# Patient Record
Sex: MALE | Race: BLACK OR AFRICAN AMERICAN | NOT HISPANIC OR LATINO | Employment: OTHER | URBAN - METROPOLITAN AREA
[De-identification: names, ages, dates, MRNs, and addresses within clinical notes are randomized per-mention and may not be internally consistent; named-entity substitution may affect disease eponyms.]

---

## 2017-01-13 ENCOUNTER — INITIAL CONSULT (OUTPATIENT)
Dept: WOUND CARE | Facility: CLINIC | Age: 67
End: 2017-01-13
Payer: MEDICARE

## 2017-01-13 VITALS — DIASTOLIC BLOOD PRESSURE: 73 MMHG | TEMPERATURE: 98 F | SYSTOLIC BLOOD PRESSURE: 130 MMHG | HEART RATE: 60 BPM

## 2017-01-13 DIAGNOSIS — L89.620 DECUBITUS ULCER OF LEFT HEEL, UNSTAGEABLE: ICD-10-CM

## 2017-01-13 DIAGNOSIS — R60.0 BILATERAL LEG EDEMA: ICD-10-CM

## 2017-01-13 DIAGNOSIS — L97.509 ULCER OF OTHER PART OF FOOT: ICD-10-CM

## 2017-01-13 DIAGNOSIS — B35.3 TINEA PEDIS OF BOTH FEET: ICD-10-CM

## 2017-01-13 DIAGNOSIS — I70.25 ATHEROSCLEROSIS OF NATIVE ARTERIES OF THE EXTREMITIES WITH ULCERATION: ICD-10-CM

## 2017-01-13 PROBLEM — L89.600 DECUBITUS ULCER OF HEEL, UNSTAGEABLE: Status: ACTIVE | Noted: 2017-01-13

## 2017-01-13 PROCEDURE — 99203 OFFICE O/P NEW LOW 30 MIN: CPT | Mod: S$PBB,,, | Performed by: NURSE PRACTITIONER

## 2017-01-13 PROCEDURE — 99999 PR PBB SHADOW E&M-EST. PATIENT-LVL V: CPT | Mod: PBBFAC,,, | Performed by: NURSE PRACTITIONER

## 2017-01-13 PROCEDURE — 99215 OFFICE O/P EST HI 40 MIN: CPT | Mod: PBBFAC | Performed by: NURSE PRACTITIONER

## 2017-01-13 RX ORDER — ECONAZOLE NITRATE 10 MG/G
CREAM TOPICAL DAILY
Qty: 85 G | Refills: 0 | Status: ON HOLD | OUTPATIENT
Start: 2017-01-13 | End: 2017-05-01

## 2017-01-13 NOTE — MR AVS SNAPSHOT
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       These Medications        Disp Refills Start End    econazole nitrate 1 % cream 85 g 0 2017    Apply topically once daily. Apply to feet daily as directed. - Topical (Top)    Pharmacy: [facility] - [address] #: [phone]         [facility] On Call     [facility] On Call Nurse Care Line -  Assistance  Registered nurses in the [facility] On Call Center provide clinical advisement, health education, appointment booking, and other advisory services.  Call for this free service at [phone].             Medications           Message regarding Medications     Verify the changes and/or additions to your medication regime listed below are the same as discussed with your clinician today.  If any of these changes or additions are incorrect, please notify your healthcare provider.         START taking these NEW medications        Refills    econazole nitrate 1 % cream 0    Sig: Apply topically once daily. Apply to feet daily as directed.    Class: Normal    Route: Topical (Top)           Verify that the below list of medications is an accurate representation of the medications you are currently taking.  If none reported, the list may be blank. If incorrect, please contact your healthcare provider. Carry this list with you in case of emergency.           Current Medications     allopurinol (ZYLOPRIM) 300 MG tablet Take 300 mg by mouth.  Tablet Oral Every day    amiodarone (PACERONE) 200 MG Tab Take 200 mg by mouth once daily.     apixaban (ELIQUIS) 5 mg Tab Take 5 mg by mouth 2 (two) times daily.    aspirin 81 MG Chew Take 81 mg by mouth. 1 Tablet, Chewable Oral Every day    atorvastatin (LIPITOR) 40 MG tablet Take 40 mg by mouth. 1 Tablet Oral Every day    benzonatate (TESSALON) 100 MG capsule Take 100 mg by mouth 3 (three) times daily as needed for Cough.    carvedilol (COREG) 6.25 MG tablet Take 1 tablet (6.25 mg total) by mouth 2 (two) times daily.    econazole nitrate 1 % cream Apply topically once daily. Apply to feet daily as directed.    ferrous sulfate 325 (65 FE) MG EC tablet Take 1 tablet (325 mg total) by mouth 2 (two) times daily with meals.    fluticasone (FLONASE) 50 mcg/actuation nasal spray 2 sprays by Each Nare route once daily.    furosemide (LASIX) 40 MG tablet Take 1 tablet (40 mg total) by mouth once daily.    guaifenesin (MUCINEX) 600 mg 12 hr tablet Take 1 tablet (600 mg total) by mouth 2 (two) times daily.    insulin detemir (LEVEMIR) 100 unit/mL injection Inject 8 Units into the skin 2 (two) times daily.    insulin lispro (HUMALOG) 100 unit/mL injection Inject 5 Units into the skin 3 (three) times daily before meals.    lisinopril (PRINIVIL,ZESTRIL) 5 MG tablet Take 1 tablet (5 mg total) by mouth once daily. HOLD unless hypertensive    lisinopril (PRINIVIL,ZESTRIL) 5 MG  tablet Take 1 tablet (5 mg total) by mouth once daily.    metOLazone (ZAROXOLYN) 2.5 MG tablet Take 1 tablet (2.5 mg total) by mouth daily as needed (HOLD unless fluid overloaded).    ONE TOUCH ULTRA TEST Strp Test blood sugar four times daily    spironolactone (ALDACTONE) 25 MG tablet Take 1 tablet (25 mg total) by mouth once daily. HOLD unless fluid overloaded           Clinical Reference Information           Vital Signs - Last Recorded  Most recent update: 1/13/2017  3:59 PM by Denita Hendrix NP    Wt BMI             (P) 125.9 kg (277 lb 9.6 oz) (P) 43.57 kg/m2         Allergies as of 1/13/2017     No Known Allergies      Immunizations Administered on Date of Encounter - 1/13/2017     None      Orders Placed During Today's Visit     Future Labs/Procedures Expected by Expires    VAS Ankle Brachial Indices Resting  As directed 1/13/2018      Instructions    Spectazole cream to feet daily.  Cleanse left foot wounds with wound .  Betadine daily to left foot ulcers, cover with gauze and secure with roll gauze.

## 2017-01-13 NOTE — PATIENT INSTRUCTIONS
Spectazole cream to feet daily.  Cleanse left foot wounds with wound .  Betadine daily to left foot ulcers, cover with gauze and secure with roll gauze.

## 2017-01-13 NOTE — PROGRESS NOTES
Subjective:       Patient ID: Hemant Kennedy Jr. is a 66 y.o. male.    Chief Complaint: Wound Check    HPI   This is a 66 year old male referred for evaluation and management of wounds to both feet.  He has been using gentian violet on the wounds and they are not healing. He has home health services through [facility].  He is afebrile.  He denies increased redness, swelling or purulent drainage.  His pain level is 8/10.  His wound healing is complicated by type II diabetes.    Review of Systems   Constitutional: Negative for chills, diaphoresis and fever.   HENT: Positive for rhinorrhea. Negative for hearing loss, postnasal drip, sinus pressure, sneezing, sore throat, tinnitus and trouble swallowing.    Eyes: Negative for visual disturbance.   Respiratory: Positive for apnea (CPAP). Negative for cough, shortness of breath and wheezing.    Cardiovascular: Positive for leg swelling. Negative for chest pain and palpitations.   Gastrointestinal: Negative for constipation, diarrhea, nausea and vomiting.   Genitourinary: Positive for difficulty urinating (Incontinence). Negative for dysuria, frequency and hematuria.   Musculoskeletal: Negative for arthralgias, back pain and joint swelling.   Skin: Positive for wound.   Neurological: Negative for dizziness, weakness, light-headedness and headaches.   Hematological: Bruises/bleeds easily.   Psychiatric/Behavioral: Positive for confusion, decreased concentration, dysphoric mood and sleep disturbance. The patient is not nervous/anxious.        Objective:      Physical Exam   Constitutional: He is oriented to person, place, and time. He appears well-developed and well-nourished. No distress.   HENT:   Head: Normocephalic and atraumatic.   Mouth/Throat: Oropharynx is clear and moist.   Eyes: Conjunctivae and EOM are normal. Pupils are equal, round, and reactive to light. Right eye exhibits no discharge. Left eye exhibits no discharge. No scleral icterus.   Neck:  Normal range of motion. Neck supple. No JVD present. No tracheal deviation present. No thyromegaly present.   Cardiovascular: Normal rate, regular rhythm and normal heart sounds.  Exam reveals no gallop and no friction rub.    No murmur heard.  Pedal pulses non-palpable   Pulmonary/Chest: Effort normal and breath sounds normal. No respiratory distress. He has no wheezes. He has no rales.   Abdominal: Soft. Bowel sounds are normal. He exhibits no distension. There is no tenderness.   Musculoskeletal: Normal range of motion. He exhibits edema (3-4+lower leg). He exhibits no tenderness.        Feet:    Neurological: He is alert and oriented to person, place, and time.   Skin: Skin is warm and dry. No rash noted. He is not diaphoretic. No erythema.   Psychiatric: He has a normal mood and affect. His behavior is normal. Judgment and thought content normal.   Nursing note and vitals reviewed.      Assessment:       1. Uncontrolled type 2 diabetes mellitus with complication, with long-term current use of insulin    2. Ulcer of other part of foot    3. Decubitus ulcer of left heel, unstageable    4. Atherosclerosis of native arteries of the extremities with ulceration    5. Tinea pedis of both feet    6. Bilateral leg edema        Plan:           ALFONSO/PVRs.   Spectazole cream to feet daily.  Cleanse left foot wounds with wound .  Betadine daily to left foot ulcers, cover with gauze and secure with roll gauze.    Return to clinic in 2 weeks.   [facility] notified [facility] orders via EP[facility] fax.    Left lateral foot    Left heel

## 2017-01-18 ENCOUNTER — TELEPHONE (OUTPATIENT)
Dept: WOUND CARE | Facility: CLINIC | Age: 67
End: 2017-01-18

## 2017-01-18 ENCOUNTER — OFFICE VISIT (OUTPATIENT)
Dept: INTERNAL MEDICINE | Facility: CLINIC | Age: 67
End: 2017-01-18
Payer: MEDICARE

## 2017-01-18 VITALS
SYSTOLIC BLOOD PRESSURE: 84 MMHG | HEART RATE: 63 BPM | WEIGHT: 274.06 LBS | DIASTOLIC BLOOD PRESSURE: 42 MMHG | BODY MASS INDEX: 43.01 KG/M2

## 2017-01-18 DIAGNOSIS — I95.9 HYPOTENSION, UNSPECIFIED HYPOTENSION TYPE: ICD-10-CM

## 2017-01-18 DIAGNOSIS — N18.3 CKD (CHRONIC KIDNEY DISEASE), STAGE 3 (MODERATE): ICD-10-CM

## 2017-01-18 DIAGNOSIS — E87.70 HYPERVOLEMIA, UNSPECIFIED HYPERVOLEMIA TYPE: ICD-10-CM

## 2017-01-18 DIAGNOSIS — I50.40 COMBINED SYSTOLIC AND DIASTOLIC CONGESTIVE HEART FAILURE, UNSPECIFIED CONGESTIVE HEART FAILURE CHRONICITY: ICD-10-CM

## 2017-01-18 DIAGNOSIS — G47.33 OSA ON CPAP: ICD-10-CM

## 2017-01-18 DIAGNOSIS — E11.22 TYPE 2 DIABETES MELLITUS WITH STAGE 3 CHRONIC KIDNEY DISEASE, WITHOUT LONG-TERM CURRENT USE OF INSULIN: Primary | ICD-10-CM

## 2017-01-18 DIAGNOSIS — I48.91 ATRIAL FIBRILLATION, UNSPECIFIED TYPE: ICD-10-CM

## 2017-01-18 DIAGNOSIS — N18.30 TYPE 2 DIABETES MELLITUS WITH STAGE 3 CHRONIC KIDNEY DISEASE, WITHOUT LONG-TERM CURRENT USE OF INSULIN: Primary | ICD-10-CM

## 2017-01-18 PROCEDURE — 99999 PR PBB SHADOW E&M-EST. PATIENT-LVL III: CPT | Mod: PBBFAC,GC,, | Performed by: STUDENT IN AN ORGANIZED HEALTH CARE EDUCATION/TRAINING PROGRAM

## 2017-01-18 PROCEDURE — 99214 OFFICE O/P EST MOD 30 MIN: CPT | Mod: S$PBB,GC,, | Performed by: STUDENT IN AN ORGANIZED HEALTH CARE EDUCATION/TRAINING PROGRAM

## 2017-01-18 PROCEDURE — 99213 OFFICE O/P EST LOW 20 MIN: CPT | Mod: PBBFAC | Performed by: STUDENT IN AN ORGANIZED HEALTH CARE EDUCATION/TRAINING PROGRAM

## 2017-01-18 RX ORDER — CARVEDILOL 6.25 MG/1
12.5 TABLET ORAL 2 TIMES DAILY
Qty: 60 TABLET | Refills: 11
Start: 2017-01-18 | End: 2017-02-24 | Stop reason: DRUGHIGH

## 2017-01-18 RX ORDER — FUROSEMIDE 40 MG/1
40 TABLET ORAL DAILY
Qty: 180 TABLET | Refills: 3 | Status: ON HOLD
Start: 2017-01-18 | End: 2017-05-03 | Stop reason: HOSPADM

## 2017-01-18 RX ORDER — GABAPENTIN 300 MG/1
300 CAPSULE ORAL 3 TIMES DAILY
Qty: 90 CAPSULE | Refills: 11 | Status: ON HOLD | OUTPATIENT
Start: 2017-01-18 | End: 2020-12-16 | Stop reason: HOSPADM

## 2017-01-18 RX ORDER — LISINOPRIL 5 MG/1
5 TABLET ORAL DAILY
Qty: 90 TABLET | Refills: 3 | Status: SHIPPED | OUTPATIENT
Start: 2017-01-18 | End: 2017-02-24 | Stop reason: SDUPTHER

## 2017-01-18 NOTE — TELEPHONE ENCOUNTER
----- Message from Chandra Fontanez sent at 1/18/2017  2:09 PM CST -----  Contact: Morelia with [facility]  Caller said pt was seen on 1/13/17 and mentioned anti fungal cream. Pt didn't have a script and they want to know was it sent to a pharmacy or what they need to do. Please call Morelia at [phone]

## 2017-01-18 NOTE — PROGRESS NOTES
Subjective:       Patient ID: Hemant Kennedy Jr. is a 66 y.o. male.    Chief Complaint: Annual Exam    HPI Comments: Mr. Kenneyd is a 66 yro man with PMH DM2, foot ulcer, sacral ulcer, CKD 3, afib, systolic and diastolic HF, HTN, ERIC, CVA, gout, and anemia who presents to clinic to establish care. He was recently hospitalized at the end of November for sepsis due to PNA that was complicated by an HUMERA. He states since his hospitalization, he has been having pain in his left foot due to two ulcers, one on his left heal and the other on his left lateral forefoot. He states he has taken tylenol without relief of the pain. Home Health nursing visits his home several times a week to care for the wounds and change the dressings. Additionally, he has seen the Wound Care nurse within the past week. He states his diabetes is well controlled with 8 levemir twice a day and 5 Humalog prandial. He measures his blood sugars daily and they exhibit good control.   He reports that his BP usually runs ~120/70s. Today his BP is 84/42 automatic and 100/50 manual. He denies syncope, falls, light headedness, or fatigue. He takes lasix 40 and metolazone 2.5 PRN for LE edema. He reports a 20 lb weight gain over the past month. He was sent home with a scale from his past hospital admission, but he states he is unable to use it because he cannot stand.  He endorses bilateral LE edema that he states has been stable over the past weeks. He states he has good urine output and is feeling annoyed by how frequently he has to urinate due to his lasix. He has not seen a cardiologist since his hospital admission. He denies SOB, abdominal distention, CP, or palpitations. He has been wheelchair bound since about 2005 due to arthritis in his knees.       Review of Systems   Constitutional: Positive for unexpected weight change. Negative for activity change, chills, fatigue and fever.   HENT: Negative for congestion and rhinorrhea.    Eyes: Negative for  pain and itching.   Respiratory: Negative for cough and shortness of breath.    Cardiovascular: Positive for leg swelling. Negative for chest pain and palpitations.   Gastrointestinal: Negative for abdominal distention, abdominal pain, constipation and diarrhea.   Endocrine: Negative for cold intolerance and heat intolerance.   Genitourinary: Negative for dysuria, frequency and hematuria.   Musculoskeletal: Negative for arthralgias and myalgias.   Skin: Negative for pallor and rash.   Allergic/Immunologic: Negative for environmental allergies and food allergies.   Neurological: Negative for numbness and headaches.   Hematological: Negative for adenopathy. Does not bruise/bleed easily.   Psychiatric/Behavioral: Negative for agitation and behavioral problems.       Past Medical History   Diagnosis Date    *Atrial fibrillation     Atrial fibrillation     Cardiomyopathy     CHF (congestive heart failure)     Diabetes mellitus     DVT (deep venous thrombosis)     Hypertension     Personal history of stroke with residual effects - Right Occipital 7/25/2012    Sleep apnea     Stroke      Family History   Problem Relation Age of Onset    Heart attack Mother      No past surgical history on file.     Medication List with Changes/Refills   New Medications    GABAPENTIN (NEURONTIN) 300 MG CAPSULE    Take 1 capsule (300 mg total) by mouth 3 (three) times daily.   Current Medications    ALLOPURINOL (ZYLOPRIM) 300 MG TABLET    Take 300 mg by mouth.  Tablet Oral Every day    AMIODARONE (PACERONE) 200 MG TAB    Take 200 mg by mouth once daily.     APIXABAN (ELIQUIS) 5 MG TAB    Take 5 mg by mouth 2 (two) times daily.    ASPIRIN 81 MG CHEW    Take 81 mg by mouth. 1 Tablet, Chewable Oral Every day    ATORVASTATIN (LIPITOR) 40 MG TABLET    Take 40 mg by mouth. 1 Tablet Oral Every day    BENZONATATE (TESSALON) 100 MG CAPSULE    Take 100 mg by mouth 3 (three) times daily as needed for Cough.    ECONAZOLE NITRATE 1 % CREAM     Apply topically once daily. Apply to feet daily as directed.    FERROUS SULFATE 325 (65 FE) MG EC TABLET    Take 1 tablet (325 mg total) by mouth 2 (two) times daily with meals.    FLUTICASONE (FLONASE) 50 MCG/ACTUATION NASAL SPRAY    2 sprays by Each Nare route once daily.    GUAIFENESIN (MUCINEX) 600 MG 12 HR TABLET    Take 1 tablet (600 mg total) by mouth 2 (two) times daily.    INSULIN DETEMIR (LEVEMIR) 100 UNIT/ML INJECTION    Inject 8 Units into the skin 2 (two) times daily.    INSULIN LISPRO (HUMALOG) 100 UNIT/ML INJECTION    Inject 5 Units into the skin 3 (three) times daily before meals.    METOLAZONE (ZAROXOLYN) 2.5 MG TABLET    Take 1 tablet (2.5 mg total) by mouth daily as needed (HOLD unless fluid overloaded).    ONE TOUCH ULTRA TEST STRP    Test blood sugar four times daily    SPIRONOLACTONE (ALDACTONE) 25 MG TABLET    Take 1 tablet (25 mg total) by mouth once daily. HOLD unless fluid overloaded   Changed and/or Refilled Medications    Modified Medication Previous Medication    CARVEDILOL (COREG) 6.25 MG TABLET carvedilol (COREG) 6.25 MG tablet       Take 2 tablets (12.5 mg total) by mouth 2 (two) times daily.    Take 1 tablet (6.25 mg total) by mouth 2 (two) times daily.    FUROSEMIDE (LASIX) 40 MG TABLET furosemide (LASIX) 40 MG tablet       Take 1 tablet (40 mg total) by mouth once daily.    Take 1 tablet (40 mg total) by mouth once daily.    LISINOPRIL (PRINIVIL,ZESTRIL) 5 MG TABLET lisinopril (PRINIVIL,ZESTRIL) 5 MG tablet       Take 1 tablet (5 mg total) by mouth once daily.    Take 1 tablet (5 mg total) by mouth once daily.               Objective:       Visit Vitals    BP (!) 84/42 (BP Location: Left arm, Patient Position: Sitting, BP Method: Automatic)    Pulse 63    Wt 124.3 kg (274 lb 0.5 oz)    BMI 43.01 kg/m2       Physical Exam   Constitutional: He is oriented to person, place, and time. He appears well-developed and well-nourished. No distress.   HENT:   Head: Normocephalic and  atraumatic.   Nose: Nose normal.   Mouth/Throat: Oropharynx is clear and moist. No oropharyngeal exudate.   Eyes: Conjunctivae and EOM are normal. Pupils are equal, round, and reactive to light. No scleral icterus.   Neck: Neck supple. No JVD present. No thyromegaly present.   Cardiovascular: Normal rate, regular rhythm, normal heart sounds and intact distal pulses.    No murmur heard.  Pulmonary/Chest: Effort normal and breath sounds normal. No respiratory distress. He has no wheezes. He has no rales.   Abdominal: Soft. Bowel sounds are normal. He exhibits no distension. There is no tenderness.   Musculoskeletal: Normal range of motion. He exhibits edema. He exhibits no deformity.   Bilateral pitting edema to knees. Left foot wrapped with dressings for ulcer on left heal and left lateral forefoot.    Lymphadenopathy:     He has no cervical adenopathy.   Neurological: He is alert and oriented to person, place, and time.   Skin: Skin is warm and dry. No rash noted. No erythema.       Assessment:       1. Type 2 diabetes mellitus with stage 3 chronic kidney disease, without long-term current use of insulin    2. Ulcer    3. Combined systolic and diastolic congestive heart failure, unspecified congestive heart failure chronicity    4. CKD (chronic kidney disease), stage 3 (moderate)    5. Hypotension, unspecified hypotension type    6. ERIC on CPAP    7. Hypervolemia, unspecified hypervolemia type    8. Atrial fibrillation, unspecified type        Plan:         Type 2 diabetes mellitus with stage 3 chronic kidney disease, without long-term current use of insulin  -     Lipid panel; Future; Expected date: 1/18/17  -     Ambulatory Referral to Endocrinology  -     gabapentin (NEURONTIN) 300 MG capsule; Take 1 capsule (300 mg total) by mouth 3 (three) times daily.  Dispense: 90 capsule; Refill: 11  -     Ambulatory Referral to Optometry  -    Continue levemir 8 BID and Humalog 5 prandial     Ulcer        -   Home health  nursing visits his home several times a week to care for wounds and changes wound dressings        -   Follows with Wound Care, last seen by wound care nurse this week        -   Pain not adequately addressed with tylenol, start gabapentin 300 TID    Combined systolic and diastolic congestive heart failure, unspecified congestive heart failure chronicity  -     Ambulatory consult to Cardiology  -     Refilled medications below:  -     carvedilol (COREG) 6.25 MG tablet; Take 2 tablets (12.5 mg total) by mouth 2 (two) times daily.  Dispense: 60 tablet; Refill: 11  -     furosemide (LASIX) 40 MG tablet; Take 1 tablet (40 mg total) by mouth once daily.  Dispense: 180 tablet; Refill: 3        -     lisinopril (PRINIVIL,ZESTRIL) 5 MG tablet; Take 1 tablet (5 mg total) by mouth once daily.  Dispense: 90 tablet; Refill: 3    CKD (chronic kidney disease), stage 3 (moderate)  -     COMPREHENSIVE METABOLIC PANEL; Future; Expected date: 1/18/17  -     Follows with Dr. Barker in nephrology, last seen within the past mo and was instructed to restart all anti-HTN medications after hospital discharge on 11/30    Hypotension, unspecified hypotension type       -     Currently taking coreg 12.5 BID, lasix 40, lisinopril 5 mg, spirnolactone 25 mg, metolazone 2.5 PRN for LE edema       -     Consulted cardiology for further recommendations, last seen by Dr. Joseph in cardiology clinic    ERIC on CPAP        -     Continue nightly CPAP and home oxygen    Hypervolemia, unspecified hypervolemia type        -     Reports significant weight gain over past month- about 20 lbs        -     Currently taking lasix 40 mg and metolazone 2.5 PRN for LE edema        -     States he follows a low salt diet, but is not fluid restricted         -     Unable to measure his weight on scale he was given because he cannot stand. Requesting scale with arm rails for support        -     Consulted cardiology for further recommendations    NUNO Sanchez       -     Continue apixiban 5 mg and amdiodarone 200 mg    CAD        -    Continue ASA 81, atorvastatin 40    Gout       -     Continue Allopurinol 300 mg    Patient seen with Dr. RANDALL Cooper MD  IM PGY-1

## 2017-01-18 NOTE — MR AVS SNAPSHOT
[facility] - Internal Medicine  [address]  [place] [address]  Phone: [phone]  Fax: [phone]                  Hemant Kennedy Jr.   2017 1:15 PM   Office Visit    Description:  Male : 1950   Provider:  Vickie Cooper MD   Department:  [facility] - Internal Medicine           Reason for Visit     Annual Exam           Diagnoses this Visit        Comments    Type 2 diabetes mellitus with stage 3 chronic kidney disease, without long-term current use of insulin    -  Primary     Ulcer         Health care maintenance         Combined systolic and diastolic congestive heart failure, unspecified congestive heart failure chronicity         CKD (chronic kidney disease), stage 3 (moderate)         Hypotension, unspecified hypotension type                To Do List           Future Appointments        Provider Department Dept Phone    2017 11:00 AM VASCULAR, LAB [facility] - Vascular Laboratory [phone]    2017 12:40 PM Denita Hendrix NP [facility] - Wound Care [phone]    2017 10:30 AM Karne Mcdermott OD [facility] - Optometry [phone]    2017 4:00 PM Viri Chambers MD [facility] - Cardiology [phone]    3/29/2017 1:00 PM Lakisha Plummer MD [facility] - Nephrology [phone]      Goals (5 Years of Data)     None      Follow-Up and Disposition     Return in about 3 months (around 2017), or if symptoms worsen or fail to improve.       These Medications        Disp Refills Start End    gabapentin (NEURONTIN) 300 MG capsule 90 capsule 11 2017    Take 1 capsule (300 mg total) by mouth 3 (three) times daily. - Oral    Pharmacy: [facility] [id number] [address] [facility] AT [facility] Ph #: [phone]       carvedilol (COREG) 6.25 MG tablet 60 tablet 11 2017    Take 2 tablets (12.5 mg total) by mouth 2 (two) times daily. - Oral    Pharmacy: [facility] [id number] - [facility]  [address] AT [facility] Ph #: [phone]       lisinopril (PRINIVIL,ZESTRIL) 5 MG tablet 90 tablet 3 1/18/2017 1/18/2018    Take 1 tablet (5 mg total) by mouth once daily. - Oral    Pharmacy: [facility] [address] [place] [address] AT [facility] Ph #: [phone]       furosemide (LASIX) 40 MG tablet 180 tablet 3 1/18/2017     Take 1 tablet (40 mg total) by mouth once daily. - Oral    Pharmacy: [facility] [address] AT [facility] Ph #: [phone]         [facility]     [facility] On Call Nurse Care Line - 24/7 Assistance  Registered nurses in the [facility] Center provide clinical advisement, health education, appointment booking, and other advisory services.  Call for this free service at [phone].             Medications           Message regarding Medications     Verify the changes and/or additions to your medication regime listed below are the same as discussed with your clinician today.  If any of these changes or additions are incorrect, please notify your healthcare provider.        START taking these NEW medications        Refills    gabapentin (NEURONTIN) 300 MG capsule 11    Sig: Take 1 capsule (300 mg total) by mouth 3 (three) times daily.    Class: Normal    Route: Oral      CHANGE how you are taking these medications     Start Taking Instead of    carvedilol (COREG) 6.25 MG tablet carvedilol (COREG) 6.25 MG tablet    Dosage:  Take 2 tablets (12.5 mg total) by mouth 2 (two) times daily. Dosage:  Take 1 tablet (6.25 mg total) by mouth 2 (two) times daily.    Reason for Change:  Reorder            Verify that the below list of medications is an accurate representation of the medications you are currently taking.  If none reported, the list may be blank. If incorrect, please contact your healthcare provider. Carry this list with you in case of emergency.            Current Medications     allopurinol (ZYLOPRIM) 300 MG tablet Take 300 mg by mouth.  Tablet Oral Every day    amiodarone (PACERONE) 200 MG Tab Take 200 mg by mouth once daily.     apixaban (ELIQUIS) 5 mg Tab Take 5 mg by mouth 2 (two) times daily.    aspirin 81 MG Chew Take 81 mg by mouth. 1 Tablet, Chewable Oral Every day    atorvastatin (LIPITOR) 40 MG tablet Take 40 mg by mouth. 1 Tablet Oral Every day    benzonatate (TESSALON) 100 MG capsule Take 100 mg by mouth 3 (three) times daily as needed for Cough.    carvedilol (COREG) 6.25 MG tablet Take 2 tablets (12.5 mg total) by mouth 2 (two) times daily.    econazole nitrate 1 % cream Apply topically once daily. Apply to feet daily as directed.    ferrous sulfate 325 (65 FE) MG EC tablet Take 1 tablet (325 mg total) by mouth 2 (two) times daily with meals.    fluticasone (FLONASE) 50 mcg/actuation nasal spray 2 sprays by Each Nare route once daily.    furosemide (LASIX) 40 MG tablet Take 1 tablet (40 mg total) by mouth once daily.    insulin detemir (LEVEMIR) 100 unit/mL injection Inject 8 Units into the skin 2 (two) times daily.    insulin lispro (HUMALOG) 100 unit/mL injection Inject 5 Units into the skin 3 (three) times daily before meals.    lisinopril (PRINIVIL,ZESTRIL) 5 MG tablet Take 1 tablet (5 mg total) by mouth once daily.    metOLazone (ZAROXOLYN) 2.5 MG tablet Take 1 tablet (2.5 mg total) by mouth daily as needed (HOLD unless fluid overloaded).    ONE TOUCH ULTRA TEST Strp Test blood sugar four times daily    spironolactone (ALDACTONE) 25 MG tablet Take 1 tablet (25 mg total) by mouth once daily. HOLD unless fluid overloaded    gabapentin (NEURONTIN) 300 MG capsule Take 1 capsule (300 mg total) by mouth 3 (three) times daily.    guaifenesin (MUCINEX) 600 mg 12 hr tablet Take 1 tablet (600 mg total) by mouth 2 (two) times daily.           Clinical Reference Information           Vital Signs - Last Recorded  Most recent update: 1/18/2017  1:28 PM by  Steph Mcclendon MA    BP Pulse Wt BMI       (!) 84/42 (BP Location: Left arm, Patient Position: Sitting, BP Method: Automatic) 63 124.3 kg (274 lb 0.5 oz) 43.01 kg/m2       Blood Pressure          Most Recent Value    BP  (!)  84/42      Allergies as of 1/18/2017     No Known Allergies      Immunizations Administered on Date of Encounter - 1/18/2017     None      Orders Placed During Today's Visit      Normal Orders This Visit    Ambulatory consult to Cardiology     Ambulatory Referral to Endocrinology     Ambulatory Referral to Optometry     Future Labs/Procedures Expected by Expires    COMPREHENSIVE METABOLIC PANEL  1/18/2017 3/19/2018    Lipid panel  1/18/2017 3/19/2018

## 2017-01-19 DIAGNOSIS — I50.20 SYSTOLIC CONGESTIVE HEART FAILURE, UNSPECIFIED CONGESTIVE HEART FAILURE CHRONICITY: Primary | ICD-10-CM

## 2017-01-19 NOTE — PROGRESS NOTES
Patient examined, record reviewed, agree with findings and recommendations as documented above. Complicated medical history with multiple active conditions. Today, weight is significantly heavier than on discharge from hospital and also significantly heavier than last cardiology visit. Lower extremity edema bilaterally. Difficulty weighing at home as he does not have an appropriate scale with handles for balance while in standing position. Refilled medications. Message to cardiology regarding increase in weight likely due to HF. Obtain bloodwork today.

## 2017-01-24 ENCOUNTER — LAB VISIT (OUTPATIENT)
Dept: LAB | Facility: HOSPITAL | Age: 67
End: 2017-01-24
Attending: HOSPITALIST
Payer: MEDICARE

## 2017-01-24 ENCOUNTER — OFFICE VISIT (OUTPATIENT)
Dept: CARDIOLOGY | Facility: CLINIC | Age: 67
End: 2017-01-24
Payer: MEDICARE

## 2017-01-24 VITALS
BODY MASS INDEX: 45.88 KG/M2 | HEIGHT: 65 IN | HEART RATE: 62 BPM | WEIGHT: 275.38 LBS | SYSTOLIC BLOOD PRESSURE: 103 MMHG | DIASTOLIC BLOOD PRESSURE: 51 MMHG

## 2017-01-24 DIAGNOSIS — I42.8 CARDIOMYOPATHY, NONISCHEMIC: ICD-10-CM

## 2017-01-24 DIAGNOSIS — E78.5 HYPERLIPIDEMIA, UNSPECIFIED HYPERLIPIDEMIA TYPE: ICD-10-CM

## 2017-01-24 DIAGNOSIS — R60.0 BILATERAL LEG EDEMA: ICD-10-CM

## 2017-01-24 DIAGNOSIS — N18.30 CKD (CHRONIC KIDNEY DISEASE) STAGE 3, GFR 30-59 ML/MIN: ICD-10-CM

## 2017-01-24 DIAGNOSIS — Z09 HOSPITAL DISCHARGE FOLLOW-UP: Primary | ICD-10-CM

## 2017-01-24 DIAGNOSIS — I10 ESSENTIAL HYPERTENSION: ICD-10-CM

## 2017-01-24 DIAGNOSIS — I50.20 SYSTOLIC CONGESTIVE HEART FAILURE, UNSPECIFIED CONGESTIVE HEART FAILURE CHRONICITY: ICD-10-CM

## 2017-01-24 DIAGNOSIS — I63.9 CEREBROVASCULAR ACCIDENT (CVA), UNSPECIFIED MECHANISM: ICD-10-CM

## 2017-01-24 DIAGNOSIS — I48.91 ATRIAL FIBRILLATION, UNSPECIFIED TYPE: ICD-10-CM

## 2017-01-24 DIAGNOSIS — I50.22 CHRONIC SYSTOLIC HEART FAILURE: ICD-10-CM

## 2017-01-24 DIAGNOSIS — E66.01 MORBID OBESITY DUE TO EXCESS CALORIES: ICD-10-CM

## 2017-01-24 PROBLEM — I70.25 ATHEROSCLEROSIS OF NATIVE ARTERIES OF THE EXTREMITIES WITH ULCERATION: Status: RESOLVED | Noted: 2017-01-13 | Resolved: 2017-01-24

## 2017-01-24 LAB
ANION GAP SERPL CALC-SCNC: 7 MMOL/L
BNP SERPL-MCNC: 127 PG/ML
BUN SERPL-MCNC: 40 MG/DL
CALCIUM SERPL-MCNC: 8.6 MG/DL
CHLORIDE SERPL-SCNC: 108 MMOL/L
CO2 SERPL-SCNC: 27 MMOL/L
CREAT SERPL-MCNC: 1.9 MG/DL
EST. GFR  (AFRICAN AMERICAN): 41 ML/MIN/1.73 M^2
EST. GFR  (NON AFRICAN AMERICAN): 35.4 ML/MIN/1.73 M^2
GLUCOSE SERPL-MCNC: 105 MG/DL
POTASSIUM SERPL-SCNC: 4.6 MMOL/L
SODIUM SERPL-SCNC: 142 MMOL/L

## 2017-01-24 PROCEDURE — 99213 OFFICE O/P EST LOW 20 MIN: CPT | Mod: PBBFAC | Performed by: INTERNAL MEDICINE

## 2017-01-24 PROCEDURE — 93010 ELECTROCARDIOGRAM REPORT: CPT | Mod: S$PBB,,, | Performed by: INTERNAL MEDICINE

## 2017-01-24 PROCEDURE — 99214 OFFICE O/P EST MOD 30 MIN: CPT | Mod: S$PBB,GC,, | Performed by: INTERNAL MEDICINE

## 2017-01-24 PROCEDURE — 93005 ELECTROCARDIOGRAM TRACING: CPT | Mod: PBBFAC | Performed by: INTERNAL MEDICINE

## 2017-01-24 PROCEDURE — 99999 PR PBB SHADOW E&M-EST. PATIENT-LVL III: CPT | Mod: PBBFAC,GC,, | Performed by: INTERNAL MEDICINE

## 2017-01-24 NOTE — PROGRESS NOTES
I have personally taken the history and examined this patient and agree with the fellow's and resident's note as stated above. If creatinine trends 2.0 or above, would stop spironolactone.

## 2017-01-24 NOTE — MR AVS SNAPSHOT
[facility] - Cardiology  [address] [facility] [address]  Phone: [phone]                  Hematn Kennedy Jr.   2017 11:00 AM   Office Visit    Description:  Male : 1950   Provider:  Luther Brandt MD   Department:  [facility] - Cardiology           Reason for Visit     Chronic systolic congestive heart failure, NYHA class 2           Diagnoses this Visit        Comments    Morbid obesity due to excess calories    -  Primary     Bilateral leg edema         CKD (chronic kidney disease) stage 3, GFR 30-59 ml/min         Cardiomyopathy, nonischemic         Atrial fibrillation, unspecified type         Essential hypertension                To Do List           Future Appointments        Provider Department Dept Phone    2017 11:00 AM VASCULAR, LAB [facility] - Vascular Laboratory [phone]    2017 12:40 PM Denita Hendrix NP [facility] - Wound Care [phone]    2017 10:30 AM Karen Mcdermott OD [facility] - Optometry [phone]    2017 4:00 PM Viri Chambers MD [facility] - Cardiology [phone]    3/29/2017 1:00 PM Lakisha Plummer MD [facility] - Nephrology [phone]      Goals (5 Years of Data)     None      Follow-Up and Disposition     Return in about 1 month (around 2017).      [facility]     [facility] On Call Nurse [facility] -  Assistance  Registered nurses in the [facility] On Call Center provide clinical advisement, health education, appointment booking, and other advisory services.  Call for this free service at [phone].             Medications           Message regarding Medications     Verify the changes and/or additions to your medication regime listed below are the same as discussed with your clinician today.  If any of these changes or additions are incorrect, please notify your healthcare provider.        STOP taking these medications     benzonatate (TESSALON) 100 MG capsule Take 100 mg by mouth 3 (three) times daily as needed for  Cough.    guaifenesin (MUCINEX) 600 mg 12 hr tablet Take 1 tablet (600 mg total) by mouth 2 (two) times daily.           Verify that the below list of medications is an accurate representation of the medications you are currently taking.  If none reported, the list may be blank. If incorrect, please contact your healthcare provider. Carry this list with you in case of emergency.           Current Medications     allopurinol (ZYLOPRIM) 300 MG tablet Take 300 mg by mouth.  Tablet Oral Every day    amiodarone (PACERONE) 200 MG Tab Take 200 mg by mouth once daily.     apixaban (ELIQUIS) 5 mg Tab Take 5 mg by mouth 2 (two) times daily.    aspirin 81 MG Chew Take 81 mg by mouth. 1 Tablet, Chewable Oral Every day    atorvastatin (LIPITOR) 40 MG tablet Take 40 mg by mouth. 1 Tablet Oral Every day    econazole nitrate 1 % cream Apply topically once daily. Apply to feet daily as directed.    ferrous sulfate 325 (65 FE) MG EC tablet Take 1 tablet (325 mg total) by mouth 2 (two) times daily with meals.    fluticasone (FLONASE) 50 mcg/actuation nasal spray 2 sprays by Each Nare route once daily.    furosemide (LASIX) 40 MG tablet Take 1 tablet (40 mg total) by mouth once daily.    gabapentin (NEURONTIN) 300 MG capsule Take 1 capsule (300 mg total) by mouth 3 (three) times daily.    HYDROPHILIC CREAM (TRIAD WOUND DRESSING TOP) Apply topically daily as needed.    insulin detemir (LEVEMIR) 100 unit/mL injection Inject 8 Units into the skin 2 (two) times daily.    insulin lispro (HUMALOG) 100 unit/mL injection Inject 5 Units into the skin 3 (three) times daily before meals.    lisinopril (PRINIVIL,ZESTRIL) 5 MG tablet Take 1 tablet (5 mg total) by mouth once daily.    metOLazone (ZAROXOLYN) 2.5 MG tablet Take 1 tablet (2.5 mg total) by mouth daily as needed (HOLD unless fluid overloaded).    ONE TOUCH ULTRA TEST Strp Test blood sugar four times daily    spironolactone (ALDACTONE) 25 MG tablet Take 1 tablet (25 mg total) by mouth  "once daily. HOLD unless fluid overloaded    carvedilol (COREG) 6.25 MG tablet Take 2 tablets (12.5 mg total) by mouth 2 (two) times daily.           Clinical Reference Information           Vital Signs - Last Recorded  Most recent update: 1/24/2017 10:54 AM by Ella Zelaya MA    BP Pulse Ht Wt BMI    (!) 103/51 (BP Location: Left arm, Patient Position: Sitting, BP Method: Automatic) 62 5' 5" (1.651 m) 124.9 kg (275 lb 5.7 oz) 45.82 kg/m2      Blood Pressure          Most Recent Value    Right Arm BP - Sitting  95/53    Left Arm BP - Sitting  103/51    BP  (!)  103/51      Allergies as of 1/24/2017     No Known Allergies      Immunizations Administered on Date of Encounter - 1/24/2017     None      Orders Placed During Today's Visit      Normal Orders This Visit    IN OFFICE EKG 12-LEAD (to [place])     Future Labs/Procedures Expected by Expires    Basic metabolic panel  1/31/2017 3/25/2018      "

## 2017-01-24 NOTE — PROGRESS NOTES
Cardiology Clinic Note  1/24/17    Subjective:       Patient ID: Hemant Kennedy Jr. is a 66 y.o. male being seen for an established visit.    Chief Complaint: Chronic systolic congestive heart failure, NYHA class 2    HPI  67 y/o M with HFrEF (EF 20-25% in Jun 2016), NICM, HTN, HLD, atrial fibrillation, T2DM, h/o CVA in 2010, ERIC who presents to the clinic for hospital follow up.     He was admitted to hospital Nov 9-Nov 30 '16 for fall from bed that resulted in a head laceration. On presentation to the ER, patient was noted to have hypotension, elevated leukocytosis and bilateral infiltrates on CXR indicative of pneumonia. He was thus treated for septic shock 2/2 pneumonia. Prior to discharge he was asked to discontinue metolazone and spirinolactone; his lisinopril dose was decreased from 40mg daily to 5mg daily due to persistent hypotension in the hospital. He was discharged to rehab for 2 weeks and since has been week. Although his BP is low in clinic, his records by home health nurse note SBP ranging from 115-140s at home    In clinic, he notes no chest, palpitations, SOB. He denies any orthopnea (sleeps on 1 pillow at night) or PND. He usually wears CPAP nightly for ERIC.    Of note, patient he wheel-chair bound due to severe bilateral osteoarthritis of knees that prevent weight-bearing for any length of time. He has a h/o CVA in 2010 that resulted in residual left sided weakness in upper and lower extremities.        Past Medical History   Diagnosis Date    *Atrial fibrillation     Atrial fibrillation     Cardiomyopathy     CHF (congestive heart failure)     Diabetes mellitus     DVT (deep venous thrombosis)     Hypertension     Personal history of stroke with residual effects - Right Occipital 7/25/2012    Sleep apnea     Stroke        No past surgical history on file.    Family History   Problem Relation Age of Onset    Heart attack Mother        Social History     Social History    Marital  status:      Spouse name: N/A    Number of children: N/A    Years of education: N/A     Social History Main Topics    Smoking status: Never Smoker    Smokeless tobacco: Never Used    Alcohol use No    Drug use: No    Sexual activity: Not on file     Other Topics Concern    Not on file     Social History Narrative     Review of Systems   Constitutional: Negative for appetite change, chills and unexpected weight change.   Eyes: Negative for visual disturbance.   Respiratory: Negative for chest tightness, shortness of breath and wheezing.    Cardiovascular: Positive for leg swelling. Negative for chest pain and palpitations.   Gastrointestinal: Negative for abdominal pain, diarrhea and nausea.   Endocrine: Positive for polyuria.   Musculoskeletal: Positive for arthralgias.   Neurological: Negative for dizziness, weakness and light-headedness.       Patient's Medications   New Prescriptions    No medications on file   Previous Medications    ALLOPURINOL (ZYLOPRIM) 300 MG TABLET    Take 300 mg by mouth.  Tablet Oral Every day    AMIODARONE (PACERONE) 200 MG TAB    Take 200 mg by mouth once daily.     APIXABAN (ELIQUIS) 5 MG TAB    Take 5 mg by mouth 2 (two) times daily.    ASPIRIN 81 MG CHEW    Take 81 mg by mouth. 1 Tablet, Chewable Oral Every day    ATORVASTATIN (LIPITOR) 40 MG TABLET    Take 40 mg by mouth. 1 Tablet Oral Every day    CARVEDILOL (COREG) 6.25 MG TABLET    Take 2 tablets (12.5 mg total) by mouth 2 (two) times daily.    ECONAZOLE NITRATE 1 % CREAM    Apply topically once daily. Apply to feet daily as directed.    FERROUS SULFATE 325 (65 FE) MG EC TABLET    Take 1 tablet (325 mg total) by mouth 2 (two) times daily with meals.    FLUTICASONE (FLONASE) 50 MCG/ACTUATION NASAL SPRAY    2 sprays by Each Nare route once daily.    FUROSEMIDE (LASIX) 40 MG TABLET    Take 1 tablet (40 mg total) by mouth once daily.    GABAPENTIN (NEURONTIN) 300 MG CAPSULE    Take 1 capsule (300 mg total) by mouth 3  (three) times daily.    HYDROPHILIC CREAM (TRIAD WOUND DRESSING TOP)    Apply topically daily as needed.    INSULIN DETEMIR (LEVEMIR) 100 UNIT/ML INJECTION    Inject 8 Units into the skin 2 (two) times daily.    INSULIN LISPRO (HUMALOG) 100 UNIT/ML INJECTION    Inject 5 Units into the skin 3 (three) times daily before meals.    LISINOPRIL (PRINIVIL,ZESTRIL) 5 MG TABLET    Take 1 tablet (5 mg total) by mouth once daily.    METOLAZONE (ZAROXOLYN) 2.5 MG TABLET    Take 1 tablet (2.5 mg total) by mouth daily as needed (HOLD unless fluid overloaded).    ONE TOUCH ULTRA TEST STRP    Test blood sugar four times daily    SPIRONOLACTONE (ALDACTONE) 25 MG TABLET    Take 1 tablet (25 mg total) by mouth once daily. HOLD unless fluid overloaded   Modified Medications    No medications on file   Discontinued Medications    BENZONATATE (TESSALON) 100 MG CAPSULE    Take 100 mg by mouth 3 (three) times daily as needed for Cough.    GUAIFENESIN (MUCINEX) 600 MG 12 HR TABLET    Take 1 tablet (600 mg total) by mouth 2 (two) times daily.       Patient Active Problem List    Diagnosis Date Noted    Chronic systolic heart failure 01/24/2017    Type II diabetes mellitus with complication, uncontrolled 01/13/2017    Ulcer of other part of foot 01/13/2017    Decubitus ulcer of heel, unstageable 01/13/2017    Tinea pedis 01/13/2017    Bilateral leg edema 01/13/2017    HUMERA (acute kidney injury)     ATN (acute tubular necrosis) 11/10/2016     - pt has CKD Stage III, with baseline Cr of 1.4  - Cr of 8.9, repeat Cr of 7.9  - likely pre-renal and 2/2 to hypotensive injury as pt is in septic shock  - will f/u urine studies      Left-sided weakness secondary to cva 2010 07/15/2016    Wheelchair bound 07/15/2016    On home oxygen therapy 07/15/2016    Morbid obesity due to excess calories 12/02/2015    CVA 2010 L sided weakness 11/29/2015    Acute systolic heart failure 11/28/2015    CKD (chronic kidney disease) stage 3, GFR 30-59  "ml/min 11/27/2015    Atrial fibrillation, controlled 07/21/2013    Anemia 07/21/2013    Anasarca 07/20/2013    CAP (community acquired pneumonia) 07/07/2013    Debility 07/07/2013    Diabetes mellitus, type 2 07/07/2013    Morbid obesity 07/07/2013    Sleep apnea 07/07/2013    Hypertension 12/18/2012    Atrial fibrillation 12/18/2012    Cardiomyopathy, nonischemic 12/18/2012    Chronic systolic congestive heart failure, NYHA class 2 12/18/2012    Long term (current) use of anticoagulants 07/25/2012         Objective:      Visit Vitals    BP (!) 103/51 (BP Location: Left arm, Patient Position: Sitting, BP Method: Automatic)    Pulse 62    Ht 5' 5" (1.651 m)    Wt 124.9 kg (275 lb 5.7 oz)    BMI 45.82 kg/m2     Estimated body mass index is 45.82 kg/(m^2) as calculated from the following:    Height as of this encounter: 5' 5" (1.651 m).    Weight as of this encounter: 124.9 kg (275 lb 5.7 oz).    Physical Exam   Constitutional: He is oriented to person, place, and time. He appears well-developed and well-nourished.   HENT:   Head: Normocephalic and atraumatic.   Eyes: Pupils are equal, round, and reactive to light.   Neck: No JVD present.   Cardiovascular: Normal heart sounds and intact distal pulses.    Irregular rate and rhythm   Pulmonary/Chest: Effort normal and breath sounds normal. He has no wheezes. He has no rales.   Abdominal: Soft. Bowel sounds are normal.   Musculoskeletal: He exhibits edema (1+ pitting edema bilaterally).   Neurological: He is alert and oriented to person, place, and time.   Skin: Skin is warm and dry.   Vitals reviewed.      2D ECHO (7/14/16)  Results for orders placed or performed during the hospital encounter of 07/14/16   2D echo with color flow doppler   Result Value Ref Range    EF 25 (A) 55 - 65    Diastolic Dysfunction Yes (A)     Est. PA Systolic Pressure 53.44 (A)     Tricuspid Valve Regurgitation TRIVIAL TO MILD        Assessment:         1. Hospital discharge " follow-up     2. Chronic systolic heart failure     3. Atrial fibrillation, unspecified type  IN OFFICE EKG 12-LEAD (to [place])   4. CKD (chronic kidney disease) stage 3, GFR 30-59 ml/min  Basic metabolic panel   5. Cardiomyopathy, nonischemic     6. Essential hypertension     7. Bilateral leg edema     8. Cerebrovascular accident (CVA), unspecified mechanism     9. Morbid obesity due to excess calories     10. Hyperlipidemia, unspecified hyperlipidemia type           Plan:       Mr. Kennedy is a 65 y/o M with HFrEF (EF 20-25% in Jun 2016), NICM, HTN, HLD, atrial fibrillation, T2DM, h/o CVA in 2010, ERIC who presents to the clinic for hospital follow up.     Hospital follow up  - has completed 2 weeks of inpatient rehab  - no further falls or new symptoms since discharge    HFrEF  - although patient has LE edema, he does not have JVD or pulmonary crackles indicative of fluid, thus appearing euvolemic  - he is asymptomatic - denies SOBOE  - will continue lasix 40mg daily, coreg 12.5mg BID, spirinolactone 25mg, and lisinopril 5mg  - will continue holding off on metalazone at this time as patient is reporting polyuria  - patient unable to weigh himself at home, therefore unable to adjust metolazone and lasix on a prn basis    Atrial fibrillation, unspecified type  - IN OFFICE EKG 12-LEAD (to [place])_ Sinus bradycardia today  - for rate control: continue carvedilol 6.25mg BID  - for rhythm control: continue amiodarone 200mg  - for anticoagulation: CHADsVASC 6; will continue with elliquis 5mg daily    CKD (chronic kidney disease) stage 3, GFR 30-59 ml/min  - Cr 1.9 today appears to higher than previous baseline however not warranting change of medications yet  - Basic metabolic panel ordered in 1-2 weeks  - if Cr continue to trend upwards, will consider discontinuing ACEi vs. lasix to prevent further kidney injury    Cardiomyopathy, nonischemic  - no acute issues  - continue RF modification of HTN, T2DM, HLD, morbid  obesity    Essential hypertension  - BP in clinic 103/51; unable to perform orthostatics as he is unable to stand  - per home records, SBP ranging 110-140s  - will continue current regimen: coreg, lisnopril, lasix    Bilateral leg edema  - likely secondary to HFrEF. See above for plan  - encouraged leg elevation when home    CVA 2010 with residual L sided weakness  - continue RF mod for increased stroke risk - continue aspirin, atorvastaitn, elliquis for anticoagulation    Morbid obesity due to excess calories  - patient physically unable to exercise due to wheelchair-bound status  - encouraged low-salt, low-chol diet; avoid juice and soft-drinks    Hyperlipidemia  - will continue atorvastatin 40mg daily    BMP in 1-2 weeks to evaluate stable vs. worsening creatinine. RTC in 1 month with Dr. Chambers.    Patient seen and plan of care discussed with Dr. Vazquez and Dr. Damian Olivares MD  Internal Medicine, PGY-1  [phone]    Fellow Addendum    I saw and evaluated the patient with Dr. Olivares and reviewed and edited the note above. I agree with the plan.    Luther Brandt MD  Cardiology Fellow, PGY-VI  Pager: [phone]  1/24/2017, 4:55 PM

## 2017-02-01 ENCOUNTER — OFFICE VISIT (OUTPATIENT)
Dept: WOUND CARE | Facility: CLINIC | Age: 67
End: 2017-02-01
Payer: MEDICARE

## 2017-02-01 ENCOUNTER — HOSPITAL ENCOUNTER (OUTPATIENT)
Dept: VASCULAR SURGERY | Facility: CLINIC | Age: 67
Discharge: HOME | End: 2017-02-01
Attending: NURSE PRACTITIONER
Payer: MEDICARE

## 2017-02-01 VITALS
BODY MASS INDEX: 44.82 KG/M2 | HEIGHT: 66 IN | HEART RATE: 55 BPM | WEIGHT: 278.88 LBS | DIASTOLIC BLOOD PRESSURE: 67 MMHG | TEMPERATURE: 98 F | SYSTOLIC BLOOD PRESSURE: 112 MMHG

## 2017-02-01 DIAGNOSIS — I73.9 PERIPHERAL VASCULAR DISEASE, UNSPECIFIED: ICD-10-CM

## 2017-02-01 DIAGNOSIS — I70.25 ATHEROSCLEROSIS OF NATIVE ARTERIES OF THE EXTREMITIES WITH ULCERATION: ICD-10-CM

## 2017-02-01 DIAGNOSIS — B35.3 TINEA PEDIS OF BOTH FEET: ICD-10-CM

## 2017-02-01 DIAGNOSIS — L89.620 DECUBITUS ULCER OF LEFT HEEL, UNSTAGEABLE: ICD-10-CM

## 2017-02-01 DIAGNOSIS — R60.0 BILATERAL LEG EDEMA: ICD-10-CM

## 2017-02-01 DIAGNOSIS — L97.509 ULCER OF OTHER PART OF FOOT: ICD-10-CM

## 2017-02-01 PROCEDURE — 11042 DBRDMT SUBQ TIS 1ST 20SQCM/<: CPT | Mod: PBBFAC,LT | Performed by: NURSE PRACTITIONER

## 2017-02-01 PROCEDURE — 99999 PR PBB SHADOW E&M-EST. PATIENT-LVL V: CPT | Mod: PBBFAC,,, | Performed by: NURSE PRACTITIONER

## 2017-02-01 PROCEDURE — 99499 UNLISTED E&M SERVICE: CPT | Mod: S$PBB,,, | Performed by: NURSE PRACTITIONER

## 2017-02-01 PROCEDURE — 93923 UPR/LXTR ART STDY 3+ LVLS: CPT | Mod: 26,S$PBB,, | Performed by: SURGERY

## 2017-02-01 PROCEDURE — 99215 OFFICE O/P EST HI 40 MIN: CPT | Mod: PBBFAC | Performed by: NURSE PRACTITIONER

## 2017-02-01 PROCEDURE — 11042 DBRDMT SUBQ TIS 1ST 20SQCM/<: CPT | Mod: S$PBB,,, | Performed by: NURSE PRACTITIONER

## 2017-02-01 NOTE — PROGRESS NOTES
Subjective:       Patient ID: Hemant Kennedy Jr. is a 66 y.o. male.    Chief Complaint: Wound Check    Wound Check        This patient is seen today for reevaluation of wounds to both feet.  He has been using betadine on the wounds and they are not healing. He has [facility] health services [facility].  He is afebrile.  He denies increased redness, swelling or purulent drainage.  His pain level is 8/10.  His wound healing is complicated by type II diabetes.    Review of Systems   Constitutional: Negative for chills, diaphoresis and fever.   HENT: Positive for rhinorrhea. Negative for hearing loss, postnasal drip, sinus pressure, sneezing, sore throat, tinnitus and trouble swallowing.    Eyes: Negative for visual disturbance.   Respiratory: Positive for apnea (CPAP). Negative for cough, shortness of breath and wheezing.    Cardiovascular: Positive for leg swelling. Negative for chest pain and palpitations.   Gastrointestinal: Negative for constipation, diarrhea, nausea and vomiting.   Genitourinary: Positive for difficulty urinating (Incontinence). Negative for dysuria, frequency and hematuria.   Musculoskeletal: Negative for arthralgias, back pain and joint swelling.   Skin: Positive for wound.   Neurological: Negative for dizziness, weakness, light-headedness and headaches.   Hematological: Bruises/bleeds easily.   Psychiatric/Behavioral: Positive for confusion, decreased concentration, dysphoric mood and sleep disturbance. The patient is not nervous/anxious.        Objective:      Physical Exam   Constitutional: He is oriented to person, place, and time. He appears well-developed and well-nourished. No distress.   HENT:   Head: Normocephalic and atraumatic.   Neck: Normal range of motion.   Cardiovascular:   Pedal pulses non-palpable   Pulmonary/Chest: Effort normal. No respiratory distress.   Musculoskeletal: Normal range of motion. He exhibits edema (3-4+lower leg). He exhibits no tenderness.         Feet:    Neurological: He is alert and oriented to person, place, and time.   Skin: Skin is warm and dry. No rash noted. He is not diaphoretic. No erythema.   Psychiatric: He has a normal mood and affect. His behavior is normal. Judgment and thought content normal.   Nursing note and vitals reviewed.      ALower Extremities Segmental Pressure [mmHg]:                    Right             Left  _______________________________________________________________  Brachial          115               100  Calf              255               255  Posterior Tibial  255               255  Dorsalis Pedis    255               255  ALFONSO (Post. Tib.)  2.22              2.22  ALFONSO (Dors. 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 PVR waveforms indicate mild peripheral arterial obstructive disease.     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 PVR waveforms indicate mild peripheral arterial obstructive disease. vascular lab study performed today revealed:    ..  Hemoglobin A1C   Date Value Ref Range Status   11/10/2016 7.6 (H) 4.5 - 6.2 % Final     Comment:     According to ADA guidelines, hemoglobin A1C <7.0% represents  optimal control in non-pregnant diabetic patients.  Different  metrics may apply to specific populations.   Standards of Medical Care in Diabetes - 2016.  For the purpose of screening for the presence of diabetes:  <5.7%     Consistent with the absence of diabetes  5.7-6.4%  Consistent with increasing risk for diabetes   (prediabetes)  >or=6.5%  Consistent with diabetes  Currently no consensus exists for use of hemoglobin A1C  for diagnosis of diabetes for children.     07/16/2016 6.3 (H) 4.5 - 6.2 % Final     Comment:     According to ADA guidelines, hemoglobin A1C <7.0% represents  optimal control in non-pregnant diabetic patients.  Different  metrics may apply to  "specific populations.   Standards of Medical Care in Diabetes - 2016.  For the purpose of screening for the presence of diabetes:  <5.7%     Consistent with the absence of diabetes  5.7-6.4%  Consistent with increasing risk for diabetes   (prediabetes)  >or=6.5%  Consistent with diabetes  Currently no consensus exists for use of hemoglobin A1C  for diagnosis of diabetes for children.     11/27/2015 6.9 (H) 4.5 - 6.2 % Final     Assessment:       1. Uncontrolled type 2 diabetes mellitus with complication, with long-term current use of insulin    2. Ulcer of other part of foot    3. Decubitus ulcer of left heel, unstageable    4. Tinea pedis of both feet    5. Atherosclerosis of native arteries of the extremities with ulceration    6. Bilateral leg edema        Plan:           Spectazole cream to feet daily.  Cleanse left foot wounds with wound .  Santyl daily to left foot ulcers, cover with gauze and secure with roll gauze.    Compression with two 4" ace wraps left lower leg.  Change dressing daily.  Return to clinic in 3 weeks.   [facility] notified of orders via EPIC fax.    Left lateral foot      Left heel            "

## 2017-02-01 NOTE — PATIENT INSTRUCTIONS
"You may shower using a mild soap such as Dove.  Irrigate the wounds with lukewarm water for 5 minutes and dry thoroughly.  Apply santyl to the wounds, cover with cotton gauze and secure with roll gauze.  Use two 4" ace wraps for compression.  Change dressing daily.  Report any signs of infection.  "

## 2017-02-01 NOTE — MR AVS SNAPSHOT
[facility]  [address]  [facility] [address]  Phone: [phone]                  Hemant Kennedy JrShanti   2017 12:40 PM   Office Visit    Description:  Male : 1950   Provider:  Denita Hendrix NP   Department:  [facility]           Reason for Visit     Wound Check           Diagnoses this Visit        Comments    Uncontrolled type 2 diabetes mellitus with complication, with long-term current use of insulin    -  Primary     Ulcer of other part of foot         Decubitus ulcer of left heel, unstageable         Tinea pedis of both feet         Atherosclerosis of native arteries of the extremities with ulceration         Bilateral leg edema                To Do List           Future Appointments        Provider Department Dept Phone    2017 10:30 AM [facility] - Optometry [phone]    2017 4:00 PM MD [facility] - Cardiology [phone]    3/29/2017 1:00 PM MD [facility] - Nephrology [phone]    2017 10:00 AM MD [facility] - Endo/Diab/Metab [phone]      Goals (5 Years of Data)     None      Follow-Up and Disposition     Return in about 3 weeks (around 2017) for Wound evaluation.       These Medications        Disp Refills Start End    collagenase (SANTYL) ointment 90 g 0 2017     Apply topically once daily. Apply to wound daily as directed. - Topical (Top)    Pharmacy: [facility] [address] [facility] Ph #: [phone]         [facility] On Call     [facility] On Call Nurse Care Line -  Assistance  Registered nurses in the [facility] On Call Center provide clinical advisement, health education, appointment booking, and other advisory services.  Call for this free service at [phone].             Medications           Message regarding Medications     Verify the changes and/or additions to your medication  regime listed below are the same as discussed with your clinician today.  If any of these changes or additions are incorrect, please notify your healthcare provider.        START taking these NEW medications        Refills    collagenase (SANTYL) ointment 0    Sig: Apply topically once daily. Apply to wound daily as directed.    Class: Normal    Route: Topical (Top)           Verify that the below list of medications is an accurate representation of the medications you are currently taking.  If none reported, the list may be blank. If incorrect, please contact your healthcare provider. Carry this list with you in case of emergency.           Current Medications     allopurinol (ZYLOPRIM) 300 MG tablet Take 300 mg by mouth.  Tablet Oral Every day    amiodarone (PACERONE) 200 MG Tab Take 200 mg by mouth once daily.     apixaban (ELIQUIS) 5 mg Tab Take 5 mg by mouth 2 (two) times daily.    aspirin 81 MG Chew Take 81 mg by mouth. 1 Tablet, Chewable Oral Every day    atorvastatin (LIPITOR) 40 MG tablet Take 40 mg by mouth. 1 Tablet Oral Every day    carvedilol (COREG) 6.25 MG tablet Take 2 tablets (12.5 mg total) by mouth 2 (two) times daily.    collagenase (SANTYL) ointment Apply topically once daily. Apply to wound daily as directed.    econazole nitrate 1 % cream Apply topically once daily. Apply to feet daily as directed.    ferrous sulfate 325 (65 FE) MG EC tablet Take 1 tablet (325 mg total) by mouth 2 (two) times daily with meals.    fluticasone (FLONASE) 50 mcg/actuation nasal spray 2 sprays by Each Nare route once daily.    furosemide (LASIX) 40 MG tablet Take 1 tablet (40 mg total) by mouth once daily.    gabapentin (NEURONTIN) 300 MG capsule Take 1 capsule (300 mg total) by mouth 3 (three) times daily.    HYDROPHILIC CREAM (TRIAD WOUND DRESSING TOP) Apply topically daily as needed.    insulin detemir (LEVEMIR) 100 unit/mL injection Inject 8 Units into the skin 2 (two) times daily.    insulin lispro (HUMALOG)  "100 unit/mL injection Inject 5 Units into the skin 3 (three) times daily before meals.    lisinopril (PRINIVIL,ZESTRIL) 5 MG tablet Take 1 tablet (5 mg total) by mouth once daily.    metOLazone (ZAROXOLYN) 2.5 MG tablet Take 1 tablet (2.5 mg total) by mouth daily as needed (HOLD unless fluid overloaded).    ONE TOUCH ULTRA TEST Strp Test blood sugar four times daily    spironolactone (ALDACTONE) 25 MG tablet Take 1 tablet (25 mg total) by mouth once daily. HOLD unless fluid overloaded           Clinical Reference Information           Vital Signs - Last Recorded  Most recent update: 2/1/2017 12:49 PM by Luz Marina Acevedo LPN    BP Pulse Temp Ht Wt BMI    112/67 (!) 55 98 °F (36.7 °C) (Oral) 5' 6" (1.676 m) 126.5 kg (278 lb 14.4 oz) 45.02 kg/m2      Blood Pressure          Most Recent Value    BP  112/67      Allergies as of 2/1/2017     No Known Allergies      Immunizations Administered on Date of Encounter - 2/1/2017     None      Instructions    You may shower using a mild soap such as Dove.  Irrigate the wounds with lukewarm water for 5 minutes and dry thoroughly.  Apply santyl to the wounds, cover with cotton gauze and secure with roll gauze.  Use two 4" ace wraps for compression.  Change dressing daily.  Report any signs of infection.       "

## 2017-02-10 ENCOUNTER — TELEPHONE (OUTPATIENT)
Dept: WOUND CARE | Facility: CLINIC | Age: 67
End: 2017-02-10

## 2017-02-10 NOTE — TELEPHONE ENCOUNTER
----- Message from Chandra Fontanez sent at 2/10/2017 11:29 AM CST -----  Contact: Vinh with [facility]  Caller said pt is a daily wound care with santyl ointment and he doesn't have anymore slough. Caller would like to reduce pt to 3 times a week. Please call her regarding this at [phone]

## 2017-02-20 ENCOUNTER — TELEPHONE (OUTPATIENT)
Dept: INTERNAL MEDICINE | Facility: CLINIC | Age: 67
End: 2017-02-20

## 2017-02-20 NOTE — TELEPHONE ENCOUNTER
Spoke with Domonique, states that  orders were faxed over and they were signed by Dr. Cooper, but they can not take the residence signature. Advised Domonique to fax order to [facility] and I would give to Dr. Villa.

## 2017-02-20 NOTE — TELEPHONE ENCOUNTER
----- Message from Mary Schwarz sent at 2/20/2017  1:30 PM CST -----  Contact: [phone]  States that they have sent over orders for home health on pt. Stating she wants to talk to someone in regards to orders.Please advise.

## 2017-02-23 ENCOUNTER — INITIAL CONSULT (OUTPATIENT)
Dept: OPTOMETRY | Facility: CLINIC | Age: 67
End: 2017-02-23
Payer: MEDICARE

## 2017-02-23 DIAGNOSIS — H52.4 MYOPIA WITH PRESBYOPIA OF BOTH EYES: ICD-10-CM

## 2017-02-23 DIAGNOSIS — E11.3293 MILD NONPROLIFERATIVE DIABETIC RETINOPATHY OF BOTH EYES WITHOUT MACULAR EDEMA ASSOCIATED WITH TYPE 2 DIABETES MELLITUS: ICD-10-CM

## 2017-02-23 DIAGNOSIS — H25.13 NUCLEAR SCLEROSIS, BILATERAL: Primary | ICD-10-CM

## 2017-02-23 DIAGNOSIS — H52.13 MYOPIA WITH PRESBYOPIA OF BOTH EYES: ICD-10-CM

## 2017-02-23 PROCEDURE — 99213 OFFICE O/P EST LOW 20 MIN: CPT | Mod: PBBFAC | Performed by: OPTOMETRIST

## 2017-02-23 PROCEDURE — 92004 COMPRE OPH EXAM NEW PT 1/>: CPT | Mod: S$PBB,,, | Performed by: OPTOMETRIST

## 2017-02-23 PROCEDURE — 99999 PR PBB SHADOW E&M-EST. PATIENT-LVL III: CPT | Mod: PBBFAC,,, | Performed by: OPTOMETRIST

## 2017-02-23 RX ORDER — CARVEDILOL 12.5 MG/1
12.5 TABLET ORAL 2 TIMES DAILY WITH MEALS
Status: ON HOLD | COMMUNITY
Start: 2017-02-02 | End: 2017-07-28 | Stop reason: HOSPADM

## 2017-02-23 NOTE — LETTER
February 23, 2017      Vickie Cooper MD  [address]  [facility] [address]           [facility]  [address]  [facility] [address]  Phone: [phone]  Fax: [phone]          Patient: Hemant Kennedy Jr.   MR Number: [id number]   YOB: 1950   Date of Visit: 2/23/2017       Dear Dr. Vickie Cooper:    Thank you for referring Hemant Kennedy to me for evaluation. Attached you will find relevant portions of my assessment and plan of care.    If you have questions, please do not hesitate to call me. I look forward to following Hemant Kennedy along with you.    Sincerely,    Karen Mcdermott, OD    Enclosure  CC:  No Recipients    If you would like to receive this communication electronically, please contact [email or web address] or [phone] to request more information on [facility] Link access.    For providers and/or their staff who would like to refer a patient to [facility], please contact us through our one-stop-shop provider referral line, [facility], at [phone].    If you feel you have received this communication in error or would no longer like to receive these types of communications, please e-mail [email or web address]

## 2017-02-23 NOTE — PROGRESS NOTES
HPI     Last eye exam was approximately 2 years ago (Ricky).  Patient states decrease in distance vision over the past year. Overdue for   health check.  Patient denies diplopia, headaches, flashes/floaters, and pain.    Hemoglobin A1C       Date                     Value               Ref Range             Status                11/10/2016               7.6 (H)             4.5 - 6.2 %           Final                     Last edited by Nayely Soto on 2/23/2017 10:56 AM.     ROS     Negative for: Constitutional, Gastrointestinal, Neurological, Skin,   Genitourinary, Musculoskeletal, HENT, Endocrine, Cardiovascular, Eyes,   Respiratory, Psychiatric, Allergic/Imm, Heme/Lymph    Last edited by Karen Mcdermott, OD on 2/23/2017 11:40 AM. (History)        Assessment /Plan     For exam results, see Encounter Report.    Nuclear sclerosis, bilateral    Mild nonproliferative diabetic retinopathy of both eyes without macular edema associated with type 2 diabetes mellitus    Myopia with presbyopia of both eyes            1. Educated on cataracts and affects on vision.  Patient unhappy with vision.  Consult Dr. Zuniga for cataract surgery.  2.  Educated pt on diabetic changes. Continue working on good blood sugar control.  Monitor 6 months.  3.   Continue w/ current rx.  Educated pt can not improve vision by changing rx.

## 2017-02-24 ENCOUNTER — OFFICE VISIT (OUTPATIENT)
Dept: CARDIOLOGY | Facility: CLINIC | Age: 67
End: 2017-02-24
Payer: MEDICARE

## 2017-02-24 ENCOUNTER — OFFICE VISIT (OUTPATIENT)
Dept: WOUND CARE | Facility: CLINIC | Age: 67
End: 2017-02-24
Payer: MEDICARE

## 2017-02-24 VITALS
DIASTOLIC BLOOD PRESSURE: 53 MMHG | TEMPERATURE: 98 F | SYSTOLIC BLOOD PRESSURE: 110 MMHG | HEART RATE: 54 BPM | HEIGHT: 67 IN

## 2017-02-24 VITALS
SYSTOLIC BLOOD PRESSURE: 117 MMHG | WEIGHT: 276.69 LBS | DIASTOLIC BLOOD PRESSURE: 57 MMHG | BODY MASS INDEX: 43.33 KG/M2 | HEART RATE: 53 BPM

## 2017-02-24 DIAGNOSIS — R60.0 BILATERAL LEG EDEMA: ICD-10-CM

## 2017-02-24 DIAGNOSIS — L97.509 ULCER OF OTHER PART OF FOOT: ICD-10-CM

## 2017-02-24 DIAGNOSIS — B35.3 TINEA PEDIS OF BOTH FEET: ICD-10-CM

## 2017-02-24 DIAGNOSIS — I10 ESSENTIAL HYPERTENSION: Primary | ICD-10-CM

## 2017-02-24 DIAGNOSIS — I48.0 PAROXYSMAL ATRIAL FIBRILLATION: ICD-10-CM

## 2017-02-24 DIAGNOSIS — I50.22 CHRONIC SYSTOLIC CONGESTIVE HEART FAILURE, NYHA CLASS 2: ICD-10-CM

## 2017-02-24 DIAGNOSIS — I70.25 ATHEROSCLEROSIS OF NATIVE ARTERIES OF THE EXTREMITIES WITH ULCERATION: ICD-10-CM

## 2017-02-24 DIAGNOSIS — I42.8 CARDIOMYOPATHY, NONISCHEMIC: ICD-10-CM

## 2017-02-24 DIAGNOSIS — L89.620 DECUBITUS ULCER OF LEFT HEEL, UNSTAGEABLE: ICD-10-CM

## 2017-02-24 PROCEDURE — 99213 OFFICE O/P EST LOW 20 MIN: CPT | Mod: PBBFAC,27 | Performed by: HOSPITALIST

## 2017-02-24 PROCEDURE — 99999 PR PBB SHADOW E&M-EST. PATIENT-LVL III: CPT | Mod: PBBFAC,GC,, | Performed by: HOSPITALIST

## 2017-02-24 PROCEDURE — 99213 OFFICE O/P EST LOW 20 MIN: CPT | Mod: S$PBB,GC,, | Performed by: HOSPITALIST

## 2017-02-24 PROCEDURE — 99212 OFFICE O/P EST SF 10 MIN: CPT | Mod: S$PBB,,, | Performed by: NURSE PRACTITIONER

## 2017-02-24 PROCEDURE — 99999 PR PBB SHADOW E&M-EST. PATIENT-LVL V: CPT | Mod: PBBFAC,,, | Performed by: NURSE PRACTITIONER

## 2017-02-24 RX ORDER — LISINOPRIL 5 MG/1
7.5 TABLET ORAL DAILY
Qty: 90 TABLET | Refills: 3 | Status: SHIPPED | OUTPATIENT
Start: 2017-02-24 | End: 2017-03-29 | Stop reason: SDUPTHER

## 2017-02-24 NOTE — PROGRESS NOTES
Subjective:    Patient ID:  Hemant Kennedy Jr. is a 66 y.o. male who presents for follow-up of Chronic systolic congestive heart failure, NYHA class 2      HPI  67 y/o M with HFrEF (EF 20-25% in Jun 2016), NICM declined ICD, HTN, HLD, atrial fibrillation, T2DM, h/o CVA in 2010, ERIC  presents to the clinic for Follow up. Patient has chronic systolic heart failure exacerbation EF 20-25%. Patient was recently admitted with PNA and septic shock in 11/2016 and his heart failure medications was cut because of low BP. He was seen last month for post discharge follow up and was doing well. He is here today for follow up. Denied new complaint. Has chronic LE edema that is stable, denied orthopnea or PND.     ROS    Constitutional: negative for chills, fevers and night sweats  Ears, nose, mouth, throat, and face: negative for nasal congestion, sore throat and tinnitus  Respiratory: negative for cough, dyspnea on exertion, pleurisy/chest pain, sputum and wheezing  Cardiovascular: See HPI  Gastrointestinal: negative  Genitourinary:negative for dysuria, frequency, hematuria, hesitancy and nocturia  Hematologic/lymphatic: negative for bleeding and easy bruising  Musculoskeletal:negative for muscle weakness and myalgias  Neurological: negative for dizziness, headaches, paresthesia and weakness  Behavioral/Psych: negative for anxiety and bad mood    Objective:    Physical Exam    General: Patient in no acute distress or discomfort  HEENT: No JVD, moist mucous membranes  Cardiac: S1S2 RRR no GMR  Chest: CTABL, no wheezing or rales  Abd:Soft NTND  Ext: chronic 2+ Pretibial Edema No swelling  Neuro: A and O X 3, non focal    Assessment:       1. Essential hypertension    2. Paroxysmal atrial fibrillation    3. Cardiomyopathy, nonischemic    4. Chronic systolic congestive heart failure, NYHA class 2      HTN: Controlled  P Afib: currently in sinus  NICM: Chronic, compensated    Plan:       Plan:  1. Chronic systolic HF- NICM  -  patient appears euvolemic today, has chronic LE edema. will continue with lasix 40 daily  C/W Coreg to 12.5 mgs TWICE DAILY  Will increase Lisinopril gradually to 7.5 mgs daily and continue wiht Spironolactone 25 mgs  coucelled to avoid salt and wt himself daily  Patient declined ICD placement.  2. Afib, paroxsysmal now in sinus: Chadsvasc 6, on Amio and eliquis  3. Type II DM: a1c 6.3/134  4. ERIC on Cpap: cpap at night at 8  5. CVA 2010 L sided weakness/ wheelchair bound: C/W ASA, Atorvastain and Eliquis for anticoagulation    RTC in 4-6 weeks with BMP    Viri Chambers MD  Cardiology Fellow

## 2017-02-24 NOTE — MR AVS SNAPSHOT
[facility]  [address]  [facility] [address]  Phone: [phone]                  Hemant Kennedy JrShanti   2017 2:40 PM   Office Visit    Description:  Male : 1950   Provider:  Denita Hendrix NP   Department:  [facility]           Reason for Visit     Wound Check           Diagnoses this Visit        Comments    Uncontrolled type 2 diabetes mellitus with complication, with long-term current use of insulin    -  Primary     Ulcer of other part of foot         Decubitus ulcer of left heel, unstageable         Atherosclerosis of native arteries of the extremities with ulceration         Tinea pedis of both feet         Bilateral leg edema                To Do List           Future Appointments        Provider Department Dept Phone    2017 4:00 PM MD [facility] - Cardiology [phone]    3/17/2017 10:00 AM MD [facility] - Ophthalmology [phone]    3/29/2017 1:00 PM MD [facility] - Nephrology [phone]    2017 10:00 AM MD [facility] - Endo/Diab/Metab [phone]      Goals (5 Years of Data)     None      Follow-Up and Disposition     Return in about 3 weeks (around 3/17/2017) for Wound evaluation.      [facility] On Call     [facility] On Call Nurse Care Line -  Assistance  Registered nurses in the [facility] On Call Center provide clinical advisement, health education, appointment booking, and other advisory services.  Call for this free service at [phone].             Medications           Message regarding Medications     Verify the changes and/or additions to your medication regime listed below are the same as discussed with your clinician today.  If any of these changes or additions are incorrect, please notify your healthcare provider.             Verify that the below list of medications is an accurate representation of the medications you are currently taking.  If none reported, the  list may be blank. If incorrect, please contact your healthcare provider. Carry this list with you in case of emergency.           Current Medications     allopurinol (ZYLOPRIM) 300 MG tablet Take 300 mg by mouth.  Tablet Oral Every day    amiodarone (PACERONE) 200 MG Tab Take 200 mg by mouth once daily.     apixaban (ELIQUIS) 5 mg Tab Take 5 mg by mouth 2 (two) times daily.    aspirin 81 MG Chew Take 81 mg by mouth. 1 Tablet, Chewable Oral Every day    atorvastatin (LIPITOR) 40 MG tablet Take 40 mg by mouth. 1 Tablet Oral Every day    carvedilol (COREG) 12.5 MG tablet     carvedilol (COREG) 6.25 MG tablet Take 2 tablets (12.5 mg total) by mouth 2 (two) times daily.    collagenase (SANTYL) ointment Apply topically once daily. Apply to wound daily as directed.    econazole nitrate 1 % cream Apply topically once daily. Apply to feet daily as directed.    ferrous sulfate 325 (65 FE) MG EC tablet Take 1 tablet (325 mg total) by mouth 2 (two) times daily with meals.    fluticasone (FLONASE) 50 mcg/actuation nasal spray 2 sprays by Each Nare route once daily.    furosemide (LASIX) 40 MG tablet Take 1 tablet (40 mg total) by mouth once daily.    gabapentin (NEURONTIN) 300 MG capsule Take 1 capsule (300 mg total) by mouth 3 (three) times daily.    HYDROPHILIC CREAM (TRIAD WOUND DRESSING TOP) Apply topically daily as needed.    insulin detemir (LEVEMIR) 100 unit/mL injection Inject 8 Units into the skin 2 (two) times daily.    insulin lispro (HUMALOG) 100 unit/mL injection Inject 5 Units into the skin 3 (three) times daily before meals.    lisinopril (PRINIVIL,ZESTRIL) 5 MG tablet Take 1 tablet (5 mg total) by mouth once daily.    metOLazone (ZAROXOLYN) 2.5 MG tablet Take 1 tablet (2.5 mg total) by mouth daily as needed (HOLD unless fluid overloaded).    ONE TOUCH ULTRA TEST Strp Test blood sugar four times daily    spironolactone (ALDACTONE) 25 MG tablet Take 1 tablet (25 mg total) by mouth once daily. HOLD unless fluid  "overloaded           Clinical Reference Information           Your Vitals Were     BP                   110/53           Blood Pressure          Most Recent Value    BP  (!)  110/53      Allergies as of 2/24/2017     No Known Allergies      Immunizations Administered on Date of Encounter - 2/24/2017     None      Instructions    Spectazole cream to feet daily.  Cleanse left foot wounds with wound .  Santyl daily to left foot ulcers, cover with gauze and secure with roll gauze.    Compression with two 4" ace wraps left lower leg.  Change dressing daily.         Language Assistance Services     ATTENTION: Language assistance services are available, free of charge. Please call [phone].      ATENCIÓN: Si habla harvey, tiene a bodlen disposición servicios gratuitos de asistencia lingüística. Llame al [phone].     MANISH Ý: N?u b?n nói Ti?ng Vi?t, có các d?ch v? h? tr? ngôn ng? mi?n phí dành cho b?n. G?i s? [phone].         [facility] complies with applicable Federal civil rights laws and does not discriminate on the basis of race, color, national origin, age, disability, or sex.        "

## 2017-02-24 NOTE — PROGRESS NOTES
I have reviewed the fellow's history and physical and discussed the case with the fellow. I have personally spoken with and examined the patient in the clinic. I agree with the findings, assessment, and plan.

## 2017-02-24 NOTE — MR AVS SNAPSHOT
[facility] - Cardiology  [address] [facility] [address]  Phone: [phone]                  Hemant Kennedy Jr.   2017 4:00 PM   Office Visit    Description:  Male : 1950   Provider:  Viri Chambers MD   Department:  [facility] - Cardiology           Reason for Visit     Chronic systolic congestive heart failure, NYHA class 2           Diagnoses this Visit        Comments    Essential hypertension    -  Primary     Paroxysmal atrial fibrillation         Cardiomyopathy, nonischemic         Chronic systolic congestive heart failure, NYHA class 2                To Do List           Future Appointments        Provider Department Dept Phone    3/17/2017 10:00 AM Gilberto Zuniga MD [facility] - Ophthalmology [phone]    3/23/2017 12:40 PM Denita Hendrix NP [facility] - Wound Care [phone]    3/29/2017 1:00 PM Lakisha Plummer MD [facility] - Nephrology [phone]    2017 10:00 AM Catarina Domingo MD [facility] - Endo/Diab/Metab [phone]      Goals (5 Years of Data)     None      Follow-Up and Disposition     Return in about 5 weeks (around 3/31/2017).       These Medications        Disp Refills Start End    lisinopril (PRINIVIL,ZESTRIL) 5 MG tablet 90 tablet 3 2017    Take 1.5 tablets (7.5 mg total) by mouth once daily. - Oral    Pharmacy: [facility] - [address] #: [phone]         [facility] On Call     [facility] On Call Nurse Care Line -  Assistance  Registered nurses in the [facility] provide clinical advisement, health education, appointment booking, and other advisory services.  Call for this free service at [phone].             Medications           Message regarding Medications     Verify the changes and/or additions to your medication regime listed below are the same as discussed with your clinician today.  If any of these changes or additions are incorrect, please notify your  healthcare provider.        CHANGE how you are taking these medications     Start Taking Instead of    lisinopril (PRINIVIL,ZESTRIL) 5 MG tablet lisinopril (PRINIVIL,ZESTRIL) 5 MG tablet    Dosage:  Take 1.5 tablets (7.5 mg total) by mouth once daily. Dosage:  Take 1 tablet (5 mg total) by mouth once daily.    Reason for Change:  Reorder            Verify that the below list of medications is an accurate representation of the medications you are currently taking.  If none reported, the list may be blank. If incorrect, please contact your healthcare provider. Carry this list with you in case of emergency.           Current Medications     allopurinol (ZYLOPRIM) 300 MG tablet Take 300 mg by mouth.  Tablet Oral Every day    amiodarone (PACERONE) 200 MG Tab Take 200 mg by mouth once daily.     apixaban (ELIQUIS) 5 mg Tab Take 5 mg by mouth 2 (two) times daily.    aspirin 81 MG Chew Take 81 mg by mouth. 1 Tablet, Chewable Oral Every day    atorvastatin (LIPITOR) 40 MG tablet Take 40 mg by mouth. 1 Tablet Oral Every day    carvedilol (COREG) 12.5 MG tablet Take 12.5 mg by mouth 2 (two) times daily with meals.     collagenase (SANTYL) ointment Apply topically once daily. Apply to wound daily as directed.    ferrous sulfate 325 (65 FE) MG EC tablet Take 1 tablet (325 mg total) by mouth 2 (two) times daily with meals.    fluticasone (FLONASE) 50 mcg/actuation nasal spray 2 sprays by Each Nare route once daily.    furosemide (LASIX) 40 MG tablet Take 1 tablet (40 mg total) by mouth once daily.    gabapentin (NEURONTIN) 300 MG capsule Take 1 capsule (300 mg total) by mouth 3 (three) times daily.    HYDROPHILIC CREAM (TRIAD WOUND DRESSING TOP) Apply topically daily as needed.    insulin detemir (LEVEMIR) 100 unit/mL injection Inject 8 Units into the skin 2 (two) times daily.    insulin lispro (HUMALOG) 100 unit/mL injection Inject 5 Units into the skin 3 (three) times daily before meals.    lisinopril (PRINIVIL,ZESTRIL) 5 MG  tablet Take 1.5 tablets (7.5 mg total) by mouth once daily.    metOLazone (ZAROXOLYN) 2.5 MG tablet Take 1 tablet (2.5 mg total) by mouth daily as needed (HOLD unless fluid overloaded).    ONE TOUCH ULTRA TEST Strp Test blood sugar four times daily    spironolactone (ALDACTONE) 25 MG tablet Take 1 tablet (25 mg total) by mouth once daily. HOLD unless fluid overloaded    econazole nitrate 1 % cream Apply topically once daily. Apply to feet daily as directed.           Clinical Reference Information           Your Vitals Were     BP                   117/57 (BP Location: Left arm, Patient Position: Sitting, BP Method: Automatic)           Blood Pressure          Most Recent Value    Right Arm BP - Sitting  119/60    Left Arm BP - Sitting  117/57    BP  (!)  117/57      Allergies as of 2/24/2017     No Known Allergies      Immunizations Administered on Date of Encounter - 2/24/2017     None      Language Assistance Services     ATTENTION: Language assistance services are available, free of charge. Please call [phone].      ATENCIÓN: Si mandola harvey, tiene a bolden disposición servicios gratuitos de asistencia lingüística. Llame al [phone].     MANISH Ý: N?u b?n nói Ti?ng Vi?t, có các d?ch v? h? tr? ngôn ng? mi?n phí dành cho b?n. G?i s? [phone].         [facility] complies with applicable Federal civil rights laws and does not discriminate on the basis of race, color, national origin, age, disability, or sex.

## 2017-02-24 NOTE — PATIENT INSTRUCTIONS
"Spectazole cream to feet daily.  Cleanse left foot wounds with wound .  Santyl daily to left foot ulcers, cover with gauze and secure with roll gauze.    Compression with two 4" ace wraps left lower leg.  Change dressing daily.    "

## 2017-03-01 ENCOUNTER — TELEPHONE (OUTPATIENT)
Dept: WOUND CARE | Facility: CLINIC | Age: 67
End: 2017-03-01

## 2017-03-01 ENCOUNTER — HOSPITAL ENCOUNTER (EMERGENCY)
Facility: HOSPITAL | Age: 67
Discharge: HOME | End: 2017-03-02
Attending: EMERGENCY MEDICINE
Payer: MEDICARE

## 2017-03-01 DIAGNOSIS — R52 PAIN: ICD-10-CM

## 2017-03-01 DIAGNOSIS — M54.9 UPPER BACK PAIN ON RIGHT SIDE: Primary | ICD-10-CM

## 2017-03-01 PROCEDURE — 99283 EMERGENCY DEPT VISIT LOW MDM: CPT | Mod: ,,, | Performed by: EMERGENCY MEDICINE

## 2017-03-01 PROCEDURE — 99283 EMERGENCY DEPT VISIT LOW MDM: CPT

## 2017-03-01 NOTE — ED AVS SNAPSHOT
[facility]  [address] [facility] [address]               Hemant Kennedy Jr.   3/1/2017 11:37 PM   ED    Description:  Male : 1950   Department:  [facility]           Your Care was Coordinated By:     Provider Role From To    Derek Miller MD Attending Provider 17 0027 --      Reason for Visit     Back Pain           Diagnoses this Visit        Comments    Upper back pain on right side    -  Primary     Pain           ED Disposition     ED Disposition Condition Comment    Discharge             To Do List           Follow-up Information     Follow up with Hemant Markham MD. Schedule an appointment as soon as possible for a visit in 3 days.    Specialty:  Internal Medicine    Why:  As needed    Contact information:    [address] [facility] [address] [phone]          [facility]    Specialty:  Emergency Medicine    Why:  If symptoms worsen    Contact information:    [address] [facility] [address] [phone]       These Medications        Disp Refills Start End    acetaminophen (TYLENOL) 500 MG tablet 15 tablet 0 3/2/2017     Take 2 tablets (1,000 mg total) by mouth 3 (three) times daily as needed for Pain. - Oral    Pharmacy: [facility] - [address] Ph #: [phone]       cyclobenzaprine (FLEXERIL) 5 MG tablet 10 tablet 0 3/2/2017 3/12/2017    Take 1 tablet (5 mg total) by mouth 2 (two) times daily as needed for Muscle spasms. - Oral    Pharmacy: [facility] - [address] Ph #: [phone]         [facility]     [facility] Care [facility] -  Assistance  Registered nurses in the [facility] provide clinical advisement, health education, appointment booking, and other advisory services.  Call for this free service at  [phone].             Medications           Message regarding Medications     Verify the changes and/or additions to your medication regime listed below are the same as discussed with your clinician today.  If any of these changes or additions are incorrect, please notify your healthcare provider.        START taking these NEW medications        Refills    acetaminophen (TYLENOL) 500 MG tablet 0    Sig: Take 2 tablets (1,000 mg total) by mouth 3 (three) times daily as needed for Pain.    Class: Print    Route: Oral    cyclobenzaprine (FLEXERIL) 5 MG tablet 0    Sig: Take 1 tablet (5 mg total) by mouth 2 (two) times daily as needed for Muscle spasms.    Class: Print    Route: Oral      These medications were administered today        Dose Freq    acetaminophen tablet 650 mg 650 mg ED 1 Time    Sig: Take 2 tablets (650 mg total) by mouth ED 1 Time.    Class: Normal    Route: Oral           Verify that the below list of medications is an accurate representation of the medications you are currently taking.  If none reported, the list may be blank. If incorrect, please contact your healthcare provider. Carry this list with you in case of emergency.           Current Medications     allopurinol (ZYLOPRIM) 300 MG tablet Take 300 mg by mouth.  Tablet Oral Every day    amiodarone (PACERONE) 200 MG Tab Take 200 mg by mouth once daily.     apixaban (ELIQUIS) 5 mg Tab Take 5 mg by mouth 2 (two) times daily.    aspirin 81 MG Chew Take 81 mg by mouth. 1 Tablet, Chewable Oral Every day    atorvastatin (LIPITOR) 40 MG tablet Take 40 mg by mouth. 1 Tablet Oral Every day    carvedilol (COREG) 12.5 MG tablet Take 12.5 mg by mouth 2 (two) times daily with meals.     ferrous sulfate 325 (65 FE) MG EC tablet Take 1 tablet (325 mg total) by mouth 2 (two) times daily with meals.    fluticasone (FLONASE) 50 mcg/actuation nasal spray 2 sprays by Each Nare route once daily.    furosemide (LASIX) 40 MG tablet Take 1 tablet (40 mg total)  by mouth once daily.    gabapentin (NEURONTIN) 300 MG capsule Take 1 capsule (300 mg total) by mouth 3 (three) times daily.    HYDROPHILIC CREAM (TRIAD WOUND DRESSING TOP) Apply topically daily as needed.    insulin detemir (LEVEMIR) 100 unit/mL injection Inject 8 Units into the skin 2 (two) times daily.    insulin lispro (HUMALOG) 100 unit/mL injection Inject 5 Units into the skin 3 (three) times daily before meals.    lisinopril (PRINIVIL,ZESTRIL) 5 MG tablet Take 1.5 tablets (7.5 mg total) by mouth once daily.    metOLazone (ZAROXOLYN) 2.5 MG tablet Take 1 tablet (2.5 mg total) by mouth daily as needed (HOLD unless fluid overloaded).    ONE TOUCH ULTRA TEST Strp Test blood sugar four times daily    spironolactone (ALDACTONE) 25 MG tablet Take 1 tablet (25 mg total) by mouth once daily. HOLD unless fluid overloaded    acetaminophen (TYLENOL) 500 MG tablet Take 2 tablets (1,000 mg total) by mouth 3 (three) times daily as needed for Pain.    collagenase (SANTYL) ointment Apply topically once daily. Apply to wound daily as directed.    cyclobenzaprine (FLEXERIL) 5 MG tablet Take 1 tablet (5 mg total) by mouth 2 (two) times daily as needed for Muscle spasms.    econazole nitrate 1 % cream Apply topically once daily. Apply to feet daily as directed.           Clinical Reference Information           Your Vitals Were     BP                   150/70           Allergies as of 3/2/2017     No Known Allergies      Immunizations Administered on Date of Encounter - 3/2/2017     None      ED Micro, Lab, POCT     None      ED Imaging Orders     Start Ordered       Status Ordering Provider    03/02/17 0042 03/02/17 0041  X-Ray Chest PA And Lateral  1 time imaging      Final result       Discharge References/Attachments     BACK PAIN (ACUTE OR CHRONIC) (ENGLISH)      Your Scheduled Appointments     Mar 17, 2017 10:00 AM CDT   Cataract [facility] with MD [facility]    [address]  [facility] [address]   [phone]            Mar 23, 2017 12:40 PM CDT   Established Patient Visit with LUCILLE Crump - Wound Care ([facility] )    [address]  [place] [address]   [phone]            Mar 29, 2017  1:00 PM CDT   Established Patient Visit with MD [facility] - Nephrology ([facility] )    [address]  [place] [address]   [phone]            Mar 29, 2017  3:00 PM CDT   Established Patient Visit with MD [facility] - Cardiology ([facility] )    [address]  [place] [address]   [phone]            May 16, 2017 10:00 AM CDT   CONSULT with MD [facility] - Endo/Diab/Metab ([facility] )    [address]  [place] [id number] [phone]               [facility] complies with applicable Federal civil rights laws and does not discriminate on the basis of race, color, national origin, age, disability, or sex.        Language Assistance Services     ATTENTION: Language assistance services are available, free of charge. Please call [phone].      ATENCIÓN: Si habla español, tiene a bolden disposición servicios gratuitos de asistencia lingüística. Llame al [phone].     CHÚ Ý: N?u b?n nói Ti?ng Vi?t, có các d?ch v? h? tr? ngôn ng? mi?n phí dành cho b?n. G?i s? [phone].

## 2017-03-01 NOTE — TELEPHONE ENCOUNTER
----- Message from Dimple Smalls sent at 3/1/2017 12:02 PM CST -----  Contact: Instagram [facility]  Caller would like to speak with someone in ref to pt to get clarification on some things. Please call [phone].Thank you.

## 2017-03-02 VITALS
RESPIRATION RATE: 18 BRPM | BODY MASS INDEX: 43.16 KG/M2 | WEIGHT: 275 LBS | SYSTOLIC BLOOD PRESSURE: 117 MMHG | OXYGEN SATURATION: 100 % | DIASTOLIC BLOOD PRESSURE: 61 MMHG | TEMPERATURE: 98 F | HEART RATE: 53 BPM | HEIGHT: 67 IN

## 2017-03-02 PROCEDURE — 25000003 PHARM REV CODE 250: Performed by: EMERGENCY MEDICINE

## 2017-03-02 RX ORDER — ACETAMINOPHEN 325 MG/1
650 TABLET ORAL
Status: COMPLETED | OUTPATIENT
Start: 2017-03-02 | End: 2017-03-02

## 2017-03-02 RX ORDER — CYCLOBENZAPRINE HCL 5 MG
5 TABLET ORAL 2 TIMES DAILY PRN
Qty: 10 TABLET | Refills: 0 | Status: SHIPPED | OUTPATIENT
Start: 2017-03-02 | End: 2017-03-12

## 2017-03-02 RX ORDER — ACETAMINOPHEN 500 MG
1000 TABLET ORAL 3 TIMES DAILY PRN
Qty: 15 TABLET | Refills: 0 | Status: SHIPPED | OUTPATIENT
Start: 2017-03-02

## 2017-03-02 RX ADMIN — ACETAMINOPHEN 650 MG: 325 TABLET ORAL at 01:03

## 2017-03-02 NOTE — ED TRIAGE NOTES
"Hemant Kennedy Jr., a 66 y.o. male presents to the ED with c/o back pain. Pt states that he was seen in hospital weeks ago and diagnosed with a pinched nerve. Pt states that it is painful  to lean to either side. Pt states that he goes to rehab for his feet for bed sores from his hospitalization from November to December. Pt denies SOB, chest pain NVDF. Pt does not ambulate due to "bone on bone knee pain since Arianne".     3  Chief Complaint   Patient presents with    Back Pain     right lower back pain for 2 weeks, denies trauma.       Review of patient's allergies indicates:  No Known Allergies  Past Medical History:   Diagnosis Date    *Atrial fibrillation     Atrial fibrillation     Cardiomyopathy     Cataract     CHF (congestive heart failure)     Diabetes mellitus     DVT (deep venous thrombosis)     Hypertension     Personal history of stroke with residual effects - Right Occipital 7/25/2012    Sleep apnea     Stroke        LOC: Patient name and date of birth verified.  The patient is awake, alert and aware of environment with an appropriate affect, the patient is oriented x 3 and speaking appropriately.  Pt in NAD.    APPEARANCE: Patient resting comfortably and in no acute distress, patient is clean and well groomed, patient's clothing is properly fastened.  SKIN: The skin is warm and dry, color consistent with ethnicity, patient has normal skin turgor and moist mucus membranes, skin intact, no breakdown or brusing noted.  MUSCULOSKELETAL: Patient moving all extremities well, no obvious swelling or deformities noted.  Pt reports right sided back pain , with exertion. Pt present in wheelchair due to weakness and pain to bilateral extremities unrelated to current complaint.   RESPIRATORY: Airway is open and patent, respirations are spontaneous, patient has a normal effort and rate, no accessory muscle use noted.  CARDIAC: Patient has a normal rate and rhythm, no periphreal edema noted, capillary " "refill < 3 seconds.  ABDOMEN: Soft and non tender to palpation, no distention noted. Bowel sounds present in all four quadrants.  NEUROLOGIC: Eyes open spontaneously, behavior appropriate to situation, follows commands, facial expression symmetrical, bilateral hand grasp equal and even, purposeful motor response noted, normal sensation in all extremities when touched with a finger.  Pt reports "pinched nerve" in lower back.     "

## 2017-03-02 NOTE — ED PROVIDER NOTES
Encounter Date: 3/1/2017    SCRIBE #1 NOTE: I, Elijah De La Rosa, am scribing for, and in the presence of,  Dr. Miller. I have scribed the following portions of the note - Other sections scribed: HPI, ROS.       History     Chief Complaint   Patient presents with    Back Pain     right lower back pain for 2 weeks, denies trauma.       Review of patient's allergies indicates:  No Known Allergies  HPI Comments: Time seen by provider: 12:34 AM     This is a 66 y.o. male with pertinent PMHx of HTN, stroke, DM, atrial fibrillation, cardiomyopathy, CHF, and DVT, who presents to the ED with a chief complaint of gradually worsening moderate right sided back pain that radiates to the front with exertion x 2 weeks. Pt states that he was told ~ 1 week ago by his cardiologist that he probably had a pulled muscle due to being chronically wheelchair bound. Pt states that he came to the ED tonight because he couldn't take the pain anymore. He states that he was not prescribed any pain medications for this pain by the cardiologist and has not taken any OTC pain medications either other than once a day aspirin. Pt denies fever, injury, new weakness or numbness, urinary symptoms, rash, cough, and cold symptoms. There are no other associated symptoms or mitigating/aggravating factors reported at this time.  The pain is specifically provoked by turning in his wheelchair or attempting to use his arm to push himself up out of the chair.      The history is provided by the patient.     Past Medical History:   Diagnosis Date    *Atrial fibrillation     Atrial fibrillation     Cardiomyopathy     Cataract     CHF (congestive heart failure)     Diabetes mellitus     DVT (deep venous thrombosis)     Hypertension     Personal history of stroke with residual effects - Right Occipital 7/25/2012    Sleep apnea     Stroke      History reviewed. No pertinent surgical history.  Family History   Problem Relation Age of Onset    Heart attack  Mother     Cataracts Mother     Glaucoma Sister     Glaucoma Sister      Social History   Substance Use Topics    Smoking status: Never Smoker    Smokeless tobacco: Never Used    Alcohol use No     Review of Systems   Constitutional: Negative for chills and fever.   HENT: Negative for congestion.    Eyes: Negative for pain.   Respiratory: Negative for cough and shortness of breath.    Cardiovascular: Negative for chest pain.   Gastrointestinal: Negative for abdominal pain, nausea and vomiting.   Genitourinary: Negative for difficulty urinating and dysuria.   Musculoskeletal: Positive for back pain (right side, radiates to front with exertion). Negative for neck pain.        Positive chronic bilateral leg pain and foot pain   Skin: Negative for rash.   Neurological: Negative for weakness and headaches.       Physical Exam   Initial Vitals   BP Pulse Resp Temp SpO2   03/01/17 2049 03/01/17 2049 03/01/17 2049 03/01/17 2049 03/01/17 2049   115/63 59 18 98.2 °F (36.8 °C) 99 %     Physical Exam    Constitutional: He appears well-developed and well-nourished. He is not diaphoretic. No distress.   HENT:   Head: Normocephalic and atraumatic.   Right Ear: External ear normal.   Left Ear: External ear normal.   Mouth/Throat: Oropharynx is clear and moist.   Eyes: Conjunctivae are normal. Pupils are equal, round, and reactive to light. Right eye exhibits no discharge. Left eye exhibits no discharge. No scleral icterus.   Neck: Normal range of motion. Neck supple.   Cardiovascular: Normal rate and regular rhythm. Exam reveals no gallop and no friction rub.    Pulmonary/Chest: Breath sounds normal. No respiratory distress. He has no wheezes. He has no rhonchi. He has no rales. He exhibits tenderness.   Musculoskeletal: Normal range of motion. He exhibits tenderness. He exhibits no edema.   Tender R upper back at inferior edge of scapula around towards mid axillary line, reproduces sxs, no rash, no ecchymosis, no  crepitus  No spinal tenderness CTL spine   Neurological: He is alert and oriented to person, place, and time.   Skin: Skin is warm and dry. No rash noted. No erythema.   Psychiatric: He has a normal mood and affect.         ED Course   Procedures  Labs Reviewed - No data to display       X-Rays:   Independently Interpreted Readings:   Other Readings:  Cxr:  No acute infiltrate, consolidation or effusion.  No ptx.  No fx.        Medical Decision Making:   History:   Old Medical Records: I decided to obtain old medical records.  Initial Assessment:   Unilateral upper back pain provoked by specific movements - is c/w msk back pain.  Very low suspicion for pna, ptx, pe, chf, acs.  Pain is reproducible on exam.  Will obtain screening xr.    xr w/o acute process.  Home with trial of nsaids, f/u pmd.  Advised on sxs to prompt return to ED.    Clinical Tests:   Radiological Study: Reviewed and Ordered            Scribe Attestation:   Scribe #1: I performed the above scribed service and the documentation accurately describes the services I performed. I attest to the accuracy of the note.    Attending Attestation:           Physician Attestation for Scribe:  Physician Attestation Statement for Scribe #1: I, Dr. Miller, reviewed documentation, as scribed by Elijah De La Rosa in my presence, and it is both accurate and complete.                 ED Course     Clinical Impression:   The primary encounter diagnosis was Upper back pain on right side. A diagnosis of Pain was also pertinent to this visit.          Derek Miller MD  03/06/17 8849

## 2017-03-17 ENCOUNTER — OFFICE VISIT (OUTPATIENT)
Dept: OPHTHALMOLOGY | Facility: CLINIC | Age: 67
End: 2017-03-17
Payer: MEDICARE

## 2017-03-17 DIAGNOSIS — H25.13 NUCLEAR SCLEROSIS, BILATERAL: Primary | ICD-10-CM

## 2017-03-17 PROCEDURE — 99999 PR PBB SHADOW E&M-EST. PATIENT-LVL II: CPT | Mod: PBBFAC,,, | Performed by: OPHTHALMOLOGY

## 2017-03-17 PROCEDURE — 92014 COMPRE OPH EXAM EST PT 1/>: CPT | Mod: S$PBB,,, | Performed by: OPHTHALMOLOGY

## 2017-03-17 PROCEDURE — 99212 OFFICE O/P EST SF 10 MIN: CPT | Mod: PBBFAC | Performed by: OPHTHALMOLOGY

## 2017-03-17 PROCEDURE — 92136 OPHTHALMIC BIOMETRY: CPT | Mod: 50,PBBFAC | Performed by: OPHTHALMOLOGY

## 2017-03-17 RX ORDER — PHENYLEPHRINE HYDROCHLORIDE 25 MG/ML
1 SOLUTION/ DROPS OPHTHALMIC
Status: CANCELLED | OUTPATIENT
Start: 2017-03-17

## 2017-03-17 RX ORDER — TROPICAMIDE 10 MG/ML
1 SOLUTION/ DROPS OPHTHALMIC
Status: CANCELLED | OUTPATIENT
Start: 2017-03-17

## 2017-03-17 RX ORDER — LIDOCAINE HYDROCHLORIDE 10 MG/ML
1 INJECTION, SOLUTION EPIDURAL; INFILTRATION; INTRACAUDAL; PERINEURAL ONCE
Status: CANCELLED | OUTPATIENT
Start: 2017-03-17 | End: 2017-03-17

## 2017-03-17 RX ORDER — TETRACAINE HYDROCHLORIDE 5 MG/ML
1 SOLUTION OPHTHALMIC
Status: CANCELLED | OUTPATIENT
Start: 2017-03-17

## 2017-03-17 RX ORDER — MOXIFLOXACIN 5 MG/ML
1 SOLUTION/ DROPS OPHTHALMIC
Status: CANCELLED | OUTPATIENT
Start: 2017-03-17

## 2017-03-17 NOTE — PROGRESS NOTES
HPI     Cataract    Additional comments: Left Eye > Right Eye.            Comments   65 y/o diabetic male referred by Dr Mcdermott for Cataract Evaluation.   Pt states vision OS has like a film over it x 1 yr.  Finds OD is not   affected.  Has been wearing glasses all his life.  Wears them for all things except   very small print.     LBSL: 102 this am   Hemoglobin A1C       Date                     Value               Ref Range             Status                11/10/2016               7.6 (H)             4.5 - 6.2 %           Final                   07/16/2016               6.3 (H)             4.5 - 6.2 %           Final                        11/27/2015               6.9 (H)             4.5 - 6.2 %           Final                       Last edited by Grecia Thorpe on 3/17/2017 10:51 AM. (History)            Assessment /Plan     For exam results, see Encounter Report.    Nuclear sclerosis, bilateral      Visually Significant Cataract: Patient reports decreased vision consistent with the clinical amount of lenticular opacity, which reaches the level of visual significance and affects activities of daily living. Risks, benefits, and alternatives to cataract surgery were discussed and the consent reviewed. IOL options were discussed, including ATIOLs and the associated side effects and additional patient cost associated with them.   IOL Selections:   Right eye  IOL: pcboo 19.0 near target     Left eye  IOL: pcboo 20.0 near target    Pt wishes to have left eye done first.  Wants to keep myopia

## 2017-03-21 RX ORDER — ALLOPURINOL 300 MG/1
300 TABLET ORAL DAILY
Qty: 90 TABLET | Refills: 3 | OUTPATIENT
Start: 2017-03-21

## 2017-03-23 ENCOUNTER — OFFICE VISIT (OUTPATIENT)
Dept: WOUND CARE | Facility: CLINIC | Age: 67
End: 2017-03-23
Payer: MEDICARE

## 2017-03-23 VITALS
DIASTOLIC BLOOD PRESSURE: 37 MMHG | BODY MASS INDEX: 39.37 KG/M2 | WEIGHT: 275 LBS | HEIGHT: 70 IN | HEART RATE: 56 BPM | SYSTOLIC BLOOD PRESSURE: 75 MMHG | TEMPERATURE: 98 F

## 2017-03-23 DIAGNOSIS — L97.509 ULCER OF OTHER PART OF FOOT: ICD-10-CM

## 2017-03-23 DIAGNOSIS — B35.3 TINEA PEDIS OF BOTH FEET: ICD-10-CM

## 2017-03-23 DIAGNOSIS — L89.620 DECUBITUS ULCER OF LEFT HEEL, UNSTAGEABLE: ICD-10-CM

## 2017-03-23 DIAGNOSIS — I70.25 ATHEROSCLEROSIS OF NATIVE ARTERIES OF THE EXTREMITIES WITH ULCERATION: ICD-10-CM

## 2017-03-23 DIAGNOSIS — R60.0 BILATERAL LEG EDEMA: ICD-10-CM

## 2017-03-23 PROCEDURE — 99999 PR PBB SHADOW E&M-EST. PATIENT-LVL V: CPT | Mod: PBBFAC,,, | Performed by: NURSE PRACTITIONER

## 2017-03-23 PROCEDURE — 99212 OFFICE O/P EST SF 10 MIN: CPT | Mod: S$PBB,,, | Performed by: NURSE PRACTITIONER

## 2017-03-23 PROCEDURE — 99215 OFFICE O/P EST HI 40 MIN: CPT | Mod: PBBFAC | Performed by: NURSE PRACTITIONER

## 2017-03-23 NOTE — MR AVS SNAPSHOT
[facility]  [address]  [facility] [address]  Phone: [phone]                  Hemant Kennedy    3/23/2017 12:40 PM   Office Visit    Description:  Male : 1950   Provider:  Denita Hendrix NP   Department:  [facility]           Reason for Visit     Pressure Ulcer           Diagnoses this Visit        Comments    Uncontrolled type 2 diabetes mellitus with complication, with long-term current use of insulin    -  Primary     Ulcer of other part of foot         Decubitus ulcer of left heel, unstageable         Tinea pedis of both feet         Bilateral leg edema         Atherosclerosis of native arteries of the extremities with ulceration                To Do List           Future Appointments        Provider Department Dept Phone    3/29/2017 1:00 PM MD [facility] Nephrology [phone]    3/29/2017 3:00 PM MD [facility] - Cardiology [phone]    2017 10:00 AM MD [facility] - Endo/Diab/Metab [phone]      Goals (5 Years of Data)     None      Follow-Up and Disposition     Return in about 1 month (around 2017) for Wound evaluation.      [facility]     [facility] On Call Nurse Care Line -  Assistance  Registered nurses in the [facility] On Call Center provide clinical advisement, health education, appointment booking, and other advisory services.  Call for this free service at [phone].             Medications           Message regarding Medications     Verify the changes and/or additions to your medication regime listed below are the same as discussed with your clinician today.  If any of these changes or additions are incorrect, please notify your healthcare provider.             Verify that the below list of medications is an accurate representation of the medications you are currently taking.  If none reported, the list may be blank. If incorrect, please contact your healthcare provider. Carry  this list with you in case of emergency.           Current Medications     acetaminophen (TYLENOL) 500 MG tablet Take 2 tablets (1,000 mg total) by mouth 3 (three) times daily as needed for Pain.    allopurinol (ZYLOPRIM) 300 MG tablet Take 300 mg by mouth.  Tablet Oral Every day    amiodarone (PACERONE) 200 MG Tab Take 200 mg by mouth once daily.     apixaban (ELIQUIS) 5 mg Tab Take 5 mg by mouth 2 (two) times daily.    aspirin 81 MG Chew Take 81 mg by mouth. 1 Tablet, Chewable Oral Every day    atorvastatin (LIPITOR) 40 MG tablet Take 40 mg by mouth. 1 Tablet Oral Every day    carvedilol (COREG) 12.5 MG tablet Take 12.5 mg by mouth 2 (two) times daily with meals.     collagenase (SANTYL) ointment Apply topically once daily. Apply to wound daily as directed.    econazole nitrate 1 % cream Apply topically once daily. Apply to feet daily as directed.    ferrous sulfate 325 (65 FE) MG EC tablet Take 1 tablet (325 mg total) by mouth 2 (two) times daily with meals.    fluticasone (FLONASE) 50 mcg/actuation nasal spray 2 sprays by Each Nare route once daily.    furosemide (LASIX) 40 MG tablet Take 1 tablet (40 mg total) by mouth once daily.    gabapentin (NEURONTIN) 300 MG capsule Take 1 capsule (300 mg total) by mouth 3 (three) times daily.    HYDROPHILIC CREAM (TRIAD WOUND DRESSING TOP) Apply topically daily as needed.    insulin detemir (LEVEMIR) 100 unit/mL injection Inject 8 Units into the skin 2 (two) times daily.    insulin lispro (HUMALOG) 100 unit/mL injection Inject 5 Units into the skin 3 (three) times daily before meals.    lisinopril (PRINIVIL,ZESTRIL) 5 MG tablet Take 1.5 tablets (7.5 mg total) by mouth once daily.    metOLazone (ZAROXOLYN) 2.5 MG tablet Take 1 tablet (2.5 mg total) by mouth daily as needed (HOLD unless fluid overloaded).    ONE TOUCH ULTRA TEST Strp Test blood sugar four times daily    spironolactone (ALDACTONE) 25 MG tablet Take 1 tablet (25 mg total) by mouth once daily. HOLD unless  "fluid overloaded           Clinical Reference Information           Your Vitals Were     BP Pulse Temp Height Weight BMI    75/37 56 98 °F (36.7 °C) (Oral) 5' 10" (1.778 m) 124.7 kg (275 lb) 39.46 kg/m2      Blood Pressure          Most Recent Value    BP  (!)  75/37      Allergies as of 3/23/2017     No Known Allergies      Immunizations Administered on Date of Encounter - 3/23/2017     None      Instructions    Keep your dressing dry.  On the day home health is coming to change your dressing, you may shower with a mild soap such as Dove.  Irrigate the wound with lukewarm water for 5 minutes, then dry thoroughly.  Place a dry bandage over the wound to cover it until the nurse arrives.       Language Assistance Services     ATTENTION: Language assistance services are available, free of charge. Please call [phone].      ATENCIÓN: Si habla harvey, tiene a bolden disposición servicios gratuitos de asistencia lingüística. Llame al [phone].     CHÚ Ý: N?u b?n nói Ti?ng Vi?t, có các d?ch v? h? tr? ngôn ng? mi?n phí dành cho b?n. G?i s? [phone].         [facility] complies with applicable Federal civil rights laws and does not discriminate on the basis of race, color, national origin, age, disability, or sex.        "

## 2017-03-23 NOTE — PROGRESS NOTES
Subjective:       Patient ID: eHmant Kennedy Jr. is a 66 y.o. male.    Chief Complaint: Pressure Ulcer    Wound Check        This patient is seen today for reevaluation of wounds to the left foot.  He has been using santyl daily on the wounds and they are healing as evidenced by wound contracture. The lateral foot wound is healed and the heel wound is healing as evidenced by wound contracture.  He has home health services through [facility].  He is afebrile.  He denies increased redness, swelling or purulent drainage.  He has no pain.  His wound healing is complicated by type II diabetes.    Review of Systems   Constitutional: Negative for chills, diaphoresis and fever.   HENT: Positive for rhinorrhea. Negative for hearing loss, postnasal drip, sinus pressure, sneezing, sore throat, tinnitus and trouble swallowing.    Eyes: Negative for visual disturbance.   Respiratory: Positive for apnea (CPAP). Negative for cough, shortness of breath and wheezing.    Cardiovascular: Positive for leg swelling. Negative for chest pain and palpitations.   Gastrointestinal: Negative for constipation, diarrhea, nausea and vomiting.   Genitourinary: Positive for difficulty urinating (Incontinence). Negative for dysuria, frequency and hematuria.   Musculoskeletal: Negative for arthralgias, back pain and joint swelling.   Skin: Positive for wound.   Neurological: Negative for dizziness, weakness, light-headedness and headaches.   Hematological: Bruises/bleeds easily.   Psychiatric/Behavioral: Positive for confusion, decreased concentration, dysphoric mood and sleep disturbance. The patient is not nervous/anxious.        Objective:      Physical Exam   Constitutional: He is oriented to person, place, and time. He appears well-developed and well-nourished. No distress.   HENT:   Head: Normocephalic and atraumatic.   Neck: Normal range of motion.   Cardiovascular:   Pedal pulses non-palpable   Pulmonary/Chest: Effort normal. No  respiratory distress.   Musculoskeletal: Normal range of motion. He exhibits edema (3-4+lower leg). He exhibits no tenderness.        Feet:    Neurological: He is alert and oriented to person, place, and time.   Skin: Skin is warm and dry. No rash noted. He is not diaphoretic. No erythema.   Psychiatric: He has a normal mood and affect. His behavior is normal. Judgment and thought content normal.   Nursing note and vitals reviewed.      ALower Extremities Segmental Pressure [mmHg]:                    Right             Left  _______________________________________________________________  Brachial          115               100  Calf              255               255  Posterior Tibial  255               255  Dorsalis Pedis    255               255  ALFONSO (Post. Tib.)  2.22              2.22  ALFONSO (Dors. 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 PVR waveforms indicate mild peripheral arterial obstructive disease.     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 PVR waveforms indicate mild peripheral arterial obstructive disease. vascular lab study performed today revealed:    ..  Hemoglobin A1C   Date Value Ref Range Status   11/10/2016 7.6 (H) 4.5 - 6.2 % Final     Comment:     According to ADA guidelines, hemoglobin A1C <7.0% represents  optimal control in non-pregnant diabetic patients.  Different  metrics may apply to specific populations.   Standards of Medical Care in Diabetes - 2016.  For the purpose of screening for the presence of diabetes:  <5.7%     Consistent with the absence of diabetes  5.7-6.4%  Consistent with increasing risk for diabetes   (prediabetes)  >or=6.5%  Consistent with diabetes  Currently no consensus exists for use of hemoglobin A1C  for diagnosis of diabetes for children.     07/16/2016 6.3 (H) 4.5 - 6.2 % Final     Comment:     According to  "ADA guidelines, hemoglobin A1C <7.0% represents  optimal control in non-pregnant diabetic patients.  Different  metrics may apply to specific populations.   Standards of Medical Care in Diabetes - 2016.  For the purpose of screening for the presence of diabetes:  <5.7%     Consistent with the absence of diabetes  5.7-6.4%  Consistent with increasing risk for diabetes   (prediabetes)  >or=6.5%  Consistent with diabetes  Currently no consensus exists for use of hemoglobin A1C  for diagnosis of diabetes for children.     11/27/2015 6.9 (H) 4.5 - 6.2 % Final     Assessment:       1. Uncontrolled type 2 diabetes mellitus with complication, with long-term current use of insulin    2. Ulcer of other part of foot    3. Decubitus ulcer of left heel, unstageable    4. Tinea pedis of both feet    5. Bilateral leg edema    6. Atherosclerosis of native arteries of the extremities with ulceration        Plan:           Spectazole cream to feet daily.  Cleanse left foot wounds with wound .  Mepilex foam to left heel ulcer, cover with gauze and secure with roll gauze.    Compression with two 4" ace wraps left lower leg.  Change dressing three times weekly.  Return to clinic in one month.   [facility] notified of orders via EPIC fax.    Left lateral foot      Left heel                    "

## 2017-03-24 ENCOUNTER — TELEPHONE (OUTPATIENT)
Dept: INTERNAL MEDICINE | Facility: CLINIC | Age: 67
End: 2017-03-24

## 2017-03-24 ENCOUNTER — TELEPHONE (OUTPATIENT)
Dept: CARDIOLOGY | Facility: CLINIC | Age: 67
End: 2017-03-24

## 2017-03-24 NOTE — TELEPHONE ENCOUNTER
----- Message from Joanie Randle sent at 3/24/2017 10:51 AM CDT -----  Contact: pt   Pt need a refill on his Zyloprim 300 mg 90 day supply and he uses [facility] home delivery. Pt states he's been trying to get this refilled for over a week.  LOV 2/24/17 Dr. Chambers     Thanks

## 2017-03-24 NOTE — TELEPHONE ENCOUNTER
Spoke with the pt's sister, Subha - advised her to call the pt's PCP for Xyloprim and she verbalized understanding.

## 2017-03-24 NOTE — TELEPHONE ENCOUNTER
----- Message from Eleazar Mayen MA sent at 3/24/2017  1:26 PM CDT -----  Contact: Ruthie/Gusqvw-[phone]  Vickie Cooper MD    Type: Rx    Name of medication(s): allopurinol (ZYLOPRIM) 300 MG tablet    Is this a refill? New rx? Refill    Who prescribed medication?    Pharmacy Name, Phone, & Location: [facility] - [address]    Comments: Please advise. Thanks!

## 2017-03-28 ENCOUNTER — TELEPHONE (OUTPATIENT)
Dept: INTERNAL MEDICINE | Facility: CLINIC | Age: 67
End: 2017-03-28

## 2017-03-28 NOTE — TELEPHONE ENCOUNTER
----- Message from Luli Claudio MA sent at 3/28/2017  3:11 PM CDT -----  Contact: Domonique moreau/[facility] - [phone]  Please advise status of Orders and Plan of Care faxed to the office several times since 1/2017. Please call. Thanks!

## 2017-03-29 ENCOUNTER — OFFICE VISIT (OUTPATIENT)
Dept: CARDIOLOGY | Facility: CLINIC | Age: 67
End: 2017-03-29
Payer: MEDICARE

## 2017-03-29 ENCOUNTER — OFFICE VISIT (OUTPATIENT)
Dept: NEPHROLOGY | Facility: CLINIC | Age: 67
End: 2017-03-29
Payer: MEDICARE

## 2017-03-29 ENCOUNTER — LAB VISIT (OUTPATIENT)
Dept: LAB | Facility: HOSPITAL | Age: 67
End: 2017-03-29
Payer: MEDICARE

## 2017-03-29 VITALS
HEIGHT: 70 IN | OXYGEN SATURATION: 98 % | DIASTOLIC BLOOD PRESSURE: 60 MMHG | SYSTOLIC BLOOD PRESSURE: 116 MMHG | WEIGHT: 291.69 LBS | HEART RATE: 62 BPM | BODY MASS INDEX: 41.76 KG/M2

## 2017-03-29 VITALS
BODY MASS INDEX: 47.08 KG/M2 | HEART RATE: 55 BPM | DIASTOLIC BLOOD PRESSURE: 55 MMHG | HEIGHT: 66 IN | SYSTOLIC BLOOD PRESSURE: 107 MMHG

## 2017-03-29 DIAGNOSIS — I48.91 ATRIAL FIBRILLATION, CONTROLLED: ICD-10-CM

## 2017-03-29 DIAGNOSIS — I10 ESSENTIAL HYPERTENSION: ICD-10-CM

## 2017-03-29 DIAGNOSIS — N18.30 CKD (CHRONIC KIDNEY DISEASE) STAGE 3, GFR 30-59 ML/MIN: ICD-10-CM

## 2017-03-29 DIAGNOSIS — I50.22 CHRONIC SYSTOLIC HEART FAILURE: Primary | ICD-10-CM

## 2017-03-29 DIAGNOSIS — N40.0 BENIGN PROSTATIC HYPERPLASIA, PRESENCE OF LOWER URINARY TRACT SYMPTOMS UNSPECIFIED, UNSPECIFIED MORPHOLOGY: ICD-10-CM

## 2017-03-29 DIAGNOSIS — Z79.01 LONG TERM (CURRENT) USE OF ANTICOAGULANTS: ICD-10-CM

## 2017-03-29 DIAGNOSIS — N18.2 CKD (CHRONIC KIDNEY DISEASE), STAGE 2 (MILD): ICD-10-CM

## 2017-03-29 DIAGNOSIS — M89.8X9 METABOLIC BONE DISEASE: ICD-10-CM

## 2017-03-29 DIAGNOSIS — E55.9 VITAMIN D DEFICIENCY: ICD-10-CM

## 2017-03-29 DIAGNOSIS — N18.2 CKD (CHRONIC KIDNEY DISEASE), STAGE 2 (MILD): Primary | ICD-10-CM

## 2017-03-29 LAB
BACTERIA #/AREA URNS AUTO: NORMAL /HPF
BILIRUB UR QL STRIP: NEGATIVE
CLARITY UR REFRACT.AUTO: CLEAR
COLOR UR AUTO: ABNORMAL
CREAT UR-MCNC: 25 MG/DL
GLUCOSE UR QL STRIP: NEGATIVE
HGB UR QL STRIP: NEGATIVE
KETONES UR QL STRIP: NEGATIVE
LEUKOCYTE ESTERASE UR QL STRIP: ABNORMAL
MICROSCOPIC COMMENT: NORMAL
NITRITE UR QL STRIP: NEGATIVE
PH UR STRIP: 5 [PH] (ref 5–8)
PROT UR QL STRIP: NEGATIVE
PROT UR-MCNC: <7 MG/DL
PROT/CREAT RATIO, UR: NORMAL
RBC #/AREA URNS AUTO: 0 /HPF (ref 0–4)
SP GR UR STRIP: 1.01 (ref 1–1.03)
URN SPEC COLLECT METH UR: ABNORMAL
UROBILINOGEN UR STRIP-ACNC: NEGATIVE EU/DL
WBC #/AREA URNS AUTO: 1 /HPF (ref 0–5)

## 2017-03-29 PROCEDURE — 99214 OFFICE O/P EST MOD 30 MIN: CPT | Mod: PBBFAC,27 | Performed by: INTERNAL MEDICINE

## 2017-03-29 PROCEDURE — 99214 OFFICE O/P EST MOD 30 MIN: CPT | Mod: S$PBB,GC,, | Performed by: INTERNAL MEDICINE

## 2017-03-29 PROCEDURE — 99999 PR PBB SHADOW E&M-EST. PATIENT-LVL IV: CPT | Mod: PBBFAC,GC,, | Performed by: INTERNAL MEDICINE

## 2017-03-29 PROCEDURE — 99999 PR PBB SHADOW E&M-EST. PATIENT-LVL V: CPT | Mod: PBBFAC,GC,, | Performed by: INTERNAL MEDICINE

## 2017-03-29 RX ORDER — SPIRONOLACTONE 25 MG/1
25 TABLET ORAL DAILY
Qty: 90 TABLET | Refills: 3 | Status: ON HOLD | OUTPATIENT
Start: 2017-03-29 | End: 2017-05-03 | Stop reason: HOSPADM

## 2017-03-29 RX ORDER — LISINOPRIL 10 MG/1
10 TABLET ORAL DAILY
Qty: 90 TABLET | Refills: 3 | Status: ON HOLD | OUTPATIENT
Start: 2017-03-29 | End: 2017-07-26

## 2017-03-29 RX ORDER — ATORVASTATIN CALCIUM 40 MG/1
40 TABLET, FILM COATED ORAL DAILY
Qty: 90 TABLET | Refills: 3 | Status: SHIPPED | OUTPATIENT
Start: 2017-03-29 | End: 2018-10-10 | Stop reason: SDUPTHER

## 2017-03-29 RX ORDER — ALLOPURINOL 300 MG/1
300 TABLET ORAL DAILY
Qty: 90 TABLET | Refills: 3 | Status: SHIPPED | OUTPATIENT
Start: 2017-03-29 | End: 2018-10-11 | Stop reason: SDUPTHER

## 2017-03-29 RX ORDER — TAMSULOSIN HYDROCHLORIDE 0.4 MG/1
0.4 CAPSULE ORAL DAILY
Status: DISCONTINUED | OUTPATIENT
Start: 2017-03-30 | End: 2017-05-02

## 2017-03-29 RX ORDER — AMIODARONE HYDROCHLORIDE 200 MG/1
200 TABLET ORAL DAILY
Qty: 90 TABLET | Refills: 3 | Status: SHIPPED | OUTPATIENT
Start: 2017-03-29 | End: 2018-10-10 | Stop reason: SDUPTHER

## 2017-03-29 NOTE — MR AVS SNAPSHOT
[facility]  [address]  [facility] [address]  Phone: [phone]  Fax: [phone]                  Hemant Kennedy Jr.   3/29/2017 1:00 PM   Office Visit    Description:  Male : 1950   Provider:  Lakisha Plummer MD   Department:  [facility]                To Do List           Future Appointments        Provider Department Dept Phone    3/29/2017 3:00 PM Meir Wei MD [facility] - Cardiology [phone]    2017 12:40 PM Denita Hendrix NP [facility] - Wound Care [phone]    2017 10:00 AM Catarina Domingo MD [facility] - Endo/Diab/Metab [phone]      Goals (5 Years of Data)     None      [facility]     [facility] -  Assistance  Registered nurses in the [facility] Center provide clinical advisement, health education, appointment booking, and other advisory services.  Call for this free service at [phone].             Medications           Message regarding Medications     Verify the changes and/or additions to your medication regime listed below are the same as discussed with your clinician today.  If any of these changes or additions are incorrect, please notify your healthcare provider.             Verify that the below list of medications is an accurate representation of the medications you are currently taking.  If none reported, the list may be blank. If incorrect, please contact your healthcare provider. Carry this list with you in case of emergency.           Current Medications     acetaminophen (TYLENOL) 500 MG tablet Take 2 tablets (1,000 mg total) by mouth 3 (three) times daily as needed for Pain.    allopurinol (ZYLOPRIM) 300 MG tablet Take 300 mg by mouth.  Tablet Oral Every day    amiodarone (PACERONE) 200 MG Tab Take 200 mg by mouth once daily.     apixaban (ELIQUIS) 5 mg Tab Take 5 mg by mouth 2 (two) times daily.    aspirin 81 MG Chew Take 81 mg by mouth. 1 Tablet, Chewable Oral Every day     "atorvastatin (LIPITOR) 40 MG tablet Take 40 mg by mouth. 1 Tablet Oral Every day    carvedilol (COREG) 12.5 MG tablet Take 12.5 mg by mouth 2 (two) times daily with meals.     collagenase (SANTYL) ointment Apply topically once daily. Apply to wound daily as directed.    ferrous sulfate 325 (65 FE) MG EC tablet Take 1 tablet (325 mg total) by mouth 2 (two) times daily with meals.    fluticasone (FLONASE) 50 mcg/actuation nasal spray 2 sprays by Each Nare route once daily.    furosemide (LASIX) 40 MG tablet Take 1 tablet (40 mg total) by mouth once daily.    gabapentin (NEURONTIN) 300 MG capsule Take 1 capsule (300 mg total) by mouth 3 (three) times daily.    HYDROPHILIC CREAM (TRIAD WOUND DRESSING TOP) Apply topically daily as needed.    insulin detemir (LEVEMIR) 100 unit/mL injection Inject 8 Units into the skin 2 (two) times daily.    insulin lispro (HUMALOG) 100 unit/mL injection Inject 5 Units into the skin 3 (three) times daily before meals.    lisinopril (PRINIVIL,ZESTRIL) 5 MG tablet Take 1.5 tablets (7.5 mg total) by mouth once daily.    metOLazone (ZAROXOLYN) 2.5 MG tablet Take 1 tablet (2.5 mg total) by mouth daily as needed (HOLD unless fluid overloaded).    ONE TOUCH ULTRA TEST Strp Test blood sugar four times daily    spironolactone (ALDACTONE) 25 MG tablet Take 1 tablet (25 mg total) by mouth once daily. HOLD unless fluid overloaded    econazole nitrate 1 % cream Apply topically once daily. Apply to feet daily as directed.           Clinical Reference Information           Your Vitals Were     BP Pulse Height Weight SpO2 BMI    116/60 (BP Location: Left arm, Patient Position: Sitting) 62 5' 10" (1.778 m) 132.3 kg (291 lb 10.7 oz) 98% 41.85 kg/m2      Blood Pressure          Most Recent Value    BP  116/60      Allergies as of 3/29/2017     No Known Allergies      Immunizations Administered on Date of Encounter - 3/29/2017     None      Language Assistance Services     ATTENTION: Language assistance " services are available, free of charge. Please call [phone].      ATENCIÓN: Si habla harvey, tiene a bolden disposición servicios gratuitos de asistencia lingüística. Llame al [phone].     CHÚ Ý: N?u b?n nói Ti?ng Vi?t, có các d?ch v? h? tr? ngôn ng? mi?n phí dành cho b?n. G?i s? [phone].         [facility] complies with applicable Federal civil rights laws and does not discriminate on the basis of race, color, national origin, age, disability, or sex.

## 2017-03-29 NOTE — PROGRESS NOTES
Cardiology Clinic Note  Reason for Visit: f/u CHF    HPI:   67 y/o M with HFrEF (EF 20-25% in Jun 2016), NICM declined ICD, HTN, HLD, atrial fibrillation on Eliquis, T2DM, h/o CVA in 2010, ERIC  presents to the clinic for Follow up. Patient has chronic systolic heart failure exacerbation EF 20-25%. Patient was recently admitted with PNA and septic shock in 11/2016 and his heart failure medications was cut because of low BP. He has been seen and medications titrated by Dr. Brandt and Dr. Chambers.  He has been in a wheelchair since Arianne because of osteoarthritis.  He has LE edema which accumulates through the day, right worse than the left because of DVT in 1996.      He adamantly states that his breathing is fine today, sleeps on 1 pillow.  He is wheelchair-bound.  He does check his BP at home, but is unsure of the numbers.      His big complaints today are feeling of incomplete voiding and want for viagra for ED.      ROS:    Constitution: Negative for fever or chills.   HENT: Negative for sore throat or headaches. Negative for rhinorrhea.  Eyes: Negative for blurred or double vision.   Cardiovascular: See above  Pulmonary: Negative for SOB. Negative for cough.   Gastrointestinal: Negative for abdominal pain. Negative for nausea/ vomiting. Negative for diarrhea.   : Negative for dysuria.   Neurological: Negative for focal weakness or sensory changes.  PMH:     Past Medical History:   Diagnosis Date    *Atrial fibrillation     Atrial fibrillation     Cardiomyopathy     Cataract     CHF (congestive heart failure)     Diabetes mellitus     DVT (deep venous thrombosis)     Hypertension     Personal history of stroke with residual effects - Right Occipital 7/25/2012    Sleep apnea     Stroke      History reviewed. No pertinent surgical history.  Allergies:   Review of patient's allergies indicates:  No Known Allergies  Medications:     Current Outpatient Prescriptions on File Prior to Visit   Medication  Sig Dispense Refill    acetaminophen (TYLENOL) 500 MG tablet Take 2 tablets (1,000 mg total) by mouth 3 (three) times daily as needed for Pain. 15 tablet 0    allopurinol (ZYLOPRIM) 300 MG tablet Take 1 tablet (300 mg total) by mouth once daily.  Tablet Oral Every day 90 tablet 3    amiodarone (PACERONE) 200 MG Tab Take 200 mg by mouth once daily.       apixaban (ELIQUIS) 5 mg Tab Take 5 mg by mouth 2 (two) times daily.      aspirin 81 MG Chew Take 81 mg by mouth. 1 Tablet, Chewable Oral Every day      atorvastatin (LIPITOR) 40 MG tablet Take 40 mg by mouth. 1 Tablet Oral Every day      carvedilol (COREG) 12.5 MG tablet Take 12.5 mg by mouth 2 (two) times daily with meals.       collagenase (SANTYL) ointment Apply topically once daily. Apply to wound daily as directed. 90 g 0    econazole nitrate 1 % cream Apply topically once daily. Apply to feet daily as directed. 85 g 0    ferrous sulfate 325 (65 FE) MG EC tablet Take 1 tablet (325 mg total) by mouth 2 (two) times daily with meals. (Patient taking differently: Take 325 mg by mouth once daily. ) 60 tablet 5    fluticasone (FLONASE) 50 mcg/actuation nasal spray 2 sprays by Each Nare route once daily.  0    furosemide (LASIX) 40 MG tablet Take 1 tablet (40 mg total) by mouth once daily. 180 tablet 3    gabapentin (NEURONTIN) 300 MG capsule Take 1 capsule (300 mg total) by mouth 3 (three) times daily. 90 capsule 11    HYDROPHILIC CREAM (TRIAD WOUND DRESSING TOP) Apply topically daily as needed.      insulin detemir (LEVEMIR) 100 unit/mL injection Inject 8 Units into the skin 2 (two) times daily.  12    insulin lispro (HUMALOG) 100 unit/mL injection Inject 5 Units into the skin 3 (three) times daily before meals.      lisinopril (PRINIVIL,ZESTRIL) 5 MG tablet Take 1.5 tablets (7.5 mg total) by mouth once daily. 90 tablet 3    metOLazone (ZAROXOLYN) 2.5 MG tablet Take 1 tablet (2.5 mg total) by mouth daily as needed (HOLD unless fluid overloaded). 30  "tablet 1    ONE TOUCH ULTRA TEST Strp Test blood sugar four times daily      spironolactone (ALDACTONE) 25 MG tablet Take 1 tablet (25 mg total) by mouth once daily. HOLD unless fluid overloaded 90 tablet 3    [DISCONTINUED] allopurinol (ZYLOPRIM) 300 MG tablet Take 300 mg by mouth.  Tablet Oral Every day       No current facility-administered medications on file prior to visit.      Social History:     Social History   Substance Use Topics    Smoking status: Never Smoker    Smokeless tobacco: Never Used    Alcohol use No     Family History:     Family History   Problem Relation Age of Onset    Heart attack Mother     Cataracts Mother     Glaucoma Sister     Glaucoma Sister      Physical Exam:   BP (!) 107/55 (BP Location: Left arm, Patient Position: Sitting, BP Method: Automatic)  Pulse (!) 55  Ht 5' 6" (1.676 m)  BMI 47.08 kg/m2     Constitutional: NAD, conversant  Neck: No appreciable JVD  CV: Regular rate and rhythm  Pulm: CTAB, eupneic  GI: Abdomen soft, NTND  Extremities: 2+ RLE edema, LLE in bandage  Psych: AOx3, appropriate affect  Neuro: No focal deficits    Labs:     Lab Results   Component Value Date     01/24/2017    K 4.6 01/24/2017     01/24/2017    CO2 27 01/24/2017    BUN 40 (H) 01/24/2017    CREATININE 1.9 (H) 01/24/2017    ANIONGAP 7 (L) 01/24/2017     Lab Results   Component Value Date    AST 22 01/18/2017    ALT 14 01/18/2017    ALKPHOS 90 01/18/2017    BILITOT 0.4 01/18/2017    BILIDIR 0.8 (H) 11/17/2016    ALBUMIN 2.8 (L) 01/18/2017    ALBUMIN 3.3 (L) 12/11/2013     Lab Results   Component Value Date    CALCIUM 8.6 (L) 01/24/2017    MG 1.7 11/30/2016    PHOS 3.9 12/28/2016     Lab Results   Component Value Date     (H) 01/24/2017     (H) 11/15/2016     (H) 11/09/2016    Lab Results   Component Value Date    WBC 8.90 11/30/2016    HGB 7.1 (L) 11/30/2016    HCT 24.0 (L) 11/30/2016     11/30/2016    GRAN 6.0 11/30/2016    GRAN 67.9 11/30/2016 "     Lab Results   Component Value Date    INR 1.5 (H) 11/09/2016    INR 2.9 08/27/2014     Lab Results   Component Value Date    CHOL 131 01/18/2017    HDL 66 01/18/2017    LDLCALC 60.4 (L) 01/18/2017    TRIG 23 (L) 01/18/2017     Lab Results   Component Value Date    HGBA1C 7.6 (H) 11/10/2016     Lab Results   Component Value Date    TSH 2.254 07/16/2016          Imaging:   TTE 7/2016  1 - Mild left ventricular enlargement.     2 - Severely depressed left ventricular systolic function (EF 25-30%). There is severe hypokinesis of the inferseptum and anteroseptum.     3 - Right ventricular enlargement with low normal systolic function.     4 - Left ventricular diastolic dysfunction GRADE III.     5 - Pulmonary hypertension. The estimated PA systolic pressure is 53 mmHg.     6 - Trivial to mild tricuspid regurgitation.     7 - Increased central venous pressure.     8 - Severe left atrial enlargement.     EF   Date Value Ref Range Status   07/15/2016 25 (A) 55 - 65    12/01/2015 25 (A) 55 - 65    07/22/2013 40         Assessment and Plan:    65 y/o M with HFrEF (EF 20-25% in Jun 2016), NICM declined ICD, HTN, HLD, atrial fibrillation on Eliquis, T2DM, h/o CVA in 2010, ERIC, hx DVT presents to the clinic for follow up.     Chronic systolic HF- NICM  - patient appears euvolemic today, has chronic LE edema R>L. will continue with lasix 40 daily  - Continue Coreg to 12.5 mg bid, spironolactone 25mg  - Lisinopril 10mg qdaily  - Patient declined ICD placement.    Afib, paroxsysmal now in sinus: Chadsvasc 6, on Amio and eliquis    Type II DM: A1c 7.6%    ERIC on Cpap: cpap at night at 8    CVA 2010 L sided weakness  - C/W ASA, Atorvastain and Eliquis for anticoagulation    Incomplete Voiding, ED  - Recommend PCP check testosterone levels, check for prostate  - I will prescribe for flomax as trial (rec take at night), but will defer to PCP for further management  - Recommend he get viagra from PCP, as I do not prescribe this at  present    Signed:  Meir Wei MD  Cardiology Fellow, PGY-5  Pager: [phone]  3/29/2017 3:20 PM    Follow-up:   Future Appointments  Date Time Provider Department Center   4/21/2017 12:40 PM [facility]   5/16/2017 10:00 AM Catarina Domingo MD [facility] [place]

## 2017-03-29 NOTE — PROGRESS NOTES
Your test results were in acceptable range. Message me for concerns or questions. Confirm you have received this message.

## 2017-03-29 NOTE — PROGRESS NOTES
Subjective:       Patient ID: Hemant Kennedy Jr. is a 67 y.o. male.    Chief Complaint:Follow-up    Interval history   No ED visitis, hospitalization since last visit.   No c/o SOB.   PT has a wound in left leg for which he has been getting wound care.   No LE swelling   Pt has been following a no salt diet.   No use of NSAIDs  C/o frequent urination and incomplete emptying of bladder     HPI     66 y.o. male with hx of CHF, DM II, right occipital stroke, afib, HTN, cardiomyopathy, DVT who was recently in the hosp between 11/9 to 11/30/2016 and seen by our service for HUMERA requiring 1 time dialysis is here for hosp follow up. Pt had initially presented for a fall. Hospitalization was complicated by Sepsis 2/2 community acquired PNA and HUMERA thought to be 2/2 ischemic ATN. 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 Creat went up to >4 and required 1 session of RRT for clearence and then pt started trending back down to baseline on his own. At discharge pt's creat was down to baseline at 1.5. Last creat from Jan, 2017 is up to 1.9.     Review of Systems      Constitutional: no chills, no fevers  HEENT: no issues   Resp: no shortness of breath, no cough   Cardiac: no chest pain, no palpitations  Abd: no nausea or vomiting. No abdominal pain  Neuro: no headache, no dizziness.   Psych: no depression, no agitation    Objective:      Physical Exam    General: Well developed, well nourished in NAD  HEENT: Conjunctiva clear; Oropharynx clear  Neck: No JVD noted, Supple  CV- Normal S1, S2 with no murmurs,gallops,rubs  Resp- Lungs CTA Bilaterally, Unlabored  Abdomen- NTND, BS normoactive x4 quads, soft  Extrem- No cyanosis, clubbing, no edema, wound on left leg, compression stockings on B/L   Skin- No rashes, lesions, ulcers  Neuro: awake, Oriented x3, no FND    Assessment:       1. CKD (chronic kidney disease), stage 2 (mild)    2. Benign  prostatic hyperplasia, presence of lower urinary tract symptoms unspecified, unspecified morphology    3. Metabolic bone disease    4. Vitamin D deficiency        Plan:            1. CKD stage 2-3   - likely due to DM and HTN   - baseline 1.5 to 1.9, will recheck today   - kidney USG with some signs of chronic disease      Lab Results   Component Value Date    CREATININE 1.9 (H) 01/24/2017     Protein Creatinine Ratios: has been non significant  Will recheck today     Prot/Creat Ratio, Ur   Date Value Ref Range Status   12/28/2016 Unable to calculate 0.00 - 0.20 Final   11/10/2016 0.32 (H) 0.00 - 0.20 Final   07/21/2013 UNABLE TO CALCULATE 0.0 - 0.2 Final     ·   ·   Acid-Base: stable   Lab Results   Component Value Date     01/24/2017    K 4.6 01/24/2017    CO2 27 01/24/2017     2. HTN: Blood pressures stable, continue coreg, lasix, lisinopril, spironolactone     3. Renal osteodystrophy: will check PTH, vit D    Lab Results   Component Value Date    CALCIUM 8.6 (L) 01/24/2017    PHOS 3.9 12/28/2016       4. Anemia: TSAT low, pt on iron supplementation   Lab Results   Component Value Date    HGB 7.1 (L) 11/30/2016        5. DM:  Last HbA1C slightly high, will defer management to primary, counselled pt that it needs better control to avoid worsening of renal function   Lab Results   Component Value Date    HGBA1C 7.6 (H) 11/10/2016       6. Lipid management: normal  Lab Results   Component Value Date    LDLCALC 60.4 (L) 01/18/2017       7. Frequent urination    - will order PSA  - ordered ambulatory referral to urology     ESRD planing:     Renal panel, UA, urine PC ration, PTH, Vit D today   Follow up in 4 months with rpt labs     Lakisha Plummer MD   Nephrology fellow   Pager [phone]

## 2017-03-29 NOTE — MR AVS SNAPSHOT
[facility]  [address]  [facility] [address]  Phone: [phone]                  Hemant Kennedy Jr.   3/29/2017 3:00 PM   Office Visit    Description:  Male : 1950   Provider:  Meir Wei MD   Department:  [facility]           Reason for Visit     Essential hypertension           Diagnoses this Visit        Comments    Chronic systolic heart failure    -  Primary     Atrial fibrillation, controlled         Essential hypertension         CKD (chronic kidney disease) stage 3, GFR 30-59 ml/min         Long term (current) use of anticoagulants                To Do List           Future Appointments        Provider Department Dept Phone    2017 12:40 PM LUCILLE [facility] Wound Care [phone]    2017 10:00 AM MD [facility] Endo/Diab/Metab [phone]      Goals (5 Years of Data)     None      Follow-Up and Disposition     Return in about 3 months (around 2017).    Follow-up and Disposition History       These Medications        Disp Refills Start End    amiodarone (PACERONE) 200 MG Tab 90 tablet 3 3/29/2017     Take 1 tablet (200 mg total) by mouth once daily. - Oral    Pharmacy: [facility] - [address] Ph #: [phone]       atorvastatin (LIPITOR) 40 MG tablet 90 tablet 3 3/29/2017     Take 1 tablet (40 mg total) by mouth once daily. 1 Tablet Oral Every day - Oral    Pharmacy: [facility] - [address] Ph #: [phone]       spironolactone (ALDACTONE) 25 MG tablet 90 tablet 3 3/29/2017 3/29/2018    Take 1 tablet (25 mg total) by mouth once daily. - Oral    Pharmacy: [facility] - [address] Ph #: [phone]       lisinopril 10 MG tablet 90 tablet 3 3/29/2017 3/29/2018    Take 1 tablet (10 mg total) by mouth once daily. - Oral    Pharmacy: [facility] -   [place] - [address] [place] [address] #: [phone]         [facility]     [facility] Care [facility] - 24/7 Assistance  Registered nurses in the [facility] provide clinical advisement, health education, appointment booking, and other advisory services.  Call for this free service at [phone].             Medications           Message regarding Medications     Verify the changes and/or additions to your medication regime listed below are the same as discussed with your clinician today.  If any of these changes or additions are incorrect, please notify your healthcare provider.        START taking these NEW medications        Refills    spironolactone (ALDACTONE) 25 MG tablet 3    Sig: Take 1 tablet (25 mg total) by mouth once daily.    Class: Normal    Route: Oral      These medications were administered today        Dose Freq    tamsulosin 24 hr capsule 0.4 mg 0.4 mg Daily    Starting on: 3/30/2017    Sig: Take 1 capsule (0.4 mg total) by mouth once daily.    Class: Normal    Route: Oral      CHANGE how you are taking these medications     Start Taking Instead of    amiodarone (PACERONE) 200 MG Tab amiodarone (PACERONE) 200 MG Tab    Dosage:  Take 1 tablet (200 mg total) by mouth once daily. Dosage:  Take 200 mg by mouth once daily.     Reason for Change:  Reorder     atorvastatin (LIPITOR) 40 MG tablet atorvastatin (LIPITOR) 40 MG tablet    Dosage:  Take 1 tablet (40 mg total) by mouth once daily. 1 Tablet Oral Every day Dosage:  Take 40 mg by mouth. 1 Tablet Oral Every day    Reason for Change:  Reorder     lisinopril 10 MG tablet lisinopril (PRINIVIL,ZESTRIL) 5 MG tablet    Dosage:  Take 1 tablet (10 mg total) by mouth once daily. Dosage:  Take 1.5 tablets (7.5 mg total) by mouth once daily.    Reason for Change:  Reorder            Verify that the below list of medications is an accurate representation of the medications you are currently taking.  If none reported, the list may be  blank. If incorrect, please contact your healthcare provider. Carry this list with you in case of emergency.           Current Medications     acetaminophen (TYLENOL) 500 MG tablet Take 2 tablets (1,000 mg total) by mouth 3 (three) times daily as needed for Pain.    allopurinol (ZYLOPRIM) 300 MG tablet Take 1 tablet (300 mg total) by mouth once daily.  Tablet Oral Every day    apixaban (ELIQUIS) 5 mg Tab Take 5 mg by mouth 2 (two) times daily.    aspirin 81 MG Chew Take 81 mg by mouth. 1 Tablet, Chewable Oral Every day    carvedilol (COREG) 12.5 MG tablet Take 12.5 mg by mouth 2 (two) times daily with meals.     collagenase (SANTYL) ointment Apply topically once daily. Apply to wound daily as directed.    ferrous sulfate 325 (65 FE) MG EC tablet Take 1 tablet (325 mg total) by mouth 2 (two) times daily with meals.    fluticasone (FLONASE) 50 mcg/actuation nasal spray 2 sprays by Each Nare route once daily.    furosemide (LASIX) 40 MG tablet Take 1 tablet (40 mg total) by mouth once daily.    gabapentin (NEURONTIN) 300 MG capsule Take 1 capsule (300 mg total) by mouth 3 (three) times daily.    HYDROPHILIC CREAM (TRIAD WOUND DRESSING TOP) Apply topically daily as needed.    insulin detemir (LEVEMIR) 100 unit/mL injection Inject 8 Units into the skin 2 (two) times daily.    insulin lispro (HUMALOG) 100 unit/mL injection Inject 5 Units into the skin 3 (three) times daily before meals.    metOLazone (ZAROXOLYN) 2.5 MG tablet Take 1 tablet (2.5 mg total) by mouth daily as needed (HOLD unless fluid overloaded).    ONE TOUCH ULTRA TEST Strp Test blood sugar four times daily    spironolactone (ALDACTONE) 25 MG tablet Take 1 tablet (25 mg total) by mouth once daily. HOLD unless fluid overloaded    amiodarone (PACERONE) 200 MG Tab Take 1 tablet (200 mg total) by mouth once daily.    atorvastatin (LIPITOR) 40 MG tablet Take 1 tablet (40 mg total) by mouth once daily. 1 Tablet Oral Every day    econazole nitrate 1 % cream Apply  "topically once daily. Apply to feet daily as directed.    lisinopril 10 MG tablet Take 1 tablet (10 mg total) by mouth once daily.    spironolactone (ALDACTONE) 25 MG tablet Take 1 tablet (25 mg total) by mouth once daily.           Clinical Reference Information           Your Vitals Were     BP Pulse Height BMI       107/55 (BP Location: Left arm, Patient Position: Sitting, BP Method: Automatic) 55 5' 6" (1.676 m) 47.08 kg/m2       Blood Pressure          Most Recent Value    Right Arm BP - Sitting  104/56    Left Arm BP - Sitting  107/55    BP  (!)  107/55      Allergies as of 3/29/2017     No Known Allergies      Immunizations Administered on Date of Encounter - 3/29/2017     None      Language Assistance Services     ATTENTION: Language assistance services are available, free of charge. Please call [phone].      ATENCIÓN: Si cherri gabriel, tiene a bolden disposición servicios gratuitos de asistencia lingüística. Llame al [phone].     [facility] Ý: N?u b?n nói Ti?ng Vi?t, có các d?ch v? h? tr? ngôn ng? mi?n phí dành cho b?n. G?i s? [phone].         [facility] complies with applicable Federal civil rights laws and does not discriminate on the basis of race, color, national origin, age, disability, or sex.        "

## 2017-03-30 ENCOUNTER — TELEPHONE (OUTPATIENT)
Dept: NEPHROLOGY | Facility: CLINIC | Age: 67
End: 2017-03-30

## 2017-03-30 DIAGNOSIS — N25.81 SECONDARY HYPERPARATHYROIDISM: Primary | ICD-10-CM

## 2017-03-30 RX ORDER — ERGOCALCIFEROL 1.25 MG/1
50000 CAPSULE ORAL
Qty: 12 CAPSULE | Refills: 3 | Status: ON HOLD | OUTPATIENT
Start: 2017-03-30 | End: 2020-12-16 | Stop reason: HOSPADM

## 2017-03-30 NOTE — TELEPHONE ENCOUNTER
Creat trended up compared to before. Informed pt's sister who answered the phone.   Informed to rpt labs in 2 wks to follow trend.

## 2017-04-04 ENCOUNTER — TELEPHONE (OUTPATIENT)
Dept: OPHTHALMOLOGY | Facility: CLINIC | Age: 67
End: 2017-04-04

## 2017-04-04 DIAGNOSIS — H25.12 NUCLEAR SCLEROTIC CATARACT OF LEFT EYE: Primary | ICD-10-CM

## 2017-04-21 ENCOUNTER — OFFICE VISIT (OUTPATIENT)
Dept: WOUND CARE | Facility: CLINIC | Age: 67
End: 2017-04-21
Payer: MEDICARE

## 2017-04-21 VITALS
DIASTOLIC BLOOD PRESSURE: 42 MMHG | HEART RATE: 54 BPM | TEMPERATURE: 99 F | SYSTOLIC BLOOD PRESSURE: 97 MMHG | HEIGHT: 67 IN

## 2017-04-21 DIAGNOSIS — B35.3 TINEA PEDIS OF BOTH FEET: ICD-10-CM

## 2017-04-21 DIAGNOSIS — R60.0 BILATERAL LEG EDEMA: ICD-10-CM

## 2017-04-21 DIAGNOSIS — L89.620 DECUBITUS ULCER OF LEFT HEEL, UNSTAGEABLE: ICD-10-CM

## 2017-04-21 PROBLEM — L97.509 ULCER OF OTHER PART OF FOOT: Status: RESOLVED | Noted: 2017-01-13 | Resolved: 2017-04-21

## 2017-04-21 PROCEDURE — 99999 PR PBB SHADOW E&M-EST. PATIENT-LVL V: CPT | Mod: PBBFAC,,, | Performed by: NURSE PRACTITIONER

## 2017-04-21 PROCEDURE — 99215 OFFICE O/P EST HI 40 MIN: CPT | Mod: PBBFAC | Performed by: NURSE PRACTITIONER

## 2017-04-21 PROCEDURE — 99212 OFFICE O/P EST SF 10 MIN: CPT | Mod: S$PBB,,, | Performed by: NURSE PRACTITIONER

## 2017-04-21 NOTE — PROGRESS NOTES
Subjective:       Patient ID: Hemant Kennedy Jr. is a 67 y.o. male.    Chief Complaint: Wound Check    Wound Check        This patient is seen today for reevaluation of a wound to the left heel.  He has been using a mepilex foam border dressing on the wound three times weekly. The heel wound is healing as evidenced by wound contracture.  He has home health services through [facility].  He is afebrile.  He denies increased redness, swelling or purulent drainage.  His pain level is 4/10.  His wound healing is complicated by type II diabetes.    Review of Systems   Constitutional: Negative for chills, diaphoresis and fever.   HENT: Positive for rhinorrhea. Negative for hearing loss, postnasal drip, sinus pressure, sneezing, sore throat, tinnitus and trouble swallowing.    Eyes: Negative for visual disturbance.   Respiratory: Positive for apnea (CPAP). Negative for cough, shortness of breath and wheezing.    Cardiovascular: Positive for leg swelling. Negative for chest pain and palpitations.   Gastrointestinal: Negative for constipation, diarrhea, nausea and vomiting.   Genitourinary: Positive for difficulty urinating (Incontinence). Negative for dysuria, frequency and hematuria.   Musculoskeletal: Negative for arthralgias, back pain and joint swelling.   Skin: Positive for wound.   Neurological: Negative for dizziness, weakness, light-headedness and headaches.   Hematological: Bruises/bleeds easily.   Psychiatric/Behavioral: Positive for confusion, decreased concentration, dysphoric mood and sleep disturbance. The patient is not nervous/anxious.        Objective:      Physical Exam   Constitutional: He is oriented to person, place, and time. He appears well-developed and well-nourished. No distress.   HENT:   Head: Normocephalic and atraumatic.   Neck: Normal range of motion.   Cardiovascular:   Pedal pulses non-palpable   Pulmonary/Chest: Effort normal. No respiratory distress.   Musculoskeletal: Normal  range of motion. He exhibits edema (3-4+lower leg). He exhibits no tenderness.        Feet:    Neurological: He is alert and oriented to person, place, and time.   Skin: Skin is warm and dry. No rash noted. He is not diaphoretic. No erythema.   Psychiatric: He has a normal mood and affect. His behavior is normal. Judgment and thought content normal.   Nursing note and vitals reviewed.        Hemoglobin A1C   Date Value Ref Range Status   11/10/2016 7.6 (H) 4.5 - 6.2 % Final     Comment:     According to ADA guidelines, hemoglobin A1C <7.0% represents  optimal control in non-pregnant diabetic patients.  Different  metrics may apply to specific populations.   Standards of Medical Care in Diabetes - 2016.  For the purpose of screening for the presence of diabetes:  <5.7%     Consistent with the absence of diabetes  5.7-6.4%  Consistent with increasing risk for diabetes   (prediabetes)  >or=6.5%  Consistent with diabetes  Currently no consensus exists for use of hemoglobin A1C  for diagnosis of diabetes for children.     07/16/2016 6.3 (H) 4.5 - 6.2 % Final     Comment:     According to ADA guidelines, hemoglobin A1C <7.0% represents  optimal control in non-pregnant diabetic patients.  Different  metrics may apply to specific populations.   Standards of Medical Care in Diabetes - 2016.  For the purpose of screening for the presence of diabetes:  <5.7%     Consistent with the absence of diabetes  5.7-6.4%  Consistent with increasing risk for diabetes   (prediabetes)  >or=6.5%  Consistent with diabetes  Currently no consensus exists for use of hemoglobin A1C  for diagnosis of diabetes for children.     11/27/2015 6.9 (H) 4.5 - 6.2 % Final     Assessment:       1. Uncontrolled type 2 diabetes mellitus with complication, with long-term current use of insulin    2. Decubitus ulcer of left heel, unstageable    3. Tinea pedis of both feet    4. Bilateral leg edema        Plan:           Spectazole cream to feet daily.  Cleanse  "left foot wounds with wound .  Mepilex foam to left heel ulcer, cover with gauze and secure with roll gauze.    Compression with two 4" ace wraps left lower leg.  Change dressing three times weekly.  Return to clinic in one month.   [facility] notified of orders via EPIC fax.      Left heel                        "

## 2017-04-21 NOTE — MR AVS SNAPSHOT
[facility]  [address]  [facility] [address]  Phone: [phone]                  Hemant Kennedy JrShanti   2017 12:40 PM   Office Visit    Description:  Male : 1950   Provider:  Denita Hendrix NP   Department:  [facility]           Reason for Visit     Wound Check           Diagnoses this Visit        Comments    Uncontrolled type 2 diabetes mellitus with complication, with long-term current use of insulin    -  Primary     Decubitus ulcer of left heel, unstageable         Tinea pedis of both feet         Bilateral leg edema                To Do List           Future Appointments        Provider Department Dept Phone    2017 11:00 AM MD [facility] - Endo/Diab/Metab [phone]    2017 1:40 PM Meir Wei MD [facility] - Cardiology [phone]      Your Future Surgeries/Procedures     May 15, 2017   Surgery with Gilberto Zuniga MD   [facility] ([facility]    [address]  [facility] [id number] [phone]              Goals (5 Years of Data)     None      Follow-Up and Disposition     Return in about 1 month (around 2017) for Wound evaluation.      [facility]     [facility] Care [facility] -  Assistance  Unless otherwise directed by your provider, please contact [facility], our nurse care line that is available for  assistance.     Registered nurses in the [facility] provide: appointment scheduling, clinical advisement, health education, and other advisory services.  Call: [phone] (toll free)               Medications           Message regarding Medications     Verify the changes and/or additions to your medication regime listed below are the same as discussed with your clinician today.  If any of these changes or additions are incorrect, please notify your healthcare provider.             Verify that the below list of medications is an accurate  representation of the medications you are currently taking.  If none reported, the list may be blank. If incorrect, please contact your healthcare provider. Carry this list with you in case of emergency.           Current Medications     allopurinol (ZYLOPRIM) 300 MG tablet Take 1 tablet (300 mg total) by mouth once daily.  Tablet Oral Every day    amiodarone (PACERONE) 200 MG Tab Take 1 tablet (200 mg total) by mouth once daily.    apixaban (ELIQUIS) 5 mg Tab Take 5 mg by mouth 2 (two) times daily.    aspirin 81 MG Chew Take 81 mg by mouth. 1 Tablet, Chewable Oral Every day    atorvastatin (LIPITOR) 40 MG tablet Take 1 tablet (40 mg total) by mouth once daily. 1 Tablet Oral Every day    carvedilol (COREG) 12.5 MG tablet Take 12.5 mg by mouth 2 (two) times daily with meals.     collagenase (SANTYL) ointment Apply topically once daily. Apply to wound daily as directed.    ergocalciferol (ERGOCALCIFEROL) 50,000 unit Cap Take 1 capsule (50,000 Units total) by mouth every 7 days.    ferrous sulfate 325 (65 FE) MG EC tablet Take 1 tablet (325 mg total) by mouth 2 (two) times daily with meals.    fluticasone (FLONASE) 50 mcg/actuation nasal spray 2 sprays by Each Nare route once daily.    furosemide (LASIX) 40 MG tablet Take 1 tablet (40 mg total) by mouth once daily.    gabapentin (NEURONTIN) 300 MG capsule Take 1 capsule (300 mg total) by mouth 3 (three) times daily.    HYDROPHILIC CREAM (TRIAD WOUND DRESSING TOP) Apply topically daily as needed.    insulin detemir (LEVEMIR) 100 unit/mL injection Inject 8 Units into the skin 2 (two) times daily.    insulin lispro (HUMALOG) 100 unit/mL injection Inject 5 Units into the skin 3 (three) times daily before meals.    lisinopril 10 MG tablet Take 1 tablet (10 mg total) by mouth once daily.    metOLazone (ZAROXOLYN) 2.5 MG tablet Take 1 tablet (2.5 mg total) by mouth daily as needed (HOLD unless fluid overloaded).    ONE TOUCH ULTRA TEST Strp Test blood sugar four times daily  "   spironolactone (ALDACTONE) 25 MG tablet Take 1 tablet (25 mg total) by mouth once daily. HOLD unless fluid overloaded    spironolactone (ALDACTONE) 25 MG tablet Take 1 tablet (25 mg total) by mouth once daily.    acetaminophen (TYLENOL) 500 MG tablet Take 2 tablets (1,000 mg total) by mouth 3 (three) times daily as needed for Pain.    econazole nitrate 1 % cream Apply topically once daily. Apply to feet daily as directed.           Clinical Reference Information           Your Vitals Were     BP Pulse Temp Height          97/42 (BP Location: Left arm, Patient Position: Sitting) 54 98.8 °F (37.1 °C) (Oral) 5' 7" (1.702 m)        Blood Pressure          Most Recent Value    BP  (!)  97/42      Allergies as of 4/21/2017     No Known Allergies      Immunizations Administered on Date of Encounter - 4/21/2017     None      Instructions    Keep your dressing dry.  On the day home health is coming to change your dressing, you may shower with a mild soap such as Dove.  Irrigate the wound with lukewarm water for 5 minutes, then dry thoroughly.  Place a dry bandage over the wound to cover it until the nurse arrives.       Language Assistance Services     ATTENTION: Language assistance services are available, free of charge. Please call [phone].      ATENCIÓN: Si mandola harvey, tiene a bolden disposición servicios gratuitos de asistencia lingüística. Llame al [phone].     MANISH Ý: N?u b?n nói Ti?ng Vi?t, có các d?ch v? h? tr? ngôn ng? mi?n phí dành cho b?n. G?i s? [phone].         [facility] complies with applicable Federal civil rights laws and does not discriminate on the basis of race, color, national origin, age, disability, or sex.        "

## 2017-04-27 ENCOUNTER — HOSPITAL ENCOUNTER (INPATIENT)
Facility: HOSPITAL | Age: 67
LOS: 12 days | Discharge: NURSING, REHAB OR LONG-TERM CARE | DRG: 682 | End: 2017-05-10
Attending: EMERGENCY MEDICINE | Admitting: HOSPITALIST
Payer: MEDICARE

## 2017-04-27 DIAGNOSIS — I48.91 ATRIAL FIBRILLATION, CONTROLLED: ICD-10-CM

## 2017-04-27 DIAGNOSIS — N18.30 ACUTE RENAL FAILURE SUPERIMPOSED ON STAGE 3 CHRONIC KIDNEY DISEASE: ICD-10-CM

## 2017-04-27 DIAGNOSIS — Z51.89 ENCOUNTER FOR WOUND CARE: ICD-10-CM

## 2017-04-27 DIAGNOSIS — I15.0 RENOVASCULAR HYPERTENSION: ICD-10-CM

## 2017-04-27 DIAGNOSIS — N17.0 ATN (ACUTE TUBULAR NECROSIS): ICD-10-CM

## 2017-04-27 DIAGNOSIS — N17.9 AKI (ACUTE KIDNEY INJURY): ICD-10-CM

## 2017-04-27 DIAGNOSIS — E66.01 MORBID OBESITY DUE TO EXCESS CALORIES: ICD-10-CM

## 2017-04-27 DIAGNOSIS — R00.0 TACHYCARDIA: ICD-10-CM

## 2017-04-27 DIAGNOSIS — N19 UREMIC ENCEPHALOPATHY: ICD-10-CM

## 2017-04-27 DIAGNOSIS — R53.83 FATIGUE: ICD-10-CM

## 2017-04-27 DIAGNOSIS — N17.9 ACUTE RENAL FAILURE SUPERIMPOSED ON STAGE 3 CHRONIC KIDNEY DISEASE: ICD-10-CM

## 2017-04-27 DIAGNOSIS — G47.33 OBSTRUCTIVE SLEEP APNEA SYNDROME: ICD-10-CM

## 2017-04-27 DIAGNOSIS — G93.49 UREMIC ENCEPHALOPATHY: ICD-10-CM

## 2017-04-27 DIAGNOSIS — Z71.89 GOALS OF CARE, COUNSELING/DISCUSSION: ICD-10-CM

## 2017-04-27 DIAGNOSIS — E87.5 HYPERKALEMIA: Primary | ICD-10-CM

## 2017-04-27 DIAGNOSIS — R53.81 DEBILITY: ICD-10-CM

## 2017-04-27 DIAGNOSIS — N18.30 CKD (CHRONIC KIDNEY DISEASE) STAGE 3, GFR 30-59 ML/MIN: ICD-10-CM

## 2017-04-27 DIAGNOSIS — I50.22 CHRONIC SYSTOLIC CONGESTIVE HEART FAILURE, NYHA CLASS 2: ICD-10-CM

## 2017-04-27 DIAGNOSIS — N17.9 ACUTE RENAL FAILURE, UNSPECIFIED ACUTE RENAL FAILURE TYPE: ICD-10-CM

## 2017-04-27 DIAGNOSIS — G93.40 ENCEPHALOPATHY: ICD-10-CM

## 2017-04-27 DIAGNOSIS — I50.22 CHRONIC SYSTOLIC HEART FAILURE: ICD-10-CM

## 2017-04-27 LAB
APTT BLDCRRT: 30.1 SEC
BNP SERPL-MCNC: 157 PG/ML
INR PPP: 1.3
LIPASE SERPL-CCNC: 144 U/L
MAGNESIUM SERPL-MCNC: 2.5 MG/DL
PROTHROMBIN TIME: 13 SEC

## 2017-04-27 PROCEDURE — 96365 THER/PROPH/DIAG IV INF INIT: CPT

## 2017-04-27 PROCEDURE — 84484 ASSAY OF TROPONIN QUANT: CPT

## 2017-04-27 PROCEDURE — 83880 ASSAY OF NATRIURETIC PEPTIDE: CPT

## 2017-04-27 PROCEDURE — 85730 THROMBOPLASTIN TIME PARTIAL: CPT

## 2017-04-27 PROCEDURE — 86901 BLOOD TYPING SEROLOGIC RH(D): CPT

## 2017-04-27 PROCEDURE — 93010 ELECTROCARDIOGRAM REPORT: CPT | Mod: ,,, | Performed by: INTERNAL MEDICINE

## 2017-04-27 PROCEDURE — 99285 EMERGENCY DEPT VISIT HI MDM: CPT | Mod: 25

## 2017-04-27 PROCEDURE — 96375 TX/PRO/DX INJ NEW DRUG ADDON: CPT

## 2017-04-27 PROCEDURE — 96361 HYDRATE IV INFUSION ADD-ON: CPT

## 2017-04-27 PROCEDURE — 84443 ASSAY THYROID STIM HORMONE: CPT

## 2017-04-27 PROCEDURE — P9612 CATHETERIZE FOR URINE SPEC: HCPCS

## 2017-04-27 PROCEDURE — 80053 COMPREHEN METABOLIC PANEL: CPT

## 2017-04-27 PROCEDURE — 85025 COMPLETE CBC W/AUTO DIFF WBC: CPT

## 2017-04-27 PROCEDURE — 86900 BLOOD TYPING SEROLOGIC ABO: CPT

## 2017-04-27 PROCEDURE — 96366 THER/PROPH/DIAG IV INF ADDON: CPT

## 2017-04-27 PROCEDURE — 83735 ASSAY OF MAGNESIUM: CPT

## 2017-04-27 PROCEDURE — 99285 EMERGENCY DEPT VISIT HI MDM: CPT | Mod: ,,, | Performed by: EMERGENCY MEDICINE

## 2017-04-27 PROCEDURE — 83690 ASSAY OF LIPASE: CPT

## 2017-04-27 PROCEDURE — 85610 PROTHROMBIN TIME: CPT

## 2017-04-27 RX ORDER — DEXTROSE 50 % IN WATER (D50W) INTRAVENOUS SYRINGE
25
Status: COMPLETED | OUTPATIENT
Start: 2017-04-28 | End: 2017-04-28

## 2017-04-28 PROBLEM — N19 UREMIC ENCEPHALOPATHY: Status: ACTIVE | Noted: 2017-04-28

## 2017-04-28 PROBLEM — G93.49 UREMIC ENCEPHALOPATHY: Status: ACTIVE | Noted: 2017-04-28

## 2017-04-28 PROBLEM — I15.0 RENOVASCULAR HYPERTENSION: Status: ACTIVE | Noted: 2017-04-28

## 2017-04-28 PROBLEM — E87.5 HYPERKALEMIA: Status: ACTIVE | Noted: 2017-04-28

## 2017-04-28 PROBLEM — N18.30 ACUTE RENAL FAILURE SUPERIMPOSED ON STAGE 3 CHRONIC KIDNEY DISEASE: Status: ACTIVE | Noted: 2017-04-28

## 2017-04-28 PROBLEM — N17.9 ACUTE RENAL FAILURE SUPERIMPOSED ON STAGE 3 CHRONIC KIDNEY DISEASE: Status: ACTIVE | Noted: 2017-04-28

## 2017-04-28 LAB
ABO + RH BLD: NORMAL
ALBUMIN SERPL BCP-MCNC: 3.2 G/DL
ALLENS TEST: ABNORMAL
ALP SERPL-CCNC: 93 U/L
ALT SERPL W/O P-5'-P-CCNC: 27 U/L
ANION GAP SERPL CALC-SCNC: 10 MMOL/L
ANION GAP SERPL CALC-SCNC: 7 MMOL/L
ANION GAP SERPL CALC-SCNC: 7 MMOL/L
ANION GAP SERPL CALC-SCNC: 8 MMOL/L
ANION GAP SERPL CALC-SCNC: 9 MMOL/L
ANION GAP SERPL CALC-SCNC: 9 MMOL/L
ANISOCYTOSIS BLD QL SMEAR: SLIGHT
AST SERPL-CCNC: 55 U/L
BACTERIA #/AREA URNS AUTO: ABNORMAL /HPF
BASOPHILS # BLD AUTO: 0.01 K/UL
BASOPHILS # BLD AUTO: 0.03 K/UL
BASOPHILS NFR BLD: 0.1 %
BASOPHILS NFR BLD: 0.3 %
BILIRUB SERPL-MCNC: 0.6 MG/DL
BILIRUB UR QL STRIP: NEGATIVE
BLD GP AB SCN CELLS X3 SERPL QL: NORMAL
BUN SERPL-MCNC: 115 MG/DL
BUN SERPL-MCNC: 119 MG/DL
BUN SERPL-MCNC: 124 MG/DL
BUN SERPL-MCNC: 127 MG/DL
BUN SERPL-MCNC: 134 MG/DL
BUN SERPL-MCNC: 147 MG/DL
CALCIUM SERPL-MCNC: 8.3 MG/DL
CALCIUM SERPL-MCNC: 8.9 MG/DL
CALCIUM SERPL-MCNC: 9 MG/DL
CALCIUM SERPL-MCNC: 9.1 MG/DL
CALCIUM SERPL-MCNC: 9.1 MG/DL
CALCIUM SERPL-MCNC: 9.4 MG/DL
CHLORIDE SERPL-SCNC: 114 MMOL/L
CHLORIDE SERPL-SCNC: 116 MMOL/L
CHLORIDE SERPL-SCNC: 118 MMOL/L
CHLORIDE SERPL-SCNC: 118 MMOL/L
CHLORIDE SERPL-SCNC: 119 MMOL/L
CHLORIDE SERPL-SCNC: 121 MMOL/L
CK MB SERPL-MCNC: 29.3 NG/ML
CK MB SERPL-RTO: 1.2 %
CK SERPL-CCNC: 2488 U/L
CK SERPL-CCNC: 2547 U/L
CLARITY UR REFRACT.AUTO: ABNORMAL
CO2 SERPL-SCNC: 12 MMOL/L
CO2 SERPL-SCNC: 13 MMOL/L
CO2 SERPL-SCNC: 14 MMOL/L
CO2 SERPL-SCNC: 14 MMOL/L
CO2 SERPL-SCNC: 16 MMOL/L
CO2 SERPL-SCNC: 16 MMOL/L
COLOR UR AUTO: YELLOW
CREAT SERPL-MCNC: 4.2 MG/DL
CREAT SERPL-MCNC: 4.4 MG/DL
CREAT SERPL-MCNC: 4.8 MG/DL
CREAT SERPL-MCNC: 4.9 MG/DL
CREAT SERPL-MCNC: 5.4 MG/DL
CREAT SERPL-MCNC: 6.2 MG/DL
CREAT UR-MCNC: 56 MG/DL
DIASTOLIC DYSFUNCTION: YES
DIFFERENTIAL METHOD: ABNORMAL
DIFFERENTIAL METHOD: ABNORMAL
EOSINOPHIL # BLD AUTO: 0.3 K/UL
EOSINOPHIL # BLD AUTO: 0.5 K/UL
EOSINOPHIL NFR BLD: 3 %
EOSINOPHIL NFR BLD: 4.4 %
ERYTHROCYTE [DISTWIDTH] IN BLOOD BY AUTOMATED COUNT: 16.7 %
ERYTHROCYTE [DISTWIDTH] IN BLOOD BY AUTOMATED COUNT: 16.8 %
EST. GFR  (AFRICAN AMERICAN): 11.5 ML/MIN/1.73 M^2
EST. GFR  (AFRICAN AMERICAN): 12.9 ML/MIN/1.73 M^2
EST. GFR  (AFRICAN AMERICAN): 13.3 ML/MIN/1.73 M^2
EST. GFR  (AFRICAN AMERICAN): 14.7 ML/MIN/1.73 M^2
EST. GFR  (AFRICAN AMERICAN): 15.6 ML/MIN/1.73 M^2
EST. GFR  (AFRICAN AMERICAN): 9.7 ML/MIN/1.73 M^2
EST. GFR  (NON AFRICAN AMERICAN): 10 ML/MIN/1.73 M^2
EST. GFR  (NON AFRICAN AMERICAN): 11.2 ML/MIN/1.73 M^2
EST. GFR  (NON AFRICAN AMERICAN): 11.5 ML/MIN/1.73 M^2
EST. GFR  (NON AFRICAN AMERICAN): 12.7 ML/MIN/1.73 M^2
EST. GFR  (NON AFRICAN AMERICAN): 13.5 ML/MIN/1.73 M^2
EST. GFR  (NON AFRICAN AMERICAN): 8.4 ML/MIN/1.73 M^2
ESTIMATED AVG GLUCOSE: 154 MG/DL
GLUCOSE SERPL-MCNC: 105 MG/DL
GLUCOSE SERPL-MCNC: 126 MG/DL
GLUCOSE SERPL-MCNC: 146 MG/DL
GLUCOSE SERPL-MCNC: 91 MG/DL
GLUCOSE SERPL-MCNC: 95 MG/DL
GLUCOSE SERPL-MCNC: 98 MG/DL
GLUCOSE UR QL STRIP: ABNORMAL
HBA1C MFR BLD HPLC: 7 %
HCO3 UR-SCNC: 20.5 MMOL/L (ref 24–28)
HCT VFR BLD AUTO: 28.3 %
HCT VFR BLD AUTO: 32.9 %
HCT VFR BLD CALC: 41 %PCV (ref 36–54)
HGB BLD-MCNC: 10.9 G/DL
HGB BLD-MCNC: 9.6 G/DL
HGB UR QL STRIP: ABNORMAL
KETONES UR QL STRIP: NEGATIVE
LACTATE SERPL-SCNC: 2 MMOL/L
LEUKOCYTE ESTERASE UR QL STRIP: NEGATIVE
LYMPHOCYTES # BLD AUTO: 1.9 K/UL
LYMPHOCYTES # BLD AUTO: 2 K/UL
LYMPHOCYTES NFR BLD: 17.3 %
LYMPHOCYTES NFR BLD: 18.1 %
MAGNESIUM SERPL-MCNC: 2.3 MG/DL
MCH RBC QN AUTO: 28.4 PG
MCH RBC QN AUTO: 28.7 PG
MCHC RBC AUTO-ENTMCNC: 33.1 %
MCHC RBC AUTO-ENTMCNC: 33.9 %
MCV RBC AUTO: 84 FL
MCV RBC AUTO: 87 FL
MICROSCOPIC COMMENT: ABNORMAL
MONOCYTES # BLD AUTO: 1.4 K/UL
MONOCYTES # BLD AUTO: 1.6 K/UL
MONOCYTES NFR BLD: 12.1 %
MONOCYTES NFR BLD: 14.1 %
NEUTROPHILS # BLD AUTO: 7.1 K/UL
NEUTROPHILS # BLD AUTO: 7.4 K/UL
NEUTROPHILS NFR BLD: 63.6 %
NEUTROPHILS NFR BLD: 66.7 %
NITRITE UR QL STRIP: NEGATIVE
OVALOCYTES BLD QL SMEAR: ABNORMAL
PCO2 BLDA: 38.7 MMHG (ref 35–45)
PH SMN: 7.33 [PH] (ref 7.35–7.45)
PH UR STRIP: 5 [PH] (ref 5–8)
PHOSPHATE SERPL-MCNC: 4.2 MG/DL
PLATELET # BLD AUTO: 116 K/UL
PLATELET # BLD AUTO: 144 K/UL
PLATELET BLD QL SMEAR: ABNORMAL
PMV BLD AUTO: 10.4 FL
PMV BLD AUTO: 11.6 FL
PO2 BLDA: 30 MMHG (ref 40–60)
POC BE: -5 MMOL/L
POC IONIZED CALCIUM: 1.26 MMOL/L (ref 1.06–1.42)
POC SATURATED O2: 53 % (ref 95–100)
POC TCO2: 22 MMOL/L (ref 24–29)
POCT GLUCOSE: 103 MG/DL (ref 70–110)
POCT GLUCOSE: 106 MG/DL (ref 70–110)
POCT GLUCOSE: 108 MG/DL (ref 70–110)
POCT GLUCOSE: 130 MG/DL (ref 70–110)
POCT GLUCOSE: 133 MG/DL (ref 70–110)
POCT GLUCOSE: 137 MG/DL (ref 70–110)
POCT GLUCOSE: 138 MG/DL (ref 70–110)
POCT GLUCOSE: 148 MG/DL (ref 70–110)
POCT GLUCOSE: 152 MG/DL (ref 70–110)
POCT GLUCOSE: 157 MG/DL (ref 70–110)
POCT GLUCOSE: 165 MG/DL (ref 70–110)
POCT GLUCOSE: 166 MG/DL (ref 70–110)
POCT GLUCOSE: 173 MG/DL (ref 70–110)
POCT GLUCOSE: 68 MG/DL (ref 70–110)
POIKILOCYTOSIS BLD QL SMEAR: SLIGHT
POLYCHROMASIA BLD QL SMEAR: ABNORMAL
POTASSIUM BLD-SCNC: 3.7 MMOL/L (ref 3.5–5.1)
POTASSIUM SERPL-SCNC: 5.7 MMOL/L
POTASSIUM SERPL-SCNC: 5.8 MMOL/L
POTASSIUM SERPL-SCNC: 6.3 MMOL/L
POTASSIUM SERPL-SCNC: 6.5 MMOL/L
POTASSIUM SERPL-SCNC: 6.6 MMOL/L
POTASSIUM SERPL-SCNC: 6.6 MMOL/L
POTASSIUM SERPL-SCNC: 7 MMOL/L
PROT SERPL-MCNC: 7.8 G/DL
PROT UR QL STRIP: NEGATIVE
RBC # BLD AUTO: 3.38 M/UL
RBC # BLD AUTO: 3.8 M/UL
RBC #/AREA URNS AUTO: 10 /HPF (ref 0–4)
RETIRED EF AND QEF - SEE NOTES: 40 (ref 55–65)
SAMPLE: ABNORMAL
SCHISTOCYTES BLD QL SMEAR: ABNORMAL
SITE: ABNORMAL
SODIUM BLD-SCNC: 141 MMOL/L (ref 136–145)
SODIUM SERPL-SCNC: 138 MMOL/L
SODIUM SERPL-SCNC: 139 MMOL/L
SODIUM SERPL-SCNC: 140 MMOL/L
SODIUM SERPL-SCNC: 141 MMOL/L
SODIUM SERPL-SCNC: 141 MMOL/L
SODIUM SERPL-SCNC: 142 MMOL/L
SP GR UR STRIP: 1.01 (ref 1–1.03)
TROPONIN I SERPL DL<=0.01 NG/ML-MCNC: 0.03 NG/ML
TSH SERPL DL<=0.005 MIU/L-ACNC: 1.49 UIU/ML
URN SPEC COLLECT METH UR: ABNORMAL
UROBILINOGEN UR STRIP-ACNC: NEGATIVE EU/DL
UUN UR-MCNC: 636 MG/DL
WBC # BLD AUTO: 11.1 K/UL
WBC # BLD AUTO: 11.18 K/UL
WBC #/AREA URNS AUTO: 1 /HPF (ref 0–5)

## 2017-04-28 PROCEDURE — 25000242 PHARM REV CODE 250 ALT 637 W/ HCPCS: Performed by: INTERNAL MEDICINE

## 2017-04-28 PROCEDURE — 25000003 PHARM REV CODE 250: Performed by: HOSPITALIST

## 2017-04-28 PROCEDURE — 25000003 PHARM REV CODE 250: Performed by: STUDENT IN AN ORGANIZED HEALTH CARE EDUCATION/TRAINING PROGRAM

## 2017-04-28 PROCEDURE — 83036 HEMOGLOBIN GLYCOSYLATED A1C: CPT

## 2017-04-28 PROCEDURE — 93010 ELECTROCARDIOGRAM REPORT: CPT | Mod: ,,, | Performed by: INTERNAL MEDICINE

## 2017-04-28 PROCEDURE — C1751 CATH, INF, PER/CENT/MIDLINE: HCPCS

## 2017-04-28 PROCEDURE — 84295 ASSAY OF SERUM SODIUM: CPT

## 2017-04-28 PROCEDURE — 93010 ELECTROCARDIOGRAM REPORT: CPT | Mod: 77,,, | Performed by: INTERNAL MEDICINE

## 2017-04-28 PROCEDURE — 27000221 HC OXYGEN, UP TO 24 HOURS

## 2017-04-28 PROCEDURE — 94660 CPAP INITIATION&MGMT: CPT

## 2017-04-28 PROCEDURE — 83735 ASSAY OF MAGNESIUM: CPT

## 2017-04-28 PROCEDURE — 99223 1ST HOSP IP/OBS HIGH 75: CPT | Mod: ,,, | Performed by: INTERNAL MEDICINE

## 2017-04-28 PROCEDURE — 85014 HEMATOCRIT: CPT

## 2017-04-28 PROCEDURE — 82803 BLOOD GASES ANY COMBINATION: CPT

## 2017-04-28 PROCEDURE — C8929 TTE W OR WO FOL WCON,DOPPLER: HCPCS

## 2017-04-28 PROCEDURE — 83605 ASSAY OF LACTIC ACID: CPT

## 2017-04-28 PROCEDURE — 63600175 PHARM REV CODE 636 W HCPCS: Performed by: INTERNAL MEDICINE

## 2017-04-28 PROCEDURE — 25000003 PHARM REV CODE 250: Performed by: INTERNAL MEDICINE

## 2017-04-28 PROCEDURE — 80048 BASIC METABOLIC PNL TOTAL CA: CPT

## 2017-04-28 PROCEDURE — 82553 CREATINE MB FRACTION: CPT

## 2017-04-28 PROCEDURE — 93306 TTE W/DOPPLER COMPLETE: CPT | Mod: 26,,, | Performed by: INTERNAL MEDICINE

## 2017-04-28 PROCEDURE — 27000190 HC CPAP FULL FACE MASK W/VALVE

## 2017-04-28 PROCEDURE — 84132 ASSAY OF SERUM POTASSIUM: CPT

## 2017-04-28 PROCEDURE — 93005 ELECTROCARDIOGRAM TRACING: CPT

## 2017-04-28 PROCEDURE — 81001 URINALYSIS AUTO W/SCOPE: CPT

## 2017-04-28 PROCEDURE — 02HV33Z INSERTION OF INFUSION DEVICE INTO SUPERIOR VENA CAVA, PERCUTANEOUS APPROACH: ICD-10-PCS | Performed by: INTERNAL MEDICINE

## 2017-04-28 PROCEDURE — 20000000 HC ICU ROOM

## 2017-04-28 PROCEDURE — 82570 ASSAY OF URINE CREATININE: CPT

## 2017-04-28 PROCEDURE — 84100 ASSAY OF PHOSPHORUS: CPT

## 2017-04-28 PROCEDURE — 63600175 PHARM REV CODE 636 W HCPCS: Performed by: EMERGENCY MEDICINE

## 2017-04-28 PROCEDURE — 80048 BASIC METABOLIC PNL TOTAL CA: CPT | Mod: 91

## 2017-04-28 PROCEDURE — 25000003 PHARM REV CODE 250: Performed by: EMERGENCY MEDICINE

## 2017-04-28 PROCEDURE — 36556 INSERT NON-TUNNEL CV CATH: CPT

## 2017-04-28 PROCEDURE — 84540 ASSAY OF URINE/UREA-N: CPT

## 2017-04-28 PROCEDURE — 85025 COMPLETE CBC W/AUTO DIFF WBC: CPT

## 2017-04-28 PROCEDURE — 82550 ASSAY OF CK (CPK): CPT

## 2017-04-28 PROCEDURE — 25000003 PHARM REV CODE 250

## 2017-04-28 PROCEDURE — 82330 ASSAY OF CALCIUM: CPT

## 2017-04-28 PROCEDURE — 94640 AIRWAY INHALATION TREATMENT: CPT

## 2017-04-28 PROCEDURE — 99900035 HC TECH TIME PER 15 MIN (STAT)

## 2017-04-28 RX ORDER — ERGOCALCIFEROL 1.25 MG/1
50000 CAPSULE ORAL
Status: DISCONTINUED | OUTPATIENT
Start: 2017-04-28 | End: 2017-05-10 | Stop reason: HOSPADM

## 2017-04-28 RX ORDER — POTASSIUM CHLORIDE 20 MEQ/15ML
40 SOLUTION ORAL
Status: DISCONTINUED | OUTPATIENT
Start: 2017-04-28 | End: 2017-04-28

## 2017-04-28 RX ORDER — MAGNESIUM SULFATE HEPTAHYDRATE 40 MG/ML
2 INJECTION, SOLUTION INTRAVENOUS
Status: DISCONTINUED | OUTPATIENT
Start: 2017-04-28 | End: 2017-04-28

## 2017-04-28 RX ORDER — ONDANSETRON 2 MG/ML
8 INJECTION INTRAMUSCULAR; INTRAVENOUS EVERY 6 HOURS PRN
Status: DISCONTINUED | OUTPATIENT
Start: 2017-04-28 | End: 2017-05-10 | Stop reason: HOSPADM

## 2017-04-28 RX ORDER — SODIUM POLYSTYRENE SULFONATE 15 G/60ML
15 SUSPENSION ORAL; RECTAL ONCE
Status: DISCONTINUED | OUTPATIENT
Start: 2017-04-28 | End: 2017-04-28

## 2017-04-28 RX ORDER — FLUTICASONE PROPIONATE 50 MCG
2 SPRAY, SUSPENSION (ML) NASAL DAILY
Status: DISCONTINUED | OUTPATIENT
Start: 2017-04-28 | End: 2017-05-10 | Stop reason: HOSPADM

## 2017-04-28 RX ORDER — POTASSIUM CHLORIDE 20 MEQ/15ML
60 SOLUTION ORAL
Status: DISCONTINUED | OUTPATIENT
Start: 2017-04-28 | End: 2017-04-28

## 2017-04-28 RX ORDER — AMIODARONE HYDROCHLORIDE 200 MG/1
200 TABLET ORAL DAILY
Status: DISCONTINUED | OUTPATIENT
Start: 2017-04-28 | End: 2017-05-10 | Stop reason: HOSPADM

## 2017-04-28 RX ORDER — LIDOCAINE HYDROCHLORIDE 10 MG/ML
10 INJECTION INFILTRATION; PERINEURAL ONCE
Status: COMPLETED | OUTPATIENT
Start: 2017-04-28 | End: 2017-04-28

## 2017-04-28 RX ORDER — SODIUM CHLORIDE 0.9 % (FLUSH) 0.9 %
3 SYRINGE (ML) INJECTION EVERY 8 HOURS
Status: DISCONTINUED | OUTPATIENT
Start: 2017-04-28 | End: 2017-05-10 | Stop reason: HOSPADM

## 2017-04-28 RX ORDER — LIDOCAINE HYDROCHLORIDE 10 MG/ML
INJECTION INFILTRATION; PERINEURAL
Status: COMPLETED
Start: 2017-04-28 | End: 2017-04-28

## 2017-04-28 RX ORDER — ALBUTEROL SULFATE 0.83 MG/ML
10 SOLUTION RESPIRATORY (INHALATION) ONCE
Status: COMPLETED | OUTPATIENT
Start: 2017-04-28 | End: 2017-04-28

## 2017-04-28 RX ORDER — GLUCAGON 1 MG
1 KIT INJECTION
Status: DISCONTINUED | OUTPATIENT
Start: 2017-04-28 | End: 2017-05-10 | Stop reason: HOSPADM

## 2017-04-28 RX ORDER — CARVEDILOL 12.5 MG/1
12.5 TABLET ORAL 2 TIMES DAILY WITH MEALS
Status: DISCONTINUED | OUTPATIENT
Start: 2017-04-28 | End: 2017-04-28

## 2017-04-28 RX ORDER — SODIUM CHLORIDE 9 MG/ML
INJECTION, SOLUTION INTRAVENOUS ONCE
Status: DISCONTINUED | OUTPATIENT
Start: 2017-04-28 | End: 2017-05-01

## 2017-04-28 RX ORDER — SODIUM,POTASSIUM PHOSPHATES 280-250MG
2 POWDER IN PACKET (EA) ORAL
Status: DISCONTINUED | OUTPATIENT
Start: 2017-04-28 | End: 2017-04-28

## 2017-04-28 RX ORDER — IBUPROFEN 200 MG
16 TABLET ORAL
Status: DISCONTINUED | OUTPATIENT
Start: 2017-04-28 | End: 2017-05-10 | Stop reason: HOSPADM

## 2017-04-28 RX ORDER — SODIUM CHLORIDE 9 MG/ML
INJECTION, SOLUTION INTRAVENOUS
Status: DISCONTINUED | OUTPATIENT
Start: 2017-04-28 | End: 2017-04-30

## 2017-04-28 RX ORDER — MAGNESIUM SULFATE HEPTAHYDRATE 40 MG/ML
4 INJECTION, SOLUTION INTRAVENOUS
Status: DISCONTINUED | OUTPATIENT
Start: 2017-04-28 | End: 2017-04-28

## 2017-04-28 RX ORDER — HEPARIN SODIUM 5000 [USP'U]/ML
5000 INJECTION, SOLUTION INTRAVENOUS; SUBCUTANEOUS EVERY 8 HOURS
Status: DISCONTINUED | OUTPATIENT
Start: 2017-04-28 | End: 2017-05-03

## 2017-04-28 RX ORDER — IBUPROFEN 200 MG
24 TABLET ORAL
Status: DISCONTINUED | OUTPATIENT
Start: 2017-04-28 | End: 2017-05-10 | Stop reason: HOSPADM

## 2017-04-28 RX ORDER — ATORVASTATIN CALCIUM 20 MG/1
40 TABLET, FILM COATED ORAL DAILY
Status: DISCONTINUED | OUTPATIENT
Start: 2017-04-28 | End: 2017-05-10 | Stop reason: HOSPADM

## 2017-04-28 RX ORDER — INSULIN ASPART 100 [IU]/ML
0-5 INJECTION, SOLUTION INTRAVENOUS; SUBCUTANEOUS
Status: DISCONTINUED | OUTPATIENT
Start: 2017-04-28 | End: 2017-05-10 | Stop reason: HOSPADM

## 2017-04-28 RX ADMIN — MICONAZOLE NITRATE: 20 OINTMENT TOPICAL at 08:04

## 2017-04-28 RX ADMIN — SODIUM POLYSTYRENE SULFONATE 30 G: 15 SUSPENSION ORAL; RECTAL at 08:04

## 2017-04-28 RX ADMIN — INSULIN HUMAN 10 UNITS: 100 INJECTION, SOLUTION PARENTERAL at 08:04

## 2017-04-28 RX ADMIN — INSULIN HUMAN 10 UNITS: 100 INJECTION, SOLUTION PARENTERAL at 01:04

## 2017-04-28 RX ADMIN — Medication 3 ML: at 10:04

## 2017-04-28 RX ADMIN — LIDOCAINE HYDROCHLORIDE 10 ML: 10 INJECTION, SOLUTION INFILTRATION; PERINEURAL at 11:04

## 2017-04-28 RX ADMIN — SODIUM POLYSTYRENE SULFONATE 30 G: 15 SUSPENSION ORAL; RECTAL at 07:04

## 2017-04-28 RX ADMIN — HEPARIN SODIUM 5000 UNITS: 5000 INJECTION, SOLUTION INTRAVENOUS; SUBCUTANEOUS at 09:04

## 2017-04-28 RX ADMIN — SODIUM BICARBONATE: 84 INJECTION, SOLUTION INTRAVENOUS at 11:04

## 2017-04-28 RX ADMIN — HEPARIN SODIUM 5000 UNITS: 5000 INJECTION, SOLUTION INTRAVENOUS; SUBCUTANEOUS at 03:04

## 2017-04-28 RX ADMIN — MICONAZOLE NITRATE: 20 OINTMENT TOPICAL at 12:04

## 2017-04-28 RX ADMIN — SODIUM CHLORIDE 1000 ML: 0.9 INJECTION, SOLUTION INTRAVENOUS at 03:04

## 2017-04-28 RX ADMIN — SODIUM CHLORIDE 1000 ML: 0.9 INJECTION, SOLUTION INTRAVENOUS at 02:04

## 2017-04-28 RX ADMIN — LIDOCAINE HYDROCHLORIDE 10 ML: 10 INJECTION INFILTRATION; PERINEURAL at 11:04

## 2017-04-28 RX ADMIN — ERGOCALCIFEROL 50000 UNITS: 1.25 CAPSULE, LIQUID FILLED ORAL at 10:04

## 2017-04-28 RX ADMIN — DEXTROSE MONOHYDRATE 25 G: 25 INJECTION, SOLUTION INTRAVENOUS at 02:04

## 2017-04-28 RX ADMIN — ATORVASTATIN CALCIUM 40 MG: 20 TABLET, FILM COATED ORAL at 10:04

## 2017-04-28 RX ADMIN — DEXTROSE MONOHYDRATE 25 G: 500 INJECTION PARENTERAL at 09:04

## 2017-04-28 RX ADMIN — AMIODARONE HYDROCHLORIDE 200 MG: 200 TABLET ORAL at 10:04

## 2017-04-28 RX ADMIN — Medication 3 ML: at 07:04

## 2017-04-28 RX ADMIN — ALBUTEROL SULFATE 10 MG: 2.5 SOLUTION RESPIRATORY (INHALATION) at 03:04

## 2017-04-28 RX ADMIN — INSULIN HUMAN 10 UNITS: 100 INJECTION, SOLUTION PARENTERAL at 02:04

## 2017-04-28 RX ADMIN — DEXTROSE MONOHYDRATE 25 G: 500 INJECTION PARENTERAL at 08:04

## 2017-04-28 RX ADMIN — CALCIUM GLUCONATE 1 G: 94 INJECTION, SOLUTION INTRAVENOUS at 01:04

## 2017-04-28 RX ADMIN — Medication 3 ML: at 02:04

## 2017-04-28 RX ADMIN — DEXTROSE MONOHYDRATE 25 G: 500 INJECTION PARENTERAL at 12:04

## 2017-04-28 NOTE — PROGRESS NOTES
Progress Note  Nephrology    Admit Date: 4/27/2017   LOS: 0 days     SUBJECTIVE:     Follow-up For:  Leticia on CKD with hyperkalemia     Does not appear altered this morning   Pt with bradycardia with HR in 50s and hypotensive this morning 90s/50s   Asymptomatic at this time     Review of Systems:    Constitutional: no chills, no fevers, c/o weakness   HEENT: no issues   Resp: no shortness of breath, no cough   Cardiac: no chest pain, no palpitations  Abd: no nausea or vomiting. No abdominal pain  Neuro: no headache, no dizziness.   Psych: no depression, no agitation    OBJECTIVE:     Vital Signs (Most Recent)  Temp: 98.6 °F (37 °C) (04/28/17 0740)  Pulse: (!) 56 (04/28/17 1030)  Resp: 19 (04/28/17 1030)  BP: (!) 91/46 (04/28/17 1030)  SpO2: 97 % (04/28/17 1030)    Vital Signs Range (Last 24H):  Temp:  [98.5 °F (36.9 °C)-98.6 °F (37 °C)]   Pulse:  [52-58]   Resp:  [12-39]   BP: ()/(39-64)   SpO2:  [81 %-100 %]       Intake/Output Summary (Last 24 hours) at 04/28/17 1157  Last data filed at 04/28/17 1000   Gross per 24 hour   Intake                0 ml   Output             2150 ml   Net            -2150 ml     Physical Exam:  General: Well developed, well nourished in NAD  HEENT: Conjunctiva clear; on  RA  Neck: No JVD noted, Supple  CV- Normal S1, S2 with no murmurs,gallops,rubs  Resp- Lungs CTA Bilaterally, Unlabored  Abdomen- NTND, BS normoactive x4 quads, soft  Extrem- No cyanosis, clubbing, edema. Has dressing on left foot, c/d/i   Skin- No rashes, lesions, ulcers  Neuro: awake, Oriented x3, no FND    Laboratory:  CBC:   Recent Labs  Lab 04/28/17  0453   WBC 11.10   RBC 3.38*   HGB 9.6*   HCT 28.3*   *   MCV 84   MCH 28.4   MCHC 33.9     CMP:   Recent Labs  Lab 04/27/17  0607  04/28/17  0453 04/28/17  0849   GLU 91  < > 98  --    CALCIUM 9.4  < > 9.0  --    ALBUMIN 3.2*  --   --   --    PROT 7.8  --   --   --      < > 140  --    K 7.0*  < > 6.6* 5.8*   CO2 14*  < > 13*  --    *  < > 118*   --    *  < > 134*  --    CREATININE 6.2*  < > 5.4*  --    ALKPHOS 93  --   --   --    ALT 27  --   --   --    AST 55*  --   --   --    BILITOT 0.6  --   --   --    < > = values in this interval not displayed.  ABGs:   Recent Labs  Lab 04/28/17  0029   PH 7.332*   PCO2 38.7   HCO3 20.5*   POCSATURATED 53*   BE -5       Recent Labs  Lab 04/28/17  0149   COLORU Yellow   SPECGRAV 1.010   PHUR 5.0   PROTEINUA Negative   BACTERIA Rare   NITRITE Negative   LEUKOCYTESUR Negative   UROBILINOGEN Negative       Diagnostic Results:  Labs: Reviewed  X-Ray: Reviewed  US: Reviewed    ASSESSMENT/PLAN:     Patient is a 67 y.o. male y/o M with HFrEF (EF 20-25% in Jun 2016), NICM declined ICD, HTN, HLD, atrial fibrillation on Eliquis, T2DM, h/o CVA in 2010, ERIC presented with generalized weakness, fatigue and AMS found to have HUMERA on CKD, severe hyperkalemia and acidosis      HUMERA on CKD stage 3   - likely 2/2 pre renal due to volume depletion due to diarrhea and hypotension vs ischemic ATN  - creat 1.5 to 1.9 baseline, pt presented with creat 6.2   - down to 5.4/134 today, pt received 2 L bolus overnight   - BP continues to be low, pt anuric   - recommend bicarb drip. - 75 meQ in 1/2NS at 75 cc/hr     Hyperkalemia   - 2/2 to HUMERA, pt given Ca gluconate, insulin, kayexalate overnight   - K 5.8 this morning   - recommend bicarb drip as mentioned above   - check BMP Q4 hrs     Acidosis   - non anion gap acidosis likely due to HUMERA   - recommend check lactate   - recommend bicarb drip    Lakisha Plummer MD   Nephrology fellow   Pager [phone]

## 2017-04-28 NOTE — SUBJECTIVE & OBJECTIVE
Past Medical History:   Diagnosis Date    *Atrial fibrillation     Atrial fibrillation     Cardiomyopathy     Cataract     CHF (congestive heart failure)     Diabetes mellitus     DVT (deep venous thrombosis)     Hypertension     Personal history of stroke with residual effects - Right Occipital 7/25/2012    Sleep apnea     Stroke 2010       No past surgical history on file.    Review of patient's allergies indicates:  No Known Allergies    Family History     Problem Relation (Age of Onset)    Cataracts Mother    Glaucoma Sister, Sister    Heart attack Mother        Social History Main Topics    Smoking status: Never Smoker    Smokeless tobacco: Never Used    Alcohol use No    Drug use: No    Sexual activity: Not on file      Review of Systems   Constitutional: Positive for activity change and fatigue. Negative for appetite change, diaphoresis and fever.   HENT: Negative for congestion.    Respiratory: Negative for chest tightness, shortness of breath and wheezing.    Cardiovascular: Negative for chest pain, palpitations and leg swelling.   Gastrointestinal: Positive for diarrhea. Negative for abdominal distention, abdominal pain, nausea and vomiting.   Genitourinary: Positive for decreased urine volume and dysuria. Negative for difficulty urinating.   Musculoskeletal: Negative for arthralgias.   Skin: Positive for wound.   Neurological: Positive for weakness and light-headedness.     Objective:     Vital Signs (Most Recent):  Temp: 98.5 °F (36.9 °C) (04/27/17 1935)  Pulse: (!) 52 (04/27/17 1935)  Resp: 18 (04/27/17 1935)  BP: 124/60 (04/27/17 1935)  SpO2: 100 % (04/27/17 1935) Vital Signs (24h Range):  Temp:  [98.5 °F (36.9 °C)] 98.5 °F (36.9 °C)  Pulse:  [52] 52  Resp:  [18] 18  SpO2:  [100 %] 100 %  BP: (124)/(60) 124/60   Weight: 131.5 kg (290 lb)  Body mass index is 45.42 kg/(m^2).    No intake or output data in the 24 hours ending 04/28/17 0202    Physical Exam   Constitutional: He appears  well-developed and well-nourished. No distress.   Patient is sluggish in responses but is AAOX4 and answers appropriately. Foul urine odor in room. Upon observation, patient has voided onto the bed.    HENT:   Head: Normocephalic.   Eyes: EOM are normal. Pupils are equal, round, and reactive to light.   Neck: Normal range of motion. Neck supple. No JVD present.   Cardiovascular: Normal rate, regular rhythm and normal heart sounds.    No murmur heard.  Pulmonary/Chest: Effort normal and breath sounds normal. No respiratory distress. He has no rales.   Abdominal: Soft. Bowel sounds are normal. He exhibits no distension. There is no tenderness.   Obese   Musculoskeletal: Normal range of motion. He exhibits no edema.   Skin: Skin is warm and dry. He is not diaphoretic.   Vitals reviewed.      Vents:     Lines/Drains/Airways     Drain                 Urethral Catheter 04/28/17 0144 less than 1 day          Pressure Ulcer                 Pressure Ulcer 11/16/16 1510 Right posterior heel suspected deep tissue injury 162 days          Peripheral Intravenous Line                 Peripheral IV - Single Lumen 04/27/17 2257 Right Antecubital less than 1 day         Peripheral IV - Single Lumen 04/27/17 2258 Right Antecubital less than 1 day              Significant Labs:    CBC/Anemia Profile:    Recent Labs  Lab 04/27/17 2257 04/28/17  0029   WBC 11.18  --    HGB 10.9*  --    HCT 32.9* 41   *  --    MCV 87  --    RDW 16.8*  --         Chemistries:    Recent Labs  Lab 04/27/17 2257      K 7.0*   *   CO2 14*   *   CREATININE 6.2*   CALCIUM 9.4   ALBUMIN 3.2*   PROT 7.8   BILITOT 0.6   ALKPHOS 93   ALT 27   AST 55*   MG 2.5       Significant Imaging:   Imaging Results         X-Ray Chest 1 View (Final result) Result time:  04/27/17 23:44:25    Final result by Gigi Tillman MD (04/27/17 23:44:25)    Impression:        No significant change from prior study..        Electronically signed by: GIGI  DHEERAJ JIN  Date:     04/27/17  Time:    23:44     Narrative:    Chest AP portable    Indication:.    Comparison:March 2, 2017.    Findings:     Lordotic positioning.    X-ray beam attenuation and scatter occur in generous overlying soft tissues.  Intrathoracic structures are magnified by the patient's thick body wall.      Heart and lungs unchanged when allowing for differences in technique and positioning.            EKG - sinus bradycardia with first degree AV block

## 2017-04-28 NOTE — CONSULTS
Patient seen for wound care consult. Patient sees ISABELLA Hendrix in outpatient wound care. He has a wound to the lateral side of his left foot which is now healed with fresh pink epithelial tissue. Mepilex foam dressing applied to protect. He has a small open area to the healing left lateral heel ulcer. This began as a pressure injury but unsure of stage. It is now almost to skin surface approx 0.5 x 1 cm. It is pink and slightly moist. Mepilex foam dressing applied. REcommend to leave in place and change weekly. He also has moist redenned area beneath his pannus and in his bilateral groin area. Nursing applied barrier cream but think he would benefit from antifungal as this has a yeasty appearance. Nurse to discuss with MD. Patient also with a small stage 2 to the inner right side of his scrotum which is approx 1x1cm. Recommend barrier cream to this area as well. Nursing to continue care.

## 2017-04-28 NOTE — ASSESSMENT & PLAN NOTE
- With sluggishness and confusion past 48 hours, likely secondary to uremia (BUN significantly elevated at 147).  - No recent history of falls or illness.  - Continue monitor mentation with resolution of uremia.

## 2017-04-28 NOTE — PROGRESS NOTES
Consent form obtained for administration of Echocardiographic Contrast.  Patient denies ASD/VSD and blood transfusion reaction.  IV saline lock flushed prior to contrast and after contrast. No swelling or redness notice to site.  Patient tolerated well.

## 2017-04-28 NOTE — ASSESSMENT & PLAN NOTE
- 7.0 on presentation. No EKG changes.  - Secondary to HUMERA.  - Received IV calcium and insulin in ED.  - 6.6 AM of 4/28; provided with kayexalate and additional insulin.  - Improved this am to 5.4

## 2017-04-28 NOTE — ED NOTES
Patient reports he wasn't feeling well. Family called EMS because patient was acting strange. Patient denies SOB, chest pain.

## 2017-04-28 NOTE — ASSESSMENT & PLAN NOTE
- Holding carvedilol 12.5 mg PO daily secondary to hypotensive events overnight.   - Holding spironolactone and lisinopril for now considering significant HUMERA.

## 2017-04-28 NOTE — CONSULTS
[facility]  Critical Care Medicine  Consult Note    Patient Name: Hemant Kennedy Jr.  MRN: [id number]  Admission Date: 4/27/2017  Hospital Length of Stay: 0 days  Code Status: Full Code  Attending Physician: Rosa Ugalde MD   Primary Care Provider: Hemant Markham MD   Principal Problem: Hyperkalemia    Inpatient consult to Critical Care Medicine  Consult performed by: JEAN CARLOS MOSQUERA  Consult ordered by: ROSA UGALDE  Reason for consult: hyperkalemia         Subjective:     HPI:  Hemant Kennedy Jr. is a 67 year old male with a medical history significant for type II DM, CKD III, non-ischemic cardiomyopathy (EF 25%), ERIC who presents to the ED on 4/28 with chief complaint of sluggishness and fatigue. In the ED, patient was found to have significant HUMERA with creatinine at 6.2 (baseline 1.5) with significant hyperkalemia of 7. Patient's history was gathered from patient as well as sister on the phone as patient was confused and fell asleep frequently during interview. Per the patient, his granddaughter called the ambulance to bring him in after finding him lethargic at home and with a low blood sugar. He could not tell me the low sugar value, but sister on phone states it was in the 70s. Otherwise, the sister states that starting yesterday afternoon, she noticed that the patient was more lethargic and somnolent. She states that last week 4/18-4/21, the patient had 2-3 bouts of diarrhea a day that resolved with imodium over the weekend. Otherwise, she states that he has not had any recent illnesses otherwise.     The patient lives by himself but is visited for most of the day everyday by his sister. Per the sister, she states that she has forced him to drink more water this past week because all he drinks is Coke Zero and milk. She cannot assess his fluid intake but states she has not noticed he has been fluid down this past week; she makes sure he drinks a couple bottles of water per day.  She is in charge of administering his medications, which include two tablets of furosemide daily (med rec states he only takes one pill per day, however), as well as aldactone. The patient is also seen three times a week by home health nurse in [facility]. Per the sister, the nurse has mentioned his blood pressure has been lower than normal for the past week, in the SBP 90- low 100s. She state he usually runs higher. She first noticed this last Friday 4/21. He has not started any new medication and occasionally takes Tylenol for his left diabetic foot ulcer. Patient does not take any antibiotics for his foot ulcer. The patient has not has trouble urinating and has voided fine. Sister affirms this but states that his urinary output seems to have decreased from yesterday evening to today. Patient denies dysuria or flank pain.     Otherwise, patient denies fevers, chills, shortness of breath, new onset edema, chest pain, diaphoresis, dysuria, hematuria, abdominal pain, nausea, vomiting, diarrhea.       Hospital/ICU Course:  In ED, provided 1g calcium gluconate IV as well as 10U insulin with dextrose for intracellular shifting of potassium. Repeat BMP was demonstrable for resolved hyperkalemia at 3.7. Otherwise, andrade placed yielded approximately 500 ml of urine.     Past Medical History:   Diagnosis Date    *Atrial fibrillation     Atrial fibrillation     Cardiomyopathy     Cataract     CHF (congestive heart failure)     Diabetes mellitus     DVT (deep venous thrombosis)     Hypertension     Personal history of stroke with residual effects - Right Occipital 7/25/2012    Sleep apnea     Stroke 2010       No past surgical history on file.    Review of patient's allergies indicates:  No Known Allergies    Family History     Problem Relation (Age of Onset)    Cataracts Mother    Glaucoma Sister, Sister    Heart attack Mother        Social History Main Topics    Smoking status: Never Smoker    Smokeless tobacco: Never  Used    Alcohol use No    Drug use: No    Sexual activity: Not on file      Review of Systems   Constitutional: Positive for activity change and fatigue. Negative for appetite change, diaphoresis and fever.   HENT: Negative for congestion.    Respiratory: Negative for chest tightness, shortness of breath and wheezing.    Cardiovascular: Negative for chest pain, palpitations and leg swelling.   Gastrointestinal: Positive for diarrhea. Negative for abdominal distention, abdominal pain, nausea and vomiting.   Genitourinary: Positive for decreased urine volume and dysuria. Negative for difficulty urinating.   Musculoskeletal: Negative for arthralgias.   Skin: Positive for wound.   Neurological: Positive for weakness and light-headedness.     Objective:     Vital Signs (Most Recent):  Temp: 98.5 °F (36.9 °C) (04/27/17 1935)  Pulse: (!) 52 (04/27/17 1935)  Resp: 18 (04/27/17 1935)  BP: 124/60 (04/27/17 1935)  SpO2: 100 % (04/27/17 1935) Vital Signs (24h Range):  Temp:  [98.5 °F (36.9 °C)] 98.5 °F (36.9 °C)  Pulse:  [52] 52  Resp:  [18] 18  SpO2:  [100 %] 100 %  BP: (124)/(60) 124/60   Weight: 131.5 kg (290 lb)  Body mass index is 45.42 kg/(m^2).    No intake or output data in the 24 hours ending 04/28/17 0202    Physical Exam   Constitutional: He appears well-developed and well-nourished. No distress.   Patient is sluggish in responses but is AAOX4 and answers appropriately. Foul urine odor in room. Upon observation, patient has voided onto the bed.    HENT:   Head: Normocephalic.   Eyes: EOM are normal. Pupils are equal, round, and reactive to light.   Neck: Normal range of motion. Neck supple. No JVD present.   Cardiovascular: Normal rate, regular rhythm and normal heart sounds.    No murmur heard.  Pulmonary/Chest: Effort normal and breath sounds normal. No respiratory distress. He has no rales.   Abdominal: Soft. Bowel sounds are normal. He exhibits no distension. There is no tenderness.   Obese   Musculoskeletal:  Normal range of motion. He exhibits no edema.   Skin: Skin is warm and dry. He is not diaphoretic.   Vitals reviewed.      Vents:     Lines/Drains/Airways     Drain                 Urethral Catheter 04/28/17 0144 less than 1 day          Pressure Ulcer                 Pressure Ulcer 11/16/16 1510 Right posterior heel suspected deep tissue injury 162 days          Peripheral Intravenous Line                 Peripheral IV - Single Lumen 04/27/17 2257 Right Antecubital less than 1 day         Peripheral IV - Single Lumen 04/27/17 2258 Right Antecubital less than 1 day              Significant Labs:    CBC/Anemia Profile:    Recent Labs  Lab 04/27/17 2257 04/28/17  0029   WBC 11.18  --    HGB 10.9*  --    HCT 32.9* 41   *  --    MCV 87  --    RDW 16.8*  --         Chemistries:    Recent Labs  Lab 04/27/17 2257      K 7.0*   *   CO2 14*   *   CREATININE 6.2*   CALCIUM 9.4   ALBUMIN 3.2*   PROT 7.8   BILITOT 0.6   ALKPHOS 93   ALT 27   AST 55*   MG 2.5       Significant Imaging:   Imaging Results         X-Ray Chest 1 View (Final result) Result time:  04/27/17 23:44:25    Final result by Gigi Tillman MD (04/27/17 23:44:25)    Impression:        No significant change from prior study..        Electronically signed by: GIGI TILLMAN MD  Date:     04/27/17  Time:    23:44     Narrative:    Chest AP portable    Indication:.    Comparison:March 2, 2017.    Findings:     Lordotic positioning.    X-ray beam attenuation and scatter occur in generous overlying soft tissues.  Intrathoracic structures are magnified by the patient's thick body wall.      Heart and lungs unchanged when allowing for differences in technique and positioning.            EKG - sinus bradycardia with first degree AV block     Assessment/Plan:     Neuro  Uremic encephalopathy  - With sluggishness and confusion past 48 hours, likely secondary to uremia (BUN significantly elevated at 147).  - No recent history of falls or  illness.  - Continue monitor mentation with resolution of uremia.    Cardiac  Renovascular hypertension  - Continue carvedilol 12.5 mg PO daily.   - Holding spironolactone and lisinopril for now considering significant HUMERA.     Chronic systolic congestive heart failure, NYHA class 2  - EF 25% on 7/2016.  - Not fluid overloaded on physical examination and BNP at baseline at 157.  - Continue cardedilol 12.5 mg PO BID and atorvastatin 40 mg PO daily. Holding ASA 81 mg PO daily, spironolactone 25 mg PO daily , and lisinopril 10 mg PO daily considering HUMERA.    Atrial fibrillation, controlled  - [id number] score of 5; high risk of stroke (7.2% per year)  - Continue apixaban 5 mg PO daily.    Renal  Acute renal failure superimposed on stage 3 chronic kidney disease  - BUN/Cr 147/6.2. Baseline Cr generally ~1.5-2.  - With 3 day history of diarrhea in past week with uncertain PO fluid intake the past 3-4 days, per sister. Also on ample diuretic use at home (furosemide 40 mg PO daily) as well as spironolactone 25 mg PO daily.    - Likely pre-renal azotemia.   - BUN/Cr ratio elevated at 24>20.   - FEUrea at 46% (>35%) representative of intrinsic renal injury; possible pre-renal azotemia progressing to ATN.   - Post-obstructive HUMERA possible with BPH - andrade placed and patient voided 500 ml.   - Renal ultrasound with medical renal disease without hydronephrosis, so unlikely obstruction.   - Also with hyperkalemia  - Provide fluid challenge and periodic monitoring of BMP q4h.   - Followed by nephrology and without resolution will placed trialysis for dialysis.     CKD (chronic kidney disease) stage 3, GFR 30-59 ml/min  - 2/2 to HTN and type II DM.   - See above.     ID  Decubitus ulcer of heel, unstageable  - Seen by wound care (last 4/21).  - Consult in morning for their recommendations; treated topically and with dressings. Not on oral antibiotics.   - Without leukocytosis and has remained afebrile.     Endocrine  Type II  diabetes mellitus with complication, uncontrolled  - At home, long acting 8U BID and short acting 5U TIDWM.  - 5U BID here with LDSSI.   - Diabetic diet and repeat HgA1C.     Fluids/Electrolytes/Nutrition/GI  * Hyperkalemia  - 7.0 on presentation. No EKG changes.  - Secondary to HUMERA.  - Received IV calcium and insulin in ED.  - 6.6 AM of 4/28; provided with kayexalate and additional insulin.  - BMP q4h.         Thank you for your consult. I will follow-up with patient. Please contact us if you have any additional questions.     Kimo Das MD  PGY-1 Internal Medicine  [phone]    Critical Care Medicine  [facility]

## 2017-04-28 NOTE — ASSESSMENT & PLAN NOTE
- BUN/Cr 147/6.2. Baseline Cr generally ~1.5-2.  - With 3 day history of diarrhea in past week with uncertain PO fluid intake the past 3-4 days, per sister. Also on ample diuretic use at home (furosemide 40 mg PO daily) as well as spironolactone 25 mg PO daily.    - Likely pre-renal azotemia.   - BUN/Cr ratio elevated at 24>20.  - Post-obstructive HUMERA possible with BPH - andrade placed and patient voided 500 ml.  - Renal ultrasound and urine studies ordered.  - Also with hyperkalemia of 6.2.  - Provide fluid challenge and periodic monitoring of BMP.

## 2017-04-28 NOTE — ASSESSMENT & PLAN NOTE
- Continue carvedilol 12.5 mg PO daily.   - Holding spironolactone and lisinopril for now considering significant HUMERA.

## 2017-04-28 NOTE — ASSESSMENT & PLAN NOTE
- EF 25% on 7/2016.  - Not fluid overloaded on physical examination and BNP at baseline at 157.  - Continue cardedilol 12.5 mg PO BID and atorvastatin 40 mg PO daily. Holding ASA 81 mg PO daily, spironolactone 25 mg PO daily, and lisinopril 10 mg PO daily considering HUMERA.  - Repeat 2D echo for surveillance->EF improved to 40 w/ DD

## 2017-04-28 NOTE — ASSESSMENT & PLAN NOTE
- Seen by wound care (last 4/21).  - Consult in morning for their recommendations; treated topically and with dressings. Not on oral antibiotics.   - Without leukocytosis and has remained afebrile.

## 2017-04-28 NOTE — PLAN OF CARE
Hemant Markham MD  [address] / [facility] [address]    [facility] - [place], [place] - [address]  [address] [address]  Phone: [phone] Fax: [phone]    Payor: MEDICARE / Plan: MEDICARE PART A & B / Product Type: Government /     Future Appointments  Date Time Provider Department Center   5/18/2017 11:00 AM Catarina Domingo MD [facility]OCRN [address]   5/18/2017 2:00 PM Denita Hendrix NP [facility] WOUND [address]   6/28/2017 1:40 PM Meir Wei MD [facility] CARDIO [facility]     Extended Emergency Contact Information  Primary Emergency Contact: Ruthie Fragoso   [place]  Mobile Phone: [phone]  Relation: Sister  Preferred language: English  Secondary Emergency Contact: Keyla Giordano [address] [address]  Home Phone: [phone]  Relation: Daughter  Preferred language: English     04/28/17 1414   Discharge Assessment   Assessment Type Discharge Planning Assessment   Confirmed/corrected address and phone number on facesheet? Yes   Assessment information obtained from? Caregiver;Medical Record   Expected Length of Stay (days) 4   Communicated expected length of stay with patient/caregiver no   Prior to hospitilization cognitive status: Alert/Oriented   Prior to hospitalization functional status: Assistive Equipment;Needs Assistance   Current cognitive status: Not Oriented to Place;Not Oriented to Time   Current Functional Status: Needs Assistance;Assistive Equipment   Arrived From home or self-care   Lives With alone   Able to Return to Prior Arrangements unable to determine at this time (comments)   Is patient able to care for self after discharge? No   How many people do you have in your home that can help with your care after discharge? 0   Who are your caregiver(s) and their phone number(s)? Ruthie Fragoso (Sister) [phone]    Patient's perception of discharge disposition home health   Readmission  Within The Last 30 Days no previous admission in last 30 days   Patient currently being followed by outpatient case management? No   Patient currently receives home health services? Yes   Patient previously received home health services and would like to resume services if necessary? Yes   If yes, name of home health provider: Rosie GREGORIO   Does the patient currently use HME? Yes   Patient currently receives private duty nursing? N/A   Patient currently receives any other outside agency services? No   Equipment Currently Used at Home CPAP;oxygen;grab bar;glucometer;power chair;wheelchair;shower chair;hospital bed   Do you have any problems affording any of your prescribed medications? No   Is the patient taking medications as prescribed? yes   Do you have any financial concerns preventing you from receiving the healthcare you need? No   Does the patient have transportation to healthcare appointments? Yes   Transportation Available family or friend will provide   On Dialysis? No   Does the patient receive services at the [facility]? No   Are there any open cases? No   Discharge Plan A Home Health;Home with family   Discharge Plan B Skilled Nursing Facility   Patient/Family In Agreement With Plan yes   Chacha Javier RN, BSN  Case Management  [facility]  Ext. [phone]

## 2017-04-28 NOTE — ASSESSMENT & PLAN NOTE
- 7.0 on presentation. No EKG changes.  - Secondary to HUMERA.  - Received IV calcium and insulin in ED.  - Resolved to 3.7 on POCT - repeat BMP.

## 2017-04-28 NOTE — ED PROVIDER NOTES
"Encounter Date: 4/27/2017    SCRIBE #1 NOTE: I, Jorge Jenkins, am scribing for, and in the presence of,  Jorge Ugalde MD. I have scribed the following portions of the note - the EKG reading and the Resident attestation.       History     Chief Complaint   Patient presents with    Fatigue     Pt presents to the ED via EMS with complaints of generalized weakness since this morning.      Review of patient's allergies indicates:  No Known Allergies  HPI     Patient states his family called EMS because he was feeling generally fatigued and weak this AM.  States his Blood sugar was a little low earlier today.  When asked how he feels now, he states, "I'm a little cold and very hungry."  Denies CP, SOB, Fever, Chills, focal weakness, N/V/D, Cough.      Denies melena, Abd pain, dysuria.        Past Medical History:   Diagnosis Date    *Atrial fibrillation     Atrial fibrillation     Cardiomyopathy     Cataract     CHF (congestive heart failure)     Diabetes mellitus     DVT (deep venous thrombosis)     Hypertension     Personal history of stroke with residual effects - Right Occipital 7/25/2012    Sleep apnea     Stroke 2010     No past surgical history on file.  Family History   Problem Relation Age of Onset    Heart attack Mother     Cataracts Mother     Glaucoma Sister     Glaucoma Sister      Social History   Substance Use Topics    Smoking status: Never Smoker    Smokeless tobacco: Never Used    Alcohol use No     Review of Systems   Constitutional: Positive for fatigue.   Neurological: Positive for weakness.   All other systems reviewed and are negative.      Physical Exam   Initial Vitals   BP Pulse Resp Temp SpO2   04/27/17 1935 04/27/17 1935 04/27/17 1935 04/27/17 1935 04/27/17 1935   124/60 52 18 98.5 °F (36.9 °C) 100 %     Physical Exam  Gen/Constitutional: Interactive. No acute distress  Head: Normocephalic, Atraumatic  Neck: supple, no masses or LAD  Eyes: PERRLA, conjunctiva clear  Ears, " Nose and Throat: No rhinorrhea or stridor.  Cardiac: Reg Rhythm, No murmur  Pulmonary: CTA Bilat, no wheezes, rhonchi, rales.  GI: Abdomen soft, non-tender, non-distended; no rebound or guarding  : No CVA tenderness.  Musculoskeletal: Extremities warm, well perfused, no erythema, no edema  Skin: No rashes. Left foot with stage 1 ulcer near fifth toe. Stage 2 ulcer left heel.  Neuro: Alert and Oriented x 3; No focal motor or sensory deficits.  No pronator drift in upper  extremities, no face droop, no dysarthria. Able to lift lower extremities against gravity, further movement is limited due to pain.  Normal finger to nose and heel to shin.  No ataxia.  Psych: Normal affect      ED Course   Procedures  Labs Reviewed   CBC W/ AUTO DIFFERENTIAL - Abnormal; Notable for the following:        Result Value    RBC 3.80 (*)     Hemoglobin 10.9 (*)     Hematocrit 32.9 (*)     RDW 16.8 (*)     Platelets 144 (*)     Mono # 1.4 (*)     All other components within normal limits   COMPREHENSIVE METABOLIC PANEL - Abnormal; Notable for the following:     Potassium 7.0 (*)     Chloride 114 (*)     CO2 14 (*)     BUN, Bld 147 (*)     Creatinine 6.2 (*)     Albumin 3.2 (*)     AST 55 (*)     eGFR if  9.7 (*)     eGFR if non  8.4 (*)     All other components within normal limits   B-TYPE NATRIURETIC PEPTIDE - Abnormal; Notable for the following:      (*)     All other components within normal limits   LIPASE - Abnormal; Notable for the following:     Lipase 144 (*)     All other components within normal limits   TROPONIN I - Abnormal; Notable for the following:     Troponin I 0.027 (*)     All other components within normal limits   PROTIME-INR - Abnormal; Notable for the following:     Prothrombin Time 13.0 (*)     INR 1.3 (*)     All other components within normal limits   ISTAT PROCEDURE - Abnormal; Notable for the following:     POC PH 7.332 (*)     POC PO2 30 (*)     POC HCO3 20.5 (*)      POC SATURATED O2 53 (*)     POC TCO2 22 (*)     All other components within normal limits   MAGNESIUM   APTT   TSH   URINALYSIS   TYPE & SCREEN     Imaging Results         US Retroperitoneal Complete (Final result) Result time:  04/28/17 05:30:32    Final result by Cory Pimentel MD (04/28/17 05:30:32)    Impression:      Sonographic findings compatible with medical renal disease.  ______________________________________     Electronically signed by resident: CORY PIMENTEL MD  Date:     04/28/17  Time:    05:21            As the supervising and teaching physician, I personally reviewed the images and resident's interpretation and I agree with the findings.          Electronically signed by: LAVON MURRAY MD  Date:     04/28/17  Time:    05:30     Narrative:    Time of Procedure: 04/28/17 04:25:00  Accession # [id number]    Reason for study: Acute kidney injury.     Comparison: Retroperitoneal ultrasound 11/11/2016.    Technique: Routine renal ultrasound was performed.    Findings: The kidneys are normal in size on the right, measuring 10.2 cm on the right and asymmetrically smaller on the left measuring 8.2 cm. There is decreased corticomedullary differentiation and mild decrease in cortical thickness. No solid renal masses, nephrolithiasis, or hydronephrosis. Perfusion to the kidneys is decreased and may relate to decreased sound penetration due to the patient's body habitus. Resistive indices are elevated and as follow: 1.0 on the right and 0.8 on the left. The urinary bladder is decompressed around a Lott catheter.            X-Ray Chest 1 View (Final result) Result time:  04/27/17 23:44:25    Final result by Angel Esqueda MD (04/27/17 23:44:25)    Impression:        No significant change from prior study..        Electronically signed by: ANGEL ESQUEDA MD  Date:     04/27/17  Time:    23:44     Narrative:    Chest AP portable    Indication:.    Comparison:March 2, 2017.    Findings:     Lordotic  positioning.    X-ray beam attenuation and scatter occur in generous overlying soft tissues.  Intrathoracic structures are magnified by the patient's thick body wall.      Heart and lungs unchanged when allowing for differences in technique and positioning.              Critical Care Procedure Note:  Direct patient critical care time: 10 minutes  Additional history critical care time:10 minutes  Ordering / reviewing labs and studies: critical care time:10 minutes  Documentation critical care time: 10 minutes  Consulting other physicians critical care time: 10 minutes  Total critical care time (exclusive of procedural time) : 50 minutes   Reason for Critical Care: Hyperkalemia requiring IV medications for treatment.       EKG Readings: (Independently Interpreted)   EKG: Sinus bradycardia with 1st degree AV lock at 54 BPM, no RA's or STD's, non-specific twave pattern, no STEMI. Unchanged from prior.             Medical Decision Making:   History:   Old Medical Records: I decided to obtain old medical records.  Initial Assessment:   Intern MDM: 66 yo HFrEF (Ef 20%), NICM, A fib on Eliquis, T2DM, h/o CVA, ERIC presenting with generalized weakness. No neuro deficits on exam. He is feeling well now without any complaints. Will check labs, urine, chest xray, ekg.  Differential Diagnosis:   Electrolyte abnormalities vs uti vs pneumonia vs TIA vs heart failure   Independently Interpreted Test(s):   I have ordered and independently interpreted EKG Reading(s) - see prior notes  Clinical Tests:   Lab Tests: Ordered and Reviewed  Radiological Study: Ordered and Reviewed  Medical Tests: Ordered and Reviewed  Other:   I have discussed this case with another health care provider.       APC / Resident Notes:   12:12 AM  Critical lab K 7, Cr 6.4, . Calcium gluconate, insulin, D50, kayexelate ordered.No t wave abnormality on exam. Last known RFP BUN 62 cr 2.4. Patient reports making urine. Critical care and nephrology  consulted    1:21 AM  Patient to be admitted to Medical ICU.     Attending ED Notes:   1:00 AM: labs notable for hyperkalemia at 7.0. Also acute renal failure with bump in BUN, creatinine and Acidosis with Bicarb of 14. Calcium, insulin, glucose, kayexalate given. ICU and nephrology consulted. ICU will admit the patient.     Scribe Attestation:   Scribe #1: I performed the above scribed service and the documentation accurately describes the services I performed. I attest to the accuracy of the note.    Attending Attestation:   Physician Attestation Statement for Resident:  As the supervising MD   Physician Attestation Statement: I have personally seen and examined this patient.   I agree with the above history. -: Pt presents with generalized weakness. I considered stroke, MI/ACS, electrolyte abnormality, endocrine abnormality, UTI, PNA, among other diagnosis. Plan to check labs, urine, CXR, EKG, and will re-evaluate.     K elevated at 7 - given insulin, gluc, calcium, kayexelate    ARF on labs.    MICU and nephrology consulted.  MICU will admit.   As the supervising MD I agree with the above PE.    As the supervising MD I agree with the above treatment, course, plan, and disposition.  I have reviewed and agree with the residents interpretation of the following: x-rays, EKG and lab data.          Physician Attestation for Scribe:  Physician Attestation Statement for Scribe #1: I, Jorge Ugalde MD, reviewed documentation, as scribed by Jorge Jenkins in my presence, and it is both accurate and complete.                 ED Course     Clinical Impression:   The primary encounter diagnosis was Hyperkalemia. Diagnoses of Fatigue, Acute renal failure, unspecified acute renal failure type, and Chronic systolic heart failure were also pertinent to this visit.    Disposition:   Disposition: Admitted       Jorge Ugalde MD  04/28/17 0177

## 2017-04-28 NOTE — ASSESSMENT & PLAN NOTE
- EF 25% on 7/2016.  - Not fluid overloaded on physical examination and BNP at baseline at 157.  - Continue cardedilol 12.5 mg PO BID and atorvastatin 40 mg PO daily. Holding ASA 81 mg PO daily, spironolactone 25 mg PO daily , and lisinopril 10 mg PO daily considering HUMERA.

## 2017-04-28 NOTE — ASSESSMENT & PLAN NOTE
- At home, long acting 8U BID and short acting 5U TIDWM.  - 5U BID here with LDSSI.   - Diabetic diet and repeat HgA1C.

## 2017-04-28 NOTE — CONSULTS
Consult Note  Nephrology    Consult Requested By: Jorge Ugalde MD  Reason for Consult: HUMERA with hyperkalemia     SUBJECTIVE:     History of Present Illness:  Patient is a 67 y.o. male y/o M with HFrEF (EF 20-25% in Jun 2016), NICM declined ICD, HTN, HLD, atrial fibrillation on Eliquis, T2DM, h/o CVA in 2010, ERIC presents with generalized weakness, fatigue and AMS that started as per patient since yesterday. While at the ED laboratories drawn and patient was found with acute renal failure BUN/ Creatinine: 147/6.2 and  hyperkalemic: 7.0 (No hemolysis) and that's the reason we are consult. Patient poor historian.     Patient was hospitalized between 11/9 to 11/30/2016 and seen by our service for HUMERA requiring 1 time dialysis. Pt had initially presented for a fall at that time. Hospitalization was complicated by Sepsis 2/2 community acquired PNA and HUMERA thought to be 2/2 ischemic ATN. 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 Creat during that hospitalization went up to >4 and required 1 session of RRT for clearence and then pt started trending back down to baseline on his own. At discharge pt's creat was down to baseline at 1.5. Seems like since that time his creatinine has remained between 1.5- 1.9.     Past Medical History:   Diagnosis Date    *Atrial fibrillation     Atrial fibrillation     Cardiomyopathy     Cataract     CHF (congestive heart failure)     Diabetes mellitus     DVT (deep venous thrombosis)     Hypertension     Personal history of stroke with residual effects - Right Occipital 7/25/2012    Sleep apnea     Stroke 2010     No past surgical history on file.  Family History   Problem Relation Age of Onset    Heart attack Mother     Cataracts Mother     Glaucoma Sister     Glaucoma Sister      Social History   Substance Use Topics    Smoking status: Never Smoker    Smokeless tobacco: Never Used    Alcohol use No       Review of patient's  allergies indicates:  No Known Allergies     Review of Systems:   Constitutional: Negative for fever, appetite change. Positive for fatigue and weakness.  HENT: Negative for hearing loss, sore throat and mouth sores.  Eyes: Negative for photophobia, pain and visual disturbance.  Respiratory: Negative for cough, chest tightness, shortness of breath and wheezing.  Cardiovascular: Negative for chest pain, palpitations and leg swelling.  Gastrointestinal: Negative for nausea, vomiting, abdominal pain, diarrhea, constipation, blood in stool and abdominal distention.  Genitourinary: Negative for dysuria, urgency, frequency, hematuria, decreased urine volume. Positive for feeling that he is not emptying his bladder completely.   Musculoskeletal: Negative for back pain, joint swelling, arthralgias and gait problem.  Skin: Negative for pallor, rash and wound.  Neurological: Negative for dizziness, tremors, syncope, light-headedness and headaches. Positive for weakness.  Hematological: Negative for adenopathy. Does not bruise/bleed easily.  Psychiatric/Behavioral: Negative for confusion, sleep disturbance and dysphoric mood. The patient is not nervous/anxious.       OBJECTIVE:     Vital Signs (Most Recent)  Temp: 98.5 °F (36.9 °C) (04/27/17 1935)  Pulse: (!) 52 (04/27/17 1935)  Resp: 18 (04/27/17 1935)  BP: 124/60 (04/27/17 1935)  SpO2: 100 % (04/27/17 1935)    Vital Signs Range (Last 24H):  Temp:  [98.5 °F (36.9 °C)]   Pulse:  [52]   Resp:  [18]   BP: (124)/(60)   SpO2:  [100 %]     Physical Exam:  General: No acute distress, alert, talkative  HEENT: Normocephalic, atraumatic, external inspection of ears and nose normal, moist mucous membranes  Neck: Supple, symmetrical, trachea midline, no thyromegaly  Respiratory: Clear to auscultation bilaterally, respirations unlabored, no rales/rhonchi/wheezing  Cardiovacular: Regular rate and rhythm, S1, S2 normal, no murmurs, rubs or gallops  Gastrointestinal: Soft, non-tender, bowel  sounds normal, no hepatosplenomegaly  Musculoskeletal: No knee or ankle joint tenderness or swelling.   Extremities: No clubbing or cyanosis of bilateral upper extremities; no lower extremity edema bilaterally, radial pulses 2+ bilaterally, symmetric  Skin: warm and dry; Left foot with stage 1 ulcer near fifth toe. Stage 2 ulcer left heel.    Laboratory:  CBC:   Recent Labs  Lab 04/27/17 2257 04/28/17  0029   WBC 11.18  --    RBC 3.80*  --    HGB 10.9*  --    HCT 32.9* 41   *  --    MCV 87  --    MCH 28.7  --    MCHC 33.1  --      CMP:   Recent Labs  Lab 04/27/17 2257   GLU 91   CALCIUM 9.4   ALBUMIN 3.2*   PROT 7.8      K 7.0*   CO2 14*   *   *   CREATININE 6.2*   ALKPHOS 93   ALT 27   AST 55*   BILITOT 0.6       Diagnostic Results:  Labs: Reviewed  X-Ray: Reviewed    ASSESSMENT/PLAN:     A 67 y.o. male y/o M with HFrEF (EF 20-25% in Jun 2016), NICM declined ICD, HTN, HLD, atrial fibrillation on Eliquis, T2DM, h/o CVA in 2010, ERIC presents with generalized weakness, fatigue and AMS that started as per patient since yesterday. Consulted to our service due to acute renal failure and hyperkalemia. Etiologies could be obstructive uropathy vs. ATN. On recent outpatient visit with Dr. Plummer patient c/o frequent urination and incomplete emptying of bladder that makes obstruction highly suspicious.     Plan:   - Lott catheter placed in the ED and patient with 350 ml of urine ~ 15 minutes after placement   - Discuss with critical care team to pass 1L fluid bolus bolus   - Patient already shifted with Insulin and dextrose  - Obtain urine lytes, urine creat, urine urea to calculate FeNa/Fe urea  - Check protein creat ratio, wrights stain   - Check renal USG (STAT)   - Will repeat RFP STAT since follow up POC presenting with K: 3.7 and notify nephrology fellow if still presenting with elevated K levels   - Will continue monitoring     Yong Peterson   Nephrology fellow PGY- 5  [phone]

## 2017-04-29 LAB
ANION GAP SERPL CALC-SCNC: 7 MMOL/L
ANION GAP SERPL CALC-SCNC: 8 MMOL/L
ANION GAP SERPL CALC-SCNC: 8 MMOL/L
ANION GAP SERPL CALC-SCNC: 9 MMOL/L
BASOPHILS # BLD AUTO: 0.01 K/UL
BASOPHILS NFR BLD: 0.1 %
BUN SERPL-MCNC: 101 MG/DL
BUN SERPL-MCNC: 109 MG/DL
BUN SERPL-MCNC: 70 MG/DL
BUN SERPL-MCNC: 88 MG/DL
BUN SERPL-MCNC: 92 MG/DL
BUN SERPL-MCNC: 92 MG/DL
CALCIUM SERPL-MCNC: 7.5 MG/DL
CALCIUM SERPL-MCNC: 8.1 MG/DL
CALCIUM SERPL-MCNC: 8.3 MG/DL
CALCIUM SERPL-MCNC: 8.9 MG/DL
CALCIUM SERPL-MCNC: 9.3 MG/DL
CALCIUM SERPL-MCNC: 9.8 MG/DL
CHLORIDE SERPL-SCNC: 111 MMOL/L
CHLORIDE SERPL-SCNC: 117 MMOL/L
CHLORIDE SERPL-SCNC: 118 MMOL/L
CHLORIDE SERPL-SCNC: 119 MMOL/L
CHLORIDE SERPL-SCNC: 121 MMOL/L
CHLORIDE SERPL-SCNC: 121 MMOL/L
CK SERPL-CCNC: 1985 U/L
CO2 SERPL-SCNC: 13 MMOL/L
CO2 SERPL-SCNC: 14 MMOL/L
CO2 SERPL-SCNC: 18 MMOL/L
CO2 SERPL-SCNC: 18 MMOL/L
CO2 SERPL-SCNC: 19 MMOL/L
CO2 SERPL-SCNC: 26 MMOL/L
CREAT SERPL-MCNC: 2.3 MG/DL
CREAT SERPL-MCNC: 3 MG/DL
CREAT SERPL-MCNC: 3.2 MG/DL
CREAT SERPL-MCNC: 3.2 MG/DL
CREAT SERPL-MCNC: 3.4 MG/DL
CREAT SERPL-MCNC: 3.9 MG/DL
DIFFERENTIAL METHOD: ABNORMAL
EOSINOPHIL # BLD AUTO: 0.5 K/UL
EOSINOPHIL NFR BLD: 4.5 %
ERYTHROCYTE [DISTWIDTH] IN BLOOD BY AUTOMATED COUNT: 16.6 %
EST. GFR  (AFRICAN AMERICAN): 17.1 ML/MIN/1.73 M^2
EST. GFR  (AFRICAN AMERICAN): 20.1 ML/MIN/1.73 M^2
EST. GFR  (AFRICAN AMERICAN): 21.7 ML/MIN/1.73 M^2
EST. GFR  (AFRICAN AMERICAN): 21.7 ML/MIN/1.73 M^2
EST. GFR  (AFRICAN AMERICAN): 23.4 ML/MIN/1.73 M^2
EST. GFR  (AFRICAN AMERICAN): 32.3 ML/MIN/1.73 M^2
EST. GFR  (NON AFRICAN AMERICAN): 14.8 ML/MIN/1.73 M^2
EST. GFR  (NON AFRICAN AMERICAN): 17.4 ML/MIN/1.73 M^2
EST. GFR  (NON AFRICAN AMERICAN): 18.7 ML/MIN/1.73 M^2
EST. GFR  (NON AFRICAN AMERICAN): 18.7 ML/MIN/1.73 M^2
EST. GFR  (NON AFRICAN AMERICAN): 20.3 ML/MIN/1.73 M^2
EST. GFR  (NON AFRICAN AMERICAN): 27.9 ML/MIN/1.73 M^2
GLUCOSE SERPL-MCNC: 101 MG/DL
GLUCOSE SERPL-MCNC: 105 MG/DL
GLUCOSE SERPL-MCNC: 106 MG/DL
GLUCOSE SERPL-MCNC: 110 MG/DL
GLUCOSE SERPL-MCNC: 136 MG/DL
GLUCOSE SERPL-MCNC: 98 MG/DL
HCT VFR BLD AUTO: 29.8 %
HGB BLD-MCNC: 9.9 G/DL
LYMPHOCYTES # BLD AUTO: 1.9 K/UL
LYMPHOCYTES NFR BLD: 18.2 %
MAGNESIUM SERPL-MCNC: 1.9 MG/DL
MCH RBC QN AUTO: 28.7 PG
MCHC RBC AUTO-ENTMCNC: 33.2 %
MCV RBC AUTO: 86 FL
MONOCYTES # BLD AUTO: 1.2 K/UL
MONOCYTES NFR BLD: 10.9 %
NEUTROPHILS # BLD AUTO: 7 K/UL
NEUTROPHILS NFR BLD: 66.1 %
PHOSPHATE SERPL-MCNC: 3.2 MG/DL
PLATELET # BLD AUTO: 108 K/UL
PMV BLD AUTO: 10.3 FL
POCT GLUCOSE: 101 MG/DL (ref 70–110)
POCT GLUCOSE: 106 MG/DL (ref 70–110)
POCT GLUCOSE: 106 MG/DL (ref 70–110)
POCT GLUCOSE: 115 MG/DL (ref 70–110)
POCT GLUCOSE: 118 MG/DL (ref 70–110)
POCT GLUCOSE: 118 MG/DL (ref 70–110)
POCT GLUCOSE: 121 MG/DL (ref 70–110)
POCT GLUCOSE: 122 MG/DL (ref 70–110)
POCT GLUCOSE: 130 MG/DL (ref 70–110)
POCT GLUCOSE: 138 MG/DL (ref 70–110)
POCT GLUCOSE: 77 MG/DL (ref 70–110)
POCT GLUCOSE: 84 MG/DL (ref 70–110)
POCT GLUCOSE: 94 MG/DL (ref 70–110)
POTASSIUM SERPL-SCNC: 5.1 MMOL/L
POTASSIUM SERPL-SCNC: 5.4 MMOL/L
POTASSIUM SERPL-SCNC: 5.6 MMOL/L
POTASSIUM SERPL-SCNC: 5.7 MMOL/L
POTASSIUM SERPL-SCNC: 5.8 MMOL/L
POTASSIUM SERPL-SCNC: 5.9 MMOL/L
RBC # BLD AUTO: 3.45 M/UL
SODIUM SERPL-SCNC: 143 MMOL/L
SODIUM SERPL-SCNC: 143 MMOL/L
SODIUM SERPL-SCNC: 144 MMOL/L
SODIUM SERPL-SCNC: 145 MMOL/L
WBC # BLD AUTO: 10.56 K/UL

## 2017-04-29 PROCEDURE — 99233 SBSQ HOSP IP/OBS HIGH 50: CPT | Mod: ,,, | Performed by: INTERNAL MEDICINE

## 2017-04-29 PROCEDURE — 25000003 PHARM REV CODE 250: Performed by: INTERNAL MEDICINE

## 2017-04-29 PROCEDURE — 80048 BASIC METABOLIC PNL TOTAL CA: CPT

## 2017-04-29 PROCEDURE — 80048 BASIC METABOLIC PNL TOTAL CA: CPT | Mod: 91

## 2017-04-29 PROCEDURE — 27000190 HC CPAP FULL FACE MASK W/VALVE

## 2017-04-29 PROCEDURE — 94660 CPAP INITIATION&MGMT: CPT

## 2017-04-29 PROCEDURE — 85025 COMPLETE CBC W/AUTO DIFF WBC: CPT

## 2017-04-29 PROCEDURE — 25000003 PHARM REV CODE 250: Performed by: STUDENT IN AN ORGANIZED HEALTH CARE EDUCATION/TRAINING PROGRAM

## 2017-04-29 PROCEDURE — 83735 ASSAY OF MAGNESIUM: CPT

## 2017-04-29 PROCEDURE — 99232 SBSQ HOSP IP/OBS MODERATE 35: CPT | Mod: ,,, | Performed by: INTERNAL MEDICINE

## 2017-04-29 PROCEDURE — 82550 ASSAY OF CK (CPK): CPT

## 2017-04-29 PROCEDURE — 20000000 HC ICU ROOM

## 2017-04-29 PROCEDURE — 99900035 HC TECH TIME PER 15 MIN (STAT)

## 2017-04-29 PROCEDURE — 84100 ASSAY OF PHOSPHORUS: CPT

## 2017-04-29 RX ORDER — RAMELTEON 8 MG/1
8 TABLET ORAL NIGHTLY PRN
Status: DISCONTINUED | OUTPATIENT
Start: 2017-04-29 | End: 2017-05-10 | Stop reason: HOSPADM

## 2017-04-29 RX ADMIN — Medication 3 ML: at 09:04

## 2017-04-29 RX ADMIN — SODIUM BICARBONATE: 84 INJECTION, SOLUTION INTRAVENOUS at 09:04

## 2017-04-29 RX ADMIN — HEPARIN SODIUM 5000 UNITS: 5000 INJECTION, SOLUTION INTRAVENOUS; SUBCUTANEOUS at 06:04

## 2017-04-29 RX ADMIN — RAMELTEON 8 MG: 8 TABLET, FILM COATED ORAL at 09:04

## 2017-04-29 RX ADMIN — Medication 3 ML: at 06:04

## 2017-04-29 RX ADMIN — MICONAZOLE NITRATE: 20 OINTMENT TOPICAL at 09:04

## 2017-04-29 RX ADMIN — HEPARIN SODIUM 5000 UNITS: 5000 INJECTION, SOLUTION INTRAVENOUS; SUBCUTANEOUS at 09:04

## 2017-04-29 RX ADMIN — ATORVASTATIN CALCIUM 40 MG: 20 TABLET, FILM COATED ORAL at 09:04

## 2017-04-29 RX ADMIN — Medication 3 ML: at 01:04

## 2017-04-29 RX ADMIN — FLUTICASONE PROPIONATE 2 SPRAY: 50 SPRAY, METERED NASAL at 09:04

## 2017-04-29 RX ADMIN — HEPARIN SODIUM 5000 UNITS: 5000 INJECTION, SOLUTION INTRAVENOUS; SUBCUTANEOUS at 03:04

## 2017-04-29 RX ADMIN — SODIUM BICARBONATE: 84 INJECTION, SOLUTION INTRAVENOUS at 01:04

## 2017-04-29 RX ADMIN — AMIODARONE HYDROCHLORIDE 200 MG: 200 TABLET ORAL at 09:04

## 2017-04-29 NOTE — ASSESSMENT & PLAN NOTE
- BUN/Cr 147/6.2. Baseline Cr generally ~1.5-2.  - With 3 day history of diarrhea in past week with uncertain PO fluid intake the past 3-4 days, per sister. Also on ample diuretic use at home (furosemide 40 mg PO daily) as well as spironolactone 25 mg PO daily.    - Likely pre-renal azotemia.   - BUN/Cr ratio elevated at 24>20.  - Post-obstructive HUMERA possible with BPH - andrade placed and patient voided 500 ml.  - Renal ultrasound and urine studies ordered.  - Improved with fluid challenge

## 2017-04-29 NOTE — PROGRESS NOTES
CCS at bedside to put in R IJ trialysis; unsuccessful after multiple attempts. Hematoma noted on R neck, pressure dressing applied, CXR ordered. Will continue to monitor.

## 2017-04-29 NOTE — PROGRESS NOTES
K+ persistently high inspite of multiple medications since overnight and bicarb drip .   Will do hr HD with 2 K+ bath and no UF   D/w primary team and recommended trialysis cath ASAp  Informed dialysis RN. HD orders are in.   Please call [phone] when line is in place     Lakisha Plummer MD   Nephrology fellow   Pager [phone]

## 2017-04-29 NOTE — RESIDENT HANDOFF
Handoff     Primary Team: [facility] CRITICAL CARE MEDICINE Room Number: [id number] [id number]     Patient Name: Hemant Kennedy Jr. MRN: [id number]     Date of Birth: [id number] Allergies: Review of patient's allergies indicates no known allergies.     Age: 67 y.o. Admit Date: 4/27/2017     Sex: male  BMI: Body mass index is 41.64 kg/(m^2).     Code Status: Full Code          Reason for Admission: Hyperkalemia    Brief HPI (pertinent PMH and diagnosis or differential diagnosis): Hemant Kennedy Jr. is a 67 year old male with a medical history significant for type II DM, CKD III, non-ischemic cardiomyopathy (EF 25%), ERIC who presents to the ED on 4/28 with chief complaint of sluggishness and fatigue. In the ED, patient was found to have significant HUMERA with creatinine at 6.2 (baseline 1.5) with significant hyperkalemia of 7. Patient's history was gathered from patient as well as sister on the phone as patient was confused and fell asleep frequently during interview. Per the patient, his granddaughter called the ambulance to bring him in after finding him lethargic at home and with a low blood sugar. He could not tell me the low sugar value, but sister on phone states it was in the 70s. Otherwise, the sister states that starting yesterday afternoon, she noticed that the patient was more lethargic and somnolent. She states that last week 4/18-4/21, the patient had 2-3 bouts of diarrhea a day that resolved with imodium over the weekend. Otherwise, she states that he has not had any recent illnesses otherwise.      The patient lives by himself but is visited for most of the day everyday by his sister. Per the sister, she states that she has forced him to drink more water this past week because all he drinks is Coke Zero and milk. She cannot assess his fluid intake but states she has not noticed he has been fluid down this past week; she makes sure he drinks a couple bottles of water per day. She is in charge of administering his medications,  which include two tablets of furosemide daily (med rec states he only takes one pill per day, however), as well as aldactone. The patient is also seen three times a week by home health nurse in [facility]. Per the sister, the nurse has mentioned his blood pressure has been lower than normal for the past week, in the SBP 90- low 100s. She state he usually runs higher. She first noticed this last Friday 4/21. He has not started any new medication and occasionally takes Tylenol for his left diabetic foot ulcer. Patient does not take any antibiotics for his foot ulcer. The patient has not has trouble urinating and has voided fine. Sister affirms this but states that his urinary output seems to have decreased from yesterday evening to today. Patient denies dysuria or flank pain.      Otherwise, patient denies fevers, chills, shortness of breath, new onset edema, chest pain, diaphoresis, dysuria, hematuria, abdominal pain, nausea, vomiting, diarrhea.     Hospital Course (updated, brief assessment by system or problem, significant events): Patient's potassium shifted with good response, Cr improved with fluid. Discussed case with Nephrology who felt that patient did not need emergent dialysis. However potassium increased once again, decision was made to place trialysis for CRRT, but access was unattainable, and Nephrology recommended managing medically as patient urinating well and responding to treatment. Hyperkalemia thought to be secondary to spironolactone, prerenal ARF, and rhabdomyolysis. Should continue to improve and Nephrology does not feel patient will require permanent access at this time.    Tasks (specific, using if-then statements):  1. F/u Nephrology recs  2. Monitor BMP's and Urine output    Contingency Plan (special circumstances anticipated and plan): If patient decompensated please call Nephrology and MICU    Estimated Discharge Date: 5/1/17    Discharge Disposition: Home or Self Care    Mentored By:   Kan[place]

## 2017-04-29 NOTE — ASSESSMENT & PLAN NOTE
- At home, long acting 8U BID and short acting 5U TIDWM.  - 5U BID here with LDSSI.   - Diabetic diet and repeat [id number]

## 2017-04-29 NOTE — PROGRESS NOTES
Progress Note  Nephrology    Admit Date: 4/27/2017   LOS: 1 day     SUBJECTIVE:     Follow-up For: hyperkalemia; HUMERA on CKD    Interval Hx: decision was made to start RRT last night for persistent hyperkalemia; line placement unsuccessful. Patient received insulin/D50 x2 and kayexalate x1. UOP 5.36L/24h. K 5.7 this AM. Currently receiving IVF. Patient disoriented this AM; wants to go outside. Denies SOB.       OBJECTIVE:     Vital Signs (Most Recent)  Temp: 98.6 °F (37 °C) (04/29/17 0700)  Pulse: 67 (04/29/17 1000)  Resp: 17 (04/29/17 1000)  BP: 111/75 (04/29/17 1000)  SpO2: 97 % (04/29/17 1000)    Vital Signs Range (Last 24H):  Temp:  [98.3 °F (36.8 °C)-98.6 °F (37 °C)]   Pulse:  [54-83]   Resp:  [0-48]   BP: ()/(34-80)   SpO2:  [95 %-100 %]       Intake/Output Summary (Last 24 hours) at 04/29/17 1242  Last data filed at 04/29/17 1000   Gross per 24 hour   Intake             1317 ml   Output             3785 ml   Net            -2468 ml     Physical Exam:  Gen: WDWN AAM in NAD.  Neuro: disoriented  Eyes: EOMI, clear conjunctivae  CV: RRR. No peripheral edema  Resp: normal effort. No crackles  Abd: Soft, NTND  Skin: warm, normal turgor      Laboratory:  CBC:     Recent Labs  Lab 04/29/17  0308   WBC 10.56   RBC 3.45*   HGB 9.9*   HCT 29.8*   *   MCV 86   MCH 28.7   MCHC 33.2     CMP:   Recent Labs  Lab 04/27/17  2257  04/29/17  0724   GLU 91  < > 101   CALCIUM 9.4  < > 8.1*   ALBUMIN 3.2*  --   --    PROT 7.8  --   --      < > 144   K 7.0*  < > 5.7*   CO2 14*  < > 14*   *  < > 121*   *  < > 92*   CREATININE 6.2*  < > 3.0*   ALKPHOS 93  --   --    ALT 27  --   --    AST 55*  --   --    BILITOT 0.6  --   --    < > = values in this interval not displayed.  ABGs:     Recent Labs  Lab 04/28/17  0029   PH 7.332*   PCO2 38.7   HCO3 20.5*   POCSATURATED 53*   BE -5       Recent Labs  Lab 04/28/17  0149   COLORU Yellow   SPECGRAV 1.010   PHUR 5.0   PROTEINUA Negative   BACTERIA Rare   NITRITE  Negative   LEUKOCYTESUR Negative   UROBILINOGEN Negative       Diagnostic Results:  Labs: Reviewed  X-Ray: Reviewed  US: Reviewed    ASSESSMENT/PLAN:     Patient is a 67 y.o. male y/o M with HFrEF (EF 20-25% in Jun 2016), NICM declined ICD, HTN, HLD, atrial fibrillation on Eliquis, T2DM, h/o CVA in 2010, ERIC presented with generalized weakness, fatigue and AMS found to have HUMERA on CKD, severe hyperkalemia and acidosis.    HUMERA on CKD stage 3   - baseline sCr 1.5-1.9. sCr 6.2 on arrival.   - UOP improved upon placing andrade catheter  - pre-renal HUMERA 2/2 volume depletion  - improving with hydration  - no need for RRT  - continue to monitor. Strict I/Os    Hyperkalemia  - likely 2/2 HUMERA. Cannot rule out hemolysis  - improving overall  - continue medical management  - no need for RRT  - continue low K diet. Patient had fruit on tray this AM    NAGMA  - likely 2/2 volume resuscitation with NS + underlying CKD  - serum bicarb 14  - recommend changing IVF to 3 amps NaBicarb (150meq) in D5W at same rate        Juli Gunn, PGY-4  Nephrology Fellow  Pager: [phone]

## 2017-04-29 NOTE — PROCEDURES
"Hemant Kennedy Jr. is a 67 y.o. male patient.    Temp: 98.3 °F (36.8 °C) (04/28/17 1900)  Pulse: 64 (04/28/17 2300)  Resp: (!) 0 (04/28/17 2300)  BP: (!) 109/57 (04/28/17 2300)  SpO2: 100 % (04/28/17 2300)  Weight: 131.5 kg (290 lb) (04/27/17 1935)       Central Line  Date/Time: 4/28/2017 11:32 PM  Performed by: DIANA RENEE  Assisting provider: BELLA BELLE  Pre-operative Diagnosis: hyperkalemia  Post-operative diagnosis: hyperkalemia   Consent Done: Yes  Time out: Immediately prior to procedure a "time out" was called to verify the correct patient, procedure, equipment, support staff and site/side marked as required.  Indications: hemodialysis  Anesthesia: local infiltration    Anesthesia:  Anesthesia: local infiltration  Local Anesthetic: lidocaine 1% without epinephrine   Anesthetic total: 5 mL  Preparation: skin prepped with ChloraPrep  Skin prep agent dried: skin prep agent completely dried prior to procedure  Sterile barriers: all five maximum sterile barriers used - cap, mask, sterile gown, sterile gloves, and large sterile sheet  Hand hygiene: hand hygiene performed prior to central venous catheter insertion  Location details: right internal jugular  Catheter type: dialysis  Catheter size: 12 Fr  Ultrasound guidance: yes  Vessel Caliber: small, not patent, compressibility poor  Number of attempts: 4  Comments: Unable to place CVC at this time.  RIJ vessel small and collapsible 4 attempts made, 3 by Dr. Renee and 1 by Dr. Belle.  Able to achieve flashback, unable to advance guidewire.  LIJ assessed, smaller and more collapsible than RIJ.  Femorals assessed, R femoral vessel small and under artery, L femoral vessel small and collapsible.  Will obtain CXR as multiple attempts made.   Will contact nephrology for continued medical management at this time, may attempt in AM.            Diana Renee  4/28/2017  "

## 2017-04-29 NOTE — PROGRESS NOTES
[facility]  Critical Care Medicine  Progress Note    Patient Name: Hemant Kennedy Jr.  MRN: [id number]  Admission Date: 4/27/2017  Hospital Length of Stay: 1 days  Code Status: Full Code  Attending Provider: Lan Murphy MD  Primary Care Provider: Hemant Markham MD   Principal Problem: Hyperkalemia    Subjective:     HPI:  Hemant Kennedy Jr. is a 67 year old male with a medical history significant for type II DM, CKD III, non-ischemic cardiomyopathy (EF 25%), ERIC who presents to the ED on 4/28 with chief complaint of sluggishness and fatigue. In the ED, patient was found to have significant HUMERA with creatinine at 6.2 (baseline 1.5) with significant hyperkalemia of 7. Patient's history was gathered from patient as well as sister on the phone as patient was confused and fell asleep frequently during interview. Per the patient, his granddaughter called the ambulance to bring him in after finding him lethargic at home and with a low blood sugar. He could not tell me the low sugar value, but sister on phone states it was in the 70s. Otherwise, the sister states that starting yesterday afternoon, she noticed that the patient was more lethargic and somnolent. She states that last week 4/18-4/21, the patient had 2-3 bouts of diarrhea a day that resolved with imodium over the weekend. Otherwise, she states that he has not had any recent illnesses otherwise.     The patient lives by himself but is visited for most of the day everyday by his sister. Per the sister, she states that she has forced him to drink more water this past week because all he drinks is Coke Zero and milk. She cannot assess his fluid intake but states she has not noticed he has been fluid down this past week; she makes sure he drinks a couple bottles of water per day. She is in charge of administering his medications, which include two tablets of furosemide daily (med rec states he only takes one pill per day, however), as well as  aldactone. The patient is also seen three times a week by home health nurse in [facility]. Per the sister, the nurse has mentioned his blood pressure has been lower than normal for the past week, in the SBP 90- low 100s. She state he usually runs higher. She first noticed this last Friday 4/21. He has not started any new medication and occasionally takes Tylenol for his left diabetic foot ulcer. Patient does not take any antibiotics for his foot ulcer. The patient has not has trouble urinating and has voided fine. Sister affirms this but states that his urinary output seems to have decreased from yesterday evening to today. Patient denies dysuria or flank pain.     Otherwise, patient denies fevers, chills, shortness of breath, new onset edema, chest pain, diaphoresis, dysuria, hematuria, abdominal pain, nausea, vomiting, diarrhea.       Hospital/ICU Course:  4/28: In ED, provided 1g calcium gluconate IV as well as 10U insulin with dextrose for intracellular shifting of potassium. Repeat BMP was demonstrable for resolved hyperkalemia at 3.7. Otherwise, andrade placed yielded approximately 500 ml of urine.   4/29: Patient seen by Nephrology yesterday due to persistent hyperkalemia in setting of HUMERA on CKD, decided on likely need for RRT. Trialysis attempted overnight, very poor access and increased risk of attempting femoral line due to proximity of artery to vein, discussed with Nephrology overnight, who agreed with managing medically as patient urinating well. Labs improving with shift of potassium      Interval History/Significant Events: Trialysis attempted overnight, unable to gain access, managed medically overnight with good response in labs    Review of Systems   Constitutional: Positive for activity change and fatigue. Negative for appetite change, diaphoresis and fever.   HENT: Negative for congestion.    Respiratory: Negative for chest tightness, shortness of breath and wheezing.    Cardiovascular: Negative for  chest pain, palpitations and leg swelling.   Gastrointestinal: Positive for diarrhea. Negative for abdominal distention, abdominal pain, nausea and vomiting.   Genitourinary: Positive for decreased urine volume and dysuria. Negative for difficulty urinating.   Musculoskeletal: Negative for arthralgias.   Skin: Positive for wound.   Neurological: Positive for weakness and light-headedness.     Objective:     Vital Signs (Most Recent):  Temp: 98.4 °F (36.9 °C) (04/29/17 0300)  Pulse: 68 (04/29/17 0630)  Resp: 10 (04/29/17 0630)  BP: (!) 120/57 (04/29/17 0630)  SpO2: 100 % (04/29/17 0630) Vital Signs (24h Range):  Temp:  [98.3 °F (36.8 °C)-98.5 °F (36.9 °C)] 98.4 °F (36.9 °C)  Pulse:  [54-83] 68  Resp:  [0-48] 10  SpO2:  [95 %-100 %] 100 %  BP: ()/(34-80) 120/57   Weight: 120.6 kg (265 lb 14 oz)  Body mass index is 41.64 kg/(m^2).      Intake/Output Summary (Last 24 hours) at 04/29/17 0908  Last data filed at 04/29/17 0600   Gross per 24 hour   Intake             1317 ml   Output             3635 ml   Net            -2318 ml       Physical Exam   Constitutional: He appears well-developed and well-nourished. No distress.   Patient more alert this am.   HENT:   Head: Normocephalic.   Eyes: EOM are normal. Pupils are equal, round, and reactive to light.   Neck: Normal range of motion. Neck supple. No JVD present.   Cardiovascular: Normal rate, regular rhythm and normal heart sounds.    No murmur heard.  Pulmonary/Chest: Effort normal and breath sounds normal. No respiratory distress. He has no rales.   Abdominal: Soft. Bowel sounds are normal. He exhibits no distension. There is no tenderness.   Obese   Musculoskeletal: Normal range of motion. He exhibits no edema.   Skin: Skin is warm and dry. He is not diaphoretic.   Vitals reviewed.      Vents:     Lines/Drains/Airways     Drain                 Urethral Catheter 04/28/17 0144 1 day          Pressure Ulcer                 Pressure Ulcer 11/16/16 1510 Right  posterior heel suspected deep tissue injury 163 days         Pressure Ulcer 04/28/17 0800 Left plantar heel Stage I 1 day         Pressure Ulcer 04/28/17 0800 Left posterior heel Stage I 1 day         Pressure Ulcer 04/28/17 0800 medial other (see comments) Stage II 1 day          Peripheral Intravenous Line                 Peripheral IV - Single Lumen 04/27/17 2257 Right Antecubital 1 day         Peripheral IV - Single Lumen 04/28/17 1435 Right Hand less than 1 day              Significant Labs:    CBC/Anemia Profile:    Recent Labs  Lab 04/27/17 2257 04/28/17  0029 04/28/17  0453 04/29/17  0308   WBC 11.18  --  11.10 10.56   HGB 10.9*  --  9.6* 9.9*   HCT 32.9* 41 28.3* 29.8*   *  --  116* 108*   MCV 87  --  84 86   RDW 16.8*  --  16.7* 16.6*        Chemistries:    Recent Labs  Lab 04/27/17 2257 04/28/17 0453  04/28/17  2343 04/29/17  0308 04/29/17  0724     < > 140  < > 143 143 144   K 7.0*  < > 6.6*  < > 5.6* 5.4* 5.7*   *  < > 118*  < > 117* 121* 121*   CO2 14*  < > 13*  < > 18* 13* 14*   *  < > 134*  < > 109* 101* 92*   CREATININE 6.2*  < > 5.4*  < > 3.9* 3.4* 3.0*   CALCIUM 9.4  < > 9.0  < > 8.9 8.3* 8.1*   ALBUMIN 3.2*  --   --   --   --   --   --    PROT 7.8  --   --   --   --   --   --    BILITOT 0.6  --   --   --   --   --   --    ALKPHOS 93  --   --   --   --   --   --    ALT 27  --   --   --   --   --   --    AST 55*  --   --   --   --   --   --    MG 2.5  --  2.3  --   --  1.9  --    PHOS  --   --  4.2  --   --  3.2  --    < > = values in this interval not displayed.    All pertinent labs within the past 24 hours have been reviewed.    Significant Imaging:  I have reviewed all pertinent imaging results/findings within the past 24 hours.    Assessment/Plan:     Neuro  Sleep apnea  - Nightly CPAP.     Cardiac  Chronic systolic congestive heart failure, NYHA class 2  - EF 25% on 7/2016.  - Not fluid overloaded on physical examination and BNP at baseline at 157.  - Continue  cardedilol 12.5 mg PO BID and atorvastatin 40 mg PO daily. Holding ASA 81 mg PO daily, spironolactone 25 mg PO daily, and lisinopril 10 mg PO daily considering HUMERA.  - Repeat 2D echo for surveillance->EF improved to 40 w/ DD    Atrial fibrillation, controlled  - [id number] score of 5; high risk of stroke (7.2% per year)  - Continue apixaban 5 mg PO daily.    Renovascular hypertension  - Holding carvedilol 12.5 mg PO daily secondary to hypotensive events overnight.   - Holding spironolactone and lisinopril for now considering significant HUMERA.     Renal  CKD (chronic kidney disease) stage 3, GFR 30-59 ml/min  - 2/2 to HTN and type II DM.   - See above.     Acute renal failure superimposed on stage 3 chronic kidney disease  - BUN/Cr 147/6.2. Baseline Cr generally ~1.5-2.  - With 3 day history of diarrhea in past week with uncertain PO fluid intake the past 3-4 days, per sister. Also on ample diuretic use at home (furosemide 40 mg PO daily) as well as spironolactone 25 mg PO daily.    - Likely pre-renal azotemia.   - BUN/Cr ratio elevated at 24>20.  - Post-obstructive HUMERA possible with BPH - andrade placed and patient voided 500 ml.  - Renal ultrasound and urine studies ordered.  - Improved with fluid challenge    ID  Decubitus ulcer of heel, unstageable  - Seen by wound care (last 4/21).  - Consult in morning for their recommendations; treated topically and with dressings. Not on oral antibiotics.   - Without leukocytosis and has remained afebrile.     Endocrine  Type II diabetes mellitus with complication, uncontrolled  - At home, long acting 8U BID and short acting 5U TIDWM.  - 5U BID here with LDSSI.   - Diabetic diet and repeat LaB5G=6    Fluids/Electrolytes/Nutrition/GI  * Hyperkalemia  - 7.0 on presentation. No EKG changes.  - Secondary to HUMERA.  - Received IV calcium and insulin in ED.  - 6.6 AM of 4/28; provided with kayexalate and additional insulin.  - Improved this am to 5.4        Alberto Reed MD  Critical  [facility]  [facility]

## 2017-04-29 NOTE — PROGRESS NOTES
Dr. Bekah Walters with critical care notified of following lab values.  All other pt VSS and normothermic with adequate urine output (>80cc/hr).    Results for MOE ISIDRO JR. (MRN [id number]) as of 4/29/2017 17:15   Ref. Range 4/29/2017 15:53   Potassium Latest Ref Range: 3.5 - 5.1 mmol/L 5.9 (H)     Results for MOE ISIDRO JR. (MRN [id number]) as of 4/29/2017 17:15   Ref. Range 4/29/2017 15:53   Creatinine Latest Ref Range: 0.5 - 1.4 mg/dL 3.2 (H)     No new orders have been received at this time. Will continue to monitor closely.

## 2017-04-29 NOTE — SUBJECTIVE & OBJECTIVE
Interval History/Significant Events: Trialysis attempted overnight, unable to gain access, managed medically overnight with good response in labs    Review of Systems   Constitutional: Positive for activity change and fatigue. Negative for appetite change, diaphoresis and fever.   HENT: Negative for congestion.    Respiratory: Negative for chest tightness, shortness of breath and wheezing.    Cardiovascular: Negative for chest pain, palpitations and leg swelling.   Gastrointestinal: Positive for diarrhea. Negative for abdominal distention, abdominal pain, nausea and vomiting.   Genitourinary: Positive for decreased urine volume and dysuria. Negative for difficulty urinating.   Musculoskeletal: Negative for arthralgias.   Skin: Positive for wound.   Neurological: Positive for weakness and light-headedness.     Objective:     Vital Signs (Most Recent):  Temp: 98.4 °F (36.9 °C) (04/29/17 0300)  Pulse: 68 (04/29/17 0630)  Resp: 10 (04/29/17 0630)  BP: (!) 120/57 (04/29/17 0630)  SpO2: 100 % (04/29/17 0630) Vital Signs (24h Range):  Temp:  [98.3 °F (36.8 °C)-98.5 °F (36.9 °C)] 98.4 °F (36.9 °C)  Pulse:  [54-83] 68  Resp:  [0-48] 10  SpO2:  [95 %-100 %] 100 %  BP: ()/(34-80) 120/57   Weight: 120.6 kg (265 lb 14 oz)  Body mass index is 41.64 kg/(m^2).      Intake/Output Summary (Last 24 hours) at 04/29/17 0908  Last data filed at 04/29/17 0600   Gross per 24 hour   Intake             1317 ml   Output             3635 ml   Net            -2318 ml       Physical Exam   Constitutional: He appears well-developed and well-nourished. No distress.   Patient more alert this am.   HENT:   Head: Normocephalic.   Eyes: EOM are normal. Pupils are equal, round, and reactive to light.   Neck: Normal range of motion. Neck supple. No JVD present.   Cardiovascular: Normal rate, regular rhythm and normal heart sounds.    No murmur heard.  Pulmonary/Chest: Effort normal and breath sounds normal. No respiratory distress. He has no  rales.   Abdominal: Soft. Bowel sounds are normal. He exhibits no distension. There is no tenderness.   Obese   Musculoskeletal: Normal range of motion. He exhibits no edema.   Skin: Skin is warm and dry. He is not diaphoretic.   Vitals reviewed.      Vents:     Lines/Drains/Airways     Drain                 Urethral Catheter 04/28/17 0144 1 day          Pressure Ulcer                 Pressure Ulcer 11/16/16 1510 Right posterior heel suspected deep tissue injury 163 days         Pressure Ulcer 04/28/17 0800 Left plantar heel Stage I 1 day         Pressure Ulcer 04/28/17 0800 Left posterior heel Stage I 1 day         Pressure Ulcer 04/28/17 0800 medial other (see comments) Stage II 1 day          Peripheral Intravenous Line                 Peripheral IV - Single Lumen 04/27/17 2257 Right Antecubital 1 day         Peripheral IV - Single Lumen 04/28/17 1435 Right Hand less than 1 day              Significant Labs:    CBC/Anemia Profile:    Recent Labs  Lab 04/27/17 2257 04/28/17  0029 04/28/17  0453 04/29/17  0308   WBC 11.18  --  11.10 10.56   HGB 10.9*  --  9.6* 9.9*   HCT 32.9* 41 28.3* 29.8*   *  --  116* 108*   MCV 87  --  84 86   RDW 16.8*  --  16.7* 16.6*        Chemistries:    Recent Labs  Lab 04/27/17  2257  04/28/17  0453  04/28/17  2343 04/29/17  0308 04/29/17  0724     < > 140  < > 143 143 144   K 7.0*  < > 6.6*  < > 5.6* 5.4* 5.7*   *  < > 118*  < > 117* 121* 121*   CO2 14*  < > 13*  < > 18* 13* 14*   *  < > 134*  < > 109* 101* 92*   CREATININE 6.2*  < > 5.4*  < > 3.9* 3.4* 3.0*   CALCIUM 9.4  < > 9.0  < > 8.9 8.3* 8.1*   ALBUMIN 3.2*  --   --   --   --   --   --    PROT 7.8  --   --   --   --   --   --    BILITOT 0.6  --   --   --   --   --   --    ALKPHOS 93  --   --   --   --   --   --    ALT 27  --   --   --   --   --   --    AST 55*  --   --   --   --   --   --    MG 2.5  --  2.3  --   --  1.9  --    PHOS  --   --  4.2  --   --  3.2  --    < > = values in this interval not  displayed.    All pertinent labs within the past 24 hours have been reviewed.    Significant Imaging:  I have reviewed all pertinent imaging results/findings within the past 24 hours.

## 2017-04-29 NOTE — PLAN OF CARE
Problem: Patient Care Overview  Goal: Plan of Care Review  Patient is a 67 y.o. male y/o M with HFrEF (EF 20-25% in Jun 2016), NICM declined ICD, HTN, HLD, atrial fibrillation on Eliquis, T2DM, h/o CVA in 2010, ERIC presents with generalized weakness, fatigue and AMS that started as per patient since yesterday. While at the ED laboratories drawn and patient was found with acute renal failure BUN/ Creatinine: 147/6.2 and hyperkalemic: 7.0 (No hemolysis) and thats the reason we are consult. Patient poor historian.       4/28: Admit to SICU for hyperkalemia. K shifted X2. Kayexalate given X 1.     Q1H Accuchecks.  Miconazole ointment BID to pannus and perineal area.   Outcome: Ongoing (interventions implemented as appropriate)  Pt VSS and normothermic throughout shift.  Pt remains disoriented to time and situation. Urine output > 80cc/hr.  Bicarb gtt maintained at 50cc/hr.  Pt has transfer orders to step down unit. Will continue to monitor closely.

## 2017-04-30 PROBLEM — I50.22 CHRONIC SYSTOLIC HEART FAILURE: Status: RESOLVED | Noted: 2017-01-24 | Resolved: 2017-04-30

## 2017-04-30 PROBLEM — B35.3 TINEA PEDIS: Status: RESOLVED | Noted: 2017-01-13 | Resolved: 2017-04-30

## 2017-04-30 PROBLEM — I70.25 ATHEROSCLEROSIS OF NATIVE ARTERIES OF THE EXTREMITIES WITH ULCERATION: Status: RESOLVED | Noted: 2017-01-13 | Resolved: 2017-04-30

## 2017-04-30 PROBLEM — R60.0 BILATERAL LEG EDEMA: Status: RESOLVED | Noted: 2017-01-13 | Resolved: 2017-04-30

## 2017-04-30 PROBLEM — E66.01 MORBID OBESITY: Status: ACTIVE | Noted: 2017-04-30

## 2017-04-30 PROBLEM — Z71.89 GOALS OF CARE, COUNSELING/DISCUSSION: Status: ACTIVE | Noted: 2017-04-30

## 2017-04-30 PROBLEM — E66.01 MORBID OBESITY: Status: RESOLVED | Noted: 2017-04-30 | Resolved: 2017-04-30

## 2017-04-30 LAB
ANION GAP SERPL CALC-SCNC: 10 MMOL/L
ANION GAP SERPL CALC-SCNC: 11 MMOL/L
ANION GAP SERPL CALC-SCNC: 7 MMOL/L
BASOPHILS # BLD AUTO: 0.03 K/UL
BASOPHILS NFR BLD: 0.2 %
BUN SERPL-MCNC: 62 MG/DL
BUN SERPL-MCNC: 68 MG/DL
BUN SERPL-MCNC: 76 MG/DL
CALCIUM SERPL-MCNC: 8.6 MG/DL
CALCIUM SERPL-MCNC: 9.4 MG/DL
CALCIUM SERPL-MCNC: 9.5 MG/DL
CHLORIDE SERPL-SCNC: 117 MMOL/L
CHLORIDE SERPL-SCNC: 119 MMOL/L
CHLORIDE SERPL-SCNC: 121 MMOL/L
CO2 SERPL-SCNC: 18 MMOL/L
CO2 SERPL-SCNC: 21 MMOL/L
CO2 SERPL-SCNC: 22 MMOL/L
CREAT SERPL-MCNC: 2.3 MG/DL
CREAT SERPL-MCNC: 2.4 MG/DL
CREAT SERPL-MCNC: 2.7 MG/DL
DIFFERENTIAL METHOD: ABNORMAL
EOSINOPHIL # BLD AUTO: 0.7 K/UL
EOSINOPHIL NFR BLD: 5.8 %
ERYTHROCYTE [DISTWIDTH] IN BLOOD BY AUTOMATED COUNT: 16.5 %
EST. GFR  (AFRICAN AMERICAN): 26.6 ML/MIN/1.73 M^2
EST. GFR  (AFRICAN AMERICAN): 30.7 ML/MIN/1.73 M^2
EST. GFR  (AFRICAN AMERICAN): 32.3 ML/MIN/1.73 M^2
EST. GFR  (NON AFRICAN AMERICAN): 23 ML/MIN/1.73 M^2
EST. GFR  (NON AFRICAN AMERICAN): 26.5 ML/MIN/1.73 M^2
EST. GFR  (NON AFRICAN AMERICAN): 27.9 ML/MIN/1.73 M^2
GLUCOSE SERPL-MCNC: 112 MG/DL
GLUCOSE SERPL-MCNC: 156 MG/DL
GLUCOSE SERPL-MCNC: 94 MG/DL
HCT VFR BLD AUTO: 32.9 %
HGB BLD-MCNC: 11 G/DL
LYMPHOCYTES # BLD AUTO: 2.1 K/UL
LYMPHOCYTES NFR BLD: 16.6 %
MAGNESIUM SERPL-MCNC: 1.8 MG/DL
MAGNESIUM SERPL-MCNC: 1.9 MG/DL
MCH RBC QN AUTO: 28.8 PG
MCHC RBC AUTO-ENTMCNC: 33.4 %
MCV RBC AUTO: 86 FL
MONOCYTES # BLD AUTO: 1 K/UL
MONOCYTES NFR BLD: 7.9 %
NEUTROPHILS # BLD AUTO: 8.8 K/UL
NEUTROPHILS NFR BLD: 69.2 %
PHOSPHATE SERPL-MCNC: 3.1 MG/DL
PHOSPHATE SERPL-MCNC: 3.3 MG/DL
PLATELET # BLD AUTO: 121 K/UL
PMV BLD AUTO: 10 FL
POCT GLUCOSE: 116 MG/DL (ref 70–110)
POCT GLUCOSE: 137 MG/DL (ref 70–110)
POCT GLUCOSE: 160 MG/DL (ref 70–110)
POCT GLUCOSE: 173 MG/DL (ref 70–110)
POTASSIUM SERPL-SCNC: 4.9 MMOL/L
POTASSIUM SERPL-SCNC: 5 MMOL/L
POTASSIUM SERPL-SCNC: 5.4 MMOL/L
RBC # BLD AUTO: 3.82 M/UL
SODIUM SERPL-SCNC: 148 MMOL/L
SODIUM SERPL-SCNC: 149 MMOL/L
SODIUM SERPL-SCNC: 149 MMOL/L
WBC # BLD AUTO: 12.67 K/UL

## 2017-04-30 PROCEDURE — 25000003 PHARM REV CODE 250: Performed by: INTERNAL MEDICINE

## 2017-04-30 PROCEDURE — 99232 SBSQ HOSP IP/OBS MODERATE 35: CPT | Mod: ,,, | Performed by: INTERNAL MEDICINE

## 2017-04-30 PROCEDURE — 83735 ASSAY OF MAGNESIUM: CPT | Mod: 91

## 2017-04-30 PROCEDURE — 25000003 PHARM REV CODE 250: Performed by: STUDENT IN AN ORGANIZED HEALTH CARE EDUCATION/TRAINING PROGRAM

## 2017-04-30 PROCEDURE — 84100 ASSAY OF PHOSPHORUS: CPT

## 2017-04-30 PROCEDURE — 99233 SBSQ HOSP IP/OBS HIGH 50: CPT | Mod: ,,, | Performed by: CLINICAL NURSE SPECIALIST

## 2017-04-30 PROCEDURE — 83735 ASSAY OF MAGNESIUM: CPT

## 2017-04-30 PROCEDURE — 20000000 HC ICU ROOM

## 2017-04-30 PROCEDURE — 80048 BASIC METABOLIC PNL TOTAL CA: CPT | Mod: 91

## 2017-04-30 PROCEDURE — 85025 COMPLETE CBC W/AUTO DIFF WBC: CPT

## 2017-04-30 PROCEDURE — 36415 COLL VENOUS BLD VENIPUNCTURE: CPT

## 2017-04-30 PROCEDURE — 84100 ASSAY OF PHOSPHORUS: CPT | Mod: 91

## 2017-04-30 PROCEDURE — 80048 BASIC METABOLIC PNL TOTAL CA: CPT

## 2017-04-30 RX ORDER — SODIUM BICARBONATE IN D5W 150/1000ML
PLASTIC BAG, INJECTION (ML) INTRAVENOUS CONTINUOUS
Status: DISCONTINUED | OUTPATIENT
Start: 2017-04-30 | End: 2017-05-01

## 2017-04-30 RX ADMIN — HEPARIN SODIUM 5000 UNITS: 5000 INJECTION, SOLUTION INTRAVENOUS; SUBCUTANEOUS at 09:04

## 2017-04-30 RX ADMIN — HEPARIN SODIUM 5000 UNITS: 5000 INJECTION, SOLUTION INTRAVENOUS; SUBCUTANEOUS at 05:04

## 2017-04-30 RX ADMIN — ATORVASTATIN CALCIUM 40 MG: 20 TABLET, FILM COATED ORAL at 08:04

## 2017-04-30 RX ADMIN — Medication 3 ML: at 05:04

## 2017-04-30 RX ADMIN — Medication 3 ML: at 09:04

## 2017-04-30 RX ADMIN — Medication 0.39 ML/KG/HR: at 06:04

## 2017-04-30 RX ADMIN — AMIODARONE HYDROCHLORIDE 200 MG: 200 TABLET ORAL at 08:04

## 2017-04-30 RX ADMIN — RAMELTEON 8 MG: 8 TABLET, FILM COATED ORAL at 09:04

## 2017-04-30 RX ADMIN — HEPARIN SODIUM 5000 UNITS: 5000 INJECTION, SOLUTION INTRAVENOUS; SUBCUTANEOUS at 03:04

## 2017-04-30 RX ADMIN — MICONAZOLE NITRATE: 20 OINTMENT TOPICAL at 08:04

## 2017-04-30 RX ADMIN — FLUTICASONE PROPIONATE 2 SPRAY: 50 SPRAY, METERED NASAL at 08:04

## 2017-04-30 RX ADMIN — Medication 3 ML: at 03:04

## 2017-04-30 NOTE — ASSESSMENT & PLAN NOTE
- BUN/Cr Improving. Baseline Cr generally ~1.5-2.   - Likely pre-renal azotemia due to volume depletion from diarrhea and duiretics   - BUN/Cr ratio elevated at 24>20.              - Improved with fluid challenge  - Post-obstructive HUMERA possible with BPH - andrade placed and patient voided 500 ml.  - Renal ultrasound  Consistent with medical renal disease.  -- No need for CRRT at this time    - NAGMA improving  - continue bicarb infusion for now per Nephrology  -- Can consider stopping gtt once PO intake improves and acidosis resolves

## 2017-04-30 NOTE — PROGRESS NOTES
[facility]  Critical Care Medicine  Progress Note    Patient Name: Hemant Kennedy Jr.  MRN: [id number]  Admission Date: 4/27/2017  Hospital Length of Stay: 2 days  Code Status: Full Code  Attending Provider: Lan Murphy MD  Primary Care Provider: Hemant Markham MD   Principal Problem: Hyperkalemia    Subjective:     HPI:  Hemant Kennedy Jr. is a 67 year old male with a medical history significant for type II DM, CKD III, non-ischemic cardiomyopathy (EF 25%), ERIC who presents to the ED on 4/28 with chief complaint of sluggishness and fatigue. In the ED, patient was found to have significant HUMERA with creatinine at 6.2 (baseline 1.5) with significant hyperkalemia of 7. Patient's history was gathered from patient as well as sister on the phone as patient was confused and fell asleep frequently during interview. Per the patient, his granddaughter called the ambulance to bring him in after finding him lethargic at home and with a low blood sugar. He could not tell me the low sugar value, but sister on phone states it was in the 70s. Otherwise, the sister states that starting yesterday afternoon, she noticed that the patient was more lethargic and somnolent. She states that last week 4/18-4/21, the patient had 2-3 bouts of diarrhea a day that resolved with imodium over the weekend. Otherwise, she states that he has not had any recent illnesses otherwise.     The patient lives by himself but is visited for most of the day everyday by his sister. Per the sister, she states that she has forced him to drink more water this past week because all he drinks is Coke Zero and milk. She cannot assess his fluid intake but states she has not noticed he has been fluid down this past week; she makes sure he drinks a couple bottles of water per day. She is in charge of administering his medications, which include two tablets of furosemide daily (med rec states he only takes one pill per day, however), as well as  aldactone. The patient is also seen three times a week by home health nurse in [facility]. Per the sister, the nurse has mentioned his blood pressure has been lower than normal for the past week, in the SBP 90- low 100s. She state he usually runs higher. She first noticed this last Friday 4/21. He has not started any new medication and occasionally takes Tylenol for his left diabetic foot ulcer. Patient does not take any antibiotics for his foot ulcer. The patient has not has trouble urinating and has voided fine. Sister affirms this but states that his urinary output seems to have decreased from yesterday evening to today. Patient denies dysuria or flank pain.     Otherwise, patient denies fevers, chills, shortness of breath, new onset edema, chest pain, diaphoresis, dysuria, hematuria, abdominal pain, nausea, vomiting, diarrhea.       Hospital/ICU Course:  4/28: In ED, provided 1g calcium gluconate IV as well as 10U insulin with dextrose for intracellular shifting of potassium. Repeat BMP was demonstrable for resolved hyperkalemia at 3.7. A andrade placed and urine voided. On 4/29: Patient seen by Nephrology due to persistent hyperkalemia in setting of HUMERA on CKD. A  Trialysis was initially attempted however the patient has very poor access and a decision was made to manage the patient medically. Urine output has continued to improve as have other electrolytes. The patient continues to seem somewhat altered but can answer simple demographic questions. He is pending transfer to the floor and will need social work follow-up for discharge planning.    Interval History/Significant Events: No acute events overnight    Review of Systems   Unable to perform ROS: Mental status change     Objective:     Vital Signs (Most Recent):  Temp: 98.6 °F (37 °C) (04/30/17 0700)  Pulse: 78 (04/30/17 1310)  Resp: 17 (04/30/17 1310)  BP: 139/71 (04/30/17 0730)  SpO2: 100 % (04/30/17 1310) Vital Signs (24h Range):  Temp:  [98.3 °F (36.8 °C)-98.6  °F (37 °C)] 98.6 °F (37 °C)  Pulse:  [68-80] 78  Resp:  [7-37] 17  SpO2:  [93 %-100 %] 100 %  BP: ()/(44-89) 139/71   Weight: 118.3 kg (260 lb 12.9 oz)  Body mass index is 40.85 kg/(m^2).      Intake/Output Summary (Last 24 hours) at 04/30/17 1611  Last data filed at 04/30/17 0900   Gross per 24 hour   Intake             1210 ml   Output             1605 ml   Net             -395 ml       Physical Exam   Constitutional: He appears well-developed.   Slightly altered    HENT:   Head: Normocephalic.   Eyes: Pupils are equal, round, and reactive to light.   Cardiovascular: Normal rate and normal pulses.    Pulmonary/Chest: Effort normal. He has decreased breath sounds in the right middle field, the right lower field, the left middle field and the left lower field.   Abdominal: Soft. Bowel sounds are decreased.   Neurological: He is alert. He has normal strength. GCS eye subscore is 4. GCS verbal subscore is 4. GCS motor subscore is 6.   Skin: Skin is warm and dry.   Psychiatric: He is slowed. He is inattentive.   Nursing note and vitals reviewed.      Vents:     Lines/Drains/Airways     Drain                 Urethral Catheter 04/28/17 0144 2 days          Pressure Ulcer                 Pressure Ulcer 11/16/16 1510 Right posterior heel suspected deep tissue injury 165 days         Pressure Ulcer 04/28/17 0800 Left plantar heel Stage I 2 days         Pressure Ulcer 04/28/17 0800 Left posterior heel Stage I 2 days         Pressure Ulcer 04/28/17 0800 medial other (see comments) Stage II 2 days         Pressure Ulcer 04/30/17 0300 medial coccyx Stage II less than 1 day         Pressure Ulcer 04/30/17 0300 medial coccyx Stage II less than 1 day          Peripheral Intravenous Line                 Peripheral IV - Single Lumen 04/28/17 1435 Right Hand 2 days         Peripheral IV - Single Lumen 04/29/17 2018 Right Forearm less than 1 day              Significant Labs:    CBC/Anemia Profile:    Recent Labs  Lab  04/29/17  0308 04/30/17  0254   WBC 10.56 12.67   HGB 9.9* 11.0*   HCT 29.8* 32.9*   * 121*   MCV 86 86   RDW 16.6* 16.5*        Chemistries:    Recent Labs  Lab 04/29/17  0308  04/29/17 2015 04/30/17  0254 04/30/17  0751     < > 145 148* 149*   K 5.4*  < > 5.1 5.4* 5.0   *  < > 111* 119* 121*   CO2 13*  < > 26 18* 21*   *  < > 70* 76* 68*   CREATININE 3.4*  < > 2.3* 2.7* 2.3*   CALCIUM 8.3*  < > 7.5* 9.4 8.6*   MG 1.9  --   --  1.8  --    PHOS 3.2  --   --  3.3  --    < > = values in this interval not displayed.    All pertinent labs within the past 24 hours have been reviewed.    Significant Imaging:  I have reviewed all pertinent imaging results/findings within the past 24 hours.    Assessment/Plan:     Neuro  Sleep apnea  - Nightly CPAP.     Uremic encephalopathy  -Improving  -- Slight confusion possibly secondary to uremia or delirium  -- Old CVA in 2010 --  SUBACUTE INFARCTION INVOLVING THE RIGHT PCA  TERRITORY WITHIN THE PARASAGITTAL RIGHT OCCIPITAL LOBE  - Continue monitor     Cardiac  Chronic systolic congestive heart failure, NYHA class 2  - EF 25% on 7/2016.  - Not fluid overloaded on physical examination and BNP at baseline at 157.  - Continue Amiodorone and Atorvastatin. Holding ASA, Spironolactone, and Lisinopril considering HUMERA.  - Repeat 2D echo for surveillance->EF improved to 40 w/ DD    Atrial fibrillation, controlled  - [id number] score of 5; high risk of stroke (7.2% per year)  - Continue apixaban      Renal  CKD (chronic kidney disease) stage 3, GFR 30-59 ml/min  - 2/2 to HTN and type II DM.   - See above.     Acute renal failure superimposed on stage 3 chronic kidney disease  - BUN/Cr Improving. Baseline Cr generally ~1.5-2.   - Likely pre-renal azotemia due to volume depletion from diarrhea and duiretics   - BUN/Cr ratio elevated at 24>20.              - Improved with fluid challenge  - Post-obstructive HUMERA possible with BPH - andrade placed and patient voided 500  ml.  - Renal ultrasound  Consistent with medical renal disease.  -- No need for CRRT at this time    - NAGMA improving  - continue bicarb infusion for now per Nephrology  -- Can consider stopping gtt once PO intake improves and acidosis resolves      ID  Decubitus ulcer of heel, unstageable  - POA  --  Followed by wound care (last 4/21).      Endocrine  Type II diabetes mellitus with complication, uncontrolled  - At home, long acting 8U BID and short acting 5U TIDWM.  - 5U BID here with LDSSI.   - Diabetic diet and repeat WuV5N=1    Fluids/Electrolytes/Nutrition/GI  * Hyperkalemia  - Improved  -- 7.0 on presentation. No EKG changes.  - Secondary to HUMERA.  - Received IV calcium and insulin in ED.  - Trend BMP    Morbid obesity due to excess calories  Dietary consult for nutritional advice    Other  Debility  Patient has limited mobility  -- PT/OT    Goals of care, counseling/discussion  -- Social work consult for discharge planning.  -- Patient appears to need support at home for activities of daily living and medical needs.  -- Unclear if the patient can go home and live safely        Critical Care Medicine Daily Checklist:    A: Awake: RASS Goal/Actual Goal:    Actual: Matthew Agitation Sedation Scale (RASS): Drowsy   B: Spontaneous Breathing Trial Performed?     C: SAT & SBT Coordinated?  NA                    D: Delirium: CAM-ICU Positive   E: Early Mobility Performed? No   F: Feeding Goal:    Status:     Current Diet Order   Procedures    Diet Diabetic 1800 Calories Low Potassium     Order Specific Question:   Additional restrictions:     Answer:   Low Potassium      AS: Analgesia/Sedation NA   T: Thromboembolic Prophylaxis heparin   H: HOB > 300 Yes   U: Stress Ulcer Prophylaxis (if needed) NA   G: Glucose Control SSI   B: Bowel Function Stool Occurrence: 1   I: Indwelling Catheter (Lines & Lott) Necessity Lott for strict I+O to assess renal recovery   D: De-escalation of Antimicrobials/Pharmacotherapies NA     Plan for the day/ETD Transfer to floor    Code Status:  Family/Goals of Care: Full Code        I spent >70 minutes reviewing patient records, examining, and counseling the patient with greater than 50% of the time spent with direct patient care and coordination.      Fiona Winterbottom, APRN, CNS  Critical Care Medicine  [facility]

## 2017-04-30 NOTE — ASSESSMENT & PLAN NOTE
- At home, long acting 8U BID and short acting 5U TIDWM.  - 5U BID here with LDSSI.   - Diabetic diet and repeat QoD9V=1

## 2017-04-30 NOTE — ASSESSMENT & PLAN NOTE
- EF 25% on 7/2016.  - Not fluid overloaded on physical examination and BNP at baseline at 157.  - Continue Amiodorone and Atorvastatin. Holding ASA, Spironolactone, and Lisinopril considering HUMERA.  - Repeat 2D echo for surveillance->EF improved to 40 w/ DD

## 2017-04-30 NOTE — ASSESSMENT & PLAN NOTE
- Improved  -- 7.0 on presentation. No EKG changes.  - Secondary to HUMERA.  - Received IV calcium and insulin in ED.  - Trend BMP

## 2017-04-30 NOTE — SUBJECTIVE & OBJECTIVE
Interval History/Significant Events: No acute events overnight    Review of Systems   Unable to perform ROS: Mental status change     Objective:     Vital Signs (Most Recent):  Temp: 98.6 °F (37 °C) (04/30/17 0700)  Pulse: 78 (04/30/17 1310)  Resp: 17 (04/30/17 1310)  BP: 139/71 (04/30/17 0730)  SpO2: 100 % (04/30/17 1310) Vital Signs (24h Range):  Temp:  [98.3 °F (36.8 °C)-98.6 °F (37 °C)] 98.6 °F (37 °C)  Pulse:  [68-80] 78  Resp:  [7-37] 17  SpO2:  [93 %-100 %] 100 %  BP: ()/(44-89) 139/71   Weight: 118.3 kg (260 lb 12.9 oz)  Body mass index is 40.85 kg/(m^2).      Intake/Output Summary (Last 24 hours) at 04/30/17 1611  Last data filed at 04/30/17 0900   Gross per 24 hour   Intake             1210 ml   Output             1605 ml   Net             -395 ml       Physical Exam   Constitutional: He appears well-developed.   Slightly altered    HENT:   Head: Normocephalic.   Eyes: Pupils are equal, round, and reactive to light.   Cardiovascular: Normal rate and normal pulses.    Pulmonary/Chest: Effort normal. He has decreased breath sounds in the right middle field, the right lower field, the left middle field and the left lower field.   Abdominal: Soft. Bowel sounds are decreased.   Neurological: He is alert. He has normal strength. GCS eye subscore is 4. GCS verbal subscore is 4. GCS motor subscore is 6.   Skin: Skin is warm and dry.   Psychiatric: He is slowed. He is inattentive.   Nursing note and vitals reviewed.      Vents:     Lines/Drains/Airways     Drain                 Urethral Catheter 04/28/17 0144 2 days          Pressure Ulcer                 Pressure Ulcer 11/16/16 1510 Right posterior heel suspected deep tissue injury 165 days         Pressure Ulcer 04/28/17 0800 Left plantar heel Stage I 2 days         Pressure Ulcer 04/28/17 0800 Left posterior heel Stage I 2 days         Pressure Ulcer 04/28/17 0800 medial other (see comments) Stage II 2 days         Pressure Ulcer 04/30/17 0300 medial  coccyx Stage II less than 1 day         Pressure Ulcer 04/30/17 0300 medial coccyx Stage II less than 1 day          Peripheral Intravenous Line                 Peripheral IV - Single Lumen 04/28/17 1435 Right Hand 2 days         Peripheral IV - Single Lumen 04/29/17 2018 Right Forearm less than 1 day              Significant Labs:    CBC/Anemia Profile:    Recent Labs  Lab 04/29/17 0308 04/30/17  0254   WBC 10.56 12.67   HGB 9.9* 11.0*   HCT 29.8* 32.9*   * 121*   MCV 86 86   RDW 16.6* 16.5*        Chemistries:    Recent Labs  Lab 04/29/17 0308 04/29/17 2015 04/30/17  0254 04/30/17  0751     < > 145 148* 149*   K 5.4*  < > 5.1 5.4* 5.0   *  < > 111* 119* 121*   CO2 13*  < > 26 18* 21*   *  < > 70* 76* 68*   CREATININE 3.4*  < > 2.3* 2.7* 2.3*   CALCIUM 8.3*  < > 7.5* 9.4 8.6*   MG 1.9  --   --  1.8  --    PHOS 3.2  --   --  3.3  --    < > = values in this interval not displayed.    All pertinent labs within the past 24 hours have been reviewed.    Significant Imaging:  I have reviewed all pertinent imaging results/findings within the past 24 hours.

## 2017-04-30 NOTE — PLAN OF CARE
Problem: Patient Care Overview  Goal: Plan of Care Review  Patient is a 67 y.o. male y/o M with HFrEF (EF 20-25% in Jun 2016), NICM declined ICD, HTN, HLD, atrial fibrillation on Eliquis, T2DM, h/o CVA in 2010, ERIC presents with generalized weakness, fatigue and AMS that started as per patient since yesterday. While at the ED laboratories drawn and patient was found with acute renal failure BUN/ Creatinine: 147/6.2 and hyperkalemic: 7.0 (No hemolysis) and thats the reason we are consult. Patient poor historian.       4/28: Admit to SICU for hyperkalemia. K shifted X2. Kayexalate given X 1.     Q1H Accuchecks.  Miconazole ointment BID to pannus and perineal area.   Outcome: Ongoing (interventions implemented as appropriate)  POC reviewed with patient. Pt oriented to person and place. Pt remains on RA. Adequate UOP throughout shift. Bicarb gtt at 50ml/hr. VSS throughout night. See flowsheets for details. transfer orders since yesterday evening; pending bed assignment. All questions answered by RN. Will continue to monitor.

## 2017-04-30 NOTE — ASSESSMENT & PLAN NOTE
-- Social work consult for discharge planning.  -- Patient appears to need support at home for activities of daily living and medical needs.  -- Unclear if the patient can go home and live safely

## 2017-04-30 NOTE — ASSESSMENT & PLAN NOTE
-Improving  -- Slight confusion possibly secondary to uremia or delirium  -- Old CVA in 2010 --  SUBACUTE INFARCTION INVOLVING THE RIGHT PCA  TERRITORY WITHIN THE PARASAGITTAL RIGHT OCCIPITAL LOBE  - Continue monitor

## 2017-04-30 NOTE — PROGRESS NOTES
Progress Note  Nephrology    Admit Date: 4/27/2017   LOS: 2 days     SUBJECTIVE:     Follow-up For: hyperkalemia; HUMERA on CKD    Interval Hx: no events overnight. Transfer to floor orders placed. Patient didn't eat breakfast; no appetite. Denies SOB. Wants to go home.   UOP: 2.3L/24h.     OBJECTIVE:     Vital Signs (Most Recent)  Temp: 98.6 °F (37 °C) (04/30/17 0700)  Pulse: 78 (04/30/17 1310)  Resp: 17 (04/30/17 1310)  BP: 139/71 (04/30/17 0730)  SpO2: 100 % (04/30/17 1310)    Vital Signs Range (Last 24H):  Temp:  [98.3 °F (36.8 °C)-98.8 °F (37.1 °C)]   Pulse:  [68-81]   Resp:  [7-37]   BP: ()/(44-89)   SpO2:  [93 %-100 %]       Intake/Output Summary (Last 24 hours) at 04/30/17 1345  Last data filed at 04/30/17 0900   Gross per 24 hour   Intake             1210 ml   Output             1885 ml   Net             -675 ml     Physical Exam:  Gen: WDWN AAM in NAD.  Neuro: disoriented  Eyes: EOMI, clear conjunctivae  CV: RRR. No peripheral edema  Resp: normal effort. No crackles  Abd: Soft, NTND  Skin: warm, normal turgor      Laboratory:  CBC:     Recent Labs  Lab 04/30/17  0254   WBC 12.67   RBC 3.82*   HGB 11.0*   HCT 32.9*   *   MCV 86   MCH 28.8   MCHC 33.4     CMP:   Recent Labs  Lab 04/27/17  2257  04/30/17  0751   GLU 91  < > 112*   CALCIUM 9.4  < > 8.6*   ALBUMIN 3.2*  --   --    PROT 7.8  --   --      < > 149*   K 7.0*  < > 5.0   CO2 14*  < > 21*   *  < > 121*   *  < > 68*   CREATININE 6.2*  < > 2.3*   ALKPHOS 93  --   --    ALT 27  --   --    AST 55*  --   --    BILITOT 0.6  --   --    < > = values in this interval not displayed.  ABGs:     Recent Labs  Lab 04/28/17  0029   PH 7.332*   PCO2 38.7   HCO3 20.5*   POCSATURATED 53*   BE -5       Recent Labs  Lab 04/28/17  0149   COLORU Yellow   SPECGRAV 1.010   PHUR 5.0   PROTEINUA Negative   BACTERIA Rare   NITRITE Negative   LEUKOCYTESUR Negative   UROBILINOGEN Negative       Diagnostic Results:  Labs: Reviewed  X-Ray: Reviewed  US:  Reviewed    ASSESSMENT/PLAN:     Patient is a 67 y.o. male y/o M with HFrEF (EF 20-25% in Jun 2016), NICM declined ICD, HTN, HLD, atrial fibrillation on Eliquis, T2DM, h/o CVA in 2010, ERIC presented with generalized weakness, fatigue and AMS found to have HUMERA on CKD, severe hyperkalemia and acidosis.    HUMERA on CKD stage 3   - baseline sCr 1.5-1.9. sCr 6.2 on arrival.   - UOP improved upon placing andrade catheter  - pre-renal HUMERA 2/2 volume depletion  - improving with hydration  - no need for RRT  - continue to monitor. Strict I/Os    Hyperkalemia  - likely 2/2 HUMERA. Cannot rule out hemolysis  - improving overall  - continue medical management  - no need for RRT  - continue low K diet.     NAGMA  - likely 2/2 volume resuscitation with NS + underlying CKD  - improving  - continue bicarb infusion for now. Can consider stopping gtt once PO intake improves and acidosis resolves        Juli Gunn, PGY-4  Nephrology Fellow  Pager: [phone]

## 2017-05-01 PROBLEM — Z51.89 ENCOUNTER FOR WOUND CARE: Status: ACTIVE | Noted: 2017-01-13

## 2017-05-01 LAB
ANION GAP SERPL CALC-SCNC: 8 MMOL/L
BACTERIA #/AREA URNS AUTO: ABNORMAL /HPF
BASOPHILS # BLD AUTO: 0.05 K/UL
BASOPHILS NFR BLD: 0.3 %
BILIRUB UR QL STRIP: NEGATIVE
BUN SERPL-MCNC: 50 MG/DL
CALCIUM SERPL-MCNC: 9.1 MG/DL
CHLORIDE SERPL-SCNC: 119 MMOL/L
CLARITY UR REFRACT.AUTO: ABNORMAL
CO2 SERPL-SCNC: 23 MMOL/L
COLOR UR AUTO: YELLOW
CREAT SERPL-MCNC: 2.2 MG/DL
DIFFERENTIAL METHOD: ABNORMAL
EOSINOPHIL # BLD AUTO: 1 K/UL
EOSINOPHIL NFR BLD: 6.4 %
ERYTHROCYTE [DISTWIDTH] IN BLOOD BY AUTOMATED COUNT: 16.3 %
EST. GFR  (AFRICAN AMERICAN): 34.1 ML/MIN/1.73 M^2
EST. GFR  (NON AFRICAN AMERICAN): 29.5 ML/MIN/1.73 M^2
GLUCOSE SERPL-MCNC: 96 MG/DL
GLUCOSE UR QL STRIP: NEGATIVE
HCT VFR BLD AUTO: 31.9 %
HGB BLD-MCNC: 10.8 G/DL
HGB UR QL STRIP: ABNORMAL
HYALINE CASTS UR QL AUTO: 1 /LPF
KETONES UR QL STRIP: NEGATIVE
LEUKOCYTE ESTERASE UR QL STRIP: ABNORMAL
LYMPHOCYTES # BLD AUTO: 2.9 K/UL
LYMPHOCYTES NFR BLD: 19.1 %
MAGNESIUM SERPL-MCNC: 1.9 MG/DL
MCH RBC QN AUTO: 28.6 PG
MCHC RBC AUTO-ENTMCNC: 33.9 %
MCV RBC AUTO: 85 FL
MICROSCOPIC COMMENT: ABNORMAL
MONOCYTES # BLD AUTO: 1.7 K/UL
MONOCYTES NFR BLD: 10.8 %
NEUTROPHILS # BLD AUTO: 9.5 K/UL
NEUTROPHILS NFR BLD: 63.4 %
NITRITE UR QL STRIP: NEGATIVE
PH UR STRIP: 8 [PH] (ref 5–8)
PHOSPHATE SERPL-MCNC: 3.2 MG/DL
PLATELET # BLD AUTO: 112 K/UL
PLATELET BLD QL SMEAR: ABNORMAL
PMV BLD AUTO: 10.8 FL
POCT GLUCOSE: 108 MG/DL (ref 70–110)
POCT GLUCOSE: 111 MG/DL (ref 70–110)
POCT GLUCOSE: 118 MG/DL (ref 70–110)
POCT GLUCOSE: 90 MG/DL (ref 70–110)
POTASSIUM SERPL-SCNC: 5.3 MMOL/L
PROT UR QL STRIP: NEGATIVE
RBC # BLD AUTO: 3.77 M/UL
RBC #/AREA URNS AUTO: 1 /HPF (ref 0–4)
SODIUM SERPL-SCNC: 150 MMOL/L
SP GR UR STRIP: 1.02 (ref 1–1.03)
SQUAMOUS #/AREA URNS AUTO: 0 /HPF
URN SPEC COLLECT METH UR: ABNORMAL
UROBILINOGEN UR STRIP-ACNC: 2 EU/DL
WBC # BLD AUTO: 15.22 K/UL
WBC #/AREA URNS AUTO: 28 /HPF (ref 0–5)

## 2017-05-01 PROCEDURE — 94660 CPAP INITIATION&MGMT: CPT

## 2017-05-01 PROCEDURE — 25000003 PHARM REV CODE 250: Performed by: STUDENT IN AN ORGANIZED HEALTH CARE EDUCATION/TRAINING PROGRAM

## 2017-05-01 PROCEDURE — 80069 RENAL FUNCTION PANEL: CPT

## 2017-05-01 PROCEDURE — 83735 ASSAY OF MAGNESIUM: CPT

## 2017-05-01 PROCEDURE — 80048 BASIC METABOLIC PNL TOTAL CA: CPT

## 2017-05-01 PROCEDURE — 25000003 PHARM REV CODE 250: Performed by: INTERNAL MEDICINE

## 2017-05-01 PROCEDURE — 99232 SBSQ HOSP IP/OBS MODERATE 35: CPT | Mod: ,,, | Performed by: INTERNAL MEDICINE

## 2017-05-01 PROCEDURE — 27000190 HC CPAP FULL FACE MASK W/VALVE

## 2017-05-01 PROCEDURE — 99900035 HC TECH TIME PER 15 MIN (STAT)

## 2017-05-01 PROCEDURE — 11000001 HC ACUTE MED/SURG PRIVATE ROOM

## 2017-05-01 PROCEDURE — 97802 MEDICAL NUTRITION INDIV IN: CPT

## 2017-05-01 PROCEDURE — 85025 COMPLETE CBC W/AUTO DIFF WBC: CPT

## 2017-05-01 PROCEDURE — 36415 COLL VENOUS BLD VENIPUNCTURE: CPT

## 2017-05-01 PROCEDURE — 97530 THERAPEUTIC ACTIVITIES: CPT

## 2017-05-01 PROCEDURE — 97162 PT EVAL MOD COMPLEX 30 MIN: CPT

## 2017-05-01 PROCEDURE — 81001 URINALYSIS AUTO W/SCOPE: CPT

## 2017-05-01 PROCEDURE — 25000003 PHARM REV CODE 250: Performed by: HOSPITALIST

## 2017-05-01 PROCEDURE — 99233 SBSQ HOSP IP/OBS HIGH 50: CPT | Mod: GC,,, | Performed by: HOSPITALIST

## 2017-05-01 PROCEDURE — 84100 ASSAY OF PHOSPHORUS: CPT

## 2017-05-01 RX ORDER — CARVEDILOL 6.25 MG/1
6.25 TABLET ORAL 2 TIMES DAILY
Status: DISCONTINUED | OUTPATIENT
Start: 2017-05-01 | End: 2017-05-10 | Stop reason: HOSPADM

## 2017-05-01 RX ORDER — NAPROXEN SODIUM 220 MG/1
81 TABLET, FILM COATED ORAL DAILY
Status: DISCONTINUED | OUTPATIENT
Start: 2017-05-02 | End: 2017-05-10 | Stop reason: HOSPADM

## 2017-05-01 RX ORDER — DEXTROSE MONOHYDRATE 50 MG/ML
INJECTION, SOLUTION INTRAVENOUS CONTINUOUS
Status: DISCONTINUED | OUTPATIENT
Start: 2017-05-01 | End: 2017-05-04

## 2017-05-01 RX ADMIN — FLUTICASONE PROPIONATE 2 SPRAY: 50 SPRAY, METERED NASAL at 09:05

## 2017-05-01 RX ADMIN — Medication 3 ML: at 10:05

## 2017-05-01 RX ADMIN — AMIODARONE HYDROCHLORIDE 200 MG: 200 TABLET ORAL at 09:05

## 2017-05-01 RX ADMIN — MICONAZOLE NITRATE: 20 OINTMENT TOPICAL at 09:05

## 2017-05-01 RX ADMIN — CARVEDILOL 6.25 MG: 6.25 TABLET, FILM COATED ORAL at 10:05

## 2017-05-01 RX ADMIN — DEXTROSE: 5 SOLUTION INTRAVENOUS at 05:05

## 2017-05-01 RX ADMIN — HEPARIN SODIUM 5000 UNITS: 5000 INJECTION, SOLUTION INTRAVENOUS; SUBCUTANEOUS at 10:05

## 2017-05-01 RX ADMIN — ATORVASTATIN CALCIUM 40 MG: 20 TABLET, FILM COATED ORAL at 09:05

## 2017-05-01 RX ADMIN — Medication 3 ML: at 06:05

## 2017-05-01 NOTE — PLAN OF CARE
PT REMAINS MEDICALLY UNSTABLE      05/01/17 1059   Discharge Reassessment   Assessment Type Discharge Planning Reassessment   Can the patient answer the patient profile reliably? No, cognitively impaired

## 2017-05-01 NOTE — ASSESSMENT & PLAN NOTE
- At home, long acting 8U BID and short acting 5U TIDWM.  - 5U BID here with LDSSI.   - Diabetic diet and repeat SxL1M=5

## 2017-05-01 NOTE — NURSING TRANSFER
Nursing Transfer Note      5/1/2017     Transfer To: [address]    Transfer via bed    Transfer with cardiac monitoring    Transported by RN      Medicines sent: yes    Chart send with patient: Yes    Upon arrival to floor: cardiac monitor applied, patient oriented to room, call bell in reach and bed in lowest position

## 2017-05-01 NOTE — PLAN OF CARE
Problem: Physical Therapy Goal  Goal: Physical Therapy Goal  Goals to be met by: 5/15/2017     Patient will increase functional independence with mobility by performin. Pt will be independent with w/c set up for w/c<> bed transfer  2. Bed <> w/c transfer with modified independence using a slideboard.  3. Pt will be independent with LE exercise program to improve LE ROM and prevent further contractures using handout  4. Pt will be educated in pressure relief in sitting and supine.   Outcome: Ongoing (interventions implemented as appropriate)  Initial eval completed. Results, POC and goals discussed with patient.

## 2017-05-01 NOTE — CONSULTS
Wound care consulted for sacral stage 2 wound.  Patient was seen on 4/28/17.    The sacral area skin is intact, the scrotum has shearing noted to the posterior aspect.  Nursing to apply miconazole ointment BID to scrotal area to protect the skin and resolve fungal infection.   Consent was received from the patient for the wound photograph.  Scrotum (posterior)

## 2017-05-01 NOTE — NURSING TRANSFER
Nursing Transfer Note      5/1/2017     Transfer To: [id number]    Transfer via stretcher    Transfer with cardiac monitoring    Transported by PCT    Medicines sent: no    Chart send with patient: Yes    Notified: family    Patient reassessed at: 5/1/2017 1443 (date, time)    Upon arrival to floor: patient oriented to room, call bell in reach and bed in lowest position

## 2017-05-01 NOTE — ASSESSMENT & PLAN NOTE
-Improving  -- Slight confusion possibly secondary to uremia or delirium  -- Old CVA in 2010 --  SUBACUTE INFARCTION INVOLVING THE RIGHT PCA TERRITORY WITHIN THE PARASAGITTAL RIGHT OCCIPITAL LOBE  - Continue monitor   -CPAP QHS

## 2017-05-01 NOTE — PT/OT/SLP EVAL
"Physical Therapy  Evaluation and Treatment     Hemant Kennedy Jr.   MRN: [id number]   Admitting Diagnosis: Hyperkalemia    PT Received On: 05/01/17  PT Start Time: 1540     PT Stop Time: 1640    PT Total Time (min): 60 min   (Extra time elapsed with physicians rounding)    Billable Minutes:  Evaluation 30 and Therapeutic Activity 15    Diagnosis: Hyperkalemia  Comorbidities that affect PT POC:  · Acute renl failure  · Heart failure with EF 20-25%  · Heel ulcers L foot  · CVA '2010  · Obesity   · A fib    Past Medical History:   Diagnosis Date    *Atrial fibrillation     Atrial fibrillation     Cardiomyopathy     Cataract     CHF (congestive heart failure)     Diabetes mellitus     DVT (deep venous thrombosis)     Hypertension     Personal history of stroke with residual effects - Right Occipital 7/25/2012    Sleep apnea     Stroke 2010      No past surgical history on file.    Referring physician: Marcelo JIN  Date referred to PT: 5/1/2017    General Precautions: Standard,      Patient History:  Patient lives alone the 2nd floor in senior living apartments with elevator access.  His sister lives ~1 block away and assists him with grocery shopping.  Pt is a poor historian.  He states he has not walked since Arianne (2005) due to arthritis(?).  He reports being independent with transfers to and from w/c using slide board.  Patient could not specify for therapist how he set up the w/c, if he removed the arm rests, or how he got to his power chair.  Patient reports recent and prolonged hospitalization but could not provide any details or state when he discharged home. He gave conflicting answers to questions regarding bathing and dressing.   Equipment Currently Used at Home:  (w/c, slide board, power chair, hospital bed, TTB, Darco shoes)    Subjective:  Communicated with RN prior to session.   Patient agreed to therapy evaluation.     Chief Complaint: "They wanted me to expose myself, and I'm not going to do " "that" (wound care)  Patient goals: return home    Pain Ratin/10    Pain Rating Post-Intervention: 0/10    Objective:   Patient found with: telemetry   Patient found supine in bed with heel offloading boots.  He agreed to therapy evaluation.  Evaluation interrupted by physicians rounding. Patient sat EOB 10-15 minutes unassisted while speaking with physicians.     Cognitive Exam:  Oriented to: person and birthday only  Not oriented to place (I'm somewhere between here and my house").  When therapist reoriented him to "hospital" he stated "thats what they keep trying to tell me."    Follows Commands/attention: Easily distracted and follows simple, 1 step commands  Communication: clear, tangential, loss of train of thought  Safety awareness/insight to disability: intact    Physical Exam:  Postural examination/scapula alignment: Rounded shoulder, Head forward and hip and knee contractures    Skin integrity: L foot with multiple bandages in place for heel ulcers    Sensation:   LT intact per patient report throughout LEs, but findings not consistent with the presence of diabetic foot wounds.     Lower Extremity Range of Motion:  Right Lower Extremity: hips WFL, knee very limited with flexion contractures at about 90* and only ~20 degrees of movement present, patient resisting PROM  Left Lower Extremity: hips WFL for w/c bound status, knee flexion contracture (lacking ~20 degreed of extension only)    Lower Extremity Strength: Muscle testing extremely limited 2* contractures/ grades within available range  Right Lower Extremity: hip flexion 2/5, hip ext 3/5, knee ext 4/5, ankles 3/5  Left Lower Extremity: hip flexion 3/5, hip ext 4/5, knee ext 4/5, ankles 3/5    Gross motor coordination: WFL at a w/c level    Functional Mobility:  Bed Mobility:  Rolling/Turning to Left: Stand by assistance (with bed rails)  Rolling/Turning Right: Stand by assistance  Scooting/Bridging: Stand by Assistance (with rails)  Supine to Sit: " Stand by Assistance (with hospital bed)  Sit to Supine: Stand by Assistance (with help from hospital bed)    Sitting EOB 15 minutes with SBA and no LOB    Transfers:  Sit <> Stand Assistance: Activity did not occur  Bed <> Chair Assistance:  (min assist bed to w/c transfer with slide board.)  · Patient and therapist discussed step by step slide board transfer technique prior to attempting.   · Patient attempted to place slide board but unable.  He scooted onto slide board with therapist holding slideboad and blocking bilateral knees for safety during transfer  · Pt required 3 scoots to reach w/c and assistance to remove slide board  · Pt able to return to the bed (uphill) with same above assistance (min assist to stabilize slide board and slightly block knees)     Gait:   Gait Distance: Pt non ambulatory  W/c mobility:  W/c mobility not performed on this visit.  Patient was unable to clarify which w/c he uses most frequently at home: manual w/c or power chair.  During disussion of each he stated that is the one he always uses. Several attempts were made to clarify these statements, but they continued to be conflicting.      Balance:   Static Sit: FAIR+: Able to take MINIMAL challenges from all directions  Dynamic Sit: FAIR+: Maintains balance through MINIMAL excursions of active trunk motion  Static Stand: 0: Needs MAXIMAL assist to maintain   Dynamic stand: 0: N/A    Therapeutic Activities and Exercises:  Static and dynamic sitting balance:  · Patient sat unsupported EOB with no LOB   · No LOB with head turns or trunk rotation   · No LOB with attempted slide board placement    Sit to 1/2 stand from EOB to reposition bed sheets/hospital gown.    · On first to attempts patient unable to perform.    · On third attempt patient used arm rest of bedside chair and arm rest of w/c to assist with anterior weight shift onto arms.  Able to offload hips 1in from bed SBA with bilateral UE support, but unable to stand erect due  to knee/hip contractures    AM-PAC 6 CLICK MOBILITY  How much help from another person does this patient currently need?   1 = Unable, Total/Dependent Assistance  2 = A lot, Maximum/Moderate Assistance  3 = A little, Minimum/Contact Guard/Supervision  4 = None, Modified [place]/Independent    Turning over in bed (including adjusting bedclothes, sheets and blankets)?: 3  Sitting down on and standing up from a chair with arms (e.g., wheelchair, bedside commode, etc.): 1  Moving from lying on back to sitting on the side of the bed?: 4  Moving to and from a bed to a chair (including a wheelchair)?: 3  Need to walk in hospital room?: 1  Climbing 3-5 steps with a railing?: 1  Total Score: 13     AM-PAC Raw Score CMS G-Code Modifier Level of Impairment Assistance   6 % Total / Unable   7 - 9 CM 80 - 100% Maximal Assist   10 - 14 CL 60 - 80% Moderate Assist   15 - 19 CK 40 - 60% Moderate Assist   20 - 22 CJ 20 - 40% Minimal Assist   23 CI 1-20% SBA / CGA   24 CH 0% Independent/ Mod I     Patient left left sidelying with all lines intact, call button in reach, heel offloading boots donned and RN notified.    Assessment:   Hemant Kennedy Jr. is a 67 y.o. male with a medical diagnosis of Hyperkalemia and presents with decreased functional mobility and poor historian.  Patient appears to be near baseline of independence with bed mobility and transfers at a w/c level using a slide board.   During evaluation he required min assist to stabilize slide board and block knees during transfers.  Patient is at high risk for deconditioning during this hospital stay. He would benefit from skilled PT services to mobilize in order to be able to return home at time of discharge.  If patient deteriorates he will require SNF placement 2* living alone.     Rehab identified problem list/impairments: Rehab identified problem list/impairments: weakness, impaired functional mobilty, decreased ROM, impaired skin, impaired joint  extensibility, impaired cognition    Rehab potential is fair.    Activity tolerance: Good    Discharge recommendations: Discharge Facility/Level Of Care Needs:  (HHPT, if patient deconditions he will need SNF placement)     Barriers to discharge: Barriers to Discharge: Decreased caregiver support    Equipment recommendations: Equipment Needed After Discharge: none     GOALS:   Physical Therapy Goals        Problem: Physical Therapy Goal    Goal Priority Disciplines Outcome Goal Variances Interventions   Physical Therapy Goal     PT/OT, PT Ongoing (interventions implemented as appropriate)     Description:  Goals to be met by: 5/15/2017     Patient will increase functional independence with mobility by performin. Pt will be independent with w/c set up for w/c<> bed transfer  2. Bed <> w/c transfer with modified independence using a slideboard.  3. Pt will be independent with LE exercise program to improve LE ROM and prevent further contractures using handout  4.  Pt will be educated in pressure relief in sitting and supine.   5. Pt will perform w/c mobility on level surfaces x 50 feet with SBA.                    PLAN:    Patient to be seen 3 x/week to address the above listed problems via therapeutic activities, therapeutic exercises, wheelchair management/training  Plan of Care expires: 17  Plan of Care reviewed with: patient          Clarisse Engle, PT  2017

## 2017-05-01 NOTE — SUBJECTIVE & OBJECTIVE
"Interval History: Patient was somnolent and oriented x1 on bedside exam this morning, only able to communicate that he "doesn't feel good today" with poor appetite. No acute events noted since ICU stepdown. Leukocytosis is noted on CBC today.    Review of Systems   Unable to perform ROS: Mental status change     Objective:     Vital Signs (Most Recent):  Temp: 98.3 °F (36.8 °C) (05/01/17 1500)  Pulse: 63 (05/01/17 1500)  Resp: 20 (05/01/17 1500)  BP: (!) 158/73 (05/01/17 1500)  SpO2: 98 % (05/01/17 1500) Vital Signs (24h Range):  Temp:  [98 °F (36.7 °C)-98.5 °F (36.9 °C)] 98.3 °F (36.8 °C)  Pulse:  [60-74] 63  Resp:  [16-23] 20  SpO2:  [96 %-100 %] 98 %  BP: (110-158)/(56-73) 158/73     Weight: 119 kg (262 lb 5.6 oz)  Body mass index is 39.89 kg/(m^2).    Intake/Output Summary (Last 24 hours) at 05/01/17 1621  Last data filed at 04/30/17 2335   Gross per 24 hour   Intake                0 ml   Output              480 ml   Net             -480 ml      Physical Exam   Constitutional: He appears well-developed.   Slightly altered    HENT:   Head: Normocephalic.   Mouth/Throat: Oropharynx is clear and moist. No oropharyngeal exudate.   Eyes: Pupils are equal, round, and reactive to light.   Neck: No JVD present.   Cardiovascular: Normal rate, regular rhythm, intact distal pulses and normal pulses.  Exam reveals no gallop and no friction rub.    No murmur heard.  Heart sounds distant   Pulmonary/Chest: Effort normal. No respiratory distress. He has no decreased breath sounds. He has no wheezes. He has no rales. He exhibits no tenderness.   Decreased breath sounds   Abdominal: Soft. He exhibits distension. He exhibits no mass. Bowel sounds are decreased. There is no tenderness. No hernia.   Musculoskeletal: Normal range of motion. He exhibits no edema or deformity.   Lymphadenopathy:     He has no cervical adenopathy.   Neurological: He is alert. He has normal strength. No cranial nerve deficit. He exhibits normal muscle " tone. Coordination normal. GCS eye subscore is 4. GCS verbal subscore is 4. GCS motor subscore is 6.   Skin: Skin is warm and dry.   Psychiatric: He is slowed. He is inattentive.   Nursing note and vitals reviewed.      Significant Labs:   CBC:   Recent Labs  Lab 04/30/17  0254 05/01/17  0700   WBC 12.67 15.22*   HGB 11.0* 10.8*   HCT 32.9* 31.9*   * 112*     CMP:   Recent Labs  Lab 04/30/17  0751 04/30/17  1612 05/01/17  0700   * 149* 150*   K 5.0 4.9 5.3*   * 117* 119*   CO2 21* 22* 23   * 156* 96   BUN 68* 62* 50*   CREATININE 2.3* 2.4* 2.2*   CALCIUM 8.6* 9.5 9.1   ANIONGAP 7* 10 8   EGFRNONAA 27.9* 26.5* 29.5*     Coagulation: No results for input(s): INR, APTT in the last 48 hours.    Invalid input(s): PT    Significant Imaging: I have reviewed and interpreted all pertinent imaging results/findings within the past 24 hours.   CXR PaL:Cardiac size is mildly enlarged.  Allowing for technique lungs appear clear no definite infiltrate is seen.

## 2017-05-01 NOTE — ASSESSMENT & PLAN NOTE
-wound care following, continuing their recs  -possible explanation of leukocytosis on 5/1  -The sacral area skin is intact, the scrotum has shearing noted to the posterior aspect  -miconazole ointment BID to scrotal area to protect the skin and resolve fungal infection.

## 2017-05-01 NOTE — ASSESSMENT & PLAN NOTE
EF 25% on 7/2016.  - Not fluid overloaded on physical examination and BNP at baseline at 157.  - Continue Amiodorone and Atorvastatin. Holding Spironolactone, and Lisinopril considering HUMERA.  - Repeat 2D echo for surveillance->EF improved to 40 w/ DD

## 2017-05-01 NOTE — ASSESSMENT & PLAN NOTE
- BUN/Cr Improving. Baseline Cr generally ~1.5-2.  - Likely pre-renal azotemia due to volume depletion from diarrhea and duiretics  - BUN/Cr ratio elevated at 24>20.  - Improved with fluid challenge  - Post-obstructive HUMERA possible with BPH - andrade placed and patient voided well.  - Renal ultrasound Consistent with medical renal disease.  -- No need for CRRT at this time     - NAGMA improving  -switched bicarb infusion to D5W @75cc/hr w/ RFP monitoring

## 2017-05-01 NOTE — PROGRESS NOTES
"[facility] Hospital Medicine  Progress Note    Patient Name: Hemant Kennedy Jr.  MRN: [id number]  Patient Class: IP- Inpatient   Admission Date: 4/27/2017  Length of Stay: 3 days  Attending Physician: Godwin Robertson MD  Primary Care Provider: Hemant Markham MD    [facility] Medicine Team: Networked reference to record PCT  Chon Lopez MD    Subjective:     Principal Problem:Hyperkalemia    Hospital Course:  4/28: In ED, provided 1g calcium gluconate IV as well as 10U insulin with dextrose for intracellular shifting of potassium. Repeat BMP was demonstrable for resolved hyperkalemia at 3.7. A andrade placed and urine voided. On 4/29: Patient seen by Nephrology due to persistent hyperkalemia in setting of HUMERA on CKD. A  Trialysis was initially attempted however the patient has very poor access and a decision was made to manage the patient medically. Urine output has continued to improve as have other electrolytes. The patient continues to seem somewhat altered but can answer simple demographic questions. He is pending transfer to the floor and will need social work follow-up for discharge planning.    Interval History: Patient was somnolent and oriented x1 on bedside exam this morning, only able to communicate that he "doesn't feel good today" with poor appetite. No acute events noted since ICU stepdown. Leukocytosis is noted on CBC today.    Review of Systems   Unable to perform ROS: Mental status change     Objective:     Vital Signs (Most Recent):  Temp: 98.3 °F (36.8 °C) (05/01/17 1500)  Pulse: 63 (05/01/17 1500)  Resp: 20 (05/01/17 1500)  BP: (!) 158/73 (05/01/17 1500)  SpO2: 98 % (05/01/17 1500) Vital Signs (24h Range):  Temp:  [98 °F (36.7 °C)-98.5 °F (36.9 °C)] 98.3 °F (36.8 °C)  Pulse:  [60-74] 63  Resp:  [16-23] 20  SpO2:  [96 %-100 %] 98 %  BP: (110-158)/(56-73) 158/73     Weight: 119 kg (262 lb 5.6 oz)  Body mass index is 39.89 kg/(m^2).    Intake/Output Summary (Last 24 hours) at " 05/01/17 1621  Last data filed at 04/30/17 2335   Gross per 24 hour   Intake                0 ml   Output              480 ml   Net             -480 ml      Physical Exam   Constitutional: He appears well-developed.   Slightly altered    HENT:   Head: Normocephalic.   Mouth/Throat: Oropharynx is clear and moist. No oropharyngeal exudate.   Eyes: Pupils are equal, round, and reactive to light.   Neck: No JVD present.   Cardiovascular: Normal rate, regular rhythm, intact distal pulses and normal pulses.  Exam reveals no gallop and no friction rub.    No murmur heard.  Heart sounds distant   Pulmonary/Chest: Effort normal. No respiratory distress. He has no decreased breath sounds. He has no wheezes. He has no rales. He exhibits no tenderness.   Decreased breath sounds   Abdominal: Soft. He exhibits distension. He exhibits no mass. Bowel sounds are decreased. There is no tenderness. No hernia.   Musculoskeletal: Normal range of motion. He exhibits no edema or deformity.   Lymphadenopathy:     He has no cervical adenopathy.   Neurological: He is alert. He has normal strength. No cranial nerve deficit. He exhibits normal muscle tone. Coordination normal. GCS eye subscore is 4. GCS verbal subscore is 4. GCS motor subscore is 6.   Skin: Skin is warm and dry.   Psychiatric: He is slowed. He is inattentive.   Nursing note and vitals reviewed.      Significant Labs:   CBC:   Recent Labs  Lab 04/30/17  0254 05/01/17  0700   WBC 12.67 15.22*   HGB 11.0* 10.8*   HCT 32.9* 31.9*   * 112*     CMP:   Recent Labs  Lab 04/30/17  0751 04/30/17  1612 05/01/17  0700   * 149* 150*   K 5.0 4.9 5.3*   * 117* 119*   CO2 21* 22* 23   * 156* 96   BUN 68* 62* 50*   CREATININE 2.3* 2.4* 2.2*   CALCIUM 8.6* 9.5 9.1   ANIONGAP 7* 10 8   EGFRNONAA 27.9* 26.5* 29.5*     Coagulation: No results for input(s): INR, APTT in the last 48 hours.    Invalid input(s): PT    Significant Imaging: I have reviewed and interpreted all  pertinent imaging results/findings within the past 24 hours.   CXR PaL:Cardiac size is mildly enlarged.  Allowing for technique lungs appear clear no definite infiltrate is seen.    Assessment/Plan:      * Hyperkalemia  - Improved  -- 7.0 on presentation. No EKG changes.  - Likely secondary to HUMERA.  - Received IV calcium and insulin in ED.  - Trend BMP      Chronic systolic congestive heart failure, NYHA class 2  EF 25% on 7/2016.  - Not fluid overloaded on physical examination and BNP at baseline at 157.  - Continue Amiodorone and Atorvastatin. Holding Spironolactone, and Lisinopril considering HUMERA.  - Repeat 2D echo for surveillance->EF improved to 40 w/ DD      Debility  -PT/OT/SW consult for discharge planning      Sleep apnea  -CPAP QHS      Atrial fibrillation, controlled  - [id number] score of 5; high risk of stroke (7.2% per year)  - Continue  Amiodarone, holding apixaban      Type II diabetes mellitus with complication, uncontrolled  - At home, long acting 8U BID and short acting 5U TIDWM.  - 5U BID here with LDSSI.   - Diabetic diet and repeat OfL3D=9      Encounter for wound care  -wound care following, continuing their recs  -possible explanation of leukocytosis on 5/1  -The sacral area skin is intact, the scrotum has shearing noted to the posterior aspect  -miconazole ointment BID to scrotal area       Acute renal failure superimposed on stage 3 chronic kidney disease  - BUN/Cr Improving. Baseline Cr generally ~1.5-2.  - Likely pre-renal azotemia due to volume depletion from diarrhea and duiretics  - BUN/Cr ratio elevated at 24>20.  - Improved with fluid challenge  - Post-obstructive HUMERA possible with BPH - andrade placed and patient voided well.  - Renal ultrasound Consistent with medical renal disease.  -- No need for CRRT at this time  -5/1 CXR showed no edema     - NAGMA improving  -switched bicarb infusion to D5W @75cc/hr w/ RFP monitoring      Uremic encephalopathy  -Improving  -- Slight confusion  possibly secondary to uremia or delirium  -- Old CVA in 2010 --  SUBACUTE INFARCTION INVOLVING THE RIGHT PCA TERRITORY WITHIN THE PARASAGITTAL RIGHT OCCIPITAL LOBE  - Continue monitor       Renovascular hypertension  -continue to monitor and add HTN regimen as tolerated-restarted home low-dose Coreg      VTE Risk Mitigation         Ordered     heparin (porcine) injection 5,000 Units  Every 8 hours     Route:  Subcutaneous        04/28/17 0923     Medium Risk of VTE  Once      04/28/17 0211     Place sequential compression device  Until discontinued      04/28/17 0211      Patient seen and discussed on rounds with Dr. Robertson.          Chon Lopez MD  Department of Hospital Medicine   [facility]

## 2017-05-01 NOTE — PLAN OF CARE
Problem: Patient Care Overview  Goal: Plan of Care Review  Patient is a 67 y.o. male y/o M with HFrEF (EF 20-25% in Jun 2016), NICM declined ICD, HTN, HLD, atrial fibrillation on Eliquis, T2DM, h/o CVA in 2010, ERIC presents with generalized weakness, fatigue and AMS that started as per patient since yesterday. While at the ED laboratories drawn and patient was found with acute renal failure BUN/ Creatinine: 147/6.2 and hyperkalemic: 7.0 (No hemolysis) and thats the reason we are consult. Patient poor historian.       4/28: Admit to SICU for hyperkalemia. K shifted X2. Kayexalate given X 1.   4/29: K shifted x 1, kayexalate x 1    Accuchecks AC/HS  Miconazole ointment BID to pannus and perineal area.   Outcome: Ongoing (interventions implemented as appropriate)  The pt is a poor historian and not able to complete the admit history, pt has no family members in the room at the present time.  Pt has andrade to gravity. The are 4 pressure ulcer spots on pt's scrotum.  The left heel has ulcers unseen due to mepilex on wounds.  Foam boots on feet bilaterally. Breathing is regular equal and unlabored on room air.

## 2017-05-01 NOTE — ASSESSMENT & PLAN NOTE
- [id number] score of 5; high risk of stroke (7.2% per year)  - Continue  Amiodarone, holding apixaban

## 2017-05-01 NOTE — PLAN OF CARE
Problem: Patient Care Overview  Goal: Plan of Care Review  Patient is a 67 y.o. male y/o M with HFrEF (EF 20-25% in Jun 2016), NICM declined ICD, HTN, HLD, atrial fibrillation on Eliquis, T2DM, h/o CVA in 2010, ERIC presents with generalized weakness, fatigue and AMS that started as per patient since yesterday. While at the ED laboratories drawn and patient was found with acute renal failure BUN/ Creatinine: 147/6.2 and hyperkalemic: 7.0 (No hemolysis) and thats the reason we are consult. Patient poor historian.       4/28: Admit to SICU for hyperkalemia. K shifted X2. Kayexalate given X 1.   4/29: K shifted x 1, kayexalate x 1    Accuchecks AC/HS  Miconazole ointment BID to pannus and perineal area.   Outcome: Ongoing (interventions implemented as appropriate)  Plan of care reviewed with the pt and his sister, both verbalized understanding.  Pt is AAOx2, VSS.  Pt on tele.  Lott to gravity.  Left foot dressing C/D/I.  Pt tolerating diabetic diet.  Blood sugar monitored before meals.  Pt moves independently in bed, free of falls and injuries.  Pt denies pain, chest pain, SOB, or nausea.  Bed in low position, call light in reach, nonskid socks on, WCTM

## 2017-05-01 NOTE — PLAN OF CARE
Problem: Patient Care Overview  Goal: Plan of Care Review  Patient is a 67 y.o. male y/o M with HFrEF (EF 20-25% in Jun 2016), NICM declined ICD, HTN, HLD, atrial fibrillation on Eliquis, T2DM, h/o CVA in 2010, ERIC presents with generalized weakness, fatigue and AMS that started as per patient since yesterday. While at the ED laboratories drawn and patient was found with acute renal failure BUN/ Creatinine: 147/6.2 and hyperkalemic: 7.0 (No hemolysis) and thats the reason we are consult. Patient poor historian.       4/28: Admit to SICU for hyperkalemia. K shifted X2. Kayexalate given X 1.   4/29: K shifted x 1, kayexalate x 1    Accuchecks AC/HS  Miconazole ointment BID to pannus and perineal area.   Outcome: Ongoing (interventions implemented as appropriate)  Recommendations  1. Encourage increased PO intake.   2. Recommend adding Boost Glucose Control to all meals.      RD to monitor. Full assessment completed. See RD Consult Note 5/1/17.

## 2017-05-01 NOTE — CONSULTS
[facility]  Adult Nutrition  Consult Note    SUMMARY     Recommendations  Recommendation/Intervention:   1. Encourage increased PO intake.   2. Recommend adding Boost Glucose Control to all meals.   RD to monitor.    Goals: Patient to consume > 75% of meals  Nutrition Goal Status: new  Communication of RD Recs: discussed on rounds    Reason for Assessment  Reason for Assessment: physician consult  Diagnosis:  (hyperkalemia)  Relevent Medical History: CHF, type 2 DM, HTN, CVA, CKD stage 3   Interdisciplinary Rounds: attended   General Information Comments: Patient with fair appetite and PO intake, ~ 50% of meals.   Nutrition Discharge Planning: Adequate nutrition via PO intake.    Nutrition Prescription Ordered  Current Diet Order: Diabetic 1800, Low Potassium     Evaluation of Received Nutrients/Fluid Intake  Comments: LBM 4/30. I/O noted, -6L since admit.     Nutrition/Diet History  Factors Affecting Nutritional Intake: decreased appetite     Labs/Tests/Procedures/Meds   Pertinent Labs Reviewed: reviewed  Pertinent Labs Comments: Na 150, K 5.3, Cl 119, BUN 50, Creat 2.2, HgbA1c 7.0  Pertinent Medications Reviewed: reviewed  Pertinent Medications Comments: ergocalciferol    Physical Findings  Overall Physical Appearance: overweight  Tubes:  (none)  Oral/Mouth Cavity: tooth/teeth missing  Skin: edema, pressure ulcer(s) (stage 1 x2 and stage 2 x3)    Anthropometrics   Height (inches): 66.93 in  Weight Method: Bed Scale  Weight (kg): 118.3 kg  Ideal Body Weight (IBW), Male: 147.58 lb   % Ideal Body Weight, Male (lb): 176.72 lb   BMI (kg/m2): 40.93  BMI Grade: greater than 40 - morbid obesity     Estimated/Assessed Needs  Weight Used For Calorie Calculations: 118.3 kg (260 lb 12.9 oz)   Height (cm): 170 cm (per chart review, no height in chart)   Energy Need Method: [place]-St Jeor (2292 kcal/day (1.2))  RMR ([place]-St. Jeor Equation): 1909.84   Weight Used For Protein Calculations: 118.3 kg (260  lb 12.9 oz)  Protein Requirements: 107-130 g/day (0.9-1.1 g/kg)  Fluid Need Method: RDA Method (1 mL/kcal or per MD)     Monitor and Evaluation  Food and Nutrient Intake: energy intake, food and beverage intake  Food and Nutrient Adminstration: diet order   Physical Activity and Function: nutrition-related ADLs and IADLs  Anthropometric Measurements: weight, weight change  Biochemical Data, Medical Tests and Procedures: electrolyte and renal panel, gastrointestinal profile  Nutrition-Focused Physical Findings: overall appearance    Nutrition Risk  Level of Risk:  (1x/week)    Nutrition Follow-Up  RD Follow-up?: Yes    Nutrition Diagnosis  No nutrition problem.

## 2017-05-02 ENCOUNTER — TELEPHONE (OUTPATIENT)
Dept: OPHTHALMOLOGY | Facility: CLINIC | Age: 67
End: 2017-05-02

## 2017-05-02 LAB
ALBUMIN SERPL BCP-MCNC: 2.8 G/DL
ANION GAP SERPL CALC-SCNC: 10 MMOL/L
ANION GAP SERPL CALC-SCNC: 8 MMOL/L
BASOPHILS # BLD AUTO: 0.03 K/UL
BASOPHILS NFR BLD: 0.3 %
BUN SERPL-MCNC: 38 MG/DL
BUN SERPL-MCNC: 41 MG/DL
CALCIUM SERPL-MCNC: 9.1 MG/DL
CALCIUM SERPL-MCNC: 9.5 MG/DL
CHLORIDE SERPL-SCNC: 116 MMOL/L
CHLORIDE SERPL-SCNC: 116 MMOL/L
CO2 SERPL-SCNC: 23 MMOL/L
CO2 SERPL-SCNC: 26 MMOL/L
CREAT SERPL-MCNC: 2.1 MG/DL
CREAT SERPL-MCNC: 2.2 MG/DL
DIFFERENTIAL METHOD: ABNORMAL
EOSINOPHIL # BLD AUTO: 0.8 K/UL
EOSINOPHIL NFR BLD: 7.4 %
ERYTHROCYTE [DISTWIDTH] IN BLOOD BY AUTOMATED COUNT: 16.4 %
EST. GFR  (AFRICAN AMERICAN): 34.1 ML/MIN/1.73 M^2
EST. GFR  (AFRICAN AMERICAN): 36 ML/MIN/1.73 M^2
EST. GFR  (NON AFRICAN AMERICAN): 29.5 ML/MIN/1.73 M^2
EST. GFR  (NON AFRICAN AMERICAN): 31.2 ML/MIN/1.73 M^2
GLUCOSE SERPL-MCNC: 109 MG/DL
GLUCOSE SERPL-MCNC: 122 MG/DL
HCT VFR BLD AUTO: 32.8 %
HGB BLD-MCNC: 10.5 G/DL
LYMPHOCYTES # BLD AUTO: 1.7 K/UL
LYMPHOCYTES NFR BLD: 16 %
MAGNESIUM SERPL-MCNC: 1.8 MG/DL
MCH RBC QN AUTO: 28.4 PG
MCHC RBC AUTO-ENTMCNC: 32 %
MCV RBC AUTO: 89 FL
MONOCYTES # BLD AUTO: 1 K/UL
MONOCYTES NFR BLD: 9.5 %
NEUTROPHILS # BLD AUTO: 7.2 K/UL
NEUTROPHILS NFR BLD: 66.4 %
PHOSPHATE SERPL-MCNC: 3 MG/DL
PHOSPHATE SERPL-MCNC: 3 MG/DL
PLATELET # BLD AUTO: 137 K/UL
PMV BLD AUTO: 10.7 FL
POCT GLUCOSE: 112 MG/DL (ref 70–110)
POCT GLUCOSE: 130 MG/DL (ref 70–110)
POCT GLUCOSE: 148 MG/DL (ref 70–110)
POTASSIUM SERPL-SCNC: 4.4 MMOL/L
POTASSIUM SERPL-SCNC: 4.5 MMOL/L
RBC # BLD AUTO: 3.7 M/UL
SODIUM SERPL-SCNC: 149 MMOL/L
SODIUM SERPL-SCNC: 150 MMOL/L
WBC # BLD AUTO: 10.79 K/UL

## 2017-05-02 PROCEDURE — 11000001 HC ACUTE MED/SURG PRIVATE ROOM

## 2017-05-02 PROCEDURE — 25000003 PHARM REV CODE 250: Performed by: STUDENT IN AN ORGANIZED HEALTH CARE EDUCATION/TRAINING PROGRAM

## 2017-05-02 PROCEDURE — 36415 COLL VENOUS BLD VENIPUNCTURE: CPT

## 2017-05-02 PROCEDURE — 99900035 HC TECH TIME PER 15 MIN (STAT)

## 2017-05-02 PROCEDURE — 84100 ASSAY OF PHOSPHORUS: CPT

## 2017-05-02 PROCEDURE — 99233 SBSQ HOSP IP/OBS HIGH 50: CPT | Mod: ,,, | Performed by: HOSPITALIST

## 2017-05-02 PROCEDURE — 25000003 PHARM REV CODE 250: Performed by: INTERNAL MEDICINE

## 2017-05-02 PROCEDURE — 85025 COMPLETE CBC W/AUTO DIFF WBC: CPT

## 2017-05-02 PROCEDURE — 80048 BASIC METABOLIC PNL TOTAL CA: CPT

## 2017-05-02 PROCEDURE — 83735 ASSAY OF MAGNESIUM: CPT

## 2017-05-02 PROCEDURE — 99232 SBSQ HOSP IP/OBS MODERATE 35: CPT | Mod: ,,, | Performed by: INTERNAL MEDICINE

## 2017-05-02 PROCEDURE — 94660 CPAP INITIATION&MGMT: CPT

## 2017-05-02 RX ADMIN — HEPARIN SODIUM 5000 UNITS: 5000 INJECTION, SOLUTION INTRAVENOUS; SUBCUTANEOUS at 05:05

## 2017-05-02 RX ADMIN — FLUTICASONE PROPIONATE 2 SPRAY: 50 SPRAY, METERED NASAL at 08:05

## 2017-05-02 RX ADMIN — AMIODARONE HYDROCHLORIDE 200 MG: 200 TABLET ORAL at 10:05

## 2017-05-02 RX ADMIN — Medication 3 ML: at 05:05

## 2017-05-02 RX ADMIN — MICONAZOLE NITRATE: 20 OINTMENT TOPICAL at 08:05

## 2017-05-02 RX ADMIN — ASPIRIN 81 MG CHEWABLE TABLET 81 MG: 81 TABLET CHEWABLE at 10:05

## 2017-05-02 RX ADMIN — ATORVASTATIN CALCIUM 40 MG: 20 TABLET, FILM COATED ORAL at 10:05

## 2017-05-02 RX ADMIN — Medication 3 ML: at 09:05

## 2017-05-02 RX ADMIN — HEPARIN SODIUM 5000 UNITS: 5000 INJECTION, SOLUTION INTRAVENOUS; SUBCUTANEOUS at 09:05

## 2017-05-02 RX ADMIN — DEXTROSE: 5 SOLUTION INTRAVENOUS at 04:05

## 2017-05-02 RX ADMIN — CARVEDILOL 6.25 MG: 6.25 TABLET, FILM COATED ORAL at 10:05

## 2017-05-02 RX ADMIN — MICONAZOLE NITRATE: 20 OINTMENT TOPICAL at 09:05

## 2017-05-02 RX ADMIN — HEPARIN SODIUM 5000 UNITS: 5000 INJECTION, SOLUTION INTRAVENOUS; SUBCUTANEOUS at 02:05

## 2017-05-02 RX ADMIN — Medication 3 ML: at 02:05

## 2017-05-02 RX ADMIN — CARVEDILOL 6.25 MG: 6.25 TABLET, FILM COATED ORAL at 09:05

## 2017-05-02 NOTE — PROGRESS NOTES
[facility] Hospital Medicine  Progress Note    Patient Name: Hemant Kennedy Jr.  MRN: [id number]  Patient Class: IP- Inpatient   Admission Date: 4/27/2017  Length of Stay: 4 days  Attending Physician: Prabhjot Maldonado MD  Primary Care Provider: Hemant Markham MD    [facility] Medicine Team: [facility] 5 Lan Estes MD    Subjective:     Principal Problem:Hyperkalemia    HPI:  Hemant Kennedy Jr. is a 67 year old male with a medical history significant for type II DM, CKD III, non-ischemic cardiomyopathy (EF 25%), ERIC who presents to the ED on 4/28 with chief complaint of sluggishness and fatigue. In the ED, patient was found to have significant HUMERA with creatinine at 6.2 (baseline 1.5) with significant hyperkalemia of 7. Patient's history was gathered from patient as well as sister on the phone as patient was confused and fell asleep frequently during interview. Per the patient, his granddaughter called the ambulance to bring him in after finding him lethargic at home and with a low blood sugar. He could not tell me the low sugar value, but sister on phone states it was in the 70s. Otherwise, the sister states that starting yesterday afternoon, she noticed that the patient was more lethargic and somnolent. She states that last week 4/18-4/21, the patient had 2-3 bouts of diarrhea a day that resolved with imodium over the weekend. Otherwise, she states that he has not had any recent illnesses otherwise.     The patient lives by himself but is visited for most of the day everyday by his sister. Per the sister, she states that she has forced him to drink more water this past week because all he drinks is Coke Zero and milk. She cannot assess his fluid intake but states she has not noticed he has been fluid down this past week; she makes sure he drinks a couple bottles of water per day. She is in charge of administering his medications, which include two tablets of furosemide daily (med rec states  he only takes one pill per day, however), as well as aldactone. The patient is also seen three times a week by home health nurse in [facility]. Per the sister, the nurse has mentioned his blood pressure has been lower than normal for the past week, in the SBP 90- low 100s. She state he usually runs higher. She first noticed this last Friday 4/21. He has not started any new medication and occasionally takes Tylenol for his left diabetic foot ulcer. Patient does not take any antibiotics for his foot ulcer. The patient has not has trouble urinating and has voided fine. Sister affirms this but states that his urinary output seems to have decreased from yesterday evening to today. Patient denies dysuria or flank pain.     Otherwise, patient denies fevers, chills, shortness of breath, new onset edema, chest pain, diaphoresis, dysuria, hematuria, abdominal pain, nausea, vomiting, diarrhea.       Hospital Course:  4/28: In ED, provided 1g calcium gluconate IV as well as 10U insulin with dextrose for intracellular shifting of potassium. Repeat BMP was demonstrable for resolved hyperkalemia at 3.7. A andrade placed and urine voided. On 4/29: Patient seen by Nephrology due to persistent hyperkalemia in setting of HUMERA on CKD. A  Trialysis was initially attempted however the patient has very poor access and a decision was made to manage the patient medically. Urine output has continued to improve as have other electrolytes. The patient continues to seem somewhat altered but can answer simple demographic questions. He is pending transfer to the floor and will need social work follow-up for discharge planning.    5/2: Attempts made to contact family for more clarification of patient's prior hospitalization as well as move forward with discharge planning.      Interval History: Patient reports that he had no issues overnight. Denies fevers or chills. No other complaints.    Review of Systems   Unable to perform ROS: Mental status change    Objective:     Vital Signs (Most Recent):  Temp: 98.2 °F (36.8 °C) (05/02/17 1125)  Pulse: 63 (05/02/17 1125)  Resp: 16 (05/02/17 1125)  BP: (!) 150/76 (05/02/17 1125)  SpO2: 95 % (05/02/17 1125) Vital Signs (24h Range):  Temp:  [97.7 °F (36.5 °C)-98.7 °F (37.1 °C)] 98.2 °F (36.8 °C)  Pulse:  [59-72] 63  Resp:  [16-20] 16  SpO2:  [94 %-98 %] 95 %  BP: (133-172)/(66-85) 150/76     Weight: 119 kg (262 lb 5.6 oz)  Body mass index is 39.89 kg/(m^2).    Intake/Output Summary (Last 24 hours) at 05/02/17 1429  Last data filed at 05/02/17 0500   Gross per 24 hour   Intake            23.75 ml   Output             1050 ml   Net         -1026.25 ml      Physical Exam  Constitutional: He appears well-developed.   Slightly altered    HENT:   Head: Normocephalic.   Mouth/Throat: Oropharynx is clear and moist. No oropharyngeal exudate.   Eyes: Pupils are equal, round, and reactive to light.   Neck: No JVD present.   Cardiovascular: Normal rate, regular rhythm, intact distal pulses and normal pulses. Exam reveals no gallop and no friction rub.   No murmur heard.  Heart sounds distant   Pulmonary/Chest: Effort normal. No respiratory distress. He has no decreased breath sounds. He has no wheezes. He has no rales. He exhibits no tenderness.   Decreased breath sounds   Abdominal: Soft. He exhibits distension. He exhibits no mass. Bowel sounds are decreased. There is no tenderness. No hernia.   Musculoskeletal: Normal range of motion. He exhibits no edema or deformity.   Lymphadenopathy:   He has no cervical adenopathy.   Neurological: He is alert. He has normal strength. No cranial nerve deficit. He exhibits normal muscle tone. Coordination normal. GCS eye subscore is 4. GCS verbal subscore is 4. GCS motor subscore is 6.   Skin: Skin is warm and dry.   Psychiatric: He is slowed. He is inattentive.   Nursing note and vitals reviewed.    Significant Labs:   Recent Results (from the past 24 hour(s))   POCT glucose    Collection Time:  05/01/17  5:21 PM   Result Value Ref Range    POCT Glucose 111 (H) 70 - 110 mg/dL   POCT glucose    Collection Time: 05/01/17 10:26 PM   Result Value Ref Range    POCT Glucose 90 70 - 110 mg/dL   Renal function panel    Collection Time: 05/01/17 11:47 PM   Result Value Ref Range    Glucose 122 (H) 70 - 110 mg/dL    Sodium 150 (H) 136 - 145 mmol/L    Potassium 4.4 3.5 - 5.1 mmol/L    Chloride 116 (H) 95 - 110 mmol/L    CO2 26 23 - 29 mmol/L    BUN, Bld 41 (H) 8 - 23 mg/dL    Calcium 9.5 8.7 - 10.5 mg/dL    Creatinine 2.2 (H) 0.5 - 1.4 mg/dL    Albumin 2.8 (L) 3.5 - 5.2 g/dL    Phosphorus 3.0 2.7 - 4.5 mg/dL    eGFR if  34.1 (A) >60 mL/min/1.73 m^2    eGFR if non African American 29.5 (A) >60 mL/min/1.73 m^2    Anion Gap 8 8 - 16 mmol/L   Basic metabolic panel    Collection Time: 05/02/17  4:39 AM   Result Value Ref Range    Sodium 149 (H) 136 - 145 mmol/L    Potassium 4.5 3.5 - 5.1 mmol/L    Chloride 116 (H) 95 - 110 mmol/L    CO2 23 23 - 29 mmol/L    Glucose 109 70 - 110 mg/dL    BUN, Bld 38 (H) 8 - 23 mg/dL    Creatinine 2.1 (H) 0.5 - 1.4 mg/dL    Calcium 9.1 8.7 - 10.5 mg/dL    Anion Gap 10 8 - 16 mmol/L    eGFR if African American 36.0 (A) >60 mL/min/1.73 m^2    eGFR if non  31.2 (A) >60 mL/min/1.73 m^2   CBC auto differential    Collection Time: 05/02/17  4:39 AM   Result Value Ref Range    WBC 10.79 3.90 - 12.70 K/uL    RBC 3.70 (L) 4.60 - 6.20 M/uL    Hemoglobin 10.5 (L) 14.0 - 18.0 g/dL    Hematocrit 32.8 (L) 40.0 - 54.0 %    MCV 89 82 - 98 fL    MCH 28.4 27.0 - 31.0 pg    MCHC 32.0 32.0 - 36.0 %    RDW 16.4 (H) 11.5 - 14.5 %    Platelets 137 (L) 150 - 350 K/uL    MPV 10.7 9.2 - 12.9 fL    Gran # 7.2 1.8 - 7.7 K/uL    Lymph # 1.7 1.0 - 4.8 K/uL    Mono # 1.0 0.3 - 1.0 K/uL    Eos # 0.8 (H) 0.0 - 0.5 K/uL    Baso # 0.03 0.00 - 0.20 K/uL    Gran% 66.4 38.0 - 73.0 %    Lymph% 16.0 (L) 18.0 - 48.0 %    Mono% 9.5 4.0 - 15.0 %    Eosinophil% 7.4 0.0 - 8.0 %    Basophil% 0.3 0.0 - 1.9  %    Differential Method Automated    Magnesium    Collection Time: 05/02/17  4:39 AM   Result Value Ref Range    Magnesium 1.8 1.6 - 2.6 mg/dL   Phosphorus    Collection Time: 05/02/17  4:39 AM   Result Value Ref Range    Phosphorus 3.0 2.7 - 4.5 mg/dL   POCT glucose    Collection Time: 05/02/17  8:23 AM   Result Value Ref Range    POCT Glucose 112 (H) 70 - 110 mg/dL   POCT glucose    Collection Time: 05/02/17 11:31 AM   Result Value Ref Range    POCT Glucose 148 (H) 70 - 110 mg/dL     Significant Imaging: Imaging from last 24 hours reviewed.    Assessment/Plan:      * Hyperkalemia  - Improved  - 7.0 on presentation. No EKG changes.  - Secondary to HUMERA.  - Received IV calcium and insulin in ED.  - Trend BMP      Chronic systolic congestive heart failure, NYHA class 2  EF 25% on 7/2016.  - Not fluid overloaded on physical examination and BNP at baseline at 157.  - Continue Amiodorone and Atorvastatin. Holding Spironolactone, and Lisinopril considering HUMERA.  - Repeat 2D echo for surveillance->EF improved to 40 w/ DD      Debility  -PT/OT/SW consult for discharge planning      Sleep apnea  -CPAP QHS      Atrial fibrillation, controlled  - [id number] score of 5; high risk of stroke (7.2% per year)  - Continue  Amiodarone, holding apixaban      Type II diabetes mellitus with complication, uncontrolled  - At home, long acting 8U BID and short acting 5U TIDWM.  - LDSSI   - Diabetic diet and repeat ElX2F=2      Encounter for wound care  -wound care following, continuing their recs  -possible explanation of leukocytosis on 5/1  -The sacral area skin is intact, the scrotum has shearing noted to the posterior aspect  -miconazole ointment BID to scrotal area to protect the skin and resolve fungal infection.       Acute renal failure superimposed on stage 3 chronic kidney disease  - BUN/Cr Improving. Baseline Cr generally ~1.5-2.  - Likely pre-renal azotemia due to volume depletion from diarrhea and duiretics  - BUN/Cr ratio  elevated at 24>20.  - Improved with fluid challenge  - Post-obstructive HUMERA possible with BPH - andrade placed and patient voided well.  - Renal ultrasound Consistent with medical renal disease.  - No need for CRRT at this time  - NAGMA improving  -switched bicarb infusion to D5W @75cc/hr w/ RFP monitoring      Uremic encephalopathy  -Improving  -- Slight confusion possibly secondary to uremia or delirium  -- Old CVA in 2010 --  SUBACUTE INFARCTION INVOLVING THE RIGHT PCA TERRITORY WITHIN THE PARASAGITTAL RIGHT OCCIPITAL LOBE  - Continue monitor   -CPAP QHS      Renovascular hypertension  -continue to monitor and add HTN regimen as tolerated-restarted home low-dose Coreg      VTE Risk Mitigation         Ordered     heparin (porcine) injection 5,000 Units  Every 8 hours     Route:  Subcutaneous        04/28/17 0923     Medium Risk of VTE  Once      04/28/17 0211     Place sequential compression device  Until discontinued      04/28/17 0211          Lan Estes MD  Department of Hospital Medicine   [facility]

## 2017-05-02 NOTE — ASSESSMENT & PLAN NOTE
- At home, long acting 8U BID and short acting 5U TIDWM.  - LDSSI   - Diabetic diet and repeat SsX9S=4

## 2017-05-02 NOTE — PROGRESS NOTES
Progress Note  Nephrology    Admit Date: 4/27/2017   LOS: 4 days     SUBJECTIVE:     Follow-up For: hyperkalemia; HUMERA on CKD    Interval Hx: no events overnight. Sister at bedside this AM; reports no improvement in mental status. Patient drank 3-4 cups water yesterday; still no appetite. Not sleeping. Denies SOB.   UOP 1.05L/24h.     OBJECTIVE:     Vital Signs (Most Recent)  Temp: 98.2 °F (36.8 °C) (05/02/17 1125)  Pulse: 63 (05/02/17 1125)  Resp: 16 (05/02/17 1125)  BP: (!) 150/76 (05/02/17 1125)  SpO2: 95 % (05/02/17 1125)    Vital Signs Range (Last 24H):  Temp:  [97.7 °F (36.5 °C)-98.7 °F (37.1 °C)]   Pulse:  [59-72]   Resp:  [16-18]   BP: (133-172)/(66-85)   SpO2:  [94 %-96 %]       Intake/Output Summary (Last 24 hours) at 05/02/17 1613  Last data filed at 05/02/17 0500   Gross per 24 hour   Intake            23.75 ml   Output             1050 ml   Net         -1026.25 ml     Physical Exam:  Gen: WDWN AAM in NAD.  Neuro: disoriented  Eyes: EOMI, clear conjunctivae  CV: RRR. Trace edema today  Resp: normal effort. No crackles  Abd: Soft, NTND  Skin: warm, normal turgor      Laboratory:  CBC:     Recent Labs  Lab 05/02/17  0439   WBC 10.79   RBC 3.70*   HGB 10.5*   HCT 32.8*   *   MCV 89   MCH 28.4   MCHC 32.0     CMP:   Recent Labs  Lab 04/27/17  2257  05/01/17  2347 05/02/17  0439   GLU 91  < > 122* 109   CALCIUM 9.4  < > 9.5 9.1   ALBUMIN 3.2*  --  2.8*  --    PROT 7.8  --   --   --      < > 150* 149*   K 7.0*  < > 4.4 4.5   CO2 14*  < > 26 23   *  < > 116* 116*   *  < > 41* 38*   CREATININE 6.2*  < > 2.2* 2.1*   ALKPHOS 93  --   --   --    ALT 27  --   --   --    AST 55*  --   --   --    BILITOT 0.6  --   --   --    < > = values in this interval not displayed.  ABGs:     Recent Labs  Lab 04/28/17  0029   PH 7.332*   PCO2 38.7   HCO3 20.5*   POCSATURATED 53*   BE -5       Recent Labs  Lab 05/01/17  1156   COLORU Yellow   SPECGRAV 1.020   PHUR 8.0   PROTEINUA Negative   BACTERIA Many*    NITRITE Negative   LEUKOCYTESUR 3+*   UROBILINOGEN 2.0   HYALINECASTS 1       Diagnostic Results:  Labs: Reviewed  X-Ray: Reviewed  US: Reviewed    ASSESSMENT/PLAN:     Patient is a 67 y.o. male y/o M with HFrEF (EF 20-25% in Jun 2016), NICM declined ICD, HTN, HLD, atrial fibrillation on Eliquis, T2DM, h/o CVA in 2010, ERIC presented with generalized weakness, fatigue and AMS found to have HUMERA on CKD, severe hyperkalemia and acidosis.    HUMERA on CKD stage 3   - baseline sCr 1.5-1.9. sCr 6.2 on arrival.   - UOP improved upon placing andrade catheter  - pre-renal HUMERA 2/2 volume depletion  - improving with hydration. sCr 2.1 today; close to baseline  - BUN also down to 38; AMS not 2/2 uremic encephalopathy  - no need for RRT  - will sign off  - will arrange for f/u with his primary nephrologist     Hyperkalemia  - likely 2/2 combination of dehydration in setting of spironolactone use and HUMERA  - currently resolved  - continue low K diet    Hypernatremia  - likely 2/2 dehydration  - improving with IVF.   - continue D5W until free water deficit repleted            Juli Gunn, PGY-4  Nephrology Fellow  Pager: [phone]

## 2017-05-02 NOTE — SUBJECTIVE & OBJECTIVE
Interval History: Patient reports that he had no issues overnight. Denies fevers or chills. No other complaints.    Review of Systems   Unable to perform ROS: Mental status change   Objective:     Vital Signs (Most Recent):  Temp: 98.2 °F (36.8 °C) (05/02/17 1125)  Pulse: 63 (05/02/17 1125)  Resp: 16 (05/02/17 1125)  BP: (!) 150/76 (05/02/17 1125)  SpO2: 95 % (05/02/17 1125) Vital Signs (24h Range):  Temp:  [97.7 °F (36.5 °C)-98.7 °F (37.1 °C)] 98.2 °F (36.8 °C)  Pulse:  [59-72] 63  Resp:  [16-20] 16  SpO2:  [94 %-98 %] 95 %  BP: (133-172)/(66-85) 150/76     Weight: 119 kg (262 lb 5.6 oz)  Body mass index is 39.89 kg/(m^2).    Intake/Output Summary (Last 24 hours) at 05/02/17 1429  Last data filed at 05/02/17 0500   Gross per 24 hour   Intake            23.75 ml   Output             1050 ml   Net         -1026.25 ml      Physical Exam  Constitutional: He appears well-developed.   Slightly altered    HENT:   Head: Normocephalic.   Mouth/Throat: Oropharynx is clear and moist. No oropharyngeal exudate.   Eyes: Pupils are equal, round, and reactive to light.   Neck: No JVD present.   Cardiovascular: Normal rate, regular rhythm, intact distal pulses and normal pulses. Exam reveals no gallop and no friction rub.   No murmur heard.  Heart sounds distant   Pulmonary/Chest: Effort normal. No respiratory distress. He has no decreased breath sounds. He has no wheezes. He has no rales. He exhibits no tenderness.   Decreased breath sounds   Abdominal: Soft. He exhibits distension. He exhibits no mass. Bowel sounds are decreased. There is no tenderness. No hernia.   Musculoskeletal: Normal range of motion. He exhibits no edema or deformity.   Lymphadenopathy:   He has no cervical adenopathy.   Neurological: He is alert. He has normal strength. No cranial nerve deficit. He exhibits normal muscle tone. Coordination normal. GCS eye subscore is 4. GCS verbal subscore is 4. GCS motor subscore is 6.   Skin: Skin is warm and dry.    Psychiatric: He is slowed. He is inattentive.   Nursing note and vitals reviewed.    Significant Labs:   Recent Results (from the past 24 hour(s))   POCT glucose    Collection Time: 05/01/17  5:21 PM   Result Value Ref Range    POCT Glucose 111 (H) 70 - 110 mg/dL   POCT glucose    Collection Time: 05/01/17 10:26 PM   Result Value Ref Range    POCT Glucose 90 70 - 110 mg/dL   Renal function panel    Collection Time: 05/01/17 11:47 PM   Result Value Ref Range    Glucose 122 (H) 70 - 110 mg/dL    Sodium 150 (H) 136 - 145 mmol/L    Potassium 4.4 3.5 - 5.1 mmol/L    Chloride 116 (H) 95 - 110 mmol/L    CO2 26 23 - 29 mmol/L    BUN, Bld 41 (H) 8 - 23 mg/dL    Calcium 9.5 8.7 - 10.5 mg/dL    Creatinine 2.2 (H) 0.5 - 1.4 mg/dL    Albumin 2.8 (L) 3.5 - 5.2 g/dL    Phosphorus 3.0 2.7 - 4.5 mg/dL    eGFR if  34.1 (A) >60 mL/min/1.73 m^2    eGFR if non African American 29.5 (A) >60 mL/min/1.73 m^2    Anion Gap 8 8 - 16 mmol/L   Basic metabolic panel    Collection Time: 05/02/17  4:39 AM   Result Value Ref Range    Sodium 149 (H) 136 - 145 mmol/L    Potassium 4.5 3.5 - 5.1 mmol/L    Chloride 116 (H) 95 - 110 mmol/L    CO2 23 23 - 29 mmol/L    Glucose 109 70 - 110 mg/dL    BUN, Bld 38 (H) 8 - 23 mg/dL    Creatinine 2.1 (H) 0.5 - 1.4 mg/dL    Calcium 9.1 8.7 - 10.5 mg/dL    Anion Gap 10 8 - 16 mmol/L    eGFR if African American 36.0 (A) >60 mL/min/1.73 m^2    eGFR if non  31.2 (A) >60 mL/min/1.73 m^2   CBC auto differential    Collection Time: 05/02/17  4:39 AM   Result Value Ref Range    WBC 10.79 3.90 - 12.70 K/uL    RBC 3.70 (L) 4.60 - 6.20 M/uL    Hemoglobin 10.5 (L) 14.0 - 18.0 g/dL    Hematocrit 32.8 (L) 40.0 - 54.0 %    MCV 89 82 - 98 fL    MCH 28.4 27.0 - 31.0 pg    MCHC 32.0 32.0 - 36.0 %    RDW 16.4 (H) 11.5 - 14.5 %    Platelets 137 (L) 150 - 350 K/uL    MPV 10.7 9.2 - 12.9 fL    Gran # 7.2 1.8 - 7.7 K/uL    Lymph # 1.7 1.0 - 4.8 K/uL    Mono # 1.0 0.3 - 1.0 K/uL    Eos # 0.8 (H) 0.0 -  0.5 K/uL    Baso # 0.03 0.00 - 0.20 K/uL    Gran% 66.4 38.0 - 73.0 %    Lymph% 16.0 (L) 18.0 - 48.0 %    Mono% 9.5 4.0 - 15.0 %    Eosinophil% 7.4 0.0 - 8.0 %    Basophil% 0.3 0.0 - 1.9 %    Differential Method Automated    Magnesium    Collection Time: 05/02/17  4:39 AM   Result Value Ref Range    Magnesium 1.8 1.6 - 2.6 mg/dL   Phosphorus    Collection Time: 05/02/17  4:39 AM   Result Value Ref Range    Phosphorus 3.0 2.7 - 4.5 mg/dL   POCT glucose    Collection Time: 05/02/17  8:23 AM   Result Value Ref Range    POCT Glucose 112 (H) 70 - 110 mg/dL   POCT glucose    Collection Time: 05/02/17 11:31 AM   Result Value Ref Range    POCT Glucose 148 (H) 70 - 110 mg/dL     Significant Imaging: Imaging from last 24 hours reviewed.

## 2017-05-02 NOTE — ASSESSMENT & PLAN NOTE
- [id number] score of 5; high risk of stroke (7.2% per year)  - Continue  Amiodarone, holding apixaban

## 2017-05-02 NOTE — PHARMACY MED REC
"Admission Medication Reconciliation - Pharmacy Consult Note    The home medication history was taken by Avril Darden, Pharmacy Technician.  Based on information gathered and subsequent review by the clinical pharmacist, the items below may need attention.     You may go to "Admission" then "Reconcile Home Medications" tabs to review and/or act upon these items. Based on information gathered and subsequent review by the clinical pharmacist, the items below may need attention.    Potentially problematic discrepancies with current MAR  o Patient IS taking the following which was not ordered upon admit  o Allopurinol (may require dose reduction as kidney function stabilizes)  o Ferrous sulfate 325 mg PO daily  o Gabapentin (may require dose reduction as kidney function stabilizes; if choose not to re-start, monitor for any signs/symptoms of W/D)    Please address this information as you see fit.  Feel free to contact us if you have any questions or require assistance.    Avril Zaragoza, PharmD  [id number]    Patient's prior to admission medication regimen was as follows:    Medication Sig    acetaminophen (TYLENOL) 500 MG tablet Take 2 tablets (1,000 mg total) by mouth 3 (three) times daily as needed for Pain.    allopurinol (ZYLOPRIM) 300 MG tablet Take 1 tablet (300 mg total) by mouth once daily.  Tablet Oral Every day (Patient taking differently: Take 300 mg by mouth once daily. )    amiodarone (PACERONE) 200 MG Tab Take 1 tablet (200 mg total) by mouth once daily.    apixaban (ELIQUIS) 5 mg Tab Take 5 mg by mouth 2 (two) times daily.    aspirin 81 MG Chew Take 81 mg by mouth once daily.     atorvastatin (LIPITOR) 40 MG tablet Take 1 tablet (40 mg total) by mouth once daily. 1 Tablet Oral Every day (Patient taking differently: Take 40 mg by mouth once daily. )    carvedilol (COREG) 12.5 MG tablet Take 12.5 mg by mouth 2 (two) times daily with meals.     collagenase (SANTYL) ointment Apply topically once daily. " Apply to wound daily as directed.    ergocalciferol (ERGOCALCIFEROL) 50,000 unit Cap Take 1 capsule (50,000 Units total) by mouth every 7 days. (Patient taking differently: Take 50,000 Units by mouth every Tuesday. )    ferrous sulfate 325 (65 FE) MG EC tablet Take 1 tablet (325 mg total) by mouth 2 (two) times daily with meals. (Patient taking differently: Take 325 mg by mouth once daily. )    fluticasone (FLONASE) 50 mcg/actuation nasal spray 2 sprays by Each Nare route once daily. (Patient taking differently: 2 sprays by Each Nare route daily as needed for Allergies. )    furosemide (LASIX) 40 MG tablet Take 1 tablet (40 mg total) by mouth once daily. (Patient taking differently: Take 40 mg by mouth 2 (two) times daily. )    gabapentin (NEURONTIN) 300 MG capsule Take 1 capsule (300 mg total) by mouth 3 (three) times daily.    HYDROPHILIC CREAM (TRIAD WOUND DRESSING TOP) Apply topically daily as needed.    insulin detemir (LEVEMIR) 100 unit/mL injection Inject 8 Units into the skin 2 (two) times daily.    insulin lispro (HUMALOG) 100 unit/mL injection Inject 5 Units into the skin 3 (three) times daily before meals.    lisinopril 10 MG tablet Take 1 tablet (10 mg total) by mouth once daily.    metOLazone (ZAROXOLYN) 2.5 MG tablet Take 1 tablet (2.5 mg total) by mouth daily as needed (HOLD unless fluid overloaded).    ONE TOUCH ULTRA TEST Strp Test blood sugar four times daily    spironolactone (ALDACTONE) 25 MG tablet Take 1 tablet (25 mg total) by mouth once daily.         Please add appropriate    SmartPhrase below:

## 2017-05-02 NOTE — TELEPHONE ENCOUNTER
----- Message from Nella Madrid sent at 5/2/2017 11:18 AM CDT -----  Contact: Ruthie Richmond (Sister)  Ms. Frgaoso would like to cancel pt's upcoming surgery which is scheduled on Monday 05/15/2017 with Dr. Zuniga. She says that pt has been admitted to the hospital due to sudden lost of memory which can't be explained for now and the doctor is requesting for the eye surgery to be put on hold for now. She can be reached at [phone]    Spoke with sister. Surgery postponed.  She will call to reschedule. [facility]

## 2017-05-02 NOTE — PLAN OF CARE
Problem: Patient Care Overview  Goal: Plan of Care Review  Patient is a 67 y.o. male y/o M with HFrEF (EF 20-25% in Jun 2016), NICM declined ICD, HTN, HLD, atrial fibrillation on Eliquis, T2DM, h/o CVA in 2010, ERIC presents with generalized weakness, fatigue and AMS that started as per patient since yesterday. While at the ED laboratories drawn and patient was found with acute renal failure BUN/ Creatinine: 147/6.2 and hyperkalemic: 7.0 (No hemolysis) and thats the reason we are consult. Patient poor historian.       4/28: Admit to SICU for hyperkalemia. K shifted X2. Kayexalate given X 1.   4/29: K shifted x 1, kayexalate x 1    Accuchecks AC/HS  Miconazole ointment BID to pannus and perineal area.   Outcome: Ongoing (interventions implemented as appropriate)  Pt AA&Ox2. Remained free from injury/falls this shift. VSS, afebrile. CBG monitored overnight; WNL, no SSI coverage needed. Potassium levels now WNL per morning labs.   Approximately 700 cc of ben urine removed from andrade drainage bag. Incontinence and andrade care provided. Pt repositioned independently. Bed locked in lowest position. SR upx2. Call light within reach. Will continue to monitor.

## 2017-05-02 NOTE — ASSESSMENT & PLAN NOTE
- Improved  - 7.0 on presentation. No EKG changes.  - Secondary to HUMERA.  - Received IV calcium and insulin in ED.  - Trend BMP

## 2017-05-02 NOTE — ASSESSMENT & PLAN NOTE
- BUN/Cr Improving. Baseline Cr generally ~1.5-2.  - Likely pre-renal azotemia due to volume depletion from diarrhea and duiretics  - BUN/Cr ratio elevated at 24>20.  - Improved with fluid challenge  - Post-obstructive HUMERA possible with BPH - andrade placed and patient voided well.  - Renal ultrasound Consistent with medical renal disease.  - No need for CRRT at this time  - NAGMA improving  -switched bicarb infusion to D5W @75cc/hr w/ RFP monitoring

## 2017-05-03 LAB
ANION GAP SERPL CALC-SCNC: 9 MMOL/L
BASOPHILS # BLD AUTO: 0.03 K/UL
BASOPHILS NFR BLD: 0.3 %
BUN SERPL-MCNC: 30 MG/DL
CALCIUM SERPL-MCNC: 8.5 MG/DL
CHLORIDE SERPL-SCNC: 114 MMOL/L
CO2 SERPL-SCNC: 23 MMOL/L
CREAT SERPL-MCNC: 1.9 MG/DL
DIFFERENTIAL METHOD: ABNORMAL
EOSINOPHIL # BLD AUTO: 0.9 K/UL
EOSINOPHIL NFR BLD: 8.2 %
ERYTHROCYTE [DISTWIDTH] IN BLOOD BY AUTOMATED COUNT: 16.4 %
EST. GFR  (AFRICAN AMERICAN): 40.7 ML/MIN/1.73 M^2
EST. GFR  (NON AFRICAN AMERICAN): 35.2 ML/MIN/1.73 M^2
GLUCOSE SERPL-MCNC: 104 MG/DL
HCT VFR BLD AUTO: 32.4 %
HGB BLD-MCNC: 10.2 G/DL
LYMPHOCYTES # BLD AUTO: 1.9 K/UL
LYMPHOCYTES NFR BLD: 16.9 %
MAGNESIUM SERPL-MCNC: 1.8 MG/DL
MCH RBC QN AUTO: 28 PG
MCHC RBC AUTO-ENTMCNC: 31.5 %
MCV RBC AUTO: 89 FL
MONOCYTES # BLD AUTO: 1.4 K/UL
MONOCYTES NFR BLD: 12 %
NEUTROPHILS # BLD AUTO: 7 K/UL
NEUTROPHILS NFR BLD: 62.2 %
PHOSPHATE SERPL-MCNC: 3.3 MG/DL
PLATELET # BLD AUTO: 145 K/UL
PMV BLD AUTO: 10.9 FL
POCT GLUCOSE: 103 MG/DL (ref 70–110)
POCT GLUCOSE: 129 MG/DL (ref 70–110)
POCT GLUCOSE: 133 MG/DL (ref 70–110)
POCT GLUCOSE: 134 MG/DL (ref 70–110)
POCT GLUCOSE: 99 MG/DL (ref 70–110)
POTASSIUM SERPL-SCNC: 4.1 MMOL/L
RBC # BLD AUTO: 3.64 M/UL
SODIUM SERPL-SCNC: 146 MMOL/L
WBC # BLD AUTO: 11.27 K/UL

## 2017-05-03 PROCEDURE — 25000003 PHARM REV CODE 250: Performed by: STUDENT IN AN ORGANIZED HEALTH CARE EDUCATION/TRAINING PROGRAM

## 2017-05-03 PROCEDURE — 36415 COLL VENOUS BLD VENIPUNCTURE: CPT

## 2017-05-03 PROCEDURE — 80048 BASIC METABOLIC PNL TOTAL CA: CPT

## 2017-05-03 PROCEDURE — 83735 ASSAY OF MAGNESIUM: CPT

## 2017-05-03 PROCEDURE — 25000003 PHARM REV CODE 250: Performed by: INTERNAL MEDICINE

## 2017-05-03 PROCEDURE — 84100 ASSAY OF PHOSPHORUS: CPT

## 2017-05-03 PROCEDURE — 99232 SBSQ HOSP IP/OBS MODERATE 35: CPT | Mod: GC,,, | Performed by: HOSPITALIST

## 2017-05-03 PROCEDURE — 94761 N-INVAS EAR/PLS OXIMETRY MLT: CPT

## 2017-05-03 PROCEDURE — 63600175 PHARM REV CODE 636 W HCPCS: Performed by: INTERNAL MEDICINE

## 2017-05-03 PROCEDURE — 85025 COMPLETE CBC W/AUTO DIFF WBC: CPT

## 2017-05-03 PROCEDURE — 99900035 HC TECH TIME PER 15 MIN (STAT)

## 2017-05-03 PROCEDURE — 94660 CPAP INITIATION&MGMT: CPT

## 2017-05-03 PROCEDURE — 11000001 HC ACUTE MED/SURG PRIVATE ROOM

## 2017-05-03 RX ORDER — MAGNESIUM SULFATE HEPTAHYDRATE 40 MG/ML
2 INJECTION, SOLUTION INTRAVENOUS ONCE
Status: COMPLETED | OUTPATIENT
Start: 2017-05-03 | End: 2017-05-03

## 2017-05-03 RX ORDER — CALCIUM CARBONATE 200(500)MG
1000 TABLET,CHEWABLE ORAL ONCE
Status: COMPLETED | OUTPATIENT
Start: 2017-05-03 | End: 2017-05-03

## 2017-05-03 RX ADMIN — MAGNESIUM SULFATE IN WATER 2 G: 40 INJECTION, SOLUTION INTRAVENOUS at 10:05

## 2017-05-03 RX ADMIN — Medication 3 ML: at 02:05

## 2017-05-03 RX ADMIN — CALCIUM CARBONATE (ANTACID) CHEW TAB 500 MG 1000 MG: 500 CHEW TAB at 08:05

## 2017-05-03 RX ADMIN — MICONAZOLE NITRATE: 20 OINTMENT TOPICAL at 09:05

## 2017-05-03 RX ADMIN — Medication 3 ML: at 06:05

## 2017-05-03 RX ADMIN — FLUTICASONE PROPIONATE 2 SPRAY: 50 SPRAY, METERED NASAL at 04:05

## 2017-05-03 RX ADMIN — DEXTROSE: 5 SOLUTION INTRAVENOUS at 11:05

## 2017-05-03 RX ADMIN — ASPIRIN 81 MG CHEWABLE TABLET 81 MG: 81 TABLET CHEWABLE at 09:05

## 2017-05-03 RX ADMIN — CARVEDILOL 6.25 MG: 6.25 TABLET, FILM COATED ORAL at 10:05

## 2017-05-03 RX ADMIN — Medication 3 ML: at 10:05

## 2017-05-03 RX ADMIN — APIXABAN 5 MG: 2.5 TABLET, FILM COATED ORAL at 09:05

## 2017-05-03 RX ADMIN — ATORVASTATIN CALCIUM 40 MG: 20 TABLET, FILM COATED ORAL at 09:05

## 2017-05-03 RX ADMIN — AMIODARONE HYDROCHLORIDE 200 MG: 200 TABLET ORAL at 09:05

## 2017-05-03 RX ADMIN — CARVEDILOL 6.25 MG: 6.25 TABLET, FILM COATED ORAL at 09:05

## 2017-05-03 RX ADMIN — DEXTROSE: 5 SOLUTION INTRAVENOUS at 03:05

## 2017-05-03 RX ADMIN — HEPARIN SODIUM 5000 UNITS: 5000 INJECTION, SOLUTION INTRAVENOUS; SUBCUTANEOUS at 06:05

## 2017-05-03 NOTE — ASSESSMENT & PLAN NOTE
- At home, long acting 8U BID and short acting 5U TIDWM.  - LDSSI   - Diabetic diet and repeat RlG2O=1

## 2017-05-03 NOTE — PROGRESS NOTES
[facility] Hospital Medicine  Progress Note    Patient Name: Hemant Kennedy Jr.  MRN: [id number]  Patient Class: IP- Inpatient   Admission Date: 4/27/2017  Length of Stay: 5 days  Attending Physician: Prabhjot Maldonado MD  Primary Care Provider: Hemant Markham MD    [facility] Medicine Team: [facility] 5 Chon Lopez MD    Subjective:     Principal Problem:Hyperkalemia    Hospital Course:  4/28: In ED, provided 1g calcium gluconate IV as well as 10U insulin with dextrose for intracellular shifting of potassium. Repeat BMP was demonstrable for resolved hyperkalemia at 3.7. A andrade placed and urine voided. On 4/29: Patient seen by Nephrology due to persistent hyperkalemia in setting of HUMERA on CKD. A  Trialysis was initially attempted however the patient has very poor access and a decision was made to manage the patient medically. Urine output has continued to improve as have other electrolytes. The patient continues to seem somewhat altered but can answer simple demographic questions. He is pending transfer to the floor and will need social work follow-up for discharge planning.    5/2: Attempts made to contact family for more clarification of patient's prior hospitalization as well as move forward with discharge planning.  5/3: Improving from electrolyte, mental status and renal perspective.     Interval History: Patient reports no confusion and is AOx3 today with much more clear mental status. Hypernatremia still correcting with D5W and PO intake is improving. Patient denies any symptoms or confusion concerning for CVA leading up to this hospitalization.     Review of Systems   Cardiovascular: Negative for chest pain.   Neurological: Positive for weakness. Negative for syncope and headaches.   Psychiatric/Behavioral: Negative for confusion and decreased concentration.     Objective:     Vital Signs (Most Recent):  Temp: 98.3 °F (36.8 °C) (05/03/17 1124)  Pulse: 72 (05/03/17 1124)  Resp: 18  (05/03/17 1124)  BP: 125/65 (05/03/17 1124)  SpO2: 96 % (05/03/17 1124) Vital Signs (24h Range):  Temp:  [98.1 °F (36.7 °C)-98.8 °F (37.1 °C)] 98.3 °F (36.8 °C)  Pulse:  [58-72] 72  Resp:  [16-18] 18  SpO2:  [96 %-100 %] 96 %  BP: (125-136)/(60-70) 125/65     Weight: 119 kg (262 lb 5.6 oz)  Body mass index is 39.89 kg/(m^2).    Intake/Output Summary (Last 24 hours) at 05/03/17 1508  Last data filed at 05/03/17 0305   Gross per 24 hour   Intake             2210 ml   Output             1750 ml   Net              460 ml      Physical Exam   Constitutional: He is oriented to person, place, and time.   HENT:   Nose: Nose normal.   Mouth/Throat: Oropharynx is clear and moist. No oropharyngeal exudate.   Eyes: EOM are normal. Pupils are equal, round, and reactive to light. Left eye exhibits no discharge. No scleral icterus.   Neck: No JVD present. No tracheal deviation present. No thyromegaly present.   Cardiovascular: Normal rate, regular rhythm and intact distal pulses.  Exam reveals no gallop and no friction rub.    Murmur heard.  Pulmonary/Chest: Effort normal. No respiratory distress. He has no wheezes. He has no rales. He exhibits no tenderness.   BS distant   Abdominal: Soft. Bowel sounds are normal. He exhibits no distension and no mass. There is no tenderness. No hernia.   Genitourinary: Rectal exam shows guaiac negative stool. No penile tenderness.   Musculoskeletal: Normal range of motion. He exhibits no edema, tenderness or deformity.   Lymphadenopathy:     He has no cervical adenopathy.   Neurological: He is oriented to person, place, and time. He displays normal reflexes. No cranial nerve deficit. Coordination normal.   Residual LLE weakness   Skin: Skin is warm and dry. No rash noted. No erythema.   Psychiatric: He has a normal mood and affect.       Significant Labs:   CBC:   Recent Labs  Lab 05/02/17  0439 05/03/17  0405   WBC 10.79 11.27   HGB 10.5* 10.2*   HCT 32.8* 32.4*   * 145*     CMP:    Recent Labs  Lab 05/01/17  2347 05/02/17  0439 05/03/17  0405   * 149* 146*   K 4.4 4.5 4.1   * 116* 114*   CO2 26 23 23   * 109 104   BUN 41* 38* 30*   CREATININE 2.2* 2.1* 1.9*   CALCIUM 9.5 9.1 8.5*   ALBUMIN 2.8*  --   --    ANIONGAP 8 10 9   EGFRNONAA 29.5* 31.2* 35.2*       Significant Imaging: I have reviewed and interpreted all pertinent imaging results/findings within the past 24 hours.    Assessment/Plan:      * Hyperkalemia  - Improved  - 7.0 on presentation. No EKG changes. Holding spironolactone/ ACE-ARB  - Secondary to HUMERA.  - Received IV calcium and insulin in ED.  - Trend BMP      Chronic systolic congestive heart failure, NYHA class 2  EF 25% on 7/2016.  - Not fluid overloaded on physical examination and BNP at baseline at 157.  - Continue Amiodorone and Atorvastatin. Holding Spironolactone, and Lisinopril considering HUMERA.  - Repeat 2D echo for surveillance->EF improved to 40 w/ DD      Debility  -PT/OT/SW consult for discharge planning      Atrial fibrillation, controlled  - [id number] score of 5; high risk of stroke (7.2% per year)  - Continue  Amiodarone, resumed apixaban      Type II diabetes mellitus with complication, uncontrolled  - At home, long acting 8U BID and short acting 5U TIDWM.  - LDSSI   - Diabetic diet and repeat IpK1S=3      Encounter for wound care  -wound care following, continuing their recs  -possible explanation of leukocytosis on 5/1  -The sacral area skin is intact, the scrotum has shearing noted to the posterior aspect  -miconazole ointment BID to scrotal area to protect the skin and resolve fungal infection.       Acute renal failure superimposed on stage 3 chronic kidney disease  - BUN/Cr Improving. Baseline Cr generally ~1.5-2.  - Likely pre-renal azotemia due to volume depletion from diarrhea and duiretics  - BUN/Cr ratio elevated   - Improved with fluid challenge  - Post-obstructive HUMERA possible with BPH - andrade placed and patient voided well.  -  Renal ultrasound Consistent with medical renal disease.  - No need for CRRT at this time  - NAGMA improving  -switched bicarb infusion to D5W w/ RFP monitoring      Uremic encephalopathy  -Improving  -- Slight confusion possibly secondary to uremia or delirium  -- Old CVA in 2010 --  SUBACUTE INFARCTION INVOLVING THE RIGHT PCA TERRITORY WITHIN THE PARASAGITTAL RIGHT OCCIPITAL LOBE  - Continue monitor   -CPAP QHS  -improving; some component thought to be from CVA prior to admission but patient is improved. Less concern and no further brain imaging.      Renovascular hypertension  -continue to monitor and add HTN regimen as tolerated-restarted home low-dose Coreg      VTE Risk Mitigation         Ordered     apixaban tablet 5 mg  2 times daily     Route:  Oral        05/03/17 0940     Medium Risk of VTE  Once      04/28/17 0211     Place sequential compression device  Until discontinued      04/28/17 0211          Chon Lopez MD  Department of Hospital Medicine   [facility]

## 2017-05-03 NOTE — SUBJECTIVE & OBJECTIVE
Interval History: Patient reports no confusion and is AOx3 today with much more clear mental status. Hypernatremia still correcting with D5W and PO intake is improving. Patient denies any symptoms or confusion concerning for CVA leading up to this hospitalization.     Review of Systems   Cardiovascular: Negative for chest pain.   Neurological: Positive for weakness. Negative for syncope and headaches.   Psychiatric/Behavioral: Negative for confusion and decreased concentration.     Objective:     Vital Signs (Most Recent):  Temp: 98.3 °F (36.8 °C) (05/03/17 1124)  Pulse: 72 (05/03/17 1124)  Resp: 18 (05/03/17 1124)  BP: 125/65 (05/03/17 1124)  SpO2: 96 % (05/03/17 1124) Vital Signs (24h Range):  Temp:  [98.1 °F (36.7 °C)-98.8 °F (37.1 °C)] 98.3 °F (36.8 °C)  Pulse:  [58-72] 72  Resp:  [16-18] 18  SpO2:  [96 %-100 %] 96 %  BP: (125-136)/(60-70) 125/65     Weight: 119 kg (262 lb 5.6 oz)  Body mass index is 39.89 kg/(m^2).    Intake/Output Summary (Last 24 hours) at 05/03/17 1508  Last data filed at 05/03/17 0305   Gross per 24 hour   Intake             2210 ml   Output             1750 ml   Net              460 ml      Physical Exam   Constitutional: He is oriented to person, place, and time.   HENT:   Nose: Nose normal.   Mouth/Throat: Oropharynx is clear and moist. No oropharyngeal exudate.   Eyes: EOM are normal. Pupils are equal, round, and reactive to light. Left eye exhibits no discharge. No scleral icterus.   Neck: No JVD present. No tracheal deviation present. No thyromegaly present.   Cardiovascular: Normal rate, regular rhythm and intact distal pulses.  Exam reveals no gallop and no friction rub.    Murmur heard.  Pulmonary/Chest: Effort normal. No respiratory distress. He has no wheezes. He has no rales. He exhibits no tenderness.   BS distant   Abdominal: Soft. Bowel sounds are normal. He exhibits no distension and no mass. There is no tenderness. No hernia.   Genitourinary: Rectal exam shows guaiac  negative stool. No penile tenderness.   Musculoskeletal: Normal range of motion. He exhibits no edema, tenderness or deformity.   Lymphadenopathy:     He has no cervical adenopathy.   Neurological: He is oriented to person, place, and time. He displays normal reflexes. No cranial nerve deficit. Coordination normal.   Residual LLE weakness   Skin: Skin is warm and dry. No rash noted. No erythema.   Psychiatric: He has a normal mood and affect.       Significant Labs:   CBC:   Recent Labs  Lab 05/02/17  0439 05/03/17  0405   WBC 10.79 11.27   HGB 10.5* 10.2*   HCT 32.8* 32.4*   * 145*     CMP:   Recent Labs  Lab 05/01/17  2347 05/02/17  0439 05/03/17  0405   * 149* 146*   K 4.4 4.5 4.1   * 116* 114*   CO2 26 23 23   * 109 104   BUN 41* 38* 30*   CREATININE 2.2* 2.1* 1.9*   CALCIUM 9.5 9.1 8.5*   ALBUMIN 2.8*  --   --    ANIONGAP 8 10 9   EGFRNONAA 29.5* 31.2* 35.2*       Significant Imaging: I have reviewed and interpreted all pertinent imaging results/findings within the past 24 hours.

## 2017-05-03 NOTE — ASSESSMENT & PLAN NOTE
-Improving  -- Slight confusion possibly secondary to uremia or delirium  -- Old CVA in 2010 --  SUBACUTE INFARCTION INVOLVING THE RIGHT PCA TERRITORY WITHIN THE PARASAGITTAL RIGHT OCCIPITAL LOBE  - Continue monitor   -CPAP QHS  -improving; some component thought to be from CVA prior to admission but patient is improved. Less concern and no further brain imaging.

## 2017-05-03 NOTE — PLAN OF CARE
CM in to see pt AA but disoriented to place and time.  Pt states he is W/C bound and uses CPAP and Oxygen at home.  Pt current with [facility] for wound care.  PT recommends HH PT vs SNF for deconditioning.     05/03/17 1210   Right Care Assessment   Can the patient answer the patient profile reliably? No, cognitively impaired   How often would a person be available to care for the patient? Occasionally   Describe the patient's ability to walk at the present time. Does not walk or unable to take any steps at all   How does the patient rate their overall health at the present time? Fair   During the past month, has the patient often been bothered by feeling down, depressed or hopeless? No   During the past month, has the patient often been bothered by little interest or pleasure in doing things? No     Plan A: HH  Plan B: SNF

## 2017-05-03 NOTE — PLAN OF CARE
Problem: Patient Care Overview  Goal: Plan of Care Review  Patient is a 67 y.o. male y/o M with HFrEF (EF 20-25% in Jun 2016), NICM declined ICD, HTN, HLD, atrial fibrillation on Eliquis, T2DM, h/o CVA in 2010, ERIC presents with generalized weakness, fatigue and AMS that started as per patient since yesterday. While at the ED laboratories drawn and patient was found with acute renal failure BUN/ Creatinine: 147/6.2 and hyperkalemic: 7.0 (No hemolysis) and thats the reason we are consult. Patient poor historian.       4/28: Admit to SICU for hyperkalemia. K shifted X2. Kayexalate given X 1.   4/29: K shifted x 1, kayexalate x 1    Accuchecks AC/HS  Miconazole ointment BID to pannus and perineal area.   Outcome: Ongoing (interventions implemented as appropriate)  Alert/oriented...blunted affect. Has yeasty gino/sacrum/abd fold redness and moisture. Left foot heal wound; side of foot healing. accuchecks ac and hs-poor appetite. Covered w/ssi. No active infection and is afebrile. Receiving iv fluids to help with bun/cr. Catheter in place-urine dark and concentrated. No falls/trauma this shift. Wears CPAP at night.

## 2017-05-03 NOTE — ASSESSMENT & PLAN NOTE
- BUN/Cr Improving. Baseline Cr generally ~1.5-2.  - Likely pre-renal azotemia due to volume depletion from diarrhea and duiretics  - BUN/Cr ratio elevated   - Improved with fluid challenge  - Post-obstructive HUMERA possible with BPH - andrade placed and patient voided well.  - Renal ultrasound Consistent with medical renal disease.  - No need for CRRT at this time  - NAGMA improving  -switched bicarb infusion to D5W w/ RFP monitoring

## 2017-05-03 NOTE — ASSESSMENT & PLAN NOTE
- Improved  - 7.0 on presentation. No EKG changes. Holding spironolactone/ ACE-ARB  - Secondary to HUMERA.  - Received IV calcium and insulin in ED.  - Trend BMP

## 2017-05-03 NOTE — ASSESSMENT & PLAN NOTE
- [id number] score of 5; high risk of stroke (7.2% per year)  - Continue  Amiodarone, resumed apixaban

## 2017-05-03 NOTE — PLAN OF CARE
[facility]    HOME HEALTH ORDERS  FACE TO FACE ENCOUNTER    Patient Name: Hemant Kennedy Jr.  YOB: 1950    PCP: Hemant Markham MD   PCP Address: [address] / [place] [address]  PCP Phone Number: [phone]  PCP Fax: [phone]    Encounter Date: 05/03/2017    Admit to Home Health    Diagnoses:  Active Hospital Problems    Diagnosis  POA    *Hyperkalemia [E87.5]  Yes    Goals of care, counseling/discussion [Z71.89]  Not Applicable    Acute renal failure superimposed on stage 3 chronic kidney disease [N17.9, N18.3]  Yes    Uremic encephalopathy [G93.41, N19]  Yes    Renovascular hypertension [I15.0]  Yes    Encounter for wound care [Z51.89]  Not Applicable    Type II diabetes mellitus with complication, uncontrolled [E11.8, E11.65]  Yes    HUMERA (acute kidney injury) [N17.9]  Yes    Morbid obesity due to excess calories [E66.01]  Yes    CKD (chronic kidney disease) stage 3, GFR 30-59 ml/min [N18.3]  Yes    Atrial fibrillation, controlled [I48.91]  Yes    Sleep apnea [G47.30]  Yes    Debility [R53.81]  Yes    Chronic systolic congestive heart failure, NYHA class 2 [I50.22]  Yes      Resolved Hospital Problems    Diagnosis Date Resolved POA    Morbid obesity [E66.01] 04/30/2017 Unknown       Future Appointments  Date Time Provider Department Center   5/18/2017 11:00 AM Catarina Domingo MD [facility] ENDOCRN [facility]   5/18/2017 12:00 PM LAB, APPOINTMENT [facility] LAB [facility]   5/18/2017 2:00 PM Denita Hendrix NP [facility] WOUND [facility]   5/31/2017 3:40 PM Lakisha Plummer MD [facility] NEPHRO [facility]   6/28/2017 1:40 PM Meir Wei MD [facility] CARDIO [facility]           I have seen and examined this patient face to face today. My clinical findings that support the need for the home health skilled services and home bound status are the following:  Weakness/numbness causing balance and gait disturbance due to Heart Failure making it taxing to  leave home.    Allergies:  Review of patient's allergies indicates:   Allergen Reactions    Spironolactone      Hyperkalemia         Diet: cardiac diet and renal diet    Activities: activity as tolerated    Nursing:   SN to complete comprehensive assessment including routine vital signs. Instruct on disease process and s/s of complications to report to MD. Review/verify medication list sent home with the patient at time of discharge  and instruct patient/caregiver as needed. Frequency may be adjusted depending on start of care date.    Notify MD if SBP > 160 or < 90; DBP > 90 or < 50; HR > 120 or < 50; Temp > 101      CONSULTS:    Physical Therapy to evaluate and treat. Evaluate for home safety and equipment needs; Establish/upgrade home exercise program. Perform / instruct on therapeutic exercises, gait training, transfer training, and Range of Motion.  Occupational Therapy to evaluate and treat. Evaluate home environment for safety and equipment needs. Perform/Instruct on transfers, ADL training, ROM, and therapeutic exercises.   to evaluate for community resources/long-range planning.  Aide to provide assistance with personal care, ADLs, and vital signs.    MISCELLANEOUS CARE:  N/A    WOUND CARE ORDERS  yes:  Scrotal pressure ulcer: apply miconazole powder BID, off load pressure ulcers on heels      Medications: Review discharge medications with patient and family and provide education.      Current Discharge Medication List      CONTINUE these medications which have NOT CHANGED    Details   acetaminophen (TYLENOL) 500 MG tablet Take 2 tablets (1,000 mg total) by mouth 3 (three) times daily as needed for Pain.  Qty: 15 tablet, Refills: 0      allopurinol (ZYLOPRIM) 300 MG tablet Take 1 tablet (300 mg total) by mouth once daily.  Tablet Oral Every day  Qty: 90 tablet, Refills: 3      amiodarone (PACERONE) 200 MG Tab Take 1 tablet (200 mg total) by mouth once daily.  Qty: 90 tablet, Refills: 3     Associated Diagnoses: Chronic systolic heart failure; Atrial fibrillation, controlled      apixaban (ELIQUIS) 5 mg Tab Take 5 mg by mouth 2 (two) times daily.      aspirin 81 MG Chew Take 81 mg by mouth once daily.       atorvastatin (LIPITOR) 40 MG tablet Take 1 tablet (40 mg total) by mouth once daily. 1 Tablet Oral Every day  Qty: 90 tablet, Refills: 3    Associated Diagnoses: Chronic systolic heart failure; Atrial fibrillation, controlled      carvedilol (COREG) 12.5 MG tablet Take 12.5 mg by mouth 2 (two) times daily with meals.       collagenase (SANTYL) ointment Apply topically once daily. Apply to wound daily as directed.  Qty: 90 g, Refills: 0    Associated Diagnoses: Ulcer of other part of foot; Decubitus ulcer of left heel, unstageable      ergocalciferol (ERGOCALCIFEROL) 50,000 unit Cap Take 1 capsule (50,000 Units total) by mouth every 7 days.  Qty: 12 capsule, Refills: 3    Associated Diagnoses: Secondary hyperparathyroidism      ferrous sulfate 325 (65 FE) MG EC tablet Take 1 tablet (325 mg total) by mouth 2 (two) times daily with meals.  Qty: 60 tablet, Refills: 5      fluticasone (FLONASE) 50 mcg/actuation nasal spray 2 sprays by Each Nare route once daily.  Refills: 0      gabapentin (NEURONTIN) 300 MG capsule Take 1 capsule (300 mg total) by mouth 3 (three) times daily.  Qty: 90 capsule, Refills: 11    Associated Diagnoses: Type 2 diabetes mellitus with stage 3 chronic kidney disease, without long-term current use of insulin      HYDROPHILIC CREAM (TRIAD WOUND DRESSING TOP) Apply topically daily as needed.      insulin detemir (LEVEMIR) 100 unit/mL injection Inject 8 Units into the skin 2 (two) times daily.  Refills: 12      insulin lispro (HUMALOG) 100 unit/mL injection Inject 5 Units into the skin 3 (three) times daily before meals.      lisinopril 10 MG tablet Take 1 tablet (10 mg total) by mouth once daily.  Qty: 90 tablet, Refills: 3    Associated Diagnoses: Chronic systolic heart failure       ONE TOUCH ULTRA TEST Strp Test blood sugar four times daily         STOP taking these medications       furosemide (LASIX) 40 MG tablet Comments:   Reason for Stopping:         metOLazone (ZAROXOLYN) 2.5 MG tablet Comments:   Reason for Stopping:         spironolactone (ALDACTONE) 25 MG tablet Comments:   Reason for Stopping:               I certify that this patient is confined to his home and needs intermittent skilled nursing care, physical therapy and occupational therapy.

## 2017-05-04 LAB
ANION GAP SERPL CALC-SCNC: 10 MMOL/L
BASOPHILS # BLD AUTO: 0.02 K/UL
BASOPHILS NFR BLD: 0.2 %
BUN SERPL-MCNC: 28 MG/DL
CALCIUM SERPL-MCNC: 8.6 MG/DL
CHLORIDE SERPL-SCNC: 112 MMOL/L
CO2 SERPL-SCNC: 19 MMOL/L
CREAT SERPL-MCNC: 1.9 MG/DL
DIFFERENTIAL METHOD: ABNORMAL
EOSINOPHIL # BLD AUTO: 0.7 K/UL
EOSINOPHIL NFR BLD: 6.9 %
ERYTHROCYTE [DISTWIDTH] IN BLOOD BY AUTOMATED COUNT: 16.5 %
EST. GFR  (AFRICAN AMERICAN): 40.7 ML/MIN/1.73 M^2
EST. GFR  (NON AFRICAN AMERICAN): 35.2 ML/MIN/1.73 M^2
GLUCOSE SERPL-MCNC: 112 MG/DL
HCT VFR BLD AUTO: 30.7 %
HGB BLD-MCNC: 10.2 G/DL
LYMPHOCYTES # BLD AUTO: 2 K/UL
LYMPHOCYTES NFR BLD: 19.5 %
MAGNESIUM SERPL-MCNC: 2.2 MG/DL
MCH RBC QN AUTO: 28.6 PG
MCHC RBC AUTO-ENTMCNC: 33.2 %
MCV RBC AUTO: 86 FL
MONOCYTES # BLD AUTO: 1.3 K/UL
MONOCYTES NFR BLD: 12.9 %
NEUTROPHILS # BLD AUTO: 6.1 K/UL
NEUTROPHILS NFR BLD: 60.2 %
PHOSPHATE SERPL-MCNC: 3.3 MG/DL
PLATELET # BLD AUTO: 121 K/UL
PMV BLD AUTO: 10.2 FL
POCT GLUCOSE: 107 MG/DL (ref 70–110)
POCT GLUCOSE: 134 MG/DL (ref 70–110)
POCT GLUCOSE: 95 MG/DL (ref 70–110)
POTASSIUM SERPL-SCNC: 4.4 MMOL/L
RBC # BLD AUTO: 3.57 M/UL
SODIUM SERPL-SCNC: 141 MMOL/L
WBC # BLD AUTO: 10.07 K/UL

## 2017-05-04 PROCEDURE — 85025 COMPLETE CBC W/AUTO DIFF WBC: CPT

## 2017-05-04 PROCEDURE — 25000003 PHARM REV CODE 250: Performed by: STUDENT IN AN ORGANIZED HEALTH CARE EDUCATION/TRAINING PROGRAM

## 2017-05-04 PROCEDURE — 83735 ASSAY OF MAGNESIUM: CPT

## 2017-05-04 PROCEDURE — 99900035 HC TECH TIME PER 15 MIN (STAT)

## 2017-05-04 PROCEDURE — 11000001 HC ACUTE MED/SURG PRIVATE ROOM

## 2017-05-04 PROCEDURE — 84100 ASSAY OF PHOSPHORUS: CPT

## 2017-05-04 PROCEDURE — 80048 BASIC METABOLIC PNL TOTAL CA: CPT

## 2017-05-04 PROCEDURE — 36415 COLL VENOUS BLD VENIPUNCTURE: CPT

## 2017-05-04 PROCEDURE — 25000003 PHARM REV CODE 250: Performed by: INTERNAL MEDICINE

## 2017-05-04 PROCEDURE — 94660 CPAP INITIATION&MGMT: CPT

## 2017-05-04 PROCEDURE — 99232 SBSQ HOSP IP/OBS MODERATE 35: CPT | Mod: GC,,, | Performed by: HOSPITALIST

## 2017-05-04 RX ADMIN — ATORVASTATIN CALCIUM 40 MG: 20 TABLET, FILM COATED ORAL at 08:05

## 2017-05-04 RX ADMIN — ASPIRIN 81 MG CHEWABLE TABLET 81 MG: 81 TABLET CHEWABLE at 09:05

## 2017-05-04 RX ADMIN — AMIODARONE HYDROCHLORIDE 200 MG: 200 TABLET ORAL at 08:05

## 2017-05-04 RX ADMIN — MICONAZOLE NITRATE: 20 OINTMENT TOPICAL at 08:05

## 2017-05-04 RX ADMIN — CARVEDILOL 6.25 MG: 6.25 TABLET, FILM COATED ORAL at 08:05

## 2017-05-04 RX ADMIN — FLUTICASONE PROPIONATE 2 SPRAY: 50 SPRAY, METERED NASAL at 08:05

## 2017-05-04 RX ADMIN — APIXABAN 5 MG: 2.5 TABLET, FILM COATED ORAL at 08:05

## 2017-05-04 RX ADMIN — Medication 3 ML: at 02:05

## 2017-05-04 RX ADMIN — Medication 3 ML: at 08:05

## 2017-05-04 NOTE — PLAN OF CARE
CM in to see pt rounding with IM5.  Pt AAO eating breakfast in bed.  MD informed pt of recent lab results and is continuing to monitor electrolytes with possible discharge tomorrow.  Pt in agreement with resumption of home health with [facility] upon discharge.  CM will continue to follow.

## 2017-05-04 NOTE — PROGRESS NOTES
[facility] Hospital Medicine  Progress Note    Patient Name: Hemant Kennedy Jr.  MRN: [id number]  Patient Class: IP- Inpatient   Admission Date: 4/27/2017  Length of Stay: 6 days  Attending Physician: Prabhjot Maldonado MD  Primary Care Provider: Hemant Markham MD    [facility] Medicine Team: [facility] HOSP MED 5 Chon Lopez MD    Subjective:     Principal Problem:Hyperkalemia    Hospital Course:  4/28: In ED, provided 1g calcium gluconate IV as well as 10U insulin with dextrose for intracellular shifting of potassium. Repeat BMP was demonstrable for resolved hyperkalemia at 3.7. A andrade placed and urine voided. On 4/29: Patient seen by Nephrology due to persistent hyperkalemia in setting of HUMERA on CKD. A  [facility] was initially attempted however the patient has very poor access and a decision was made to manage the patient medically. Urine output has continued to improve as have other electrolytes. The patient continues to seem somewhat altered but can answer simple demographic questions. He is pending transfer to the floor and will need social work follow-up for discharge planning.    5/2: Attempts made to contact family for more clarification of patient's prior hospitalization as well as move forward with discharge planning.  5/4: FWD improved on D5; encouraging PO intake and electrolyte monitoring with targeted discharge for 5/5.       Interval History: Patient is improving with mental status and electrolyte/renal function. Patient eager for discharge tomorrow pending electrolyte status.     Review of Systems   Cardiovascular: Negative for chest pain.   Neurological: Positive for weakness. Negative for syncope and headaches.   Psychiatric/Behavioral: Negative for confusion and decreased concentration.     Objective:     Vital Signs (Most Recent):  Temp: 99 °F (37.2 °C) (05/04/17 0758)  Pulse: 60 (05/04/17 0758)  Resp: 18 (05/04/17 0758)  BP: 137/65 (05/04/17 0758)  SpO2: 98 % (05/04/17 0758)  Vital Signs (24h Range):  Temp:  [98.4 °F (36.9 °C)-99 °F (37.2 °C)] 99 °F (37.2 °C)  Pulse:  [60-68] 60  Resp:  [16-20] 18  SpO2:  [94 %-99 %] 98 %  BP: (118-137)/(60-65) 137/65     Weight: 119 kg (262 lb 5.6 oz)  Body mass index is 39.89 kg/(m^2).    Intake/Output Summary (Last 24 hours) at 05/04/17 1150  Last data filed at 05/04/17 0611   Gross per 24 hour   Intake             1790 ml   Output             1825 ml   Net              -35 ml      Physical Exam   Constitutional: He is oriented to person, place, and time.   HENT:   Nose: Nose normal.   Mouth/Throat: Oropharynx is clear and moist. No oropharyngeal exudate.   Eyes: EOM are normal. Pupils are equal, round, and reactive to light. Left eye exhibits no discharge. No scleral icterus.   Neck: No JVD present. No tracheal deviation present. No thyromegaly present.   Cardiovascular: Normal rate, regular rhythm and intact distal pulses.  Exam reveals no gallop and no friction rub.    Murmur heard.  Pulmonary/Chest: Effort normal. No respiratory distress. He has no wheezes. He has no rales. He exhibits no tenderness.   BS distant   Abdominal: Soft. Bowel sounds are normal. He exhibits no distension and no mass. There is no tenderness. No hernia.   Genitourinary: Rectal exam shows guaiac negative stool. No penile tenderness.   Musculoskeletal: Normal range of motion. He exhibits no edema, tenderness or deformity.   Lymphadenopathy:     He has no cervical adenopathy.   Neurological: He is oriented to person, place, and time. He displays normal reflexes. No cranial nerve deficit. Coordination normal.   Residual LLE weakness   Skin: Skin is warm and dry. No rash noted. No erythema.   Psychiatric: He has a normal mood and affect.       Significant Labs:   CBC:   Recent Labs  Lab 05/03/17  0405 05/04/17 0418   WBC 11.27 10.07   HGB 10.2* 10.2*   HCT 32.4* 30.7*   * 121*     CMP:   Recent Labs  Lab 05/03/17  0405 05/04/17 0418   * 141   K 4.1 4.4   *  112*   CO2 23 19*    112*   BUN 30* 28*   CREATININE 1.9* 1.9*   CALCIUM 8.5* 8.6*   ANIONGAP 9 10   EGFRNONAA 35.2* 35.2*       Significant Imaging: I have reviewed and interpreted all pertinent imaging results/findings within the past 24 hours.    Assessment/Plan:      * Hyperkalemia  - Improved  - 7.0 on presentation. No EKG changes. Holding spironolactone/ ACE-ARB  - Secondary to HUMERA.  - Received IV calcium and insulin in ED.  - Trend BMP      Chronic systolic congestive heart failure, NYHA class 2  EF 25% on 7/2016.  - Not fluid overloaded on physical examination and BNP at baseline at 157.  - Continue Amiodorone and Atorvastatin. Holding Spironolactone, and Lisinopril considering HUMERA.  - Repeat 2D echo for surveillance->EF improved to 40 w/ DD      Debility  -PT/OT/SW consult for discharge planning      Sleep apnea  -CPAP QHS      Atrial fibrillation, controlled  - [id number] score of 5; high risk of stroke (7.2% per year)  - Continue  Amiodarone, resumed apixaban      Type II diabetes mellitus with complication, uncontrolled  - At home, long acting 8U BID and short acting 5U TIDWM.  - LDSSI   - Diabetic diet and repeat [id number]      Encounter for wound care  -wound care following, continuing their recs  -possible explanation of leukocytosis on 5/1  -The sacral area skin is intact, the scrotum has shearing noted to the posterior aspect  -miconazole ointment BID to scrotal area to protect the skin and resolve fungal infection.       Acute renal failure superimposed on stage 3 chronic kidney disease  - BUN/Cr Improving. Baseline Cr generally ~1.5-2.  - Likely pre-renal azotemia due to volume depletion from diarrhea and duiretics  - BUN/Cr ratio elevated   - Improved with fluid challenge  - Post-obstructive HUMERA possible with BPH - andrade placed and patient voided well.  - Renal ultrasound Consistent with medical renal disease.  - No need for CRRT at this time  - NAGMA improving  -discontinued bicarb infusion  to D5W as symptoms have resolved      Uremic encephalopathy  -Improving  -- Slight confusion possibly secondary to uremia or delirium  -- Old CVA in 2010 --  SUBACUTE INFARCTION INVOLVING THE RIGHT PCA TERRITORY WITHIN THE PARASAGITTAL RIGHT OCCIPITAL LOBE  - Continue monitor   -CPAP QHS  -improving; some component thought to be from CVA prior to admission but patient is improved. Less concern and no further brain imaging.      Renovascular hypertension  -continue to monitor and add HTN regimen as tolerated-restarted home low-dose Coreg      VTE Risk Mitigation         Ordered     apixaban tablet 5 mg  2 times daily     Route:  Oral        05/03/17 0940     Medium Risk of VTE  Once      04/28/17 0211     Place sequential compression device  Until discontinued      04/28/17 0211          Chon Lopez MD  Department of Hospital Medicine   [facility]

## 2017-05-04 NOTE — ASSESSMENT & PLAN NOTE
- BUN/Cr Improving. Baseline Cr generally ~1.5-2.  - Likely pre-renal azotemia due to volume depletion from diarrhea and duiretics  - BUN/Cr ratio elevated   - Improved with fluid challenge  - Post-obstructive HUMERA possible with BPH - andrade placed and patient voided well.  - Renal ultrasound Consistent with medical renal disease.  - No need for CRRT at this time  - NAGMA improving  -discontinued bicarb infusion to D5W as symptoms have resolved

## 2017-05-04 NOTE — ASSESSMENT & PLAN NOTE
- At home, long acting 8U BID and short acting 5U TIDWM.  - LDSSI   - Diabetic diet and repeat [id number]

## 2017-05-04 NOTE — SUBJECTIVE & OBJECTIVE
Interval History: Patient is improving with mental status and electrolyte/renal function. Patient eager for discharge tomorrow pending electrolyte status.     Review of Systems   Cardiovascular: Negative for chest pain.   Neurological: Positive for weakness. Negative for syncope and headaches.   Psychiatric/Behavioral: Negative for confusion and decreased concentration.     Objective:     Vital Signs (Most Recent):  Temp: 99 °F (37.2 °C) (05/04/17 0758)  Pulse: 60 (05/04/17 0758)  Resp: 18 (05/04/17 0758)  BP: 137/65 (05/04/17 0758)  SpO2: 98 % (05/04/17 0758) Vital Signs (24h Range):  Temp:  [98.4 °F (36.9 °C)-99 °F (37.2 °C)] 99 °F (37.2 °C)  Pulse:  [60-68] 60  Resp:  [16-20] 18  SpO2:  [94 %-99 %] 98 %  BP: (118-137)/(60-65) 137/65     Weight: 119 kg (262 lb 5.6 oz)  Body mass index is 39.89 kg/(m^2).    Intake/Output Summary (Last 24 hours) at 05/04/17 1150  Last data filed at 05/04/17 0611   Gross per 24 hour   Intake             1790 ml   Output             1825 ml   Net              -35 ml      Physical Exam   Constitutional: He is oriented to person, place, and time.   HENT:   Nose: Nose normal.   Mouth/Throat: Oropharynx is clear and moist. No oropharyngeal exudate.   Eyes: EOM are normal. Pupils are equal, round, and reactive to light. Left eye exhibits no discharge. No scleral icterus.   Neck: No JVD present. No tracheal deviation present. No thyromegaly present.   Cardiovascular: Normal rate, regular rhythm and intact distal pulses.  Exam reveals no gallop and no friction rub.    Murmur heard.  Pulmonary/Chest: Effort normal. No respiratory distress. He has no wheezes. He has no rales. He exhibits no tenderness.   BS distant   Abdominal: Soft. Bowel sounds are normal. He exhibits no distension and no mass. There is no tenderness. No hernia.   Genitourinary: Rectal exam shows guaiac negative stool. No penile tenderness.   Musculoskeletal: Normal range of motion. He exhibits no edema, tenderness or  deformity.   Lymphadenopathy:     He has no cervical adenopathy.   Neurological: He is oriented to person, place, and time. He displays normal reflexes. No cranial nerve deficit. Coordination normal.   Residual LLE weakness   Skin: Skin is warm and dry. No rash noted. No erythema.   Psychiatric: He has a normal mood and affect.       Significant Labs:   CBC:   Recent Labs  Lab 05/03/17 0405 05/04/17  0418   WBC 11.27 10.07   HGB 10.2* 10.2*   HCT 32.4* 30.7*   * 121*     CMP:   Recent Labs  Lab 05/03/17 0405 05/04/17 0418   * 141   K 4.1 4.4   * 112*   CO2 23 19*    112*   BUN 30* 28*   CREATININE 1.9* 1.9*   CALCIUM 8.5* 8.6*   ANIONGAP 9 10   EGFRNONAA 35.2* 35.2*       Significant Imaging: I have reviewed and interpreted all pertinent imaging results/findings within the past 24 hours.

## 2017-05-04 NOTE — PLAN OF CARE
Problem: Patient Care Overview  Goal: Discharge Needs Assessment  Outcome: Ongoing (interventions implemented as appropriate)  Pt oriented to self only. Pt keeps requesting to go home, in disbelief that he is in the hospital. Pt turned Q2 with max assist. No new skin breakdown noted. VS and assessment performed per orders, VSS throughout nightshift. Patient progressing towards goals as tolerated. Plan of care reviewed with pt, all concerns addressed. Will continue to monitor

## 2017-05-04 NOTE — ASSESSMENT & PLAN NOTE
- [id number] score of 5; high risk of stroke (7.2% per year)  - Continue  Amiodarone, resumed apixaban

## 2017-05-04 NOTE — PLAN OF CARE
[facility]    HOME HEALTH ORDERS  FACE TO FACE ENCOUNTER    Patient Name: Hemant Kennedy Jr.  YOB: 1950    PCP: Hemant Markham MD   PCP Address: [address] [facility] [facility] / [place] [address]  PCP Phone Number: [phone]  PCP Fax: [phone]    Encounter Date: 05/04/2017    Admit to Home Health    Diagnoses:  Active Hospital Problems    Diagnosis  POA    *Hyperkalemia [E87.5]  Yes    Goals of care, counseling/discussion [Z71.89]  Not Applicable    Acute renal failure superimposed on stage 3 chronic kidney disease [N17.9, N18.3]  Yes    Uremic encephalopathy [G93.41, N19]  Yes    Renovascular hypertension [I15.0]  Yes    Encounter for wound care [Z51.89]  Not Applicable    Type II diabetes mellitus with complication, uncontrolled [E11.8, E11.65]  Yes    HUMERA (acute kidney injury) [N17.9]  Yes    Morbid obesity due to excess calories [E66.01]  Yes    CKD (chronic kidney disease) stage 3, GFR 30-59 ml/min [N18.3]  Yes    Atrial fibrillation, controlled [I48.91]  Yes    Sleep apnea [G47.30]  Yes    Debility [R53.81]  Yes    Chronic systolic congestive heart failure, NYHA class 2 [I50.22]  Yes      Resolved Hospital Problems    Diagnosis Date Resolved POA    Morbid obesity [E66.01] 04/30/2017 Yes       Future Appointments  Date Time Provider Department Center   5/18/2017 11:00 AM Catarina Domingo MD [facility] ENDOCRN [facility]   5/18/2017 12:00 PM LAB, APPOINTMENT [facility]   5/18/2017 2:00 PM Denita Hendrix NP [facility] WOUND [facility]   5/31/2017 3:40 PM Lakisha Plummer MD [facility] NEPHRO [facility]   6/28/2017 1:40 PM Meir Wei MD [facility] CARDIO [facility]           I have seen and examined this patient face to face today. My clinical findings that support the need for the home health skilled services and home bound status are the following:  Mental confusion making it unsafe for patient to leave home alone due to  Acute  Delirium.    Allergies:  Review of patient's allergies indicates:   Allergen Reactions    Spironolactone      Hyperkalemia         Diet: renal diet  Boost glucose supplement TID    Activities: as tolerated    Nursing:   SN to complete comprehensive assessment including routine vital signs. Instruct on disease process and s/s of complications to report to MD. Review/verify medication list sent home with the patient at time of discharge  and instruct patient/caregiver as needed. Frequency may be adjusted depending on start of care date.    Notify MD if SBP > 160 or < 90; DBP > 90 or < 50; HR > 120 or < 50; Temp > 101; Other: confusion    -Weekly CBC, CMP      CONSULTS:    Physical Therapy to evaluate and treat. Evaluate for home safety and equipment needs; Establish/upgrade home exercise program. Perform / instruct on therapeutic exercises, gait training, transfer training, and Range of Motion.  Occupational Therapy to evaluate and treat. Evaluate home environment for safety and equipment needs. Perform/Instruct on transfers, ADL training, ROM, and therapeutic exercises.  Speech Therapy  to evaluate and treat for  Swallowing.  Aide to provide assistance with personal care, ADLs, and vital signs.    MISCELLANEOUS CARE:  Routine Skin for Bedridden Patients: Instruct patient/caregiver to apply moisture barrier cream to all skin folds and wet areas in perineal area daily and after baths and all bowel movements.    WOUND CARE ORDERS  yes:  Pressure Ulcer(s) Stage II :   Location: sacrum, scrotum  Apply Miconzazole:  2% cream to scrotum, collagenase to sacrum                      Frequency:  Daily                             If incontinent of stool or urine, apply thin layer Barrier cream                   twice daily and PRN to wound         Pressure relief measure:  for pressure redistribution               Medications: Review discharge medications with patient and family and provide education.      Current  Discharge Medication List      CONTINUE these medications which have NOT CHANGED    Details   acetaminophen (TYLENOL) 500 MG tablet Take 2 tablets (1,000 mg total) by mouth 3 (three) times daily as needed for Pain.  Qty: 15 tablet, Refills: 0      allopurinol (ZYLOPRIM) 300 MG tablet Take 1 tablet (300 mg total) by mouth once daily.  Tablet Oral Every day  Qty: 90 tablet, Refills: 3      amiodarone (PACERONE) 200 MG Tab Take 1 tablet (200 mg total) by mouth once daily.  Qty: 90 tablet, Refills: 3    Associated Diagnoses: Chronic systolic heart failure; Atrial fibrillation, controlled      apixaban (ELIQUIS) 5 mg Tab Take 5 mg by mouth 2 (two) times daily.      aspirin 81 MG Chew Take 81 mg by mouth once daily.       atorvastatin (LIPITOR) 40 MG tablet Take 1 tablet (40 mg total) by mouth once daily. 1 Tablet Oral Every day  Qty: 90 tablet, Refills: 3    Associated Diagnoses: Chronic systolic heart failure; Atrial fibrillation, controlled      carvedilol (COREG) 12.5 MG tablet Take 12.5 mg by mouth 2 (two) times daily with meals.       collagenase (SANTYL) ointment Apply topically once daily. Apply to wound daily as directed.  Qty: 90 g, Refills: 0    Associated Diagnoses: Ulcer of other part of foot; Decubitus ulcer of left heel, unstageable      ergocalciferol (ERGOCALCIFEROL) 50,000 unit Cap Take 1 capsule (50,000 Units total) by mouth every 7 days.  Qty: 12 capsule, Refills: 3    Associated Diagnoses: Secondary hyperparathyroidism      ferrous sulfate 325 (65 FE) MG EC tablet Take 1 tablet (325 mg total) by mouth 2 (two) times daily with meals.  Qty: 60 tablet, Refills: 5      fluticasone (FLONASE) 50 mcg/actuation nasal spray 2 sprays by Each Nare route once daily.  Refills: 0      gabapentin (NEURONTIN) 300 MG capsule Take 1 capsule (300 mg total) by mouth 3 (three) times daily.  Qty: 90 capsule, Refills: 11    Associated Diagnoses: Type 2 diabetes mellitus with stage 3 chronic kidney disease, without long-term  current use of insulin      HYDROPHILIC CREAM (TRIAD WOUND DRESSING TOP) Apply topically daily as needed.      insulin detemir (LEVEMIR) 100 unit/mL injection Inject 8 Units into the skin 2 (two) times daily.  Refills: 12      insulin lispro (HUMALOG) 100 unit/mL injection Inject 5 Units into the skin 3 (three) times daily before meals.      lisinopril 10 MG tablet Take 1 tablet (10 mg total) by mouth once daily.  Qty: 90 tablet, Refills: 3    Associated Diagnoses: Chronic systolic heart failure      ONE TOUCH ULTRA TEST Strp Test blood sugar four times daily         STOP taking these medications       furosemide (LASIX) 40 MG tablet Comments:   Reason for Stopping:         metOLazone (ZAROXOLYN) 2.5 MG tablet Comments:   Reason for Stopping:         spironolactone (ALDACTONE) 25 MG tablet Comments:   Reason for Stopping:               I certify that this patient is confined to his home and needs intermittent skilled nursing care, physical therapy and speech therapy.

## 2017-05-05 ENCOUNTER — OUTPATIENT CASE MANAGEMENT (OUTPATIENT)
Dept: ADMINISTRATIVE | Facility: OTHER | Age: 67
End: 2017-05-05

## 2017-05-05 ENCOUNTER — TELEPHONE (OUTPATIENT)
Dept: INTERNAL MEDICINE | Facility: CLINIC | Age: 67
End: 2017-05-05

## 2017-05-05 DIAGNOSIS — E66.01 MORBID OBESITY DUE TO EXCESS CALORIES: ICD-10-CM

## 2017-05-05 DIAGNOSIS — N18.30 CKD (CHRONIC KIDNEY DISEASE) STAGE 3, GFR 30-59 ML/MIN: ICD-10-CM

## 2017-05-05 DIAGNOSIS — I50.22 CHRONIC SYSTOLIC CONGESTIVE HEART FAILURE, NYHA CLASS 2: ICD-10-CM

## 2017-05-05 LAB
ANION GAP SERPL CALC-SCNC: 9 MMOL/L
BASOPHILS # BLD AUTO: 0.02 K/UL
BASOPHILS NFR BLD: 0.2 %
BUN SERPL-MCNC: 28 MG/DL
CALCIUM SERPL-MCNC: 9.1 MG/DL
CHLORIDE SERPL-SCNC: 111 MMOL/L
CO2 SERPL-SCNC: 22 MMOL/L
CREAT SERPL-MCNC: 1.8 MG/DL
DIFFERENTIAL METHOD: ABNORMAL
EOSINOPHIL # BLD AUTO: 0.9 K/UL
EOSINOPHIL NFR BLD: 9 %
ERYTHROCYTE [DISTWIDTH] IN BLOOD BY AUTOMATED COUNT: 16.2 %
EST. GFR  (AFRICAN AMERICAN): 43.4 ML/MIN/1.73 M^2
EST. GFR  (NON AFRICAN AMERICAN): 37.6 ML/MIN/1.73 M^2
GLUCOSE SERPL-MCNC: 98 MG/DL
HCT VFR BLD AUTO: 30.5 %
HGB BLD-MCNC: 10.1 G/DL
LYMPHOCYTES # BLD AUTO: 1.8 K/UL
LYMPHOCYTES NFR BLD: 18.7 %
MAGNESIUM SERPL-MCNC: 1.9 MG/DL
MCH RBC QN AUTO: 28.2 PG
MCHC RBC AUTO-ENTMCNC: 33.1 %
MCV RBC AUTO: 85 FL
MONOCYTES # BLD AUTO: 1.3 K/UL
MONOCYTES NFR BLD: 13.1 %
NEUTROPHILS # BLD AUTO: 5.7 K/UL
NEUTROPHILS NFR BLD: 58.7 %
PHOSPHATE SERPL-MCNC: 3.3 MG/DL
PLATELET # BLD AUTO: 137 K/UL
PMV BLD AUTO: 9.9 FL
POCT GLUCOSE: 122 MG/DL (ref 70–110)
POCT GLUCOSE: 80 MG/DL (ref 70–110)
POCT GLUCOSE: 85 MG/DL (ref 70–110)
POCT GLUCOSE: 97 MG/DL (ref 70–110)
POTASSIUM SERPL-SCNC: 3.8 MMOL/L
RBC # BLD AUTO: 3.58 M/UL
SODIUM SERPL-SCNC: 142 MMOL/L
WBC # BLD AUTO: 9.74 K/UL

## 2017-05-05 PROCEDURE — 25000003 PHARM REV CODE 250: Performed by: STUDENT IN AN ORGANIZED HEALTH CARE EDUCATION/TRAINING PROGRAM

## 2017-05-05 PROCEDURE — 99232 SBSQ HOSP IP/OBS MODERATE 35: CPT | Mod: GC,,, | Performed by: HOSPITALIST

## 2017-05-05 PROCEDURE — 85025 COMPLETE CBC W/AUTO DIFF WBC: CPT

## 2017-05-05 PROCEDURE — 25000003 PHARM REV CODE 250: Performed by: INTERNAL MEDICINE

## 2017-05-05 PROCEDURE — 36415 COLL VENOUS BLD VENIPUNCTURE: CPT

## 2017-05-05 PROCEDURE — 80048 BASIC METABOLIC PNL TOTAL CA: CPT

## 2017-05-05 PROCEDURE — 11000001 HC ACUTE MED/SURG PRIVATE ROOM

## 2017-05-05 PROCEDURE — 94660 CPAP INITIATION&MGMT: CPT

## 2017-05-05 PROCEDURE — 83735 ASSAY OF MAGNESIUM: CPT

## 2017-05-05 PROCEDURE — 84100 ASSAY OF PHOSPHORUS: CPT

## 2017-05-05 RX ADMIN — APIXABAN 5 MG: 2.5 TABLET, FILM COATED ORAL at 09:05

## 2017-05-05 RX ADMIN — ERGOCALCIFEROL 50000 UNITS: 1.25 CAPSULE, LIQUID FILLED ORAL at 09:05

## 2017-05-05 RX ADMIN — MICONAZOLE NITRATE: 20 OINTMENT TOPICAL at 08:05

## 2017-05-05 RX ADMIN — FLUTICASONE PROPIONATE 2 SPRAY: 50 SPRAY, METERED NASAL at 09:05

## 2017-05-05 RX ADMIN — CARVEDILOL 6.25 MG: 6.25 TABLET, FILM COATED ORAL at 09:05

## 2017-05-05 RX ADMIN — MICONAZOLE NITRATE: 20 OINTMENT TOPICAL at 09:05

## 2017-05-05 RX ADMIN — Medication 3 ML: at 05:05

## 2017-05-05 RX ADMIN — ATORVASTATIN CALCIUM 40 MG: 20 TABLET, FILM COATED ORAL at 09:05

## 2017-05-05 RX ADMIN — CARVEDILOL 6.25 MG: 6.25 TABLET, FILM COATED ORAL at 08:05

## 2017-05-05 RX ADMIN — APIXABAN 5 MG: 2.5 TABLET, FILM COATED ORAL at 08:05

## 2017-05-05 RX ADMIN — AMIODARONE HYDROCHLORIDE 200 MG: 200 TABLET ORAL at 09:05

## 2017-05-05 RX ADMIN — ASPIRIN 81 MG CHEWABLE TABLET 81 MG: 81 TABLET CHEWABLE at 09:05

## 2017-05-05 RX ADMIN — Medication 3 ML: at 08:05

## 2017-05-05 RX ADMIN — Medication 3 ML: at 02:05

## 2017-05-05 NOTE — PLAN OF CARE
05/05/17 1142   Final Note   Assessment Type Final Discharge Note   Discharge Disposition Home-Health  ([facility] )   Discharge planning education complete? Yes   Discharge plans and expectations educations in teach back method with documentation complete? Yes   Offered [facility] -- Bedside Delivery? n/a   Discharge/Hospital Encounter Summary to ([facility]) PCP n/a   Referral to Outpatient Case Management complete? n/a   Referral to / orders for Home Health Complete? Yes  ([facility] )   30 day supply of medicines given at discharge, if documented non-compliance / non-adherence? n/a   Any social issues identified prior to discharge? No   Did you assess the readiness or willingness of the family or caregiver to support self management of care? Yes     Future Appointments  Date Time Provider Department Center   5/18/2017 11:00 AM Catarina Domingo MD [facility] ENDOCRN [facility]   5/18/2017 12:00 PM LAB, APPOINTMENT [facility] LAB VNP [facility]   5/18/2017 2:00 PM Denita Hendrix NP [facility] WOUND [facility]   5/31/2017 3:40 PM Lakisha Plummer MD [facility] NEPHRO [facility]   6/28/2017 1:40 PM Meir Wei MD [facility] CARDIO [facility]       Pt discharged home with family and edysis  to transfer to [facility]

## 2017-05-05 NOTE — ASSESSMENT & PLAN NOTE
- At home, long acting 8U BID and short acting 5U TIDWM.  - LDSSI   - Diabetic diet and repeat [id number]

## 2017-05-05 NOTE — ASSESSMENT & PLAN NOTE
- BUN/Cr Improving. Baseline Cr generally ~1.5-2.  - Likely pre-renal azotemia due to volume depletion from diarrhea and duiretics  - BUN/Cr ratio elevated   - Improved with fluid challenge  - Post-obstructive HUMERA possible with BPH - andrade placed and patient voided well.  - Renal ultrasound Consistent with medical renal disease.  - No need for CRRT at this time  - NAGMA improving  -discontinued bicarb infusion to D5W as symptoms have resolved; now off gtts and improved sodium and FWD

## 2017-05-05 NOTE — PLAN OF CARE
NAOMY received call from floor RNCiro stating [facility] representative is at nurses station to see pt and do evaluation.  She states they request to speak to CM.  CM spoke with [facility] representative informing her that the floor RN can print all required information necessary for admission and CM provided residents contact information for any problems.  HH orders placed with Breana CARRILLO, notified and aware to send to [facility] for resumption of care.  NAOMY called and spoke to daughter/POA, Socorro Giordano, informing of discharge today with Home Health orders placed per MD and sent per GERARDO to [facility].  Daughter states she is physically in [place] and is not able to come pickup her father for discharge and will reach out to her brother for discharge placement today.  Socorro states she has Breana CARRILLO, number to followup with her later today on pt discharge placement.

## 2017-05-05 NOTE — SUBJECTIVE & OBJECTIVE
Interval History: Patient reports no issues or worsening confusion; notes good PO intake and drinking fluids liberally. Patient to discharge home w/ home health.     Review of Systems   Respiratory: Negative for shortness of breath.    Gastrointestinal: Negative for constipation.   Endocrine: Negative for polyphagia and polyuria.   Genitourinary: Negative for testicular pain.   Neurological: Negative for dizziness and weakness.   Psychiatric/Behavioral: Negative for agitation and confusion.     Objective:     Vital Signs (Most Recent):  Temp: 98.3 °F (36.8 °C) (05/05/17 0758)  Pulse: (!) 59 (05/05/17 0758)  Resp: 18 (05/05/17 0758)  BP: 137/79 (05/05/17 0758)  SpO2: 96 % (05/05/17 0758) Vital Signs (24h Range):  Temp:  [98 °F (36.7 °C)-98.3 °F (36.8 °C)] 98.3 °F (36.8 °C)  Pulse:  [59-67] 59  Resp:  [16-20] 18  SpO2:  [96 %-99 %] 96 %  BP: (110-137)/(59-79) 137/79     Weight: 119 kg (262 lb 5.6 oz)  Body mass index is 39.89 kg/(m^2).    Intake/Output Summary (Last 24 hours) at 05/05/17 0830  Last data filed at 05/05/17 0500   Gross per 24 hour   Intake                0 ml   Output             1200 ml   Net            -1200 ml      Physical Exam   Constitutional: He is oriented to person, place, and time.   HENT:   Nose: Nose normal.   Mouth/Throat: Oropharynx is clear and moist. No oropharyngeal exudate.   Eyes: EOM are normal. Pupils are equal, round, and reactive to light. Left eye exhibits no discharge. No scleral icterus.   Neck: No JVD present. No tracheal deviation present. No thyromegaly present.   Cardiovascular: Normal rate, regular rhythm and intact distal pulses.  Exam reveals no gallop and no friction rub.    Murmur heard.  Pulmonary/Chest: Effort normal. No respiratory distress. He has no wheezes. He has no rales. He exhibits no tenderness.   BS distant   Abdominal: Soft. Bowel sounds are normal. He exhibits no distension and no mass. There is no tenderness. No hernia.   Genitourinary: Rectal exam shows  guaiac negative stool. No penile tenderness.   Musculoskeletal: Normal range of motion. He exhibits no edema, tenderness or deformity.   Lymphadenopathy:     He has no cervical adenopathy.   Neurological: He is oriented to person, place, and time. He displays normal reflexes. No cranial nerve deficit. Coordination normal.   Residual LLE weakness   Skin: Skin is warm and dry. No rash noted. No erythema.   Psychiatric: He has a normal mood and affect.       Significant Labs:   CBC:   Recent Labs  Lab 05/04/17 0418 05/05/17  0446   WBC 10.07 9.74   HGB 10.2* 10.1*   HCT 30.7* 30.5*   * 137*     CMP:   Recent Labs  Lab 05/04/17 0418 05/05/17  0446    142   K 4.4 3.8   * 111*   CO2 19* 22*   * 98   BUN 28* 28*   CREATININE 1.9* 1.8*   CALCIUM 8.6* 9.1   ANIONGAP 10 9   EGFRNONAA 35.2* 37.6*       Significant Imaging: I have reviewed and interpreted all pertinent imaging results/findings within the past 24 hours.

## 2017-05-05 NOTE — PLAN OF CARE
CM received call from pt daughter, Socorro NICOLE at [phone], stating [facility] was coming to do evaluation of pt in hospital today and she needs MD notes stating he is unable to go back to his home living alone.  Pt pending possible Assisted Living Facility placement per daughter.  Socorro requesting SW contact information with CM providing GERARDO Toussaint today.  CM will continue to follow.

## 2017-05-05 NOTE — PLAN OF CARE
CM in to see pt AA ordering breakfast in no apparent distress.  CM informed pt pending lab results within normal range today possibility of discharge today.  CM will continue to follow.

## 2017-05-05 NOTE — PROGRESS NOTES
For your Information:    Please note the following patient has been assigned to KIMBERLEE Buenrostro with Outpatient Complex Care Mgmt for screening.    Reason: Low Risk  Chronic systolic congestive heart failure, NYHA class 2  CKD (chronic kidney disease) stage 3, GFR 30-59 ml/min  Morbid obesity due to excess calories  Uncontrolled type 2 diabetes mellitus with complication, with long-term current use of insulin    Please contact [facility] at ext [phone] with questions.    Thank you,  Nayely Maddox, SSC

## 2017-05-05 NOTE — PLAN OF CARE
05/05/2017      Hemant Kennedy Jr.  [address] [address]  [place] [address]          Hospital Medicine Dept.  [facility] [address] [place] [address] [phone] after hours  [phone] fax Hemant Kennedy Jr. has been hospitalized at the [facility] since 4/27/2017.  Please excuse the patient from duties, and he will need future living arrangements for senior care housing to meet his level of care needs as he can no longer live without assistance.    Please contact me if you have any questions.                  __________________________  Chon Lopez MD  05/05/2017

## 2017-05-05 NOTE — ASSESSMENT & PLAN NOTE
- [id number] score of 5; high risk of stroke (7.2% per year)  - Continue  Amiodarone, resumed apixaban

## 2017-05-05 NOTE — TELEPHONE ENCOUNTER
Hi, once he is discharged from hosp please set patient up with resident clinic pcp. I frequently get home health order and he has only been seen once in resident clinic in January.  Thank you, Kian Villa

## 2017-05-05 NOTE — PLAN OF CARE
Covering GERARDO learned from Cm, Katya Cardenas, that Pt would be discharging home today and had been current with [facility] prior to admit. SW sent all necessary referral information to [facility] via [facility] system and notified them of Pt's discharge for today.    Breana Enamorado LCSW

## 2017-05-05 NOTE — PLAN OF CARE
Covering SW received call from Pt's nurse stating that the family was in the room questioning the discharge plan and wanting to speak to someone. CM is gone for the day. SW placed call to , Vero, asking if she might be able to meet with this family, as SW does not have the background information necessary to answer the questions Pt's nurse is stating the family is asking.     Breana Enamorado, ANNAW

## 2017-05-05 NOTE — PROGRESS NOTES
[facility] Hospital Medicine  Progress Note    Patient Name: Hemant Kennedy Jr.  MRN: [id number]  Patient Class: IP- Inpatient   Admission Date: 4/27/2017  Length of Stay: 7 days  Attending Physician: Prabhjot Maldonado MD  Primary Care Provider: Hemant Markham MD    [facility] Medicine Team: [facility] MED 5 Chon Lopez MD    Subjective:     Principal Problem:Hyperkalemia    Hospital Course:  4/28: In ED, provided 1g calcium gluconate IV as well as 10U insulin with dextrose for intracellular shifting of potassium. Repeat BMP was demonstrable for resolved hyperkalemia at 3.7. A andrade placed and urine voided. On 4/29: Patient seen by Nephrology due to persistent hyperkalemia in setting of HUMERA on CKD. A  Trialysis was initially attempted however the patient has very poor access and a decision was made to manage the patient medically. Urine output has continued to improve as have other electrolytes. The patient continues to seem somewhat altered but can answer simple demographic questions. He is pending transfer to the floor and will need social work follow-up for discharge planning.    5/2: Attempts made to contact family for more clarification of patient's prior hospitalization as well as move forward with discharge planning.  5/4: FWD improved on D5; encouraging PO intake and electrolyte monitoring with targeted discharge for 5/5.       Interval History: Patient reports no issues or worsening confusion; notes good PO intake and drinking fluids liberally. Patient to discharge home w/ home health.     Review of Systems   Respiratory: Negative for shortness of breath.    Gastrointestinal: Negative for constipation.   Endocrine: Negative for polyphagia and polyuria.   Genitourinary: Negative for testicular pain.   Neurological: Negative for dizziness and weakness.   Psychiatric/Behavioral: Negative for agitation and confusion.     Objective:     Vital Signs (Most Recent):  Temp: 98.3 °F (36.8 °C)  (05/05/17 0758)  Pulse: (!) 59 (05/05/17 0758)  Resp: 18 (05/05/17 0758)  BP: 137/79 (05/05/17 0758)  SpO2: 96 % (05/05/17 0758) Vital Signs (24h Range):  Temp:  [98 °F (36.7 °C)-98.3 °F (36.8 °C)] 98.3 °F (36.8 °C)  Pulse:  [59-67] 59  Resp:  [16-20] 18  SpO2:  [96 %-99 %] 96 %  BP: (110-137)/(59-79) 137/79     Weight: 119 kg (262 lb 5.6 oz)  Body mass index is 39.89 kg/(m^2).    Intake/Output Summary (Last 24 hours) at 05/05/17 0830  Last data filed at 05/05/17 0500   Gross per 24 hour   Intake                0 ml   Output             1200 ml   Net            -1200 ml      Physical Exam   Constitutional: He is oriented to person, place, and time.   HENT:   Nose: Nose normal.   Mouth/Throat: Oropharynx is clear and moist. No oropharyngeal exudate.   Eyes: EOM are normal. Pupils are equal, round, and reactive to light. Left eye exhibits no discharge. No scleral icterus.   Neck: No JVD present. No tracheal deviation present. No thyromegaly present.   Cardiovascular: Normal rate, regular rhythm and intact distal pulses.  Exam reveals no gallop and no friction rub.    Murmur heard.  Pulmonary/Chest: Effort normal. No respiratory distress. He has no wheezes. He has no rales. He exhibits no tenderness.   BS distant   Abdominal: Soft. Bowel sounds are normal. He exhibits no distension and no mass. There is no tenderness. No hernia.   Genitourinary: Rectal exam shows guaiac negative stool. No penile tenderness.   Musculoskeletal: Normal range of motion. He exhibits no edema, tenderness or deformity.   Lymphadenopathy:     He has no cervical adenopathy.   Neurological: He is oriented to person, place, and time. He displays normal reflexes. No cranial nerve deficit. Coordination normal.   Residual LLE weakness   Skin: Skin is warm and dry. No rash noted. No erythema.   Psychiatric: He has a normal mood and affect.       Significant Labs:   CBC:   Recent Labs  Lab 05/04/17  0418 05/05/17  0446   WBC 10.07 9.74   HGB 10.2*  10.1*   HCT 30.7* 30.5*   * 137*     CMP:   Recent Labs  Lab 05/04/17  0418 05/05/17  0446    142   K 4.4 3.8   * 111*   CO2 19* 22*   * 98   BUN 28* 28*   CREATININE 1.9* 1.8*   CALCIUM 8.6* 9.1   ANIONGAP 10 9   EGFRNONAA 35.2* 37.6*       Significant Imaging: I have reviewed and interpreted all pertinent imaging results/findings within the past 24 hours.    Assessment/Plan:      * Hyperkalemia  - Improved  - 7.0 on presentation. No EKG changes. Holding spironolactone/ ACE-ARB  - Secondary to HUMERA.  - Received IV calcium and insulin in ED.  - Trend BMP      Chronic systolic congestive heart failure, NYHA class 2  EF 25% on 7/2016.  - Not fluid overloaded on physical examination and BNP at baseline at 157.  - Continue Amiodorone and Atorvastatin. Holding Spironolactone, and Lisinopril considering HUMERA.  - Repeat 2D echo for surveillance->EF improved to 40 w/ DD      Debility  -PT/OT/SW consult for discharge planning- home with       Sleep apnea  -CPAP QHS      Atrial fibrillation, controlled  - [id number] score of 5; high risk of stroke (7.2% per year)  - Continue  Amiodarone, resumed apixaban      Type II diabetes mellitus with complication, uncontrolled  - At home, long acting 8U BID and short acting 5U TIDWM.  - LDSSI   - Diabetic diet and repeat [id number]      Encounter for wound care  -wound care following, continuing their recs  -possible explanation of leukocytosis on 5/1  -The sacral area skin is intact, the scrotum has shearing noted to the posterior aspect  -miconazole ointment BID to scrotal area to protect the skin and resolve fungal infection.       Acute renal failure superimposed on stage 3 chronic kidney disease  - BUN/Cr Improving. Baseline Cr generally ~1.5-2.  - Likely pre-renal azotemia due to volume depletion from diarrhea and duiretics  - BUN/Cr ratio elevated   - Improved with fluid challenge  - Post-obstructive HUMERA possible with BPH - andrade placed and patient voided  well.  - Renal ultrasound Consistent with medical renal disease.  - No need for CRRT at this time  - NAGMA improving  -discontinued bicarb infusion to D5W as symptoms have resolved; now off gtts and improved sodium and FWD      Uremic encephalopathy  -Improving  -- Slight confusion possibly secondary to uremia or delirium  -- Old CVA in 2010 --  SUBACUTE INFARCTION INVOLVING THE RIGHT PCA TERRITORY WITHIN THE PARASAGITTAL RIGHT OCCIPITAL LOBE  - Continue monitor   -CPAP QHS  -improving; some component thought to be from CVA prior to admission but patient is improved. Less concern and no further brain imaging.      Renovascular hypertension  -continue to monitor and add HTN regimen as tolerated-restarted home low-dose Coreg      VTE Risk Mitigation         Ordered     apixaban tablet 5 mg  2 times daily     Route:  Oral        05/03/17 0940     Medium Risk of VTE  Once      04/28/17 0211     Place sequential compression device  Until discontinued      04/28/17 0211          Chon Lopez MD  Department of Hospital Medicine   [facility]

## 2017-05-05 NOTE — PLAN OF CARE
SW received another call from Pt's nurse requesting SW come meet with Pt's son who was in the room. SW has not heard back from , as she is likely in huddle. SW went to the room to meet with Pt's son. Pt's son states he has concerns about Pt's confusion, which is a new symptom to this admission he states. He further states that Pt can not return home, as he was living alone and has had falls, etc. Pt's son states they are working to get Pt into an Assisted Living and expects this to happen by Monday. When SW asked Pt's son about the possibility of Pt staying with either him or his sister while the Assisted Living situation gets ironed out, Pt's son stated that this wasn't possible due to the narrow hallways in his home, etc. SW then asked if someone could go stay with Pt for a few days in his apartment, but Pt's son states he has to travel, etc. SW stated that all she could do was ask the physicians to come meet with him so he could voice his medical concerns. Pt's son also stated that they had not been notified that Pt was being discharged today until this morning and that this had been surprising to them.  GERARDO paged IM Resident, Dr. Lopez, and updated him on above and asked him to please meet with Pt and Pt's son. Resident agreed to do so, but said Pt's discharge was already in.     Breana Enamorado LCSW

## 2017-05-06 LAB
ANION GAP SERPL CALC-SCNC: 9 MMOL/L
BASOPHILS # BLD AUTO: 0.02 K/UL
BASOPHILS NFR BLD: 0.2 %
BUN SERPL-MCNC: 28 MG/DL
CALCIUM SERPL-MCNC: 8.8 MG/DL
CHLORIDE SERPL-SCNC: 113 MMOL/L
CO2 SERPL-SCNC: 22 MMOL/L
CREAT SERPL-MCNC: 1.8 MG/DL
DIFFERENTIAL METHOD: ABNORMAL
EOSINOPHIL # BLD AUTO: 1 K/UL
EOSINOPHIL NFR BLD: 9.1 %
ERYTHROCYTE [DISTWIDTH] IN BLOOD BY AUTOMATED COUNT: 16.5 %
EST. GFR  (AFRICAN AMERICAN): 43.4 ML/MIN/1.73 M^2
EST. GFR  (NON AFRICAN AMERICAN): 37.6 ML/MIN/1.73 M^2
GLUCOSE SERPL-MCNC: 93 MG/DL
HCT VFR BLD AUTO: 31.8 %
HGB BLD-MCNC: 10.1 G/DL
LYMPHOCYTES # BLD AUTO: 2 K/UL
LYMPHOCYTES NFR BLD: 18.7 %
MAGNESIUM SERPL-MCNC: 1.8 MG/DL
MCH RBC QN AUTO: 28.1 PG
MCHC RBC AUTO-ENTMCNC: 31.8 %
MCV RBC AUTO: 89 FL
MONOCYTES # BLD AUTO: 1.4 K/UL
MONOCYTES NFR BLD: 12.8 %
NEUTROPHILS # BLD AUTO: 6.3 K/UL
NEUTROPHILS NFR BLD: 58.9 %
PHOSPHATE SERPL-MCNC: 3.4 MG/DL
PLATELET # BLD AUTO: 159 K/UL
PMV BLD AUTO: 10.6 FL
POCT GLUCOSE: 84 MG/DL (ref 70–110)
POCT GLUCOSE: 84 MG/DL (ref 70–110)
POCT GLUCOSE: 99 MG/DL (ref 70–110)
POTASSIUM SERPL-SCNC: 4 MMOL/L
RBC # BLD AUTO: 3.59 M/UL
SODIUM SERPL-SCNC: 144 MMOL/L
WBC # BLD AUTO: 10.73 K/UL

## 2017-05-06 PROCEDURE — 99232 SBSQ HOSP IP/OBS MODERATE 35: CPT | Mod: GC,,, | Performed by: HOSPITALIST

## 2017-05-06 PROCEDURE — 25000003 PHARM REV CODE 250: Performed by: INTERNAL MEDICINE

## 2017-05-06 PROCEDURE — 36415 COLL VENOUS BLD VENIPUNCTURE: CPT

## 2017-05-06 PROCEDURE — 99900035 HC TECH TIME PER 15 MIN (STAT)

## 2017-05-06 PROCEDURE — 80048 BASIC METABOLIC PNL TOTAL CA: CPT

## 2017-05-06 PROCEDURE — 11000001 HC ACUTE MED/SURG PRIVATE ROOM

## 2017-05-06 PROCEDURE — 94660 CPAP INITIATION&MGMT: CPT

## 2017-05-06 PROCEDURE — 84100 ASSAY OF PHOSPHORUS: CPT

## 2017-05-06 PROCEDURE — 25000003 PHARM REV CODE 250: Performed by: STUDENT IN AN ORGANIZED HEALTH CARE EDUCATION/TRAINING PROGRAM

## 2017-05-06 PROCEDURE — 85025 COMPLETE CBC W/AUTO DIFF WBC: CPT

## 2017-05-06 PROCEDURE — 83735 ASSAY OF MAGNESIUM: CPT

## 2017-05-06 RX ADMIN — Medication 3 ML: at 08:05

## 2017-05-06 RX ADMIN — AMIODARONE HYDROCHLORIDE 200 MG: 200 TABLET ORAL at 08:05

## 2017-05-06 RX ADMIN — FLUTICASONE PROPIONATE 2 SPRAY: 50 SPRAY, METERED NASAL at 08:05

## 2017-05-06 RX ADMIN — ATORVASTATIN CALCIUM 40 MG: 20 TABLET, FILM COATED ORAL at 08:05

## 2017-05-06 RX ADMIN — MICONAZOLE NITRATE: 20 OINTMENT TOPICAL at 08:05

## 2017-05-06 RX ADMIN — APIXABAN 5 MG: 2.5 TABLET, FILM COATED ORAL at 08:05

## 2017-05-06 RX ADMIN — Medication 3 ML: at 02:05

## 2017-05-06 RX ADMIN — ASPIRIN 81 MG CHEWABLE TABLET 81 MG: 81 TABLET CHEWABLE at 08:05

## 2017-05-06 RX ADMIN — CARVEDILOL 6.25 MG: 6.25 TABLET, FILM COATED ORAL at 08:05

## 2017-05-06 RX ADMIN — Medication 3 ML: at 05:05

## 2017-05-06 NOTE — ASSESSMENT & PLAN NOTE
- [id number] score of 5; high risk of stroke (7.2% per year)  - Continue  Amiodarone, resumed apixaban

## 2017-05-06 NOTE — SUBJECTIVE & OBJECTIVE
Interval History: Patient's family came to bedside yesterday and noted that he has been having subacute, intermittent bouts of delirium and confusion, and he is AOx2. They are seeking assisted living w/ home health, but fluctuating mental status will likely require dispo reevaluation to SNF or detention care. Increasing free water intake.    Review of Systems   Respiratory: Negative for shortness of breath.    Gastrointestinal: Negative for constipation.   Endocrine: Negative for polyphagia and polyuria.   Genitourinary: Negative for testicular pain.   Neurological: Negative for dizziness and weakness.   Psychiatric/Behavioral: Positive for confusion. Negative for agitation.     Objective:     Vital Signs (Most Recent):  Temp: 98.3 °F (36.8 °C) (05/06/17 1100)  Pulse: 67 (05/06/17 1100)  Resp: 18 (05/06/17 1100)  BP: 127/60 (05/06/17 1100)  SpO2: (!) 94 % (05/06/17 1100) Vital Signs (24h Range):  Temp:  [97.4 °F (36.3 °C)-98.3 °F (36.8 °C)] 98.3 °F (36.8 °C)  Pulse:  [56-67] 67  Resp:  [16-18] 18  SpO2:  [94 %-98 %] 94 %  BP: (116-142)/(58-68) 127/60     Weight: 119 kg (262 lb 5.6 oz)  Body mass index is 39.89 kg/(m^2).    Intake/Output Summary (Last 24 hours) at 05/06/17 1526  Last data filed at 05/06/17 0500   Gross per 24 hour   Intake                0 ml   Output             1220 ml   Net            -1220 ml      Physical Exam   Constitutional: He is oriented to person, place, and time.   HENT:   Nose: Nose normal.   Mouth/Throat: Oropharynx is clear and moist. No oropharyngeal exudate.   Eyes: EOM are normal. Pupils are equal, round, and reactive to light. Left eye exhibits no discharge. No scleral icterus.   Neck: No JVD present. No tracheal deviation present. No thyromegaly present.   Cardiovascular: Normal rate, regular rhythm and intact distal pulses.  Exam reveals no gallop and no friction rub.    Murmur heard.  Pulmonary/Chest: Effort normal. No respiratory distress. He has no wheezes. He has no rales. He  exhibits no tenderness.   BS distant   Abdominal: Soft. Bowel sounds are normal. He exhibits no distension and no mass. There is no tenderness. No hernia.   Genitourinary: Rectal exam shows guaiac negative stool. No penile tenderness.   Musculoskeletal: Normal range of motion. He exhibits no edema, tenderness or deformity.   Lymphadenopathy:     He has no cervical adenopathy.   Neurological: He is oriented to person, place, and time. He displays normal reflexes. No cranial nerve deficit. Coordination normal.   Residual LLE weakness   Skin: Skin is warm and dry. No rash noted. No erythema.   Psychiatric: He has a normal mood and affect.       Significant Labs:   CBC:   Recent Labs  Lab 05/05/17  0446 05/06/17  0500   WBC 9.74 10.73   HGB 10.1* 10.1*   HCT 30.5* 31.8*   * 159     CMP:   Recent Labs  Lab 05/05/17  0446 05/06/17  0500    144   K 3.8 4.0   * 113*   CO2 22* 22*   GLU 98 93   BUN 28* 28*   CREATININE 1.8* 1.8*   CALCIUM 9.1 8.8   ANIONGAP 9 9   EGFRNONAA 37.6* 37.6*       Significant Imaging: I have reviewed and interpreted all pertinent imaging results/findings within the past 24 hours.

## 2017-05-06 NOTE — ASSESSMENT & PLAN NOTE
- BUN/Cr Improving. Baseline Cr generally ~1.5-2.  - Likely pre-renal azotemia due to volume depletion from diarrhea and duiretics  - BUN/Cr ratio elevated   - Improved with fluid challenge  - Post-obstructive HUMERA possible with BPH - andrade placed and patient voided well.  - Renal ultrasound Consistent with medical renal disease.  - No need for CRRT at this time  - NAGMA improving  -discontinued bicarb infusion to D5W; now off gtts and improved sodium and FW intake ordered for nursing to give fixed amount daily

## 2017-05-06 NOTE — ASSESSMENT & PLAN NOTE
-Improving  -- Slight confusion possibly secondary to uremia or delirium  -- Old CVA in 2010 --  SUBACUTE INFARCTION INVOLVING THE RIGHT PCA TERRITORY WITHIN THE PARASAGITTAL RIGHT OCCIPITAL LOBE  - Continue monitor   -CPAP QHS  -improving; some component thought to be from CVA prior to admission but patient is improved. Less concern and no further brain imaging warranted.

## 2017-05-06 NOTE — ASSESSMENT & PLAN NOTE
- At home, long acting 8U BID and short acting 5U TIDWM.  - LDSSI   - Diabetic diet and repeat DmG1D=2

## 2017-05-06 NOTE — ASSESSMENT & PLAN NOTE
-needed for placement and family discussion regarding disposition; now that encephalopathy is persistent without organic cause, patient may now need NH

## 2017-05-06 NOTE — PROGRESS NOTES
[facility] Hospital Medicine  Progress Note    Patient Name: Hemant Kennedy Jr.  MRN: [id number]  Patient Class: IP- Inpatient   Admission Date: 4/27/2017  Length of Stay: 8 days  Attending Physician: Prabhjot Maldonado MD  Primary Care Provider: Hemant Markham MD    [facility] Medicine Team: [facility] 5 Chon Lopez MD    Subjective:     Principal Problem:Hyperkalemia    Hospital Course:  4/28: In ED, provided 1g calcium gluconate IV as well as 10U insulin with dextrose for intracellular shifting of potassium. Repeat BMP was demonstrable for resolved hyperkalemia at 3.7. A andrade placed and urine voided. On 4/29: Patient seen by Nephrology due to persistent hyperkalemia in setting of HUMERA on CKD. A  Trialysis was initially attempted however the patient has very poor access and a decision was made to manage the patient medically. Urine output has continued to improve as have other electrolytes. The patient continues to seem somewhat altered but can answer simple demographic questions. He is pending transfer to the floor and will need social work follow-up for discharge planning.    5/2: Attempts made to contact family for more clarification of patient's prior hospitalization as well as move forward with discharge planning.  5/4-5/6: FWD improved on D5 and stable now; encouraging PO intake and electrolyte monitoring with targeted discharge for 5/8 pending shelter /SNF care.       Interval History: Patient's family came to bedside yesterday and noted that he has been having subacute, intermittent bouts of delirium and confusion, and he is AOx2. They are seeking assisted living w/ home health, but fluctuating mental status will likely require dispo reevaluation to SNF or shelter care. Increasing free water intake.    Review of Systems   Respiratory: Negative for shortness of breath.    Gastrointestinal: Negative for constipation.   Endocrine: Negative for polyphagia and polyuria.    Genitourinary: Negative for testicular pain.   Neurological: Negative for dizziness and weakness.   Psychiatric/Behavioral: Positive for confusion. Negative for agitation.     Objective:     Vital Signs (Most Recent):  Temp: 98.3 °F (36.8 °C) (05/06/17 1100)  Pulse: 67 (05/06/17 1100)  Resp: 18 (05/06/17 1100)  BP: 127/60 (05/06/17 1100)  SpO2: (!) 94 % (05/06/17 1100) Vital Signs (24h Range):  Temp:  [97.4 °F (36.3 °C)-98.3 °F (36.8 °C)] 98.3 °F (36.8 °C)  Pulse:  [56-67] 67  Resp:  [16-18] 18  SpO2:  [94 %-98 %] 94 %  BP: (116-142)/(58-68) 127/60     Weight: 119 kg (262 lb 5.6 oz)  Body mass index is 39.89 kg/(m^2).    Intake/Output Summary (Last 24 hours) at 05/06/17 1526  Last data filed at 05/06/17 0500   Gross per 24 hour   Intake                0 ml   Output             1220 ml   Net            -1220 ml      Physical Exam   Constitutional: He is oriented to person, place, and time.   HENT:   Nose: Nose normal.   Mouth/Throat: Oropharynx is clear and moist. No oropharyngeal exudate.   Eyes: EOM are normal. Pupils are equal, round, and reactive to light. Left eye exhibits no discharge. No scleral icterus.   Neck: No JVD present. No tracheal deviation present. No thyromegaly present.   Cardiovascular: Normal rate, regular rhythm and intact distal pulses.  Exam reveals no gallop and no friction rub.    Murmur heard.  Pulmonary/Chest: Effort normal. No respiratory distress. He has no wheezes. He has no rales. He exhibits no tenderness.   BS distant   Abdominal: Soft. Bowel sounds are normal. He exhibits no distension and no mass. There is no tenderness. No hernia.   Genitourinary: Rectal exam shows guaiac negative stool. No penile tenderness.   Musculoskeletal: Normal range of motion. He exhibits no edema, tenderness or deformity.   Lymphadenopathy:     He has no cervical adenopathy.   Neurological: He is oriented to person, place, and time. He displays normal reflexes. No cranial nerve deficit. Coordination  normal.   Residual LLE weakness   Skin: Skin is warm and dry. No rash noted. No erythema.   Psychiatric: He has a normal mood and affect.       Significant Labs:   CBC:   Recent Labs  Lab 05/05/17  0446 05/06/17  0500   WBC 9.74 10.73   HGB 10.1* 10.1*   HCT 30.5* 31.8*   * 159     CMP:   Recent Labs  Lab 05/05/17  0446 05/06/17  0500    144   K 3.8 4.0   * 113*   CO2 22* 22*   GLU 98 93   BUN 28* 28*   CREATININE 1.8* 1.8*   CALCIUM 9.1 8.8   ANIONGAP 9 9   EGFRNONAA 37.6* 37.6*       Significant Imaging: I have reviewed and interpreted all pertinent imaging results/findings within the past 24 hours.    Assessment/Plan:      * Hyperkalemia  - Improved  - 7.0 on presentation. No EKG changes. Holding spironolactone/ ACE-ARB  - Secondary to HUMERA.  - Received IV calcium and insulin in ED.  - Trend BMP      Chronic systolic congestive heart failure, NYHA class 2  EF 25% on 7/2016.  - Not fluid overloaded on physical examination and BNP at baseline at 157.  - Continue Amiodorone and Atorvastatin. Holding Spironolactone, and Lisinopril considering HUMERA.  - Repeat 2D echo for surveillance->EF improved to 40 w/ DD      Debility  -PT/OT/SW consult for discharge planning- home with       Sleep apnea  -CPAP QHS      Atrial fibrillation, controlled  - [id number] score of 5; high risk of stroke (7.2% per year)  - Continue  Amiodarone, resumed apixaban      Morbid obesity due to excess calories        Type II diabetes mellitus with complication, uncontrolled  - At home, long acting 8U BID and short acting 5U TIDWM.  - LDSSI   - Diabetic diet and repeat QxH1Z=7      Encounter for wound care  -wound care following, continuing their recs  -possible explanation of leukocytosis on 5/1  -The sacral area skin is intact, the scrotum has shearing noted to the posterior aspect  -miconazole ointment BID to scrotal area to protect the skin and resolve fungal infection.       Acute renal failure superimposed on stage 3  chronic kidney disease  - BUN/Cr Improving. Baseline Cr generally ~1.5-2.  - Likely pre-renal azotemia due to volume depletion from diarrhea and duiretics  - BUN/Cr ratio elevated   - Improved with fluid challenge  - Post-obstructive HUMERA possible with BPH - andrade placed and patient voided well.  - Renal ultrasound Consistent with medical renal disease.  - No need for CRRT at this time  - NAGMA improving  -discontinued bicarb infusion to D5W; now off gtts and improved sodium and FW intake ordered for nursing to give fixed amount daily      Uremic encephalopathy  -Improving  -- Slight confusion possibly secondary to uremia or delirium  -- Old CVA in 2010 --  SUBACUTE INFARCTION INVOLVING THE RIGHT PCA TERRITORY WITHIN THE PARASAGITTAL RIGHT OCCIPITAL LOBE  - Continue monitor   -CPAP QHS  -improving; some component thought to be from CVA prior to admission but patient is improved. Less concern and no further brain imaging warranted.      Renovascular hypertension  -continue to monitor-restarted Coreg      Goals of care, counseling/discussion  -needed for placement and family discussion regarding disposition; now that encephalopathy is persistent without organic cause, patient may now need NH      VTE Risk Mitigation         Ordered     apixaban tablet 5 mg  2 times daily     Route:  Oral        05/03/17 0940     Medium Risk of VTE  Once      04/28/17 0211     Place sequential compression device  Until discontinued      04/28/17 0211          Chon Lopez MD  Department of Hospital Medicine   [facility]

## 2017-05-07 PROBLEM — G93.40 ENCEPHALOPATHY: Status: ACTIVE | Noted: 2017-04-28

## 2017-05-07 LAB
ANION GAP SERPL CALC-SCNC: 8 MMOL/L
BASOPHILS # BLD AUTO: 0.03 K/UL
BASOPHILS NFR BLD: 0.3 %
BUN SERPL-MCNC: 25 MG/DL
CALCIUM SERPL-MCNC: 8.7 MG/DL
CHLORIDE SERPL-SCNC: 114 MMOL/L
CO2 SERPL-SCNC: 22 MMOL/L
CREAT SERPL-MCNC: 1.6 MG/DL
DIFFERENTIAL METHOD: ABNORMAL
EOSINOPHIL # BLD AUTO: 0.9 K/UL
EOSINOPHIL NFR BLD: 8 %
ERYTHROCYTE [DISTWIDTH] IN BLOOD BY AUTOMATED COUNT: 16.5 %
EST. GFR  (AFRICAN AMERICAN): 50.1 ML/MIN/1.73 M^2
EST. GFR  (NON AFRICAN AMERICAN): 43.3 ML/MIN/1.73 M^2
GLUCOSE SERPL-MCNC: 97 MG/DL
HCT VFR BLD AUTO: 33.4 %
HGB BLD-MCNC: 10.5 G/DL
LYMPHOCYTES # BLD AUTO: 2 K/UL
LYMPHOCYTES NFR BLD: 16.9 %
MAGNESIUM SERPL-MCNC: 1.7 MG/DL
MCH RBC QN AUTO: 27.9 PG
MCHC RBC AUTO-ENTMCNC: 31.4 %
MCV RBC AUTO: 89 FL
MONOCYTES # BLD AUTO: 1.3 K/UL
MONOCYTES NFR BLD: 11.1 %
NEUTROPHILS # BLD AUTO: 7.5 K/UL
NEUTROPHILS NFR BLD: 63.4 %
PHOSPHATE SERPL-MCNC: 3.3 MG/DL
PLATELET # BLD AUTO: 180 K/UL
PMV BLD AUTO: 10.2 FL
POCT GLUCOSE: 105 MG/DL (ref 70–110)
POCT GLUCOSE: 81 MG/DL (ref 70–110)
POTASSIUM SERPL-SCNC: 3.8 MMOL/L
RBC # BLD AUTO: 3.77 M/UL
SODIUM SERPL-SCNC: 144 MMOL/L
WBC # BLD AUTO: 11.81 K/UL

## 2017-05-07 PROCEDURE — 25000003 PHARM REV CODE 250: Performed by: STUDENT IN AN ORGANIZED HEALTH CARE EDUCATION/TRAINING PROGRAM

## 2017-05-07 PROCEDURE — 99232 SBSQ HOSP IP/OBS MODERATE 35: CPT | Mod: GC,,, | Performed by: HOSPITALIST

## 2017-05-07 PROCEDURE — 11000001 HC ACUTE MED/SURG PRIVATE ROOM

## 2017-05-07 PROCEDURE — 85025 COMPLETE CBC W/AUTO DIFF WBC: CPT

## 2017-05-07 PROCEDURE — 25000003 PHARM REV CODE 250: Performed by: INTERNAL MEDICINE

## 2017-05-07 PROCEDURE — 94660 CPAP INITIATION&MGMT: CPT

## 2017-05-07 PROCEDURE — 87086 URINE CULTURE/COLONY COUNT: CPT

## 2017-05-07 PROCEDURE — 99900035 HC TECH TIME PER 15 MIN (STAT)

## 2017-05-07 PROCEDURE — 84100 ASSAY OF PHOSPHORUS: CPT

## 2017-05-07 PROCEDURE — 80048 BASIC METABOLIC PNL TOTAL CA: CPT

## 2017-05-07 PROCEDURE — 36415 COLL VENOUS BLD VENIPUNCTURE: CPT

## 2017-05-07 PROCEDURE — 83735 ASSAY OF MAGNESIUM: CPT

## 2017-05-07 RX ADMIN — ATORVASTATIN CALCIUM 40 MG: 20 TABLET, FILM COATED ORAL at 08:05

## 2017-05-07 RX ADMIN — MICONAZOLE NITRATE: 20 OINTMENT TOPICAL at 08:05

## 2017-05-07 RX ADMIN — ASPIRIN 81 MG CHEWABLE TABLET 81 MG: 81 TABLET CHEWABLE at 08:05

## 2017-05-07 RX ADMIN — Medication 3 ML: at 09:05

## 2017-05-07 RX ADMIN — APIXABAN 5 MG: 2.5 TABLET, FILM COATED ORAL at 08:05

## 2017-05-07 RX ADMIN — AMIODARONE HYDROCHLORIDE 200 MG: 200 TABLET ORAL at 08:05

## 2017-05-07 RX ADMIN — Medication 3 ML: at 05:05

## 2017-05-07 RX ADMIN — FLUTICASONE PROPIONATE 2 SPRAY: 50 SPRAY, METERED NASAL at 09:05

## 2017-05-07 RX ADMIN — Medication 3 ML: at 02:05

## 2017-05-07 RX ADMIN — MICONAZOLE NITRATE: 20 OINTMENT TOPICAL at 09:05

## 2017-05-07 RX ADMIN — CARVEDILOL 6.25 MG: 6.25 TABLET, FILM COATED ORAL at 08:05

## 2017-05-07 RX ADMIN — CARVEDILOL 6.25 MG: 6.25 TABLET, FILM COATED ORAL at 09:05

## 2017-05-07 RX ADMIN — APIXABAN 5 MG: 2.5 TABLET, FILM COATED ORAL at 09:05

## 2017-05-07 NOTE — ASSESSMENT & PLAN NOTE
- Improved  - Likely due to medications  - 7.0 on presentation. No EKG changes.   - Holding spironolactone/ ACE-ARB  - Secondary to HUMERA.  - Received IV calcium and insulin in ED.

## 2017-05-07 NOTE — PLAN OF CARE
Problem: Patient Care Overview  Goal: Plan of Care Review  Patient is a 67 y.o. male y/o M with HFrEF (EF 20-25% in Jun 2016), NICM declined ICD, HTN, HLD, atrial fibrillation on Eliquis, T2DM, h/o CVA in 2010, ERIC presents with generalized weakness, fatigue and AMS that started as per patient since yesterday. While at the ED laboratories drawn and patient was found with acute renal failure BUN/ Creatinine: 147/6.2 and hyperkalemic: 7.0 (No hemolysis) and thats the reason we are consult. Patient poor historian.       4/28: Admit to SICU for hyperkalemia. K shifted X2. Kayexalate given X 1.   4/29: K shifted x 1, kayexalate x 1    Accuchecks AC/HS  Miconazole ointment BID to pannus and perineal area.   Outcome: Ongoing (interventions implemented as appropriate)  Patient remained free of falls. Safety maintained throughout shift. Blood glucose within normal range. All care explained. Will continue to monitor.

## 2017-05-07 NOTE — ASSESSMENT & PLAN NOTE
- BUN/Cr Improving. Baseline Cr generally ~1.5-2.  - Likely pre-renal azotemia due to volume depletion from diarrhea and duiretics  - BUN/Cr ratio elevated   - Improved with fluid challenge  - Post-obstructive HUMERA possible with BPH - andrade placed and patient voided well.  - Renal ultrasound Consistent with medical renal disease.  - NAGMA improving  -discontinued bicarb infusion to D5W; now off gtts and improved sodium and FW intake ordered for nursing to give fixed amount daily

## 2017-05-07 NOTE — SUBJECTIVE & OBJECTIVE
Interval History: Increased free water intake. NAEON.    Review of Systems   Respiratory: Negative for shortness of breath.    Gastrointestinal: Negative for constipation.   Endocrine: Negative for polyphagia and polyuria.   Genitourinary: Negative for testicular pain.   Neurological: Negative for dizziness and weakness.   Psychiatric/Behavioral: Positive for confusion. Negative for agitation.     Objective:     Vital Signs (Most Recent):  Temp: 97.9 °F (36.6 °C) (05/07/17 0427)  Pulse: 61 (05/07/17 0427)  Resp: 20 (05/07/17 0427)  BP: (!) 150/70 (05/07/17 0427)  SpO2: 96 % (05/07/17 0427) Vital Signs (24h Range):  Temp:  [97.9 °F (36.6 °C)-98.8 °F (37.1 °C)] 97.9 °F (36.6 °C)  Pulse:  [58-82] 61  Resp:  [16-23] 20  SpO2:  [94 %-100 %] 96 %  BP: (127-150)/(58-70) 150/70     Weight: 119 kg (262 lb 5.6 oz)  Body mass index is 39.89 kg/(m^2).    Intake/Output Summary (Last 24 hours) at 05/07/17 0702  Last data filed at 05/07/17 0500   Gross per 24 hour   Intake                0 ml   Output             1100 ml   Net            -1100 ml      Physical Exam   Constitutional: He is oriented to person, place, and time. He appears well-developed and well-nourished. No distress.   HENT:   Nose: Nose normal.   Mouth/Throat: Oropharynx is clear and moist. No oropharyngeal exudate.   Eyes: EOM are normal. Pupils are equal, round, and reactive to light. Left eye exhibits no discharge. No scleral icterus.   Neck: No JVD present. No tracheal deviation present. No thyromegaly present.   Cardiovascular: Normal rate, regular rhythm and intact distal pulses.  Exam reveals no gallop and no friction rub.    Murmur heard.  Pulmonary/Chest: Effort normal. No respiratory distress. He has no wheezes. He has no rales. He exhibits no tenderness.   Abdominal: Soft. Bowel sounds are normal. He exhibits no distension and no mass. There is no tenderness. No hernia.   Genitourinary: Rectal exam shows guaiac negative stool. No penile tenderness.    Musculoskeletal: Normal range of motion. He exhibits no edema, tenderness or deformity.   Lymphadenopathy:     He has no cervical adenopathy.   Neurological: He is oriented to person, place, and time. He displays normal reflexes. No cranial nerve deficit. Coordination normal.   Residual LLE weakness   Skin: Skin is warm and dry. No rash noted. No erythema.   Psychiatric: He has a normal mood and affect.   Nursing note and vitals reviewed.      Significant Labs:   CBC:     Recent Labs  Lab 05/06/17  0500   WBC 10.73   HGB 10.1*   HCT 31.8*        CMP:     Recent Labs  Lab 05/06/17  0500      K 4.0   *   CO2 22*   GLU 93   BUN 28*   CREATININE 1.8*   CALCIUM 8.8   ANIONGAP 9   EGFRNONAA 37.6*

## 2017-05-07 NOTE — ASSESSMENT & PLAN NOTE
- [id number] score of 5; high risk of stroke (7.2% per year)  - Continue  Amiodarone  - Resumed apixaban

## 2017-05-07 NOTE — NURSING
MD notified of thick,filmy, clear discharge from pt's penis. O c/o pain or discomfort. VSS. Will continue to monitor.

## 2017-05-07 NOTE — ASSESSMENT & PLAN NOTE
-Improving  -Slight confusion possibly secondary to uremia or delirium  -Old CVA in 2010 --  SUBACUTE INFARCTION INVOLVING THE RIGHT PCA TERRITORY WITHIN THE PARASAGITTAL RIGHT OCCIPITAL LOBE. Making pt more prone to delirium  -Continue monitor   -CPAP QHS  -improving; some component thought to be from CVA prior to admission but patient is improved.

## 2017-05-07 NOTE — PROGRESS NOTES
[facility] Hospital Medicine  Progress Note    Patient Name: Hemant Kennedy Jr.  MRN: [id number]  Patient Class: IP- Inpatient   Admission Date: 4/27/2017  Length of Stay: 9 days  Attending Physician: Prabhjot Maldonado MD  Primary Care Provider: Hemant Markham MD    [facility] Medicine Team: [facility] HOSP MED 5 Jennifer Givens MD    Subjective:     Principal Problem:Hyperkalemia    HPI:  Hemant Kennedy Jr. is a 67 year old male with a medical history significant for type II DM, CKD III, non-ischemic cardiomyopathy (EF 25%), ERIC who presents to the ED on 4/28 with chief complaint of sluggishness and fatigue. In the ED, patient was found to have significant HUMERA with creatinine at 6.2 (baseline 1.5) with significant hyperkalemia of 7. Patient's history was gathered from patient as well as sister on the phone as patient was confused and fell asleep frequently during interview. Per the patient, his granddaughter called the ambulance to bring him in after finding him lethargic at home and with a low blood sugar. He could not tell me the low sugar value, but sister on phone states it was in the 70s. Otherwise, the sister states that starting yesterday afternoon, she noticed that the patient was more lethargic and somnolent. She states that last week 4/18-4/21, the patient had 2-3 bouts of diarrhea a day that resolved with imodium over the weekend. Otherwise, she states that he has not had any recent illnesses otherwise.     The patient lives by himself but is visited for most of the day everyday by his sister. Per the sister, she states that she has forced him to drink more water this past week because all he drinks is Coke Zero and milk. She cannot assess his fluid intake but states she has not noticed he has been fluid down this past week; she makes sure he drinks a couple bottles of water per day. She is in charge of administering his medications, which include two tablets of furosemide daily (med rec  states he only takes one pill per day, however), as well as aldactone. The patient is also seen three times a week by home health nurse in [facility]. Per the sister, the nurse has mentioned his blood pressure has been lower than normal for the past week, in the SBP 90- low 100s. She state he usually runs higher. She first noticed this last Friday 4/21. He has not started any new medication and occasionally takes Tylenol for his left diabetic foot ulcer. Patient does not take any antibiotics for his foot ulcer. The patient has not has trouble urinating and has voided fine. Sister affirms this but states that his urinary output seems to have decreased from yesterday evening to today. Patient denies dysuria or flank pain.     Otherwise, patient denies fevers, chills, shortness of breath, new onset edema, chest pain, diaphoresis, dysuria, hematuria, abdominal pain, nausea, vomiting, diarrhea.       Hospital Course:  4/28: In ED, provided 1g calcium gluconate IV as well as 10U insulin with dextrose for intracellular shifting of potassium. Repeat BMP was demonstrable for resolved hyperkalemia at 3.7. A andrade placed and urine voided. On 4/29: Patient seen by Nephrology due to persistent hyperkalemia in setting of HUMERA on CKD. A  Trialysis was initially attempted however the patient has very poor access and a decision was made to manage the patient medically. Urine output has continued to improve as have other electrolytes. The patient continues to seem somewhat altered but can answer simple demographic questions. He is pending transfer to the floor and will need social work follow-up for discharge planning.    5/2: Attempts made to contact family for more clarification of patient's prior hospitalization as well as move forward with discharge planning.  5/4-5/7: FWD improved on D5 and stable now; encouraging PO intake and electrolyte monitoring with targeted discharge for 5/8 pending assisted /SNF care.       Interval History:  Increased free water intake. NAEON.    Review of Systems   Respiratory: Negative for shortness of breath.    Gastrointestinal: Negative for constipation.   Endocrine: Negative for polyphagia and polyuria.   Genitourinary: Negative for testicular pain.   Neurological: Negative for dizziness and weakness.   Psychiatric/Behavioral: Positive for confusion. Negative for agitation.     Objective:     Vital Signs (Most Recent):  Temp: 97.9 °F (36.6 °C) (05/07/17 0427)  Pulse: 61 (05/07/17 0427)  Resp: 20 (05/07/17 0427)  BP: (!) 150/70 (05/07/17 0427)  SpO2: 96 % (05/07/17 0427) Vital Signs (24h Range):  Temp:  [97.9 °F (36.6 °C)-98.8 °F (37.1 °C)] 97.9 °F (36.6 °C)  Pulse:  [58-82] 61  Resp:  [16-23] 20  SpO2:  [94 %-100 %] 96 %  BP: (127-150)/(58-70) 150/70     Weight: 119 kg (262 lb 5.6 oz)  Body mass index is 39.89 kg/(m^2).    Intake/Output Summary (Last 24 hours) at 05/07/17 0702  Last data filed at 05/07/17 0500   Gross per 24 hour   Intake                0 ml   Output             1100 ml   Net            -1100 ml      Physical Exam   Constitutional: He is oriented to person, place, and time. He appears well-developed and well-nourished. No distress.   HENT:   Nose: Nose normal.   Mouth/Throat: Oropharynx is clear and moist. No oropharyngeal exudate.   Eyes: EOM are normal. Pupils are equal, round, and reactive to light. Left eye exhibits no discharge. No scleral icterus.   Neck: No JVD present. No tracheal deviation present. No thyromegaly present.   Cardiovascular: Normal rate, regular rhythm and intact distal pulses.  Exam reveals no gallop and no friction rub.    Murmur heard.  Pulmonary/Chest: Effort normal. No respiratory distress. He has no wheezes. He has no rales. He exhibits no tenderness.   Abdominal: Soft. Bowel sounds are normal. He exhibits no distension and no mass. There is no tenderness. No hernia.   Genitourinary: Rectal exam shows guaiac negative stool. No penile tenderness.   Musculoskeletal:  Normal range of motion. He exhibits no edema, tenderness or deformity.   Lymphadenopathy:     He has no cervical adenopathy.   Neurological: He is oriented to person, place, and time. He displays normal reflexes. No cranial nerve deficit. Coordination normal.   Residual LLE weakness   Skin: Skin is warm and dry. No rash noted. No erythema.   Psychiatric: He has a normal mood and affect.   Nursing note and vitals reviewed.      Significant Labs:   CBC:     Recent Labs  Lab 05/06/17  0500   WBC 10.73   HGB 10.1*   HCT 31.8*        CMP:     Recent Labs  Lab 05/06/17  0500      K 4.0   *   CO2 22*   GLU 93   BUN 28*   CREATININE 1.8*   CALCIUM 8.8   ANIONGAP 9   EGFRNONAA 37.6*           Assessment/Plan:      Encephalopathy  -Improving  -Slight confusion possibly secondary to uremia or delirium  -Old CVA in 2010 --  SUBACUTE INFARCTION INVOLVING THE RIGHT PCA TERRITORY WITHIN THE PARASAGITTAL RIGHT OCCIPITAL LOBE. Making pt more prone to delirium  -Continue monitor   -CPAP QHS  -improving; some component thought to be from CVA prior to admission but patient is improved.       * Hyperkalemia  - Improved  - Likely due to medications  - 7.0 on presentation. No EKG changes.   - Holding spironolactone/ ACE-ARB  - Secondary to HUMERA.  - Received IV calcium and insulin in ED.        Acute renal failure superimposed on stage 3 chronic kidney disease  - BUN/Cr Improving. Baseline Cr generally ~1.5-2.  - Likely pre-renal azotemia due to volume depletion from diarrhea and duiretics  - BUN/Cr ratio elevated   - Improved with fluid challenge  - Post-obstructive HUMERA possible with BPH - andrade placed and patient voided well.  - Renal ultrasound Consistent with medical renal disease.  - NAGMA improving  -discontinued bicarb infusion to D5W; now off gtts and improved sodium and FW intake ordered for nursing to give fixed amount daily      Goals of care, counseling/discussion  -needed for placement and family discussion  regarding disposition; now that encephalopathy is persistent without organic cause, patient may now need NH      Chronic systolic congestive heart failure, NYHA class 2  EF 25% on 7/2016.  - Not fluid overloaded on physical examination and BNP at baseline at 157.  - Continue Amiodorone and Atorvastatin. Holding Spironolactone, and Lisinopril considering HUMERA.  - Repeat 2D echo for surveillance->EF improved to 40 w/ DD      Type II diabetes mellitus with complication, uncontrolled  - At home, long acting 8U BID and short acting 5U TIDWM.  - LDSSI   - Diabetic diet and repeat [id number]=6      Encounter for wound care  -wound care following, continuing their recs  -possible explanation of leukocytosis on 5/1  -The sacral area skin is intact, the scrotum has shearing noted to the posterior aspect  -miconazole ointment BID to scrotal area to protect the skin and resolve fungal infection.       Renovascular hypertension  -continue to monitor-restarted Coreg      Debility  -PT/OT/SW consult for discharge planning      Sleep apnea  -CPAP QHS      Atrial fibrillation, controlled  - [id number] score of 5; high risk of stroke (7.2% per year)  - Continue  Amiodarone  - Resumed apixaban      VTE Risk Mitigation         Ordered     apixaban tablet 5 mg  2 times daily     Route:  Oral        05/03/17 0940     Medium Risk of VTE  Once      04/28/17 0211     Place sequential compression device  Until discontinued      04/28/17 0211          Jennifer Givens MD  Department of Hospital Medicine   [facility]

## 2017-05-08 ENCOUNTER — OUTPATIENT CASE MANAGEMENT (OUTPATIENT)
Dept: ADMINISTRATIVE | Facility: OTHER | Age: 67
End: 2017-05-08

## 2017-05-08 LAB
ANION GAP SERPL CALC-SCNC: 9 MMOL/L
BASOPHILS # BLD AUTO: 0.02 K/UL
BASOPHILS NFR BLD: 0.2 %
BUN SERPL-MCNC: 25 MG/DL
CALCIUM SERPL-MCNC: 8.9 MG/DL
CHLORIDE SERPL-SCNC: 115 MMOL/L
CO2 SERPL-SCNC: 22 MMOL/L
CREAT SERPL-MCNC: 1.6 MG/DL
DIFFERENTIAL METHOD: ABNORMAL
EOSINOPHIL # BLD AUTO: 0.8 K/UL
EOSINOPHIL NFR BLD: 7.1 %
ERYTHROCYTE [DISTWIDTH] IN BLOOD BY AUTOMATED COUNT: 16.5 %
EST. GFR  (AFRICAN AMERICAN): 50.1 ML/MIN/1.73 M^2
EST. GFR  (NON AFRICAN AMERICAN): 43.3 ML/MIN/1.73 M^2
GLUCOSE SERPL-MCNC: 103 MG/DL
HCT VFR BLD AUTO: 34.6 %
HGB BLD-MCNC: 10.7 G/DL
LYMPHOCYTES # BLD AUTO: 2 K/UL
LYMPHOCYTES NFR BLD: 17.6 %
MAGNESIUM SERPL-MCNC: 1.6 MG/DL
MCH RBC QN AUTO: 27.5 PG
MCHC RBC AUTO-ENTMCNC: 30.9 %
MCV RBC AUTO: 89 FL
MONOCYTES # BLD AUTO: 1.3 K/UL
MONOCYTES NFR BLD: 11.3 %
NEUTROPHILS # BLD AUTO: 7.4 K/UL
NEUTROPHILS NFR BLD: 63.6 %
PHOSPHATE SERPL-MCNC: 3.5 MG/DL
PLATELET # BLD AUTO: 191 K/UL
PMV BLD AUTO: 10.3 FL
POCT GLUCOSE: 102 MG/DL (ref 70–110)
POCT GLUCOSE: 116 MG/DL (ref 70–110)
POCT GLUCOSE: 116 MG/DL (ref 70–110)
POCT GLUCOSE: 117 MG/DL (ref 70–110)
POCT GLUCOSE: 75 MG/DL (ref 70–110)
POTASSIUM SERPL-SCNC: 3.8 MMOL/L
RBC # BLD AUTO: 3.89 M/UL
SODIUM SERPL-SCNC: 146 MMOL/L
WBC # BLD AUTO: 11.55 K/UL

## 2017-05-08 PROCEDURE — 80048 BASIC METABOLIC PNL TOTAL CA: CPT

## 2017-05-08 PROCEDURE — 99232 SBSQ HOSP IP/OBS MODERATE 35: CPT | Mod: GC,,, | Performed by: HOSPITALIST

## 2017-05-08 PROCEDURE — 25000003 PHARM REV CODE 250: Performed by: STUDENT IN AN ORGANIZED HEALTH CARE EDUCATION/TRAINING PROGRAM

## 2017-05-08 PROCEDURE — 85025 COMPLETE CBC W/AUTO DIFF WBC: CPT

## 2017-05-08 PROCEDURE — 99900035 HC TECH TIME PER 15 MIN (STAT)

## 2017-05-08 PROCEDURE — 36415 COLL VENOUS BLD VENIPUNCTURE: CPT

## 2017-05-08 PROCEDURE — 11000001 HC ACUTE MED/SURG PRIVATE ROOM

## 2017-05-08 PROCEDURE — 97542 WHEELCHAIR MNGMENT TRAINING: CPT

## 2017-05-08 PROCEDURE — 25000003 PHARM REV CODE 250: Performed by: INTERNAL MEDICINE

## 2017-05-08 PROCEDURE — 83735 ASSAY OF MAGNESIUM: CPT

## 2017-05-08 PROCEDURE — 84100 ASSAY OF PHOSPHORUS: CPT

## 2017-05-08 PROCEDURE — 94660 CPAP INITIATION&MGMT: CPT

## 2017-05-08 PROCEDURE — 63600175 PHARM REV CODE 636 W HCPCS: Performed by: INTERNAL MEDICINE

## 2017-05-08 PROCEDURE — 97530 THERAPEUTIC ACTIVITIES: CPT

## 2017-05-08 RX ORDER — DEXTROSE MONOHYDRATE 50 MG/ML
INJECTION, SOLUTION INTRAVENOUS CONTINUOUS
Status: ACTIVE | OUTPATIENT
Start: 2017-05-08 | End: 2017-05-08

## 2017-05-08 RX ORDER — MAGNESIUM SULFATE HEPTAHYDRATE 40 MG/ML
2 INJECTION, SOLUTION INTRAVENOUS ONCE
Status: COMPLETED | OUTPATIENT
Start: 2017-05-08 | End: 2017-05-08

## 2017-05-08 RX ADMIN — APIXABAN 5 MG: 2.5 TABLET, FILM COATED ORAL at 09:05

## 2017-05-08 RX ADMIN — ASPIRIN 81 MG CHEWABLE TABLET 81 MG: 81 TABLET CHEWABLE at 08:05

## 2017-05-08 RX ADMIN — MICONAZOLE NITRATE: 20 OINTMENT TOPICAL at 09:05

## 2017-05-08 RX ADMIN — Medication 3 ML: at 10:05

## 2017-05-08 RX ADMIN — AMIODARONE HYDROCHLORIDE 200 MG: 200 TABLET ORAL at 08:05

## 2017-05-08 RX ADMIN — CARVEDILOL 6.25 MG: 6.25 TABLET, FILM COATED ORAL at 08:05

## 2017-05-08 RX ADMIN — MICONAZOLE NITRATE: 20 OINTMENT TOPICAL at 08:05

## 2017-05-08 RX ADMIN — ATORVASTATIN CALCIUM 40 MG: 20 TABLET, FILM COATED ORAL at 08:05

## 2017-05-08 RX ADMIN — APIXABAN 5 MG: 2.5 TABLET, FILM COATED ORAL at 08:05

## 2017-05-08 RX ADMIN — Medication 3 ML: at 05:05

## 2017-05-08 RX ADMIN — DEXTROSE: 5 SOLUTION INTRAVENOUS at 11:05

## 2017-05-08 RX ADMIN — MAGNESIUM SULFATE IN WATER 2 G: 40 INJECTION, SOLUTION INTRAVENOUS at 08:05

## 2017-05-08 RX ADMIN — FLUTICASONE PROPIONATE 2 SPRAY: 50 SPRAY, METERED NASAL at 08:05

## 2017-05-08 RX ADMIN — CARVEDILOL 6.25 MG: 6.25 TABLET, FILM COATED ORAL at 09:05

## 2017-05-08 NOTE — PT/OT/SLP PROGRESS
Occupational Therapy      Hemant DION Kennedy .  MRN: [id number]    Patient not seen today secondary to pt reports being exhausted and would like to postpone until tomorrow. OT explained the importance of participating with therapy in order to get accepted to SNF and pt understood. Will follow-up on 5/9/17.    CHANDRAKANT Hernández  5/8/2017

## 2017-05-08 NOTE — SUBJECTIVE & OBJECTIVE
Interval History: Patient reports no issues overnight, reports no confusion but mild confusion noted despite patient's overall improvement. He is awaiting placement but requires some mild free water deficit correction today 2/2 poor PO intake.     Review of Systems   Respiratory: Negative for shortness of breath.    Gastrointestinal: Negative for constipation.   Endocrine: Negative for polyphagia and polyuria.   Genitourinary: Negative for testicular pain.   Neurological: Negative for dizziness and weakness.   Psychiatric/Behavioral: Positive for confusion. Negative for agitation.     Objective:     Vital Signs (Most Recent):  Temp: 98.3 °F (36.8 °C) (05/08/17 0748)  Pulse: 60 (05/08/17 0748)  Resp: 18 (05/08/17 0748)  BP: (!) 141/63 (05/08/17 0748)  SpO2: 96 % (05/08/17 0748) Vital Signs (24h Range):  Temp:  [98 °F (36.7 °C)-98.3 °F (36.8 °C)] 98.3 °F (36.8 °C)  Pulse:  [59-68] 60  Resp:  [16-20] 18  SpO2:  [95 %-97 %] 96 %  BP: (131-152)/(63-71) 141/63     Weight: 119 kg (262 lb 5.6 oz)  Body mass index is 39.89 kg/(m^2).    Intake/Output Summary (Last 24 hours) at 05/08/17 1255  Last data filed at 05/08/17 0400   Gross per 24 hour   Intake                0 ml   Output              450 ml   Net             -450 ml      Physical Exam   Constitutional: He is oriented to person, place, and time. He appears well-developed and well-nourished. No distress.   HENT:   Nose: Nose normal.   Mouth/Throat: Oropharynx is clear and moist. No oropharyngeal exudate.   Eyes: EOM are normal. Pupils are equal, round, and reactive to light. Left eye exhibits no discharge. No scleral icterus.   Neck: No JVD present. No tracheal deviation present. No thyromegaly present.   Cardiovascular: Normal rate, regular rhythm and intact distal pulses.  Exam reveals no gallop and no friction rub.    Murmur heard.  Pulmonary/Chest: Effort normal. No respiratory distress. He has no wheezes. He has no rales. He exhibits no tenderness.   Abdominal:  Soft. Bowel sounds are normal. He exhibits no distension and no mass. There is no tenderness. No hernia.   Genitourinary: Rectal exam shows guaiac negative stool. No penile tenderness.   Musculoskeletal: Normal range of motion. He exhibits no edema, tenderness or deformity.   Lymphadenopathy:     He has no cervical adenopathy.   Neurological: He is oriented to person, place, and time. He displays normal reflexes. No cranial nerve deficit. Coordination normal.   Residual LLE weakness   Skin: Skin is warm and dry. No rash noted. No erythema.   Psychiatric: He has a normal mood and affect.   Nursing note and vitals reviewed.      Significant Labs:   CBC:   Recent Labs  Lab 05/07/17  0551 05/08/17  0526   WBC 11.81 11.55   HGB 10.5* 10.7*   HCT 33.4* 34.6*    191     CMP:   Recent Labs  Lab 05/07/17  0551 05/08/17  0526    146*   K 3.8 3.8   * 115*   CO2 22* 22*   GLU 97 103   BUN 25* 25*   CREATININE 1.6* 1.6*   CALCIUM 8.7 8.9   ANIONGAP 8 9   EGFRNONAA 43.3* 43.3*       Significant Imaging: I have reviewed and interpreted all pertinent imaging results/findings within the past 24 hours.

## 2017-05-08 NOTE — PROGRESS NOTES
[facility] Hospital Medicine  Progress Note    Patient Name: Hemant Kennedy Jr.  MRN: [id number]  Patient Class: IP- Inpatient   Admission Date: 4/27/2017  Length of Stay: 10 days  Attending Physician: Prabhjot Maldonado MD  Primary Care Provider: Hemant Markham MD    [facility] Medicine Team: [facility] HOSP MED 5 Chon Lopez MD    Subjective:     Principal Problem:Hyperkalemia      Hospital Course:  4/28: In ED, provided 1g calcium gluconate IV as well as 10U insulin with dextrose for intracellular shifting of potassium. Repeat BMP was demonstrable for resolved hyperkalemia at 3.7. A andrade placed and urine voided. On 4/29: Patient seen by Nephrology due to persistent hyperkalemia in setting of HUMERA on CKD. A  Trialysis was initially attempted however the patient has very poor access and a decision was made to manage the patient medically. Urine output has continued to improve as have other electrolytes. The patient continues to seem somewhat altered but can answer simple demographic questions. He is pending transfer to the floor and will need social work follow-up for discharge planning.    5/2: Attempts made to contact family for more clarification of patient's prior hospitalization as well as move forward with discharge planning.  5/4-5/8: FWD improved on D5 and stable now; encouraging PO intake and electrolyte monitoring with discharge for pending long term /SNF care.       Interval History: Patient reports no issues overnight, reports no confusion but mild confusion noted despite patient's overall improvement. He is awaiting placement but requires some mild free water deficit correction today 2/2 poor PO intake.     Review of Systems   Respiratory: Negative for shortness of breath.    Gastrointestinal: Negative for constipation.   Endocrine: Negative for polyphagia and polyuria.   Genitourinary: Negative for testicular pain.   Neurological: Negative for dizziness and weakness.    Psychiatric/Behavioral: Positive for confusion. Negative for agitation.     Objective:     Vital Signs (Most Recent):  Temp: 98.3 °F (36.8 °C) (05/08/17 0748)  Pulse: 60 (05/08/17 0748)  Resp: 18 (05/08/17 0748)  BP: (!) 141/63 (05/08/17 0748)  SpO2: 96 % (05/08/17 0748) Vital Signs (24h Range):  Temp:  [98 °F (36.7 °C)-98.3 °F (36.8 °C)] 98.3 °F (36.8 °C)  Pulse:  [59-68] 60  Resp:  [16-20] 18  SpO2:  [95 %-97 %] 96 %  BP: (131-152)/(63-71) 141/63     Weight: 119 kg (262 lb 5.6 oz)  Body mass index is 39.89 kg/(m^2).    Intake/Output Summary (Last 24 hours) at 05/08/17 1255  Last data filed at 05/08/17 0400   Gross per 24 hour   Intake                0 ml   Output              450 ml   Net             -450 ml      Physical Exam   Constitutional: He is oriented to person, place, and time. He appears well-developed and well-nourished. No distress.   HENT:   Nose: Nose normal.   Mouth/Throat: Oropharynx is clear and moist. No oropharyngeal exudate.   Eyes: EOM are normal. Pupils are equal, round, and reactive to light. Left eye exhibits no discharge. No scleral icterus.   Neck: No JVD present. No tracheal deviation present. No thyromegaly present.   Cardiovascular: Normal rate, regular rhythm and intact distal pulses.  Exam reveals no gallop and no friction rub.    Murmur heard.  Pulmonary/Chest: Effort normal. No respiratory distress. He has no wheezes. He has no rales. He exhibits no tenderness.   Abdominal: Soft. Bowel sounds are normal. He exhibits no distension and no mass. There is no tenderness. No hernia.   Genitourinary: Rectal exam shows guaiac negative stool. No penile tenderness.   Musculoskeletal: Normal range of motion. He exhibits no edema, tenderness or deformity.   Lymphadenopathy:     He has no cervical adenopathy.   Neurological: He is oriented to person, place, and time. He displays normal reflexes. No cranial nerve deficit. Coordination normal.   Residual LLE weakness   Skin: Skin is warm and  dry. No rash noted. No erythema.   Psychiatric: He has a normal mood and affect.   Nursing note and vitals reviewed.      Significant Labs:   CBC:   Recent Labs  Lab 05/07/17  0551 05/08/17  0526   WBC 11.81 11.55   HGB 10.5* 10.7*   HCT 33.4* 34.6*    191     CMP:   Recent Labs  Lab 05/07/17  0551 05/08/17  0526    146*   K 3.8 3.8   * 115*   CO2 22* 22*   GLU 97 103   BUN 25* 25*   CREATININE 1.6* 1.6*   CALCIUM 8.7 8.9   ANIONGAP 8 9   EGFRNONAA 43.3* 43.3*       Significant Imaging: I have reviewed and interpreted all pertinent imaging results/findings within the past 24 hours.    Assessment/Plan:      * Hyperkalemia  - Improved  - Likely due to medications  - 7.0 on presentation. No EKG changes.   - Holding spironolactone/ ACE-ARB  - Secondary to HUMERA.  - Received IV calcium and insulin in ED.        Chronic systolic congestive heart failure, NYHA class 2  EF 25% on 7/2016.  - Not fluid overloaded on physical examination and BNP at baseline at 157.  - Continue Amiodorone and Atorvastatin. Holding Spironolactone, and Lisinopril considering HUMERA.  - Repeat 2D echo for surveillance->EF improved to 40 w/ DD      Debility  -PT/OT/SW consult for discharge planning      Sleep apnea  -CPAP QHS      Atrial fibrillation, controlled  - [id number] score of 5; high risk of stroke (7.2% per year)  - Continue  Amiodarone  - Resumed apixaban      Type II diabetes mellitus with complication, uncontrolled  - At home, long acting 8U BID and short acting 5U TIDWM.  - LDSSI   - Diabetic diet and repeat JrN1V=0      Encounter for wound care  -wound care following, continuing their recs  -possible explanation of leukocytosis on 5/1  -The sacral area skin is intact, the scrotum has shearing noted to the posterior aspect  -miconazole ointment BID to scrotal area to protect the skin and resolve fungal infection.       Acute renal failure superimposed on stage 3 chronic kidney disease  - BUN/Cr Improving. Baseline Cr  generally ~1.5-2.  - Likely pre-renal azotemia due to volume depletion from diarrhea and duiretics  - BUN/Cr ratio elevated   - Improved with fluid challenge  - Post-obstructive HUMERA possible with BPH - andrade placed and patient voided well.  - Renal ultrasound Consistent with medical renal disease.  - NAGMA improving  -discontinued bicarb infusion to D5W  -FW intake ordered for nursing to give fixed amount daily  -D5W gtt today      Encephalopathy  -Improving  -Slight confusion possibly secondary to uremia or delirium  -Old CVA in 2010 --  SUBACUTE INFARCTION INVOLVING THE RIGHT PCA TERRITORY WITHIN THE PARASAGITTAL RIGHT OCCIPITAL LOBE. Making pt more prone to delirium  -Continue monitor   -CPAP QHS  -improving; some component thought to be from CVA prior to admission but patient is improved.       Renovascular hypertension  -continue to monitor-restarted Coreg      Goals of care, counseling/discussion  -needed for placement and family discussion regarding disposition; now that encephalopathy is persistent without organic cause, patient may now need nursing level long term care      VTE Risk Mitigation         Ordered     apixaban tablet 5 mg  2 times daily     Route:  Oral        05/03/17 0940     Medium Risk of VTE  Once      04/28/17 0211     Place sequential compression device  Until discontinued      04/28/17 0211          Chon Lopez MD  Department of Hospital Medicine   [facility]

## 2017-05-08 NOTE — PLAN OF CARE
Problem: Patient Care Overview  Goal: Plan of Care Review  Patient is a 67 y.o. male y/o M with HFrEF (EF 20-25% in Jun 2016), NICM declined ICD, HTN, HLD, atrial fibrillation on Eliquis, T2DM, h/o CVA in 2010, ERIC presents with generalized weakness, fatigue and AMS that started as per patient since yesterday. While at the ED laboratories drawn and patient was found with acute renal failure BUN/ Creatinine: 147/6.2 and hyperkalemic: 7.0 (No hemolysis) and thats the reason we are consult. Patient poor historian.       4/28: Admit to SICU for hyperkalemia. K shifted X2. Kayexalate given X 1.   4/29: K shifted x 1, kayexalate x 1    Accuchecks AC/HS  Miconazole ointment BID to pannus and perineal area.   Outcome: Ongoing (interventions implemented as appropriate)  Patient safety maintained throughout shift. All care explained. Questions addressed. Freely used urinal (post andrade removal). Will continue to monitor.

## 2017-05-08 NOTE — ASSESSMENT & PLAN NOTE
- BUN/Cr Improving. Baseline Cr generally ~1.5-2.  - Likely pre-renal azotemia due to volume depletion from diarrhea and duiretics  - BUN/Cr ratio elevated   - Improved with fluid challenge  - Post-obstructive HUMERA possible with BPH - andrade placed and patient voided well.  - Renal ultrasound Consistent with medical renal disease.  - NAGMA improving  -discontinued bicarb infusion to D5W  -FW intake ordered for nursing to give fixed amount daily  -D5W gtt today

## 2017-05-08 NOTE — PT/OT/SLP PROGRESS
Physical Therapy  Treatment    Hemant Kennedy Jr.   MRN: [id number]   Admitting Diagnosis: Hyperkalemia    PT Received On: 17  PT Start Time: 55     PT Stop Time: 938    PT Total Time (min): 43 min       Billable Minutes:  Therapeutic Activity 23 and Train/Wheelchair Management 20    Treatment Type: Treatment  PT/PTA: PT     PTA Visit Number: 0       General Precautions: Standard, fall  Orthopedic Precautions: N/A   Braces: N/A         Subjective:  Communicated with RN prior to session.  Pt agreeable to therapy session.     Pain Ratin/10              Pain Rating Post-Intervention: 0/10    Objective:   Patient found with: telemetry    Functional Mobility:  Bed Mobility:   Supine to Sit: Minimum Assistance (assist at trunk)  Sit to Supine: Moderate Assistance (assist with B LE)    Transfers:  Bed <> Chair Technique: Slide Board  Bed <> Chair Assistance: Total Assistance ((assist x2))  Bed <> Chair Assistive Device: No Assistive Device    Balance:   Static Sit: GOOD-: Takes MODERATE challenges from all directions but inconsistently  Dynamic Sit: GOOD-: Maintains balance through MODERATE excursions of active trunk movement,       Therapeutic Activities and Exercises:  Pt sat EOB with S.  Pt required assist with slide board placement vc's for hand placement.  Pt required total A (assist x2 for safety) with transfer EOB<>w/c with 3 scoots each way.  Pt educated on safety with transfers with w/c and sliding board.     AM-PAC 6 CLICK MOBILITY  How much help from another person does this patient currently need?   1 = Unable, Total/Dependent Assistance  2 = A lot, Maximum/Moderate Assistance  3 = A little, Minimum/Contact Guard/Supervision  4 = None, Modified [place]/Independent    Turning over in bed (including adjusting bedclothes, sheets and blankets)?: 3  Sitting down on and standing up from a chair with arms (e.g., wheelchair, bedside commode, etc.): 1  Moving from lying on back to sitting on the side of  the bed?: 3  Moving to and from a bed to a chair (including a wheelchair)?: 1  Need to walk in hospital room?: 1  Climbing 3-5 steps with a railing?: 1  Total Score: 10    AM-PAC Raw Score CMS G-Code Modifier Level of Impairment Assistance   6 % Total / Unable   7 - 9 CM 80 - 100% Maximal Assist   10 - 14 CL 60 - 80% Moderate Assist   15 - 19 CK 40 - 60% Moderate Assist   20 - 22 CJ 20 - 40% Minimal Assist   23 CI 1-20% SBA / CGA   24 CH 0% Independent/ Mod I     Patient left supine with all lines intact, call button in reach and RN notified.    Assessment:  Hemant Kennedy Jr. is a 67 y.o. male with a medical diagnosis of Hyperkalemia and presents with decreased strength, endurance, balance and overall functional mobility. Pt performed bed mobility min/mod A and w/c sliding board transfer total A (assist x2 for safety). Pt will continue to benefit from skilled PT to improve deficits and increase overall functional mobility. Pt will benefit from short-stay SNF to return to PLOF and increase overall functional independence.     Rehab identified problem list/impairments: Rehab identified problem list/impairments: weakness, impaired balance, impaired endurance, decreased safety awareness, impaired functional mobilty, decreased lower extremity function, decreased ROM, impaired joint extensibility    Rehab potential is good.    Activity tolerance: Good    Discharge recommendations: Discharge Facility/Level Of Care Needs: nursing facility, skilled     Barriers to discharge: Barriers to Discharge: Decreased caregiver support (pt requires increased assist at this time)    Equipment recommendations: Equipment Needed After Discharge: none     GOALS:   Physical Therapy Goals        Problem: Physical Therapy Goal    Goal Priority Disciplines Outcome Goal Variances Interventions   Physical Therapy Goal     PT/OT, PT Ongoing (interventions implemented as appropriate)     Description:  Goals to be met by: 5/15/2017      Patient will increase functional independence with mobility by performin. Pt will be independent with w/c set up for w/c<> bed transfer  2. Bed <> w/c transfer with modified independence using a slideboard.  3. Pt will be independent with LE exercise program to improve LE ROM and prevent further contractures using handout  4.  Pt will be educated in pressure relief in sitting and supine.   5. Pt will perform w/c mobility on level surfaces x 50 feet with SBA.                    PLAN:    Patient to be seen 4 x/week  to address the above listed problems via therapeutic activities, therapeutic exercises, wheelchair management/training  Plan of Care expires: 17  Plan of Care reviewed with: patient         MARIOLA DAVE, PT  2017

## 2017-05-08 NOTE — PLAN OF CARE
05/08/17 1409   Right Care Assessment   Can the patient answer the patient profile reliably? No, cognitively impaired   How often would a person be available to care for the patient? Occasionally   Describe the patient's ability to walk at the present time. Does not walk or unable to take any steps at all   How does the patient rate their overall health at the present time? Fair   Number of comorbid conditions (as recorded on the chart) Three   During the past month, has the patient often been bothered by feeling down, depressed or hopeless? No   During the past month, has the patient often been bothered by little interest or pleasure in doing things? No     Plan A: SNF  Plan B: HH or [facility]

## 2017-05-08 NOTE — ASSESSMENT & PLAN NOTE
-needed for placement and family discussion regarding disposition; now that encephalopathy is persistent without organic cause, patient may now need nursing level long term care

## 2017-05-08 NOTE — PLAN OF CARE
CM in to see pt with OT at bedside.  Pt awake and alert but not oriented to time.  CM spoke with Socorro couple times today on pt disposition plan to SNF with [facility] referral placed today per request.  CM informed pt importance of participating in therapy to get into SNF then on to his home verbalizing understanding.  CM will continue to follow.

## 2017-05-08 NOTE — PLAN OF CARE
CM received call from pt daughter, Socorro, questioning pts disposition of SNF vs HH.  CM called spoke with Jennifer Givens, [facility] resident, stating pt deconditioned over the weekend and prefers pt to go to SNF for short term then on to [facility] with [facility].  CM returned call to Socorro informing of above information verbalizing understanding stating she will go and speak with [facility] today at 9am.  CM placed referral to [facility].  CM will continue to follow.

## 2017-05-08 NOTE — PROGRESS NOTES
This CSW received a referral for this patient who is currently admitted to acute care.   CSW will follow up with patient/caregiver upon discharge, in order to complete assessment.

## 2017-05-08 NOTE — ASSESSMENT & PLAN NOTE
- At home, long acting 8U BID and short acting 5U TIDWM.  - LDSSI   - Diabetic diet and repeat LnZ3H=3

## 2017-05-08 NOTE — ASSESSMENT & PLAN NOTE
- [id number] score of 5; high risk of stroke (7.2% per year)  - Continue  Amiodarone  - Resumed apixaban

## 2017-05-08 NOTE — PLAN OF CARE
Problem: Physical Therapy Goal  Goal: Physical Therapy Goal  Goals to be met by: 5/15/2017     Patient will increase functional independence with mobility by performin. Pt will be independent with w/c set up for w/c<> bed transfer  2. Bed <> w/c transfer with modified independence using a slideboard.  3. Pt will be independent with LE exercise program to improve LE ROM and prevent further contractures using handout  4. Pt will be educated in pressure relief in sitting and supine.   5. Pt will perform w/c mobility on level surfaces x 50 feet with SBA.      Outcome: Ongoing (interventions implemented as appropriate)  Pt goals remain appropriate.      MARIOLA ACOSTA, PT  2017

## 2017-05-09 LAB
ANION GAP SERPL CALC-SCNC: 7 MMOL/L
BACTERIA UR CULT: NORMAL
BACTERIA UR CULT: NORMAL
BASOPHILS # BLD AUTO: 0.04 K/UL
BASOPHILS NFR BLD: 0.4 %
BUN SERPL-MCNC: 24 MG/DL
CALCIUM SERPL-MCNC: 9 MG/DL
CHLORIDE SERPL-SCNC: 117 MMOL/L
CO2 SERPL-SCNC: 19 MMOL/L
CREAT SERPL-MCNC: 1.6 MG/DL
DIFFERENTIAL METHOD: ABNORMAL
EOSINOPHIL # BLD AUTO: 0.7 K/UL
EOSINOPHIL NFR BLD: 6.7 %
ERYTHROCYTE [DISTWIDTH] IN BLOOD BY AUTOMATED COUNT: 16.6 %
EST. GFR  (AFRICAN AMERICAN): 50.1 ML/MIN/1.73 M^2
EST. GFR  (NON AFRICAN AMERICAN): 43.3 ML/MIN/1.73 M^2
GLUCOSE SERPL-MCNC: 88 MG/DL
HCT VFR BLD AUTO: 31.7 %
HGB BLD-MCNC: 10.5 G/DL
LYMPHOCYTES # BLD AUTO: 2 K/UL
LYMPHOCYTES NFR BLD: 20.3 %
MAGNESIUM SERPL-MCNC: 2.2 MG/DL
MCH RBC QN AUTO: 28.8 PG
MCHC RBC AUTO-ENTMCNC: 33.1 %
MCV RBC AUTO: 87 FL
MONOCYTES # BLD AUTO: 1 K/UL
MONOCYTES NFR BLD: 10.2 %
NEUTROPHILS # BLD AUTO: 6.3 K/UL
NEUTROPHILS NFR BLD: 62.4 %
PHOSPHATE SERPL-MCNC: 3.6 MG/DL
PLATELET # BLD AUTO: 184 K/UL
PLATELET BLD QL SMEAR: ABNORMAL
PMV BLD AUTO: 10.2 FL
POCT GLUCOSE: 100 MG/DL (ref 70–110)
POCT GLUCOSE: 113 MG/DL (ref 70–110)
POCT GLUCOSE: 115 MG/DL (ref 70–110)
POCT GLUCOSE: 96 MG/DL (ref 70–110)
POTASSIUM SERPL-SCNC: 4.3 MMOL/L
RBC # BLD AUTO: 3.65 M/UL
SODIUM SERPL-SCNC: 143 MMOL/L
WBC # BLD AUTO: 10.05 K/UL

## 2017-05-09 PROCEDURE — 25000003 PHARM REV CODE 250: Performed by: INTERNAL MEDICINE

## 2017-05-09 PROCEDURE — 97530 THERAPEUTIC ACTIVITIES: CPT

## 2017-05-09 PROCEDURE — 25000003 PHARM REV CODE 250: Performed by: STUDENT IN AN ORGANIZED HEALTH CARE EDUCATION/TRAINING PROGRAM

## 2017-05-09 PROCEDURE — 84100 ASSAY OF PHOSPHORUS: CPT

## 2017-05-09 PROCEDURE — 11000001 HC ACUTE MED/SURG PRIVATE ROOM

## 2017-05-09 PROCEDURE — 36415 COLL VENOUS BLD VENIPUNCTURE: CPT

## 2017-05-09 PROCEDURE — 80048 BASIC METABOLIC PNL TOTAL CA: CPT

## 2017-05-09 PROCEDURE — 97803 MED NUTRITION INDIV SUBSEQ: CPT

## 2017-05-09 PROCEDURE — 97165 OT EVAL LOW COMPLEX 30 MIN: CPT

## 2017-05-09 PROCEDURE — 63600175 PHARM REV CODE 636 W HCPCS: Performed by: HOSPITALIST

## 2017-05-09 PROCEDURE — 83735 ASSAY OF MAGNESIUM: CPT

## 2017-05-09 PROCEDURE — 99900035 HC TECH TIME PER 15 MIN (STAT)

## 2017-05-09 PROCEDURE — 86580 TB INTRADERMAL TEST: CPT | Performed by: HOSPITALIST

## 2017-05-09 PROCEDURE — 85025 COMPLETE CBC W/AUTO DIFF WBC: CPT

## 2017-05-09 PROCEDURE — 99232 SBSQ HOSP IP/OBS MODERATE 35: CPT | Mod: GC,,, | Performed by: HOSPITALIST

## 2017-05-09 RX ADMIN — MICONAZOLE NITRATE: 20 OINTMENT TOPICAL at 08:05

## 2017-05-09 RX ADMIN — MICONAZOLE NITRATE: 20 OINTMENT TOPICAL at 10:05

## 2017-05-09 RX ADMIN — CARVEDILOL 6.25 MG: 6.25 TABLET, FILM COATED ORAL at 10:05

## 2017-05-09 RX ADMIN — APIXABAN 5 MG: 2.5 TABLET, FILM COATED ORAL at 10:05

## 2017-05-09 RX ADMIN — APIXABAN 5 MG: 2.5 TABLET, FILM COATED ORAL at 08:05

## 2017-05-09 RX ADMIN — ATORVASTATIN CALCIUM 40 MG: 20 TABLET, FILM COATED ORAL at 08:05

## 2017-05-09 RX ADMIN — Medication 3 ML: at 10:05

## 2017-05-09 RX ADMIN — Medication 3 ML: at 06:05

## 2017-05-09 RX ADMIN — AMIODARONE HYDROCHLORIDE 200 MG: 200 TABLET ORAL at 08:05

## 2017-05-09 RX ADMIN — Medication 3 ML: at 02:05

## 2017-05-09 RX ADMIN — ASPIRIN 81 MG CHEWABLE TABLET 81 MG: 81 TABLET CHEWABLE at 08:05

## 2017-05-09 RX ADMIN — FLUTICASONE PROPIONATE 2 SPRAY: 50 SPRAY, METERED NASAL at 08:05

## 2017-05-09 RX ADMIN — Medication 5 UNITS: at 11:05

## 2017-05-09 RX ADMIN — CARVEDILOL 6.25 MG: 6.25 TABLET, FILM COATED ORAL at 08:05

## 2017-05-09 NOTE — PLAN OF CARE
CM received call from 5, Dr. Lopez, stating PASSR now signed per Dr. Robertson with CM notifying Breana CARRILLO of PASSR in pt chart signed.

## 2017-05-09 NOTE — PLAN OF CARE
SW attempted to call in LOCET for Pt, but had to leave her name and number to request a call back. GERARDO placed PASRR in Pt's chart for MD signature and asked CM, Katya Cardenas, to please notify her Resident and/or Attending that it required signature.     Breana Enamorado, ANNAW

## 2017-05-09 NOTE — PLAN OF CARE
SW learned that Pt had been declined from [facility], but had been accepted to [facility]. Pt's family member is reportedly going to tour facilities tomorrow.     ANNA MarquezW

## 2017-05-09 NOTE — PT/OT/SLP EVAL
Occupational Therapy  Evaluation    Hemant Kennedy Jr.   MRN: [id number]   Admitting Diagnosis: Hyperkalemia    OT Date of Treatment: 17   OT Start Time: 1040  OT Stop Time: 1105  OT Total Time (min): 25 min    Billable Minutes:  Evaluation 15  Therapeutic Activity 10    Diagnosis: Hyperkalemia       Past Medical History:   Diagnosis Date    Atrial fibrillation     Cardiomyopathy     home O2    Cataract     CHF (congestive heart failure)     CKD (chronic kidney disease) stage 3, GFR 30-59 ml/min 2015    Diabetes mellitus     DVT (deep venous thrombosis)     Hypertension     Sleep apnea     Stroke     with residual effects W/C bound - Right Occipital      History reviewed. No pertinent surgical history.    Referring physician: John  Date referred to OT: 17    General Precautions: Standard, fall  Orthopedic Precautions:    Braces:      Do you have any cultural, spiritual, Adventism conflicts, given your current situation?: no     Patient History:  Living Environment  Lives With: alone  Living Arrangements: apartment  Home Accessibility: other (see comments) (elevator)  Transportation Available: family or friend will provide  Living Environment Comment: Pt states that his walking ability has been declining for last decade or so. States that he was (I) with ADLs/meal prep but was only doing squat/pivot t/fs from w/c. Pt states he has a walk-in shower. Difficult to obtain history from pt.  Equipment Currently Used at Home: walker, rolling, wheelchair, shower chair    Prior level of function:   Bed Mobility/Transfers: needs device  Grooming: independent  Bathing: needs device  Upper Body Dressing: independent  Lower Body Dressing: independent  Toileting: needs device        Dominant hand: right    Subjective:  Communicated with RN prior to session.  Chief Complaint: BLE weakness  Patient/Family stated goals: None stated    Pain Ratin/10        Objective:  Patient found with: peripheral  IV    Cognitive Exam:  Oriented to: Person, Place, Time and Situation  Follows Commands/attention: Follows multistep  commands  Communication: clear/fluent  Memory:  No Deficits noted  Safety awareness/insight to disability: intact  Coping skills/emotional control: Appropriate to situation    Visual/perceptual:  Intact    Physical Exam:  Postural examination/scapula alignment: No postural abnormalities identified  Skin integrity: Tear of R heel  Edema: None noted     Sensation:   Intact    Upper Extremity Range of Motion:  Right Upper Extremity: WNL  Left Upper Extremity: WNL    Upper Extremity Strength:  Right Upper Extremity: WNL  Left Upper Extremity: WNL   Strength: wnl    Fine motor coordination:   Intact    Gross motor coordination: WFL    Functional Mobility:  Bed Mobility:  Rolling/Turning to Left: Stand by assistance  Scooting/Bridging: Contact Guard Assistance  Supine to Sit: Stand by Assistance  Sit to Supine: Contact Guard Assistance    Transfers:  Sit <> Stand Assistance: Activity did not occur (Pt states he can't stand.)    Functional Ambulation: Did not occur - unable to stand.    Activities of Daily Living:     UE Dressing Level of Assistance: Stand by assistance  LE Dressing Level of Assistance: Stand by assistance (socks)    Balance:   Static Sit: GOOD+: Takes MAXIMAL challenges from all directions.    Dynamic Sit: GOOD: Maintains balance through MODERATE excursions of active trunk movement    Therapeutic Activities and Exercises:  UE ROM/MMT  OT POC  Bed mobility    AM-PAC 6 CLICK ADL  How much help from another person does this patient currently need?  1 = Unable, Total/Dependent Assistance  2 = A lot, Maximum/Moderate Assistance  3 = A little, Minimum/Contact Guard/Supervision  4 = None, Modified [place]/Independent    Putting on and taking off regular lower body clothing? : 3  Bathing (including washing, rinsing, drying)?: 2  Toileting, which includes using toilet, bedpan, or urinal? :  "1  Putting on and taking off regular upper body clothing?: 3  Taking care of personal grooming such as brushing teeth?: 4  Eating meals?: 4  Total Score: 17    AM-PAC Raw Score CMS "G-Code Modifier Level of Impairment Assistance   6 % Total / Unable   7 - 9 CM 80 - 100% Maximal Assist   10 - 14 CL 60 - 80% Moderate Assist   15 - 19 CK 40 - 60% Moderate Assist   20 - 22 CJ 20 - 40% Minimal Assist   23 CI 1-20% SBA / CGA   24 CH 0% Independent/ Mod I       Patient left supine with all lines intact and call button in reach    Assessment:  Hemant Kennedy Jr. is a 67 y.o. male with a medical diagnosis of Hyperkalemia and presents with decreased (I) in multiple ADL areas, functional mobility & t/fs as well as decreased overall LE ROM/strength,  endurance and balance. Pt would benefit from IP OT to address these deficits and to facilitate improving (I) with daily tasks.    Rehab identified problem list/impairments: Rehab identified problem list/impairments: weakness, impaired balance, impaired skin, impaired endurance, impaired self care skills, decreased coordination, impaired functional mobilty, decreased lower extremity function, gait instability, decreased ROM    Rehab potential is fair.    Activity tolerance: Good    Discharge recommendations: Discharge Facility/Level Of Care Needs: nursing facility, skilled     Barriers to discharge: Barriers to Discharge: Decreased caregiver support    Equipment recommendations:  (TBD)     GOALS:   Occupational Therapy Goals        Problem: Occupational Therapy Goal    Goal Priority Disciplines Outcome Interventions   Occupational Therapy Goal     OT, PT/OT Ongoing (interventions implemented as appropriate)    Description:  Goals to be met by: 5/16/17    Patient will increase functional independence with ADLs by performing:    LE Dressing with Stand-by Assistance (pants, underwear as available)  Grooming while seated with Set-up Assistance.  Supine to sit with " [place].  Squat pivot or slide board transfers as appropriate with Maximum Assistance.  Toilet transfer to bedside commode with Maximum Assistance.                PLAN:  Patient to be seen 3 x/week to address the above listed problems via self-care/home management, therapeutic activities, therapeutic exercises, neuromuscular re-education  Plan of Care expires: 06/08/17  Plan of Care reviewed with: patient         Ivan CHANDRAKANT Mcintosh  05/09/2017

## 2017-05-09 NOTE — PLAN OF CARE
SW received call from the [facility] office and completed assessment. GERARDO will fax PASRR to the state once received.     Breana Enamorado LCSW

## 2017-05-09 NOTE — PROGRESS NOTES
[facility] Hospital Medicine  Progress Note    Patient Name: Hemant Kennedy Jr.  MRN: [id number]  Patient Class: IP- Inpatient   Admission Date: 4/27/2017  Length of Stay: 11 days  Attending Physician: Godwin Roberston MD  Primary Care Provider: Hemant Markham MD    [facility] Medicine Team: [facility] HOSP MED 5 Chon Lopez MD    Subjective:     Principal Problem:Hyperkalemia    Hospital Course:  4/28: In ED, provided 1g calcium gluconate IV as well as 10U insulin with dextrose for intracellular shifting of potassium. Repeat BMP was demonstrable for resolved hyperkalemia at 3.7. A andrade placed and urine voided. On 4/29: Patient seen by Nephrology due to persistent hyperkalemia in setting of HUMERA on CKD. A  Trialysis was initially attempted however the patient has very poor access and a decision was made to manage the patient medically. Urine output has continued to improve as have other electrolytes. The patient continues to seem somewhat altered but can answer simple demographic questions. He is pending transfer to the floor and will need social work follow-up for discharge planning.    5/2: Attempts made to contact family for more clarification of patient's prior hospitalization as well as move forward with discharge planning.  5/4-5/9: FWD improved on D5 and stable now; encouraging PO intake and electrolyte monitoring with discharge for pending FDC /SNF care.       Interval History: Patient's sodium improved on D5w overnight; no acute symptoms or confusion reported, awaiting placement.     Review of Systems   Respiratory: Negative for shortness of breath.    Gastrointestinal: Negative for constipation.   Endocrine: Negative for polyphagia and polyuria.   Genitourinary: Negative for testicular pain.   Neurological: Negative for dizziness and weakness.   Psychiatric/Behavioral: Positive for confusion. Negative for agitation.     Objective:     Vital Signs (Most Recent):  Temp: 98.2 °F (36.8  °C) (05/09/17 0750)  Pulse: 67 (05/09/17 0750)  Resp: 18 (05/09/17 0750)  BP: 124/60 (05/09/17 0750)  SpO2: 97 % (05/09/17 0750) Vital Signs (24h Range):  Temp:  [98.2 °F (36.8 °C)-98.3 °F (36.8 °C)] 98.2 °F (36.8 °C)  Pulse:  [60-69] 67  Resp:  [18-19] 18  SpO2:  [96 %-97 %] 97 %  BP: (119-134)/(57-65) 124/60     Weight: 119 kg (262 lb 5.6 oz)  Body mass index is 39.89 kg/(m^2).  No intake or output data in the 24 hours ending 05/09/17 1321   Physical Exam   Constitutional: He is oriented to person, place, and time. He appears well-developed and well-nourished. No distress.   HENT:   Nose: Nose normal.   Mouth/Throat: Oropharynx is clear and moist. No oropharyngeal exudate.   Eyes: EOM are normal. Pupils are equal, round, and reactive to light. Left eye exhibits no discharge. No scleral icterus.   Neck: No JVD present. No tracheal deviation present. No thyromegaly present.   Cardiovascular: Normal rate, regular rhythm and intact distal pulses.  Exam reveals no gallop and no friction rub.    Murmur heard.  Pulmonary/Chest: Effort normal. No respiratory distress. He has no wheezes. He has no rales. He exhibits no tenderness.   Abdominal: Soft. Bowel sounds are normal. He exhibits no distension and no mass. There is no tenderness. No hernia.   Genitourinary: Rectal exam shows guaiac negative stool. No penile tenderness.   Musculoskeletal: Normal range of motion. He exhibits no edema, tenderness or deformity.   Lymphadenopathy:     He has no cervical adenopathy.   Neurological: He is oriented to person, place, and time. He displays normal reflexes. No cranial nerve deficit. Coordination normal.   Residual LLE weakness   Skin: Skin is warm and dry. No rash noted. No erythema.   Psychiatric: He has a normal mood and affect.   Nursing note and vitals reviewed.      Significant Labs:   CBC:   Recent Labs  Lab 05/08/17  0526 05/09/17  0358   WBC 11.55 10.05   HGB 10.7* 10.5*   HCT 34.6* 31.7*    184     CMP:   Recent  Labs  Lab 05/08/17  0526 05/09/17  0357   * 143   K 3.8 4.3   * 117*   CO2 22* 19*    88   BUN 25* 24*   CREATININE 1.6* 1.6*   CALCIUM 8.9 9.0   ANIONGAP 9 7*   EGFRNONAA 43.3* 43.3*       Significant Imaging: I have reviewed and interpreted all pertinent imaging results/findings within the past 24 hours.    Assessment/Plan:      * Hyperkalemia  - Improved  - Likely due to medications  - 7.0 on presentation. No EKG changes.   - Holding spironolactone/ ACE-ARB  - Secondary to HUMERA.  - Received IV calcium and insulin in ED.        Chronic systolic congestive heart failure, NYHA class 2  EF 25% on 7/2016.  - Not fluid overloaded on physical examination and BNP at baseline at 157.  - Continue Amiodorone and Atorvastatin. Holding Spironolactone, and Lisinopril considering HUMERA.  - Repeat 2D echo for surveillance->EF improved to 40 w/ DD      Debility  -PT/OT/SW consult for discharge planning      Sleep apnea  -CPAP QHS      Atrial fibrillation, controlled  - [id number] score of 5; high risk of stroke (7.2% per year)  - Continue  Amiodarone  - Resumed apixaban      Type II diabetes mellitus with complication, uncontrolled  - At home, long acting 8U BID and short acting 5U TIDWM.  - LDSSI   - Diabetic diet and repeat ZnN4Z=9      Encounter for wound care  -wound care following, continuing their recs  -possible explanation of leukocytosis on 5/1  -The sacral area skin is intact, the scrotum has shearing noted to the posterior aspect  -miconazole ointment BID to scrotal area to protect the skin and resolve fungal infection.       Acute renal failure superimposed on stage 3 chronic kidney disease  - BUN/Cr Improving. Baseline Cr generally ~1.5-2.  - Likely pre-renal azotemia due to volume depletion from diarrhea and duiretics  - BUN/Cr ratio elevated   - Improved with fluid challenge  - Post-obstructive HUMERA possible with BPH - andrade placed and patient voided well.  - Renal ultrasound Consistent with medical  renal disease.  - NAGMA improving  -discontinued bicarb infusion, discontinued D5W  -FW intake ordered for nursing to give fixed amount daily        Encephalopathy    -Old CVA in 2010 --  SUBACUTE INFARCTION INVOLVING THE RIGHT PCA TERRITORY WITHIN THE PARASAGITTAL RIGHT OCCIPITAL LOBE. Making pt more prone to delirium  -Continue monitor   -CPAP QHS  -improving; some component thought to be from CVA prior to admission but patient is improved.       Goals of care, counseling/discussion  -needed for placement and family discussion regarding disposition; now that encephalopathy is persistent without organic cause, patient now needs nursing level long term care      VTE Risk Mitigation         Ordered     apixaban tablet 5 mg  2 times daily     Route:  Oral        05/03/17 0940     Medium Risk of VTE  Once      04/28/17 0211     Place sequential compression device  Until discontinued      04/28/17 0211        Patient seen and discussed on rounds with Dr. Robertson.        Chon Lopez MD  Department of Hospital Medicine   [facility]

## 2017-05-09 NOTE — ASSESSMENT & PLAN NOTE
- [id number] score of 5; high risk of stroke (7.2% per year)  - Continue  Amiodarone  - Resumed apixaban

## 2017-05-09 NOTE — ASSESSMENT & PLAN NOTE
- At home, long acting 8U BID and short acting 5U TIDWM.  - LDSSI   - Diabetic diet and repeat HrH8W=6

## 2017-05-09 NOTE — PROGRESS NOTES
[facility]  Adult Nutrition  Progress Note    SUMMARY     Pt reports he ate all his breakfast. He said he hasn't been getting ONS, although it is ordered. Denies n/v or chewing/swallowing difficulty. Pt was very sleepy during visit. No family present. Na 146, Cl 115, BG WNL.     Recommendations    1. Rec liberalizing diet, K WNL.   2. Continue DM 1800 kcal diet order.   3. Encouraged good PO intake to optimize nutrition.   4. Reorder ONS.     RD to monitor and follow up as appropriate.     Goals: PO intake >/=75%  Nutrition Goal Status: progressing towards goal  Communication of RD Recs: Placed recs in sticky note    Continuum of Care Plan    Nursing Team: obtain bi weekly wts    Reason for Assessment    Reason for Assessment: physician consult  Diagnosis:  (hyperkalemia)  Relevent Medical History: CHF, type 2 DM, HTN, CVA, CKD stage 3   Interdisciplinary Rounds: attended  General Information Comments: Patient with fair appetite and PO intake, ~ 50% of meals.  (from previous RD visit)  Nutrition Discharge Planning: Adequate nutrition via PO intake.    Nutrition Prescription Ordered    Current Diet Order: Diabetic 1800, Low Potassium  Oral Nutrition Supplement: Boost glucose control    IV Fluid (mL): 1200      Nutrition Risk Screen     Nutrition Risk Screen: no indicators present    Nutrition/Diet History    Patient Reported Diet/Restrictions/Preferences: general  Food Preferences: No cultural/Restorationist limitations reported.   Factors Affecting Nutritional Intake: decreased appetite     Labs/Tests/Procedures/Meds    Pertinent Labs Reviewed: reviewed  Pertinent Labs Comments: Na 146, Cl 115, BUN 25, Cr 1.6, K WNL  Pertinent Medications Reviewed: reviewed  Pertinent Medications Comments: ergocalciferol, statin, aspirin    Physical Findings    Overall Physical Appearance: overweight, lethargic  Tubes:  (-)  Oral/Mouth Cavity: WDL  Skin: intact    Anthropometrics    Height (inches): 66.93 in  Weight  Method: Bed Scale  Weight (kg): 119 kg  Ideal Body Weight (IBW), Male: 147.58 lb  % Ideal Body Weight, Male (lb): 176.72 lb  BMI (kg/m2): 40.93  BMI Grade: greater than 40 - morbid obesity  Usual Body Weight (UBW), k.6 kg  % Usual Body Weight: 92.53  Weight Loss:  (no wt loss reported)     Estimated/Assessed Needs    Weight Used For Calorie Calculations: 118.3 kg (260 lb 12.9 oz)   Height (cm): 170 cm (per chart review, no height in chart)  Energy Need Method: Holden-St Jeor (2292 kcal/day (1.2))  RMR (Holden-St. Jeor Equation): 1909.84  Weight Used For Protein Calculations: 118.3 kg (260 lb 12.9 oz)  Protein Requirements: 107-130 g/day (0.9-1.1 g/kg)  Fluid Need Method: RDA Method (2300 mL or per MD)  CHO Requirement: 50% of kcals     Nutrition Diagnosis    No nutrition dx at this time.     Monitor and Evaluation    Food and Nutrient Intake: energy intake, food and beverage intake  Food and Nutrient Adminstration: diet order  Physical Activity and Function: nutrition-related ADLs and IADLs  Anthropometric Measurements: weight, weight change  Biochemical Data, Medical Tests and Procedures: electrolyte and renal panel, gastrointestinal profile  Nutrition-Focused Physical Findings: overall appearance    Nutrition Risk    Level of Risk:  (1x/week)    Nutrition Follow-Up    RD Follow-up?: Yes     Angeline Menjivar RD, LDN

## 2017-05-09 NOTE — PLAN OF CARE
CM received voice message from pt daughter/POA, Socorro Giordano, requesting return call on updates for SNF placement.  CM returned call speaking with Socorro in regards to SNF placement with CM relaying information from [facility] liaison, Elle, they are unable to accept pt due to low participation and needing more long term SNF with Socorro verbalizing understanding.  CM reviewed SNF list in [facility] via phone with Socorro requesting referrals to be sent to [facility], and [facility] in [place].  CM will forward referral requests to Orly HERNANDEZ.  CM/SW will continue to follow.

## 2017-05-09 NOTE — PLAN OF CARE
CM contacted per covering SWBreana, stating she is calling in LOCET and will place PASSR in pt chart for attending MD signature.  CM contacted IM5, Dr. Lopez, aware of PASSR needing signature from Dr. Beckman.

## 2017-05-09 NOTE — ASSESSMENT & PLAN NOTE
-Old CVA in 2010 --  SUBACUTE INFARCTION INVOLVING THE RIGHT PCA TERRITORY WITHIN THE PARASAGITTAL RIGHT OCCIPITAL LOBE. Making pt more prone to delirium  -Continue monitor   -CPAP QHS  -improving; some component thought to be from CVA prior to admission but patient is improved.

## 2017-05-09 NOTE — SUBJECTIVE & OBJECTIVE
Interval History: Patient's sodium improved on D5w overnight; no acute symptoms or confusion reported, awaiting placement.     Review of Systems   Respiratory: Negative for shortness of breath.    Gastrointestinal: Negative for constipation.   Endocrine: Negative for polyphagia and polyuria.   Genitourinary: Negative for testicular pain.   Neurological: Negative for dizziness and weakness.   Psychiatric/Behavioral: Positive for confusion. Negative for agitation.     Objective:     Vital Signs (Most Recent):  Temp: 98.2 °F (36.8 °C) (05/09/17 0750)  Pulse: 67 (05/09/17 0750)  Resp: 18 (05/09/17 0750)  BP: 124/60 (05/09/17 0750)  SpO2: 97 % (05/09/17 0750) Vital Signs (24h Range):  Temp:  [98.2 °F (36.8 °C)-98.3 °F (36.8 °C)] 98.2 °F (36.8 °C)  Pulse:  [60-69] 67  Resp:  [18-19] 18  SpO2:  [96 %-97 %] 97 %  BP: (119-134)/(57-65) 124/60     Weight: 119 kg (262 lb 5.6 oz)  Body mass index is 39.89 kg/(m^2).  No intake or output data in the 24 hours ending 05/09/17 1321   Physical Exam   Constitutional: He is oriented to person, place, and time. He appears well-developed and well-nourished. No distress.   HENT:   Nose: Nose normal.   Mouth/Throat: Oropharynx is clear and moist. No oropharyngeal exudate.   Eyes: EOM are normal. Pupils are equal, round, and reactive to light. Left eye exhibits no discharge. No scleral icterus.   Neck: No JVD present. No tracheal deviation present. No thyromegaly present.   Cardiovascular: Normal rate, regular rhythm and intact distal pulses.  Exam reveals no gallop and no friction rub.    Murmur heard.  Pulmonary/Chest: Effort normal. No respiratory distress. He has no wheezes. He has no rales. He exhibits no tenderness.   Abdominal: Soft. Bowel sounds are normal. He exhibits no distension and no mass. There is no tenderness. No hernia.   Genitourinary: Rectal exam shows guaiac negative stool. No penile tenderness.   Musculoskeletal: Normal range of motion. He exhibits no edema, tenderness  or deformity.   Lymphadenopathy:     He has no cervical adenopathy.   Neurological: He is oriented to person, place, and time. He displays normal reflexes. No cranial nerve deficit. Coordination normal.   Residual LLE weakness   Skin: Skin is warm and dry. No rash noted. No erythema.   Psychiatric: He has a normal mood and affect.   Nursing note and vitals reviewed.      Significant Labs:   CBC:   Recent Labs  Lab 05/08/17  0526 05/09/17  0358   WBC 11.55 10.05   HGB 10.7* 10.5*   HCT 34.6* 31.7*    184     CMP:   Recent Labs  Lab 05/08/17 0526 05/09/17  0357   * 143   K 3.8 4.3   * 117*   CO2 22* 19*    88   BUN 25* 24*   CREATININE 1.6* 1.6*   CALCIUM 8.9 9.0   ANIONGAP 9 7*   EGFRNONAA 43.3* 43.3*       Significant Imaging: I have reviewed and interpreted all pertinent imaging results/findings within the past 24 hours.

## 2017-05-09 NOTE — PLAN OF CARE
CM called floor RN, Ciro, in regards to PASSR in pt chart if signed or not per CM request of IM5 at 10:18am this morning.  Ciro states PASSR is in front of chart not signed.  NAOMY placed another call to IM5, Dr. Lopez, requesting again to have Dr. Robertson sign PASSR for pt to move forward with SNF placement.

## 2017-05-09 NOTE — PLAN OF CARE
Problem: Occupational Therapy Goal  Goal: Occupational Therapy Goal  Goals to be met by: 5/16/17    Patient will increase functional independence with ADLs by performing:    LE Dressing with Stand-by Assistance (pants, underwear as available)  Grooming while seated with Set-up Assistance.  Supine to sit with Logan.  Squat pivot or slide board transfers as appropriate with Maximum Assistance.  Toilet transfer to bedside commode with Maximum Assistance.  Outcome: Ongoing (interventions implemented as appropriate)  Evaluation completed and POC established.     CHANDRAKANT Burns

## 2017-05-09 NOTE — PLAN OF CARE
GERARDO faxed signed PASRR to the state and requested the 142 form be sent to her [facility] email address.     Breana Enamorado LCSW

## 2017-05-09 NOTE — PLAN OF CARE
Problem: Patient Care Overview  Goal: Plan of Care Review  Patient is a 67 y.o. male y/o M with HFrEF (EF 20-25% in Jun 2016), NICM declined ICD, HTN, HLD, atrial fibrillation on Eliquis, T2DM, h/o CVA in 2010, ERIC presents with generalized weakness, fatigue and AMS that started as per patient since yesterday. While at the ED laboratories drawn and patient was found with acute renal failure BUN/ Creatinine: 147/6.2 and hyperkalemic: 7.0 (No hemolysis) and thats the reason we are consult. Patient poor historian.       4/28: Admit to SICU for hyperkalemia. K shifted X2. Kayexalate given X 1.   4/29: K shifted x 1, kayexalate x 1    Accuchecks AC/HS  Miconazole ointment BID to pannus and perineal area.   Pt reports he ate all his breakfast. He said he hasn't been getting ONS, although it is ordered. Denies n/v or chewing/swallowing difficulty. Pt was very sleepy during visit. No family present. Na 146, Cl 115, BG WNL. K previously elevated, now WNL, since 5/1.      Recommendations     1. Rec liberalizing diet, K WNL.   2. Continue DM 1800 kcal diet order.   3. Encouraged good PO intake to optimize nutrition.   4. Reorder ONS.     RD to monitor and follow up as appropriate.      Angeline Menjivar, RD, LDN

## 2017-05-09 NOTE — PHYSICIAN QUERY
PT Name: Hemant Kennedy Jr.  MR #: [id number]    Physician Query Form - Atrial Fibrillation Specificity     CDS/: Tammy Posada               Contact information: [phone]     This form is a permanent document in the medical record.     Query Date: May 9, 2017    By submitting this query, we are merely seeking further clarification of documentation. Please utilize your independent clinical judgment when addressing the question(s) below.    The medical record contains the following:   Indicators     Supporting Clinical Findings Location in Medical Record   x Atrial Fibrillation Atrial fibrillation, controlled   - [id number] score of 5; high risk of stroke (7.2% per year)   - Continue  Amiodarone   - Resumed apixaban    Progress note 5/8    EKG results      Medication      Treatment      Other         Provider, please further specify the Atrial Fibrillation diagnosis.    [  ] Chronic  [ X ] Paroxysmal  [  ] Permanent  [  ] Persistent  [  ] Other (please specify): ____________________________  [  ] Clinically Undetermined    Please document in your progress notes daily for the duration of treatment until resolved, and include in your discharge summary.

## 2017-05-09 NOTE — ASSESSMENT & PLAN NOTE
-needed for placement and family discussion regarding disposition; now that encephalopathy is persistent without organic cause, patient now needs nursing level long term care

## 2017-05-09 NOTE — PLAN OF CARE
Problem: Patient Care Overview  Goal: Plan of Care Review  Patient is a 67 y.o. male y/o M with HFrEF (EF 20-25% in Jun 2016), NICM declined ICD, HTN, HLD, atrial fibrillation on Eliquis, T2DM, h/o CVA in 2010, ERIC presents with generalized weakness, fatigue and AMS that started as per patient since yesterday. While at the ED laboratories drawn and patient was found with acute renal failure BUN/ Creatinine: 147/6.2 and hyperkalemic: 7.0 (No hemolysis) and thats the reason we are consult. Patient poor historian.       4/28: Admit to SICU for hyperkalemia. K shifted X2. Kayexalate given X 1.   4/29: K shifted x 1, kayexalate x 1    Accuchecks AC/HS  Miconazole ointment BID to pannus and perineal area.   Outcome: Ongoing (interventions implemented as appropriate)  Patient is incontinent of urine this shift. The scrotum has 3 open skin area, red in color the largest one is bleeding when wiped. Repositioned, perineal care given. Bed alarm in use, no falls or injuries this sift. Bilateral heel boots in use.

## 2017-05-09 NOTE — ASSESSMENT & PLAN NOTE
- BUN/Cr Improving. Baseline Cr generally ~1.5-2.  - Likely pre-renal azotemia due to volume depletion from diarrhea and duiretics  - BUN/Cr ratio elevated   - Improved with fluid challenge  - Post-obstructive HUMERA possible with BPH - andrade placed and patient voided well.  - Renal ultrasound Consistent with medical renal disease.  - NAGMA improving  -discontinued bicarb infusion, discontinued D5W  -FW intake ordered for nursing to give fixed amount daily

## 2017-05-10 VITALS
RESPIRATION RATE: 18 BRPM | BODY MASS INDEX: 39.76 KG/M2 | WEIGHT: 262.38 LBS | TEMPERATURE: 98 F | SYSTOLIC BLOOD PRESSURE: 131 MMHG | HEART RATE: 65 BPM | OXYGEN SATURATION: 95 % | HEIGHT: 68 IN | DIASTOLIC BLOOD PRESSURE: 60 MMHG

## 2017-05-10 PROBLEM — E87.5 HYPERKALEMIA: Status: RESOLVED | Noted: 2017-04-28 | Resolved: 2017-05-10

## 2017-05-10 LAB
ANION GAP SERPL CALC-SCNC: 10 MMOL/L
BASOPHILS # BLD AUTO: 0.05 K/UL
BASOPHILS NFR BLD: 0.5 %
BUN SERPL-MCNC: 30 MG/DL
CALCIUM SERPL-MCNC: 8.7 MG/DL
CHLORIDE SERPL-SCNC: 113 MMOL/L
CO2 SERPL-SCNC: 21 MMOL/L
CREAT SERPL-MCNC: 1.7 MG/DL
DIFFERENTIAL METHOD: ABNORMAL
EOSINOPHIL # BLD AUTO: 0.7 K/UL
EOSINOPHIL NFR BLD: 6.7 %
ERYTHROCYTE [DISTWIDTH] IN BLOOD BY AUTOMATED COUNT: 16.5 %
EST. GFR  (AFRICAN AMERICAN): 46.5 ML/MIN/1.73 M^2
EST. GFR  (NON AFRICAN AMERICAN): 40.3 ML/MIN/1.73 M^2
GLUCOSE SERPL-MCNC: 97 MG/DL
HCT VFR BLD AUTO: 34.2 %
HGB BLD-MCNC: 10.8 G/DL
LYMPHOCYTES # BLD AUTO: 2.2 K/UL
LYMPHOCYTES NFR BLD: 21.9 %
MAGNESIUM SERPL-MCNC: 2.1 MG/DL
MCH RBC QN AUTO: 28.2 PG
MCHC RBC AUTO-ENTMCNC: 31.6 %
MCV RBC AUTO: 89 FL
MONOCYTES # BLD AUTO: 1 K/UL
MONOCYTES NFR BLD: 9.8 %
NEUTROPHILS # BLD AUTO: 6.2 K/UL
NEUTROPHILS NFR BLD: 60.7 %
PHOSPHATE SERPL-MCNC: 3.5 MG/DL
PLATELET # BLD AUTO: 208 K/UL
PMV BLD AUTO: 10.3 FL
POCT GLUCOSE: 101 MG/DL (ref 70–110)
POCT GLUCOSE: 128 MG/DL (ref 70–110)
POCT GLUCOSE: 135 MG/DL (ref 70–110)
POTASSIUM SERPL-SCNC: 3.7 MMOL/L
RBC # BLD AUTO: 3.83 M/UL
SODIUM SERPL-SCNC: 144 MMOL/L
WBC # BLD AUTO: 10.2 K/UL

## 2017-05-10 PROCEDURE — 25000003 PHARM REV CODE 250: Performed by: STUDENT IN AN ORGANIZED HEALTH CARE EDUCATION/TRAINING PROGRAM

## 2017-05-10 PROCEDURE — 25000003 PHARM REV CODE 250: Performed by: INTERNAL MEDICINE

## 2017-05-10 PROCEDURE — 85025 COMPLETE CBC W/AUTO DIFF WBC: CPT

## 2017-05-10 PROCEDURE — 99238 HOSP IP/OBS DSCHRG MGMT 30/<: CPT | Mod: GC,,, | Performed by: HOSPITALIST

## 2017-05-10 PROCEDURE — 80048 BASIC METABOLIC PNL TOTAL CA: CPT

## 2017-05-10 PROCEDURE — 36415 COLL VENOUS BLD VENIPUNCTURE: CPT

## 2017-05-10 PROCEDURE — 84100 ASSAY OF PHOSPHORUS: CPT

## 2017-05-10 PROCEDURE — 83735 ASSAY OF MAGNESIUM: CPT

## 2017-05-10 RX ADMIN — APIXABAN 5 MG: 2.5 TABLET, FILM COATED ORAL at 01:05

## 2017-05-10 RX ADMIN — ATORVASTATIN CALCIUM 40 MG: 20 TABLET, FILM COATED ORAL at 09:05

## 2017-05-10 RX ADMIN — ASPIRIN 81 MG CHEWABLE TABLET 81 MG: 81 TABLET CHEWABLE at 09:05

## 2017-05-10 RX ADMIN — FLUTICASONE PROPIONATE 2 SPRAY: 50 SPRAY, METERED NASAL at 12:05

## 2017-05-10 RX ADMIN — CARVEDILOL 6.25 MG: 6.25 TABLET, FILM COATED ORAL at 09:05

## 2017-05-10 RX ADMIN — MICONAZOLE NITRATE: 20 OINTMENT TOPICAL at 09:05

## 2017-05-10 RX ADMIN — AMIODARONE HYDROCHLORIDE 200 MG: 200 TABLET ORAL at 09:05

## 2017-05-10 NOTE — PLAN OF CARE
CM received call from pt daughter/POA, Socorro, stating she chooses [facility] for her father to be transferred today.  CM notified covering Avril CARRILLO, of choice made per family with accepting facility and SW following through with transfer.

## 2017-05-10 NOTE — PLAN OF CARE
05/10/17 1325   Final Note   Assessment Type Final Discharge Note   Discharge Disposition SNF  ([facility])   Discharge planning education complete? Yes   Discharge plans and expectations educations in teach back method with documentation complete? Yes   Offered [facility] -- Bedside Delivery? n/a   Discharge/Hospital Encounter Summary to (non[facility]) PCP n/a   Referral to Outpatient Case Management complete? n/a   Referral to / orders for Home Health Complete? n/a   30 day supply of medicines given at discharge, if documented non-compliance / non-adherence? n/a   Any social issues identified prior to discharge? No   Did you assess the readiness or willingness of the family or caregiver to support self management of care? Yes     Future Appointments  Date Time Provider Department Center   5/18/2017 11:00 AM Catarina Domingo MD [facility] ENDOCRN [facility]   5/18/2017 12:00 PM LAB, APPOINTMENT [facility] LAB VNP [facility]   5/18/2017 2:00 PM Denita Hendrix NP [facility] WOUND [facility]   5/31/2017 3:40 PM Lakisha Plummer MD [facility] NEPHRO [facility]   6/28/2017 1:40 PM Meir Wei MD [facility] CARDIO [facility]       Pt transferring to [facility] SNF

## 2017-05-10 NOTE — PROGRESS NOTES
Report given to [facility] at [phone] Venus JEFFERS . Pt awaiting for [facility].

## 2017-05-10 NOTE — DISCHARGE SUMMARY
Discharge Summary  [facility] Medicine    Patient Name: Hemant Kennedy Jr.    YOB: 1950    Admit Date: 4/27/2017    Discharge Date and Time: 05/10/2017    Discharge Attending Physician: Godwin Robertson MD     Discharge Resident Team: [facility] HOSP MED 5    Chief Complaint/Reason for Admission: presumed septic shock    Primary Diagnosis: Hyperkalemia    Secondary Diagnosis:  Patient Active Problem List   Diagnosis    Chronic systolic congestive heart failure, NYHA class 2    Debility    Sleep apnea    Atrial fibrillation, controlled    CKD (chronic kidney disease) stage 3, GFR 30-59 ml/min    Morbid obesity due to excess calories    ATN (acute tubular necrosis)    HUMERA (acute kidney injury)    Type II diabetes mellitus with complication, uncontrolled    Encounter for wound care    Acute renal failure superimposed on stage 3 chronic kidney disease    Encephalopathy    Renovascular hypertension    Goals of care, counseling/discussion       History of Presenting Illness:   66 y.o. male with hx of CHF, DM II, right occipital stroke, afib, HTN, cardiomyopathy, DVT that presented to the ED with chief complaint of forehead laceration s/p fall. Pt fell out of bed prior to arrival while trying to go to the bathroom. Pt does not recall how he got onto the ground but remembers falling from his wheelchair that he uses at baseline; the fall was unwitnessed and LOC unknown. Two recent lacerations noted to the forehead and EMS reported that pt was hypotensive to 60s systolic upon arrival and was 85 systolic after 1L NS. Pt reports headache but otherwise now feels at baseline. No hip pain, neck pain. Relatives state the pt has SOB. No known hx of hypotensive episodes.   ICU Course:  Pt arrived to the ED hypotensive s/p fall, CT head was benign for any acute intracranial processes but did show a remote R occipital infarct. Pt was AAOx3 and did not have any complaints, mentating at baseline. Pt hypotensive and  after 3L of IVF BP remained in the 90's/50's. Pt also found to have an elevated white count and started on vancomycin and ceftriaxone, CXR showed diffuse opacities in R midlung field. Pt also found to have Cr of 8.9 (up from baseline of 1.3) and elevated troponin, likely due to demand ischemia. ICU consulted for septic shock.     Hospital Course:   Patient was admitted to Hospital Medicine after critical care unit stepdown:   Chronic systolic congestive heart failure, NYHA class 2  EF 25% on 7/2016.  - Not fluid overloaded on physical examination and BNP at baseline at 157.  - Continue Amiodorone and Atorvastatin. Holding Spironolactone, and Lisinopril considering HUMERA.  - Repeat 2D echo for surveillance->EF improved to 40 w/ DD        Debility  -PT/OT/SW consult for discharge planning        Sleep apnea  -CPAP QHS        Atrial fibrillation, controlled  - [id number] score of 5; high risk of stroke (7.2% per year)  - Continue Amiodarone  - Resumed apixaban        Type II diabetes mellitus with complication, uncontrolled  - At home, long acting 8U BID and short acting 5U TIDWM.  - LDSSI   - Diabetic diet and repeat [id number]        Encounter for wound care  -wound care following, continuing their recs  -possible explanation of leukocytosis on 5/1  -The sacral area skin is intact, the scrotum has shearing noted to the posterior aspect  -miconazole ointment BID to scrotal area to protect the skin and resolve fungal infection.         Acute renal failure superimposed on stage 3 chronic kidney disease  - BUN/Cr Improving. Baseline Cr generally ~1.5-2.  - Likely pre-renal azotemia due to volume depletion from diarrhea and duiretics  - BUN/Cr ratio elevated   - Improved with fluid challenge  - Post-obstructive HUMERA possible with BPH - andrade placed and patient voided well.  - Renal ultrasound Consistent with medical renal disease.  - NAGMA improving  -discontinued bicarb infusion, discontinued D5W  -FW intake ordered for  nursing to give fixed amount daily        Encephalopathy     -Old CVA in 2010 -- SUBACUTE INFARCTION INVOLVING THE RIGHT PCA TERRITORY WITHIN THE PARASAGITTAL RIGHT OCCIPITAL LOBE. Making pt more prone to delirium  -Continue monitor   -CPAP QHS  -improving; some component thought to be from CVA prior to admission but patient is improved.         Renovascular hypertension  -continue to monitor-restarted Coreg        Goals of care, counseling/discussion  -needed for placement and family discussion regarding disposition; now that encephalopathy is persistent without organic cause, patient now needs nursing level long term care        * Hyperkalemia, resolved as of 5/10/2017  - Improved  - Likely due to medications  - 7.0 on presentation. No EKG changes.   - Holding spironolactone/ ACE-ARB  - Secondary to HUMERA.  - Received IV calcium and insulin in ED.     Prior to discharge, the primary team reviewed the discharge plan and med list with the patient, who communicated his understanding and agreement with the plan.       Significant Investigations: electrolyte monitoring, septic workup, HUMERA workup, daily routine labs    Surgical / Diagnostic Procedures: intubation, abg monitoring, US kidneys retroperitoneal, CXR, Central line placement.  2DECHO  Studies Pending: none    Discharged Condition: stable and good condition    Discharge Disposition: NH    Follow-up Plan:    Future Appointments  Date Time Provider Department Center   5/18/2017 11:00 AM Catarina Domingo MD [facility] ENDOCRN [facility]   5/18/2017 12:00 PM LAB, APPOINTMENT [facility] LAB VNP [facility]   5/18/2017 2:00 PM Denita Hendrix NP [facility] WOUND [facility]   5/31/2017 3:40 PM Lakisha Plummer MD [facility] NEPHRO [facility]   6/28/2017 1:40 PM Meir Wei MD [facility] CARDIO [facility]       Patient Instructions / Medications:   Current Discharge Medication List      CONTINUE these medications which have NOT CHANGED    Details   acetaminophen (TYLENOL) 500 MG tablet Take 2  tablets (1,000 mg total) by mouth 3 (three) times daily as needed for Pain.  Qty: 15 tablet, Refills: 0      allopurinol (ZYLOPRIM) 300 MG tablet Take 1 tablet (300 mg total) by mouth once daily.  Tablet Oral Every day  Qty: 90 tablet, Refills: 3      amiodarone (PACERONE) 200 MG Tab Take 1 tablet (200 mg total) by mouth once daily.  Qty: 90 tablet, Refills: 3    Associated Diagnoses: Chronic systolic heart failure; Atrial fibrillation, controlled      apixaban (ELIQUIS) 5 mg Tab Take 5 mg by mouth 2 (two) times daily.      aspirin 81 MG Chew Take 81 mg by mouth once daily.       atorvastatin (LIPITOR) 40 MG tablet Take 1 tablet (40 mg total) by mouth once daily. 1 Tablet Oral Every day  Qty: 90 tablet, Refills: 3    Associated Diagnoses: Chronic systolic heart failure; Atrial fibrillation, controlled      carvedilol (COREG) 12.5 MG tablet Take 12.5 mg by mouth 2 (two) times daily with meals.       collagenase (SANTYL) ointment Apply topically once daily. Apply to wound daily as directed.  Qty: 90 g, Refills: 0    Associated Diagnoses: Ulcer of other part of foot; Decubitus ulcer of left heel, unstageable      ergocalciferol (ERGOCALCIFEROL) 50,000 unit Cap Take 1 capsule (50,000 Units total) by mouth every 7 days.  Qty: 12 capsule, Refills: 3    Associated Diagnoses: Secondary hyperparathyroidism      ferrous sulfate 325 (65 FE) MG EC tablet Take 1 tablet (325 mg total) by mouth 2 (two) times daily with meals.  Qty: 60 tablet, Refills: 5      fluticasone (FLONASE) 50 mcg/actuation nasal spray 2 sprays by Each Nare route once daily.  Refills: 0      gabapentin (NEURONTIN) 300 MG capsule Take 1 capsule (300 mg total) by mouth 3 (three) times daily.  Qty: 90 capsule, Refills: 11    Associated Diagnoses: Type 2 diabetes mellitus with stage 3 chronic kidney disease, without long-term current use of insulin      HYDROPHILIC CREAM (TRIAD WOUND DRESSING TOP) Apply topically daily as needed.      insulin detemir (LEVEMIR)  100 unit/mL injection Inject 8 Units into the skin 2 (two) times daily.  Refills: 12      insulin lispro (HUMALOG) 100 unit/mL injection Inject 5 Units into the skin 3 (three) times daily before meals.      lisinopril 10 MG tablet Take 1 tablet (10 mg total) by mouth once daily.  Qty: 90 tablet, Refills: 3    Associated Diagnoses: Chronic systolic heart failure      ONE TOUCH ULTRA TEST Strp Test blood sugar four times daily         STOP taking these medications       furosemide (LASIX) 40 MG tablet Comments:   Reason for Stopping:         metOLazone (ZAROXOLYN) 2.5 MG tablet Comments:   Reason for Stopping:         spironolactone (ALDACTONE) 25 MG tablet Comments:   Reason for Stopping:                 Discharge Procedure Orders  Diet general   Order Specific Question Answer Comments   Additional restrictions: Renal      Diet general     Call MD for:  temperature >100.4     Call MD for:  persistent dizziness, light-headedness, or visual disturbances     Call MD for:  increased confusion or weakness     Call MD for:  difficulty breathing or increased cough     Call MD for:  severe uncontrolled pain     Call MD for:  temperature >100.4     Call MD for:  persistent nausea and vomiting or diarrhea     Call MD for:  severe uncontrolled pain     Call MD for:  redness, tenderness, or signs of infection (pain, swelling, redness, odor or green/yellow discharge around incision site)     Call MD for:  difficulty breathing or increased cough     Call MD for:  severe persistent headache     Call MD for:  worsening rash     Call MD for:  persistent dizziness, light-headedness, or visual disturbances     Call MD for:  increased confusion or weakness

## 2017-05-10 NOTE — SUBJECTIVE & OBJECTIVE
Interval History: Patient reports NAEON; electrolytes improving and PO intake still minimal; encouraged free water enterally. Awaiting placement.     Review of Systems   Respiratory: Negative for shortness of breath.    Gastrointestinal: Negative for constipation.   Endocrine: Negative for polyphagia and polyuria.   Genitourinary: Negative for testicular pain.   Neurological: Negative for dizziness and weakness.   Psychiatric/Behavioral: Negative for agitation and confusion.     Objective:     Vital Signs (Most Recent):  Temp: 98.6 °F (37 °C) (05/10/17 0802)  Pulse: 60 (05/10/17 0802)  Resp: 16 (05/10/17 0802)  BP: (!) 116/55 (05/10/17 0802)  SpO2: 98 % (05/10/17 0802) Vital Signs (24h Range):  Temp:  [98.1 °F (36.7 °C)-98.7 °F (37.1 °C)] 98.6 °F (37 °C)  Pulse:  [60-73] 60  Resp:  [16-18] 16  SpO2:  [92 %-98 %] 98 %  BP: (116-143)/(53-70) 116/55     Weight: 119 kg (262 lb 5.6 oz)  Body mass index is 39.89 kg/(m^2).    Intake/Output Summary (Last 24 hours) at 05/10/17 1146  Last data filed at 05/10/17 0900   Gross per 24 hour   Intake              480 ml   Output              300 ml   Net              180 ml      Physical Exam   Constitutional: He is oriented to person, place, and time. He appears well-developed and well-nourished. No distress.   HENT:   Nose: Nose normal.   Mouth/Throat: Oropharynx is clear and moist. No oropharyngeal exudate.   Eyes: EOM are normal. Pupils are equal, round, and reactive to light. Left eye exhibits no discharge. No scleral icterus.   Neck: No JVD present. No tracheal deviation present. No thyromegaly present.   Cardiovascular: Normal rate, regular rhythm and intact distal pulses.  Exam reveals no gallop and no friction rub.    Murmur heard.  Pulmonary/Chest: Effort normal. No respiratory distress. He has no wheezes. He has no rales. He exhibits no tenderness.   Abdominal: Soft. Bowel sounds are normal. He exhibits no distension and no mass. There is no tenderness. No hernia.    Genitourinary: Rectal exam shows guaiac negative stool. No penile tenderness.   Musculoskeletal: Normal range of motion. He exhibits no edema, tenderness or deformity.   Lymphadenopathy:     He has no cervical adenopathy.   Neurological: He is oriented to person, place, and time. He displays normal reflexes. No cranial nerve deficit. Coordination normal.   Residual LLE weakness   Skin: Skin is warm and dry. No rash noted. No erythema.   Psychiatric: He has a normal mood and affect.   Nursing note and vitals reviewed.      Significant Labs:   CBC:   Recent Labs  Lab 05/09/17  0358 05/10/17  0419   WBC 10.05 10.20   HGB 10.5* 10.8*   HCT 31.7* 34.2*    208     CMP:   Recent Labs  Lab 05/09/17  0357 05/10/17  0419    144   K 4.3 3.7   * 113*   CO2 19* 21*   GLU 88 97   BUN 24* 30*   CREATININE 1.6* 1.7*   CALCIUM 9.0 8.7   ANIONGAP 7* 10   EGFRNONAA 43.3* 40.3*       Significant Imaging: I have reviewed and interpreted all pertinent imaging results/findings within the past 24 hours.

## 2017-05-10 NOTE — ASSESSMENT & PLAN NOTE
- At home, long acting 8U BID and short acting 5U TIDWM.  - LDSSI   - Diabetic diet and repeat DiB2O=2

## 2017-05-10 NOTE — PLAN OF CARE
CM called and spoke with Socorro, daughter/POA, in regards to choice for accepting facility of pt for transfer today.  Socorro states she had not heard from anyone yesterday with acceptance with CM asking Socorro according to covering GERARDO note yesterday that she was touring facilities today, Socorro in disagreement.  Socorro states give her until 11:00am and she will call CM back with choice.  CM awaiting return call with covering GERARDO, Avril DURAN, notified and aware.

## 2017-05-10 NOTE — ASSESSMENT & PLAN NOTE
- [id number] score of 5; high risk of stroke (7.2% per year)  - Continue  Amiodarone  - Resumed apixaban

## 2017-05-10 NOTE — PLAN OF CARE
GERARDO heard back from [facility] home. Pt has been officially accepted. Nico PRICE will call report to [facility] at [phone]. GERARDO arranged Secure transport for 4:30 but they cannot come till 6pm. Ministerio PRICE will call pt's dtr to inform her of d/c. GERARDO tubed Packet to 11th floor nursing station.

## 2017-05-10 NOTE — PROGRESS NOTES
[facility] Hospital Medicine  Progress Note    Patient Name: Hemant Kennedy Jr.  MRN: [id number]  Patient Class: IP- Inpatient   Admission Date: 4/27/2017  Length of Stay: 12 days  Attending Physician: Godwin Robertson MD  Primary Care Provider: Hemant Markham MD    [facility] Medicine Team: [facility] MED 5 Chon Lopez MD    Subjective:     Principal Problem:Hyperkalemia    Hospital Course:  4/28: In ED, provided 1g calcium gluconate IV as well as 10U insulin with dextrose for intracellular shifting of potassium. Repeat BMP was demonstrable for resolved hyperkalemia at 3.7. A andrade placed and urine voided. On 4/29: Patient seen by Nephrology due to persistent hyperkalemia in setting of HUMERA on CKD. A  Trialysis was initially attempted however the patient has very poor access and a decision was made to manage the patient medically. Urine output has continued to improve as have other electrolytes. The patient continues to seem somewhat altered but can answer simple demographic questions. He is pending transfer to the floor and will need social work follow-up for discharge planning.    5/2: Attempts made to contact family for more clarification of patient's prior hospitalization as well as move forward with discharge planning.  5/4-5/10:  FWD improved on D5 and stable now; encouraging PO intake and electrolyte monitoring with discharge for pending [facility] /SNF care.       Interval History: Patient reports NAEON; electrolytes improving and PO intake still minimal; encouraged free water enterally. Awaiting placement.     Review of Systems   Respiratory: Negative for shortness of breath.    Gastrointestinal: Negative for constipation.   Endocrine: Negative for polyphagia and polyuria.   Genitourinary: Negative for testicular pain.   Neurological: Negative for dizziness and weakness.   Psychiatric/Behavioral: Negative for agitation and confusion.     Objective:     Vital Signs (Most Recent):  Temp:  98.6 °F (37 °C) (05/10/17 0802)  Pulse: 60 (05/10/17 0802)  Resp: 16 (05/10/17 0802)  BP: (!) 116/55 (05/10/17 0802)  SpO2: 98 % (05/10/17 0802) Vital Signs (24h Range):  Temp:  [98.1 °F (36.7 °C)-98.7 °F (37.1 °C)] 98.6 °F (37 °C)  Pulse:  [60-73] 60  Resp:  [16-18] 16  SpO2:  [92 %-98 %] 98 %  BP: (116-143)/(53-70) 116/55     Weight: 119 kg (262 lb 5.6 oz)  Body mass index is 39.89 kg/(m^2).    Intake/Output Summary (Last 24 hours) at 05/10/17 1146  Last data filed at 05/10/17 0900   Gross per 24 hour   Intake              480 ml   Output              300 ml   Net              180 ml      Physical Exam   Constitutional: He is oriented to person, place, and time. He appears well-developed and well-nourished. No distress.   HENT:   Nose: Nose normal.   Mouth/Throat: Oropharynx is clear and moist. No oropharyngeal exudate.   Eyes: EOM are normal. Pupils are equal, round, and reactive to light. Left eye exhibits no discharge. No scleral icterus.   Neck: No JVD present. No tracheal deviation present. No thyromegaly present.   Cardiovascular: Normal rate, regular rhythm and intact distal pulses.  Exam reveals no gallop and no friction rub.    Murmur heard.  Pulmonary/Chest: Effort normal. No respiratory distress. He has no wheezes. He has no rales. He exhibits no tenderness.   Abdominal: Soft. Bowel sounds are normal. He exhibits no distension and no mass. There is no tenderness. No hernia.   Genitourinary: Rectal exam shows guaiac negative stool. No penile tenderness.   Musculoskeletal: Normal range of motion. He exhibits no edema, tenderness or deformity.   Lymphadenopathy:     He has no cervical adenopathy.   Neurological: He is oriented to person, place, and time. He displays normal reflexes. No cranial nerve deficit. Coordination normal.   Residual LLE weakness   Skin: Skin is warm and dry. No rash noted. No erythema.   Psychiatric: He has a normal mood and affect.   Nursing note and vitals  reviewed.      Significant Labs:   CBC:   Recent Labs  Lab 05/09/17  0358 05/10/17  0419   WBC 10.05 10.20   HGB 10.5* 10.8*   HCT 31.7* 34.2*    208     CMP:   Recent Labs  Lab 05/09/17  0357 05/10/17  0419    144   K 4.3 3.7   * 113*   CO2 19* 21*   GLU 88 97   BUN 24* 30*   CREATININE 1.6* 1.7*   CALCIUM 9.0 8.7   ANIONGAP 7* 10   EGFRNONAA 43.3* 40.3*       Significant Imaging: I have reviewed and interpreted all pertinent imaging results/findings within the past 24 hours.    Assessment/Plan:      Chronic systolic congestive heart failure, NYHA class 2  EF 25% on 7/2016.  - Not fluid overloaded on physical examination and BNP at baseline at 157.  - Continue Amiodorone and Atorvastatin. Holding Spironolactone, and Lisinopril considering HUMERA.  - Repeat 2D echo for surveillance->EF improved to 40 w/ DD      Debility  -PT/OT/SW consult for discharge planning      Sleep apnea  -CPAP QHS      Atrial fibrillation, controlled  - [id number] score of 5; high risk of stroke (7.2% per year)  - Continue  Amiodarone  - Resumed apixaban      Type II diabetes mellitus with complication, uncontrolled  - At home, long acting 8U BID and short acting 5U TIDWM.  - LDSSI   - Diabetic diet and repeat [id number]=0      Encounter for wound care  -wound care following, continuing their recs  -possible explanation of leukocytosis on 5/1  -The sacral area skin is intact, the scrotum has shearing noted to the posterior aspect  -miconazole ointment BID to scrotal area to protect the skin and resolve fungal infection.       Acute renal failure superimposed on stage 3 chronic kidney disease  - BUN/Cr Improving. Baseline Cr generally ~1.5-2.  - Likely pre-renal azotemia due to volume depletion from diarrhea and duiretics  - BUN/Cr ratio elevated   - Improved with fluid challenge  - Post-obstructive HUMERA possible with BPH - andrade placed and patient voided well.  - Renal ultrasound Consistent with medical renal disease.  - NAGMA  improving  -discontinued bicarb infusion, discontinued D5W  -FW intake ordered for nursing to give fixed amount daily        Encephalopathy    -Old CVA in 2010 --  SUBACUTE INFARCTION INVOLVING THE RIGHT PCA TERRITORY WITHIN THE PARASAGITTAL RIGHT OCCIPITAL LOBE. Making pt more prone to delirium  -Continue monitor   -CPAP QHS  -improving; some component thought to be from CVA prior to admission but patient is improved.       Renovascular hypertension  -continue to monitor-restarted Coreg      Goals of care, counseling/discussion  -needed for placement and family discussion regarding disposition; now that encephalopathy is persistent without organic cause, patient now needs nursing level long term care      * Hyperkalemia, resolved as of 5/10/2017  - Improved  - Likely due to medications  - 7.0 on presentation. No EKG changes.   - Holding spironolactone/ ACE-ARB  - Secondary to HUMERA.  - Received IV calcium and insulin in ED.        VTE Risk Mitigation         Ordered     apixaban tablet 5 mg  2 times daily     Route:  Oral        05/03/17 0940     Medium Risk of VTE  Once      04/28/17 0211     Place sequential compression device  Until discontinued      04/28/17 0211        Patient seen and discussed on rounds with Dr. Robertson.     Chon Lopez MD  Department of Hospital Medicine   [facility]

## 2017-05-10 NOTE — PLAN OF CARE
GERARDO heard from CM that dtr wants [facility] home. GERARDO called Carmen in admission and sent over d/c and nsg home orders. Carmen said dtr has a 1:30pm meeting with admissions to sign paperwork.

## 2017-05-10 NOTE — PROGRESS NOTES
Notified pt's daughter Socorro at [phone] that pt will be transported to [facility] at 5:30, Socorro verbalized acknowledgment.

## 2017-05-10 NOTE — PLAN OF CARE
CM in to see pt AAO pleasant in no distress this am.  CM informed pt of discharge today to SNF facility with acceptance from [facility], pt in agreement.  CM informed pt of speaking with daughter/POA, Socorro, with awaiting her top choice of accepting facility with pt in agreement.  CM will follow with pt daughter choice for transfer.

## 2017-05-11 ENCOUNTER — OUTPATIENT CASE MANAGEMENT (OUTPATIENT)
Dept: ADMINISTRATIVE | Facility: OTHER | Age: 67
End: 2017-05-11

## 2017-05-11 ENCOUNTER — PATIENT OUTREACH (OUTPATIENT)
Dept: ADMINISTRATIVE | Facility: CLINIC | Age: 67
End: 2017-05-11
Payer: MEDICARE

## 2017-05-23 NOTE — PT/OT/SLP DISCHARGE
Occupational Therapy Discharge Summary    Hemant Kennedy Jr.  MRN: [id number]   Hyperkalemia   Patient Discharged from acute Occupational Therapy on 5/10/17.  Please refer to prior OT note dated on 5/9/17 for functional status.     Assessment:   Patient was discharge unexpectedly.  Information required to complete and accurate discharge summary is unknown.  Refer to therapy initial evaluation and last progress note for initial and most recent functional status and goal achievement.  Recommendations made may be found in medical record.  GOALS:    Occupational Therapy Goals        Problem: Occupational Therapy Goal    Goal Priority Disciplines Outcome Interventions   Occupational Therapy Goal     OT, PT/OT Ongoing (interventions implemented as appropriate)    Description:  Goals to be met by: 5/16/17    Patient will increase functional independence with ADLs by performing:    LE Dressing with Stand-by Assistance (pants, underwear as available)  Grooming while seated with Set-up Assistance.  Supine to sit with [place].  Squat pivot or slide board transfers as appropriate with Maximum Assistance.  Toilet transfer to bedside commode with Maximum Assistance.                    Reasons for Discontinuation of Therapy Services  Transfer to alternate level of care.      Plan:  Patient Discharged to: Skilled Nursing Facility.

## 2017-05-24 PROBLEM — N19 UREMIC ENCEPHALOPATHY: Status: ACTIVE | Noted: 2017-05-24

## 2017-05-24 PROBLEM — G93.49 UREMIC ENCEPHALOPATHY: Status: ACTIVE | Noted: 2017-05-24

## 2017-05-24 PROBLEM — G47.33 OBSTRUCTIVE SLEEP APNEA SYNDROME: Status: ACTIVE | Noted: 2017-05-24

## 2017-05-24 NOTE — PT/OT/SLP DISCHARGE
Physical Therapy Discharge Summary    Hemant Kennedy Jr.  MRN: [id number]   Hyperkalemia   Patient Discharged from acute Physical Therapy on 05/10/2017.  Please refer to prior PT noted date on 2017 for functional status.     Assessment:   Patient appropriate for care in another setting.  GOALS:    Physical Therapy Goals        Problem: Physical Therapy Goal    Goal Priority Disciplines Outcome Goal Variances Interventions   Physical Therapy Goal     PT/OT, PT Ongoing (interventions implemented as appropriate)     Description:  Goals to be met by: 5/15/2017     Patient will increase functional independence with mobility by performin. Pt will be independent with w/c set up for w/c<> bed transfer  2. Bed <> w/c transfer with modified independence using a slideboard.  3. Pt will be independent with LE exercise program to improve LE ROM and prevent further contractures using handout  4.  Pt will be educated in pressure relief in sitting and supine.   5. Pt will perform w/c mobility on level surfaces x 50 feet with SBA.                        Reasons for Discontinuation of Therapy Services  Transfer to alternate level of care.      Plan:  Patient Discharged to: Skilled Nursing Facility.    MARIOLA ACOSTA, PT  2017

## 2017-06-16 ENCOUNTER — TELEPHONE (OUTPATIENT)
Dept: INTERNAL MEDICINE | Facility: CLINIC | Age: 67
End: 2017-06-16

## 2017-06-16 ENCOUNTER — TELEPHONE (OUTPATIENT)
Dept: OPHTHALMOLOGY | Facility: CLINIC | Age: 67
End: 2017-06-16

## 2017-06-16 NOTE — TELEPHONE ENCOUNTER
----- Message from Evy Hudson sent at 6/16/2017  9:44 AM CDT -----  Contact: [facility] physical Therapy-[phone]  Pt refuse physical therapy today.

## 2017-06-16 NOTE — TELEPHONE ENCOUNTER
----- Message from Dawn Cheema sent at 6/16/2017 12:43 PM CDT -----  Contact: rosanna([facility])  Ms. Marte called because Mr. Kennedy daughter brought them a letter stating that he missed his surgery. Ms. Marte can be reached at [phone].      Phoned [facility]. LM for Rosanna that Ms. Mascorro (Mr. Marcos's sister) cancelled surgery in May due to him being admitted to the hospital) said she would call back when they were ready to reschedule. [facility]

## 2017-06-27 ENCOUNTER — TELEPHONE (OUTPATIENT)
Dept: OPHTHALMOLOGY | Facility: CLINIC | Age: 67
End: 2017-06-27

## 2017-06-27 NOTE — TELEPHONE ENCOUNTER
----- Message from Darcy Shaw sent at 6/27/2017 11:51 AM CDT -----  Contact: [phone] (Rosanna) [facility]  Rosanna from [facility] calling to get patient rescheduled for cataract surgery with Dr. Zuniga.  He canceled the initial surgery scheduled on 05/15/17.  She can be reached at [phone]    Spoke with nurse. Surgery scheduled for 9/18/17. Will mail all paperwork to [place]  [address] [place] [id number].  [facility]

## 2017-06-30 ENCOUNTER — TELEPHONE (OUTPATIENT)
Dept: INTERNAL MEDICINE | Facility: CLINIC | Age: 67
End: 2017-06-30

## 2017-07-11 ENCOUNTER — TELEPHONE (OUTPATIENT)
Dept: NEPHROLOGY | Facility: CLINIC | Age: 67
End: 2017-07-11

## 2017-07-19 ENCOUNTER — TELEPHONE (OUTPATIENT)
Dept: OPHTHALMOLOGY | Facility: CLINIC | Age: 67
End: 2017-07-19

## 2017-07-19 DIAGNOSIS — H25.12 NUCLEAR SCLEROTIC CATARACT OF LEFT EYE: Primary | ICD-10-CM

## 2017-07-25 ENCOUNTER — HOSPITAL ENCOUNTER (INPATIENT)
Facility: HOSPITAL | Age: 67
LOS: 3 days | Discharge: HOME HEALTH | DRG: 291 | End: 2017-07-28
Attending: EMERGENCY MEDICINE | Admitting: HOSPITALIST
Payer: MEDICARE

## 2017-07-25 DIAGNOSIS — E66.01 MORBID OBESITY DUE TO EXCESS CALORIES: ICD-10-CM

## 2017-07-25 DIAGNOSIS — I50.9 ACUTE ON CHRONIC CONGESTIVE HEART FAILURE, UNSPECIFIED CONGESTIVE HEART FAILURE TYPE: Primary | ICD-10-CM

## 2017-07-25 DIAGNOSIS — G47.33 OBSTRUCTIVE SLEEP APNEA TREATED WITH CONTINUOUS POSITIVE AIRWAY PRESSURE (CPAP): ICD-10-CM

## 2017-07-25 DIAGNOSIS — I15.0 RENOVASCULAR HYPERTENSION: ICD-10-CM

## 2017-07-25 DIAGNOSIS — I48.91 ATRIAL FIBRILLATION, CONTROLLED: ICD-10-CM

## 2017-07-25 DIAGNOSIS — J96.01 ACUTE HYPOXEMIC RESPIRATORY FAILURE: ICD-10-CM

## 2017-07-25 DIAGNOSIS — E11.8 UNCONTROLLED TYPE 2 DIABETES MELLITUS WITH COMPLICATION, UNSPECIFIED LONG TERM INSULIN USE STATUS: ICD-10-CM

## 2017-07-25 DIAGNOSIS — I50.43 ACUTE ON CHRONIC COMBINED SYSTOLIC AND DIASTOLIC HEART FAILURE: ICD-10-CM

## 2017-07-25 DIAGNOSIS — Z79.01 LONG TERM CURRENT USE OF ANTICOAGULANT: ICD-10-CM

## 2017-07-25 DIAGNOSIS — E11.65 UNCONTROLLED TYPE 2 DIABETES MELLITUS WITH COMPLICATION, UNSPECIFIED LONG TERM INSULIN USE STATUS: ICD-10-CM

## 2017-07-25 DIAGNOSIS — N18.30 CKD (CHRONIC KIDNEY DISEASE) STAGE 3, GFR 30-59 ML/MIN: ICD-10-CM

## 2017-07-25 DIAGNOSIS — I50.9 CONGESTIVE HEART FAILURE (CHF): ICD-10-CM

## 2017-07-25 LAB
ALBUMIN SERPL BCP-MCNC: 3.1 G/DL
ALP SERPL-CCNC: 100 U/L
ALT SERPL W/O P-5'-P-CCNC: 15 U/L
ANION GAP SERPL CALC-SCNC: 7 MMOL/L
AST SERPL-CCNC: 13 U/L
BASOPHILS # BLD AUTO: 0.02 K/UL
BASOPHILS NFR BLD: 0.3 %
BILIRUB SERPL-MCNC: 0.6 MG/DL
BILIRUB UR QL STRIP: NEGATIVE
BNP SERPL-MCNC: 1220 PG/ML
BUN SERPL-MCNC: 16 MG/DL
CALCIUM SERPL-MCNC: 8.6 MG/DL
CHLORIDE SERPL-SCNC: 116 MMOL/L
CLARITY UR REFRACT.AUTO: CLEAR
CO2 SERPL-SCNC: 21 MMOL/L
COLOR UR AUTO: NORMAL
CREAT SERPL-MCNC: 1.6 MG/DL
DIFFERENTIAL METHOD: ABNORMAL
EOSINOPHIL # BLD AUTO: 0.5 K/UL
EOSINOPHIL NFR BLD: 6.7 %
ERYTHROCYTE [DISTWIDTH] IN BLOOD BY AUTOMATED COUNT: 16.1 %
EST. GFR  (AFRICAN AMERICAN): 50.1 ML/MIN/1.73 M^2
EST. GFR  (NON AFRICAN AMERICAN): 43.3 ML/MIN/1.73 M^2
GLUCOSE SERPL-MCNC: 107 MG/DL
GLUCOSE UR QL STRIP: NEGATIVE
HCT VFR BLD AUTO: 35.6 %
HGB BLD-MCNC: 11.1 G/DL
HGB UR QL STRIP: NEGATIVE
KETONES UR QL STRIP: NEGATIVE
LEUKOCYTE ESTERASE UR QL STRIP: NEGATIVE
LYMPHOCYTES # BLD AUTO: 1.3 K/UL
LYMPHOCYTES NFR BLD: 19.4 %
MAGNESIUM SERPL-MCNC: 1.8 MG/DL
MCH RBC QN AUTO: 28.2 PG
MCHC RBC AUTO-ENTMCNC: 31.2 G/DL
MCV RBC AUTO: 91 FL
MONOCYTES # BLD AUTO: 0.7 K/UL
MONOCYTES NFR BLD: 10.1 %
NEUTROPHILS # BLD AUTO: 4.3 K/UL
NEUTROPHILS NFR BLD: 63.2 %
NITRITE UR QL STRIP: NEGATIVE
PH UR STRIP: 5 [PH] (ref 5–8)
PHOSPHATE SERPL-MCNC: 3.4 MG/DL
PLATELET # BLD AUTO: 142 K/UL
PMV BLD AUTO: 10.2 FL
POTASSIUM SERPL-SCNC: 4.3 MMOL/L
PROT SERPL-MCNC: 6.9 G/DL
PROT UR QL STRIP: NEGATIVE
RBC # BLD AUTO: 3.93 M/UL
SODIUM SERPL-SCNC: 144 MMOL/L
SP GR UR STRIP: 1.01 (ref 1–1.03)
TROPONIN I SERPL DL<=0.01 NG/ML-MCNC: 0.02 NG/ML
URN SPEC COLLECT METH UR: NORMAL
UROBILINOGEN UR STRIP-ACNC: NEGATIVE EU/DL
WBC # BLD AUTO: 6.84 K/UL

## 2017-07-25 PROCEDURE — 11000001 HC ACUTE MED/SURG PRIVATE ROOM

## 2017-07-25 PROCEDURE — 99223 1ST HOSP IP/OBS HIGH 75: CPT | Mod: AI,GC,, | Performed by: INTERNAL MEDICINE

## 2017-07-25 PROCEDURE — 80053 COMPREHEN METABOLIC PANEL: CPT

## 2017-07-25 PROCEDURE — 84484 ASSAY OF TROPONIN QUANT: CPT

## 2017-07-25 PROCEDURE — 83735 ASSAY OF MAGNESIUM: CPT

## 2017-07-25 PROCEDURE — 96374 THER/PROPH/DIAG INJ IV PUSH: CPT

## 2017-07-25 PROCEDURE — 83880 ASSAY OF NATRIURETIC PEPTIDE: CPT

## 2017-07-25 PROCEDURE — 51702 INSERT TEMP BLADDER CATH: CPT

## 2017-07-25 PROCEDURE — 99284 EMERGENCY DEPT VISIT MOD MDM: CPT | Mod: ,,, | Performed by: EMERGENCY MEDICINE

## 2017-07-25 PROCEDURE — 85025 COMPLETE CBC W/AUTO DIFF WBC: CPT

## 2017-07-25 PROCEDURE — 63600175 PHARM REV CODE 636 W HCPCS: Performed by: STUDENT IN AN ORGANIZED HEALTH CARE EDUCATION/TRAINING PROGRAM

## 2017-07-25 PROCEDURE — 93005 ELECTROCARDIOGRAM TRACING: CPT

## 2017-07-25 PROCEDURE — 81003 URINALYSIS AUTO W/O SCOPE: CPT

## 2017-07-25 PROCEDURE — 84100 ASSAY OF PHOSPHORUS: CPT

## 2017-07-25 PROCEDURE — 99285 EMERGENCY DEPT VISIT HI MDM: CPT | Mod: 25

## 2017-07-25 RX ORDER — AMOXICILLIN 250 MG
1 CAPSULE ORAL 2 TIMES DAILY PRN
Status: DISCONTINUED | OUTPATIENT
Start: 2017-07-25 | End: 2017-07-28 | Stop reason: HOSPADM

## 2017-07-25 RX ORDER — HYDROCODONE BITARTRATE AND ACETAMINOPHEN 5; 325 MG/1; MG/1
1 TABLET ORAL EVERY 4 HOURS PRN
Status: DISCONTINUED | OUTPATIENT
Start: 2017-07-25 | End: 2017-07-28 | Stop reason: HOSPADM

## 2017-07-25 RX ORDER — FUROSEMIDE 10 MG/ML
40 INJECTION INTRAMUSCULAR; INTRAVENOUS
Status: COMPLETED | OUTPATIENT
Start: 2017-07-25 | End: 2017-07-25

## 2017-07-25 RX ORDER — RAMELTEON 8 MG/1
8 TABLET ORAL NIGHTLY PRN
Status: DISCONTINUED | OUTPATIENT
Start: 2017-07-25 | End: 2017-07-28 | Stop reason: HOSPADM

## 2017-07-25 RX ORDER — SODIUM CHLORIDE 0.9 % (FLUSH) 0.9 %
3 SYRINGE (ML) INJECTION EVERY 8 HOURS
Status: DISCONTINUED | OUTPATIENT
Start: 2017-07-26 | End: 2017-07-28 | Stop reason: HOSPADM

## 2017-07-25 RX ORDER — ACETAMINOPHEN 325 MG/1
650 TABLET ORAL EVERY 4 HOURS PRN
Status: DISCONTINUED | OUTPATIENT
Start: 2017-07-25 | End: 2017-07-28 | Stop reason: HOSPADM

## 2017-07-25 RX ORDER — HYDROCODONE BITARTRATE AND ACETAMINOPHEN 10; 325 MG/1; MG/1
1 TABLET ORAL EVERY 4 HOURS PRN
Status: DISCONTINUED | OUTPATIENT
Start: 2017-07-25 | End: 2017-07-28 | Stop reason: HOSPADM

## 2017-07-25 RX ORDER — ONDANSETRON 8 MG/1
8 TABLET, ORALLY DISINTEGRATING ORAL EVERY 8 HOURS PRN
Status: DISCONTINUED | OUTPATIENT
Start: 2017-07-25 | End: 2017-07-28 | Stop reason: HOSPADM

## 2017-07-25 RX ADMIN — FUROSEMIDE 40 MG: 10 INJECTION, SOLUTION INTRAVENOUS at 08:07

## 2017-07-25 NOTE — ED TRIAGE NOTES
Arrives via [facility] with c/o SOB, GALAVIZ, swelling of BLE and abdomen. Has been off his lasix for 2 months. Dx with pneumonia yesterday, chest xray yesterday showed bilateral pleural effusions, has not received antibiotics. From assisted living called [facility]. Arrives with 18g LAC, at baseline pt wears 3LNC. Pt denies fever, chills, or cp.

## 2017-07-26 ENCOUNTER — DOCUMENTATION ONLY (OUTPATIENT)
Dept: CARDIOLOGY | Facility: CLINIC | Age: 67
End: 2017-07-26

## 2017-07-26 PROBLEM — J96.01 ACUTE HYPOXEMIC RESPIRATORY FAILURE: Status: ACTIVE | Noted: 2017-07-26

## 2017-07-26 PROBLEM — I50.43 ACUTE ON CHRONIC COMBINED SYSTOLIC AND DIASTOLIC HEART FAILURE: Status: ACTIVE | Noted: 2017-07-26

## 2017-07-26 PROBLEM — I50.9 ACUTE ON CHRONIC CONGESTIVE HEART FAILURE: Status: RESOLVED | Noted: 2017-07-25 | Resolved: 2017-07-26

## 2017-07-26 LAB
ALLENS TEST: ABNORMAL
ANION GAP SERPL CALC-SCNC: 13 MMOL/L
AORTIC VALVE REGURGITATION: ABNORMAL
BASOPHILS # BLD AUTO: 0.03 K/UL
BASOPHILS NFR BLD: 0.4 %
BUN SERPL-MCNC: 16 MG/DL
CALCIUM SERPL-MCNC: 8.7 MG/DL
CHLORIDE SERPL-SCNC: 114 MMOL/L
CO2 SERPL-SCNC: 19 MMOL/L
CREAT SERPL-MCNC: 1.6 MG/DL
DELSYS: ABNORMAL
DIASTOLIC DYSFUNCTION: YES
DIFFERENTIAL METHOD: ABNORMAL
EOSINOPHIL # BLD AUTO: 0.6 K/UL
EOSINOPHIL NFR BLD: 8.1 %
ERYTHROCYTE [DISTWIDTH] IN BLOOD BY AUTOMATED COUNT: 16.1 %
EST. GFR  (AFRICAN AMERICAN): 50.1 ML/MIN/1.73 M^2
EST. GFR  (NON AFRICAN AMERICAN): 43.3 ML/MIN/1.73 M^2
ESTIMATED AVG GLUCOSE: 103 MG/DL
ESTIMATED PA SYSTOLIC PRESSURE: 79.96
FLOW: 4
GLUCOSE SERPL-MCNC: 60 MG/DL
HBA1C MFR BLD HPLC: 5.2 %
HCO3 UR-SCNC: 23.8 MMOL/L (ref 24–28)
HCT VFR BLD AUTO: 34.3 %
HGB BLD-MCNC: 10.7 G/DL
LYMPHOCYTES # BLD AUTO: 1.7 K/UL
LYMPHOCYTES NFR BLD: 23.6 %
MAGNESIUM SERPL-MCNC: 1.9 MG/DL
MCH RBC QN AUTO: 28.4 PG
MCHC RBC AUTO-ENTMCNC: 31.2 G/DL
MCV RBC AUTO: 91 FL
MITRAL VALVE MOBILITY: ABNORMAL
MITRAL VALVE REGURGITATION: ABNORMAL
MODE: ABNORMAL
MONOCYTES # BLD AUTO: 0.9 K/UL
MONOCYTES NFR BLD: 12.5 %
NEUTROPHILS # BLD AUTO: 3.9 K/UL
NEUTROPHILS NFR BLD: 55.3 %
PCO2 BLDA: 39.6 MMHG (ref 35–45)
PH SMN: 7.39 [PH] (ref 7.35–7.45)
PHOSPHATE SERPL-MCNC: 3.7 MG/DL
PLATELET # BLD AUTO: 147 K/UL
PMV BLD AUTO: 10.3 FL
PO2 BLDA: 79 MMHG (ref 80–100)
POC BE: -1 MMOL/L
POC SATURATED O2: 95 % (ref 95–100)
POC TCO2: 25 MMOL/L (ref 23–27)
POCT GLUCOSE: 109 MG/DL (ref 70–110)
POCT GLUCOSE: 116 MG/DL (ref 70–110)
POCT GLUCOSE: 63 MG/DL (ref 70–110)
POCT GLUCOSE: 82 MG/DL (ref 70–110)
POCT GLUCOSE: 85 MG/DL (ref 70–110)
POTASSIUM SERPL-SCNC: 3.9 MMOL/L
RBC # BLD AUTO: 3.77 M/UL
RETIRED EF AND QEF - SEE NOTES: 35 (ref 55–65)
SAMPLE: ABNORMAL
SITE: ABNORMAL
SODIUM SERPL-SCNC: 146 MMOL/L
TRICUSPID VALVE REGURGITATION: ABNORMAL
WBC # BLD AUTO: 7.12 K/UL

## 2017-07-26 PROCEDURE — 83735 ASSAY OF MAGNESIUM: CPT

## 2017-07-26 PROCEDURE — 36600 WITHDRAWAL OF ARTERIAL BLOOD: CPT

## 2017-07-26 PROCEDURE — 93306 TTE W/DOPPLER COMPLETE: CPT | Mod: 26,,, | Performed by: INTERNAL MEDICINE

## 2017-07-26 PROCEDURE — 94760 N-INVAS EAR/PLS OXIMETRY 1: CPT

## 2017-07-26 PROCEDURE — 94761 N-INVAS EAR/PLS OXIMETRY MLT: CPT

## 2017-07-26 PROCEDURE — 83036 HEMOGLOBIN GLYCOSYLATED A1C: CPT

## 2017-07-26 PROCEDURE — A4216 STERILE WATER/SALINE, 10 ML: HCPCS | Performed by: STUDENT IN AN ORGANIZED HEALTH CARE EDUCATION/TRAINING PROGRAM

## 2017-07-26 PROCEDURE — 11000001 HC ACUTE MED/SURG PRIVATE ROOM

## 2017-07-26 PROCEDURE — 99232 SBSQ HOSP IP/OBS MODERATE 35: CPT | Mod: GC,,, | Performed by: HOSPITALIST

## 2017-07-26 PROCEDURE — 25000003 PHARM REV CODE 250: Performed by: STUDENT IN AN ORGANIZED HEALTH CARE EDUCATION/TRAINING PROGRAM

## 2017-07-26 PROCEDURE — 80048 BASIC METABOLIC PNL TOTAL CA: CPT

## 2017-07-26 PROCEDURE — 36415 COLL VENOUS BLD VENIPUNCTURE: CPT

## 2017-07-26 PROCEDURE — 94660 CPAP INITIATION&MGMT: CPT

## 2017-07-26 PROCEDURE — 85025 COMPLETE CBC W/AUTO DIFF WBC: CPT

## 2017-07-26 PROCEDURE — 99900035 HC TECH TIME PER 15 MIN (STAT)

## 2017-07-26 PROCEDURE — 27000190 HC CPAP FULL FACE MASK W/VALVE

## 2017-07-26 PROCEDURE — C8929 TTE W OR WO FOL WCON,DOPPLER: HCPCS

## 2017-07-26 PROCEDURE — 84100 ASSAY OF PHOSPHORUS: CPT

## 2017-07-26 PROCEDURE — 63600175 PHARM REV CODE 636 W HCPCS: Performed by: STUDENT IN AN ORGANIZED HEALTH CARE EDUCATION/TRAINING PROGRAM

## 2017-07-26 RX ORDER — NAPROXEN SODIUM 220 MG/1
81 TABLET, FILM COATED ORAL DAILY
Status: DISCONTINUED | OUTPATIENT
Start: 2017-07-26 | End: 2017-07-28 | Stop reason: HOSPADM

## 2017-07-26 RX ORDER — ALLOPURINOL 300 MG/1
300 TABLET ORAL DAILY
Status: DISCONTINUED | OUTPATIENT
Start: 2017-07-26 | End: 2017-07-28 | Stop reason: HOSPADM

## 2017-07-26 RX ORDER — FUROSEMIDE 40 MG/1
TABLET ORAL
Status: ON HOLD | COMMUNITY
End: 2017-07-28

## 2017-07-26 RX ORDER — IBUPROFEN 200 MG
16 TABLET ORAL
Status: DISCONTINUED | OUTPATIENT
Start: 2017-07-26 | End: 2017-07-28 | Stop reason: HOSPADM

## 2017-07-26 RX ORDER — GLUCAGON 1 MG
1 KIT INJECTION
Status: DISCONTINUED | OUTPATIENT
Start: 2017-07-26 | End: 2017-07-28 | Stop reason: HOSPADM

## 2017-07-26 RX ORDER — GABAPENTIN 100 MG/1
300 CAPSULE ORAL 3 TIMES DAILY
Status: DISCONTINUED | OUTPATIENT
Start: 2017-07-26 | End: 2017-07-28 | Stop reason: HOSPADM

## 2017-07-26 RX ORDER — AMIODARONE HYDROCHLORIDE 200 MG/1
200 TABLET ORAL DAILY
Status: DISCONTINUED | OUTPATIENT
Start: 2017-07-26 | End: 2017-07-28 | Stop reason: HOSPADM

## 2017-07-26 RX ORDER — IBUPROFEN 200 MG
24 TABLET ORAL
Status: DISCONTINUED | OUTPATIENT
Start: 2017-07-26 | End: 2017-07-28 | Stop reason: HOSPADM

## 2017-07-26 RX ORDER — IBUPROFEN 200 MG
16 TABLET ORAL ONCE
Status: DISCONTINUED | OUTPATIENT
Start: 2017-07-26 | End: 2017-07-28 | Stop reason: HOSPADM

## 2017-07-26 RX ORDER — FLUTICASONE PROPIONATE 50 MCG
2 SPRAY, SUSPENSION (ML) NASAL DAILY PRN
Status: DISCONTINUED | OUTPATIENT
Start: 2017-07-26 | End: 2017-07-28 | Stop reason: HOSPADM

## 2017-07-26 RX ORDER — FUROSEMIDE 10 MG/ML
40 INJECTION INTRAMUSCULAR; INTRAVENOUS 3 TIMES DAILY
Status: DISCONTINUED | OUTPATIENT
Start: 2017-07-26 | End: 2017-07-27

## 2017-07-26 RX ORDER — ATORVASTATIN CALCIUM 20 MG/1
40 TABLET, FILM COATED ORAL DAILY
Status: DISCONTINUED | OUTPATIENT
Start: 2017-07-26 | End: 2017-07-28 | Stop reason: HOSPADM

## 2017-07-26 RX ORDER — LISINOPRIL 10 MG/1
10 TABLET ORAL DAILY
Status: DISCONTINUED | OUTPATIENT
Start: 2017-07-26 | End: 2017-07-26

## 2017-07-26 RX ORDER — CARVEDILOL 12.5 MG/1
12.5 TABLET ORAL 2 TIMES DAILY WITH MEALS
Status: DISCONTINUED | OUTPATIENT
Start: 2017-07-26 | End: 2017-07-28

## 2017-07-26 RX ORDER — LISINOPRIL 20 MG/1
20 TABLET ORAL DAILY
Status: DISCONTINUED | OUTPATIENT
Start: 2017-07-26 | End: 2017-07-28 | Stop reason: HOSPADM

## 2017-07-26 RX ORDER — INSULIN ASPART 100 [IU]/ML
0-5 INJECTION, SOLUTION INTRAVENOUS; SUBCUTANEOUS
Status: DISCONTINUED | OUTPATIENT
Start: 2017-07-26 | End: 2017-07-28 | Stop reason: HOSPADM

## 2017-07-26 RX ORDER — POTASSIUM CHLORIDE 750 MG/1
TABLET, EXTENDED RELEASE ORAL
Status: ON HOLD | COMMUNITY
End: 2020-11-02

## 2017-07-26 RX ADMIN — LISINOPRIL 20 MG: 20 TABLET ORAL at 09:07

## 2017-07-26 RX ADMIN — APIXABAN 5 MG: 5 TABLET, FILM COATED ORAL at 09:07

## 2017-07-26 RX ADMIN — FUROSEMIDE 40 MG: 10 INJECTION, SOLUTION INTRAVENOUS at 09:07

## 2017-07-26 RX ADMIN — Medication 3 ML: at 03:07

## 2017-07-26 RX ADMIN — Medication 3 ML: at 10:07

## 2017-07-26 RX ADMIN — Medication 3 ML: at 06:07

## 2017-07-26 RX ADMIN — ATORVASTATIN CALCIUM 40 MG: 20 TABLET, FILM COATED ORAL at 09:07

## 2017-07-26 RX ADMIN — ASPIRIN 81 MG CHEWABLE TABLET 81 MG: 81 TABLET CHEWABLE at 09:07

## 2017-07-26 RX ADMIN — CARVEDILOL 12.5 MG: 12.5 TABLET, FILM COATED ORAL at 08:07

## 2017-07-26 RX ADMIN — GABAPENTIN 300 MG: 100 CAPSULE ORAL at 09:07

## 2017-07-26 RX ADMIN — CARVEDILOL 12.5 MG: 12.5 TABLET, FILM COATED ORAL at 06:07

## 2017-07-26 RX ADMIN — GABAPENTIN 300 MG: 100 CAPSULE ORAL at 03:07

## 2017-07-26 RX ADMIN — GABAPENTIN 300 MG: 100 CAPSULE ORAL at 05:07

## 2017-07-26 RX ADMIN — FUROSEMIDE 40 MG: 10 INJECTION, SOLUTION INTRAVENOUS at 05:07

## 2017-07-26 RX ADMIN — ALLOPURINOL 300 MG: 300 TABLET ORAL at 09:07

## 2017-07-26 RX ADMIN — FUROSEMIDE 40 MG: 10 INJECTION, SOLUTION INTRAVENOUS at 03:07

## 2017-07-26 RX ADMIN — AMIODARONE HYDROCHLORIDE 200 MG: 200 TABLET ORAL at 09:07

## 2017-07-26 NOTE — SUBJECTIVE & OBJECTIVE
Interval History: NAEON. Reports improvement in breathing since starting diuresis. Good UOP since presentation, 5200cc recorded. No other complaints.     Review of Systems  Objective:     Vital Signs (Most Recent):  Temp: 98.2 °F (36.8 °C) (07/26/17 0800)  Pulse: (!) 57 (07/26/17 1100)  Resp: 18 (07/26/17 0800)  BP: (!) 160/81 (07/26/17 0800)  SpO2: 97 % (07/26/17 0800) Vital Signs (24h Range):  Temp:  [97.3 °F (36.3 °C)-98.2 °F (36.8 °C)] 98.2 °F (36.8 °C)  Pulse:  [50-70] 57  Resp:  [10-24] 18  SpO2:  [65 %-100 %] 97 %  BP: (145-167)/() 160/81     Weight:  (unable to get weight, bed scale not working)  Body mass index is 50.75 kg/m².    Intake/Output Summary (Last 24 hours) at 07/26/17 1502  Last data filed at 07/26/17 0845   Gross per 24 hour   Intake                0 ml   Output             5200 ml   Net            -5200 ml      Physical Exam   Constitutional: He is oriented to person, place, and time. He appears well-developed and well-nourished. No distress.   Eyes: EOM are normal.   Neck: JVD present.   Cardiovascular: Regular rhythm and normal heart sounds.    bradycardic   Pulmonary/Chest: No respiratory distress. He has no wheezes. He has no rales.   No crackles appreciated, difficult to assess 2/2 poor effort and body habitus   Abdominal: Soft. Bowel sounds are normal. There is no tenderness.   Musculoskeletal: He exhibits edema (3+ edema RLE, 2+ edema LLE).   Neurological: He is alert and oriented to person, place, and time.   Skin: Skin is warm and dry.       Significant Labs:   A1C:   Recent Labs  Lab 04/28/17  0453 07/26/17  0428   HGBA1C 7.0* 5.2     CBC:   Recent Labs  Lab 07/25/17  1935 07/26/17  0428   WBC 6.84 7.12   HGB 11.1* 10.7*   HCT 35.6* 34.3*   * 147*     CMP:   Recent Labs  Lab 07/25/17  1935 07/26/17  0428    146*   K 4.3 3.9   * 114*   CO2 21* 19*    60*   BUN 16 16   CREATININE 1.6* 1.6*   CALCIUM 8.6* 8.7   PROT 6.9  --    ALBUMIN 3.1*  --    BILITOT  0.6  --    ALKPHOS 100  --    AST 13  --    ALT 15  --    ANIONGAP 7* 13   EGFRNONAA 43.3* 43.3*     POCT Glucose:   Recent Labs  Lab 07/26/17  0816 07/26/17  0841 07/26/17  1250   POCTGLUCOSE 63* 85 82     All pertinent labs within the past 24 hours have been reviewed.    Significant Imaging: I have reviewed all pertinent imaging results/findings within the past 24 hours.

## 2017-07-26 NOTE — ASSESSMENT & PLAN NOTE
- takes insulin detemir 8 units nightly, A1C 5.2  - AM glucose 60, will hold basal insulin for now and continue SSI

## 2017-07-26 NOTE — H&P
[facility] Hospital Medicine  History & Physical    Patient Name: Hemant Kennedy Jr.  MRN: [id number]  Admission Date: 7/25/2017  Attending Physician: Bony Cheng MD   Primary Care Provider: Hemant Markham MD    [facility] Medicine Team: [facility] HOSP MED 1 Jorge Walden MD     Patient information was obtained from patient and ER records.     Subjective:     Principal Problem:Acute on chronic congestive heart failure    Chief Complaint:   Chief Complaint   Patient presents with    Shortness of Breath     dyspenea on exertion. diagnosed with pneumonia but has not started any abx yet. lives at assisted living. also hasn't had lasix in about 2 months. audible wheezing        HPI: Hemant Kennedy is a 68 yo male with a history of CHF (40-45% in 4/2017), Afib on apixaban, HTN, CVA, CKD stage 3, and T2DM presenting to ED with complaints of fluid accumulation in his abdomen, chest, and face over the past 10 weeks. He reports increasing SOB, fatigue, and exertional dyspnea with audible wheezing. Patient reports he has been on Lasix 40mg PO qD for the past 10-12 years, but has not been taking it for the past 10 weeks as the orders were not transferred over to his nursing facility, [place]. Because of his symptoms, the nursing facility ordered a CXR 3 days ago, which was read as pneumonia by nursing staff. He has not yet been started on any antibiotics. He denies fever, chills, nausea/vomiting, chest pain. At baseline, patient sleeps with CPAP machine (setting 18/19) and 3L NC and is wheelchair bound.     Past Medical History:   Diagnosis Date    Atrial fibrillation     Cardiomyopathy     home O2    Cataract     CHF (congestive heart failure)     CKD (chronic kidney disease) stage 3, GFR 30-59 ml/min 11/27/2015    Diabetes mellitus     DVT (deep venous thrombosis)     Hypertension     Sleep apnea     Stroke 2010    with residual effects W/C bound - Right Occipital       History reviewed. No  pertinent surgical history.    Review of patient's allergies indicates:   Allergen Reactions    Spironolactone      Hyperkalemia         No current facility-administered medications on file prior to encounter.      Current Outpatient Prescriptions on File Prior to Encounter   Medication Sig    acetaminophen (TYLENOL) 500 MG tablet Take 2 tablets (1,000 mg total) by mouth 3 (three) times daily as needed for Pain.    allopurinol (ZYLOPRIM) 300 MG tablet Take 1 tablet (300 mg total) by mouth once daily.  Tablet Oral Every day (Patient taking differently: Take 300 mg by mouth once daily. )    amiodarone (PACERONE) 200 MG Tab Take 1 tablet (200 mg total) by mouth once daily.    apixaban (ELIQUIS) 5 mg Tab Take 5 mg by mouth 2 (two) times daily.    aspirin 81 MG Chew Take 81 mg by mouth once daily.     atorvastatin (LIPITOR) 40 MG tablet Take 1 tablet (40 mg total) by mouth once daily. 1 Tablet Oral Every day (Patient taking differently: Take 40 mg by mouth once daily. )    carvedilol (COREG) 12.5 MG tablet Take 12.5 mg by mouth 2 (two) times daily with meals.     collagenase (SANTYL) ointment Apply topically once daily. Apply to wound daily as directed.    ergocalciferol (ERGOCALCIFEROL) 50,000 unit Cap Take 1 capsule (50,000 Units total) by mouth every 7 days. (Patient taking differently: Take 50,000 Units by mouth every Tuesday. )    ferrous sulfate 325 (65 FE) MG EC tablet Take 1 tablet (325 mg total) by mouth 2 (two) times daily with meals. (Patient taking differently: Take 325 mg by mouth once daily. )    fluticasone (FLONASE) 50 mcg/actuation nasal spray 2 sprays by Each Nare route once daily. (Patient taking differently: 2 sprays by Each Nare route daily as needed for Allergies. )    gabapentin (NEURONTIN) 300 MG capsule Take 1 capsule (300 mg total) by mouth 3 (three) times daily.    HYDROPHILIC CREAM (TRIAD WOUND DRESSING TOP) Apply topically daily as needed.    insulin detemir (LEVEMIR) 100  unit/mL injection Inject 8 Units into the skin 2 (two) times daily.    insulin lispro (HUMALOG) 100 unit/mL injection Inject 5 Units into the skin 3 (three) times daily before meals.    lisinopril 10 MG tablet Take 1 tablet (10 mg total) by mouth once daily.    ONE TOUCH ULTRA TEST Strp Test blood sugar four times daily     Family History     Problem Relation (Age of Onset)    Cataracts Mother    Glaucoma Sister, Sister    Heart attack Mother        Social History Main Topics    Smoking status: Never Smoker    Smokeless tobacco: Never Used    Alcohol use No    Drug use: No    Sexual activity: Not on file     Review of Systems   Constitutional: Positive for fatigue. Negative for chills and fever.   HENT: Negative for trouble swallowing.    Eyes: Negative for pain and visual disturbance.   Respiratory: Positive for shortness of breath and wheezing. Negative for cough.    Cardiovascular: Positive for leg swelling. Negative for chest pain and palpitations.   Gastrointestinal: Negative for abdominal pain, constipation, diarrhea, nausea and vomiting.   Genitourinary: Negative for dysuria and hematuria.   Musculoskeletal: Negative for myalgias.   Skin: Negative for rash.   Neurological: Negative for weakness.   Psychiatric/Behavioral: Negative for confusion and hallucinations.     Objective:     Vital Signs (Most Recent):  Temp: 98.1 °F (36.7 °C) (07/25/17 1827)  Pulse: (!) 54 (07/25/17 2259)  Resp: 10 (07/25/17 2202)  BP: (!) 160/75 (07/25/17 2202)  SpO2: 100 % (07/25/17 2133) Vital Signs (24h Range):  Temp:  [98.1 °F (36.7 °C)] 98.1 °F (36.7 °C)  Pulse:  [51-58] 54  Resp:  [10-24] 10  SpO2:  [65 %-100 %] 100 %  BP: (145-166)/() 160/75     Weight: (!) 147 kg (324 lb)  Body mass index is 50.75 kg/m².    Physical Exam   Constitutional: He is oriented to person, place, and time. He appears well-developed and well-nourished. No distress.   HENT:   Head: Normocephalic and atraumatic.   Eyes: EOM are normal.  Pupils are equal, round, and reactive to light.   Neck: Normal range of motion. Neck supple. No JVD (difficult to assess 2/2 body habitus) present.   Cardiovascular: Regular rhythm and normal heart sounds.    bradycardic   Pulmonary/Chest: No respiratory distress. He has wheezes (in lung bases bilaterally). He has no rales.   No crackles appreciated, difficult to assess 2/2 poor effort   Abdominal: Soft. Bowel sounds are normal. There is no tenderness.   Musculoskeletal: He exhibits edema (3+ edema).   Neurological: He is alert and oriented to person, place, and time.   Skin: Skin is warm and dry.        Significant Labs:   BMP:   Recent Labs  Lab 07/25/17 1935         K 4.3   *   CO2 21*   BUN 16   CREATININE 1.6*   CALCIUM 8.6*   MG 1.8     CBC:   Recent Labs  Lab 07/25/17 1935   WBC 6.84   HGB 11.1*   HCT 35.6*   *     CMP:   Recent Labs  Lab 07/25/17 1935      K 4.3   *   CO2 21*      BUN 16   CREATININE 1.6*   CALCIUM 8.6*   PROT 6.9   ALBUMIN 3.1*   BILITOT 0.6   ALKPHOS 100   AST 13   ALT 15   ANIONGAP 7*   EGFRNONAA 43.3*     Cardiac Markers:   Recent Labs  Lab 07/25/17 1935   BNP 1,220*       Significant Imaging: I have reviewed all pertinent imaging results/findings within the past 24 hours.     CXR 7/25:   Cardiomegaly with congestion and edema suggests CHF noting there may be a trace right effusion.    Assessment/Plan:     * Acute on chronic congestive heart failure    - acute on chronic CHF secondary to inaccess to medication for the past 10 weeks  - On admission, BNP 1220, Cr 1.6  - satting well on 3L O2 by NC  - IV lasix 40mg in ED one dose  - continue IV lasix 40 mg TID   - Strict I/Os, daily weights           Chronic systolic congestive heart failure, NYHA class 2    - see Acute on chronic congestive heart failure  - restart atorvastatin 40 mg po daily            Atrial fibrillation, controlled    - restart home dose eliquis  - restart aspirin 81mg  -  restart amiodarone 200 mg po daily        CKD (chronic kidney disease) stage 3, GFR 30-59 ml/min    - avoid nephrotoxic agents          Type II diabetes mellitus with complication, uncontrolled    - takes insulin detemir 8 units nightly  - restart insulin  - last Hgb A1c 7.0 in 4/2017          Sleep apnea    - restart home CPAP        Renovascular hypertension    - restart carvedilol 12.5 mg home med            VTE Risk Mitigation         Ordered     Medium Risk of VTE  Once      07/25/17 1077        Jorge Walden MD  Department of Hospital Medicine   [facility]    I HAVE PERSONALLY TAKEN THE HISTORY, EXAMINED THIS PATIENT,  AND AGREE WITH THE RESIDENT'S NOTE AS STATED ABOVE WITH THE FOLLOWING EXCEPTIONS:  Patient seen and examined with the intern and medical student at 11:50pm on 7/25/17    Mr. Kennedy is a 68yo man with a past medical history of S/D CHF with an Echo showing an EF of 40-45% in 4/2017.  His cardiac issues are further complicated by Afib, HTN, CVA, CKD III and DM2.  He now comes with SOB and GALAVIZ.  He is supposed to be taking Lasix, but stopped getting it over the past 10 weeks since going to the nursing home.  He also has ERIC and uses CPAP.    Assessment:  Acute on chronic combined systolic and diastolic heart failure  Pulmonary edema due to CHF  Acute respiratory failure with hypoxia  Morbid obesity  Obstructive sleep apnea  Atrial fibrillation  Long term use of anticoagulation    Plan:  -Restart Lasix at 40mg iv q12 hours or q8 hours  -Continue Lisinopril, but increase from 10mg to 20mg due to SBP in 160's  -Continue home Coreg BID  -Repeat Echo with CFD  -Care with the ACE due to CKD 3 and past h.o. Hyperkalemia   -Avoid Aldactone with ho hyperkalemia as well  -Telemetry  -Continue Apixaban  -Check ABG      Temp:  [97.3 °F (36.3 °C)-98.1 °F (36.7 °C)] 97.3 °F (36.3 °C)  Pulse:  [51-58] 55  Resp:  [10-24] 17  SpO2:  [65 %-100 %] 97 %  BP: (145-167)/() 167/87     Recent  Results (from the past 24 hour(s))   CBC auto differential    Collection Time: 07/25/17  7:35 PM   Result Value Ref Range    WBC 6.84 3.90 - 12.70 K/uL    RBC 3.93 (L) 4.60 - 6.20 M/uL    Hemoglobin 11.1 (L) 14.0 - 18.0 g/dL    Hematocrit 35.6 (L) 40.0 - 54.0 %    MCV 91 82 - 98 fL    MCH 28.2 27.0 - 31.0 pg    MCHC 31.2 (L) 32.0 - 36.0 g/dL    RDW 16.1 (H) 11.5 - 14.5 %    Platelets 142 (L) 150 - 350 K/uL    MPV 10.2 9.2 - 12.9 fL    Gran # 4.3 1.8 - 7.7 K/uL    Lymph # 1.3 1.0 - 4.8 K/uL    Mono # 0.7 0.3 - 1.0 K/uL    Eos # 0.5 0.0 - 0.5 K/uL    Baso # 0.02 0.00 - 0.20 K/uL    Gran% 63.2 38.0 - 73.0 %    Lymph% 19.4 18.0 - 48.0 %    Mono% 10.1 4.0 - 15.0 %    Eosinophil% 6.7 0.0 - 8.0 %    Basophil% 0.3 0.0 - 1.9 %    Differential Method Automated    Comprehensive metabolic panel    Collection Time: 07/25/17  7:35 PM   Result Value Ref Range    Sodium 144 136 - 145 mmol/L    Potassium 4.3 3.5 - 5.1 mmol/L    Chloride 116 (H) 95 - 110 mmol/L    CO2 21 (L) 23 - 29 mmol/L    Glucose 107 70 - 110 mg/dL    BUN, Bld 16 8 - 23 mg/dL    Creatinine 1.6 (H) 0.5 - 1.4 mg/dL    Calcium 8.6 (L) 8.7 - 10.5 mg/dL    Total Protein 6.9 6.0 - 8.4 g/dL    Albumin 3.1 (L) 3.5 - 5.2 g/dL    Total Bilirubin 0.6 0.1 - 1.0 mg/dL    Alkaline Phosphatase 100 55 - 135 U/L    AST 13 10 - 40 U/L    ALT 15 10 - 44 U/L    Anion Gap 7 (L) 8 - 16 mmol/L    eGFR if African American 50.1 (A) >60 mL/min/1.73 m^2    eGFR if non  43.3 (A) >60 mL/min/1.73 m^2   Troponin I    Collection Time: 07/25/17  7:35 PM   Result Value Ref Range    Troponin I 0.022 0.000 - 0.026 ng/mL   Brain natriuretic peptide    Collection Time: 07/25/17  7:35 PM   Result Value Ref Range    BNP 1,220 (H) 0 - 99 pg/mL   Magnesium    Collection Time: 07/25/17  7:35 PM   Result Value Ref Range    Magnesium 1.8 1.6 - 2.6 mg/dL   Phosphorus    Collection Time: 07/25/17  7:35 PM   Result Value Ref Range    Phosphorus 3.4 2.7 - 4.5 mg/dL   Urinalysis Only     Collection Time: 07/25/17  8:28 PM   Result Value Ref Range    Specimen UA Urine, Clean Catch     Color, UA Straw Yellow, Straw, Brittany    Appearance, UA Clear Clear    pH, UA 5.0 5.0 - 8.0    Specific Gravity, UA 1.010 1.005 - 1.030    Protein, UA Negative Negative    Glucose, UA Negative Negative    Ketones, UA Negative Negative    Bilirubin (UA) Negative Negative    Occult Blood UA Negative Negative    Nitrite, UA Negative Negative    Urobilinogen, UA Negative <2.0 EU/dL    Leukocytes, UA Negative Negative       2D echo with cfd:  CONCLUSIONS     1 - Technically difficult portable study.     2 - Eccentric hypertrophy.     3 - Mildly to moderately depressed left ventricular systolic function (EF 40-45%) with anterior hypokinesis.     4 - Normal right ventricular systolic function .     5 - Impaired LV relaxation, elevated LAP (grade 2 diastolic dysfunction).     6 - Mild left atrial enlargement.   This document has been electronically    SIGNED BY: Derek Crowe MD On: 04/28/2017 14:41

## 2017-07-26 NOTE — ASSESSMENT & PLAN NOTE
- acute on chronic CHF secondary to inaccess to medication for the past 10 weeks  - On admission, BNP 1220, Cr 1.6  - satting well on 3L O2 by NC  - IV lasix 40mg in ED one dose  - continue IV lasix 40 mg TID   - Strict I/Os, daily weights

## 2017-07-26 NOTE — MEDICAL/APP STUDENT
[facility] Hospital Medicine  History & Physical     Patient Name:   MRN: [id number]  Admission Date: 7/25/2017  Attending Physician: Godwin Robertson MD   Primary Care Provider: Hemant Markham MD    [facility] Medicine Team: [facility]OSP MED 1     Patient information was obtained from patient, relative(s), past medical records and ER records.     SUBJECTIVE:     Chief Complaint   Patient presents with    Shortness of Breath       dyspenea on exertion. diagnosed with pneumonia but has not started any abx yet. lives at assisted living. also hasn't had lasix in about 2 months. audible wheezing       HPI:   Hemant Kennedy is a 66 yo male with a history of CHF (40-45% in 04/2017), AFib (on apixaban), HTN, CVA (right occiput-wheelchair bound), CKD stage 3a, and DM2 presenting to ED with complaints of SOB and exertional dyspnea for the past 5 days secondary to fluid overload.  Pt reports fluid accumulation in his abdomen, BLE, chest, and face over the past 10 weeks. Over the past 5 days, pt reports increasing fatigue and audible wheezing. The wheeze is his primary complaint. Patient reports he has been on Lasix 40mg PO qD for the past 10-12 years, but has not been taking it for the past 10 weeks as the orders were not transferred over to his nursing facility, [facility]. Because of his symptoms, the nursing facility ordered a CXR 3 days ago, which was read of pneumonia by nursing staff. He has not yet been started on any antibiotics. At baseline, patient sleeps with CPAP machine and 3L NC and is wheelchair bound.     Pt denies any fever, chills, recent illness, cough, or sputum production. Pt denies myalgias.     Pain scale: 0/10    ROS:  Constitutional: no fever or chills. Positive for fatigue  Eyes: no visual changes  ENT: no nasal congestion or sore throat  Respiratory: positive for dyspnea on exertion, audible wheezing, and SOB. Negative for hemoptysis or sputum production  Cardiovascular: no chest pain or  palpitations  Gastrointestinal: no nausea or vomiting, no abdominal pain or change in bowel habits. Positive for abdominal distention   Genitourinary: no hematuria or dysuria  Skin: positive for BLE stasis dermatitis (chronic), negative for rashes  Hematologic/Lymphatic: no easy bruising or lymphadenopathy  Musculoskeletal: no arthralgias or myalgias, no joint pain  Neurological: no seizures or tremors, tingling, loss of sensation, loss of consciousness   Behavioral/Psych: no auditory or visual hallucinations, no confusion  Endocrine: no heat or cold intolerance    Review of patient's allergies indicates:   Allergen Reactions    Spironolactone      Hyperkalemia         Past Medical History:   Diagnosis Date    Atrial fibrillation     Cardiomyopathy     home O2    Cataract     CHF (congestive heart failure)     CKD (chronic kidney disease) stage 3, GFR 30-59 ml/min 11/27/2015    Diabetes mellitus     DVT (deep venous thrombosis)     Hypertension     Sleep apnea     Stroke 2010    with residual effects W/C bound - Right Occipital     History reviewed. No pertinent surgical history.     Social History     Social History    Marital status:      Spouse name: N/A    Number of children: N/A    Years of education: N/A     Occupational History    Not on file.     Social History Main Topics    Smoking status: Never Smoker    Smokeless tobacco: Never Used    Alcohol use No    Drug use: No    Sexual activity: Not on file     No current facility-administered medications on file prior to encounter.      Current Outpatient Prescriptions on File Prior to Encounter   Medication Sig Dispense Refill    acetaminophen (TYLENOL) 500 MG tablet Take 2 tablets (1,000 mg total) by mouth 3 (three) times daily as needed for Pain. 15 tablet 0    allopurinol (ZYLOPRIM) 300 MG tablet Take 1 tablet (300 mg total) by mouth once daily.  Tablet Oral Every day (Patient taking differently: Take 300 mg by mouth once daily. )  90 tablet 3    amiodarone (PACERONE) 200 MG Tab Take 1 tablet (200 mg total) by mouth once daily. 90 tablet 3    apixaban (ELIQUIS) 5 mg Tab Take 5 mg by mouth 2 (two) times daily.      aspirin 81 MG Chew Take 81 mg by mouth once daily.       atorvastatin (LIPITOR) 40 MG tablet Take 1 tablet (40 mg total) by mouth once daily. 1 Tablet Oral Every day (Patient taking differently: Take 40 mg by mouth once daily. ) 90 tablet 3    carvedilol (COREG) 12.5 MG tablet Take 12.5 mg by mouth 2 (two) times daily with meals.       collagenase (SANTYL) ointment Apply topically once daily. Apply to wound daily as directed. 90 g 0    ergocalciferol (ERGOCALCIFEROL) 50,000 unit Cap Take 1 capsule (50,000 Units total) by mouth every 7 days. (Patient taking differently: Take 50,000 Units by mouth every Tuesday. ) 12 capsule 3    ferrous sulfate 325 (65 FE) MG EC tablet Take 1 tablet (325 mg total) by mouth 2 (two) times daily with meals. (Patient taking differently: Take 325 mg by mouth once daily. ) 60 tablet 5    fluticasone (FLONASE) 50 mcg/actuation nasal spray 2 sprays by Each Nare route once daily. (Patient taking differently: 2 sprays by Each Nare route daily as needed for Allergies. )  0    gabapentin (NEURONTIN) 300 MG capsule Take 1 capsule (300 mg total) by mouth 3 (three) times daily. 90 capsule 11    HYDROPHILIC CREAM (TRIAD WOUND DRESSING TOP) Apply topically daily as needed.      insulin detemir (LEVEMIR) 100 unit/mL injection Inject 8 Units into the skin 2 (two) times daily.  12    insulin lispro (HUMALOG) 100 unit/mL injection Inject 5 Units into the skin 3 (three) times daily before meals.      lisinopril 10 MG tablet Take 1 tablet (10 mg total) by mouth once daily. 90 tablet 3    ONE TOUCH ULTRA TEST Strp Test blood sugar four times daily           OBJECTIVE:     Vital Signs Range (Last 24H):  Temp:  [98.1 °F (36.7 °C)]   Pulse:  [51-58]   Resp:  [10-24]   BP: (145-166)/()   SpO2:  [65 %-100 %]      I & O (Last 24H):  No intake or output data in the 24 hours ending 07/25/17 0504    Physical Exam:  General: fatigued, morbidly obese, currently on 3L O2 NC   HENT: normocephalic, atraumatic. Mucosa normal. PERRL.   Neck: supple, symmetrical, trachea midline, no JVD and thyroid not enlarged  Lungs:  labored breathing, diminished breath sounds throughout and wheezes bibasilar  Cardiovascular: regular rate and rhythm, S1, S2 normal, no murmur  Extremities: edema 3+ bilateral ankle and venous stasis dermatitis noted. Pulses: 1+ symmetric.  Abdomen: soft, non-tender, no masses or organomegaly. Positive for ascites  Musculoskeletal: no clubbing, cyanosis  Neurologic: Normal strength and tone. No focal numbness or weakness  Psych/Behavioral:  Alert and oriented, appropriate affect.      Recent Results (from the past 24 hour(s))   CBC auto differential    Collection Time: 07/25/17  7:35 PM   Result Value Ref Range    WBC 6.84 3.90 - 12.70 K/uL    RBC 3.93 (L) 4.60 - 6.20 M/uL    Hemoglobin 11.1 (L) 14.0 - 18.0 g/dL    Hematocrit 35.6 (L) 40.0 - 54.0 %    MCV 91 82 - 98 fL    MCH 28.2 27.0 - 31.0 pg    MCHC 31.2 (L) 32.0 - 36.0 g/dL    RDW 16.1 (H) 11.5 - 14.5 %    Platelets 142 (L) 150 - 350 K/uL    MPV 10.2 9.2 - 12.9 fL    Gran # 4.3 1.8 - 7.7 K/uL    Lymph # 1.3 1.0 - 4.8 K/uL    Mono # 0.7 0.3 - 1.0 K/uL    Eos # 0.5 0.0 - 0.5 K/uL    Baso # 0.02 0.00 - 0.20 K/uL    Gran% 63.2 38.0 - 73.0 %    Lymph% 19.4 18.0 - 48.0 %    Mono% 10.1 4.0 - 15.0 %    Eosinophil% 6.7 0.0 - 8.0 %    Basophil% 0.3 0.0 - 1.9 %    Differential Method Automated    Comprehensive metabolic panel    Collection Time: 07/25/17  7:35 PM   Result Value Ref Range    Sodium 144 136 - 145 mmol/L    Potassium 4.3 3.5 - 5.1 mmol/L    Chloride 116 (H) 95 - 110 mmol/L    CO2 21 (L) 23 - 29 mmol/L    Glucose 107 70 - 110 mg/dL    BUN, Bld 16 8 - 23 mg/dL    Creatinine 1.6 (H) 0.5 - 1.4 mg/dL    Calcium 8.6 (L) 8.7 - 10.5 mg/dL    Total Protein 6.9 6.0 -  8.4 g/dL    Albumin 3.1 (L) 3.5 - 5.2 g/dL    Total Bilirubin 0.6 0.1 - 1.0 mg/dL    Alkaline Phosphatase 100 55 - 135 U/L    AST 13 10 - 40 U/L    ALT 15 10 - 44 U/L    Anion Gap 7 (L) 8 - 16 mmol/L    eGFR if African American 50.1 (A) >60 mL/min/1.73 m^2    eGFR if non  43.3 (A) >60 mL/min/1.73 m^2   Troponin I    Collection Time: 07/25/17  7:35 PM   Result Value Ref Range    Troponin I 0.022 0.000 - 0.026 ng/mL   Brain natriuretic peptide    Collection Time: 07/25/17  7:35 PM   Result Value Ref Range    BNP 1,220 (H) 0 - 99 pg/mL   Magnesium    Collection Time: 07/25/17  7:35 PM   Result Value Ref Range    Magnesium 1.8 1.6 - 2.6 mg/dL   Phosphorus    Collection Time: 07/25/17  7:35 PM   Result Value Ref Range    Phosphorus 3.4 2.7 - 4.5 mg/dL   Urinalysis Only    Collection Time: 07/25/17  8:28 PM   Result Value Ref Range    Specimen UA Urine, Clean Catch     Color, UA Straw Yellow, Straw, Brittany    Appearance, UA Clear Clear    pH, UA 5.0 5.0 - 8.0    Specific Gravity, UA 1.010 1.005 - 1.030    Protein, UA Negative Negative    Glucose, UA Negative Negative    Ketones, UA Negative Negative    Bilirubin (UA) Negative Negative    Occult Blood UA Negative Negative    Nitrite, UA Negative Negative    Urobilinogen, UA Negative <2.0 EU/dL    Leukocytes, UA Negative Negative       ASSESSMENT/PLAN:     Hemant Kennedy is a 68 yo male with a history of CHF (40-45% in 04/2017), AFib (on Ipixiban), HTN, CVA (right occiput-wheelchair bound), CKD stage 3a, and DM2 presenting to ED with complaints of fluid accumulation in his abdomen, BLE, chest, and face over the past 10 weeks.     Active Problems:    Acute on chronic congestive heart failure, NYHA class 2  EF 40-45% on 04/28/2017; BNP 1220  -Start 40mg IV furosemide q8H; if no improvement, consider increasing dose or start Furosemide infusion  -Increase home Lisinopril to 20mg qD  -recheck Echo 2D and EF     Atrial Fibrillation  -Continue home dose apixaban    -Continue home dose Amidarone    Sleep apnea  -CPAP QHS  -recheck ABG 1 hr after     Type II diabetes mellitus with complication, uncontrolled   A1c 04/28/2017: 7.0   -Continue home dose Levemir 8U qD  -Diabetic diet  -repeat HgA1c    CKD Stage III  -baseline Cr of 1.4   -monitor carefully due to past hx of hyperkalemia & AECi use (consider

## 2017-07-26 NOTE — SUBJECTIVE & OBJECTIVE
Past Medical History:   Diagnosis Date    Atrial fibrillation     Cardiomyopathy     home O2    Cataract     CHF (congestive heart failure)     CKD (chronic kidney disease) stage 3, GFR 30-59 ml/min 11/27/2015    Diabetes mellitus     DVT (deep venous thrombosis)     Hypertension     Sleep apnea     Stroke 2010    with residual effects W/C bound - Right Occipital       History reviewed. No pertinent surgical history.    Review of patient's allergies indicates:   Allergen Reactions    Spironolactone      Hyperkalemia         No current facility-administered medications on file prior to encounter.      Current Outpatient Prescriptions on File Prior to Encounter   Medication Sig    acetaminophen (TYLENOL) 500 MG tablet Take 2 tablets (1,000 mg total) by mouth 3 (three) times daily as needed for Pain.    allopurinol (ZYLOPRIM) 300 MG tablet Take 1 tablet (300 mg total) by mouth once daily.  Tablet Oral Every day (Patient taking differently: Take 300 mg by mouth once daily. )    amiodarone (PACERONE) 200 MG Tab Take 1 tablet (200 mg total) by mouth once daily.    apixaban (ELIQUIS) 5 mg Tab Take 5 mg by mouth 2 (two) times daily.    aspirin 81 MG Chew Take 81 mg by mouth once daily.     atorvastatin (LIPITOR) 40 MG tablet Take 1 tablet (40 mg total) by mouth once daily. 1 Tablet Oral Every day (Patient taking differently: Take 40 mg by mouth once daily. )    carvedilol (COREG) 12.5 MG tablet Take 12.5 mg by mouth 2 (two) times daily with meals.     collagenase (SANTYL) ointment Apply topically once daily. Apply to wound daily as directed.    ergocalciferol (ERGOCALCIFEROL) 50,000 unit Cap Take 1 capsule (50,000 Units total) by mouth every 7 days. (Patient taking differently: Take 50,000 Units by mouth every Tuesday. )    ferrous sulfate 325 (65 FE) MG EC tablet Take 1 tablet (325 mg total) by mouth 2 (two) times daily with meals. (Patient taking differently: Take 325 mg by mouth once daily. )     fluticasone (FLONASE) 50 mcg/actuation nasal spray 2 sprays by Each Nare route once daily. (Patient taking differently: 2 sprays by Each Nare route daily as needed for Allergies. )    gabapentin (NEURONTIN) 300 MG capsule Take 1 capsule (300 mg total) by mouth 3 (three) times daily.    HYDROPHILIC CREAM (TRIAD WOUND DRESSING TOP) Apply topically daily as needed.    insulin detemir (LEVEMIR) 100 unit/mL injection Inject 8 Units into the skin 2 (two) times daily.    insulin lispro (HUMALOG) 100 unit/mL injection Inject 5 Units into the skin 3 (three) times daily before meals.    lisinopril 10 MG tablet Take 1 tablet (10 mg total) by mouth once daily.    ONE TOUCH ULTRA TEST Strp Test blood sugar four times daily     Family History     Problem Relation (Age of Onset)    Cataracts Mother    Glaucoma Sister, Sister    Heart attack Mother        Social History Main Topics    Smoking status: Never Smoker    Smokeless tobacco: Never Used    Alcohol use No    Drug use: No    Sexual activity: Not on file     Review of Systems   Constitutional: Positive for fatigue. Negative for chills and fever.   HENT: Negative for trouble swallowing.    Eyes: Negative for pain and visual disturbance.   Respiratory: Positive for shortness of breath and wheezing. Negative for cough.    Cardiovascular: Positive for leg swelling. Negative for chest pain and palpitations.   Gastrointestinal: Negative for abdominal pain, constipation, diarrhea, nausea and vomiting.   Genitourinary: Negative for dysuria and hematuria.   Musculoskeletal: Negative for myalgias.   Skin: Negative for rash.   Neurological: Negative for weakness.   Psychiatric/Behavioral: Negative for confusion and hallucinations.     Objective:     Vital Signs (Most Recent):  Temp: 98.1 °F (36.7 °C) (07/25/17 1827)  Pulse: (!) 54 (07/25/17 2259)  Resp: 10 (07/25/17 2202)  BP: (!) 160/75 (07/25/17 2202)  SpO2: 100 % (07/25/17 2133) Vital Signs (24h Range):  Temp:  [98.1 °F  (36.7 °C)] 98.1 °F (36.7 °C)  Pulse:  [51-58] 54  Resp:  [10-24] 10  SpO2:  [65 %-100 %] 100 %  BP: (145-166)/() 160/75     Weight: (!) 147 kg (324 lb)  Body mass index is 50.75 kg/m².    Physical Exam   Constitutional: He is oriented to person, place, and time. He appears well-developed and well-nourished. No distress.   HENT:   Head: Normocephalic and atraumatic.   Eyes: EOM are normal. Pupils are equal, round, and reactive to light.   Neck: Normal range of motion. Neck supple. No JVD (difficult to assess 2/2 body habitus) present.   Cardiovascular: Regular rhythm and normal heart sounds.    bradycardic   Pulmonary/Chest: No respiratory distress. He has wheezes (in lung bases bilaterally). He has no rales.   No crackles appreciated, difficult to assess 2/2 poor effort   Abdominal: Soft. Bowel sounds are normal. There is no tenderness.   Musculoskeletal: He exhibits edema (3+ edema).   Neurological: He is alert and oriented to person, place, and time.   Skin: Skin is warm and dry.        Significant Labs:   BMP:   Recent Labs  Lab 07/25/17 1935         K 4.3   *   CO2 21*   BUN 16   CREATININE 1.6*   CALCIUM 8.6*   MG 1.8     CBC:   Recent Labs  Lab 07/25/17 1935   WBC 6.84   HGB 11.1*   HCT 35.6*   *     CMP:   Recent Labs  Lab 07/25/17 1935      K 4.3   *   CO2 21*      BUN 16   CREATININE 1.6*   CALCIUM 8.6*   PROT 6.9   ALBUMIN 3.1*   BILITOT 0.6   ALKPHOS 100   AST 13   ALT 15   ANIONGAP 7*   EGFRNONAA 43.3*     Cardiac Markers:   Recent Labs  Lab 07/25/17 1935   BNP 1,220*       Significant Imaging: I have reviewed all pertinent imaging results/findings within the past 24 hours.     CXR 7/25:   Cardiomegaly with congestion and edema suggests CHF noting there may be a trace right effusion.

## 2017-07-26 NOTE — PROGRESS NOTES
[facility] Hospital Medicine  Progress Note    Patient Name: Hemant Kennedy Jr.  MRN: [id number]  Patient Class: IP- Inpatient   Admission Date: 7/25/2017  Length of Stay: 1 days  Attending Physician: Godwin Robertson MD  Primary Care Provider: Hemant Markham MD    [facility] Medicine Team: [facility] 1 Rome Ashton MD    Subjective:     Principal Problem:Acute on chronic congestive heart failure    HPI:  Hemant Kennedy is a 68 yo male with a history of CHF (40-45% in 4/2017), Afib on apixaban, HTN, CVA, CKD stage 3, and T2DM presenting to ED with complaints of fluid accumulation in his abdomen, chest, and face over the past 10 weeks. He reports increasing SOB, fatigue, and exertional dyspnea with audible wheezing. Patient reports he has been on Lasix 40mg PO qD for the past 10-12 years, but has not been taking it for the past 10 weeks as the orders were not transferred over to his nursing facility, [facility]. Because of his symptoms, the nursing facility ordered a CXR 3 days ago, which was read as pneumonia by nursing staff. He has not yet been started on any antibiotics. He denies fever, chills, nausea/vomiting, chest pain. At baseline, patient sleeps with CPAP machine (setting 18/19) and 3L NC and is wheelchair bound.     Hospital Course:  Mr Kennedy presented with SOB, GALAVIZ, and fatigue after having been off of lasix for 10 weeks. Initial workup was significant for CXR with cardiomegaly, congestion and edema and a BNP of 1220. He was started on lasix 40 TID with good diuretic response. CPAP ordered qhs. ECHO showed EF 35, diastolic dysfunction, elevated PA pressures and moderate TR.     Interval History: NAEON. Reports improvement in breathing since starting diuresis. Good UOP since presentation, 5200cc recorded. No other complaints.     Review of Systems  Objective:     Vital Signs (Most Recent):  Temp: 98.2 °F (36.8 °C) (07/26/17 0800)  Pulse: (!) 57 (07/26/17 1100)  Resp: 18 (07/26/17  0800)  BP: (!) 160/81 (07/26/17 0800)  SpO2: 97 % (07/26/17 0800) Vital Signs (24h Range):  Temp:  [97.3 °F (36.3 °C)-98.2 °F (36.8 °C)] 98.2 °F (36.8 °C)  Pulse:  [50-70] 57  Resp:  [10-24] 18  SpO2:  [65 %-100 %] 97 %  BP: (145-167)/() 160/81     Weight:  (unable to get weight, bed scale not working)  Body mass index is 50.75 kg/m².    Intake/Output Summary (Last 24 hours) at 07/26/17 1502  Last data filed at 07/26/17 0845   Gross per 24 hour   Intake                0 ml   Output             5200 ml   Net            -5200 ml      Physical Exam   Constitutional: He is oriented to person, place, and time. He appears well-developed and well-nourished. No distress.   Eyes: EOM are normal.   Neck: JVD present.   Cardiovascular: Regular rhythm and normal heart sounds.    bradycardic   Pulmonary/Chest: No respiratory distress. He has no wheezes. He has no rales.   No crackles appreciated, difficult to assess 2/2 poor effort and body habitus   Abdominal: Soft. Bowel sounds are normal. There is no tenderness.   Musculoskeletal: He exhibits edema (3+ edema RLE, 2+ edema LLE).   Neurological: He is alert and oriented to person, place, and time.   Skin: Skin is warm and dry.       Significant Labs:   A1C:   Recent Labs  Lab 04/28/17  0453 07/26/17 0428   HGBA1C 7.0* 5.2     CBC:   Recent Labs  Lab 07/25/17 1935 07/26/17 0428   WBC 6.84 7.12   HGB 11.1* 10.7*   HCT 35.6* 34.3*   * 147*     CMP:   Recent Labs  Lab 07/25/17 1935 07/26/17 0428    146*   K 4.3 3.9   * 114*   CO2 21* 19*    60*   BUN 16 16   CREATININE 1.6* 1.6*   CALCIUM 8.6* 8.7   PROT 6.9  --    ALBUMIN 3.1*  --    BILITOT 0.6  --    ALKPHOS 100  --    AST 13  --    ALT 15  --    ANIONGAP 7* 13   EGFRNONAA 43.3* 43.3*     POCT Glucose:   Recent Labs  Lab 07/26/17  0816 07/26/17  0841 07/26/17  1250   POCTGLUCOSE 63* 85 82     All pertinent labs within the past 24 hours have been reviewed.    Significant Imaging: I have  reviewed all pertinent imaging results/findings within the past 24 hours.    Assessment/Plan:      * Acute on chronic congestive heart failure    - acute on chronic CHF after lasix stopped 10 weeks ago  - On admission, BNP 1220, Cr 1.6  - satting well on 3L O2 by NC  - IV lasix 40mg TID  - continue coreg 12.5 BID and lisinopril 20  - Strict I/Os, daily weights   - Good diuretic response since starting lasix, breathing improving  - ECHO 7/26 showed EF 35, diastolic dysfunction, elevated PA pressures, moderate TR        Atrial fibrillation, controlled    - restart home dose eliquis  - restart aspirin 81mg  - restart amiodarone 200 mg po daily  - tele        Type II diabetes mellitus with complication, uncontrolled    - takes insulin detemir 8 units nightly, A1C 5.2  - AM glucose 60, will hold basal insulin for now and continue SSI        Renovascular hypertension    - continue coreg and lisinopril        CKD (chronic kidney disease) stage 3, GFR 30-59 ml/min    - creatinine at most recent baseline, 1.6  - avoid nephrotoxic agents        Sleep apnea    - restart home CPAP       VTE Risk Mitigation         Ordered     apixaban tablet 5 mg  2 times daily     Route:  Oral        07/26/17 0003     Medium Risk of VTE  Once      07/25/17 2221          Rome Ashton MD  Department of Hospital Medicine   [facility]

## 2017-07-26 NOTE — HPI
Hemant Kennedy is a 68 yo male with a history of CHF (40-45% in 4/2017), Afib on apixaban, HTN, CVA, CKD stage 3, and T2DM presenting to ED with complaints of fluid accumulation in his abdomen, chest, and face over the past 10 weeks. He reports increasing SOB, fatigue, and exertional dyspnea with audible wheezing. Patient reports he has been on Lasix 40mg PO qD for the past 10-12 years, but has not been taking it for the past 10 weeks as the orders were not transferred over to his nursing facility, [facility]. Because of his symptoms, the nursing facility ordered a CXR 3 days ago, which was read as pneumonia by nursing staff. He has not yet been started on any antibiotics. He denies fever, chills, nausea/vomiting, chest pain. At baseline, patient sleeps with CPAP machine (setting 18/19) and 3L NC and is wheelchair bound.

## 2017-07-26 NOTE — PROGRESS NOTES
Pt fingerstick CBG=63 this AM before breakfast.  Pt asymptomatic.  Admin orange juice and will recheck CBG in 15 mins.

## 2017-07-26 NOTE — ED PROVIDER NOTES
Encounter Date: 7/25/2017       History     Chief Complaint   Patient presents with    Shortness of Breath     dyspenea on exertion. diagnosed with pneumonia but has not started any abx yet. lives at assisted living. also hasn't had lasix in about 2 months. audible wheezing     HPI   Pt is a 68 YO M with a history of HTN, CVA, Dm, CHF (25% in 2016), DVT, afib who presents with three days of progressive SOB and dyspnea with exertion. Pt says that his symptoms are gradually worsening. Denies cough, hemoptysis, Cp, fever. Never has had a prior occurrence of these symptoms.  Pt had CXR done at nursing facility yesterday when he was told that he had pneumonia by nursing staff he was not started on antibiotics at that time. Pt sleeps with CPAP machine and 3L NC normally. Wheelchair bound. Pt says that he is supposed to be on lasix daily but has not been receiving it for almost 2 months now. He has noticed a marked increase in the swelling of his lower extremities and lower abdomen. No recent calf swelling, numbness/tingling, sensory/motor loss, fevers, chills.    Review of patient's allergies indicates:   Allergen Reactions    Spironolactone      Hyperkalemia       Past Medical History:   Diagnosis Date    Atrial fibrillation     Cardiomyopathy     home O2    Cataract     CHF (congestive heart failure)     CKD (chronic kidney disease) stage 3, GFR 30-59 ml/min 11/27/2015    Diabetes mellitus     DVT (deep venous thrombosis)     Hypertension     Sleep apnea     Stroke 2010    with residual effects W/C bound - Right Occipital     No past surgical history on file.  Family History   Problem Relation Age of Onset    Heart attack Mother     Cataracts Mother     Glaucoma Sister     Glaucoma Sister      Social History   Substance Use Topics    Smoking status: Never Smoker    Smokeless tobacco: Never Used    Alcohol use No     Review of Systems   Constitutional: Negative for appetite change, chills and fever.    Respiratory: Negative for cough and chest tightness.    Cardiovascular: Negative for chest pain and leg swelling.   Gastrointestinal: Negative for abdominal pain, diarrhea, nausea and vomiting.   Genitourinary: Positive for frequency. Negative for dysuria and urgency.   Musculoskeletal: Negative for back pain.   Skin: Positive for color change.   Neurological: Negative for dizziness, syncope, weakness and light-headedness.       Physical Exam     Initial Vitals [07/25/17 1827]   BP Pulse Resp Temp SpO2   (!) 158/80 (!) 57 (!) 24 98.1 °F (36.7 °C) 98 %      MAP       106         Physical Exam    Constitutional: He is not diaphoretic. No distress.   HENT:   Head: Normocephalic and atraumatic.   Eyes: EOM are normal. Pupils are equal, round, and reactive to light.   Neck: Normal range of motion.   Cardiovascular: Normal rate and regular rhythm.   No murmur heard.  Pulmonary/Chest: No respiratory distress. He has wheezes. He has rhonchi. He exhibits no tenderness.   Abdominal: Soft. He exhibits distension. There is no tenderness.   Genitourinary:   Genitourinary Comments: No scrotal swelling   Musculoskeletal: He exhibits edema. He exhibits no tenderness.   Neurological: He is alert and oriented to person, place, and time.   Skin: Capillary refill takes less than 2 seconds. No erythema.         ED Course   Procedures  Labs Reviewed   CBC W/ AUTO DIFFERENTIAL - Abnormal; Notable for the following:        Result Value    RBC 3.93 (*)     Hemoglobin 11.1 (*)     Hematocrit 35.6 (*)     MCHC 31.2 (*)     RDW 16.1 (*)     Platelets 142 (*)     All other components within normal limits   COMPREHENSIVE METABOLIC PANEL - Abnormal; Notable for the following:     Chloride 116 (*)     CO2 21 (*)     Creatinine 1.6 (*)     Calcium 8.6 (*)     Albumin 3.1 (*)     Anion Gap 7 (*)     eGFR if  50.1 (*)     eGFR if non  43.3 (*)     All other components within normal limits   B-TYPE NATRIURETIC PEPTIDE  - Abnormal; Notable for the following:     BNP 1,220 (*)     All other components within normal limits   TROPONIN I   MAGNESIUM   PHOSPHORUS   URINALYSIS     EKG Readings: (Independently Interpreted)   Sinus, no acute st elevation,          Resident [facility] PGY-1  Pt presents with SOB and dyspnea on exertion x3 days. No prior hx of these symptoms. Lasix therapy was discontinued during last hospital admission 5/2017 due to over diuresis. No respiratory distress currently, crackles at lung bases and expiratory wheezing. Pitting edema 3+ from feet until umbilical level. Vitals WNL sat 98% 3L Nc, non tachy cardic, afebrile. Wells score=3 (mild-mod risk), DDX: CHF exacerbation vs Pe, pneumonia, right heart failure, arrythmia  Plan:  NC 3L monitor vitals, Lasix 40mg , chest xr, cbc, cmp, tropx1, bnp, fingerstick  Madison Holland MD MPH  [facility] Emergency Medicine PGY-1  7:47 PM 7/25/2017    Reevaluation PGY1: CXR reads: Cardiomegaly with congestion and edema suggests CHF. BNP 1220. Trop .022. Pt has 220cc of urine in andrade bag 30 mins after lasix given. O2 on RA is 95%. EKG NSR 1st degree av block. Symptoms most likely due to CHF exacerbation will admit to medicine.   Madison Holland MD MPH  [facility] Emergency Medicine PGY-1  8:34 PM 7/25/2017                 Attending Attestation:   Physician Attestation Statement for Resident:  As the supervising MD   Physician Attestation Statement: I have personally seen and examined this patient.   I agree with the above history. -:   As the supervising MD I agree with the above PE.    As the supervising MD I agree with the above treatment, course, plan, and disposition.   -: Probable chf, less likely acs, pna, anemia, ptx, other.  Ekg, cxr, labs, monitor, re-eval.    I have reviewed and agree with the residents interpretation of the following: lab data, x-rays and EKG.  I have reviewed the following: old records at this facility.            Attending ED Notes:   Pt notes feeling much better  after lasix and large uop.  Admitting for addl diuresis.           ED Course     Clinical Impression:   The primary encounter diagnosis was Acute on chronic congestive heart failure, unspecified congestive heart failure type. A diagnosis of Congestive heart failure (CHF) was also pertinent to this visit.                           Derek Miller MD  07/26/17 0207

## 2017-07-26 NOTE — PLAN OF CARE
Hemant Markham MD     Extended Emergency Contact Information  Primary Emergency Contact: Ruthie Fragoso   [place]  Mobile Phone: [phone]  Relation: Sister  Preferred language: English  Secondary Emergency Contact: Keyla Giordano [address] [address] [facility]  Home Phone: [phone]  Relation: Daughter  Preferred language: English       [facility] - [place], [place] - [address]  [address] [address]  Phone: [phone] Fax: [phone]      Payor: MEDICARE / Plan: MEDICARE PART A & B / Product Type: Government /         07/26/17 1815   Discharge Assessment   Assessment Type Discharge Planning Assessment   Confirmed/corrected address and phone number on facesheet? Yes   Assessment information obtained from? Patient   Prior to hospitilization cognitive status: Alert/Oriented   Prior to hospitalization functional status: Assistive Equipment;Needs Assistance   Current cognitive status: Alert/Oriented   Current Functional Status: Assistive Equipment;Needs Assistance   Arrived From assisted living   Lives With facility resident   Able to Return to Prior Arrangements yes   Is patient able to care for self after discharge? Yes   How many people do you have in your home that can help with your care after discharge? 1   Who are your caregiver(s) and their phone number(s)? Luisa (daughter) [phone]   Patient's perception of discharge disposition assisted living   Readmission Within The Last 30 Days no previous admission in last 30 days   Patient currently being followed by outpatient case management? No   Patient currently receives home health services? No   Does the patient currently use HME? Yes   Patient currently receives private duty nursing? No   Patient currently receives any other outside agency services? No   Equipment Currently Used at Home power chair;bath bench;CPAP;oxygen   Do you have any problems affording any of your  prescribed medications? No   Is the patient taking medications as prescribed? yes   Do you have any financial concerns preventing you from receiving the healthcare you need? No   Does the patient have transportation to healthcare appointments? Yes   Transportation Available ambulance;van, wheelchair accessible   On Dialysis? No   Does the patient receive services at the [facility]mad[facility] Clinic? No   Are there any open cases? No   Discharge Plan A Assisted Living   Discharge Plan B Skilled Nursing Facility   Patient/Family In Agreement With Plan yes

## 2017-07-26 NOTE — PLAN OF CARE
Problem: Patient Care Overview  Goal: Plan of Care Review  Outcome: Ongoing (interventions implemented as appropriate)   VS and assessment performed per orders. Pt A&Ox4. Pt denies pain. Oxygen sats >93 on CPAP/3L nasal canula. Lott in place. Urine output of 3000cc this shift. Unable to get bed scale weight this AM, bed scale not working. Pt free of injury or falls.

## 2017-07-26 NOTE — ASSESSMENT & PLAN NOTE
- acute on chronic CHF after lasix stopped 10 weeks ago  - On admission, BNP 1220, Cr 1.6  - satting well on 3L O2 by NC  - IV lasix 40mg TID  - continue coreg 12.5 BID and lisinopril 20  - Strict I/Os, daily weights   - Good diuretic response since starting lasix, breathing improving  - ECHO 7/26 showed EF 35, diastolic dysfunction, elevated PA pressures, moderate TR

## 2017-07-26 NOTE — PHARMACY MED REC
"Admission Medication Reconciliation - Pharmacy Consult Note    The home medication history was taken by Sharonda Messina, Pharmacy Tech. Based on information gathered and subsequent review by the clinical pharmacist, the items below may need attention.    You may go to "Admission" then "Reconcile Home Medications" tabs to review and/or act upon these items.      No issues noted with the medication reconciliation.      Nubia Chiang, PharmD  [id number]        Patient's prior to admission medication regimen was as follows:  Medication Sig    acetaminophen (TYLENOL) 500 MG tablet Take 2 tablets (1,000 mg total) by mouth 3 (three) times daily as needed for Pain.    allopurinol (ZYLOPRIM) 300 MG tablet Take 1 tablet (300 mg total) by mouth once daily.  Tablet Oral Every day (Patient taking differently: Take 300 mg by mouth once daily. )    amiodarone (PACERONE) 200 MG Tab Take 1 tablet (200 mg total) by mouth once daily.    apixaban (ELIQUIS) 5 mg Tab Take 5 mg by mouth 2 (two) times daily.    aspirin 81 MG Chew Take 81 mg by mouth once daily.     atorvastatin (LIPITOR) 40 MG tablet Take 1 tablet (40 mg total) by mouth once daily. 1 Tablet Oral Every day (Patient taking differently: Take 40 mg by mouth once daily. )    carvedilol (COREG) 12.5 MG tablet Take 12.5 mg by mouth 2 (two) times daily with meals.     collagenase (SANTYL) ointment Apply topically once daily. Apply to wound daily as directed.    ergocalciferol (ERGOCALCIFEROL) 50,000 unit Cap Take 1 capsule (50,000 Units total) by mouth every 7 days. (Patient taking differently: Take 50,000 Units by mouth every Monday. )    ferrous sulfate 325 (65 FE) MG EC tablet Take 1 tablet (325 mg total) by mouth 2 (two) times daily with meals.    fluticasone (FLONASE) 50 mcg/actuation nasal spray 2 sprays by Each Nare route once daily. (Patient taking differently: 2 sprays by Each Nare route daily as needed for Allergies. )    furosemide (LASIX) 40 MG tablet Take 1 " tablet by mouth once daily for 5 days    gabapentin (NEURONTIN) 300 MG capsule Take 1 capsule (300 mg total) by mouth 3 (three) times daily.    HYDROPHILIC CREAM (TRIAD WOUND DRESSING TOP) Apply topically daily as needed.    insulin detemir (LEVEMIR) 100 unit/mL injection Inject 8 Units into the skin 2 (two) times daily.    insulin lispro (HUMALOG) 100 unit/mL injection Inject 5 Units into the skin 3 (three) times daily before meals. (Patient taking differently: Inject 5 Units into the skin 2 (two) times daily. )    ONE TOUCH ULTRA TEST Strp Test blood sugar two times daily    potassium chloride SA (K-DUR,KLOR-CON) 10 MEQ tablet Take 1 tablet by mouth once daily for 5 days         Please add appropriate    SmartPhrase below:

## 2017-07-26 NOTE — PROGRESS NOTES
Patient identified by 2 identifiers. Denies previous reactions to blood transfusions and allergies reviewed.  Procedure explained & consent obtained.  18 g IV in place to Lt AC, flushed w/ 10cc NS pre & post contrast administration.  3cc Optison administered, echo images obtained.  Pt tolerated procedure well.

## 2017-07-26 NOTE — PROGRESS NOTES
Pt is resident of [facility] and is wheelchair bound. Not a candidate for DM HF program.    Removed from HF list.

## 2017-07-27 LAB
ANION GAP SERPL CALC-SCNC: 12 MMOL/L
BASOPHILS # BLD AUTO: 0.02 K/UL
BASOPHILS NFR BLD: 0.3 %
BUN SERPL-MCNC: 15 MG/DL
CALCIUM SERPL-MCNC: 8.5 MG/DL
CHLORIDE SERPL-SCNC: 106 MMOL/L
CO2 SERPL-SCNC: 25 MMOL/L
CREAT SERPL-MCNC: 1.6 MG/DL
DIFFERENTIAL METHOD: ABNORMAL
EOSINOPHIL # BLD AUTO: 0.4 K/UL
EOSINOPHIL NFR BLD: 6.8 %
ERYTHROCYTE [DISTWIDTH] IN BLOOD BY AUTOMATED COUNT: 15.9 %
EST. GFR  (AFRICAN AMERICAN): 50.1 ML/MIN/1.73 M^2
EST. GFR  (NON AFRICAN AMERICAN): 43.3 ML/MIN/1.73 M^2
GLUCOSE SERPL-MCNC: 75 MG/DL
HCT VFR BLD AUTO: 32.9 %
HGB BLD-MCNC: 10.3 G/DL
LYMPHOCYTES # BLD AUTO: 1.1 K/UL
LYMPHOCYTES NFR BLD: 17.3 %
MAGNESIUM SERPL-MCNC: 1.7 MG/DL
MCH RBC QN AUTO: 28.7 PG
MCHC RBC AUTO-ENTMCNC: 31.3 G/DL
MCV RBC AUTO: 92 FL
MONOCYTES # BLD AUTO: 0.9 K/UL
MONOCYTES NFR BLD: 14.1 %
NEUTROPHILS # BLD AUTO: 3.8 K/UL
NEUTROPHILS NFR BLD: 61.3 %
PLATELET # BLD AUTO: 152 K/UL
PMV BLD AUTO: 10.2 FL
POCT GLUCOSE: 105 MG/DL (ref 70–110)
POCT GLUCOSE: 92 MG/DL (ref 70–110)
POCT GLUCOSE: 92 MG/DL (ref 70–110)
POTASSIUM SERPL-SCNC: 3.7 MMOL/L
RBC # BLD AUTO: 3.59 M/UL
SODIUM SERPL-SCNC: 143 MMOL/L
WBC # BLD AUTO: 6.19 K/UL

## 2017-07-27 PROCEDURE — 85025 COMPLETE CBC W/AUTO DIFF WBC: CPT

## 2017-07-27 PROCEDURE — 36415 COLL VENOUS BLD VENIPUNCTURE: CPT

## 2017-07-27 PROCEDURE — 99232 SBSQ HOSP IP/OBS MODERATE 35: CPT | Mod: GC,,, | Performed by: HOSPITALIST

## 2017-07-27 PROCEDURE — 94660 CPAP INITIATION&MGMT: CPT

## 2017-07-27 PROCEDURE — 63600175 PHARM REV CODE 636 W HCPCS: Performed by: HOSPITALIST

## 2017-07-27 PROCEDURE — 97165 OT EVAL LOW COMPLEX 30 MIN: CPT

## 2017-07-27 PROCEDURE — G8980 MOBILITY D/C STATUS: HCPCS | Mod: CL

## 2017-07-27 PROCEDURE — 25000003 PHARM REV CODE 250: Performed by: STUDENT IN AN ORGANIZED HEALTH CARE EDUCATION/TRAINING PROGRAM

## 2017-07-27 PROCEDURE — A4216 STERILE WATER/SALINE, 10 ML: HCPCS | Performed by: STUDENT IN AN ORGANIZED HEALTH CARE EDUCATION/TRAINING PROGRAM

## 2017-07-27 PROCEDURE — 80048 BASIC METABOLIC PNL TOTAL CA: CPT

## 2017-07-27 PROCEDURE — 94761 N-INVAS EAR/PLS OXIMETRY MLT: CPT

## 2017-07-27 PROCEDURE — 83735 ASSAY OF MAGNESIUM: CPT

## 2017-07-27 PROCEDURE — 11000001 HC ACUTE MED/SURG PRIVATE ROOM

## 2017-07-27 PROCEDURE — 99900035 HC TECH TIME PER 15 MIN (STAT)

## 2017-07-27 PROCEDURE — G8978 MOBILITY CURRENT STATUS: HCPCS | Mod: CL

## 2017-07-27 PROCEDURE — 63600175 PHARM REV CODE 636 W HCPCS: Performed by: STUDENT IN AN ORGANIZED HEALTH CARE EDUCATION/TRAINING PROGRAM

## 2017-07-27 PROCEDURE — 97161 PT EVAL LOW COMPLEX 20 MIN: CPT

## 2017-07-27 PROCEDURE — G8979 MOBILITY GOAL STATUS: HCPCS | Mod: CL

## 2017-07-27 PROCEDURE — 25000003 PHARM REV CODE 250: Performed by: HOSPITALIST

## 2017-07-27 RX ORDER — POTASSIUM CHLORIDE 20 MEQ/1
40 TABLET, EXTENDED RELEASE ORAL ONCE
Status: COMPLETED | OUTPATIENT
Start: 2017-07-27 | End: 2017-07-27

## 2017-07-27 RX ORDER — FUROSEMIDE 10 MG/ML
40 INJECTION INTRAMUSCULAR; INTRAVENOUS 2 TIMES DAILY
Status: DISCONTINUED | OUTPATIENT
Start: 2017-07-27 | End: 2017-07-28

## 2017-07-27 RX ADMIN — APIXABAN 5 MG: 5 TABLET, FILM COATED ORAL at 08:07

## 2017-07-27 RX ADMIN — FUROSEMIDE 40 MG: 10 INJECTION, SOLUTION INTRAVENOUS at 05:07

## 2017-07-27 RX ADMIN — AMIODARONE HYDROCHLORIDE 200 MG: 200 TABLET ORAL at 09:07

## 2017-07-27 RX ADMIN — GABAPENTIN 300 MG: 100 CAPSULE ORAL at 02:07

## 2017-07-27 RX ADMIN — Medication 3 ML: at 02:07

## 2017-07-27 RX ADMIN — ATORVASTATIN CALCIUM 40 MG: 20 TABLET, FILM COATED ORAL at 09:07

## 2017-07-27 RX ADMIN — Medication 3 ML: at 10:07

## 2017-07-27 RX ADMIN — APIXABAN 5 MG: 5 TABLET, FILM COATED ORAL at 09:07

## 2017-07-27 RX ADMIN — GABAPENTIN 300 MG: 100 CAPSULE ORAL at 08:07

## 2017-07-27 RX ADMIN — ALLOPURINOL 300 MG: 300 TABLET ORAL at 09:07

## 2017-07-27 RX ADMIN — POTASSIUM CHLORIDE 40 MEQ: 1500 TABLET, EXTENDED RELEASE ORAL at 09:07

## 2017-07-27 RX ADMIN — GABAPENTIN 300 MG: 100 CAPSULE ORAL at 05:07

## 2017-07-27 RX ADMIN — ASPIRIN 81 MG CHEWABLE TABLET 81 MG: 81 TABLET CHEWABLE at 09:07

## 2017-07-27 RX ADMIN — Medication 3 ML: at 06:07

## 2017-07-27 RX ADMIN — RAMELTEON 8 MG: 8 TABLET, FILM COATED ORAL at 08:07

## 2017-07-27 RX ADMIN — LISINOPRIL 20 MG: 20 TABLET ORAL at 09:07

## 2017-07-27 NOTE — PLAN OF CARE
Problem: Occupational Therapy Goal  Goal: Occupational Therapy Goal  Outcome: Outcome(s) achieved Date Met: 07/27/17  Evaluation completed.  Benefits of OT reviewed with patient.  He currently does have some difficulty with just getting EOB and scooting.  OT does have concerns for safety upon d/c but pt has the right to refuse and his wishes were respected.

## 2017-07-27 NOTE — PLAN OF CARE
Problem: Diabetes, Type 2 (Adult)  Goal: Signs and Symptoms of Listed Potential Problems Will be Absent, Minimized or Managed (Diabetes, Type 2)  Signs and symptoms of listed potential problems will be absent, minimized or managed by discharge/transition of care (reference Diabetes, Type 2 (Adult) CPG).   Outcome: Ongoing (interventions implemented as appropriate)  Patient's blood sugar checked.    Problem: Fall Risk (Adult)  Goal: Absence of Falls  Patient will demonstrate the desired outcomes by discharge/transition of care.   Outcome: Ongoing (interventions implemented as appropriate)  Patient remained free of falls, trauma or injuries, low bed, side rails up X2, call light within reach. Fall risk band on. Patient educated to call nurse/PCT before getting OOB, he verbalized understanding.     Problem: Patient Care Overview  Goal: Plan of Care Review  Outcome: Ongoing (interventions implemented as appropriate)  POC reviewed with patient at bedside, he verbalized understanding. No complaints of pain, no acute distress noted.     Problem: Pressure Ulcer Risk (Jose Scale) (Adult,Obstetrics,Pediatric)  Goal: Identify Related Risk Factors and Signs and Symptoms  Related risk factors and signs and symptoms are identified upon initiation of Human Response Clinical Practice Guideline (CPG)   Outcome: Ongoing (interventions implemented as appropriate)  No development of pressure ulcers during shift time, patient was able to reposition self in bed.

## 2017-07-27 NOTE — PT/OT/SLP EVAL
Occupational Therapy  Evaluation and Discharge    Hemant Kennedy Jr.   MRN: [id number]   Admitting Diagnosis: Acute on chronic combined systolic and diastolic heart failure    OT Date of Treatment: 07/27/17   OT Start Time: 1136  OT Stop Time: 1148  OT Total Time (min): 12 min    Billable Minutes:  Evaluation 12    Diagnosis: Acute on chronic combined systolic and diastolic heart failure       Past Medical History:   Diagnosis Date    Atrial fibrillation     Cardiomyopathy     home O2    Cataract     CHF (congestive heart failure)     CKD (chronic kidney disease) stage 3, GFR 30-59 ml/min 11/27/2015    Diabetes mellitus     DVT (deep venous thrombosis)     Hypertension     Sleep apnea     Stroke 2010    with residual effects W/C bound - Right Occipital      History reviewed. No pertinent surgical history.    Referring physician: Beatrice Brasher MD  Date referred to OT: 7/26/2017    General Precautions: Standard, fall, diabetic  Orthopedic Precautions: N/A  Braces: N/A    Do you have any cultural, spiritual, Faith conflicts, given your current situation?: none      Patient History:  Living Environment  Lives With: alone  Living Arrangements: apartment  Transportation Available: ambulance, van, wheelchair accessible  Living Environment Comment:  (Pt [place] [facility]. PTA he was mod I with tranfers and self care with use of devices/DME. Pt owns a raised toilet, grab bars, WIS with shower chair. Pt has a power w/c and manual w/c.  Reported just completed bout of PT/OT)  Equipment Currently Used at Home: power chair, bath bench, CPAP, oxygen    Prior level of function:   Bed Mobility/Transfers: needs device  Grooming: needs device  Bathing: needs device  Upper Body Dressing: independent  Lower Body Dressing: independent  Toileting: needs device  Home Management Skills: needs device  Homemaking Responsibilities: Yes     Subjective:  Communicated with RN prior to session.  Pt agreeable  "initially to evaluation.   "I hope this is the last time I have to do this." Upon clarification he was referring to therapy.   Chief Complaint: Pt overall appeared frustrated.  Patient/Family stated goals: Pt declined to continue OT.     Pain/Comfort  Pain Rating 1: 0/10    Objective:  Patient found with: bed alarm, andrade catheter, telemetry    Cognitive Exam:  Oriented to: Person, Place, time and situations  Follows Commands/attention: Follows one-step commands  Communication: clear/fluent  Memory:  No Deficits noted  Safety awareness/insight to disability: intact  Coping skills/emotional control: Appropriate to situation    Visual/perceptual:  Intact    Physical Exam:  Postural examination/scapula alignment: Rounded shoulder  Skin integrity: flaky all over, L groin with barrier cream noted.   Edema: None noted     Sensation:   Intact    Upper Extremity Range of Motion:  Right Upper Extremity: WFL  Left Upper Extremity: WFL    Upper Extremity Strength:  Right Upper Extremity: WFL  Left Upper Extremity: WFL   Strength: WFL    Fine motor coordination:   Intact    Gross motor coordination: WFL    Functional Mobility:  Bed Mobility:  Rolling/Turning to Left: Moderate assistance  Rolling/Turning Right: Moderate assistance  Scooting/Bridging: Moderate Assistance  Supine to Sit: Moderate Assistance  Sit to Supine: Contact Guard Assistance    Transfers:  Sit <> Stand Assistance:  (unable to stand)  Sit <> Stand Assistive Device: No Assistive Device  Bed <> Chair Transfer Assistance: Activity did not occur (pt very limited in what he was agreeable to)    Functional Ambulation: non-ambulatory    Activities of Daily Living:  Feeding Level of Assistance: Activity did not occur (no impairments observed that would limit performance with activity)  UE Dressing Level of Assistance: Activity did not occur  LE Dressing Level of Assistance: Activity did not occur, Total assistance  Grooming Position: EOB  Grooming Level of " "Assistance:  (likely set up assist based on sitting balance and coordination)  Toileting Where Assessed: Bed level  Toileting Level of Assistance: Total assistance (bed pan)    Balance:   Static Sit: FAIR+: Able to take MINIMAL challenges from all directions  Dynamic Sit: GOOD-: Maintains balance through MODERATE excursions of active trunk movement,     Static Stand: 0: Needs MAXIMAL assist to maintain   Dynamic stand: 0: N/A    AM-PAC 6 CLICK ADL  How much help from another person does this patient currently need?  1 = Unable, Total/Dependent Assistance  2 = A lot, Maximum/Moderate Assistance  3 = A little, Minimum/Contact Guard/Supervision  4 = None, Modified [place]/Independent    Putting on and taking off regular lower body clothing? : 1  Bathing (including washing, rinsing, drying)?: 1  Toileting, which includes using toilet, bedpan, or urinal? : 1  Putting on and taking off regular upper body clothing?: 3  Taking care of personal grooming such as brushing teeth?: 3  Eating meals?: 4  Total Score: 13    AM-PAC Raw Score CMS "G-Code Modifier Level of Impairment Assistance   6 % Total / Unable   7 - 9 CM 80 - 100% Maximal Assist   10-14 CL 60 - 80% Moderate Assist   15 - 19 CK 40 - 60% Moderate Assist   20 - 22 CJ 20 - 40% Minimal Assist   23 CI 1-20% SBA / CGA   24 CH 0% Independent/ Mod I       Patient left supine with all lines intact    Assessment:  Hemant Kennedy Jr. is a 67 y.o. male with a medical diagnosis of Acute on chronic combined systolic and diastolic heart failure and presents with decline in ADLs and functional mobility. However, pt declines further OT services while here in the hospital. Pt does not appear at level that would warrant safe discharge home alone. Recommend HHOT to provide therapy in home setting which would be more appropriate due to his independence level is significantly impacted by use of home environment.     Rehab identified problem list/impairments: Rehab " identified problem list/impairments: weakness, impaired endurance, impaired functional mobilty, impaired self care skills, impaired sensation, decreased lower extremity function, decreased upper extremity function, impaired coordination, decreased safety awareness    Rehab potential is good.    Activity tolerance: Good    Discharge recommendations: Discharge Facility/Level Of Care Needs: home health OT (Pt not really safe for d/c but not agreeable to inpatient therapy)     Barriers to discharge: Barriers to Discharge: None    Equipment recommendations: none     GOALS:    Occupational Therapy Goals     Not on file          Multidisciplinary Problems (Resolved)        Problem: Occupational Therapy Goal    Goal Priority Disciplines Outcome Interventions   Occupational Therapy Goal   (Resolved)     OT, PT/OT Outcome(s) achieved                    PLAN:  Patient to be discharged from skilled OT services.   Plan of Care expires:  7/27/2017  Plan of Care reviewed with: patient    CHANDRAKANT Woodward  07/27/2017

## 2017-07-27 NOTE — PLAN OF CARE
Problem: Physical Therapy Goal  Goal: Physical Therapy Goal    No acute goals needed at this time.      PT evaluation completed. Pt reports being at baseline mobility and no further need for PT/OT intervention while in acute care.  Eval/DC note to follow.     Avril Handley, PT, DPT  07/27/2017

## 2017-07-27 NOTE — PLAN OF CARE
Problem: Patient Care Overview  Goal: Plan of Care Review  Outcome: Ongoing (interventions implemented as appropriate)  VS and assessment performed per orders. Pt A&Ox4. Pt denies pain. Lott in place. Urine output of 4300cc this shift.Scheduled lasix given as ordered. No Bowel movement this shift. Pt free of injury or any falls.

## 2017-07-27 NOTE — PT/OT/SLP EVAL
"Physical Therapy  Evaluation/Discharge Summary    Hemant Kennedy Jr.   MRN: [id number]   Admitting Diagnosis: Acute on chronic combined systolic and diastolic heart failure    PT Received On: 07/27/17  PT Start Time: 1136     PT Stop Time: 1149    PT Total Time (min): 13 min       Billable Minutes:  Evaluation 13     Personal factors/comorbidities: Afib; CHF; CKD; HTN; h/o stroke; DM; cardiomyopathy. The listed co-morbidities and personal factors impact pt's current level and progress with functional mobility and independence.   Body systems elements affected: lower extremities, upper extremities, trunk, musculoskeletal system, neuromuscular system, cardiovascular, pulmonary system, integumentary system  Impairments: see assessment below  Clinical Presentation: stable  Functional Outcome Tools: AMPAC, MMT, ROM  Evaluation Complexity Level: LOW      Diagnosis: Acute on chronic combined systolic and diastolic heart failure      Past Medical History:   Diagnosis Date    Atrial fibrillation     Cardiomyopathy     home O2    Cataract     CHF (congestive heart failure)     CKD (chronic kidney disease) stage 3, GFR 30-59 ml/min 11/27/2015    Diabetes mellitus     DVT (deep venous thrombosis)     Hypertension     Sleep apnea     Stroke 2010    with residual effects W/C bound - Right Occipital      History reviewed. No pertinent surgical history.    Referring physician: Beatrice Brasher MD  Date referred to PT: 07/26/17       General Precautions: Standard, fall, diabetic  Orthopedic Precautions: N/A   Braces: N/A       Do you have any cultural, spiritual, Cheondoism conflicts, given your current situation?: none stated    Patient History:  Lives with: alone  Lives in: assisted living apartment  PLOF/Level of assist: (I) with all mobility/transfers and ADLs. Pt reports he transfers via squat pivot to/from surfaces. Pt has been w/c bound since "Arianne". Meals are provided by facility.   Bath: walk-in shower  DME: W/c " "(power & manual); raised toilet; grab bars; shower chair; hospital bed    Roles/responsibilities: father, grandfather, friend        Subjective:  Communicated with RN prior to session.  Pt appropriate for therapy.     "Why do ya'll keep coming in here every time I'm here? I don't need any therapy. I've already did all that."     Chief Complaint: none stated  Patient goals: To go home and return [facility]           Objective:   Patient found with: bed alarm, andrade catheter, telemetry     Cognitive Exam:  Oriented to: Person, Place, Time and Situation    Follows Commands/attention: Follows multistep  commands  Communication: clear/fluent  Safety awareness/insight to disability: intact    Physical Exam:  Postural examination/scapula alignment: Rounded shoulder, Head forward, Posterior pelvic tilt and Abnormal trunk flexion    Skin integrity: Visible skin intact  Edema: None noted     Sensation:   Intact    Upper Extremity Range of Motion:  Right Upper Extremity: WFL  Left Upper Extremity: WFL    Upper Extremity Strength:  Right Upper Extremity: WFL  Left Upper Extremity: WFL    Lower Extremity Range of Motion:  Right Lower Extremity: WFL; except lacks full knee extension  Left Lower Extremity: WFL except lacks full knee extension    Lower Extremity Strength:  Right Lower Extremity: demonstrated 2/5 with functional mobility  Left Lower Extremity: demonstrated 2/5 with functional mobility     Fine motor coordination:  Intact    Gross motor coordination: WFL    Functional Mobility:  Bed Mobility:  Supine to Sit:  Moderate Assistance assist with trunk; HOB elevated  Sit to supine: Stand-by Assistance HOB flat; assist with managing lines  Scooting: Stand-by Assistance and Minimal Assistance Min A to get LE's to EOB, but pt declined further attempt due to legs not touching the floor; SBA scooting to the HOB using bed rails & HOB flat    Transfers:   Unable to perform due to legs not touching floor; pt declined any " attempt      Balance:  Static Sitting: Modified Independent UE support on mattress/railing; no postural sway noted.   Dynamic Sitting: Modified Independent   Static Standing: Activity did not occur   Dynamic Standing: Activity did not occur       AM-PAC 6 CLICK MOBILITY  How much help from another person does this patient currently need?   1 = Unable, Total/Dependent Assistance  2 = A lot, Maximum/Moderate Assistance  3 = A little, Minimum/Contact Guard/Supervision  4 = None, Modified Oliver/Independent    Turning over in bed (including adjusting bedclothes, sheets and blankets)?: 3  Sitting down on and standing up from a chair with arms (e.g., wheelchair, bedside commode, etc.): 1  Moving from lying on back to sitting on the side of the bed?: 2  Moving to and from a bed to a chair (including a wheelchair)?: 2  Need to walk in hospital room?: 1  Climbing 3-5 steps with a railing?: 1  Total Score: 10     AM-PAC Raw Score CMS G-Code Modifier Level of Impairment Assistance   6 % Total / Unable   7 - 9 CM 80 - 100% Maximal Assist   10 - 14 CL 60 - 80% Moderate Assist   15 - 19 CK 40 - 60% Moderate Assist   20 - 22 CJ 20 - 40% Minimal Assist   23 CI 1-20% SBA / CGA   24 CH 0% Independent/ Mod I     Patient left supine with all lines intact, call button in reach and bed alarm on.    Assessment:   Hemant Kennedy JrShanti is a 67 y.o. male with a medical diagnosis of Acute on chronic combined systolic and diastolic heart failure and presents with decrease endurance and generalized weakness, but pt reports he feels at his baseline with no need for further PT intervention/assist with mobility.  Pt tolerated session well; and willing to participate this date. Pt reports baseline mobility and has no concerns and demonstrates good safety awareness with all mobility. Pt no longer demonstrates a need for acute PT services. At this time, pt will be d/c from acute PT. Please re-consult should pt's functional status change.  Recommend d/c to home with HHPT to ensure pt has no trouble returning to PLOF.      Education:  Education provided to pt regarding: PT role/POC. Verbalized understanding.     Whiteboard updated with correct mobility information. RN/PCT notified.        Rehab identified problem list/impairments: Rehab identified problem list/impairments: weakness, impaired endurance (pt reports he's at his baseline)    Rehab potential is good.    Activity tolerance: Good    Discharge recommendations: Discharge Facility/Level Of Care Needs: home health PT     Barriers to discharge: Barriers to Discharge: None    Equipment recommendations: Equipment Needed After Discharge: none     GOALS:      Physical Therapy Goals     Not on file          Multidisciplinary Problems (Resolved)        Problem: Physical Therapy Goal    Goal Priority Disciplines Outcome Goal Variances Interventions   Physical Therapy Goal   (Resolved)     PT/OT, PT Outcome(s) achieved     Description:    No acute goals needed at this time.                    PLAN:  D/c acute PT services at this time. Pt reports at baseline mobility and no further need for PT intervention while in hospital.     Plan of Care reviewed with: patient    Functional Assessment Tool Used: ampac  Score: 10  Functional Limitation: Mobility: Walking and moving around  Mobility: Walking and Moving Around Current Status (): CL  Mobility: Walking and Moving Around Goal Status (): CL  Mobility: Walking and Moving Around Discharge Status (): CL     Avril Handley, PT  07/27/2017

## 2017-07-27 NOTE — SUBJECTIVE & OBJECTIVE
Interval History:     Diuresing significantly : net -7.7 L over last 24 hrs, and -10.7L since admit while on 40 IV lasix TID. Otherwise, doing well    Review of Systems   Constitutional: Negative for appetite change, diaphoresis, fatigue and fever.   Respiratory: Negative for cough, chest tightness and shortness of breath.    Cardiovascular: Negative for chest pain, palpitations and leg swelling.   Gastrointestinal: Negative for abdominal distention and abdominal pain.   Genitourinary: Negative for difficulty urinating and dysuria.   Musculoskeletal: Negative for arthralgias and back pain.   Neurological: Negative for weakness and numbness.   Psychiatric/Behavioral: Negative for confusion.     Objective:     Vital Signs (Most Recent):  Temp: 98.4 °F (36.9 °C) (07/27/17 1548)  Pulse: (!) 57 (07/27/17 1548)  Resp: 18 (07/27/17 1548)  BP: (!) 146/65 (07/27/17 1548)  SpO2: 99 % (07/27/17 1548) Vital Signs (24h Range):  Temp:  [97.5 °F (36.4 °C)-98.4 °F (36.9 °C)] 98.4 °F (36.9 °C)  Pulse:  [51-59] 57  Resp:  [18-23] 18  SpO2:  [95 %-100 %] 99 %  BP: (124-148)/(65-82) 146/65     Weight: (!) 145.2 kg (320 lb)  Body mass index is 50.12 kg/m².    Intake/Output Summary (Last 24 hours) at 07/27/17 1614  Last data filed at 07/27/17 0600   Gross per 24 hour   Intake                0 ml   Output             4300 ml   Net            -4300 ml      Physical Exam   Constitutional: He is oriented to person, place, and time. He appears well-developed and well-nourished. No distress.   Eyes: EOM are normal.   Neck: JVD present.   Cardiovascular: Regular rhythm and normal heart sounds.    No murmur heard.  bradycardic   Pulmonary/Chest: Effort normal and breath sounds normal. No respiratory distress. He has no wheezes. He has no rales.   No crackles appreciated, difficult to assess 2/2 poor effort and body habitus   Abdominal: Soft. Bowel sounds are normal. He exhibits no distension. There is no tenderness.   Musculoskeletal: He  exhibits edema (2+ bilat).   Neurological: He is alert and oriented to person, place, and time. No cranial nerve deficit.   Skin: Skin is warm and dry. He is not diaphoretic.       Significant Labs:   CBC:   Recent Labs  Lab 17   WBC 6.84 7.12 6.19   HGB 11.1* 10.7* 10.3*   HCT 35.6* 34.3* 32.9*   * 147* 152     CMP:   Recent Labs  Lab 17    146* 143   K 4.3 3.9 3.7   * 114* 106   CO2 21* 19* 25    60* 75   BUN 16 16 15   CREATININE 1.6* 1.6* 1.6*   CALCIUM 8.6* 8.7 8.5*   PROT 6.9  --   --    ALBUMIN 3.1*  --   --    BILITOT 0.6  --   --    ALKPHOS 100  --   --    AST 13  --   --    ALT 15  --   --    ANIONGAP 7* 13 12   EGFRNONAA 43.3* 43.3* 43.3*     Cardiac Markers:   Recent Labs  Lab 17   BNP 1,220*       Significant Imagin D echo  CONCLUSIONS     1 - Eccentric hypertrophy.     2 - Moderately depressed left ventricular systolic function (EF 35-40%).     3 - Restrictive LV filling pattern, indicating markedly elevated LAP (grade 3 diastolic dysfunction).     4 - Biatrial enlargement.     5 - Right ventricular enlargement with moderately depressed systolic function.     6 - Pulmonary hypertension. The estimated PA systolic pressure is 80 mmHg.     7 - Trivial aortic regurgitation.     8 - Trivial mitral regurgitation.     9 - Moderate tricuspid regurgitation.     10 - Increased central venous pressure.

## 2017-07-27 NOTE — ASSESSMENT & PLAN NOTE
- takes insulin detemir 8 units nightly, A1C 5.2  - hypoglycemic / normal glucose here , so hold long acting insulin

## 2017-07-27 NOTE — PROGRESS NOTES
[facility] Hospital Medicine  Progress Note    Patient Name: Hemant Kennedy Jr.  MRN: [id number]  Patient Class: IP- Inpatient   Admission Date: 7/25/2017  Length of Stay: 2 days  Attending Physician: Godwin Robertson MD  Primary Care Provider: Hemant Markham MD    [facility] Medicine Team: [facility] MED 1 Beatrice Brasher MD    Subjective:     Principal Problem:Acute on chronic combined systolic and diastolic heart failure    HPI:  Hemant Kennedy is a 68 yo male with a history of CHF (40-45% in 4/2017), Afib on apixaban, HTN, CVA, CKD stage 3, and T2DM presenting to ED with complaints of fluid accumulation in his abdomen, chest, and face over the past 10 weeks. He reports increasing SOB, fatigue, and exertional dyspnea with audible wheezing. Patient reports he has been on Lasix 40mg PO qD for the past 10-12 years, but has not been taking it for the past 10 weeks as the orders were not transferred over to his nursing facility, [place]. Because of his symptoms, the nursing facility ordered a CXR 3 days ago, which was read as pneumonia by nursing staff. He has not yet been started on any antibiotics. He denies fever, chills, nausea/vomiting, chest pain. At baseline, patient sleeps with CPAP machine (setting 18/19) and 3L NC and is wheelchair bound.     Hospital Course:  Mr Kennedy presented with SOB, GALAVIZ, and fatigue after having been off of lasix for 10 weeks. Initial workup was significant for CXR with cardiomegaly, congestion and edema and a BNP of 1220. He was started on lasix 40 TID with good diuretic response. CPAP ordered qhs. ECHO showed EF 35, diastolic dysfunction, elevated PA pressures and moderate TR.     Interval History:     Diuresing significantly : net -7.7 L over last 24 hrs, and -10.7L since admit while on 40 IV lasix TID. Otherwise, doing well    Review of Systems   Constitutional: Negative for appetite change, diaphoresis, fatigue and fever.   Respiratory: Negative for cough,  chest tightness and shortness of breath.    Cardiovascular: Negative for chest pain, palpitations and leg swelling.   Gastrointestinal: Negative for abdominal distention and abdominal pain.   Genitourinary: Negative for difficulty urinating and dysuria.   Musculoskeletal: Negative for arthralgias and back pain.   Neurological: Negative for weakness and numbness.   Psychiatric/Behavioral: Negative for confusion.     Objective:     Vital Signs (Most Recent):  Temp: 98.4 °F (36.9 °C) (07/27/17 1548)  Pulse: (!) 57 (07/27/17 1548)  Resp: 18 (07/27/17 1548)  BP: (!) 146/65 (07/27/17 1548)  SpO2: 99 % (07/27/17 1548) Vital Signs (24h Range):  Temp:  [97.5 °F (36.4 °C)-98.4 °F (36.9 °C)] 98.4 °F (36.9 °C)  Pulse:  [51-59] 57  Resp:  [18-23] 18  SpO2:  [95 %-100 %] 99 %  BP: (124-148)/(65-82) 146/65     Weight: (!) 145.2 kg (320 lb)  Body mass index is 50.12 kg/m².    Intake/Output Summary (Last 24 hours) at 07/27/17 1614  Last data filed at 07/27/17 0600   Gross per 24 hour   Intake                0 ml   Output             4300 ml   Net            -4300 ml      Physical Exam   Constitutional: He is oriented to person, place, and time. He appears well-developed and well-nourished. No distress.   Eyes: EOM are normal.   Neck: JVD present.   Cardiovascular: Regular rhythm and normal heart sounds.    No murmur heard.  bradycardic   Pulmonary/Chest: Effort normal and breath sounds normal. No respiratory distress. He has no wheezes. He has no rales.   No crackles appreciated, difficult to assess 2/2 poor effort and body habitus   Abdominal: Soft. Bowel sounds are normal. He exhibits no distension. There is no tenderness.   Musculoskeletal: He exhibits edema (2+ bilat).   Neurological: He is alert and oriented to person, place, and time. No cranial nerve deficit.   Skin: Skin is warm and dry. He is not diaphoretic.       Significant Labs:   CBC:   Recent Labs  Lab 07/25/17  1935 07/26/17  0428 07/27/17  0337   WBC 6.84 7.12 6.19    HGB 11.1* 10.7* 10.3*   HCT 35.6* 34.3* 32.9*   * 147* 152     CMP:   Recent Labs  Lab 17  0428 17  0337    146* 143   K 4.3 3.9 3.7   * 114* 106   CO2 21* 19* 25    60* 75   BUN 16 16 15   CREATININE 1.6* 1.6* 1.6*   CALCIUM 8.6* 8.7 8.5*   PROT 6.9  --   --    ALBUMIN 3.1*  --   --    BILITOT 0.6  --   --    ALKPHOS 100  --   --    AST 13  --   --    ALT 15  --   --    ANIONGAP 7* 13 12   EGFRNONAA 43.3* 43.3* 43.3*     Cardiac Markers:   Recent Labs  Lab 17   BNP 1,220*       Significant Imagin D echo  CONCLUSIONS     1 - Eccentric hypertrophy.     2 - Moderately depressed left ventricular systolic function (EF 35-40%).     3 - Restrictive LV filling pattern, indicating markedly elevated LAP (grade 3 diastolic dysfunction).     4 - Biatrial enlargement.     5 - Right ventricular enlargement with moderately depressed systolic function.     6 - Pulmonary hypertension. The estimated PA systolic pressure is 80 mmHg.     7 - Trivial aortic regurgitation.     8 - Trivial mitral regurgitation.     9 - Moderate tricuspid regurgitation.     10 - Increased central venous pressure.     Assessment/Plan:      * Acute on chronic combined systolic and diastolic heart failure    -deescalate from 40 IV lasix TID to BID  - d/c andrade  - string I/Os          Acute hypoxemic respiratory failure    - 2/2 ADHF  - diuresis as below        CKD (chronic kidney disease) stage 3, GFR 30-59 ml/min    - creatinine at most recent baseline, 1.6  - Cr stable  - monitor daily        Atrial fibrillation, controlled    - home  eliquis  - amiodarone 200 mg po daily  - cont home carvedilol 12.5 mg BID  - tele        Renovascular hypertension    - continue coreg 12.5 BID   - lisinopril 20        Type II diabetes mellitus with complication, uncontrolled    - takes insulin detemir 8 units nightly, A1C 5.2  - hypoglycemic / normal glucose here , so hold long acting insulin          Obstructive sleep apnea treated with continuous positive airway pressure (CPAP)    - CPAP with home settings QHS          Long term current use of anticoagulant    - as above, for hx of Afib          Morbid obesity due to excess calories    - PT, OT            VTE Risk Mitigation         Ordered     apixaban tablet 5 mg  2 times daily     Route:  Oral        07/26/17 0003     Medium Risk of VTE  Once      07/25/17 2221        Cont diuresis   Will be a battery for his wheelchair  Eventually likely home to his assistant living     Beatrice Brasher MD  Department of Hospital Medicine   [facility]

## 2017-07-28 VITALS
DIASTOLIC BLOOD PRESSURE: 55 MMHG | SYSTOLIC BLOOD PRESSURE: 102 MMHG | TEMPERATURE: 98 F | OXYGEN SATURATION: 94 % | RESPIRATION RATE: 20 BRPM | BODY MASS INDEX: 49.44 KG/M2 | HEART RATE: 60 BPM | HEIGHT: 67 IN | WEIGHT: 315 LBS

## 2017-07-28 PROBLEM — J96.01 ACUTE HYPOXEMIC RESPIRATORY FAILURE: Status: RESOLVED | Noted: 2017-07-26 | Resolved: 2017-07-28

## 2017-07-28 LAB
ANION GAP SERPL CALC-SCNC: 10 MMOL/L
BUN SERPL-MCNC: 18 MG/DL
CALCIUM SERPL-MCNC: 8.7 MG/DL
CHLORIDE SERPL-SCNC: 105 MMOL/L
CO2 SERPL-SCNC: 31 MMOL/L
CREAT SERPL-MCNC: 1.6 MG/DL
EST. GFR  (AFRICAN AMERICAN): 50.1 ML/MIN/1.73 M^2
EST. GFR  (NON AFRICAN AMERICAN): 43.3 ML/MIN/1.73 M^2
GLUCOSE SERPL-MCNC: 88 MG/DL
POCT GLUCOSE: 103 MG/DL (ref 70–110)
POCT GLUCOSE: 108 MG/DL (ref 70–110)
POCT GLUCOSE: 71 MG/DL (ref 70–110)
POTASSIUM SERPL-SCNC: 3.9 MMOL/L
SODIUM SERPL-SCNC: 146 MMOL/L

## 2017-07-28 PROCEDURE — 99900035 HC TECH TIME PER 15 MIN (STAT)

## 2017-07-28 PROCEDURE — 36415 COLL VENOUS BLD VENIPUNCTURE: CPT

## 2017-07-28 PROCEDURE — 80048 BASIC METABOLIC PNL TOTAL CA: CPT

## 2017-07-28 PROCEDURE — 99238 HOSP IP/OBS DSCHRG MGMT 30/<: CPT | Mod: GC,,, | Performed by: HOSPITALIST

## 2017-07-28 PROCEDURE — 25000003 PHARM REV CODE 250: Performed by: STUDENT IN AN ORGANIZED HEALTH CARE EDUCATION/TRAINING PROGRAM

## 2017-07-28 PROCEDURE — A4216 STERILE WATER/SALINE, 10 ML: HCPCS | Performed by: STUDENT IN AN ORGANIZED HEALTH CARE EDUCATION/TRAINING PROGRAM

## 2017-07-28 PROCEDURE — 63600175 PHARM REV CODE 636 W HCPCS: Performed by: HOSPITALIST

## 2017-07-28 RX ORDER — FUROSEMIDE 40 MG/1
40 TABLET ORAL DAILY
Status: DISCONTINUED | OUTPATIENT
Start: 2017-07-28 | End: 2017-07-28 | Stop reason: HOSPADM

## 2017-07-28 RX ORDER — CARVEDILOL 6.25 MG/1
6.25 TABLET ORAL 2 TIMES DAILY WITH MEALS
Qty: 180 TABLET | Refills: 3 | Status: SHIPPED | OUTPATIENT
Start: 2017-07-28 | End: 2018-10-10 | Stop reason: SDUPTHER

## 2017-07-28 RX ORDER — CARVEDILOL 3.12 MG/1
6.25 TABLET ORAL 2 TIMES DAILY WITH MEALS
Status: DISCONTINUED | OUTPATIENT
Start: 2017-07-28 | End: 2017-07-28 | Stop reason: HOSPADM

## 2017-07-28 RX ORDER — FUROSEMIDE 40 MG/1
40 TABLET ORAL DAILY
Qty: 30 TABLET | Refills: 2 | Status: SHIPPED | OUTPATIENT
Start: 2017-07-28 | End: 2018-10-10 | Stop reason: SDUPTHER

## 2017-07-28 RX ORDER — LISINOPRIL 20 MG/1
20 TABLET ORAL DAILY
Qty: 90 TABLET | Refills: 0 | Status: ON HOLD | OUTPATIENT
Start: 2017-07-28 | End: 2018-03-24

## 2017-07-28 RX ORDER — SILDENAFIL 25 MG/1
50 TABLET, FILM COATED ORAL DAILY PRN
Qty: 30 TABLET | Refills: 2 | Status: ON HOLD | OUTPATIENT
Start: 2017-07-28 | End: 2020-11-02

## 2017-07-28 RX ADMIN — APIXABAN 5 MG: 5 TABLET, FILM COATED ORAL at 09:07

## 2017-07-28 RX ADMIN — ALLOPURINOL 300 MG: 300 TABLET ORAL at 09:07

## 2017-07-28 RX ADMIN — AMIODARONE HYDROCHLORIDE 200 MG: 200 TABLET ORAL at 09:07

## 2017-07-28 RX ADMIN — ATORVASTATIN CALCIUM 40 MG: 20 TABLET, FILM COATED ORAL at 09:07

## 2017-07-28 RX ADMIN — LISINOPRIL 20 MG: 20 TABLET ORAL at 09:07

## 2017-07-28 RX ADMIN — ASPIRIN 81 MG CHEWABLE TABLET 81 MG: 81 TABLET CHEWABLE at 09:07

## 2017-07-28 RX ADMIN — Medication 3 ML: at 05:07

## 2017-07-28 RX ADMIN — GABAPENTIN 300 MG: 100 CAPSULE ORAL at 05:07

## 2017-07-28 RX ADMIN — FUROSEMIDE 40 MG: 40 TABLET ORAL at 11:07

## 2017-07-28 NOTE — ASSESSMENT & PLAN NOTE
- deescalated from 40 IV lasix BID to 40 PO daily  - coreg decreased from 12.5 to 6.25 BID for bradycardia  - continue lisinopril  - string I/Os

## 2017-07-28 NOTE — PROGRESS NOTES
Called MD regarding Carvedilol 12.5mg because patients heart rate is 49. MD said ok to hold med.  Also patient refused furosemide 40 mg.

## 2017-07-28 NOTE — ASSESSMENT & PLAN NOTE
- home  eliquis  - amiodarone 200 mg po daily  - coreg decreased to 6.25mg BID for bradycardia  - tele

## 2017-07-28 NOTE — PLAN OF CARE
Problem: Patient Care Overview  Goal: Plan of Care Review  Outcome: Ongoing (interventions implemented as appropriate)  Pt free of any injury or falls. Vital signs stable, skin intact. Demonstrated the ability to use call light when needed. Complained of no pain or discomfort and rested well throughout the night. Personal items within reach, likes door closed at night. Will continue to monitor.

## 2017-07-28 NOTE — PROGRESS NOTES
[facility] Hospital Medicine  Progress Note    Patient Name: Hemant Kennedy Jr.  MRN: [id number]  Patient Class: IP- Inpatient   Admission Date: 7/25/2017  Length of Stay: 3 days  Attending Physician: Godwin Robertson MD  Primary Care Provider: Hemant Markham MD    [facility] Medicine Team: [facility] MED 1 Rome Ashton MD    Subjective:     Principal Problem:Acute on chronic combined systolic and diastolic heart failure    HPI:  Hemant Kennedy is a 68 yo male with a history of CHF (40-45% in 4/2017), Afib on apixaban, HTN, CVA, CKD stage 3, and T2DM presenting to ED with complaints of fluid accumulation in his abdomen, chest, and face over the past 10 weeks. He reports increasing SOB, fatigue, and exertional dyspnea with audible wheezing. Patient reports he has been on Lasix 40mg PO qD for the past 10-12 years, but has not been taking it for the past 10 weeks as the orders were not transferred over to his nursing facility, [place]. Because of his symptoms, the nursing facility ordered a CXR 3 days ago, which was read as pneumonia by nursing staff. He has not yet been started on any antibiotics. He denies fever, chills, nausea/vomiting, chest pain. At baseline, patient sleeps with CPAP machine (setting 18/19) and 3L NC and is wheelchair bound.     Hospital Course:  Mr Kennedy presented with SOB, GALAVIZ, and fatigue after having been off of lasix for 10 weeks. Initial workup was significant for CXR with cardiomegaly, congestion and edema and a BNP of 1220. He was started on lasix 40 TID with good diuretic response. CPAP ordered qhs. ECHO showed EF 35, diastolic dysfunction, elevated PA pressures and moderate TR. Breathing improved throughout admission and oxygen requirements decreased. Lasix IV switched to lasix 40 mg PO daily prior to discharge.     Interval History: NAEON. Reports breathing is improved since admission. UOP over last 24h 2550cc + 1 unmeasured. Coreg held this AM for asymptomatic  bradycardia, will restart at lower dose.    Review of Systems  Objective:     Vital Signs (Most Recent):  Temp: 98.2 °F (36.8 °C) (07/28/17 1137)  Pulse: (!) 53 (07/28/17 1137)  Resp: 20 (07/28/17 1137)  BP: (!) 102/55 (07/28/17 1137)  SpO2: (!) 94 % (07/28/17 1137) Vital Signs (24h Range):  Temp:  [96.7 °F (35.9 °C)-99.3 °F (37.4 °C)] 98.2 °F (36.8 °C)  Pulse:  [47-61] 53  Resp:  [18-20] 20  SpO2:  [94 %-100 %] 94 %  BP: (102-150)/(55-69) 102/55     Weight: (!) 145.2 kg (320 lb)  Body mass index is 50.12 kg/m².    Intake/Output Summary (Last 24 hours) at 07/28/17 1226  Last data filed at 07/28/17 0337   Gross per 24 hour   Intake              795 ml   Output             2550 ml   Net            -1755 ml      Physical Exam   Constitutional: He is oriented to person, place, and time. He appears well-developed and well-nourished. No distress.   Eyes: EOM are normal.   Cardiovascular: Regular rhythm and normal heart sounds.    No murmur heard.  bradycardic   Pulmonary/Chest: Effort normal and breath sounds normal. No respiratory distress. He has no wheezes. He has no rales.   No crackles appreciated, difficult to assess 2/2 poor effort and body habitus   Abdominal: Soft. Bowel sounds are normal. He exhibits no distension. There is no tenderness.   Musculoskeletal: He exhibits edema (2+ LLE, 1+ RLE).   Neurological: He is alert and oriented to person, place, and time. No cranial nerve deficit.   Skin: Skin is warm and dry. He is not diaphoretic.       Significant Labs:   BMP:   Recent Labs  Lab 07/27/17  0337 07/28/17  0547   GLU 75 88    146*   K 3.7 3.9    105   CO2 25 31*   BUN 15 18   CREATININE 1.6* 1.6*   CALCIUM 8.5* 8.7   MG 1.7  --      POCT Glucose:   Recent Labs  Lab 07/27/17  1705 07/27/17  2047 07/28/17  0804   POCTGLUCOSE 92 108 71     All pertinent labs within the past 24 hours have been reviewed.    Significant Imaging: I have reviewed all pertinent imaging results/findings within the past 24  hours.    Assessment/Plan:      * Acute on chronic combined systolic and diastolic heart failure    - deescalated from 40 IV lasix BID to 40 PO daily  - coreg decreased from 12.5 to 6.25 BID for bradycardia  - continue lisinopril  - string I/Os        Atrial fibrillation, controlled    - home  eliquis  - amiodarone 200 mg po daily  - coreg decreased to 6.25mg BID for bradycardia  - tele        Type II diabetes mellitus with complication, uncontrolled    - takes insulin detemir 8 units nightly, A1C 5.2  - hypoglycemic / normal glucose here , so hold long acting insulin         Renovascular hypertension    - coreg decreased to 6.25 BID for bradycardia  - lisinopril 20        Acute hypoxemic respiratory failure    - 2/2 ADHF  - diuresis as above        Obstructive sleep apnea treated with continuous positive airway pressure (CPAP)    - CPAP with home settings QHS        Morbid obesity due to excess calories    - PT, OT        CKD (chronic kidney disease) stage 3, GFR 30-59 ml/min    - creatinine at most recent baseline, 1.6  - Cr stable  - monitor daily        Long term current use of anticoagulant    - as above, for hx of Afib          VTE Risk Mitigation         Ordered     apixaban tablet 5 mg  2 times daily     Route:  Oral        07/26/17 0003     Medium Risk of VTE  Once      07/25/17 2221          Rome Ashton MD  Department of Hospital Medicine   [facility]

## 2017-07-28 NOTE — PLAN OF CARE
[facility]    HOME HEALTH ORDERS  FACE TO FACE ENCOUNTER    Patient Name: Hemant Kennedy Jr.  YOB: 1950    PCP: Hemant Markham MD   PCP Address: [facility] [place] [address]  PCP Phone Number: [phone]  PCP Fax: [phone]    Encounter Date: 07/28/2017    Admit to Home Health    Diagnoses:  Active Hospital Problems    Diagnosis  POA    *Acute on chronic combined systolic and diastolic heart failure [I50.43]  Yes     Priority: 1 - High    Atrial fibrillation, controlled [I48.91]  Yes     Priority: 2     Type II diabetes mellitus with complication, uncontrolled [E11.8, E11.65]  Yes     Priority: 3     Renovascular hypertension [I15.0]  Yes     Priority: 4     Obstructive sleep apnea treated with continuous positive airway pressure (CPAP) [G47.33, Z99.89]  Not Applicable    Morbid obesity due to excess calories [E66.01]  Yes    CKD (chronic kidney disease) stage 3, GFR 30-59 ml/min [N18.3]  Yes    Long term current use of anticoagulant [Z79.01]  Not Applicable      Resolved Hospital Problems    Diagnosis Date Resolved POA    Acute on chronic congestive heart failure [I50.9] 07/26/2017 Yes     Priority: 1 - High    Acute hypoxemic respiratory failure [J96.01] 07/28/2017 Yes    Sleep apnea [G47.30] 07/26/2017 Yes    Chronic systolic congestive heart failure, NYHA class 2 [I50.22] 07/26/2017 Yes       No future appointments.        I have seen and examined this patient face to face today. My clinical findings that support the need for the home health skilled services and home bound status are the following:  Weakness/numbness causing balance and gait disturbance due to Weakness/Debility making it taxing to leave home.    Allergies:  Review of patient's allergies indicates:   Allergen Reactions    Spironolactone      Hyperkalemia         Diet: cardiac diet and diabetic diet: 1800 calorie    Activities: activity as tolerated    Nursing:    SN to complete comprehensive assessment including routine vital signs. Instruct on disease process and s/s of complications to report to MD. Review/verify medication list sent home with the patient at time of discharge  and instruct patient/caregiver as needed. Frequency may be adjusted depending on start of care date.    Notify MD if SBP > 160 or < 90; DBP > 90 or < 50; HR > 120 or < 50; Temp > 101    CONSULTS:    Physical Therapy to evaluate and treat. Evaluate for home safety and equipment needs; Establish/upgrade home exercise program. Perform / instruct on therapeutic exercises, gait training, transfer training, and Range of Motion.  Occupational Therapy to evaluate and treat. Evaluate home environment for safety and equipment needs. Perform/Instruct on transfers, ADL training, ROM, and therapeutic exercises.   to evaluate for community resources/long-range planning.  Aide to provide assistance with personal care, ADLs, and vital signs.    MISCELLANEOUS CARE:  Diabetic Care:   SN to perform and educate Diabetic management with blood glucose monitoring:, Fingerstick blood sugar AC and HS and Report CBG < 60 or > 350 to physician.      Medications: Review discharge medications with patient and family and provide education.      Current Discharge Medication List      START taking these medications    Details   sildenafil (VIAGRA) 25 MG tablet Take 2 tablets (50 mg total) by mouth daily as needed for Erectile Dysfunction.  Qty: 30 tablet, Refills: 2         CONTINUE these medications which have CHANGED    Details   carvedilol (COREG) 6.25 MG tablet Take 1 tablet (6.25 mg total) by mouth 2 (two) times daily with meals.  Qty: 180 tablet, Refills: 3      furosemide (LASIX) 40 MG tablet Take 1 tablet (40 mg total) by mouth once daily.  Qty: 30 tablet, Refills: 2      lisinopril (PRINIVIL,ZESTRIL) 20 MG tablet Take 1 tablet (20 mg total) by mouth once daily.  Qty: 90 tablet, Refills: 0         CONTINUE  these medications which have NOT CHANGED    Details   acetaminophen (TYLENOL) 500 MG tablet Take 2 tablets (1,000 mg total) by mouth 3 (three) times daily as needed for Pain.  Qty: 15 tablet, Refills: 0      allopurinol (ZYLOPRIM) 300 MG tablet Take 1 tablet (300 mg total) by mouth once daily.  Tablet Oral Every day  Qty: 90 tablet, Refills: 3      amiodarone (PACERONE) 200 MG Tab Take 1 tablet (200 mg total) by mouth once daily.  Qty: 90 tablet, Refills: 3    Associated Diagnoses: Chronic systolic heart failure; Atrial fibrillation, controlled      apixaban (ELIQUIS) 5 mg Tab Take 5 mg by mouth 2 (two) times daily.      aspirin 81 MG Chew Take 81 mg by mouth once daily.       atorvastatin (LIPITOR) 40 MG tablet Take 1 tablet (40 mg total) by mouth once daily. 1 Tablet Oral Every day  Qty: 90 tablet, Refills: 3    Associated Diagnoses: Chronic systolic heart failure; Atrial fibrillation, controlled      collagenase (SANTYL) ointment Apply topically once daily. Apply to wound daily as directed.  Qty: 90 g, Refills: 0    Associated Diagnoses: Ulcer of other part of foot; Decubitus ulcer of left heel, unstageable      ergocalciferol (ERGOCALCIFEROL) 50,000 unit Cap Take 1 capsule (50,000 Units total) by mouth every 7 days.  Qty: 12 capsule, Refills: 3    Associated Diagnoses: Secondary hyperparathyroidism      ferrous sulfate 325 (65 FE) MG EC tablet Take 1 tablet (325 mg total) by mouth 2 (two) times daily with meals.  Qty: 60 tablet, Refills: 5      fluticasone (FLONASE) 50 mcg/actuation nasal spray 2 sprays by Each Nare route once daily.  Refills: 0      gabapentin (NEURONTIN) 300 MG capsule Take 1 capsule (300 mg total) by mouth 3 (three) times daily.  Qty: 90 capsule, Refills: 11    Associated Diagnoses: Type 2 diabetes mellitus with stage 3 chronic kidney disease, without long-term current use of insulin      HYDROPHILIC CREAM (TRIAD WOUND DRESSING TOP) Apply topically daily as needed.      ONE TOUCH ULTRA TEST  Strp Test blood sugar two times daily      potassium chloride SA (K-DUR,KLOR-CON) 10 MEQ tablet Take 1 tablet by mouth once daily for 5 days         STOP taking these medications       insulin detemir (LEVEMIR) 100 unit/mL injection Comments:   Reason for Stopping:         insulin lispro (HUMALOG) 100 unit/mL injection Comments:   Reason for Stopping:               I certify that this patient is confined to his home and needs intermittent skilled nursing care, physical therapy and occupational therapy.

## 2017-07-28 NOTE — NURSING
Patient rested most of the day. Gathered his belongings for discharge.  Patient waiting on wheelchair for discharge.

## 2017-07-28 NOTE — PLAN OF CARE
Pt d/c'd with HH orders. Spoke to pt's dgt Lynne, she stated her father has had HH in the past, but she can not remember the name of the agency he had when he initially moved into [facility] in June. She suggested I contact [facility]. Called [facility], spoke to Maura, she stated they use [facility]. Called [facility], spoke to Cherise, she stated I can fax the referral to [phone]. Referral faxed with fax confirmation e-mail received.      07/28/17 3084   Final Note   Assessment Type Final Discharge Note   Discharge Disposition ([facility] assisted living with [facility])   Right Care Referral Info   Post Acute Recommendation Home-care   Facility Name ([facility])

## 2017-07-28 NOTE — PLAN OF CARE
Problem: Fall Risk (Adult)  Intervention: Safety Precautions  Patient rested most of the day.  VSS, no signs of distress. Call light answered in person. Free from falls. Will continue to monitor.

## 2017-08-02 NOTE — PHYSICIAN QUERY
PT Name: Hemant Kennedy Jr.  MR #: [id number]    Physician Query Form - Atrial Fibrillation Specificity     CDS/: Sammy Mai   RN,BSN,CDI            Contact information:EXT. [phone]      This form is a permanent document in the medical record.     Query Date: August 2, 2017    By submitting this query, we are merely seeking further clarification of documentation. Please utilize your independent clinical judgment when addressing the question(s) below.    The medical record contains the following:   Indicators     Supporting Clinical Findings Location in Medical Record   x Atrial Fibrillation ·  Atrial fibrillation   07/25/17  H&P    EKG results     x Medication Atrial fibrillation, controlled       - home  eliquis   - amiodarone 200 mg po daily   - coreg decreased to 6.25mg BID for bradycardia   - tele         furosemide injection 40 mg  :  Dose 40 mg  :  Intravenous  :  3 times daily          07/28/17 Discharge Summary                 07/27/17 MAR    Treatment      Other         Provider, please further specify the Atrial Fibrillation diagnosis.    [x  ] Chronic  [  ] Paroxysmal  [  ] Permanent  [  ] Persistent  [  ] Other (please specify): ____________________________  [  ] Clinically Undetermined    Please document in your progress notes daily for the duration of treatment until resolved, and include in your discharge summary.

## 2017-09-14 ENCOUNTER — TELEPHONE (OUTPATIENT)
Dept: OPTOMETRY | Facility: CLINIC | Age: 67
End: 2017-09-14

## 2017-09-15 ENCOUNTER — TELEPHONE (OUTPATIENT)
Dept: OPHTHALMOLOGY | Facility: CLINIC | Age: 67
End: 2017-09-15

## 2017-09-15 NOTE — PRE ADMISSION SCREENING
Spoke to patient in [facility], verbalized understanding,stated son svetlana was bringing him day of surgery.message left for patient's nurse,fritz, to call to verify meds and history,and to send a copy of patients meds..

## 2017-09-15 NOTE — TELEPHONE ENCOUNTER
I spoke to Robert at [facility].  They need new surgery paperwork for Monday, September 18th.  Advised we will fax new paperwork today.

## 2017-09-15 NOTE — TELEPHONE ENCOUNTER
----- Message from Dawn Cheema sent at 9/15/2017  1:27 PM CDT -----  Contact: Robert(medical team at the living home)  Ms. Jones states the there is to different sets of instructions for the drops that Mr. Kennedy  Needs to start taking on tomorrow. Please call her at [phone] and the fax number is [phone]

## 2017-09-15 NOTE — TELEPHONE ENCOUNTER
----- Message from Grecia Thorpe sent at 9/15/2017  1:51 PM CDT -----  Contact: Robert(medical team at the living home)  Please fax new surgery paperwork to [phone].  ATTN : ROBERT  ----- Message -----  From: Dawn Cheema  Sent: 9/15/2017   1:27 PM  To: Efrain SALAS Staff    Ms. Robert states the there is to different sets of instructions for the drops that Mr. Kennedy  Needs to start taking on tomorrow. Please call her at [phone] and the fax number is [phone]    Faxed instructions for cataract surgery left eye to Robert @ [phone] .  [facility]

## 2017-09-18 ENCOUNTER — ANESTHESIA EVENT (OUTPATIENT)
Dept: SURGERY | Facility: OTHER | Age: 67
End: 2017-09-18
Payer: MEDICARE

## 2017-09-18 ENCOUNTER — SURGERY (OUTPATIENT)
Age: 67
End: 2017-09-18

## 2017-09-18 ENCOUNTER — HOSPITAL ENCOUNTER (OUTPATIENT)
Facility: OTHER | Age: 67
Discharge: HOME | End: 2017-09-18
Attending: OPHTHALMOLOGY | Admitting: OPHTHALMOLOGY
Payer: MEDICARE

## 2017-09-18 ENCOUNTER — ANESTHESIA (OUTPATIENT)
Dept: SURGERY | Facility: OTHER | Age: 67
End: 2017-09-18
Payer: MEDICARE

## 2017-09-18 VITALS
DIASTOLIC BLOOD PRESSURE: 73 MMHG | RESPIRATION RATE: 18 BRPM | SYSTOLIC BLOOD PRESSURE: 143 MMHG | TEMPERATURE: 98 F | HEART RATE: 56 BPM | OXYGEN SATURATION: 100 % | HEIGHT: 67 IN | WEIGHT: 291 LBS | BODY MASS INDEX: 45.67 KG/M2

## 2017-09-18 DIAGNOSIS — H25.12 NUCLEAR SCLEROTIC CATARACT OF LEFT EYE: Primary | ICD-10-CM

## 2017-09-18 LAB — POCT GLUCOSE: 80 MG/DL (ref 70–110)

## 2017-09-18 PROCEDURE — 66984 XCAPSL CTRC RMVL W/O ECP: CPT | Mod: LT,,, | Performed by: OPHTHALMOLOGY

## 2017-09-18 PROCEDURE — 36000706: Performed by: OPHTHALMOLOGY

## 2017-09-18 PROCEDURE — V2632 POST CHMBR INTRAOCULAR LENS: HCPCS | Performed by: OPHTHALMOLOGY

## 2017-09-18 PROCEDURE — 63600175 PHARM REV CODE 636 W HCPCS: Performed by: NURSE ANESTHETIST, CERTIFIED REGISTERED

## 2017-09-18 PROCEDURE — 37000009 HC ANESTHESIA EA ADD 15 MINS: Performed by: OPHTHALMOLOGY

## 2017-09-18 PROCEDURE — 37000008 HC ANESTHESIA 1ST 15 MINUTES: Performed by: OPHTHALMOLOGY

## 2017-09-18 PROCEDURE — 25000003 PHARM REV CODE 250: Performed by: OPHTHALMOLOGY

## 2017-09-18 PROCEDURE — 36000707: Performed by: OPHTHALMOLOGY

## 2017-09-18 PROCEDURE — 25000003 PHARM REV CODE 250: Performed by: NURSE ANESTHETIST, CERTIFIED REGISTERED

## 2017-09-18 PROCEDURE — 71000015 HC POSTOP RECOV 1ST HR: Performed by: OPHTHALMOLOGY

## 2017-09-18 DEVICE — LENS IOL ITEC PRELOAD 20.0D: Type: IMPLANTABLE DEVICE | Site: EYE | Status: FUNCTIONAL

## 2017-09-18 RX ORDER — TETRACAINE HYDROCHLORIDE 5 MG/ML
SOLUTION OPHTHALMIC
Status: DISCONTINUED | OUTPATIENT
Start: 2017-09-18 | End: 2017-09-18 | Stop reason: HOSPADM

## 2017-09-18 RX ORDER — SODIUM CHLORIDE 9 MG/ML
INJECTION, SOLUTION INTRAVENOUS CONTINUOUS PRN
Status: DISCONTINUED | OUTPATIENT
Start: 2017-09-18 | End: 2017-09-18

## 2017-09-18 RX ORDER — PHENYLEPHRINE HYDROCHLORIDE 25 MG/ML
1 SOLUTION/ DROPS OPHTHALMIC
Status: COMPLETED | OUTPATIENT
Start: 2017-09-18 | End: 2017-09-18

## 2017-09-18 RX ORDER — TROPICAMIDE 10 MG/ML
1 SOLUTION/ DROPS OPHTHALMIC
Status: COMPLETED | OUTPATIENT
Start: 2017-09-18 | End: 2017-09-18

## 2017-09-18 RX ORDER — LIDOCAINE HYDROCHLORIDE 10 MG/ML
1 INJECTION, SOLUTION EPIDURAL; INFILTRATION; INTRACAUDAL; PERINEURAL ONCE
Status: DISCONTINUED | OUTPATIENT
Start: 2017-09-18 | End: 2017-09-18 | Stop reason: HOSPADM

## 2017-09-18 RX ORDER — ACETAMINOPHEN 325 MG/1
650 TABLET ORAL EVERY 4 HOURS PRN
Status: DISCONTINUED | OUTPATIENT
Start: 2017-09-18 | End: 2017-09-18 | Stop reason: HOSPADM

## 2017-09-18 RX ORDER — MOXIFLOXACIN 5 MG/ML
1 SOLUTION/ DROPS OPHTHALMIC
Status: COMPLETED | OUTPATIENT
Start: 2017-09-18 | End: 2017-09-18

## 2017-09-18 RX ORDER — PROPARACAINE HYDROCHLORIDE 5 MG/ML
1 SOLUTION/ DROPS OPHTHALMIC
Status: DISCONTINUED | OUTPATIENT
Start: 2017-09-18 | End: 2017-09-18 | Stop reason: HOSPADM

## 2017-09-18 RX ORDER — MOXIFLOXACIN 5 MG/ML
SOLUTION/ DROPS OPHTHALMIC
Status: DISCONTINUED | OUTPATIENT
Start: 2017-09-18 | End: 2017-09-18 | Stop reason: HOSPADM

## 2017-09-18 RX ORDER — TETRACAINE HYDROCHLORIDE 5 MG/ML
1 SOLUTION OPHTHALMIC
Status: COMPLETED | OUTPATIENT
Start: 2017-09-18 | End: 2017-09-18

## 2017-09-18 RX ORDER — MIDAZOLAM HYDROCHLORIDE 1 MG/ML
INJECTION INTRAMUSCULAR; INTRAVENOUS
Status: DISCONTINUED | OUTPATIENT
Start: 2017-09-18 | End: 2017-09-18

## 2017-09-18 RX ORDER — LIDOCAINE HYDROCHLORIDE 40 MG/ML
INJECTION, SOLUTION RETROBULBAR
Status: DISCONTINUED | OUTPATIENT
Start: 2017-09-18 | End: 2017-09-18 | Stop reason: HOSPADM

## 2017-09-18 RX ADMIN — MOXIFLOXACIN HYDROCHLORIDE 1 DROP: 5 SOLUTION/ DROPS OPHTHALMIC at 10:09

## 2017-09-18 RX ADMIN — PHENYLEPHRINE HYDROCHLORIDE 1 DROP: 25 SOLUTION/ DROPS OPHTHALMIC at 08:09

## 2017-09-18 RX ADMIN — LIDOCAINE HYDROCHLORIDE 2 DROP: 40 INJECTION, SOLUTION RETROBULBAR; TOPICAL at 09:09

## 2017-09-18 RX ADMIN — MIDAZOLAM HYDROCHLORIDE 2 MG: 1 INJECTION, SOLUTION INTRAMUSCULAR; INTRAVENOUS at 09:09

## 2017-09-18 RX ADMIN — TETRACAINE HYDROCHLORIDE 1 DROP: 5 SOLUTION OPHTHALMIC at 08:09

## 2017-09-18 RX ADMIN — TROPICAMIDE 1 DROP: 10 SOLUTION/ DROPS OPHTHALMIC at 08:09

## 2017-09-18 RX ADMIN — TETRACAINE HYDROCHLORIDE 1 DROP: 5 SOLUTION OPHTHALMIC at 09:09

## 2017-09-18 RX ADMIN — MOXIFLOXACIN HYDROCHLORIDE 1 DROP: 5 SOLUTION/ DROPS OPHTHALMIC at 08:09

## 2017-09-18 RX ADMIN — BALANCED SALT SOLUTION ENRICHED WITH BICARBONATE, DEXTROSE, AND GLUTATHIONE 15 ML: KIT at 09:09

## 2017-09-18 RX ADMIN — SODIUM CHLORIDE: 0.9 INJECTION, SOLUTION INTRAVENOUS at 09:09

## 2017-09-18 NOTE — OR NURSING
Hemant Kennedy Jr. has met all discharge criteria from Phase II. Vital Signs are stable, ambulating  without difficulty. Discharge instructions given, patient verbalized understanding. Discharged from facility via wheelchair in stable condition.

## 2017-09-18 NOTE — OP NOTE
SURGEON:  Gilberto Zuniga M.D.    PREOPERATIVE DIAGNOSIS:    Nuclear Sclerotic Cataract Left Eye    POSTOPERATIVE DIAGNOSIS:    Nuclear Sclerotic Cataract Left Eye    PROCEDURES:    Phacoemulsification with  intraocular lens, Left eye (82465)    DATE OF SURGERY: 09/18/2017    IMPLANT: pcboo 20.0    ANESTHESIA:  MAC with topical Lidocaine    COMPLICATIONS:  None    ESTIMATED BLOOD LOSS: None    SPECIMENS: None    INDICATIONS:    The patient has a history of painless progressive visual loss and  difficulty with activities of daily living secondary to cataract formation.  After a thorough discussion of the risks, benefits, and alternatives to cataract surgery, including, but not limited to, the rare risks of infection, retinal detachment, hemorrhage, need for additional surgery, loss of vision, and even loss of the eye, the patient voices understanding and desires to proceed.    DESCRIPTION OF PROCEDURE:    The patients IOL calculations were reviewed, and the lens selection confirmed.   After verification and marking of the proper eye in the preop holding area, the patient was brought to the operating room in supine position where the eye was prepped and draped in standard sterile fashion with 5% Betadine and a lid speculum placed in the eye.   Topical 4% Lidocaine was used in addition to the preoperative anesthesia and the procedure was begun by the creation of a paracentesis incision through which viscoelastic was used to fill the anterior chamber.  Next, a keratome blade was used to create a triplanar temporal clear corneal incision and a cystotome and Utrata forceps used to fashion a continuous curvilinear capsulorrhexis.  Hydrodissection was carried out using the Crockett hydrodissection cannula and the nucleus was found to be mobile.  Phacoemulsification of the nucleus was carried out using a quick chop technique, and all remaining epinuclear and cortical material was removed.  The eye was then reformed with  Viscoelastic and the  intraocular lens was implanted into the capsular bag.  All remaining viscoelastics were removed from the eye and at the end of the case the pupil was round, the lens was well-centered within the capsular bag and all wounds were found to be water tight.  Drops of Vigamox and Pred Forte were instilled and a shield was placed over the eye. The patient will follow up with Dr. Zuniga in the morning.

## 2017-09-18 NOTE — PLAN OF CARE
Problem: Pain, Acute (Adult)  Goal: Acceptable Pain Control/Comfort Level  Patient will demonstrate the desired outcomes by discharge/transition of care.   Outcome: Outcome(s) achieved Date Met: 09/18/17  Hemant Kennedy Jr. has met all discharge criteria from Phase II. Vital Signs are stable, ambulating  without difficulty.Pain is now under control and tolerable for the pt. Pain score 0 at this time.  Discharge instructions given, patient verbalized understanding. Discharged from facility via wheelchair in stable condition.

## 2017-09-18 NOTE — ANESTHESIA POSTPROCEDURE EVALUATION
"Anesthesia Post Evaluation    Patient: Hemant Kennedy JrShanti    Procedure(s) Performed: Procedure(s) (LRB):  PHACOEMULSIFICATION-ASPIRATION-CATARACT (Left)  INSERTION-INTRAOCULAR LENS (IOL) (Left)    Final Anesthesia Type: MAC  Patient location during evaluation: OPS  Patient participation: Yes- Able to Participate  Level of consciousness: awake and alert and oriented  Post-procedure vital signs: reviewed and stable  Pain management: adequate  Airway patency: patent  PONV status at discharge: No PONV  Anesthetic complications: no      Cardiovascular status: stable  Respiratory status: unassisted, spontaneous ventilation and room air  Hydration status: euvolemic  Follow-up not needed.        Visit Vitals  Ht 5' 7" (1.702 m)   Wt 132 kg (291 lb)   BMI 45.58 kg/m²       Pain/Evon Score: Pain Assessment Performed: Yes (9/18/2017  8:25 AM)  Presence of Pain: denies (9/18/2017  8:25 AM)      "

## 2017-09-18 NOTE — DISCHARGE SUMMARY
Outcome: Successful outpatient ophthalmic surgical procedure  Preprinted Instructions given to patient.  Regular diet.  Activity: No restrictions  Meds: see Med Rec  Condition: stable  Follow up: 1 day with Dr Zuniga  Disposition: Home  Diagnosis: s/p eye surgery

## 2017-09-18 NOTE — OR NURSING
Hemant Kennedy Jr. has met all discharge criteria from Phase II. Vital Signs are stable, ambulating  without difficulty.Pain is now under control and tolerable for the pt. Pain score 0 at this time.  Discharge instructions given, patient verbalized understanding. Discharged from facility via wheelchair in stable condition.

## 2017-09-18 NOTE — ANESTHESIA PREPROCEDURE EVALUATION
09/18/2017  Hemant Kennedy Jr. is a 67 y.o., male.    Anesthesia Evaluation    I have reviewed the Patient Summary Reports.    I have reviewed the Nursing Notes.   I have reviewed the Medications.     Review of Systems  Anesthesia Hx:  Denies Family Hx of Anesthesia complications.   Denies Personal Hx of Anesthesia complications.   Social:  Non-Smoker    Hematology/Oncology:     Oncology Normal     Cardiovascular:   Exercise tolerance: poor Hypertension Dysrhythmias atrial fibrillation CHF PVD    Pulmonary:   Sleep Apnea    Renal/:   Chronic Renal Disease, CRI    Neurological:   CVA   Peripheral Neuropathy    Endocrine:   Diabetes        Physical Exam  General:  Morbid Obesity    Airway/Jaw/Neck:  Airway Findings: Mouth Opening: Normal Tongue: Normal  General Airway Assessment: Adult, Possible difficult intubation, Possible difficult mask airway  Mallampati: III  TM Distance: Normal, at least 6 cm      Dental:  Dental Findings: In tact   Chest/Lungs:  Chest/Lungs Findings: Clear to auscultation     Heart/Vascular:  Heart Findings: Rhythm: Irregularly Irregular        Mental Status:  Mental Status Findings:  Alert and Oriented, Cooperative         Anesthesia Plan  Type of Anesthesia, risks & benefits discussed:  Anesthesia Type:  MAC  Patient's Preference:   Intra-op Monitoring Plan:   Intra-op Monitoring Plan Comments:   Post Op Pain Control Plan:   Post Op Pain Control Plan Comments:   Induction:    Beta Blocker:         Informed Consent: Patient understands risks and agrees with Anesthesia plan.  Questions answered. Anesthesia consent signed with patient.  ASA Score: 3     Day of Surgery Review of History & Physical:    H&P update referred to the surgeon.         Ready For Surgery From Anesthesia Perspective.

## 2017-09-19 ENCOUNTER — OFFICE VISIT (OUTPATIENT)
Dept: OPHTHALMOLOGY | Facility: CLINIC | Age: 67
End: 2017-09-19
Attending: OPHTHALMOLOGY
Payer: MEDICARE

## 2017-09-19 DIAGNOSIS — Z98.890 POST-OPERATIVE STATE: Primary | ICD-10-CM

## 2017-09-19 PROCEDURE — 99999 PR PBB SHADOW E&M-EST. PATIENT-LVL I: CPT | Mod: PBBFAC,,, | Performed by: OPHTHALMOLOGY

## 2017-09-19 PROCEDURE — 99024 POSTOP FOLLOW-UP VISIT: CPT | Mod: ,,, | Performed by: OPHTHALMOLOGY

## 2017-09-19 PROCEDURE — 99211 OFF/OP EST MAY X REQ PHY/QHP: CPT | Mod: PBBFAC | Performed by: OPHTHALMOLOGY

## 2017-09-19 NOTE — PROGRESS NOTES
HPI     POD 1 Phaco w/IOL OS (near target) September 18, 2017    MEDS:    Pred/Gati/Nepaf TID OS    Patient states he is doing well with no complaints.    Last edited by Luisa Zavaleta on 9/19/2017  9:33 AM. (History)            Assessment /Plan     For exam results, see Encounter Report.    Post-operative state      Slit lamp exam:  L/L: nl  K: clear, wound sealed  AC: 1+ cell  Lens: IOL centered and stable    POD1 s/p Phaco/IOL  Appropriate precautions and post op medications reviewed.  Patient instructed to call or come in if symptoms of redness, decreased vision, or pain are experienced.

## 2017-09-29 ENCOUNTER — OFFICE VISIT (OUTPATIENT)
Dept: OPHTHALMOLOGY | Facility: CLINIC | Age: 67
End: 2017-09-29
Attending: OPHTHALMOLOGY
Payer: MEDICARE

## 2017-09-29 DIAGNOSIS — H25.13 NUCLEAR SCLEROSIS, BILATERAL: ICD-10-CM

## 2017-09-29 DIAGNOSIS — H25.11 NUCLEAR SCLEROSIS, RIGHT: ICD-10-CM

## 2017-09-29 DIAGNOSIS — Z98.890 POST-OPERATIVE STATE: Primary | ICD-10-CM

## 2017-09-29 PROCEDURE — 99212 OFFICE O/P EST SF 10 MIN: CPT | Mod: PBBFAC | Performed by: OPHTHALMOLOGY

## 2017-09-29 PROCEDURE — 99999 PR PBB SHADOW E&M-EST. PATIENT-LVL II: CPT | Mod: PBBFAC,,, | Performed by: OPHTHALMOLOGY

## 2017-09-29 PROCEDURE — 99024 POSTOP FOLLOW-UP VISIT: CPT | Mod: ,,, | Performed by: OPHTHALMOLOGY

## 2017-09-29 RX ORDER — PHENYLEPHRINE HYDROCHLORIDE 25 MG/ML
1 SOLUTION/ DROPS OPHTHALMIC
Status: CANCELLED | OUTPATIENT
Start: 2017-09-29

## 2017-09-29 RX ORDER — TETRACAINE HYDROCHLORIDE 5 MG/ML
1 SOLUTION OPHTHALMIC
Status: CANCELLED | OUTPATIENT
Start: 2017-09-29

## 2017-09-29 RX ORDER — TROPICAMIDE 10 MG/ML
1 SOLUTION/ DROPS OPHTHALMIC
Status: CANCELLED | OUTPATIENT
Start: 2017-09-29

## 2017-09-29 RX ORDER — LIDOCAINE HYDROCHLORIDE 10 MG/ML
1 INJECTION, SOLUTION EPIDURAL; INFILTRATION; INTRACAUDAL; PERINEURAL ONCE
Status: CANCELLED | OUTPATIENT
Start: 2017-09-29 | End: 2017-09-29

## 2017-09-29 RX ORDER — MOXIFLOXACIN 5 MG/ML
1 SOLUTION/ DROPS OPHTHALMIC
Status: CANCELLED | OUTPATIENT
Start: 2017-09-29

## 2017-09-29 NOTE — PROGRESS NOTES
HPI     Phacoemulsification with  intraocular lens, Left eye 09/18/2017  1 week post op     Triple Drop-       Last edited by Virgen Masters on 9/29/2017  2:19 PM. (History)            Assessment /Plan     For exam results, see Encounter Report.    Post-operative state    Nuclear sclerosis, bilateral      Slit lamp exam:  L/L: nl  K: clear, wound sealed  AC: trace cell  Iris/Lens: IOL centered and stable    POW1 s/p phaco: Surgery healing well with no signs of infection or abnormal inflammation.    Patient wishes to proceed with surgery in the second eye. Risks, benefits, alternatives reviewed. IOL selection reviewed.     Right eye  IOL: pcboo 19.0 near target

## 2018-03-24 ENCOUNTER — HOSPITAL ENCOUNTER (OUTPATIENT)
Facility: HOSPITAL | Age: 68
Discharge: HOME | End: 2018-03-26
Attending: EMERGENCY MEDICINE | Admitting: HOSPITALIST
Payer: MEDICARE

## 2018-03-24 DIAGNOSIS — I48.91 ATRIAL FIBRILLATION, CONTROLLED: ICD-10-CM

## 2018-03-24 DIAGNOSIS — R06.02 SHORTNESS OF BREATH: ICD-10-CM

## 2018-03-24 DIAGNOSIS — I50.43 ACUTE ON CHRONIC COMBINED SYSTOLIC AND DIASTOLIC CHF (CONGESTIVE HEART FAILURE): ICD-10-CM

## 2018-03-24 DIAGNOSIS — D50.9 IRON DEFICIENCY ANEMIA, UNSPECIFIED IRON DEFICIENCY ANEMIA TYPE: ICD-10-CM

## 2018-03-24 DIAGNOSIS — I10 HTN (HYPERTENSION): ICD-10-CM

## 2018-03-24 DIAGNOSIS — I50.43 ACUTE ON CHRONIC COMBINED SYSTOLIC AND DIASTOLIC HEART FAILURE: Primary | ICD-10-CM

## 2018-03-24 DIAGNOSIS — Z79.01 LONG TERM CURRENT USE OF ANTICOAGULANT: ICD-10-CM

## 2018-03-24 DIAGNOSIS — I15.0 RENOVASCULAR HYPERTENSION: ICD-10-CM

## 2018-03-24 DIAGNOSIS — N17.9 ACUTE RENAL FAILURE SUPERIMPOSED ON STAGE 3 CHRONIC KIDNEY DISEASE: ICD-10-CM

## 2018-03-24 DIAGNOSIS — N18.30 ACUTE RENAL FAILURE SUPERIMPOSED ON STAGE 3 CHRONIC KIDNEY DISEASE: ICD-10-CM

## 2018-03-24 DIAGNOSIS — E11.9 DM (DIABETES MELLITUS): ICD-10-CM

## 2018-03-24 DIAGNOSIS — N18.30 CKD (CHRONIC KIDNEY DISEASE) STAGE 3, GFR 30-59 ML/MIN: ICD-10-CM

## 2018-03-24 LAB
ALBUMIN SERPL BCP-MCNC: 3.4 G/DL
ALP SERPL-CCNC: 87 U/L
ALT SERPL W/O P-5'-P-CCNC: 11 U/L
ANION GAP SERPL CALC-SCNC: 9 MMOL/L
AST SERPL-CCNC: 17 U/L
BASOPHILS # BLD AUTO: 0.05 K/UL
BASOPHILS NFR BLD: 0.9 %
BILIRUB SERPL-MCNC: 1.1 MG/DL
BNP SERPL-MCNC: 875 PG/ML
BUN SERPL-MCNC: 23 MG/DL
CALCIUM SERPL-MCNC: 8.9 MG/DL
CHLORIDE SERPL-SCNC: 109 MMOL/L
CO2 SERPL-SCNC: 25 MMOL/L
CREAT SERPL-MCNC: 1.9 MG/DL
DIFFERENTIAL METHOD: ABNORMAL
EOSINOPHIL # BLD AUTO: 0.4 K/UL
EOSINOPHIL NFR BLD: 8.1 %
ERYTHROCYTE [DISTWIDTH] IN BLOOD BY AUTOMATED COUNT: 18.2 %
EST. GFR  (AFRICAN AMERICAN): 40.7 ML/MIN/1.73 M^2
EST. GFR  (NON AFRICAN AMERICAN): 35.2 ML/MIN/1.73 M^2
ESTIMATED AVG GLUCOSE: 137 MG/DL
GLUCOSE SERPL-MCNC: 121 MG/DL
HBA1C MFR BLD HPLC: 6.4 %
HCT VFR BLD AUTO: 38.5 %
HGB BLD-MCNC: 11.7 G/DL
IMM GRANULOCYTES # BLD AUTO: 0.01 K/UL
IMM GRANULOCYTES NFR BLD AUTO: 0.2 %
INR PPP: 1.4
LYMPHOCYTES # BLD AUTO: 1.1 K/UL
LYMPHOCYTES NFR BLD: 19.9 %
MCH RBC QN AUTO: 27.8 PG
MCHC RBC AUTO-ENTMCNC: 30.4 G/DL
MCV RBC AUTO: 91 FL
MONOCYTES # BLD AUTO: 0.9 K/UL
MONOCYTES NFR BLD: 16.9 %
NEUTROPHILS # BLD AUTO: 2.9 K/UL
NEUTROPHILS NFR BLD: 54 %
NRBC BLD-RTO: 0 /100 WBC
PLATELET # BLD AUTO: 106 K/UL
PMV BLD AUTO: 11 FL
POCT GLUCOSE: 92 MG/DL (ref 70–110)
POCT GLUCOSE: 94 MG/DL (ref 70–110)
POTASSIUM SERPL-SCNC: 4.3 MMOL/L
PROT SERPL-MCNC: 7.7 G/DL
PROTHROMBIN TIME: 13.8 SEC
RBC # BLD AUTO: 4.21 M/UL
SODIUM SERPL-SCNC: 143 MMOL/L
TROPONIN I SERPL DL<=0.01 NG/ML-MCNC: 0.02 NG/ML
WBC # BLD AUTO: 5.44 K/UL

## 2018-03-24 PROCEDURE — 82962 GLUCOSE BLOOD TEST: CPT

## 2018-03-24 PROCEDURE — 99219 PR INITIAL OBSERVATION CARE,LEVL II: CPT | Mod: ,,, | Performed by: PHYSICIAN ASSISTANT

## 2018-03-24 PROCEDURE — 27000221 HC OXYGEN, UP TO 24 HOURS

## 2018-03-24 PROCEDURE — 36415 COLL VENOUS BLD VENIPUNCTURE: CPT

## 2018-03-24 PROCEDURE — 63600175 PHARM REV CODE 636 W HCPCS: Performed by: PHYSICIAN ASSISTANT

## 2018-03-24 PROCEDURE — G0378 HOSPITAL OBSERVATION PER HR: HCPCS

## 2018-03-24 PROCEDURE — 85610 PROTHROMBIN TIME: CPT

## 2018-03-24 PROCEDURE — 83880 ASSAY OF NATRIURETIC PEPTIDE: CPT

## 2018-03-24 PROCEDURE — 85025 COMPLETE CBC W/AUTO DIFF WBC: CPT

## 2018-03-24 PROCEDURE — 80053 COMPREHEN METABOLIC PANEL: CPT

## 2018-03-24 PROCEDURE — 84484 ASSAY OF TROPONIN QUANT: CPT

## 2018-03-24 PROCEDURE — 99285 EMERGENCY DEPT VISIT HI MDM: CPT | Mod: ,,, | Performed by: EMERGENCY MEDICINE

## 2018-03-24 PROCEDURE — 96374 THER/PROPH/DIAG INJ IV PUSH: CPT

## 2018-03-24 PROCEDURE — 94640 AIRWAY INHALATION TREATMENT: CPT

## 2018-03-24 PROCEDURE — 99285 EMERGENCY DEPT VISIT HI MDM: CPT | Mod: 25

## 2018-03-24 PROCEDURE — 25000003 PHARM REV CODE 250: Performed by: PHYSICIAN ASSISTANT

## 2018-03-24 PROCEDURE — 94761 N-INVAS EAR/PLS OXIMETRY MLT: CPT

## 2018-03-24 PROCEDURE — 83036 HEMOGLOBIN GLYCOSYLATED A1C: CPT

## 2018-03-24 PROCEDURE — A4216 STERILE WATER/SALINE, 10 ML: HCPCS | Performed by: PHYSICIAN ASSISTANT

## 2018-03-24 PROCEDURE — 93005 ELECTROCARDIOGRAM TRACING: CPT

## 2018-03-24 PROCEDURE — 93010 ELECTROCARDIOGRAM REPORT: CPT | Mod: ,,, | Performed by: INTERNAL MEDICINE

## 2018-03-24 PROCEDURE — 25000242 PHARM REV CODE 250 ALT 637 W/ HCPCS: Performed by: PHYSICIAN ASSISTANT

## 2018-03-24 RX ORDER — IPRATROPIUM BROMIDE AND ALBUTEROL SULFATE 2.5; .5 MG/3ML; MG/3ML
3 SOLUTION RESPIRATORY (INHALATION) EVERY 4 HOURS PRN
Status: DISCONTINUED | OUTPATIENT
Start: 2018-03-24 | End: 2018-03-26 | Stop reason: HOSPADM

## 2018-03-24 RX ORDER — FLUTICASONE PROPIONATE 50 MCG
2 SPRAY, SUSPENSION (ML) NASAL DAILY PRN
Status: DISCONTINUED | OUTPATIENT
Start: 2018-03-24 | End: 2018-03-26 | Stop reason: HOSPADM

## 2018-03-24 RX ORDER — SODIUM CHLORIDE 0.9 % (FLUSH) 0.9 %
3 SYRINGE (ML) INJECTION EVERY 8 HOURS
Status: DISCONTINUED | OUTPATIENT
Start: 2018-03-24 | End: 2018-03-26 | Stop reason: HOSPADM

## 2018-03-24 RX ORDER — ALLOPURINOL 100 MG/1
300 TABLET ORAL DAILY
Status: DISCONTINUED | OUTPATIENT
Start: 2018-03-25 | End: 2018-03-26 | Stop reason: HOSPADM

## 2018-03-24 RX ORDER — NAPROXEN SODIUM 220 MG/1
81 TABLET, FILM COATED ORAL DAILY
Status: DISCONTINUED | OUTPATIENT
Start: 2018-03-25 | End: 2018-03-26 | Stop reason: HOSPADM

## 2018-03-24 RX ORDER — ONDANSETRON 8 MG/1
8 TABLET, ORALLY DISINTEGRATING ORAL EVERY 8 HOURS PRN
Status: DISCONTINUED | OUTPATIENT
Start: 2018-03-24 | End: 2018-03-26 | Stop reason: HOSPADM

## 2018-03-24 RX ORDER — FUROSEMIDE 10 MG/ML
60 INJECTION INTRAMUSCULAR; INTRAVENOUS
Status: COMPLETED | OUTPATIENT
Start: 2018-03-24 | End: 2018-03-24

## 2018-03-24 RX ORDER — ONDANSETRON 2 MG/ML
4 INJECTION INTRAMUSCULAR; INTRAVENOUS EVERY 8 HOURS PRN
Status: DISCONTINUED | OUTPATIENT
Start: 2018-03-24 | End: 2018-03-26 | Stop reason: HOSPADM

## 2018-03-24 RX ORDER — ACETAMINOPHEN 325 MG/1
650 TABLET ORAL EVERY 6 HOURS PRN
Status: DISCONTINUED | OUTPATIENT
Start: 2018-03-24 | End: 2018-03-26 | Stop reason: HOSPADM

## 2018-03-24 RX ORDER — FUROSEMIDE 10 MG/ML
40 INJECTION INTRAMUSCULAR; INTRAVENOUS 2 TIMES DAILY
Status: DISCONTINUED | OUTPATIENT
Start: 2018-03-24 | End: 2018-03-26

## 2018-03-24 RX ORDER — HYDRALAZINE HYDROCHLORIDE 25 MG/1
25 TABLET, FILM COATED ORAL EVERY 6 HOURS PRN
Status: DISCONTINUED | OUTPATIENT
Start: 2018-03-24 | End: 2018-03-26 | Stop reason: HOSPADM

## 2018-03-24 RX ORDER — INSULIN ASPART 100 [IU]/ML
0-5 INJECTION, SOLUTION INTRAVENOUS; SUBCUTANEOUS
Status: DISCONTINUED | OUTPATIENT
Start: 2018-03-24 | End: 2018-03-26 | Stop reason: HOSPADM

## 2018-03-24 RX ORDER — FERROUS SULFATE 325(65) MG
325 TABLET, DELAYED RELEASE (ENTERIC COATED) ORAL 2 TIMES DAILY WITH MEALS
Status: DISCONTINUED | OUTPATIENT
Start: 2018-03-24 | End: 2018-03-26 | Stop reason: HOSPADM

## 2018-03-24 RX ORDER — CARVEDILOL 6.25 MG/1
6.25 TABLET ORAL 2 TIMES DAILY WITH MEALS
Status: DISCONTINUED | OUTPATIENT
Start: 2018-03-24 | End: 2018-03-25

## 2018-03-24 RX ORDER — GABAPENTIN 300 MG/1
300 CAPSULE ORAL 3 TIMES DAILY
Status: DISCONTINUED | OUTPATIENT
Start: 2018-03-24 | End: 2018-03-26 | Stop reason: HOSPADM

## 2018-03-24 RX ORDER — AMIODARONE HYDROCHLORIDE 200 MG/1
200 TABLET ORAL DAILY
Status: DISCONTINUED | OUTPATIENT
Start: 2018-03-25 | End: 2018-03-26 | Stop reason: HOSPADM

## 2018-03-24 RX ORDER — IPRATROPIUM BROMIDE AND ALBUTEROL SULFATE 2.5; .5 MG/3ML; MG/3ML
3 SOLUTION RESPIRATORY (INHALATION)
Status: DISPENSED | OUTPATIENT
Start: 2018-03-24 | End: 2018-03-24

## 2018-03-24 RX ORDER — IBUPROFEN 200 MG
24 TABLET ORAL
Status: DISCONTINUED | OUTPATIENT
Start: 2018-03-24 | End: 2018-03-26 | Stop reason: HOSPADM

## 2018-03-24 RX ORDER — ATORVASTATIN CALCIUM 20 MG/1
40 TABLET, FILM COATED ORAL DAILY
Status: DISCONTINUED | OUTPATIENT
Start: 2018-03-25 | End: 2018-03-26 | Stop reason: HOSPADM

## 2018-03-24 RX ORDER — GLUCAGON 1 MG
1 KIT INJECTION
Status: DISCONTINUED | OUTPATIENT
Start: 2018-03-24 | End: 2018-03-26 | Stop reason: HOSPADM

## 2018-03-24 RX ORDER — IBUPROFEN 200 MG
16 TABLET ORAL
Status: DISCONTINUED | OUTPATIENT
Start: 2018-03-24 | End: 2018-03-26 | Stop reason: HOSPADM

## 2018-03-24 RX ADMIN — APIXABAN 5 MG: 5 TABLET, FILM COATED ORAL at 08:03

## 2018-03-24 RX ADMIN — FERROUS SULFATE TAB EC 325 MG (65 MG FE EQUIVALENT) 325 MG: 325 (65 FE) TABLET DELAYED RESPONSE at 06:03

## 2018-03-24 RX ADMIN — IPRATROPIUM BROMIDE AND ALBUTEROL SULFATE 3 ML: .5; 3 SOLUTION RESPIRATORY (INHALATION) at 09:03

## 2018-03-24 RX ADMIN — FUROSEMIDE 40 MG: 10 INJECTION, SOLUTION INTRAMUSCULAR; INTRAVENOUS at 06:03

## 2018-03-24 RX ADMIN — GABAPENTIN 300 MG: 300 CAPSULE ORAL at 06:03

## 2018-03-24 RX ADMIN — IPRATROPIUM BROMIDE AND ALBUTEROL SULFATE 3 ML: .5; 3 SOLUTION RESPIRATORY (INHALATION) at 11:03

## 2018-03-24 RX ADMIN — Medication 3 ML: at 08:03

## 2018-03-24 RX ADMIN — FUROSEMIDE 60 MG: 10 INJECTION, SOLUTION INTRAMUSCULAR; INTRAVENOUS at 12:03

## 2018-03-24 NOTE — ED PROVIDER NOTES
"Encounter Date: 3/24/2018    SCRIBE #1 NOTE: I, Godwin Gaston, am scribing for, and in the presence of,  Dr. Vann. I have scribed the following portions of the note - the EKG reading.       History     Chief Complaint   Patient presents with    Shortness of Breath     Pt c/o SOB-states "I have fluid on the lungs".       Patient is a 68-year-old male with past medical history of hypertension, diabetes, A. fib, CHF with a EF of 35%, DVT, and HTN who presents to the emergency department due to a 4 day history of worsening shortness of breath.  Patient states that he lives at a nursing home and is normally in a wheelchair and is able to ambulate to the toilet by himself.  Patient states over the past 4 days he has had worsening shortness of breath and more difficulty getting on and off the toilet.  Patient states he is also been having intermittent chest pain that feels a pressure in nature and is worse when exerting himself.  Patient denies any PND or orthopnea.  Patient denies any fevers, chills, or any other complaints.          Review of patient's allergies indicates:   Allergen Reactions    Spironolactone      Hyperkalemia       Past Medical History:   Diagnosis Date    Anticoagulant long-term use     Arthritis     Atrial fibrillation     Cardiomyopathy     home O2    Cataract     CHF (congestive heart failure)     CKD (chronic kidney disease) stage 3, GFR 30-59 ml/min 11/27/2015    Diabetes mellitus     DVT (deep venous thrombosis)     Hypertension     Sleep apnea     Stroke 2010    with residual effects W/C bound - Right Occipital     Past Surgical History:   Procedure Laterality Date    CATARACT EXTRACTION W/  INTRAOCULAR LENS IMPLANT Left 09/18/2017    Dr. Zuniga     Family History   Problem Relation Age of Onset    Heart attack Mother     Cataracts Mother     Glaucoma Sister     Glaucoma Sister      Social History   Substance Use Topics    Smoking status: Never Smoker    Smokeless tobacco: " Never Used    Alcohol use No     Review of Systems   Constitutional: Negative for activity change, appetite change, diaphoresis, fatigue and fever.   HENT: Negative for congestion, dental problem, drooling, ear pain, facial swelling, sore throat and trouble swallowing.    Eyes: Negative for pain, discharge and visual disturbance.   Respiratory: Positive for shortness of breath. Negative for apnea, cough and chest tightness.    Cardiovascular: Positive for chest pain. Negative for palpitations.   Gastrointestinal: Negative for abdominal distention, anal bleeding, blood in stool, diarrhea, nausea and vomiting.   Endocrine: Negative for cold intolerance and polydipsia.   Genitourinary: Negative for decreased urine volume, difficulty urinating, enuresis, frequency and hematuria.   Musculoskeletal: Negative for arthralgias, gait problem, myalgias and neck stiffness.   Skin: Negative for color change and pallor.   Allergic/Immunologic: Negative for environmental allergies.   Neurological: Negative for dizziness, syncope, numbness and headaches.   Psychiatric/Behavioral: Negative for agitation, confusion and dysphoric mood.       Physical Exam     Initial Vitals [03/24/18 1026]   BP Pulse Resp Temp SpO2   134/81 (!) 55 18 98.1 °F (36.7 °C) 96 %      MAP       98.67         Physical Exam    Nursing note and vitals reviewed.  Constitutional: He appears well-developed. He is not diaphoretic. He is Obese . No distress.   HENT:   Head: Normocephalic and atraumatic.   Neck: Normal range of motion. Neck supple.   Cardiovascular: Normal rate, regular rhythm and normal heart sounds. Exam reveals no gallop and no friction rub.    No murmur heard.  Pulmonary/Chest: He has wheezes. He has no rhonchi. He has no rales.   Abdominal: Soft. Bowel sounds are normal. There is no tenderness. There is no rebound and no guarding.   Musculoskeletal: Normal range of motion.   Neurological: He is alert and oriented to person, place, and time.    Skin: Skin is warm and dry. No rash noted. No erythema.   Psychiatric: He has a normal mood and affect.         ED Course   Procedures  Labs Reviewed   CBC W/ AUTO DIFFERENTIAL - Abnormal; Notable for the following:        Result Value    RBC 4.21 (*)     Hemoglobin 11.7 (*)     Hematocrit 38.5 (*)     MCHC 30.4 (*)     RDW 18.2 (*)     Platelets 106 (*)     Mono% 16.9 (*)     Eosinophil% 8.1 (*)     All other components within normal limits   COMPREHENSIVE METABOLIC PANEL - Abnormal; Notable for the following:     Glucose 121 (*)     Creatinine 1.9 (*)     Albumin 3.4 (*)     Total Bilirubin 1.1 (*)     eGFR if  40.7 (*)     eGFR if non  35.2 (*)     All other components within normal limits   B-TYPE NATRIURETIC PEPTIDE - Abnormal; Notable for the following:      (*)     All other components within normal limits   PROTIME-INR - Abnormal; Notable for the following:     Prothrombin Time 13.8 (*)     INR 1.4 (*)     All other components within normal limits   TROPONIN I     EKG Readings: (Independently Interpreted)   Sinus bradycardia with a  1st degree AV block. Right axis. No ST elevations or depressions.        X-Rays:   Independently Interpreted Readings:   Chest X-Ray: Perihilar congestion     Medical Decision Making:   History:   Old Medical Records: I decided to obtain old medical records.  Old Records Summarized: records from previous admission(s).  Independently Interpreted Test(s):   I have ordered and independently interpreted EKG Reading(s) - see prior notes  Clinical Tests:   Lab Tests: Ordered and Reviewed  Radiological Study: Ordered and Reviewed  Medical Tests: Ordered and Reviewed       APC / Resident Notes:   Patient is a 68-year-old male with past medical history of hypertension, diabetes, A. fib, CHF with a EF of 35%, DVT, and HTN who presents to the emergency department due to a 4 day history of worsening shortness of breath.  Physical exam reveals male in  no acute distress.  Heart regular rate and rhythm.  Lungs with expiratory wheezes.  Vital signs stable.  Will obtain lab work and x-ray.  Will reassess.    CBC shows hemoglobin of 11.7 and hematocrit 38.5.  CMP shows glucose of 121 and creatinine 1.9.  Troponin 0.022.  .  Chest x-ray shows findings suggested central hilar congestion/early edema.  Patient will be given a dose of IV Lasix and admitted to hospital medicine for further workup of CHF exacerbation.  Plan treatment discussed with attending physician and she is agreeable.         Attending Attestation:     Physician Attestation Statement for NP/PA:   I have conducted a face to face encounter with this patient in addition to the NP/PA, due to Medical Complexity    Other NP/PA Attestation Additions:     Physical Exam: General: no acute distress, obese  Cards: nl  Lungs (+) wheeze b/l  Extremity: (-) tenderness, (-) asymmetry (-) edema   Medical Decision Making:     Emergent evaluation a 68-year-old male with past medical history of hypertension, combined CHF with EF of 35% presenting with shortness of breath and chest pain.  Labs significant for elevated BNP, negative troponin.  EKG shows no ischemic changes.   Patient will be admitted for diuresis                      Clinical Impression:   The primary encounter diagnosis was Acute on chronic combined systolic and diastolic CHF (congestive heart failure). A diagnosis of Shortness of breath was also pertinent to this visit.    Disposition:   Disposition: Admitted  Condition: Stable                      SAKINA StreetC  03/24/18 1552       Debo Vann MD  03/24/18 1828       Debo Vann MD  03/24/18 1828

## 2018-03-24 NOTE — ED TRIAGE NOTES
Pt came in via  EMS c/o SOB. Pt states he have been feeling SOB for a few days now. Pt reported a heavy feeling in the chest. Pt reported having problems in the future of fluid around the heart. Pt denies dizziness and headache.

## 2018-03-24 NOTE — SUBJECTIVE & OBJECTIVE
Past Medical History:   Diagnosis Date    Anticoagulant long-term use     Arthritis     Atrial fibrillation     Cardiomyopathy     home O2    Cataract     CHF (congestive heart failure)     CKD (chronic kidney disease) stage 3, GFR 30-59 ml/min 11/27/2015    Diabetes mellitus     DVT (deep venous thrombosis)     Hypertension     Sleep apnea     Stroke 2010    with residual effects W/C bound - Right Occipital       Past Surgical History:   Procedure Laterality Date    CATARACT EXTRACTION W/  INTRAOCULAR LENS IMPLANT Left 09/18/2017    Dr. Zuniga       Review of patient's allergies indicates:   Allergen Reactions    Spironolactone      Hyperkalemia         No current facility-administered medications on file prior to encounter.      Current Outpatient Prescriptions on File Prior to Encounter   Medication Sig    carvedilol (COREG) 6.25 MG tablet Take 1 tablet (6.25 mg total) by mouth 2 (two) times daily with meals.    acetaminophen (TYLENOL) 500 MG tablet Take 2 tablets (1,000 mg total) by mouth 3 (three) times daily as needed for Pain.    allopurinol (ZYLOPRIM) 300 MG tablet Take 1 tablet (300 mg total) by mouth once daily.  Tablet Oral Every day (Patient taking differently: Take 300 mg by mouth once daily. )    amiodarone (PACERONE) 200 MG Tab Take 1 tablet (200 mg total) by mouth once daily.    apixaban (ELIQUIS) 5 mg Tab Take 5 mg by mouth 2 (two) times daily.    aspirin 81 MG Chew Take 81 mg by mouth once daily.     atorvastatin (LIPITOR) 40 MG tablet Take 1 tablet (40 mg total) by mouth once daily. 1 Tablet Oral Every day (Patient taking differently: Take 40 mg by mouth once daily. )    collagenase (SANTYL) ointment Apply topically once daily. Apply to wound daily as directed.    ergocalciferol (ERGOCALCIFEROL) 50,000 unit Cap Take 1 capsule (50,000 Units total) by mouth every 7 days. (Patient taking differently: Take 50,000 Units by mouth every Monday. )    ferrous sulfate 325 (65 FE) MG EC  tablet Take 1 tablet (325 mg total) by mouth 2 (two) times daily with meals.    fluticasone (FLONASE) 50 mcg/actuation nasal spray 2 sprays by Each Nare route once daily. (Patient taking differently: 2 sprays by Each Nare route daily as needed for Allergies. )    furosemide (LASIX) 40 MG tablet Take 1 tablet (40 mg total) by mouth once daily.    gabapentin (NEURONTIN) 300 MG capsule Take 1 capsule (300 mg total) by mouth 3 (three) times daily.    HYDROPHILIC CREAM (TRIAD WOUND DRESSING TOP) Apply topically daily as needed.    ONE TOUCH ULTRA TEST Strp Test blood sugar two times daily    potassium chloride SA (K-DUR,KLOR-CON) 10 MEQ tablet Take 1 tablet by mouth once daily for 5 days    sildenafil (VIAGRA) 25 MG tablet Take 2 tablets (50 mg total) by mouth daily as needed for Erectile Dysfunction.    [DISCONTINUED] lisinopril (PRINIVIL,ZESTRIL) 20 MG tablet Take 1 tablet (20 mg total) by mouth once daily.     Family History     Problem Relation (Age of Onset)    Cataracts Mother    Glaucoma Sister, Sister    Heart attack Mother        Social History Main Topics    Smoking status: Never Smoker    Smokeless tobacco: Never Used    Alcohol use No    Drug use: No    Sexual activity: Not on file     Review of Systems   Constitutional: Negative for chills, diaphoresis, fatigue and fever.   HENT: Negative for congestion, hearing loss, sore throat and trouble swallowing.    Eyes: Negative for visual disturbance.   Respiratory: Positive for shortness of breath. Negative for cough, chest tightness and wheezing.    Cardiovascular: Positive for chest pain (resolved). Negative for palpitations and leg swelling.   Gastrointestinal: Negative for abdominal distention, abdominal pain, diarrhea, nausea and vomiting.   Genitourinary: Negative for difficulty urinating, dysuria, flank pain, frequency and hematuria.   Musculoskeletal: Positive for gait problem (uses wheelchair at baseline). Negative for arthralgias, myalgias  and neck pain.   Skin: Negative for rash and wound.   Neurological: Negative for dizziness, seizures, syncope, speech difficulty, weakness and headaches.   Psychiatric/Behavioral: Negative for agitation, behavioral problems and confusion. The patient is not nervous/anxious.      Objective:     Vital Signs (Most Recent):  Temp: 96.7 °F (35.9 °C) (03/24/18 1438)  Pulse: (!) 53 (03/24/18 1438)  Resp: 18 (03/24/18 1438)  BP: (!) 168/84 (03/24/18 1438)  SpO2: 95 % (03/24/18 1438) Vital Signs (24h Range):  Temp:  [96.7 °F (35.9 °C)-98.1 °F (36.7 °C)] 96.7 °F (35.9 °C)  Pulse:  [51-55] 53  Resp:  [13-20] 18  SpO2:  [94 %-100 %] 95 %  BP: (130-188)/(72-90) 168/84     Weight: (!) 147 kg (324 lb)  Body mass index is 49.26 kg/m².    Physical Exam   Constitutional: He is oriented to person, place, and time. He appears well-developed. No distress.   HENT:   Head: Normocephalic and atraumatic.   Eyes: Conjunctivae and EOM are normal. Right eye exhibits no discharge. Left eye exhibits no discharge. No scleral icterus.   Neck: Normal range of motion. Neck supple. No tracheal deviation present.   Cardiovascular: Normal rate, regular rhythm, normal heart sounds and intact distal pulses.    Pulmonary/Chest: Effort normal and breath sounds normal. No respiratory distress. He has no wheezes.   Abdominal: Soft. Bowel sounds are normal. He exhibits no distension. There is no tenderness.   Obese    Musculoskeletal: Normal range of motion. He exhibits no edema or tenderness.   Neurological: He is alert and oriented to person, place, and time. No cranial nerve deficit.   Skin: He is not diaphoretic.   Psychiatric: He has a normal mood and affect. His behavior is normal.         CRANIAL NERVES     CN III, IV, VI   Extraocular motions are normal.        Significant Labs: All pertinent labs within the past 24 hours have been reviewed.    Significant Imaging: I have reviewed all pertinent imaging results/findings within the past 24 hours.

## 2018-03-24 NOTE — ED NOTES
Pt. Resting in bed in NAD. Continuous cardiac, BP, and O2 monitoring in progress. VS being monitoring continuously per MD orders. Pt. Offered bathroom assistance and denies need at this time. Explanation of care/wait provided. Bed in low, locked position with rails up and call bell in reach. Will continue to monitor.

## 2018-03-24 NOTE — ASSESSMENT & PLAN NOTE
- Hgb 11.7 on admit; consistent with baseline  - No s/s bleeding  - Continue home iron supplementation  - Trend daily

## 2018-03-24 NOTE — HPI
"68 year old male with a PMHx of HTN, HLD, DM2, HFrEF (35%), Afib on Eliquis, and DVT presenting to the ED from [facility] Living with family at bedside c/o worsening SOB x4 days. Pt reports associated intermittent chest pain over the past 4 days; denies association with exertion, diaphoresis, N/V, and radiation of pain. Pt states "its because of this extra fluid." States he is normally able to maneuver in/out wheelchair, but has had more difficulty 2/2 "all this fluid." Pt denies PND, orthopnea, abdominal pain/distension, N/V/D, fever, chills, recent illness/antibiotic use. Endorses compliance with medications.      In the ED, HDS. Afebrile without leukocytosis. Stable on RA. Labs remarkable for Hgb 11.7 (at baseline) and Cr 1.9 (BL ~1.6). EKG shows sinus cindy without evidence of ischemic changes. Trop WNL. . CXR with evidence of early edema.  "

## 2018-03-24 NOTE — ASSESSMENT & PLAN NOTE
HFrEF (35%)  - HDS on admit. Stable on RA  - Afebrile without leukocytosis  - EKG shows sinus cindy without evidence of ischemic changes. Trop WNL  -   - CXR with evidence of early edema  - Received IV lasix 60 mg x1 in the ED  - IV Lasix 40 mg BID  - Strict I/Os, daily weights, telemetry  - Cardiac/DM diet with fluid restriction

## 2018-03-24 NOTE — H&P
"[facility] Hospital Medicine  History & Physical    Patient Name: Hemant Kennedy Jr.  MRN: [id number]  Admission Date: 3/24/2018  Attending Physician: Tri Mo MD   Primary Care Provider: Hemant Markham MD    [facility] Medicine Team: [facility] [place] [facility] [place] Avril Nance PA-C     Patient information was obtained from patient, relative(s), past medical records and ER records.     Subjective:     Principal Problem:Acute on chronic combined systolic and diastolic heart failure    Chief Complaint:   Chief Complaint   Patient presents with    Shortness of Breath     Pt c/o SOB-states "I have fluid on the lungs".          HPI: 68 year old male with a PMHx of HTN, HLD, DM2, HFrEF (35%), Afib on Eliquis, and DVT presenting to the ED from [facility] with family at bedside c/o worsening SOB x4 days. Pt reports associated intermittent chest pain over the past 4 days; denies association with exertion, diaphoresis, N/V, and radiation of pain. Pt states "its because of this extra fluid." States he is normally able to maneuver in/out wheelchair, but has had more difficulty 2/2 "all this fluid." Pt denies PND, orthopnea, abdominal pain/distension, N/V/D, fever, chills, recent illness/antibiotic use. Endorses compliance with medications.      In the ED, HDS. Afebrile without leukocytosis. Stable on RA. Labs remarkable for Hgb 11.7 (at baseline) and Cr 1.9 (BL ~1.6). EKG shows sinus cindy without evidence of ischemic changes. Trop WNL. . CXR with evidence of early edema.    Past Medical History:   Diagnosis Date    Anticoagulant long-term use     Arthritis     Atrial fibrillation     Cardiomyopathy     home O2    Cataract     CHF (congestive heart failure)     CKD (chronic kidney disease) stage 3, GFR 30-59 ml/min 11/27/2015    Diabetes mellitus     DVT (deep venous thrombosis)     Hypertension     Sleep apnea     Stroke 2010    with residual effects W/C bound - Right Occipital "       Past Surgical History:   Procedure Laterality Date    CATARACT EXTRACTION W/  INTRAOCULAR LENS IMPLANT Left 09/18/2017    Dr. Zuniga       Review of patient's allergies indicates:   Allergen Reactions    Spironolactone      Hyperkalemia         No current facility-administered medications on file prior to encounter.      Current Outpatient Prescriptions on File Prior to Encounter   Medication Sig    carvedilol (COREG) 6.25 MG tablet Take 1 tablet (6.25 mg total) by mouth 2 (two) times daily with meals.    acetaminophen (TYLENOL) 500 MG tablet Take 2 tablets (1,000 mg total) by mouth 3 (three) times daily as needed for Pain.    allopurinol (ZYLOPRIM) 300 MG tablet Take 1 tablet (300 mg total) by mouth once daily.  Tablet Oral Every day (Patient taking differently: Take 300 mg by mouth once daily. )    amiodarone (PACERONE) 200 MG Tab Take 1 tablet (200 mg total) by mouth once daily.    apixaban (ELIQUIS) 5 mg Tab Take 5 mg by mouth 2 (two) times daily.    aspirin 81 MG Chew Take 81 mg by mouth once daily.     atorvastatin (LIPITOR) 40 MG tablet Take 1 tablet (40 mg total) by mouth once daily. 1 Tablet Oral Every day (Patient taking differently: Take 40 mg by mouth once daily. )    collagenase (SANTYL) ointment Apply topically once daily. Apply to wound daily as directed.    ergocalciferol (ERGOCALCIFEROL) 50,000 unit Cap Take 1 capsule (50,000 Units total) by mouth every 7 days. (Patient taking differently: Take 50,000 Units by mouth every Monday. )    ferrous sulfate 325 (65 FE) MG EC tablet Take 1 tablet (325 mg total) by mouth 2 (two) times daily with meals.    fluticasone (FLONASE) 50 mcg/actuation nasal spray 2 sprays by Each Nare route once daily. (Patient taking differently: 2 sprays by Each Nare route daily as needed for Allergies. )    furosemide (LASIX) 40 MG tablet Take 1 tablet (40 mg total) by mouth once daily.    gabapentin (NEURONTIN) 300 MG capsule Take 1 capsule (300 mg total) by  mouth 3 (three) times daily.    HYDROPHILIC CREAM (TRIAD WOUND DRESSING TOP) Apply topically daily as needed.    ONE TOUCH ULTRA TEST Strp Test blood sugar two times daily    potassium chloride SA (K-DUR,KLOR-CON) 10 MEQ tablet Take 1 tablet by mouth once daily for 5 days    sildenafil (VIAGRA) 25 MG tablet Take 2 tablets (50 mg total) by mouth daily as needed for Erectile Dysfunction.    [DISCONTINUED] lisinopril (PRINIVIL,ZESTRIL) 20 MG tablet Take 1 tablet (20 mg total) by mouth once daily.     Family History     Problem Relation (Age of Onset)    Cataracts Mother    Glaucoma Sister, Sister    Heart attack Mother        Social History Main Topics    Smoking status: Never Smoker    Smokeless tobacco: Never Used    Alcohol use No    Drug use: No    Sexual activity: Not on file     Review of Systems   Constitutional: Negative for chills, diaphoresis, fatigue and fever.   HENT: Negative for congestion, hearing loss, sore throat and trouble swallowing.    Eyes: Negative for visual disturbance.   Respiratory: Positive for shortness of breath. Negative for cough, chest tightness and wheezing.    Cardiovascular: Positive for chest pain (resolved). Negative for palpitations and leg swelling.   Gastrointestinal: Negative for abdominal distention, abdominal pain, diarrhea, nausea and vomiting.   Genitourinary: Negative for difficulty urinating, dysuria, flank pain, frequency and hematuria.   Musculoskeletal: Positive for gait problem (uses wheelchair at baseline). Negative for arthralgias, myalgias and neck pain.   Skin: Negative for rash and wound.   Neurological: Negative for dizziness, seizures, syncope, speech difficulty, weakness and headaches.   Psychiatric/Behavioral: Negative for agitation, behavioral problems and confusion. The patient is not nervous/anxious.      Objective:     Vital Signs (Most Recent):  Temp: 96.7 °F (35.9 °C) (03/24/18 1438)  Pulse: (!) 53 (03/24/18 1438)  Resp: 18 (03/24/18  1438)  BP: (!) 168/84 (03/24/18 1438)  SpO2: 95 % (03/24/18 1438) Vital Signs (24h Range):  Temp:  [96.7 °F (35.9 °C)-98.1 °F (36.7 °C)] 96.7 °F (35.9 °C)  Pulse:  [51-55] 53  Resp:  [13-20] 18  SpO2:  [94 %-100 %] 95 %  BP: (130-188)/(72-90) 168/84     Weight: (!) 147 kg (324 lb)  Body mass index is 49.26 kg/m².    Physical Exam   Constitutional: He is oriented to person, place, and time. He appears well-developed. No distress.   HENT:   Head: Normocephalic and atraumatic.   Eyes: Conjunctivae and EOM are normal. Right eye exhibits no discharge. Left eye exhibits no discharge. No scleral icterus.   Neck: Normal range of motion. Neck supple. No tracheal deviation present.   Cardiovascular: Normal rate, regular rhythm, normal heart sounds and intact distal pulses.    Pulmonary/Chest: Effort normal and breath sounds normal. No respiratory distress. He has no wheezes.   Abdominal: Soft. Bowel sounds are normal. He exhibits no distension. There is no tenderness.   Obese    Musculoskeletal: Normal range of motion. He exhibits no edema or tenderness.   Neurological: He is alert and oriented to person, place, and time. No cranial nerve deficit.   Skin: He is not diaphoretic.   Psychiatric: He has a normal mood and affect. His behavior is normal.         CRANIAL NERVES     CN III, IV, VI   Extraocular motions are normal.        Significant Labs: All pertinent labs within the past 24 hours have been reviewed.    Significant Imaging: I have reviewed all pertinent imaging results/findings within the past 24 hours.    Assessment/Plan:     * Acute on chronic combined systolic and diastolic heart failure    HFrEF (35%)  - HDS on admit. Stable on RA  - Afebrile without leukocytosis  - EKG shows sinus cindy without evidence of ischemic changes. Trop WNL  -   - CXR with evidence of early edema  - Received IV lasix 60 mg x1 in the ED  - IV Lasix 40 mg BID  - Strict I/Os, daily weights, telemetry  - Cardiac/DM diet with fluid  restriction        Renovascular hypertension    - Mild elevation on admit  - Continue home coreg 6.25 mg BID  - Hydralazine PRN  - Continue to monitor        Type II diabetes mellitus with complication, uncontrolled    - Last A1c 5.2 in 7/2017  - A1c pending  - Cardiac/DM diet  - Low dose SSI  - Monitor BGs and adjust insulin PRN        Atrial fibrillation, controlled    - Sinus cindy on admit  - Continue home amiodarone and eliquis  - Telemetry        Long term current use of anticoagulant    - Continue home Eliquis        CKD (chronic kidney disease) stage 3, GFR 30-59 ml/min    - Cr 1.9 on admit; BL ~1.6  - Suspect will improve with diuresis  - Will monitor closely        Iron deficiency anemia    - Hgb 11.7 on admit; consistent with baseline  - No s/s bleeding  - Continue home iron supplementation  - Trend daily          VTE Risk Mitigation         Ordered     apixaban tablet 5 mg  2 times daily     Route:  Oral        03/24/18 1434     IP VTE HIGH RISK PATIENT  Once      03/24/18 1455     Place MILADY hose  Until discontinued      03/24/18 1455     Place sequential compression device  Until discontinued      03/24/18 1455     Reason for No Pharmacological VTE Prophylaxis  Once      03/24/18 1455             Avril Nance PA-C  Department of Hospital Medicine   [facility]

## 2018-03-24 NOTE — ASSESSMENT & PLAN NOTE
- Mild elevation on admit  - Continue home coreg 6.25 mg BID  - Hydralazine PRN  - Continue to monitor

## 2018-03-24 NOTE — ED NOTES
LOC: Patient name and date of birth verified.  The patient is awake, alert and aware of environment with an appropriate affect, the patient is oriented x 3 and speaking appropriately.  Pt in NAD.    APPEARANCE: Patient is wheelchair bound and SOB.  SKIN: The skin is warm and dry, color consistent with ethnicity, patient has normal skin turgor and moist mucus membranes, skin intact  MUSCULOSKELETAL: Patient moving all extremities well, no obvious deformities noted.  RESPIRATORY: Airway is open and patent, respirations are spontaneous, patient has a normal effort and rate, no accessory muscle use noted. Expiratory wheezing posterior lungs.  CARDIAC: Patient is bradycardic, 2+ edema noted bilaterally, capillary refill < 3 seconds. 2+ pulses palpated bilateral dorsalis pedis.  ABDOMEN: No distention noted. Bowel sounds present in all four quadrants.   NEUROLOGIC: Eyes open spontaneously, behavior appropriate to situation, follows commands, facial expression symmetrical, bilateral hand grasp equal and even, purposeful motor response noted, normal sensation in all extremities when touched.

## 2018-03-24 NOTE — ASSESSMENT & PLAN NOTE
- Last A1c 5.2 in 7/2017  - A1c pending  - Cardiac/DM diet  - Low dose SSI  - Monitor BGs and adjust insulin PRN

## 2018-03-25 LAB
ALBUMIN SERPL BCP-MCNC: 3.5 G/DL
ALP SERPL-CCNC: 85 U/L
ALT SERPL W/O P-5'-P-CCNC: 11 U/L
ANION GAP SERPL CALC-SCNC: 10 MMOL/L
AST SERPL-CCNC: 23 U/L
BASOPHILS # BLD AUTO: 0.03 K/UL
BASOPHILS NFR BLD: 0.5 %
BILIRUB SERPL-MCNC: 1.2 MG/DL
BUN SERPL-MCNC: 23 MG/DL
CALCIUM SERPL-MCNC: 9 MG/DL
CHLORIDE SERPL-SCNC: 105 MMOL/L
CO2 SERPL-SCNC: 27 MMOL/L
CREAT SERPL-MCNC: 1.7 MG/DL
DIFFERENTIAL METHOD: ABNORMAL
EOSINOPHIL # BLD AUTO: 0.4 K/UL
EOSINOPHIL NFR BLD: 6.3 %
ERYTHROCYTE [DISTWIDTH] IN BLOOD BY AUTOMATED COUNT: 17.7 %
EST. GFR  (AFRICAN AMERICAN): 46.2 ML/MIN/1.73 M^2
EST. GFR  (NON AFRICAN AMERICAN): 40 ML/MIN/1.73 M^2
GLUCOSE SERPL-MCNC: 94 MG/DL
HCT VFR BLD AUTO: 38.2 %
HGB BLD-MCNC: 11.9 G/DL
IMM GRANULOCYTES # BLD AUTO: 0.02 K/UL
IMM GRANULOCYTES NFR BLD AUTO: 0.3 %
LYMPHOCYTES # BLD AUTO: 1.3 K/UL
LYMPHOCYTES NFR BLD: 20.2 %
MAGNESIUM SERPL-MCNC: 2.2 MG/DL
MCH RBC QN AUTO: 27.7 PG
MCHC RBC AUTO-ENTMCNC: 31.2 G/DL
MCV RBC AUTO: 89 FL
MONOCYTES # BLD AUTO: 0.8 K/UL
MONOCYTES NFR BLD: 13.1 %
NEUTROPHILS # BLD AUTO: 3.7 K/UL
NEUTROPHILS NFR BLD: 59.6 %
NRBC BLD-RTO: 0 /100 WBC
PHOSPHATE SERPL-MCNC: 3.5 MG/DL
PLATELET # BLD AUTO: 129 K/UL
PMV BLD AUTO: 12.3 FL
POCT GLUCOSE: 108 MG/DL (ref 70–110)
POCT GLUCOSE: 118 MG/DL (ref 70–110)
POCT GLUCOSE: 129 MG/DL (ref 70–110)
POCT GLUCOSE: 87 MG/DL (ref 70–110)
POTASSIUM SERPL-SCNC: 4.5 MMOL/L
PROT SERPL-MCNC: 8 G/DL
RBC # BLD AUTO: 4.29 M/UL
SODIUM SERPL-SCNC: 142 MMOL/L
WBC # BLD AUTO: 6.2 K/UL

## 2018-03-25 PROCEDURE — 85025 COMPLETE CBC W/AUTO DIFF WBC: CPT

## 2018-03-25 PROCEDURE — 99225 PR SUBSEQUENT OBSERVATION CARE,LEVEL II: CPT | Mod: ,,, | Performed by: PHYSICIAN ASSISTANT

## 2018-03-25 PROCEDURE — 94640 AIRWAY INHALATION TREATMENT: CPT | Mod: 76

## 2018-03-25 PROCEDURE — 80053 COMPREHEN METABOLIC PANEL: CPT

## 2018-03-25 PROCEDURE — 25000003 PHARM REV CODE 250: Performed by: PHYSICIAN ASSISTANT

## 2018-03-25 PROCEDURE — 84100 ASSAY OF PHOSPHORUS: CPT

## 2018-03-25 PROCEDURE — 36415 COLL VENOUS BLD VENIPUNCTURE: CPT

## 2018-03-25 PROCEDURE — G0378 HOSPITAL OBSERVATION PER HR: HCPCS

## 2018-03-25 PROCEDURE — 83735 ASSAY OF MAGNESIUM: CPT

## 2018-03-25 PROCEDURE — A4216 STERILE WATER/SALINE, 10 ML: HCPCS | Performed by: PHYSICIAN ASSISTANT

## 2018-03-25 PROCEDURE — 63600175 PHARM REV CODE 636 W HCPCS: Performed by: PHYSICIAN ASSISTANT

## 2018-03-25 PROCEDURE — 25000242 PHARM REV CODE 250 ALT 637 W/ HCPCS: Performed by: PHYSICIAN ASSISTANT

## 2018-03-25 PROCEDURE — 82962 GLUCOSE BLOOD TEST: CPT

## 2018-03-25 RX ORDER — RAMELTEON 8 MG/1
8 TABLET ORAL NIGHTLY
Status: DISCONTINUED | OUTPATIENT
Start: 2018-03-25 | End: 2018-03-26 | Stop reason: HOSPADM

## 2018-03-25 RX ADMIN — APIXABAN 5 MG: 5 TABLET, FILM COATED ORAL at 09:03

## 2018-03-25 RX ADMIN — GABAPENTIN 300 MG: 300 CAPSULE ORAL at 09:03

## 2018-03-25 RX ADMIN — AMIODARONE HYDROCHLORIDE 200 MG: 200 TABLET ORAL at 09:03

## 2018-03-25 RX ADMIN — FERROUS SULFATE TAB EC 325 MG (65 MG FE EQUIVALENT) 325 MG: 325 (65 FE) TABLET DELAYED RESPONSE at 09:03

## 2018-03-25 RX ADMIN — Medication 3 ML: at 02:03

## 2018-03-25 RX ADMIN — ASPIRIN 81 MG CHEWABLE TABLET 81 MG: 81 TABLET CHEWABLE at 09:03

## 2018-03-25 RX ADMIN — FUROSEMIDE 40 MG: 10 INJECTION, SOLUTION INTRAMUSCULAR; INTRAVENOUS at 06:03

## 2018-03-25 RX ADMIN — Medication 3 ML: at 09:03

## 2018-03-25 RX ADMIN — ALLOPURINOL 300 MG: 100 TABLET ORAL at 09:03

## 2018-03-25 RX ADMIN — FERROUS SULFATE TAB EC 325 MG (65 MG FE EQUIVALENT) 325 MG: 325 (65 FE) TABLET DELAYED RESPONSE at 06:03

## 2018-03-25 RX ADMIN — Medication 3 ML: at 06:03

## 2018-03-25 RX ADMIN — IPRATROPIUM BROMIDE AND ALBUTEROL SULFATE 3 ML: .5; 3 SOLUTION RESPIRATORY (INHALATION) at 07:03

## 2018-03-25 RX ADMIN — GABAPENTIN 300 MG: 300 CAPSULE ORAL at 06:03

## 2018-03-25 RX ADMIN — FUROSEMIDE 40 MG: 10 INJECTION, SOLUTION INTRAMUSCULAR; INTRAVENOUS at 09:03

## 2018-03-25 RX ADMIN — CARVEDILOL 6.25 MG: 6.25 TABLET, FILM COATED ORAL at 09:03

## 2018-03-25 RX ADMIN — ATORVASTATIN CALCIUM 40 MG: 20 TABLET, FILM COATED ORAL at 09:03

## 2018-03-25 RX ADMIN — IPRATROPIUM BROMIDE AND ALBUTEROL SULFATE 3 ML: .5; 3 SOLUTION RESPIRATORY (INHALATION) at 09:03

## 2018-03-25 NOTE — ASSESSMENT & PLAN NOTE
HFrEF (35%); diet noncompliance  - HDS on admit. Stable on RA  - Afebrile without leukocytosis  - EKG shows sinus cindy without evidence of ischemic changes. Trop WNL  -   - CXR with evidence of early edema  - Received IV lasix 60 mg x1 in the ED  - IV Lasix 40 mg BID  3/25: Net ->5L. Continue IV lasix 40 mg BID  - Strict I/Os, daily weights, telemetry  - Cardiac/DM diet with fluid restriction

## 2018-03-25 NOTE — PROGRESS NOTES
"[facility] Hospital Medicine  Progress Note    Patient Name: Hemant Kennedy Jr.  MRN: [id number]  Patient Class: OP- Observation   Admission Date: 3/24/2018  Length of Stay: 0 days  Attending Physician: Tri Mo MD  Primary Care Provider: Hemant Markham MD    [facility] Medicine [place]: [facility] [place] [facility] [place] Avril Nance PA-C    Subjective:     Principal Problem:Acute on chronic combined systolic and diastolic heart failure    HPI:  68 year old male with a PMHx of HTN, HLD, DM2, HFrEF (35%), Afib on Eliquis, and DVT presenting to the ED from [facility] with family at bedside c/o worsening SOB x4 days. Pt reports associated intermittent chest pain over the past 4 days; denies association with exertion, diaphoresis, N/V, and radiation of pain. Pt states "its because of this extra fluid." States he is normally able to maneuver in/out wheelchair, but has had more difficulty 2/2 "all this fluid." Pt denies PND, orthopnea, abdominal pain/distension, N/V/D, fever, chills, recent illness/antibiotic use. Endorses compliance with medications.      In the ED, HDS. Afebrile without leukocytosis. Stable on RA. Labs remarkable for Hgb 11.7 (at baseline) and Cr 1.9 (BL ~1.6). EKG shows sinus cindy without evidence of ischemic changes. Trop WNL. . CXR with evidence of early edema.    Hospital Course:  Pt admitted to observation for CHF (HFrEF 35%) exacerbation in setting of diet noncompliance. EKG shows sinus cindy without evidence of ischemic changes. Trop WNL. . CXR with evidence of early edema. Net ->5L. Continue IV lasix 40 mg BID. Strict I/Os.    Interval History: No acute events overnight. This AM, pt lying in bed resting comfortably. Pt reports improved SOB. Endorses frequent urination. No other complaints. Discussed plan of care.    Review of Systems   Constitutional: Negative for chills, diaphoresis, fatigue and fever.   Respiratory: Positive for shortness of breath. Negative " for cough, chest tightness and wheezing.    Cardiovascular: Positive for chest pain (resolved). Negative for palpitations and leg swelling.   Gastrointestinal: Negative for abdominal distention, abdominal pain, diarrhea, nausea and vomiting.   Musculoskeletal: Positive for gait problem (uses wheelchair at baseline). Negative for arthralgias, myalgias and neck pain.     Objective:     Vital Signs (Most Recent):  Temp: 99.3 °F (37.4 °C) (03/25/18 0847)  Pulse: 62 (03/25/18 0951)  Resp: 12 (03/25/18 0951)  BP: (!) 150/81 (03/25/18 0847)  SpO2: 98 % (03/25/18 0951) Vital Signs (24h Range):  Temp:  [96.7 °F (35.9 °C)-99.7 °F (37.6 °C)] 99.3 °F (37.4 °C)  Pulse:  [51-63] 62  Resp:  [12-20] 12  SpO2:  [93 %-100 %] 98 %  BP: (127-188)/(65-90) 150/81     Weight: (!) 147 kg (324 lb)  Body mass index is 49.26 kg/m².    Intake/Output Summary (Last 24 hours) at 03/25/18 1110  Last data filed at 03/25/18 1000   Gross per 24 hour   Intake              480 ml   Output             6150 ml   Net            -5670 ml      Physical Exam   Constitutional: He is oriented to person, place, and time. He appears well-developed. No distress.   Cardiovascular: Normal rate, regular rhythm, normal heart sounds and intact distal pulses.    Pulmonary/Chest: Effort normal and breath sounds normal. No respiratory distress. He has no wheezes.   Abdominal: Soft. Bowel sounds are normal. He exhibits no distension. There is no tenderness.   Obese    Musculoskeletal: Normal range of motion. He exhibits no edema or tenderness.   Neurological: He is alert and oriented to person, place, and time. No cranial nerve deficit.   Skin: He is not diaphoretic.   Psychiatric: He has a normal mood and affect. His behavior is normal.       Significant Labs: All pertinent labs within the past 24 hours have been reviewed.    Significant Imaging: I have reviewed all pertinent imaging results/findings within the past 24 hours.    Assessment/Plan:      * Acute on chronic  combined systolic and diastolic heart failure    HFrEF (35%); diet noncompliance  - HDS on admit. Stable on RA  - Afebrile without leukocytosis  - EKG shows sinus cindy without evidence of ischemic changes. Trop WNL  -   - CXR with evidence of early edema  - Received IV lasix 60 mg x1 in the ED  - IV Lasix 40 mg BID  3/25: Net ->5L. Continue IV lasix 40 mg BID  - Strict I/Os, daily weights, telemetry  - Cardiac/DM diet with fluid restriction        Renovascular hypertension    - Mild elevation on admit  - Continue home coreg 6.25 mg BID  - Hydralazine PRN  - BP stable, continue to monitor        Type II diabetes mellitus with complication, uncontrolled    - Last A1c 5.2 in 7/2017  - A1c 6.4  - Cardiac/DM diet  - Low dose SSI  - Monitor BGs and adjust insulin PRN        Atrial fibrillation, controlled    - Sinus cindy on admit  - Continue home amiodarone and eliquis  - Telemetry        Long term current use of anticoagulant    - Continue home Eliquis        CKD (chronic kidney disease) stage 3, GFR 30-59 ml/min    - Cr 1.9 on admit; BL ~1.6  - Improving with diuresis  - Will monitor closely        Iron deficiency anemia    - Hgb 11.7 on admit; consistent with baseline  - No s/s bleeding  - Continue home iron supplementation  - Trend daily          VTE Risk Mitigation         Ordered     apixaban tablet 5 mg  2 times daily     Route:  Oral        03/24/18 1434     IP VTE HIGH RISK PATIENT  Once      03/24/18 1455     Place MILADY hose  Until discontinued      03/24/18 1455     Place sequential compression device  Until discontinued      03/24/18 1455     Reason for No Pharmacological VTE Prophylaxis  Once      03/24/18 1455              Avril Nance PA-C  Department of Hospital Medicine   [facility]  Discussed with staff: Dr. Mo

## 2018-03-25 NOTE — ASSESSMENT & PLAN NOTE
- Mild elevation on admit  - Continue home coreg 6.25 mg BID  - Hydralazine PRN  - BP stable, continue to monitor

## 2018-03-25 NOTE — PLAN OF CARE
Problem: Patient Care Overview  Goal: Plan of Care Review  Outcome: Ongoing (interventions implemented as appropriate)  Pt arrived to OBS 9 from ED. Pt aao x 4. Pt on 2L O2 nc  Telemetry monitor in place. Safety precautions maintained. Bed in low position wheels locked. Side rails up x 2. Call light within reach. Pt free of falls. Bg monitored achs.

## 2018-03-25 NOTE — PLAN OF CARE
Problem: Patient Care Overview  Goal: Plan of Care Review  Outcome: Ongoing (interventions implemented as appropriate)  Pt. Has a history of type 2 diabetes last blood sugar 92,pt. Asking for kim crackers because sugar was low assured that this was a great reading,given 2 packs of crackers due to pt. Insisting sugar was low for him. Remains on fall precautions,bed low and locked asked for a diaper so he wouldn't have to sit up to use the urinal,informed he could not have a diaper that they were for people who could not tell when they had to urinate and that he was on accurate I and o. No evidence of breakdown moves about independently in bed. Remains on tele monitor NSR continue current plan of care.

## 2018-03-25 NOTE — PLAN OF CARE
Problem: Patient Care Overview  Goal: Plan of Care Review  Outcome: Ongoing (interventions implemented as appropriate)  POC reviewed with pt. Pt aao x 4. Telemetry monitor in place. BG monitored achs. No pressure ulcers noted. Safety precautions maintained bed in low position wheels locked. Side rails up  X 2. Call light within reach. Pt free of falls.

## 2018-03-25 NOTE — ASSESSMENT & PLAN NOTE
- Last A1c 5.2 in 7/2017  - A1c 6.4  - Cardiac/DM diet  - Low dose SSI  - Monitor BGs and adjust insulin PRN

## 2018-03-25 NOTE — SUBJECTIVE & OBJECTIVE
Interval History: No acute events overnight. This AM, pt lying in bed resting comfortably. Pt reports improved SOB. Endorses frequent urination. No other complaints. Discussed plan of care.    Review of Systems   Constitutional: Negative for chills, diaphoresis, fatigue and fever.   Respiratory: Positive for shortness of breath. Negative for cough, chest tightness and wheezing.    Cardiovascular: Positive for chest pain (resolved). Negative for palpitations and leg swelling.   Gastrointestinal: Negative for abdominal distention, abdominal pain, diarrhea, nausea and vomiting.   Musculoskeletal: Positive for gait problem (uses wheelchair at baseline). Negative for arthralgias, myalgias and neck pain.     Objective:     Vital Signs (Most Recent):  Temp: 99.3 °F (37.4 °C) (03/25/18 0847)  Pulse: 62 (03/25/18 0951)  Resp: 12 (03/25/18 0951)  BP: (!) 150/81 (03/25/18 0847)  SpO2: 98 % (03/25/18 0951) Vital Signs (24h Range):  Temp:  [96.7 °F (35.9 °C)-99.7 °F (37.6 °C)] 99.3 °F (37.4 °C)  Pulse:  [51-63] 62  Resp:  [12-20] 12  SpO2:  [93 %-100 %] 98 %  BP: (127-188)/(65-90) 150/81     Weight: (!) 147 kg (324 lb)  Body mass index is 49.26 kg/m².    Intake/Output Summary (Last 24 hours) at 03/25/18 1110  Last data filed at 03/25/18 1000   Gross per 24 hour   Intake              480 ml   Output             6150 ml   Net            -5670 ml      Physical Exam   Constitutional: He is oriented to person, place, and time. He appears well-developed. No distress.   Cardiovascular: Normal rate, regular rhythm, normal heart sounds and intact distal pulses.    Pulmonary/Chest: Effort normal and breath sounds normal. No respiratory distress. He has no wheezes.   Abdominal: Soft. Bowel sounds are normal. He exhibits no distension. There is no tenderness.   Obese    Musculoskeletal: Normal range of motion. He exhibits no edema or tenderness.   Neurological: He is alert and oriented to person, place, and time. No cranial nerve deficit.    Skin: He is not diaphoretic.   Psychiatric: He has a normal mood and affect. His behavior is normal.       Significant Labs: All pertinent labs within the past 24 hours have been reviewed.    Significant Imaging: I have reviewed all pertinent imaging results/findings within the past 24 hours.

## 2018-03-25 NOTE — HOSPITAL COURSE
Pt admitted to observation for CHF (HFrEF 35%) exacerbation in setting of diet noncompliance. EKG shows sinus cindy without evidence of ischemic changes. Trop WNL. . CXR with evidence of early edema. Diuresed >7L. Multiple discussions regarding diet compliance. Pt discharged back to assisted living facility. PCP f/u as outpatient.

## 2018-03-26 VITALS
HEIGHT: 68 IN | RESPIRATION RATE: 17 BRPM | DIASTOLIC BLOOD PRESSURE: 59 MMHG | SYSTOLIC BLOOD PRESSURE: 118 MMHG | HEART RATE: 58 BPM | TEMPERATURE: 98 F | BODY MASS INDEX: 47.74 KG/M2 | OXYGEN SATURATION: 95 % | WEIGHT: 315 LBS

## 2018-03-26 LAB
ALBUMIN SERPL BCP-MCNC: 2.9 G/DL
ALP SERPL-CCNC: 77 U/L
ALT SERPL W/O P-5'-P-CCNC: 9 U/L
ANION GAP SERPL CALC-SCNC: 10 MMOL/L
AST SERPL-CCNC: 16 U/L
BASOPHILS # BLD AUTO: 0.02 K/UL
BASOPHILS NFR BLD: 0.4 %
BILIRUB SERPL-MCNC: 1.4 MG/DL
BUN SERPL-MCNC: 27 MG/DL
CALCIUM SERPL-MCNC: 8.5 MG/DL
CHLORIDE SERPL-SCNC: 104 MMOL/L
CO2 SERPL-SCNC: 28 MMOL/L
CREAT SERPL-MCNC: 1.9 MG/DL
DIFFERENTIAL METHOD: ABNORMAL
EOSINOPHIL # BLD AUTO: 0.5 K/UL
EOSINOPHIL NFR BLD: 8.3 %
ERYTHROCYTE [DISTWIDTH] IN BLOOD BY AUTOMATED COUNT: 17.7 %
EST. GFR  (AFRICAN AMERICAN): 40.4 ML/MIN/1.73 M^2
EST. GFR  (NON AFRICAN AMERICAN): 34.9 ML/MIN/1.73 M^2
GLUCOSE SERPL-MCNC: 75 MG/DL
HCT VFR BLD AUTO: 36.1 %
HGB BLD-MCNC: 11.4 G/DL
IMM GRANULOCYTES # BLD AUTO: 0.01 K/UL
IMM GRANULOCYTES NFR BLD AUTO: 0.2 %
LYMPHOCYTES # BLD AUTO: 1.3 K/UL
LYMPHOCYTES NFR BLD: 22 %
MAGNESIUM SERPL-MCNC: 1.9 MG/DL
MCH RBC QN AUTO: 27.9 PG
MCHC RBC AUTO-ENTMCNC: 31.6 G/DL
MCV RBC AUTO: 88 FL
MONOCYTES # BLD AUTO: 0.9 K/UL
MONOCYTES NFR BLD: 15 %
NEUTROPHILS # BLD AUTO: 3.1 K/UL
NEUTROPHILS NFR BLD: 54.1 %
NRBC BLD-RTO: 0 /100 WBC
PHOSPHATE SERPL-MCNC: 4.1 MG/DL
PLATELET # BLD AUTO: 118 K/UL
PMV BLD AUTO: 10.8 FL
POCT GLUCOSE: 115 MG/DL (ref 70–110)
POCT GLUCOSE: 77 MG/DL (ref 70–110)
POTASSIUM SERPL-SCNC: 3.8 MMOL/L
PROT SERPL-MCNC: 6.8 G/DL
RBC # BLD AUTO: 4.09 M/UL
SODIUM SERPL-SCNC: 142 MMOL/L
WBC # BLD AUTO: 5.68 K/UL

## 2018-03-26 PROCEDURE — 82962 GLUCOSE BLOOD TEST: CPT

## 2018-03-26 PROCEDURE — A4216 STERILE WATER/SALINE, 10 ML: HCPCS | Performed by: PHYSICIAN ASSISTANT

## 2018-03-26 PROCEDURE — 25000003 PHARM REV CODE 250: Performed by: PHYSICIAN ASSISTANT

## 2018-03-26 PROCEDURE — G0378 HOSPITAL OBSERVATION PER HR: HCPCS

## 2018-03-26 PROCEDURE — 36415 COLL VENOUS BLD VENIPUNCTURE: CPT

## 2018-03-26 PROCEDURE — 99217 PR OBSERVATION CARE DISCHARGE: CPT | Mod: ,,, | Performed by: PHYSICIAN ASSISTANT

## 2018-03-26 PROCEDURE — 84100 ASSAY OF PHOSPHORUS: CPT

## 2018-03-26 PROCEDURE — 25000242 PHARM REV CODE 250 ALT 637 W/ HCPCS: Performed by: PHYSICIAN ASSISTANT

## 2018-03-26 PROCEDURE — 94761 N-INVAS EAR/PLS OXIMETRY MLT: CPT

## 2018-03-26 PROCEDURE — 94640 AIRWAY INHALATION TREATMENT: CPT

## 2018-03-26 PROCEDURE — 85025 COMPLETE CBC W/AUTO DIFF WBC: CPT

## 2018-03-26 PROCEDURE — 83735 ASSAY OF MAGNESIUM: CPT

## 2018-03-26 PROCEDURE — 82962 GLUCOSE BLOOD TEST: CPT | Mod: 91

## 2018-03-26 PROCEDURE — 80053 COMPREHEN METABOLIC PANEL: CPT

## 2018-03-26 RX ORDER — BENZONATATE 100 MG/1
100 CAPSULE ORAL 3 TIMES DAILY PRN
Status: DISCONTINUED | OUTPATIENT
Start: 2018-03-26 | End: 2018-03-26 | Stop reason: HOSPADM

## 2018-03-26 RX ADMIN — ATORVASTATIN CALCIUM 40 MG: 20 TABLET, FILM COATED ORAL at 09:03

## 2018-03-26 RX ADMIN — AMIODARONE HYDROCHLORIDE 200 MG: 200 TABLET ORAL at 09:03

## 2018-03-26 RX ADMIN — IPRATROPIUM BROMIDE AND ALBUTEROL SULFATE 3 ML: .5; 3 SOLUTION RESPIRATORY (INHALATION) at 10:03

## 2018-03-26 RX ADMIN — Medication 3 ML: at 06:03

## 2018-03-26 RX ADMIN — BENZONATATE 100 MG: 100 CAPSULE ORAL at 10:03

## 2018-03-26 RX ADMIN — ASPIRIN 81 MG CHEWABLE TABLET 81 MG: 81 TABLET CHEWABLE at 09:03

## 2018-03-26 RX ADMIN — GABAPENTIN 300 MG: 300 CAPSULE ORAL at 09:03

## 2018-03-26 RX ADMIN — ALLOPURINOL 300 MG: 100 TABLET ORAL at 09:03

## 2018-03-26 RX ADMIN — APIXABAN 5 MG: 5 TABLET, FILM COATED ORAL at 09:03

## 2018-03-26 RX ADMIN — FERROUS SULFATE TAB EC 325 MG (65 MG FE EQUIVALENT) 325 MG: 325 (65 FE) TABLET DELAYED RESPONSE at 09:03

## 2018-03-26 NOTE — PLAN OF CARE
Problem: Patient Care Overview  Goal: Plan of Care Review  Outcome: Ongoing (interventions implemented as appropriate)  Last blood sugar 108,continue to monitor ac and hs. Remains on fall precautions,bed low and locked side rails up. No evidence of breakdown noted,pt. Turns independently in bed. Remains on tele monitor Rate 60-70's,no complaint of CP or SOB. Continue current plan of care.

## 2018-03-26 NOTE — ASSESSMENT & PLAN NOTE
- Hgb 11.7 on admit; stable and consistent with baseline  - No s/s bleeding  - Continue home iron supplementation

## 2018-03-26 NOTE — PROGRESS NOTES
Pt dc to [facility]. Transported by [facility] via  along with personal belongings. Pt stable. NAD PIV removed, catheter tip intact

## 2018-03-26 NOTE — PLAN OF CARE
F/U d/c appt. Scheduled with Dr. Hemant Markham for urs. April 5, 2018 @ 11a.       03/26/18 1052   Final Note   Assessment Type Final Discharge Note   Discharge Disposition (Assisted living)   Hospital Follow Up  Appt(s) scheduled? Yes

## 2018-03-26 NOTE — ASSESSMENT & PLAN NOTE
HFrEF (35%); diet noncompliance  - HDS on admit. Stable on RA  - Afebrile without leukocytosis  - EKG shows sinus cindy without evidence of ischemic changes. Trop WNL  -   - CXR with evidence of early edema  - Received IV lasix 60 mg x1 in the ED  - IV Lasix 40 mg BID  - Diuresed >7L  - Multiple discussions regarding diet compliance. Pt discharged back to assisted living facility. PCP f/u as outpatient.

## 2018-03-26 NOTE — PLAN OF CARE
SW was called by the [facility] to be informed that they could not find a company to transfer the pt due to his weight.  GERARDO called Steve and set up  van bill to [facility] for the pt.      Kimberly De Jesus, [facility] [phone]

## 2018-03-26 NOTE — PLAN OF CARE
Payor: MEDICARE / Plan: MEDICARE PART A & B / Product Type: Government /      Hemant Markham MD       [facility] - [place], [place] - [address]  [address] [address]  Phone: [phone] Fax: [phone]      Extended Emergency Contact Information  Primary Emergency Contact: Ruthie Fragoso   [place]  Mobile Phone: [phone]  Relation: Sister  Preferred language: English  Secondary Emergency Contact: Keyla Giordano [address] [address]  Home Phone: [phone]  Relation: Daughter  Preferred language: English        03/26/18 1005   Discharge Assessment   Assessment Type Discharge Planning Assessment   Confirmed/corrected address and phone number on facesheet? Yes   Assessment information obtained from? Patient   Expected Length of Stay (days) 2   Communicated expected length of stay with patient/caregiver yes   Prior to hospitilization cognitive status: Alert/Oriented   Prior to hospitalization functional status: Wheelchair Bound;Needs Assistance   Current cognitive status: Alert/Oriented   Current Functional Status: Wheelchair Bound;Needs Assistance   Lives With facility resident   Able to Return to Prior Arrangements yes   Is patient able to care for self after discharge? Yes  (with assistance Lives at [facility])   Patient's perception of discharge disposition assisted living   Readmission Within The Last 30 Days no previous admission in last 30 days   Patient currently being followed by outpatient case management? No   Patient currently receives any other outside agency services? No   Equipment Currently Used at Home wheelchair   Do you have any problems affording any of your prescribed medications? No   Is the patient taking medications as prescribed? yes   Does the patient have transportation home? Yes   Transportation Available family or friend will provide   Does the patient receive  services at the [facility]? (On [place])   Discharge Plan A Assisted Living   Discharge Plan B Assisted Living   Patient/Family In Agreement With Plan yes

## 2018-03-26 NOTE — PLAN OF CARE
Pt lives at [facility] ([phone], [phone]). GERARDO spoke with Maura the nurse there.  She would like a dc summary faxed to her and the nurse to call report.  SW asked his nurse to call report.  SW set up transportation with the [facility] for the pt.  GERARDO requested a 2pm  as pt needs to eat lunch but he has not received it yet.    Kimberly De Jesus, [facility] x [phone]

## 2018-03-27 NOTE — PLAN OF CARE
3/27/18 1153    Discharge summary faxed to Betty moreau/[facility] (f) [phone] as requested.

## 2018-10-10 DIAGNOSIS — I48.91 ATRIAL FIBRILLATION, CONTROLLED: ICD-10-CM

## 2018-10-10 DIAGNOSIS — I50.22 CHRONIC SYSTOLIC HEART FAILURE: ICD-10-CM

## 2018-10-10 RX ORDER — CARVEDILOL 6.25 MG/1
6.25 TABLET ORAL 2 TIMES DAILY WITH MEALS
Qty: 180 TABLET | Refills: 0 | Status: SHIPPED | OUTPATIENT
Start: 2018-10-10 | End: 2019-01-25 | Stop reason: SDUPTHER

## 2018-10-10 RX ORDER — AMIODARONE HYDROCHLORIDE 200 MG/1
200 TABLET ORAL DAILY
Qty: 90 TABLET | Refills: 0 | Status: SHIPPED | OUTPATIENT
Start: 2018-10-10 | End: 2018-10-11 | Stop reason: SDUPTHER

## 2018-10-10 RX ORDER — ATORVASTATIN CALCIUM 40 MG/1
40 TABLET, FILM COATED ORAL DAILY
Qty: 90 TABLET | Refills: 0 | Status: SHIPPED | OUTPATIENT
Start: 2018-10-10 | End: 2019-04-04 | Stop reason: SDUPTHER

## 2018-10-10 RX ORDER — FUROSEMIDE 40 MG/1
40 TABLET ORAL DAILY
Qty: 90 TABLET | Refills: 0 | Status: SHIPPED | OUTPATIENT
Start: 2018-10-10 | End: 2018-11-07 | Stop reason: SDUPTHER

## 2018-10-10 RX ORDER — NAPROXEN SODIUM 220 MG/1
81 TABLET, FILM COATED ORAL DAILY
Qty: 90 TABLET | Refills: 0 | COMMUNITY
Start: 2018-10-10 | End: 2018-10-11 | Stop reason: SDUPTHER

## 2018-10-10 NOTE — TELEPHONE ENCOUNTER
----- Message from Joanie Randle sent at 10/10/2018 11:50 AM CDT -----  Contact: Pt daughter  Pt need refills on meds listed. Pacerone 200 mg, Lipitor 40 mg, Aspirin 81 mg, Coreg 6.25 mg, Eliquis 5 mg, and Lasix 40 mg. Please send to [facility]  LOV 3/29/17 Dr. Wei    Thanks

## 2018-10-11 DIAGNOSIS — I48.91 ATRIAL FIBRILLATION, CONTROLLED: ICD-10-CM

## 2018-10-11 DIAGNOSIS — I50.22 CHRONIC SYSTOLIC HEART FAILURE: ICD-10-CM

## 2018-10-11 RX ORDER — NAPROXEN SODIUM 220 MG/1
TABLET, FILM COATED ORAL
Qty: 30 TABLET | Status: CANCELLED | OUTPATIENT
Start: 2018-10-11

## 2018-10-11 RX ORDER — NAPROXEN SODIUM 220 MG/1
81 TABLET, FILM COATED ORAL DAILY
Qty: 90 TABLET | Refills: 0 | Status: SHIPPED | OUTPATIENT
Start: 2018-10-11 | End: 2018-12-18 | Stop reason: SDUPTHER

## 2018-10-11 NOTE — TELEPHONE ENCOUNTER
----- Message from Beatrice Cabrera sent at 10/11/2018 12:25 PM CDT -----  Contact: Pt called   Pt need a refill on mediation aspirin 81 MG Chew 90 day supply and send to [facility] - [facility] - [place]  [address] [facility] [phone] (Phone)  [phone] (Fax).Last visit 3/29/17 Dr. Wei. Thank you.

## 2018-10-12 RX ORDER — AMIODARONE HYDROCHLORIDE 200 MG/1
TABLET ORAL
Qty: 30 TABLET | Refills: 3 | Status: SHIPPED | OUTPATIENT
Start: 2018-10-11 | End: 2018-11-06 | Stop reason: SDUPTHER

## 2018-10-12 RX ORDER — ALLOPURINOL 300 MG/1
TABLET ORAL
Qty: 30 TABLET | Refills: 3 | Status: SHIPPED | OUTPATIENT
Start: 2018-10-11 | End: 2018-11-06 | Stop reason: SDUPTHER

## 2018-10-12 RX ORDER — APIXABAN 5 MG/1
TABLET, FILM COATED ORAL
Qty: 60 TABLET | Refills: 3 | Status: SHIPPED | OUTPATIENT
Start: 2018-10-11 | End: 2018-11-06 | Stop reason: SDUPTHER

## 2018-10-22 ENCOUNTER — TELEPHONE (OUTPATIENT)
Dept: CARDIOLOGY | Facility: CLINIC | Age: 68
End: 2018-10-22

## 2018-10-22 DIAGNOSIS — R00.2 PALPITATION: Primary | ICD-10-CM

## 2018-11-06 ENCOUNTER — OFFICE VISIT (OUTPATIENT)
Dept: CARDIOLOGY | Facility: CLINIC | Age: 68
End: 2018-11-06
Payer: MEDICARE

## 2018-11-06 ENCOUNTER — HOSPITAL ENCOUNTER (OUTPATIENT)
Dept: CARDIOLOGY | Facility: CLINIC | Age: 68
Discharge: HOME | End: 2018-11-06
Payer: MEDICARE

## 2018-11-06 VITALS
WEIGHT: 315 LBS | BODY MASS INDEX: 49.44 KG/M2 | SYSTOLIC BLOOD PRESSURE: 130 MMHG | HEIGHT: 67 IN | HEART RATE: 57 BPM | DIASTOLIC BLOOD PRESSURE: 90 MMHG

## 2018-11-06 DIAGNOSIS — I48.91 ATRIAL FIBRILLATION, CONTROLLED: ICD-10-CM

## 2018-11-06 DIAGNOSIS — E78.5 DYSLIPIDEMIA: ICD-10-CM

## 2018-11-06 DIAGNOSIS — I10 ESSENTIAL HYPERTENSION: ICD-10-CM

## 2018-11-06 DIAGNOSIS — N18.30 CKD (CHRONIC KIDNEY DISEASE) STAGE 3, GFR 30-59 ML/MIN: ICD-10-CM

## 2018-11-06 DIAGNOSIS — R00.2 PALPITATION: ICD-10-CM

## 2018-11-06 DIAGNOSIS — I50.42 CHRONIC COMBINED SYSTOLIC AND DIASTOLIC HEART FAILURE: ICD-10-CM

## 2018-11-06 DIAGNOSIS — I50.22 CHRONIC SYSTOLIC HEART FAILURE: ICD-10-CM

## 2018-11-06 DIAGNOSIS — G47.33 OBSTRUCTIVE SLEEP APNEA TREATED WITH CONTINUOUS POSITIVE AIRWAY PRESSURE (CPAP): ICD-10-CM

## 2018-11-06 PROCEDURE — 99214 OFFICE O/P EST MOD 30 MIN: CPT | Mod: S$PBB,25,GC, | Performed by: STUDENT IN AN ORGANIZED HEALTH CARE EDUCATION/TRAINING PROGRAM

## 2018-11-06 PROCEDURE — 99214 OFFICE O/P EST MOD 30 MIN: CPT | Mod: PBBFAC,25 | Performed by: STUDENT IN AN ORGANIZED HEALTH CARE EDUCATION/TRAINING PROGRAM

## 2018-11-06 PROCEDURE — 93010 ELECTROCARDIOGRAM REPORT: CPT | Mod: S$PBB,,, | Performed by: INTERNAL MEDICINE

## 2018-11-06 PROCEDURE — 99999 PR PBB SHADOW E&M-EST. PATIENT-LVL IV: CPT | Mod: PBBFAC,GC,, | Performed by: STUDENT IN AN ORGANIZED HEALTH CARE EDUCATION/TRAINING PROGRAM

## 2018-11-06 PROCEDURE — 93005 ELECTROCARDIOGRAM TRACING: CPT | Mod: PBBFAC | Performed by: INTERNAL MEDICINE

## 2018-11-06 RX ORDER — ALLOPURINOL 300 MG/1
300 TABLET ORAL EVERY MORNING
Qty: 90 TABLET | Refills: 3 | Status: ON HOLD | OUTPATIENT
Start: 2018-11-06 | End: 2020-12-16 | Stop reason: HOSPADM

## 2018-11-06 RX ORDER — AMIODARONE HYDROCHLORIDE 200 MG/1
200 TABLET ORAL EVERY MORNING
Qty: 90 TABLET | Refills: 3 | Status: SHIPPED | OUTPATIENT
Start: 2018-11-06 | End: 2020-08-24

## 2018-11-06 RX ORDER — ALLOPURINOL 300 MG/1
300 TABLET ORAL EVERY MORNING
Qty: 30 TABLET | Refills: 3 | Status: SHIPPED | OUTPATIENT
Start: 2018-11-06 | End: 2018-11-06 | Stop reason: SDUPTHER

## 2018-11-06 RX ORDER — AMIODARONE HYDROCHLORIDE 200 MG/1
200 TABLET ORAL EVERY MORNING
Qty: 30 TABLET | Refills: 3 | Status: SHIPPED | OUTPATIENT
Start: 2018-11-06 | End: 2018-11-06 | Stop reason: SDUPTHER

## 2018-11-06 RX ORDER — VALSARTAN 40 MG/1
20 TABLET ORAL DAILY
Qty: 45 TABLET | Refills: 3 | Status: SHIPPED | OUTPATIENT
Start: 2018-11-06 | End: 2018-12-31 | Stop reason: ALTCHOICE

## 2018-11-06 RX ORDER — VALSARTAN 40 MG/1
20 TABLET ORAL DAILY
Qty: 45 TABLET | Refills: 3 | Status: SHIPPED | OUTPATIENT
Start: 2018-11-06 | End: 2018-11-06 | Stop reason: SDUPTHER

## 2018-11-06 NOTE — PROGRESS NOTES
PCP - Hemant Markham MD  Subjective:   Patient ID:  Hemant Kennedy Jr. is a 68 y.o. male who presents for annual follow up, previously seen by Dr Wei.    Medical history is notable for HFrEF 2/2 NICM (EF 35-40%), HTN, DLD, DM2, AF (on apixaban), hx CVA w/ L hemineglect, hx DVT, ERIC (compliant on CPAP), morbid obesity (BMI 50.43 kg/m².)    Patient offers no specific complaints on today's visit. He denies chest discomfort, palpitations, shortness of breath, orthopnea, PND. Has chronic peripheral swelling that is unchanged. Patient is unfortunately wheelchair bound at baseline due to what sounds like advanced OA of bilateral knees (not amenable to surgery / not a surgical candidate). He needs assistance with transfers as well and cannot stand up for long - as a result does not use a weight scale to monitor daily weights. His weight on today's visit is unchanged since March 2018.     Of note patient was diagnosed with HFrEF in 2016. Discussions held regarding ICD implantation for primary prevention purposes which he has declined in the past, and continues to decline today despite extensive discussion.     History:     Past Medical History:   Diagnosis Date    Anticoagulant long-term use     Arthritis     Atrial fibrillation     Cardiomyopathy     home O2    Cataract     CHF (congestive heart failure)     CKD (chronic kidney disease) stage 3, GFR 30-59 ml/min 11/27/2015    Diabetes mellitus     DVT (deep venous thrombosis)     Hypertension     Sleep apnea     Stroke 2010    with residual effects W/C bound - Right Occipital     Past Surgical History:   Procedure Laterality Date    CATARACT EXTRACTION W/  INTRAOCULAR LENS IMPLANT Left 09/18/2017    Dr. Zuniga    INSERTION-INTRAOCULAR LENS (IOL) Left 9/18/2017    Performed by [facility]iga [facility] at [facility] OR    PHACOEMULSIFICATION-ASPIRATION-CATARACT Left 9/18/2017    Performed by [facility]to [facility]iga MD at [facility] OR     Social History     Tobacco Use    Smoking  status: Never Smoker    Smokeless tobacco: Never Used   Substance Use Topics    Alcohol use: No     Family History   Problem Relation Age of Onset    Heart attack Mother     Cataracts Mother     Glaucoma Sister     Glaucoma Sister        Meds:     Review of patient's allergies indicates:   Allergen Reactions    Spironolactone      Hyperkalemia         Current Outpatient Medications:     acetaminophen (TYLENOL) 500 MG tablet, Take 2 tablets (1,000 mg total) by mouth 3 (three) times daily as needed for Pain., Disp: 15 tablet, Rfl: 0    allopurinol (ZYLOPRIM) 300 MG tablet, TAKE 1 TABLET BY MOUTH IN THE MORNING, Disp: 30 tablet, Rfl: 3    amiodarone (PACERONE) 200 MG Tab, TAKE 1 TABLET BY MOUTH IN THE MORNING, Disp: 30 tablet, Rfl: 3    aspirin 81 MG Chew, Take 1 tablet (81 mg total) by mouth once daily., Disp: 90 tablet, Rfl: 0    atorvastatin (LIPITOR) 40 MG tablet, Take 1 tablet (40 mg total) by mouth once daily. 1 Tablet Oral Every day, Disp: 90 tablet, Rfl: 0    carvedilol (COREG) 6.25 MG tablet, Take 1 tablet (6.25 mg total) by mouth 2 (two) times daily with meals., Disp: 180 tablet, Rfl: 0    ELIQUIS 5 mg Tab, TAKE 1 TABLET twice a day, Disp: 60 tablet, Rfl: 3    ergocalciferol (ERGOCALCIFEROL) 50,000 unit Cap, Take 1 capsule (50,000 Units total) by mouth every 7 days. (Patient taking differently: Take 50,000 Units by mouth every Monday. ), Disp: 12 capsule, Rfl: 3    ferrous sulfate 325 (65 FE) MG EC tablet, Take 1 tablet (325 mg total) by mouth 2 (two) times daily with meals., Disp: 60 tablet, Rfl: 5    furosemide (LASIX) 40 MG tablet, Take 1 tablet (40 mg total) by mouth once daily., Disp: 90 tablet, Rfl: 0    HYDROPHILIC CREAM (TRIAD WOUND DRESSING TOP), Apply topically daily as needed., Disp: , Rfl:     ONE TOUCH ULTRA TEST Strp, Test blood sugar two times daily, Disp: , Rfl:     potassium chloride SA (K-DUR,KLOR-CON) 10 MEQ tablet, Take 1 tablet by mouth once daily for 5 days, Disp: ,  "Rfl:     collagenase (SANTYL) ointment, Apply topically once daily. Apply to wound daily as directed., Disp: 90 g, Rfl: 0    fluticasone (FLONASE) 50 mcg/actuation nasal spray, 2 sprays by Each Nare route once daily. (Patient taking differently: 2 sprays by Each Nare route daily as needed for Allergies. ), Disp: , Rfl: 0    gabapentin (NEURONTIN) 300 MG capsule, Take 1 capsule (300 mg total) by mouth 3 (three) times daily., Disp: 90 capsule, Rfl: 11    sildenafil (VIAGRA) 25 MG tablet, Take 2 tablets (50 mg total) by mouth daily as needed for Erectile Dysfunction., Disp: 30 tablet, Rfl: 2      Review of Systems   Constitutional: Negative for chills, diaphoresis, fever, malaise/fatigue and weight loss.   Eyes: Negative for blurred vision and double vision.   Respiratory: Negative for cough, shortness of breath and wheezing.    Cardiovascular: Positive for leg swelling. Negative for chest pain, palpitations, orthopnea and PND.   Gastrointestinal: Negative for abdominal pain, constipation, diarrhea, nausea and vomiting.   Neurological: Positive for weakness. Negative for dizziness, focal weakness, loss of consciousness and headaches.   Psychiatric/Behavioral: Negative for depression. The patient is not nervous/anxious.        Objective:   BP (!) 130/90   Pulse (!) 57   Ht 5' 7" (1.702 m)   Wt (!) 146.1 kg (321 lb 15.7 oz)   BMI 50.43 kg/m²   Physical Exam   Constitutional: He is oriented to person, place, and time and well-developed, well-nourished, and in no distress. No distress.   HENT:   Head: Normocephalic and atraumatic.   Mouth/Throat: Oropharynx is clear and moist.   Eyes: Conjunctivae and EOM are normal. Pupils are equal, round, and reactive to light.   Neck: Normal range of motion. Neck supple.   Unable to visualize JVD due to body habitus   Cardiovascular: Normal rate, regular rhythm and normal heart sounds.   Distant heart sounds, normal S1/S2   Pulmonary/Chest: Effort normal. No respiratory " distress. He has no wheezes. He has no rales. He exhibits no tenderness.   Diminished breath sounds throughout   Abdominal: Soft. Bowel sounds are normal. He exhibits no distension. There is no tenderness.   Musculoskeletal: Normal range of motion. He exhibits edema (2+ pitting).   Neurological: He is alert and oriented to person, place, and time.   Skin: Skin is warm and dry. No erythema.   Psychiatric: Mood, memory, affect and judgment normal.       Labs:     Lab Results   Component Value Date     03/26/2018    K 3.8 03/26/2018     03/26/2018    CO2 28 03/26/2018    BUN 27 (H) 03/26/2018    CREATININE 1.9 (H) 03/26/2018    ANIONGAP 10 03/26/2018     Lab Results   Component Value Date    HGBA1C 6.4 (H) 03/24/2018     Lab Results   Component Value Date     (H) 03/24/2018    BNP 1,220 (H) 07/25/2017     (H) 04/27/2017       Lab Results   Component Value Date    WBC 5.68 03/26/2018    HGB 11.4 (L) 03/26/2018    HCT 36.1 (L) 03/26/2018    HCT 41 04/28/2017     (L) 03/26/2018    GRAN 3.1 03/26/2018    GRAN 54.1 03/26/2018     Lab Results   Component Value Date    CHOL 131 01/18/2017    HDL 66 01/18/2017    LDLCALC 60.4 (L) 01/18/2017    TRIG 23 (L) 01/18/2017       Lab Results   Component Value Date     03/26/2018    K 3.8 03/26/2018     03/26/2018    CO2 28 03/26/2018    BUN 27 (H) 03/26/2018    CREATININE 1.9 (H) 03/26/2018    ANIONGAP 10 03/26/2018     Lab Results   Component Value Date    HGBA1C 6.4 (H) 03/24/2018     Lab Results   Component Value Date     (H) 03/24/2018    BNP 1,220 (H) 07/25/2017     (H) 04/27/2017    Lab Results   Component Value Date    WBC 5.68 03/26/2018    HGB 11.4 (L) 03/26/2018    HCT 36.1 (L) 03/26/2018    HCT 41 04/28/2017     (L) 03/26/2018    GRAN 3.1 03/26/2018    GRAN 54.1 03/26/2018     Lab Results   Component Value Date    CHOL 131 01/18/2017    HDL 66 01/18/2017    LDLCALC 60.4 (L) 01/18/2017    TRIG 23 (L)  01/18/2017                Cardiovascular Imaging:   EKG: sinus bradycardia with first degree AVB    Echo 7/2017: CONCLUSIONS:     1 - Eccentric hypertrophy.     2 - Moderately depressed left ventricular systolic function (EF 35-40%).     3 - Restrictive LV filling pattern, indicating markedly elevated LAP (grade 3 diastolic dysfunction).     4 - Biatrial enlargement.     5 - Right ventricular enlargement with moderately depressed systolic function.     6 - Pulmonary hypertension. The estimated PA systolic pressure is 80 mmHg.     7 - Trivial aortic regurgitation.     8 - Trivial mitral regurgitation.     9 - Moderate tricuspid regurgitation.     10 - Increased central venous pressure.        Assessment & Plan:     Chronic combined systolic and diastolic heart failure  Patient diagnosed with HFrEF 2/2 NICM in 2016.   EF 35-40% by echo in 2017.  Patient does not appear to be in decompensated state on today's visit.   Previously on ACEi and spironolactone (DC'd due to side effects).    Plan:  -- Continue carvedilol and furosemide.   -- Prescribed valsartan 20 mg daily. Repeat BMP 1-2 weeks to follow renal function.   -- Readdressed possible ICD implantation which patient declined again.     CKD (chronic kidney disease) stage 3, GFR 30-59 ml/min  -- Chronic, stable. Repeat BMP as above with institution of ARB.    Atrial fibrillation, controlled  -- Patient in NSR today, continue apixaban, amiodarone, and carvedilol.   -- Check LFTs, TSH in 1-2 weeks (with his BMP as above).    Obstructive sleep apnea treated with continuous positive airway pressure (CPAP)  -- Continue nocturnal CPAP.    Hypertension  -- BP controlled, continue carvedilol. Added ARB as above.     Dyslipidemia  -- Continue atorvastatin.       RTC 1 year or sooner if issues arise.     Signed:  Simin Nick M.D.  Cardiovascular Fellow PGY-IV  [facility]

## 2018-11-07 DIAGNOSIS — I50.22 CHRONIC SYSTOLIC HEART FAILURE: ICD-10-CM

## 2018-11-07 RX ORDER — FUROSEMIDE 40 MG/1
40 TABLET ORAL DAILY
Qty: 90 TABLET | Refills: 0 | Status: SHIPPED | OUTPATIENT
Start: 2018-11-07 | End: 2019-05-07 | Stop reason: SDUPTHER

## 2018-11-08 PROBLEM — I50.42 CHRONIC COMBINED SYSTOLIC AND DIASTOLIC HEART FAILURE: Status: ACTIVE | Noted: 2017-07-26

## 2018-11-08 PROBLEM — E78.5 DYSLIPIDEMIA: Status: ACTIVE | Noted: 2018-11-08

## 2018-12-18 DIAGNOSIS — I50.22 CHRONIC SYSTOLIC HEART FAILURE: ICD-10-CM

## 2018-12-20 RX ORDER — NAPROXEN SODIUM 220 MG/1
TABLET, FILM COATED ORAL
Qty: 90 TABLET | Refills: 3 | Status: SHIPPED | OUTPATIENT
Start: 2018-12-20 | End: 2019-11-29 | Stop reason: SDUPTHER

## 2018-12-28 ENCOUNTER — TELEPHONE (OUTPATIENT)
Dept: CARDIOLOGY | Facility: CLINIC | Age: 68
End: 2018-12-28

## 2018-12-28 NOTE — TELEPHONE ENCOUNTER
Dr. Nick, The pt's pharmacy sent a fax requesting a replacement for valsartan due to recall. Pt taking one half of 40 mg tablet daily per Epic med list. Please send to [facility] - retail pharmacy. Thanks, Denita

## 2018-12-31 RX ORDER — LOSARTAN POTASSIUM 25 MG/1
12.5 TABLET ORAL DAILY
Qty: 45 TABLET | Refills: 3 | Status: SHIPPED | OUTPATIENT
Start: 2018-12-31 | End: 2019-11-27 | Stop reason: SDUPTHER

## 2018-12-31 NOTE — PROGRESS NOTES
DC'd patient's valsartan in favor of losartan due to medication recall. New Rx sent to [facility] pharmacy as requested (#[phone]).

## 2019-01-25 DIAGNOSIS — I50.22 CHRONIC SYSTOLIC HEART FAILURE: ICD-10-CM

## 2019-01-27 RX ORDER — CARVEDILOL 6.25 MG/1
TABLET ORAL
Qty: 180 TABLET | Refills: 0 | Status: SHIPPED | OUTPATIENT
Start: 2019-01-27 | End: 2019-04-04 | Stop reason: SDUPTHER

## 2019-02-18 RX ORDER — ALLOPURINOL 300 MG/1
300 TABLET ORAL EVERY MORNING
Qty: 90 TABLET | Refills: 3 | OUTPATIENT
Start: 2019-02-18

## 2019-03-12 DIAGNOSIS — I50.22 CHRONIC SYSTOLIC HEART FAILURE: ICD-10-CM

## 2019-03-12 DIAGNOSIS — I48.91 ATRIAL FIBRILLATION, CONTROLLED: ICD-10-CM

## 2019-03-12 RX ORDER — AMIODARONE HYDROCHLORIDE 200 MG/1
TABLET ORAL
Qty: 30 TABLET | Refills: 3 | Status: SHIPPED | OUTPATIENT
Start: 2019-03-12 | End: 2019-06-06 | Stop reason: SDUPTHER

## 2019-04-04 DIAGNOSIS — I50.22 CHRONIC SYSTOLIC HEART FAILURE: ICD-10-CM

## 2019-04-04 DIAGNOSIS — I48.91 ATRIAL FIBRILLATION, CONTROLLED: ICD-10-CM

## 2019-04-08 RX ORDER — ATORVASTATIN CALCIUM 40 MG/1
TABLET, FILM COATED ORAL
Qty: 90 TABLET | Refills: 3 | Status: SHIPPED | OUTPATIENT
Start: 2019-04-08 | End: 2020-02-26 | Stop reason: SDUPTHER

## 2019-04-08 RX ORDER — CARVEDILOL 6.25 MG/1
TABLET ORAL
Qty: 180 TABLET | Refills: 3 | Status: SHIPPED | OUTPATIENT
Start: 2019-04-08 | End: 2020-02-26 | Stop reason: SDUPTHER

## 2019-04-08 NOTE — PROGRESS NOTES
Subjective:       Patient ID: Hemant Kennedy Jr. is a 66 y.o. male.    Chief Complaint: Wound Check    Wound Check        This patient is seen today for reevaluation of wounds to the left foott.  He has been using santyl daily on the wounds and they are healing as evidenced by wound contracture. He has home health services through [facility].  He is afebrile.  He denies increased redness, swelling or purulent drainage.  His pain level is 2/10.  His wound healing is complicated by type II diabetes.    Review of Systems   Constitutional: Negative for chills, diaphoresis and fever.   HENT: Positive for rhinorrhea. Negative for hearing loss, postnasal drip, sinus pressure, sneezing, sore throat, tinnitus and trouble swallowing.    Eyes: Negative for visual disturbance.   Respiratory: Positive for apnea (CPAP). Negative for cough, shortness of breath and wheezing.    Cardiovascular: Positive for leg swelling. Negative for chest pain and palpitations.   Gastrointestinal: Negative for constipation, diarrhea, nausea and vomiting.   Genitourinary: Positive for difficulty urinating (Incontinence). Negative for dysuria, frequency and hematuria.   Musculoskeletal: Negative for arthralgias, back pain and joint swelling.   Skin: Positive for wound.   Neurological: Negative for dizziness, weakness, light-headedness and headaches.   Hematological: Bruises/bleeds easily.   Psychiatric/Behavioral: Positive for confusion, decreased concentration, dysphoric mood and sleep disturbance. The patient is not nervous/anxious.        Objective:      Physical Exam   Constitutional: He is oriented to person, place, and time. He appears well-developed and well-nourished. No distress.   HENT:   Head: Normocephalic and atraumatic.   Neck: Normal range of motion.   Cardiovascular:   Pedal pulses non-palpable   Pulmonary/Chest: Effort normal. No respiratory distress.   Musculoskeletal: Normal range of motion. He exhibits edema (3-4+lower  leg). He exhibits no tenderness.        Feet:    Neurological: He is alert and oriented to person, place, and time.   Skin: Skin is warm and dry. No rash noted. He is not diaphoretic. No erythema.   Psychiatric: He has a normal mood and affect. His behavior is normal. Judgment and thought content normal.   Nursing note and vitals reviewed.      ALower Extremities Segmental Pressure [mmHg]:                    Right             Left  _______________________________________________________________  Brachial          115               100  Calf              255               255  Posterior Tibial  255               255  Dorsalis Pedis    255               255  ALFONSO (Post. Tib.)  2.22              2.22  ALFONSO (Dors. 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 PVR waveforms indicate mild peripheral arterial obstructive disease.     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 PVR waveforms indicate mild peripheral arterial obstructive disease. vascular lab study performed today revealed:    ..  Hemoglobin A1C   Date Value Ref Range Status   11/10/2016 7.6 (H) 4.5 - 6.2 % Final     Comment:     According to ADA guidelines, hemoglobin A1C <7.0% represents  optimal control in non-pregnant diabetic patients.  Different  metrics may apply to specific populations.   Standards of Medical Care in Diabetes - 2016.  For the purpose of screening for the presence of diabetes:  <5.7%     Consistent with the absence of diabetes  5.7-6.4%  Consistent with increasing risk for diabetes   (prediabetes)  >or=6.5%  Consistent with diabetes  Currently no consensus exists for use of hemoglobin A1C  for diagnosis of diabetes for children.     07/16/2016 6.3 (H) 4.5 - 6.2 % Final     Comment:     According to ADA guidelines, hemoglobin A1C <7.0% represents  optimal control in non-pregnant diabetic  "patients.  Different  metrics may apply to specific populations.   Standards of Medical Care in Diabetes - 2016.  For the purpose of screening for the presence of diabetes:  <5.7%     Consistent with the absence of diabetes  5.7-6.4%  Consistent with increasing risk for diabetes   (prediabetes)  >or=6.5%  Consistent with diabetes  Currently no consensus exists for use of hemoglobin A1C  for diagnosis of diabetes for children.     11/27/2015 6.9 (H) 4.5 - 6.2 % Final     Assessment:       1. Uncontrolled type 2 diabetes mellitus with complication, with long-term current use of insulin    2. Ulcer of other part of foot    3. Decubitus ulcer of left heel, unstageable    4. Atherosclerosis of native arteries of the extremities with ulceration    5. Tinea pedis of both feet    6. Bilateral leg edema        Plan:           Spectazole cream to feet daily.  Cleanse left foot wounds with wound .  Santyl daily to left foot ulcers, cover with gauze and secure with roll gauze.    Compression with two 4" ace wraps left lower leg.  Change dressing daily.  Return to clinic in 3 weeks.   [employer] [facility] notified of orders via EPIC fax.    Left lateral foot      Left heel                " Implemented All Universal Safety Interventions:  [place] to call system. Call bell, personal items and telephone within reach. Instruct patient to call for assistance. Room bathroom lighting operational. Non-slip footwear when patient is off stretcher. Physically safe environment: no spills, clutter or unnecessary equipment. Stretcher in lowest position, wheels locked, appropriate side rails in place.

## 2019-05-07 DIAGNOSIS — I50.22 CHRONIC SYSTOLIC HEART FAILURE: ICD-10-CM

## 2019-05-07 RX ORDER — FUROSEMIDE 40 MG/1
TABLET ORAL
Qty: 90 TABLET | Refills: 3 | Status: SHIPPED | OUTPATIENT
Start: 2019-05-07 | End: 2020-02-26 | Stop reason: SDUPTHER

## 2019-06-06 DIAGNOSIS — I48.91 ATRIAL FIBRILLATION, CONTROLLED: ICD-10-CM

## 2019-06-06 DIAGNOSIS — I50.22 CHRONIC SYSTOLIC HEART FAILURE: ICD-10-CM

## 2019-06-06 RX ORDER — AMIODARONE HYDROCHLORIDE 200 MG/1
TABLET ORAL
Qty: 30 TABLET | Refills: 3 | Status: SHIPPED | OUTPATIENT
Start: 2019-06-06 | End: 2019-09-26 | Stop reason: SDUPTHER

## 2019-09-26 DIAGNOSIS — I50.22 CHRONIC SYSTOLIC HEART FAILURE: ICD-10-CM

## 2019-09-26 DIAGNOSIS — I48.91 ATRIAL FIBRILLATION, CONTROLLED: ICD-10-CM

## 2019-09-26 RX ORDER — AMIODARONE HYDROCHLORIDE 200 MG/1
TABLET ORAL
Qty: 30 TABLET | Refills: 3 | Status: SHIPPED | OUTPATIENT
Start: 2019-09-26 | End: 2020-02-26 | Stop reason: SDUPTHER

## 2019-11-21 DIAGNOSIS — I48.91 ATRIAL FIBRILLATION, CONTROLLED: ICD-10-CM

## 2019-11-22 RX ORDER — APIXABAN 5 MG/1
TABLET, FILM COATED ORAL
Qty: 60 TABLET | Refills: 3 | Status: SHIPPED | OUTPATIENT
Start: 2019-11-22 | End: 2020-04-07

## 2019-11-27 RX ORDER — LOSARTAN POTASSIUM 25 MG/1
12.5 TABLET ORAL DAILY
Qty: 45 TABLET | Refills: 3 | Status: SHIPPED | OUTPATIENT
Start: 2019-11-27 | End: 2020-03-23 | Stop reason: SDUPTHER

## 2019-11-29 DIAGNOSIS — I50.22 CHRONIC SYSTOLIC HEART FAILURE: ICD-10-CM

## 2019-11-29 RX ORDER — NAPROXEN SODIUM 220 MG/1
TABLET, FILM COATED ORAL
Qty: 90 TABLET | Refills: 3 | Status: ON HOLD | OUTPATIENT
Start: 2019-11-29 | End: 2020-12-16 | Stop reason: HOSPADM

## 2020-02-26 DIAGNOSIS — I50.22 CHRONIC SYSTOLIC HEART FAILURE: ICD-10-CM

## 2020-02-26 DIAGNOSIS — I48.91 ATRIAL FIBRILLATION, CONTROLLED: ICD-10-CM

## 2020-02-26 RX ORDER — AMIODARONE HYDROCHLORIDE 200 MG/1
200 TABLET ORAL EVERY MORNING
Qty: 30 TABLET | Refills: 3 | Status: SHIPPED | OUTPATIENT
Start: 2020-02-26 | End: 2020-07-12

## 2020-04-07 DIAGNOSIS — I48.91 ATRIAL FIBRILLATION, CONTROLLED: ICD-10-CM

## 2020-04-07 RX ORDER — APIXABAN 5 MG/1
TABLET, FILM COATED ORAL
Qty: 60 TABLET | Refills: 3 | Status: ON HOLD | OUTPATIENT
Start: 2020-04-07 | End: 2020-12-16 | Stop reason: HOSPADM

## 2020-04-07 RX ORDER — LOSARTAN POTASSIUM 25 MG/1
12.5 TABLET ORAL DAILY
Qty: 45 TABLET | Refills: 3 | Status: ON HOLD | OUTPATIENT
Start: 2020-04-07 | End: 2020-12-16 | Stop reason: HOSPADM

## 2020-05-11 ENCOUNTER — TELEPHONE (OUTPATIENT)
Dept: CARDIOLOGY | Facility: CLINIC | Age: 70
End: 2020-05-11
Payer: MEDICARE

## 2020-05-11 DIAGNOSIS — I50.22 CHRONIC SYSTOLIC HEART FAILURE: ICD-10-CM

## 2020-05-11 DIAGNOSIS — I48.91 ATRIAL FIBRILLATION, CONTROLLED: ICD-10-CM

## 2020-05-11 RX ORDER — AMIODARONE HYDROCHLORIDE 200 MG/1
200 TABLET ORAL EVERY MORNING
Qty: 90 TABLET | Refills: 1 | OUTPATIENT
Start: 2020-05-11

## 2020-05-11 NOTE — TELEPHONE ENCOUNTER
Left message to call back to schedule an appointment for routine office visit. Last seen on 11/06/18.

## 2020-11-01 ENCOUNTER — HOSPITAL ENCOUNTER (INPATIENT)
Facility: HOSPITAL | Age: 70
LOS: 3 days | Discharge: NURSING, REHAB OR LONG-TERM CARE | DRG: 871 | End: 2020-11-05
Attending: EMERGENCY MEDICINE | Admitting: STUDENT IN AN ORGANIZED HEALTH CARE EDUCATION/TRAINING PROGRAM
Payer: MEDICARE

## 2020-11-01 DIAGNOSIS — I50.9 CHF (CONGESTIVE HEART FAILURE): ICD-10-CM

## 2020-11-01 DIAGNOSIS — R79.89 ELEVATED BRAIN NATRIURETIC PEPTIDE (BNP) LEVEL: ICD-10-CM

## 2020-11-01 DIAGNOSIS — N39.0 URINARY TRACT INFECTION WITH HEMATURIA, SITE UNSPECIFIED: ICD-10-CM

## 2020-11-01 DIAGNOSIS — M62.82 NON-TRAUMATIC RHABDOMYOLYSIS: ICD-10-CM

## 2020-11-01 DIAGNOSIS — I48.91 ATRIAL FIBRILLATION, UNSPECIFIED TYPE: ICD-10-CM

## 2020-11-01 DIAGNOSIS — R31.9 URINARY TRACT INFECTION WITH HEMATURIA, SITE UNSPECIFIED: ICD-10-CM

## 2020-11-01 DIAGNOSIS — E87.20 LACTIC ACIDOSIS: ICD-10-CM

## 2020-11-01 DIAGNOSIS — R79.89 ELEVATED TROPONIN: ICD-10-CM

## 2020-11-01 DIAGNOSIS — R41.82 ALTERED MENTAL STATUS, UNSPECIFIED ALTERED MENTAL STATUS TYPE: Primary | ICD-10-CM

## 2020-11-01 DIAGNOSIS — N18.9 CHRONIC KIDNEY DISEASE, UNSPECIFIED CKD STAGE: ICD-10-CM

## 2020-11-01 DIAGNOSIS — E66.01 MORBID OBESITY: ICD-10-CM

## 2020-11-01 DIAGNOSIS — I48.91 A-FIB: ICD-10-CM

## 2020-11-01 DIAGNOSIS — E87.5 HYPERKALEMIA: ICD-10-CM

## 2020-11-01 LAB
ALBUMIN SERPL BCP-MCNC: 2.9 G/DL (ref 3.5–5.2)
ALLENS TEST: ABNORMAL
ALP SERPL-CCNC: 81 U/L (ref 55–135)
ALT SERPL W/O P-5'-P-CCNC: 42 U/L (ref 10–44)
ANION GAP SERPL CALC-SCNC: 17 MMOL/L (ref 8–16)
ANISOCYTOSIS BLD QL SMEAR: SLIGHT
AST SERPL-CCNC: 133 U/L (ref 10–40)
BASOPHILS # BLD AUTO: 0.03 K/UL (ref 0–0.2)
BASOPHILS NFR BLD: 0.2 % (ref 0–1.9)
BILIRUB SERPL-MCNC: 1.5 MG/DL (ref 0.1–1)
BNP SERPL-MCNC: 491 PG/ML (ref 0–99)
BUN SERPL-MCNC: 25 MG/DL (ref 8–23)
BUN SERPL-MCNC: 30 MG/DL (ref 6–30)
BURR CELLS BLD QL SMEAR: ABNORMAL
CALCIUM SERPL-MCNC: 8.8 MG/DL (ref 8.7–10.5)
CHLORIDE SERPL-SCNC: 109 MMOL/L (ref 95–110)
CHLORIDE SERPL-SCNC: 109 MMOL/L (ref 95–110)
CO2 SERPL-SCNC: 19 MMOL/L (ref 23–29)
CREAT SERPL-MCNC: 1.7 MG/DL (ref 0.5–1.4)
CREAT SERPL-MCNC: 1.9 MG/DL (ref 0.5–1.4)
DELSYS: ABNORMAL
DIFFERENTIAL METHOD: ABNORMAL
EOSINOPHIL # BLD AUTO: 0 K/UL (ref 0–0.5)
EOSINOPHIL NFR BLD: 0 % (ref 0–8)
ERYTHROCYTE [DISTWIDTH] IN BLOOD BY AUTOMATED COUNT: 15.3 % (ref 11.5–14.5)
EST. GFR  (AFRICAN AMERICAN): 39.8 ML/MIN/1.73 M^2
EST. GFR  (NON AFRICAN AMERICAN): 34.5 ML/MIN/1.73 M^2
GLUCOSE SERPL-MCNC: 199 MG/DL (ref 70–110)
GLUCOSE SERPL-MCNC: 203 MG/DL (ref 70–110)
HCO3 UR-SCNC: 22.7 MMOL/L (ref 24–28)
HCT VFR BLD AUTO: 43.3 % (ref 40–54)
HCT VFR BLD CALC: 46 %PCV (ref 36–54)
HGB BLD-MCNC: 13.8 G/DL (ref 14–18)
HYPOCHROMIA BLD QL SMEAR: ABNORMAL
IMM GRANULOCYTES # BLD AUTO: 0.05 K/UL (ref 0–0.04)
IMM GRANULOCYTES NFR BLD AUTO: 0.3 % (ref 0–0.5)
LACTATE SERPL-SCNC: 4.9 MMOL/L (ref 0.5–2.2)
LYMPHOCYTES # BLD AUTO: 0.8 K/UL (ref 1–4.8)
LYMPHOCYTES NFR BLD: 4.8 % (ref 18–48)
MCH RBC QN AUTO: 30 PG (ref 27–31)
MCHC RBC AUTO-ENTMCNC: 31.9 G/DL (ref 32–36)
MCV RBC AUTO: 94 FL (ref 82–98)
MONOCYTES # BLD AUTO: 1.7 K/UL (ref 0.3–1)
MONOCYTES NFR BLD: 9.6 % (ref 4–15)
NEUTROPHILS # BLD AUTO: 14.8 K/UL (ref 1.8–7.7)
NEUTROPHILS NFR BLD: 85.1 % (ref 38–73)
NRBC BLD-RTO: 0 /100 WBC
OVALOCYTES BLD QL SMEAR: ABNORMAL
PCO2 BLDA: 42.6 MMHG (ref 35–45)
PH SMN: 7.33 [PH] (ref 7.35–7.45)
PLATELET # BLD AUTO: 133 K/UL (ref 150–350)
PLATELET BLD QL SMEAR: ABNORMAL
PMV BLD AUTO: 12 FL (ref 9.2–12.9)
PO2 BLDA: 27 MMHG (ref 40–60)
POC BE: -3 MMOL/L
POC IONIZED CALCIUM: 1.05 MMOL/L (ref 1.06–1.42)
POC SATURATED O2: 46 % (ref 95–100)
POC TCO2 (MEASURED): 23 MMOL/L (ref 23–29)
POC TCO2: 24 MMOL/L (ref 24–29)
POIKILOCYTOSIS BLD QL SMEAR: SLIGHT
POTASSIUM BLD-SCNC: 4 MMOL/L (ref 3.5–5.1)
POTASSIUM SERPL-SCNC: 5.3 MMOL/L (ref 3.5–5.1)
PROT SERPL-MCNC: 7.9 G/DL (ref 6–8.4)
RBC # BLD AUTO: 4.6 M/UL (ref 4.6–6.2)
SAMPLE: ABNORMAL
SAMPLE: ABNORMAL
SITE: ABNORMAL
SODIUM BLD-SCNC: 146 MMOL/L (ref 136–145)
SODIUM SERPL-SCNC: 145 MMOL/L (ref 136–145)
TROPONIN I SERPL DL<=0.01 NG/ML-MCNC: 0.07 NG/ML (ref 0–0.03)
TSH SERPL DL<=0.005 MIU/L-ACNC: 0.91 UIU/ML (ref 0.4–4)
WBC # BLD AUTO: 17.34 K/UL (ref 3.9–12.7)

## 2020-11-01 PROCEDURE — 99285 PR EMERGENCY DEPT VISIT,LEVEL V: ICD-10-PCS | Mod: ,,, | Performed by: EMERGENCY MEDICINE

## 2020-11-01 PROCEDURE — 87040 BLOOD CULTURE FOR BACTERIA: CPT

## 2020-11-01 PROCEDURE — 63600175 PHARM REV CODE 636 W HCPCS: Performed by: STUDENT IN AN ORGANIZED HEALTH CARE EDUCATION/TRAINING PROGRAM

## 2020-11-01 PROCEDURE — 96361 HYDRATE IV INFUSION ADD-ON: CPT

## 2020-11-01 PROCEDURE — 84443 ASSAY THYROID STIM HORMONE: CPT

## 2020-11-01 PROCEDURE — 82550 ASSAY OF CK (CPK): CPT

## 2020-11-01 PROCEDURE — U0002 COVID-19 LAB TEST NON-CDC: HCPCS | Performed by: STUDENT IN AN ORGANIZED HEALTH CARE EDUCATION/TRAINING PROGRAM

## 2020-11-01 PROCEDURE — 80053 COMPREHEN METABOLIC PANEL: CPT

## 2020-11-01 PROCEDURE — 83880 ASSAY OF NATRIURETIC PEPTIDE: CPT

## 2020-11-01 PROCEDURE — 84484 ASSAY OF TROPONIN QUANT: CPT

## 2020-11-01 PROCEDURE — 85025 COMPLETE CBC W/AUTO DIFF WBC: CPT

## 2020-11-01 PROCEDURE — 99285 EMERGENCY DEPT VISIT HI MDM: CPT | Mod: ,,, | Performed by: EMERGENCY MEDICINE

## 2020-11-01 PROCEDURE — 99900035 HC TECH TIME PER 15 MIN (STAT)

## 2020-11-01 PROCEDURE — 83605 ASSAY OF LACTIC ACID: CPT

## 2020-11-01 PROCEDURE — 82803 BLOOD GASES ANY COMBINATION: CPT

## 2020-11-01 PROCEDURE — 99285 EMERGENCY DEPT VISIT HI MDM: CPT | Mod: 25

## 2020-11-01 PROCEDURE — 93005 ELECTROCARDIOGRAM TRACING: CPT

## 2020-11-01 RX ADMIN — SODIUM CHLORIDE, SODIUM LACTATE, POTASSIUM CHLORIDE, AND CALCIUM CHLORIDE 500 ML: .6; .31; .03; .02 INJECTION, SOLUTION INTRAVENOUS at 11:11

## 2020-11-02 PROBLEM — M62.82 RHABDOMYOLYSIS: Status: ACTIVE | Noted: 2020-11-02

## 2020-11-02 PROBLEM — G93.40 ACUTE ENCEPHALOPATHY: Status: ACTIVE | Noted: 2020-11-02

## 2020-11-02 PROBLEM — A41.9 SEPSIS: Status: ACTIVE | Noted: 2020-11-02

## 2020-11-02 PROBLEM — T07.XXXA WOUNDS, MULTIPLE: Status: ACTIVE | Noted: 2020-11-02

## 2020-11-02 PROBLEM — I48.91 ATRIAL FIBRILLATION: Status: ACTIVE | Noted: 2020-11-02

## 2020-11-02 PROBLEM — R41.82 ALTERED MENTAL STATUS: Status: ACTIVE | Noted: 2020-11-02

## 2020-11-02 PROBLEM — E78.5 HLD (HYPERLIPIDEMIA): Status: ACTIVE | Noted: 2020-11-02

## 2020-11-02 PROBLEM — R79.89 ELEVATED TROPONIN: Status: ACTIVE | Noted: 2020-11-02

## 2020-11-02 PROBLEM — I42.8 NONISCHEMIC CARDIOMYOPATHY: Status: ACTIVE | Noted: 2020-11-02

## 2020-11-02 LAB
ALBUMIN SERPL BCP-MCNC: 2.3 G/DL (ref 3.5–5.2)
ALBUMIN SERPL BCP-MCNC: 2.4 G/DL (ref 3.5–5.2)
ALP SERPL-CCNC: 68 U/L (ref 55–135)
ALP SERPL-CCNC: 70 U/L (ref 55–135)
ALT SERPL W/O P-5'-P-CCNC: 37 U/L (ref 10–44)
ALT SERPL W/O P-5'-P-CCNC: 43 U/L (ref 10–44)
ANION GAP SERPL CALC-SCNC: 10 MMOL/L (ref 8–16)
ANION GAP SERPL CALC-SCNC: 10 MMOL/L (ref 8–16)
ASCENDING AORTA: 3.65 CM
AST SERPL-CCNC: 101 U/L (ref 10–40)
AST SERPL-CCNC: 99 U/L (ref 10–40)
BACTERIA #/AREA URNS AUTO: ABNORMAL /HPF
BILIRUB SERPL-MCNC: 0.8 MG/DL (ref 0.1–1)
BILIRUB SERPL-MCNC: 1.3 MG/DL (ref 0.1–1)
BILIRUB UR QL STRIP: NEGATIVE
BSA FOR ECHO PROCEDURE: 2.78 M2
BUN SERPL-MCNC: 31 MG/DL (ref 8–23)
BUN SERPL-MCNC: 38 MG/DL (ref 8–23)
CALCIUM SERPL-MCNC: 8.1 MG/DL (ref 8.7–10.5)
CALCIUM SERPL-MCNC: 8.3 MG/DL (ref 8.7–10.5)
CHLORIDE SERPL-SCNC: 110 MMOL/L (ref 95–110)
CHLORIDE SERPL-SCNC: 111 MMOL/L (ref 95–110)
CK SERPL-CCNC: 2327 U/L (ref 20–200)
CK SERPL-CCNC: 3111 U/L (ref 20–200)
CK SERPL-CCNC: 3762 U/L (ref 20–200)
CLARITY UR REFRACT.AUTO: ABNORMAL
CO2 SERPL-SCNC: 23 MMOL/L (ref 23–29)
CO2 SERPL-SCNC: 24 MMOL/L (ref 23–29)
COLOR UR AUTO: ABNORMAL
CREAT SERPL-MCNC: 1.9 MG/DL (ref 0.5–1.4)
CREAT SERPL-MCNC: 2.3 MG/DL (ref 0.5–1.4)
CTP QC/QA: YES
CV ECHO LV RWT: 0.51 CM
DOP CALC LVOT AREA: 5.2 CM2
DOP CALC LVOT DIAMETER: 2.58 CM
E WAVE DECELERATION TIME: 195.16 MSEC
E/A RATIO: 2.35
ECHO LV POSTERIOR WALL: 1.31 CM (ref 0.6–1.1)
EST. GFR  (AFRICAN AMERICAN): 31.6 ML/MIN/1.73 M^2
EST. GFR  (AFRICAN AMERICAN): 39.8 ML/MIN/1.73 M^2
EST. GFR  (NON AFRICAN AMERICAN): 27.3 ML/MIN/1.73 M^2
EST. GFR  (NON AFRICAN AMERICAN): 34.5 ML/MIN/1.73 M^2
ESTIMATED AVG GLUCOSE: 114 MG/DL (ref 68–131)
FRACTIONAL SHORTENING: 18 % (ref 28–44)
GLUCOSE SERPL-MCNC: 169 MG/DL (ref 70–110)
GLUCOSE SERPL-MCNC: 179 MG/DL (ref 70–110)
GLUCOSE SERPL-MCNC: 203 MG/DL (ref 70–110)
GLUCOSE UR QL STRIP: NEGATIVE
HBA1C MFR BLD HPLC: 5.6 % (ref 4–5.6)
HGB UR QL STRIP: ABNORMAL
HYALINE CASTS UR QL AUTO: 16 /LPF
INTERVENTRICULAR SEPTUM: 1.27 CM (ref 0.6–1.1)
KETONES UR QL STRIP: ABNORMAL
LA MAJOR: 6.6 CM
LA WIDTH: 4.89 CM
LACTATE SERPL-SCNC: 2.3 MMOL/L (ref 0.5–2.2)
LACTATE SERPL-SCNC: 2.8 MMOL/L (ref 0.5–2.2)
LACTATE SERPL-SCNC: 3.3 MMOL/L (ref 0.5–2.2)
LACTATE SERPL-SCNC: 3.5 MMOL/L (ref 0.5–2.2)
LEFT ATRIUM SIZE: 4.4 CM
LEFT INTERNAL DIMENSION IN SYSTOLE: 4.21 CM (ref 2.1–4)
LEFT VENTRICLE DIASTOLIC VOLUME INDEX: 48.45 ML/M2
LEFT VENTRICLE DIASTOLIC VOLUME: 126.96 ML
LEFT VENTRICLE MASS INDEX: 104 G/M2
LEFT VENTRICLE SYSTOLIC VOLUME INDEX: 30.1 ML/M2
LEFT VENTRICLE SYSTOLIC VOLUME: 78.85 ML
LEFT VENTRICULAR INTERNAL DIMENSION IN DIASTOLE: 5.16 CM (ref 3.5–6)
LEFT VENTRICULAR MASS: 272.08 G
LEUKOCYTE ESTERASE UR QL STRIP: ABNORMAL
MICROSCOPIC COMMENT: ABNORMAL
MV PEAK A VEL: 0.23 M/S
MV PEAK E VEL: 0.54 M/S
MV STENOSIS PRESSURE HALF TIME: 56.6 MS
MV VALVE AREA P 1/2 METHOD: 3.89 CM2
NITRITE UR QL STRIP: NEGATIVE
PH UR STRIP: 7 [PH] (ref 5–8)
POCT GLUCOSE: 151 MG/DL (ref 70–110)
POCT GLUCOSE: 210 MG/DL (ref 70–110)
POTASSIUM SERPL-SCNC: 3.4 MMOL/L (ref 3.5–5.1)
POTASSIUM SERPL-SCNC: 3.5 MMOL/L (ref 3.5–5.1)
PROT SERPL-MCNC: 6.3 G/DL (ref 6–8.4)
PROT SERPL-MCNC: 6.3 G/DL (ref 6–8.4)
PROT UR QL STRIP: ABNORMAL
RBC #/AREA URNS AUTO: 28 /HPF (ref 0–4)
SARS-COV-2 RDRP RESP QL NAA+PROBE: NEGATIVE
SINUS: 3.39 CM
SODIUM SERPL-SCNC: 143 MMOL/L (ref 136–145)
SODIUM SERPL-SCNC: 145 MMOL/L (ref 136–145)
SP GR UR STRIP: 1.02 (ref 1–1.03)
STJ: 3 CM
TROPONIN I SERPL DL<=0.01 NG/ML-MCNC: 0.09 NG/ML (ref 0–0.03)
TROPONIN I SERPL DL<=0.01 NG/ML-MCNC: 0.12 NG/ML (ref 0–0.03)
TROPONIN I SERPL DL<=0.01 NG/ML-MCNC: 0.12 NG/ML (ref 0–0.03)
URN SPEC COLLECT METH UR: ABNORMAL
VANCOMYCIN SERPL-MCNC: 13.3 UG/ML
WBC #/AREA URNS AUTO: >100 /HPF (ref 0–5)

## 2020-11-02 PROCEDURE — 84484 ASSAY OF TROPONIN QUANT: CPT | Mod: 91

## 2020-11-02 PROCEDURE — 25000003 PHARM REV CODE 250: Performed by: STUDENT IN AN ORGANIZED HEALTH CARE EDUCATION/TRAINING PROGRAM

## 2020-11-02 PROCEDURE — 83605 ASSAY OF LACTIC ACID: CPT

## 2020-11-02 PROCEDURE — 63600175 PHARM REV CODE 636 W HCPCS: Performed by: STUDENT IN AN ORGANIZED HEALTH CARE EDUCATION/TRAINING PROGRAM

## 2020-11-02 PROCEDURE — 83605 ASSAY OF LACTIC ACID: CPT | Mod: 91

## 2020-11-02 PROCEDURE — 87088 URINE BACTERIA CULTURE: CPT

## 2020-11-02 PROCEDURE — 94660 CPAP INITIATION&MGMT: CPT

## 2020-11-02 PROCEDURE — 82550 ASSAY OF CK (CPK): CPT | Mod: 91

## 2020-11-02 PROCEDURE — 97166 OT EVAL MOD COMPLEX 45 MIN: CPT

## 2020-11-02 PROCEDURE — 97535 SELF CARE MNGMENT TRAINING: CPT

## 2020-11-02 PROCEDURE — 80053 COMPREHEN METABOLIC PANEL: CPT

## 2020-11-02 PROCEDURE — 87077 CULTURE AEROBIC IDENTIFY: CPT

## 2020-11-02 PROCEDURE — 86580 TB INTRADERMAL TEST: CPT | Performed by: STUDENT IN AN ORGANIZED HEALTH CARE EDUCATION/TRAINING PROGRAM

## 2020-11-02 PROCEDURE — 30200315 PPD INTRADERMAL TEST REV CODE 302: Performed by: STUDENT IN AN ORGANIZED HEALTH CARE EDUCATION/TRAINING PROGRAM

## 2020-11-02 PROCEDURE — 99900035 HC TECH TIME PER 15 MIN (STAT)

## 2020-11-02 PROCEDURE — 97530 THERAPEUTIC ACTIVITIES: CPT

## 2020-11-02 PROCEDURE — 81001 URINALYSIS AUTO W/SCOPE: CPT

## 2020-11-02 PROCEDURE — 36415 COLL VENOUS BLD VENIPUNCTURE: CPT

## 2020-11-02 PROCEDURE — 87186 SC STD MICRODIL/AGAR DIL: CPT

## 2020-11-02 PROCEDURE — 80202 ASSAY OF VANCOMYCIN: CPT

## 2020-11-02 PROCEDURE — 93005 ELECTROCARDIOGRAM TRACING: CPT

## 2020-11-02 PROCEDURE — 20600001 HC STEP DOWN PRIVATE ROOM

## 2020-11-02 PROCEDURE — 83036 HEMOGLOBIN GLYCOSYLATED A1C: CPT

## 2020-11-02 PROCEDURE — 96365 THER/PROPH/DIAG IV INF INIT: CPT

## 2020-11-02 PROCEDURE — 87086 URINE CULTURE/COLONY COUNT: CPT

## 2020-11-02 PROCEDURE — 94761 N-INVAS EAR/PLS OXIMETRY MLT: CPT

## 2020-11-02 PROCEDURE — 97162 PT EVAL MOD COMPLEX 30 MIN: CPT

## 2020-11-02 RX ORDER — FERROUS SULFATE 325(65) MG
325 TABLET, DELAYED RELEASE (ENTERIC COATED) ORAL 2 TIMES DAILY WITH MEALS
Status: DISCONTINUED | OUTPATIENT
Start: 2020-11-02 | End: 2020-11-05 | Stop reason: HOSPADM

## 2020-11-02 RX ORDER — CARVEDILOL 6.25 MG/1
6.25 TABLET ORAL 2 TIMES DAILY
Status: DISCONTINUED | OUTPATIENT
Start: 2020-11-02 | End: 2020-11-05 | Stop reason: HOSPADM

## 2020-11-02 RX ORDER — INSULIN ASPART 100 [IU]/ML
0-5 INJECTION, SOLUTION INTRAVENOUS; SUBCUTANEOUS
Status: DISCONTINUED | OUTPATIENT
Start: 2020-11-02 | End: 2020-11-04

## 2020-11-02 RX ORDER — SODIUM CHLORIDE 0.9 % (FLUSH) 0.9 %
10 SYRINGE (ML) INJECTION
Status: DISCONTINUED | OUTPATIENT
Start: 2020-11-02 | End: 2020-11-05 | Stop reason: HOSPADM

## 2020-11-02 RX ORDER — ASPIRIN 81 MG/1
81 TABLET ORAL DAILY
Status: DISCONTINUED | OUTPATIENT
Start: 2020-11-02 | End: 2020-11-05 | Stop reason: HOSPADM

## 2020-11-02 RX ORDER — GLIMEPIRIDE 4 MG/1
4 TABLET ORAL DAILY
Status: ON HOLD | COMMUNITY
End: 2020-12-16 | Stop reason: HOSPADM

## 2020-11-02 RX ORDER — MICONAZOLE NITRATE 2 %
POWDER (GRAM) TOPICAL 2 TIMES DAILY
Status: DISCONTINUED | OUTPATIENT
Start: 2020-11-02 | End: 2020-11-05 | Stop reason: HOSPADM

## 2020-11-02 RX ORDER — ATORVASTATIN CALCIUM 20 MG/1
40 TABLET, FILM COATED ORAL DAILY
Status: DISCONTINUED | OUTPATIENT
Start: 2020-11-02 | End: 2020-11-02

## 2020-11-02 RX ORDER — IBUPROFEN 200 MG
16 TABLET ORAL
Status: DISCONTINUED | OUTPATIENT
Start: 2020-11-02 | End: 2020-11-04

## 2020-11-02 RX ORDER — SODIUM CHLORIDE 0.9 % (FLUSH) 0.9 %
10 SYRINGE (ML) INJECTION
Status: CANCELLED | OUTPATIENT
Start: 2020-11-02

## 2020-11-02 RX ORDER — DOXAZOSIN 2 MG/1
2 TABLET ORAL DAILY
Status: ON HOLD | COMMUNITY
End: 2020-12-01 | Stop reason: HOSPADM

## 2020-11-02 RX ORDER — SODIUM CHLORIDE, SODIUM LACTATE, POTASSIUM CHLORIDE, CALCIUM CHLORIDE 600; 310; 30; 20 MG/100ML; MG/100ML; MG/100ML; MG/100ML
INJECTION, SOLUTION INTRAVENOUS CONTINUOUS
Status: ACTIVE | OUTPATIENT
Start: 2020-11-02 | End: 2020-11-03

## 2020-11-02 RX ORDER — CALCIUM CARBONATE 600 MG
600 TABLET ORAL 2 TIMES DAILY
Status: ON HOLD | COMMUNITY
End: 2020-12-16 | Stop reason: HOSPADM

## 2020-11-02 RX ORDER — IBUPROFEN 200 MG
24 TABLET ORAL
Status: DISCONTINUED | OUTPATIENT
Start: 2020-11-02 | End: 2020-11-04

## 2020-11-02 RX ORDER — ACETAMINOPHEN 500 MG
500 TABLET ORAL EVERY 6 HOURS PRN
Status: DISCONTINUED | OUTPATIENT
Start: 2020-11-02 | End: 2020-11-05 | Stop reason: HOSPADM

## 2020-11-02 RX ORDER — ENOXAPARIN SODIUM 100 MG/ML
40 INJECTION SUBCUTANEOUS
Status: DISCONTINUED | OUTPATIENT
Start: 2020-11-02 | End: 2020-11-02

## 2020-11-02 RX ORDER — OXYBUTYNIN CHLORIDE 10 MG/1
10 TABLET, EXTENDED RELEASE ORAL DAILY
Status: ON HOLD | COMMUNITY
End: 2020-12-16 | Stop reason: HOSPADM

## 2020-11-02 RX ORDER — GLUCAGON 1 MG
1 KIT INJECTION
Status: DISCONTINUED | OUTPATIENT
Start: 2020-11-02 | End: 2020-11-04

## 2020-11-02 RX ORDER — OXYCODONE AND ACETAMINOPHEN 5; 325 MG/1; MG/1
1 TABLET ORAL EVERY 6 HOURS PRN
Status: DISCONTINUED | OUTPATIENT
Start: 2020-11-02 | End: 2020-11-05 | Stop reason: HOSPADM

## 2020-11-02 RX ORDER — MULTIVITAMIN
1 TABLET ORAL DAILY
Status: ON HOLD | COMMUNITY
End: 2020-12-16 | Stop reason: HOSPADM

## 2020-11-02 RX ADMIN — ASPIRIN 81 MG: 81 TABLET, COATED ORAL at 10:11

## 2020-11-02 RX ADMIN — MICONAZOLE NITRATE: 20 POWDER TOPICAL at 10:11

## 2020-11-02 RX ADMIN — APIXABAN 5 MG: 5 TABLET, FILM COATED ORAL at 10:11

## 2020-11-02 RX ADMIN — PIPERACILLIN AND TAZOBACTAM 4.5 G: 4; .5 INJECTION, POWDER, LYOPHILIZED, FOR SOLUTION INTRAVENOUS; PARENTERAL at 10:11

## 2020-11-02 RX ADMIN — SODIUM CHLORIDE, SODIUM LACTATE, POTASSIUM CHLORIDE, AND CALCIUM CHLORIDE: .6; .31; .03; .02 INJECTION, SOLUTION INTRAVENOUS at 06:11

## 2020-11-02 RX ADMIN — SODIUM CHLORIDE, SODIUM LACTATE, POTASSIUM CHLORIDE, AND CALCIUM CHLORIDE 1000 ML: .6; .31; .03; .02 INJECTION, SOLUTION INTRAVENOUS at 02:11

## 2020-11-02 RX ADMIN — CARVEDILOL 6.25 MG: 6.25 TABLET, FILM COATED ORAL at 10:11

## 2020-11-02 RX ADMIN — PIPERACILLIN AND TAZOBACTAM 4.5 G: 4; .5 INJECTION, POWDER, LYOPHILIZED, FOR SOLUTION INTRAVENOUS; PARENTERAL at 12:11

## 2020-11-02 RX ADMIN — PIPERACILLIN AND TAZOBACTAM 4.5 G: 4; .5 INJECTION, POWDER, LYOPHILIZED, FOR SOLUTION INTRAVENOUS; PARENTERAL at 02:11

## 2020-11-02 RX ADMIN — FERROUS SULFATE TAB EC 325 MG (65 MG FE EQUIVALENT) 325 MG: 325 (65 FE) TABLET DELAYED RESPONSE at 06:11

## 2020-11-02 RX ADMIN — TUBERCULIN PURIFIED PROTEIN DERIVATIVE 5 UNITS: 5 INJECTION, SOLUTION INTRADERMAL at 04:11

## 2020-11-02 RX ADMIN — OXYCODONE HYDROCHLORIDE AND ACETAMINOPHEN 1 TABLET: 5; 325 TABLET ORAL at 11:11

## 2020-11-02 RX ADMIN — VANCOMYCIN HYDROCHLORIDE 2500 MG: 1 INJECTION, POWDER, LYOPHILIZED, FOR SOLUTION INTRAVENOUS at 01:11

## 2020-11-02 RX ADMIN — SODIUM CHLORIDE, SODIUM LACTATE, POTASSIUM CHLORIDE, AND CALCIUM CHLORIDE 500 ML: .6; .31; .03; .02 INJECTION, SOLUTION INTRAVENOUS at 10:11

## 2020-11-02 RX ADMIN — FERROUS SULFATE TAB EC 325 MG (65 MG FE EQUIVALENT) 325 MG: 325 (65 FE) TABLET DELAYED RESPONSE at 10:11

## 2020-11-02 NOTE — ASSESSMENT & PLAN NOTE
70-year-old male with PMH of HFrEF 2/2 NICM (EF 35-40%), HTN, DLD, DM2, AF (on apixaban), hx CVA w/ L hemineglect, hx DVT, ERIC (compliant on CPAP), morbid obesity who presents after being found down at home and with altered mental status.  Last seen at 10AM on 10/31. Lives alone. Labs consistent with rhabdomyolysis and sepsis likely 2/2 to UTI.  Renal function at baseline, K 5.3.    - 1.5 L of IV fluids given ED  - trend CPK   - CMP b.i.d.  - strict I&Os

## 2020-11-02 NOTE — ASSESSMENT & PLAN NOTE
Currently in rate controlled AFib    - holding home Coreg in amiodarone given sepsis  - continue Eliquis

## 2020-11-02 NOTE — PT/OT/SLP EVAL
Physical Therapy Evaluation    Patient Name:  Hemant Kennedy Jr.   MRN:  [id number]    Recommendations:     Discharge Recommendations:  nursing facility, skilled   Discharge Equipment Recommendations: other (see comments)(TBD closer to d/c)   Barriers to discharge: Inaccessible home, Decreased caregiver support and decreased level of functional mobility from baseline    Assessment:     Hemant Kennedy Jr. is a 70 y.o. male admitted with a medical diagnosis of Rhabdomyolysis.  He presents with the following impairments/functional limitations:  weakness, impaired self care skills, impaired functional mobilty, impaired balance, decreased upper extremity function, decreased lower extremity function, decreased safety awareness, pain, impaired skin Eval completed with OT. Patient oriented to self and events, not to place or time. Able to obtain some history however patient may be a questionable historian due to decreased orientation. Patient with fair tolerance to session. TotalA for rolling and scooting in bed. Bed mobility limited by pain due to multiple abrasions on patient's body from the fall. At baseline patient is wheelchair bound but reports performing bed mobility and WC transfer without assistance. Lives alone with decreased caregiver support. Patient is not safe to return home when medically ready at current level of mobility and would benefit from skilled nursing facility to improve functional mobility and safety.      Rehab Prognosis: Good; patient would benefit from acute skilled PT services to address these deficits and reach maximum level of function.    Recent Surgery: * No surgery found *      Plan:     During this hospitalization, patient to be seen 3 x/week to address the identified rehab impairments via gait training, therapeutic activities, therapeutic exercises, neuromuscular re-education and progress toward the following goals:    · Plan of Care Expires:  12/01/20    Subjective     Chief Complaint:  "pain from abrasions  Patient/Family Comments/goals: "Like I said, since Arianne" when asked to further clarify how patient became WC bound  Pain/Comfort:  · Pain Rating 1: other (see comments)(did not rate, pain with movement)  · Location 1: other (see comments)(abrasions on abdomen, thighs and L shoulder from fall)  · Pain Addressed 1: Reposition, Distraction, Cessation of Activity  · Pain Rating Post-Intervention 1: other (see comments)    Patients cultural, spiritual, Orthodox conflicts given the current situation: no    Living Environment:  Lives alone in 1st floor apartment with no RA.   Prior to admission, patients level of function was patient reports being wheelchair bound and has not stood since Arianne, unable to state what specifically caused him to become wheelchair bound. Reports getting out of bed and into his WC without assistance, taking bed baths and getting some assistance from his daughter. He has meals prepared for him but unable to say who does this for him.  Equipment used at home: wheelchair, hospital bed, grab bar.  DME owned (not currently used): none.  Upon discharge, patient will have assistance from daughter.    Objective:     Communicated with nurse prior to session.  Patient found HOB elevated with telemetry  upon PT entry to room.    General Precautions: Standard, fall   Orthopedic Precautions:N/A   Braces: N/A     Exams:  · RLE ROM: severely decreased. Knee flex/ext contract at ~110 of extension, Ankle PF contracture, hip flexion contracture.   · RLE Strength: grossly 2/5  · LLE ROM: severely decreased. Knee flex/ext contract at ~110 of extension, Ankle PF contracture, hip flexion contracture.   · LLE Strength: grossly 2/5    Functional Mobility:  · Bed Mobility:     · Rolling Left:  total assistance and of 2 persons  · Rolling Right: total assistance and of 2 persons  · Scooting: total assistance and of 2 persons    Therapeutic Activities and Exercises:  Patient educated on role " of PT in the hospital.   Pt educated on plan and goals with physical therapy.   Pt educated on importance of OOB activity to decrease the risks associated with bed rest.   Patient participated in bed mobility, PT provided instruction for technique and safety.  Patient oriented to place, time and events.  All questions and concerns addressed within PT scope of practice.         AM-PAC 6 CLICK MOBILITY  Total Score:7     Patient left HOB elevated with all lines intact, call button in reach and nurse notified.    GOALS:   Multidisciplinary Problems     Physical Therapy Goals        Problem: Physical Therapy Goal    Goal Priority Disciplines Outcome Goal Variances Interventions   Physical Therapy Goal     PT, PT/OT Ongoing, Progressing     Description: Goals to be met by: 2020     Patient will increase functional independence with mobility by performin. Supine to sit with MInimal Assistance  2. Sit to supine with MInimal Assistance  3. Rolling to Left and Right with Minimal Assistance.  4. Bed to wheelchair transfer with Moderate Assistance using Slideboard or Scoot Pivot.  5. Wheelchair propulsion x100 feet with Minimal Assistance using bilateral uppper extremities  6. Sitting at edge of bed x10 minutes with Stand-by Assistance                       History:     Past Medical History:   Diagnosis Date    Anticoagulant long-term use     Arthritis     Atrial fibrillation     Cardiomyopathy     home O2    Cataract     CHF (congestive heart failure)     CKD (chronic kidney disease) stage 3, GFR 30-59 ml/min 2015    Diabetes mellitus     DVT (deep venous thrombosis)     Hypertension     Sleep apnea     Stroke     with residual effects W/C bound - Right Occipital       Past Surgical History:   Procedure Laterality Date    CATARACT EXTRACTION W/  INTRAOCULAR LENS IMPLANT Left 2017    Dr. Zuniga       Time Tracking:     PT Received On: 20  PT Start Time: 936     PT Stop Time:  0956  PT Total Time (min): 20 min     Billable Minutes: Evaluation 10 and Therapeutic Activity 10      Janie Paris, PT  11/02/2020

## 2020-11-02 NOTE — ASSESSMENT & PLAN NOTE
Leukocytosis and UA consistent with UTI.  Chest x-ray no evidence for focal infiltrates    - continue vanc and Zosyn.  Can likely quickly deescalate pending cultures and clinical improvement   - follow-up blood and urine cultures  - will off on further fluids right now given  and history of HFrEF

## 2020-11-02 NOTE — PLAN OF CARE
Problem: Physical Therapy Goal  Goal: Physical Therapy Goal  Description: Goals to be met by: 2020     Patient will increase functional independence with mobility by performin. Supine to sit with MInimal Assistance  2. Sit to supine with MInimal Assistance  3. Rolling to Left and Right with Minimal Assistance.  4. Bed to wheelchair transfer with Moderate Assistance using Slideboard or Scoot Pivot.  5. Wheelchair propulsion x100 feet with Minimal Assistance using bilateral uppper extremities  6. Sitting at edge of bed x10 minutes with Stand-by Assistance      Outcome: Ongoing, Progressing       PT eval complete. Patient is not safe to return home when medically ready at current level of mobility and would benefit from skilled nursing facility to improve functional mobility and safety.      Janie Paris, PT, DPT  2020

## 2020-11-02 NOTE — PROGRESS NOTES
Spoke with Pharm about Y siting LR and Zosyn. Pharm confirmed these are not compatible. Give LR bolus first and reschedule Zosyn.

## 2020-11-02 NOTE — PLAN OF CARE
Problem: Occupational Therapy Goal  Goal: Occupational Therapy Goal  Description: Goals to be met by: 11/12/20    Patient will increase functional independence with ADLs by performing:    UE Dressing with Minimal Assistance.  Grooming while seated in wheelchair with Set-up Assistance.  Toileting from bedside commode with Maximum Assistance for hygiene and clothing management.   Supine to sit with Maximum Assistance to increase bed mobility independence.  Squat pivot transfers with Maximum Assistance with use of slide board to complete daily tasks in w/c.  Upper extremity exercise program x15 reps per handout, with independence to build strength and endurance to increase independence in daily occupations..    Outcome: Ongoing, Progressing     OT evaluation complete and POC established. See evaluation note for further details.   Disposition recommendation: SNF in [place]    Elizabeth Davies OTR/L  11/2/20

## 2020-11-02 NOTE — ED PROVIDER NOTES
Encounter Date: 11/1/2020       History     Chief Complaint   Patient presents with    Altered Mental Status     found down on floor in apt for about 24 hours. Unknown how. Skin breakdown to left side. Denies pain.      70-year-old male with PMH of HFrEF 2/2 NICM (EF 35-40%), HTN, DLD, DM2, AF (on apixaban), hx CVA w/ L hemineglect, hx DVT, ERIC (compliant on CPAP), morbid obesity brought in by EMS today for altered mental status.  Patient is a poor historian, history obtained from EMS and daughter.  Daughter reports she last her father normal yesterday morning at approximately 5:00 a.m..  He was found down at approximately 10:00 a.m. and required the fire department to get him back up into bed.  By 12:00 p.m. yesterday, patient was no longer answering phone calls from family.  Today, the family was concerned because they had not heard from the patient and checked on.  Patient was found down with bruises all over his body, he was confused and unable to account for what happened.        Review of patient's allergies indicates:   Allergen Reactions    Spironolactone      Hyperkalemia       Past Medical History:   Diagnosis Date    Anticoagulant long-term use     Arthritis     Atrial fibrillation     Cardiomyopathy     home O2    Cataract     CHF (congestive heart failure)     CKD (chronic kidney disease) stage 3, GFR 30-59 ml/min 11/27/2015    Diabetes mellitus     DVT (deep venous thrombosis)     Hypertension     Sleep apnea     Stroke 2010    with residual effects W/C bound - Right Occipital     Past Surgical History:   Procedure Laterality Date    CATARACT EXTRACTION W/  INTRAOCULAR LENS IMPLANT Left 09/18/2017    Dr. Zuniga     Family History   Problem Relation Age of Onset    Heart attack Mother     Cataracts Mother     Glaucoma Sister     Glaucoma Sister      Social History     Tobacco Use    Smoking status: Never Smoker    Smokeless tobacco: Never Used   Substance Use Topics    Alcohol use:  No    Drug use: No     Review of Systems   Unable to perform ROS: Mental status change       Physical Exam     Initial Vitals [11/01/20 2214]   BP Pulse Resp Temp SpO2   (!) 118/46 104 20 98.4 °F (36.9 °C) 95 %      MAP       --         Physical Exam    Nursing note and vitals reviewed.  Constitutional: Vital signs are normal. He appears well-developed and well-nourished. He does not appear ill. No distress.   Morbidly obese, chronically ill-appearing male   HENT:   Head: Normocephalic and atraumatic.   No signs of head trauma   Eyes: Conjunctivae and EOM are normal. Pupils are equal, round, and reactive to light.   Neck: Normal range of motion. Neck supple.   Cardiovascular: Normal heart sounds, intact distal pulses and normal pulses.   No murmur heard.  Irregularly irregular rhythm   Pulmonary/Chest: No respiratory distress.   Ecchymosis noted over the left pectoral to left axilla, no crepitus, nontender to palpation   Abdominal: Soft. Normal appearance and bowel sounds are normal. There is no abdominal tenderness. There is no rebound and no guarding.   Musculoskeletal: Normal range of motion.   Neurological: He is alert. GCS eye subscore is 4. GCS verbal subscore is 5. GCS motor subscore is 6.   Oriented to self and place, disoriented to time and situation.  GCS 14.  Recognizes daughter at bedside.  Moves all extremities.  Bilateral lower extremity 3+/5 strength     Skin: Skin is warm and dry. Capillary refill takes less than 2 seconds.   Scattered Grade 1 Skin breakdown over the left flank, bilateral inguinal area, involving inferior abdominal pannus, and anterior thighs and abdomen         ED Course   Procedures  Labs Reviewed   CBC W/ AUTO DIFFERENTIAL - Abnormal; Notable for the following components:       Result Value    WBC 17.34 (*)     Hemoglobin 13.8 (*)     MCHC 31.9 (*)     RDW 15.3 (*)     Platelets 133 (*)     Gran # (ANC) 14.8 (*)     Immature Grans (Abs) 0.05 (*)     Lymph # 0.8 (*)     Mono # 1.7  (*)     Gran % 85.1 (*)     Lymph % 4.8 (*)     Platelet Estimate Decreased (*)     All other components within normal limits   COMPREHENSIVE METABOLIC PANEL - Abnormal; Notable for the following components:    Potassium 5.3 (*)     CO2 19 (*)     Glucose 203 (*)     BUN 25 (*)     Creatinine 1.9 (*)     Albumin 2.9 (*)     Total Bilirubin 1.5 (*)      (*)     Anion Gap 17 (*)     eGFR if  39.8 (*)     eGFR if non  34.5 (*)     All other components within normal limits   URINALYSIS, REFLEX TO URINE CULTURE - Abnormal; Notable for the following components:    Appearance, UA Cloudy (*)     Protein, UA 2+ (*)     Ketones, UA Trace (*)     Occult Blood UA 2+ (*)     Leukocytes, UA 3+ (*)     All other components within normal limits    Narrative:     Specimen Source->Urine   TROPONIN I - Abnormal; Notable for the following components:    Troponin I 0.074 (*)     All other components within normal limits   CK - Abnormal; Notable for the following components:    CPK 3762 (*)     All other components within normal limits   LACTIC ACID, PLASMA - Abnormal; Notable for the following components:    Lactate (Lactic Acid) 4.9 (*)     All other components within normal limits   B-TYPE NATRIURETIC PEPTIDE - Abnormal; Notable for the following components:     (*)     All other components within normal limits   URINALYSIS MICROSCOPIC - Abnormal; Notable for the following components:    RBC, UA 28 (*)     WBC, UA >100 (*)     Bacteria Many (*)     Hyaline Casts, UA 16 (*)     All other components within normal limits    Narrative:     Specimen Source->Urine   LACTIC ACID, PLASMA - Abnormal; Notable for the following components:    Lactate (Lactic Acid) 2.8 (*)     All other components within normal limits   ISTAT PROCEDURE - Abnormal; Notable for the following components:    POC Glucose 199 (*)     POC Creatinine 1.7 (*)     POC Sodium 146 (*)     POC Ionized Calcium 1.05 (*)     All  other components within normal limits   ISTAT PROCEDURE - Abnormal; Notable for the following components:    POC PH 7.334 (*)     POC PO2 27 (*)     POC HCO3 22.7 (*)     POC SATURATED O2 46 (*)     All other components within normal limits   POCT GLUCOSE - Abnormal; Notable for the following components:    POCT Glucose 210 (*)     All other components within normal limits   CULTURE, BLOOD   CULTURE, BLOOD   CULTURE, URINE   TSH   HEMOGLOBIN A1C   SARS-COV-2 RDRP GENE   POCT GLUCOSE MONITORING CONTINUOUS     EKG Readings: (Independently Interpreted)   Initial Reading: No STEMI. Rhythm: Atrial Fibrillation. Heart Rate: 103. ST Segments: Normal ST Segments. T Waves: Normal.       Imaging Results          CT Cervical Spine Without Contrast (Final result)  Result time 11/02/20 01:01:57    Final result by Haley Whitaker MD (11/02/20 01:01:57)                 Impression:      No acute intracranial abnormality detected. Remote right occipital infarct.    No acute cervical fracture.  Spondylitic changes.      Electronically signed by: aHley Whitaker  Date:    11/02/2020  Time:    01:01             Narrative:    EXAMINATION:  CT OF THE HEAD WITHOUT AND CT CERVICAL SPINE    CLINICAL HISTORY:  Altered mental status;; Neck trauma (Age => 63y);    TECHNIQUE:  5 mm unenhanced axial images were obtained from the skull base to the vertex.  1.25 mm axial images were obtained through the cervical spine.    COMPARISON:  11/10/2016    FINDINGS:  CT head: There is stable cerebral atrophy and chronic small vessel ischemic changes. A nonacute right occipital infarct is present. There is no acute intracranial hemorrhage, territorial infarct or mass effect, or midline shift. In the visualized paranasal sinuses, there is mild left ethmoid sinus mucoperiosteal thickening.    CT cervical spine: The head is tilted slightly to the right.  There is no acute fracture or subluxation.  Tiny marginal osteophytes are present.  Remote infarct is  seen in the right occipital lobe.                               CT Head Without Contrast (Final result)  Result time 11/02/20 01:01:57    Final result by Haley Whitaker MD (11/02/20 01:01:57)                 Impression:      No acute intracranial abnormality detected. Remote right occipital infarct.    No acute cervical fracture.  Spondylitic changes.      Electronically signed by: Haley Whitaker  Date:    11/02/2020  Time:    01:01             Narrative:    EXAMINATION:  CT OF THE HEAD WITHOUT AND CT CERVICAL SPINE    CLINICAL HISTORY:  Altered mental status;; Neck trauma (Age => 63y);    TECHNIQUE:  5 mm unenhanced axial images were obtained from the skull base to the vertex.  1.25 mm axial images were obtained through the cervical spine.    COMPARISON:  11/10/2016    FINDINGS:  CT head: There is stable cerebral atrophy and chronic small vessel ischemic changes. A nonacute right occipital infarct is present. There is no acute intracranial hemorrhage, territorial infarct or mass effect, or midline shift. In the visualized paranasal sinuses, there is mild left ethmoid sinus mucoperiosteal thickening.    CT cervical spine: The head is tilted slightly to the right.  There is no acute fracture or subluxation.  Tiny marginal osteophytes are present.  Remote infarct is seen in the right occipital lobe.                               X-Ray Chest AP Portable (Final result)  Result time 11/01/20 23:19:12    Final result by Haley Whitaker MD (11/01/20 23:19:12)                 Impression:      As above described.      Electronically signed by: Haley Whitaker  Date:    11/01/2020  Time:    23:19             Narrative:    EXAMINATION:  AP PORTABLE CHEST    CLINICAL HISTORY:  Cardiac workup;    TECHNIQUE:  AP portable chest radiograph was submitted.    COMPARISON:  03/24/2018    FINDINGS:  Examination is limited by body habitus.  AP portable chest radiograph demonstrates enlargement of the cardiac silhouette.  There  is nonspecific prominence of interstitium.  The possibility of mild CHF cannot be entirely excluded.  There is no focal consolidation, pneumothorax, or pleural effusion.                                 Medical Decision Making:   History:   I obtained history from: someone other than patient.  Old Medical Records: I decided to obtain old medical records.  Old Records Summarized: records from previous admission(s).  Independently Interpreted Test(s):   I have ordered and independently interpreted X-rays - see summary below.  I have ordered and independently interpreted EKG Reading(s) - see summary below  Clinical Tests:   Lab Tests: Ordered and Reviewed  Radiological Study: Ordered and Reviewed  Medical Tests: Ordered and Reviewed  ED Management:  70-year-old male with PMH of CHF, AFib, CVA, wheelchair-bound brought in by EMS today after being found down earlier today.  Patient is confused per family.  On arrival here today, the patient is mildly tachycardic and irregular, otherwise vitals were stable.  He has bruising to his anterior chest, grade 1 skin breakdown to his left flank and anterior abdomen.  Patient's presentation is concerning now to his CS, ICH, CVA, hypovolemia, CHF exacerbation, electrolyte derangement, sepsis, pneumonia, UTI.  Workup remarkable for leukocytosis of 17, obvious signs of infection.  Vanc, Zosyn given.  Blood cultures pending.  Hemoglobin and hematocrit stable, , troponin elevated at 0.074.  CMP with K of 5.3,  bicarb of 19, lactate of 4.9, CK of 4000.  Findings consistent with rhabdomyolysis, hypovolemia and suspected infection.  Patient has been admitted to hospital medicine at this time for further management and stabilization.    Santana haque MD, PGY-3  6:15 AM  11/02/2020    Other:   I have discussed this case with another health care provider.  Hospital Medicine              Attending Attestation:   Physician Attestation Statement for Resident:  As the supervising MD    Physician Attestation Statement: I have personally seen and examined this patient.   I agree with the above history. -:   As the supervising MD I agree with the above PE.    As the supervising MD I agree with the above treatment, course, plan, and disposition.                    ED Course as of Nov 02 0615   Sun Nov 01, 2020   2259 POC Creatinine(!): 1.7 [LD]   2300 POC BUN: 30 [LD]   2323 WBC(!): 17.34 [LD]   2323 POC PH(!): 7.334 [LD]   2323 POC PCO2: 42.6 [LD]   2323 POC PO2(!!): 27 [LD]   2323 Hemoglobin(!): 13.8 [LD]   2323 Hematocrit: 43.3 [LD]   2323 BNP(!): 491 [LD]   2324 Potassium(!): 5.3 [LD]   2324 CO2(!): 19 [LD]   2324 At baseline   Creatinine(!): 1.9 [LD]   2333 Lactate, Tay(!!): 4.9 [LD]   2350 Troponin I(!): 0.074 [LD]   2357 TSH: 0.913 [LD]   Mon Nov 02, 2020   0121 CPK(!): 3762 [LD]   0122 No acute intracranial abnormality detected. Remote right occipital infarct. No acute cervical fracture.  Spondylitic changes   CT Head Without Contrast [LD]      ED Course User Index  [LD] Santana Marcos MD            Clinical Impression:     ICD-10-CM ICD-9-CM   1. Altered mental status, unspecified altered mental status type  R41.82 780.97   2. A-fib  I48.91 427.31   3. Lactic acidosis  E87.2 276.2   4. Hyperkalemia  E87.5 276.7   5. Elevated troponin  R77.8 790.6   6. Elevated brain natriuretic peptide (BNP) level  R79.89 790.99   7. Morbid obesity  E66.01 278.01   8. Urinary tract infection with hematuria, site unspecified  N39.0 599.0    R31.9 599.70   9. Atrial fibrillation, unspecified type  I48.91 427.31   10. Chronic kidney disease, unspecified CKD stage  N18.9 585.9   11. Non-traumatic rhabdomyolysis  M62.82 728.88                          ED Disposition Condition    Admit                             Santana Marcos MD  Resident  11/02/20 0615

## 2020-11-02 NOTE — ASSESSMENT & PLAN NOTE
Trop 0.07 on admission. Denies chest pain but he does have AMS. Likely type 2 NSTEMI given sepsis, rhabdo, and EKG with afib and no obvious ischemic changes     - will trend trop

## 2020-11-02 NOTE — SUBJECTIVE & OBJECTIVE
Past Medical History:   Diagnosis Date    Anticoagulant long-term use     Arthritis     Atrial fibrillation     Cardiomyopathy     home O2    Cataract     CHF (congestive heart failure)     CKD (chronic kidney disease) stage 3, GFR 30-59 ml/min 11/27/2015    Diabetes mellitus     DVT (deep venous thrombosis)     Hypertension     Sleep apnea     Stroke 2010    with residual effects W/C bound - Right Occipital       Past Surgical History:   Procedure Laterality Date    CATARACT EXTRACTION W/  INTRAOCULAR LENS IMPLANT Left 09/18/2017    Dr. Zuniga       Review of patient's allergies indicates:   Allergen Reactions    Spironolactone      Hyperkalemia         No current facility-administered medications on file prior to encounter.      Current Outpatient Medications on File Prior to Encounter   Medication Sig    acetaminophen (TYLENOL) 500 MG tablet Take 2 tablets (1,000 mg total) by mouth 3 (three) times daily as needed for Pain.    allopurinol (ZYLOPRIM) 300 MG tablet Take 1 tablet (300 mg total) by mouth every morning.    amiodarone (PACERONE) 200 MG Tab TAKE 1 TABLET BY MOUTH IN THE MORNING    apixaban (ELIQUIS) 5 mg Tab Take 1 tablet (5 mg total) by mouth 2 (two) times daily.    aspirin 81 MG Chew TAKE 1 TAB BY MOUTH daily    atorvastatin (LIPITOR) 40 MG tablet @@ TAKE 1 TABLET BY MOUTH daily    carvediloL (COREG) 6.25 MG tablet TAKE 1 TABLET BY MOUTH IN THE MORNING and TAKE 1 TABLET BY MOUTH every evening with meals    collagenase (SANTYL) ointment Apply topically once daily. Apply to wound daily as directed.    ELIQUIS 5 mg Tab TAKE 1 TABLET BY MOUTH twice a day    ergocalciferol (ERGOCALCIFEROL) 50,000 unit Cap Take 1 capsule (50,000 Units total) by mouth every 7 days. (Patient taking differently: Take 50,000 Units by mouth every Monday. )    ferrous sulfate 325 (65 FE) MG EC tablet Take 1 tablet (325 mg total) by mouth 2 (two) times daily with meals.    fluticasone (FLONASE) 50  mcg/actuation nasal spray 2 sprays by Each Nare route once daily. (Patient taking differently: 2 sprays by Each Nare route daily as needed for Allergies. )    furosemide (LASIX) 40 MG tablet @@ TAKE 1 TABLET BY MOUTH daily    gabapentin (NEURONTIN) 300 MG capsule Take 1 capsule (300 mg total) by mouth 3 (three) times daily.    HYDROPHILIC CREAM (TRIAD WOUND DRESSING TOP) Apply topically daily as needed.    losartan (COZAAR) 25 MG tablet Take 0.5 tablets (12.5 mg total) by mouth once daily.    ONE TOUCH ULTRA TEST Strp Test blood sugar two times daily    potassium chloride SA (K-DUR,KLOR-CON) 10 MEQ tablet Take 1 tablet by mouth once daily for 5 days    sildenafil (VIAGRA) 25 MG tablet Take 2 tablets (50 mg total) by mouth daily as needed for Erectile Dysfunction.     Family History     Problem Relation (Age of Onset)    Cataracts Mother    Glaucoma Sister, Sister    Heart attack Mother        Tobacco Use    Smoking status: Never Smoker    Smokeless tobacco: Never Used   Substance and Sexual Activity    Alcohol use: No    Drug use: No    Sexual activity: Not on file     Review of Systems   Unable to perform ROS: Mental status change     Objective:     Vital Signs (Most Recent):  Temp: 98.4 °F (36.9 °C) (11/01/20 2214)  Pulse: 95 (11/02/20 0348)  Resp: 19 (11/02/20 0348)  BP: 108/81 (11/02/20 0332)  SpO2: 100 % (11/02/20 0348) Vital Signs (24h Range):  Temp:  [98.4 °F (36.9 °C)] 98.4 °F (36.9 °C)  Pulse:  [] 95  Resp:  [19-32] 19  SpO2:  [95 %-100 %] 100 %  BP: (108-122)/(46-81) 108/81     Weight: (!) 158.8 kg (350 lb)  Body mass index is 51.69 kg/m².    Physical Exam  Constitutional:       Appearance: Normal appearance.      Comments: obese   HENT:      Head: Normocephalic and atraumatic.      Mouth/Throat:      Mouth: Mucous membranes are dry.   Eyes:      Conjunctiva/sclera: Conjunctivae normal.   Neck:      Musculoskeletal: Normal range of motion and neck supple. No neck rigidity.    Cardiovascular:      Rate and Rhythm: Normal rate. Rhythm irregular.      Pulses: Normal pulses.   Pulmonary:      Effort: Pulmonary effort is normal. No respiratory distress.      Breath sounds: Normal breath sounds. No wheezing.   Abdominal:      Palpations: Abdomen is soft.      Tenderness: There is no abdominal tenderness. There is no guarding or rebound.   Musculoskeletal:         General: Swelling (woody edema to BLE) present.   Skin:     General: Skin is warm.      Capillary Refill: Capillary refill takes 2 to 3 seconds.      Comments: scattered blisters and skin irritation over lower extremities and left chest    Neurological:      Mental Status: He is alert.      Comments: Knows name but not place or year, speech clear but not following commands well              Significant Labs:   Blood Culture: No results for input(s): LABBLOO in the last 48 hours.  CBC:   Recent Labs   Lab 11/01/20 2247 11/01/20 2253   WBC 17.34*  --    HGB 13.8*  --    HCT 43.3 46   *  --      CMP:   Recent Labs   Lab 11/01/20 2247      K 5.3*      CO2 19*   *   BUN 25*   CREATININE 1.9*   CALCIUM 8.8   PROT 7.9   ALBUMIN 2.9*   BILITOT 1.5*   ALKPHOS 81   *   ALT 42   ANIONGAP 17*   EGFRNONAA 34.5*     Lactic Acid:   Recent Labs   Lab 11/01/20 2247   LACTATE 4.9*     Troponin:   Recent Labs   Lab 11/01/20 2247   TROPONINI 0.074*     Urine Studies:   Recent Labs   Lab 11/02/20  0051   COLORU Brittany   APPEARANCEUA Cloudy*   PHUR 7.0   SPECGRAV 1.020   PROTEINUA 2+*   GLUCUA Negative   KETONESU Trace*   BILIRUBINUA Negative   OCCULTUA 2+*   NITRITE Negative   LEUKOCYTESUR 3+*   RBCUA 28*   WBCUA >100*   BACTERIA Many*   HYALINECASTS 16*     All pertinent labs within the past 24 hours have been reviewed.    Significant Imaging: I have reviewed all pertinent imaging results/findings within the past 24 hours.

## 2020-11-02 NOTE — PLAN OF CARE
No significant events occurred during shift. Pt received 500mL LR bolus. ABX therapy cont. BG monitoring maintained. Pt educated on fall risks, Pt remained free from falls/trauma/injury. VSS. Pt denies CP, SOB, dizziness, palpitations, and pain/discomfort. Turns/repositions independently in bed. Educated Pt on POC, no questions at this time. No acute distress noted, Will continue to monitor.

## 2020-11-02 NOTE — PT/OT/SLP EVAL
Occupational Therapy   Co-Evaluation/ Treatment    Name: Hemant Kennedy Jr.  MRN: [id number]  Admitting Diagnosis:  Rhabdomyolysis      Recommendations:     Discharge Recommendations: nursing facility, skilled in NH  Discharge Equipment Recommendations:  other (see comments)(TBD pending progress)  Barriers to discharge:  Decreased caregiver support    Assessment:     Hemant Kennedy Jr. is a 70 y.o. male with a medical diagnosis of Rhabdomyolysis.  He presents with the following performance deficits affecting function: weakness, decreased ROM, abnormal tone, decreased coordination, decreased lower extremity function, impaired self care skills, impaired functional mobilty, impaired balance. Pt tolerated evaluation fair this date with the deficits listed above being his main functional limitations. At baseline, pt uses wheelchair for mobility and requires assistance for ADLs. Pt living alone with decreased caregiver assistance available post acute. Pt reported that daughter comes by and check on him occasionally. Pt not safe to return home as pt is requiring increased A for basic bed mobility, has hx of falls, and has decreased caregiver support. OT intervention to address listed functional deficits, provide patient/caregiver education, reduce fall risk, and maximize (I) and safety with self care independence. Once medically stable, recommending pt discharge to skilled nursing facility inside of a NH. While in house, pt will benefit from skilled OT 4x/wk to address the deficits affecting his occupational performance.     Rehab Prognosis: Fair; patient would benefit from acute skilled OT services to address these deficits and reach maximum level of function.       Plan:     Patient to be seen 3 x/week to address the above listed problems via self-care/home management, therapeutic activities, therapeutic exercises, neuromuscular re-education  · Plan of Care Expires: 12/01/20  · Plan of Care Reviewed with:  "patient    Subjective     Chief Complaint: Pain from multiple abrasions from fall  Patient/Family Comments/goals: Pt agreeable to OT/PT evaluation and OT POC.    Occupational Profile:  Living Environment: Pt lives alone in a 1st floor apt with 0 RA. Bathroom set up: Pt performs bed baths   Previous level of function: Performs ADLs at wheelchair level with set up A- max A. Max A for dressing, set up A for feeding and grooming, and set up A for toileting on commode : W/C for mobility since "[place]ur[place]". Daughter provides meals   Roles and Routines: None stated   Equipment Used at Home:  wheelchair, bedside commode, hospital bed  Assistance upon Discharge: Pt will have assistance from daughter upon discharge.     Pain/Comfort:  · Pain Rating 1: 0/10(at rest)  · Pain Rating Post-Intervention 1: (pt did not rate; pt with c/o pain with bed mobility)    Patients cultural, spiritual, Sikhism conflicts given the current situation: no    Objective:     Communicated with: RN prior to session.  Patient found HOB elevated with telemetry upon OT entry to room.    General Precautions: Standard, fall   Orthopedic Precautions:N/A   Braces: N/A     Occupational Performance:    Bed Mobility:    · Patient completed Rolling/Turning to Left with  total assistance and 2 persons  · Patient completed Rolling/Turning to Right with total assistance and 2 persons  · Patient completed Scooting/Bridging with total assistance and 2 persons with bed in Trendelenburg position for posterior positioning in bed     Functional Mobility/Transfers:  · NT due to pain during bed mobility     Activities of Daily Living:  · Feeding:  set up A for feeding routine seated in bed higher than 30 degrees: Pt opened small containers I'ly   · Toileting: no needs during the session     Cognitive/Visual Perceptual:  Cognitive/Psychosocial Skills:     -       Oriented to: Person   -       Follows Commands/attention:Follows one-step commands  -       " Communication: clear; delayed response  -       Memory: Impaired STM  -       Safety awareness/insight to disability: impaired   -       Mood/Affect/Coping skills/emotional control: Flat affect and Lethargic  Visual/Perceptual:      -Intact no defs noted    Physical Exam:  Balance:    -       NT  Postural examination/scapula alignment:    -       Rounded shoulders  -       Forward head  Skin integrity: Scars on thigh, arms, upper L chest   Edema:  None noted  Sensation:    -       Intact  Upper Extremity Range of Motion:     -       Right Upper Extremity: WFL  -       Left Upper Extremity: WFL  Upper Extremity Strength:    -       Right Upper Extremity: WFL  -       Left Upper Extremity: WFL   Strength:    -       Right Upper Extremity: WFL  -       Left Upper Extremity: WFL  Fine Motor Coordination:    -       Intact    AMPAC 6 Click ADL:  AMPAC Total Score: 10    Treatment & Education:  - Role of OT/ OT POC  - Self care safety/ independence  - Functional transfer/ mobility safety  - Bed mobility safety  - ADL set up for feeding routine  - Daily orientation provided   Education:    Patient left HOB elevated with all lines intact, call button in reach and RN notified    GOALS:   Multidisciplinary Problems     Occupational Therapy Goals        Problem: Occupational Therapy Goal    Goal Priority Disciplines Outcome Interventions   Occupational Therapy Goal     OT, PT/OT Ongoing, Progressing    Description: Goals to be met by: 11/12/20    Patient will increase functional independence with ADLs by performing:    UE Dressing with Minimal Assistance.  Grooming while seated in wheelchair with Set-up Assistance.  Toileting from bedside commode with Maximum Assistance for hygiene and clothing management.   Supine to sit with Maximum Assistance to increase bed mobility independence.  Squat pivot transfers with Maximum Assistance with use of slide board to complete daily tasks in w/c.  Upper extremity exercise program x15  reps per handout, with independence to build strength and endurance to increase independence in daily occupations..                     History:     Past Medical History:   Diagnosis Date    Anticoagulant long-term use     Arthritis     Atrial fibrillation     Cardiomyopathy     home O2    Cataract     CHF (congestive heart failure)     CKD (chronic kidney disease) stage 3, GFR 30-59 ml/min 11/27/2015    Diabetes mellitus     DVT (deep venous thrombosis)     Hypertension     Sleep apnea     Stroke 2010    with residual effects W/C bound - Right Occipital       Past Surgical History:   Procedure Laterality Date    CATARACT EXTRACTION W/  INTRAOCULAR LENS IMPLANT Left 09/18/2017    Dr. Zuniga       Time Tracking:     OT Date of Treatment: 11/02/20  OT Start Time: 0934  OT Stop Time: 0954  OT Total Time (min): 20 min co eval with PT for initial assessment     Billable Minutes:Evaluation 10  Self Care/Home Management 10    Elizabeth Davies, OT  11/2/2020

## 2020-11-02 NOTE — PROGRESS NOTES
Pharmacokinetic Initial Assessment: IV Vancomycin    Assessment/Plan:    Initiate intravenous vancomycin with loading dose of 2500 mg once followed by a maintenance dose of vancomycin 2000mg IV every 24 hours  Desired empiric serum trough concentration is 15 to 20 mcg/mL  Draw vancomycin trough level 60 min prior to third dose on 11/4/20 at approximately 0015.  Pharmacy will continue to follow and monitor vancomycin.      Please contact pharmacy at extension [phone] with any questions regarding this assessment.     Thank you for the consult,   Neal Epperson       Patient brief summary:  Hemant Kennedy Jr. is a 70 y.o. male initiated on antimicrobial therapy with IV Vancomycin for treatment of suspected bacteremia    Drug Allergies:   Review of patient's allergies indicates:   Allergen Reactions    Spironolactone      Hyperkalemia         Actual Body Weight:   158.8 kg    Renal Function:   Estimated Creatinine Clearance: 52.6 mL/min (A) (based on SCr of 1.9 mg/dL (H)).,     Dialysis Method (if applicable):  N/A    CBC (last 72 hours):  Recent Labs   Lab Result Units 11/01/20  2247   WBC K/uL 17.34*   Hemoglobin g/dL 13.8*   Hematocrit % 43.3   Platelets K/uL 133*   Gran % % 85.1*   Lymph % % 4.8*   Mono % % 9.6   Eosinophil % % 0.0   Basophil % % 0.2   Differential Method  Automated       Metabolic Panel (last 72 hours):  Recent Labs   Lab Result Units 11/01/20 2247 11/02/20  0051   Sodium mmol/L 145  --    Potassium mmol/L 5.3*  --    Chloride mmol/L 109  --    CO2 mmol/L 19*  --    Glucose mg/dL 203*  --    Glucose, UA   --  Negative   BUN mg/dL 25*  --    Creatinine mg/dL 1.9*  --    Albumin g/dL 2.9*  --    Total Bilirubin mg/dL 1.5*  --    Alkaline Phosphatase U/L 81  --    AST U/L 133*  --    ALT U/L 42  --        Drug levels (last 3 results):  No results for input(s): VANCOMYCINRA, VANCOMYCINPE, VANCOMYCINTR in the last 72 hours.    Microbiologic Results:  Microbiology Results (last 7 days)       Procedure  Component Value Units Date/Time    Urine culture [[id number]] Collected: 11/02/20 0051    Order Status: No result Specimen: Urine Updated: 11/02/20 0124    Blood culture #2 **CANNOT BE ORDERED STAT** [[id number]] Collected: 11/01/20 2247    Order Status: Sent Specimen: Blood from Peripheral, Antecubital, Right Updated: 11/01/20 2253    Blood culture #1 **CANNOT BE ORDERED STAT** [[id number]] Collected: 11/01/20 2246    Order Status: Sent Specimen: Blood from Peripheral, Forearm, Right Updated: 11/01/20 2253

## 2020-11-02 NOTE — ASSESSMENT & PLAN NOTE
Lab Results   Component Value Date    HGBA1C 6.4 (H) 03/24/2018     No home meds listed. BG stable    - LDSS  - POCT glucose ACHS  - Diabetic diet.  - follow up with family about home meds

## 2020-11-02 NOTE — ED NOTES
I-STAT Chem-8+ Results:   Value Reference Range   Sodium 146 136-145 mmol/L   Potassium  4 3.5-5.1 mmol/L   Chloride 109  mmol/L   Ionized Calcium 1.05 1.06-1.42 mmol/L   CO2 (measured) 23 23-29 mmol/L   Glucose 199  mg/dL   BUN 30 6-30 mg/dL   Creatinine 1.7 0.5-1.4 mg/dL   Hematocrit 46 36-54%

## 2020-11-02 NOTE — ASSESSMENT & PLAN NOTE
Does not appear to be overtly volume overloaded on exam but does have BNP of 490    - holding home GDMT  - monitor for signs of overload       TTE 2017    1 - Eccentric hypertrophy.     2 - Moderately depressed left ventricular systolic function (EF 35-40%).     3 - Restrictive LV filling pattern, indicating markedly elevated LAP (grade 3 diastolic dysfunction).     4 - Biatrial enlargement.     5 - Right ventricular enlargement with moderately depressed systolic function.     6 - Pulmonary hypertension. The estimated PA systolic pressure is 80 mmHg.     7 - Trivial aortic regurgitation.     8 - Trivial mitral regurgitation.     9 - Moderate tricuspid regurgitation.     10 - Increased central venous pressure.

## 2020-11-02 NOTE — ASSESSMENT & PLAN NOTE
Lab Results   Component Value Date    LDLCALC 60.4 (L) 01/18/2017       - continue home statin and asa

## 2020-11-02 NOTE — H&P
[facility] Medicine  History & Physical    Patient Name: Hemant Kennedy Jr.  MRN: [id number]  Admission Date: 11/1/2020  Attending Physician: Behram Khan, MD   Primary Care Provider: Hemant Markham MD    [facility] Medicine Team: Networked reference to record PCT  Jose Strange MD     Patient information was obtained from past medical records and ER records.     Subjective:     Principal Problem:Rhabdomyolysis    Chief Complaint:   Chief Complaint   Patient presents with    Altered Mental Status     found down on floor in apt for about 24 hours. Unknown how. Skin breakdown to left side. Denies pain.         HPI: 70-year-old male with PMH of HFrEF 2/2 NICM (EF 35-40%), HTN, DLD, DM2, AF (on apixaban), hx CVA w/ L hemineglect, hx DVT, ERIC (compliant on CPAP), morbid obesity who presents after being found down at home and with altered mental status.  Patient is confused on interview and family unable to be contacted via.  History obtained by chart review. Patient lives alone. On 10/31, family reported patient was seen at 5:00 a.m.; however, was found down at 10:00 a.m. later that morning.  Family then called the fire department who got patient back up in bed, this was the last time patient was seen by family.  Last night family call patient but he was not answering.  They went to check on him and found him crawling on the floor, confused so they called EMS.     On arrival to the ED, afebrile, rate controlled AFib, BP stable, satting well on room air.  Labs with leukocytosis of 17, platelets 133, BUN/Cr 25/1.9 (baseline 1.9), CPK 3700, bicarb 19, AG 17, K 5.3, LA 4.9, . UA consistent with UTI. CXR with possible pulm edema but limitied by body habitus. CT head/neck unremarkable. He was given 1.5L IVFs, doses of vanc/zosyn, and admitted to medicine for further care.     Past Medical History:   Diagnosis Date    Anticoagulant long-term use     Arthritis     Atrial fibrillation      Cardiomyopathy     home O2    Cataract     CHF (congestive heart failure)     CKD (chronic kidney disease) stage 3, GFR 30-59 ml/min 11/27/2015    Diabetes mellitus     DVT (deep venous thrombosis)     Hypertension     Sleep apnea     Stroke 2010    with residual effects W/C bound - Right Occipital       Past Surgical History:   Procedure Laterality Date    CATARACT EXTRACTION W/  INTRAOCULAR LENS IMPLANT Left 09/18/2017    Dr. Zuniga       Review of patient's allergies indicates:   Allergen Reactions    Spironolactone      Hyperkalemia         No current facility-administered medications on file prior to encounter.      Current Outpatient Medications on File Prior to Encounter   Medication Sig    acetaminophen (TYLENOL) 500 MG tablet Take 2 tablets (1,000 mg total) by mouth 3 (three) times daily as needed for Pain.    allopurinol (ZYLOPRIM) 300 MG tablet Take 1 tablet (300 mg total) by mouth every morning.    amiodarone (PACERONE) 200 MG Tab TAKE 1 TABLET BY MOUTH IN THE MORNING    apixaban (ELIQUIS) 5 mg Tab Take 1 tablet (5 mg total) by mouth 2 (two) times daily.    aspirin 81 MG Chew TAKE 1 TAB BY MOUTH daily    atorvastatin (LIPITOR) 40 MG tablet @@ TAKE 1 TABLET BY MOUTH daily    carvediloL (COREG) 6.25 MG tablet TAKE 1 TABLET BY MOUTH IN THE MORNING and TAKE 1 TABLET BY MOUTH every evening with meals    collagenase (SANTYL) ointment Apply topically once daily. Apply to wound daily as directed.    ELIQUIS 5 mg Tab TAKE 1 TABLET BY MOUTH twice a day    ergocalciferol (ERGOCALCIFEROL) 50,000 unit Cap Take 1 capsule (50,000 Units total) by mouth every 7 days. (Patient taking differently: Take 50,000 Units by mouth every Monday. )    ferrous sulfate 325 (65 FE) MG EC tablet Take 1 tablet (325 mg total) by mouth 2 (two) times daily with meals.    fluticasone (FLONASE) 50 mcg/actuation nasal spray 2 sprays by Each Nare route once daily. (Patient taking differently: 2 sprays by Each Nare route  daily as needed for Allergies. )    furosemide (LASIX) 40 MG tablet @@ TAKE 1 TABLET BY MOUTH daily    gabapentin (NEURONTIN) 300 MG capsule Take 1 capsule (300 mg total) by mouth 3 (three) times daily.    HYDROPHILIC CREAM (TRIAD WOUND DRESSING TOP) Apply topically daily as needed.    losartan (COZAAR) 25 MG tablet Take 0.5 tablets (12.5 mg total) by mouth once daily.    ONE TOUCH ULTRA TEST Strp Test blood sugar two times daily    potassium chloride SA (K-DUR,KLOR-CON) 10 MEQ tablet Take 1 tablet by mouth once daily for 5 days    sildenafil (VIAGRA) 25 MG tablet Take 2 tablets (50 mg total) by mouth daily as needed for Erectile Dysfunction.     Family History     Problem Relation (Age of Onset)    Cataracts Mother    Glaucoma Sister, Sister    Heart attack Mother        Tobacco Use    Smoking status: Never Smoker    Smokeless tobacco: Never Used   Substance and Sexual Activity    Alcohol use: No    Drug use: No    Sexual activity: Not on file     Review of Systems   Unable to perform ROS: Mental status change     Objective:     Vital Signs (Most Recent):  Temp: 98.4 °F (36.9 °C) (11/01/20 2214)  Pulse: 95 (11/02/20 0348)  Resp: 19 (11/02/20 0348)  BP: 108/81 (11/02/20 0332)  SpO2: 100 % (11/02/20 0348) Vital Signs (24h Range):  Temp:  [98.4 °F (36.9 °C)] 98.4 °F (36.9 °C)  Pulse:  [] 95  Resp:  [19-32] 19  SpO2:  [95 %-100 %] 100 %  BP: (108-122)/(46-81) 108/81     Weight: (!) 158.8 kg (350 lb)  Body mass index is 51.69 kg/m².    Physical Exam  Constitutional:       Appearance: Normal appearance.      Comments: obese   HENT:      Head: Normocephalic and atraumatic.      Mouth/Throat:      Mouth: Mucous membranes are dry.   Eyes:      Conjunctiva/sclera: Conjunctivae normal.   Neck:      Musculoskeletal: Normal range of motion and neck supple. No neck rigidity.   Cardiovascular:      Rate and Rhythm: Normal rate. Rhythm irregular.      Pulses: Normal pulses.   Pulmonary:      Effort: Pulmonary  effort is normal. No respiratory distress.      Breath sounds: Normal breath sounds. No wheezing.   Abdominal:      Palpations: Abdomen is soft.      Tenderness: There is no abdominal tenderness. There is no guarding or rebound.   Musculoskeletal:         General: Swelling (woody edema to BLE) present.   Skin:     General: Skin is warm.      Capillary Refill: Capillary refill takes 2 to 3 seconds.      Comments: scattered blisters and skin irritation over lower extremities and left chest    Neurological:      Mental Status: He is alert.      Comments: Knows name but not place or year, speech clear but not following commands well              Significant Labs:   Blood Culture: No results for input(s): LABBLOO in the last 48 hours.  CBC:   Recent Labs   Lab 11/01/20 2247 11/01/20 2253   WBC 17.34*  --    HGB 13.8*  --    HCT 43.3 46   *  --      CMP:   Recent Labs   Lab 11/01/20 2247      K 5.3*      CO2 19*   *   BUN 25*   CREATININE 1.9*   CALCIUM 8.8   PROT 7.9   ALBUMIN 2.9*   BILITOT 1.5*   ALKPHOS 81   *   ALT 42   ANIONGAP 17*   EGFRNONAA 34.5*     Lactic Acid:   Recent Labs   Lab 11/01/20 2247   LACTATE 4.9*     Troponin:   Recent Labs   Lab 11/01/20 2247   TROPONINI 0.074*     Urine Studies:   Recent Labs   Lab 11/02/20  0051   COLORU Brittany   APPEARANCEUA Cloudy*   PHUR 7.0   SPECGRAV 1.020   PROTEINUA 2+*   GLUCUA Negative   KETONESU Trace*   BILIRUBINUA Negative   OCCULTUA 2+*   NITRITE Negative   LEUKOCYTESUR 3+*   RBCUA 28*   WBCUA >100*   BACTERIA Many*   HYALINECASTS 16*     All pertinent labs within the past 24 hours have been reviewed.    Significant Imaging: I have reviewed all pertinent imaging results/findings within the past 24 hours.    Assessment/Plan:     * Rhabdomyolysis  70-year-old male with PMH of HFrEF 2/2 NICM (EF 35-40%), HTN, DLD, DM2, AF (on apixaban), hx CVA w/ L hemineglect, hx DVT, ERIC (compliant on CPAP), morbid obesity who presents after being  found down at home and with altered mental status.  Last seen at 10AM on 10/31. Lives alone. Labs consistent with rhabdomyolysis and sepsis likely 2/2 to UTI.  Renal function at baseline, K 5.3.    - 1.5 L of IV fluids given ED  - trend CPK   - CMP b.i.d.  - strict I&Os      Elevated troponin  Trop 0.07 on admission. Denies chest pain but he does have AMS. Likely type 2 NSTEMI given sepsis, rhabdo, and EKG with afib and no obvious ischemic changes     - will trend trop    HLD (hyperlipidemia)  Lab Results   Component Value Date    LDLCALC 60.4 (L) 01/18/2017       - continue home statin and asa      Acute encephalopathy  - likely from sepsis and rhabdomyolysis       Nonischemic cardiomyopathy  Does not appear to be overtly volume overloaded on exam but does have BNP of 490    - holding home GDMT  - monitor for signs of overload       TTE 2017    1 - Eccentric hypertrophy.     2 - Moderately depressed left ventricular systolic function (EF 35-40%).     3 - Restrictive LV filling pattern, indicating markedly elevated LAP (grade 3 diastolic dysfunction).     4 - Biatrial enlargement.     5 - Right ventricular enlargement with moderately depressed systolic function.     6 - Pulmonary hypertension. The estimated PA systolic pressure is 80 mmHg.     7 - Trivial aortic regurgitation.     8 - Trivial mitral regurgitation.     9 - Moderate tricuspid regurgitation.     10 - Increased central venous pressure.    Atrial fibrillation  Currently in rate controlled AFib    - holding home Coreg in amiodarone given sepsis  - continue Eliquis      Wounds, multiple  Wounds consistent with a rug burn from crawling on the floor.    - wound care consult      Sepsis  Leukocytosis and UA consistent with UTI.  Chest x-ray no evidence for focal infiltrates    - continue vanc and Zosyn.  Can likely quickly deescalate pending cultures and clinical improvement   - follow-up blood and urine cultures  - will off on further fluids right now given   and history of HFrEF      Iron deficiency anemia  - continue home iron supp      Obstructive sleep apnea treated with continuous positive airway pressure (CPAP)  - continue nightly CPAP      Type II diabetes mellitus  Lab Results   Component Value Date    HGBA1C 6.4 (H) 03/24/2018     No home meds listed. BG stable    - LDSS  - POCT glucose ACHS  - Diabetic diet.  - follow up with family about home meds        CKD (chronic kidney disease) stage 3, GFR 30-59 ml/min  - renal function at baseline Cr of 1.9  - chemistries b.i.d. given rhabdo  - strict I&Os      Hypertension  - holding home BP meds        VTE Risk Mitigation (From admission, onward)         Ordered     apixaban tablet 5 mg  2 times daily      11/02/20 0350     IP VTE HIGH RISK PATIENT  Once      11/02/20 0347     Place sequential compression device  Until discontinued      11/02/20 0347                   Jose Strange MD  Department of Hospital Medicine   [facility]

## 2020-11-02 NOTE — ED TRIAGE NOTES
Hemant Kennedy Jr., a 70 y.o. male presents to the ED   Triage note:  Chief Complaint   Patient presents with    Altered Mental Status     found down on floor in apt for about 24 hours. Unknown how. Skin breakdown to left side. Denies pain.      Pt is oriented to     Review of patient's allergies indicates:   Allergen Reactions    Spironolactone      Hyperkalemia       Past Medical History:   Diagnosis Date    Anticoagulant long-term use     Arthritis     Atrial fibrillation     Cardiomyopathy     home O2    Cataract     CHF (congestive heart failure)     CKD (chronic kidney disease) stage 3, GFR 30-59 ml/min 11/27/2015    Diabetes mellitus     DVT (deep venous thrombosis)     Hypertension     Sleep apnea     Stroke 2010    with residual effects W/C bound - Right Occipital

## 2020-11-02 NOTE — HPI
70-year-old male with PMH of HFrEF 2/2 NICM (EF 35-40%), HTN, DLD, DM2, AF (on apixaban), hx CVA w/ L hemineglect, hx DVT, ERIC (compliant on CPAP), morbid obesity who presents after being found down at home and with altered mental status.  Patient is confused on interview and family unable to be contacted via.  History obtained by chart review. Patient lives alone. On 10/31, family reported patient was seen at 5:00 a.m.; however, was found down at 10:00 a.m. later that morning.  Family then called the fire department who got patient back up in bed, this was the last time patient was seen by family.  Last night family call patient but he was not answering.  They went to check on him and found him crawling on the floor, confused so they called EMS.     On arrival to the ED, afebrile, rate controlled AFib, BP stable, satting well on room air.  Labs with leukocytosis of 17, platelets 133, BUN/Cr 25/1.9 (baseline 1.9), CPK 3700, bicarb 19, AG 17, K 5.3, LA 4.9, . UA consistent with UTI. CXR with possible pulm edema but limitied by body habitus. CT head/neck unremarkable. He was given 1.5L IVFs, doses of vanc/zosyn, and admitted to medicine for further care.

## 2020-11-03 LAB
ALBUMIN SERPL BCP-MCNC: 2.4 G/DL (ref 3.5–5.2)
ALP SERPL-CCNC: 104 U/L (ref 55–135)
ALT SERPL W/O P-5'-P-CCNC: 45 U/L (ref 10–44)
ANION GAP SERPL CALC-SCNC: 10 MMOL/L (ref 8–16)
AST SERPL-CCNC: 91 U/L (ref 10–40)
BASOPHILS # BLD AUTO: 0.05 K/UL (ref 0–0.2)
BASOPHILS NFR BLD: 0.4 % (ref 0–1.9)
BILIRUB SERPL-MCNC: 1 MG/DL (ref 0.1–1)
BUN SERPL-MCNC: 38 MG/DL (ref 8–23)
CALCIUM SERPL-MCNC: 8.1 MG/DL (ref 8.7–10.5)
CHLORIDE SERPL-SCNC: 111 MMOL/L (ref 95–110)
CK SERPL-CCNC: 1947 U/L (ref 20–200)
CO2 SERPL-SCNC: 25 MMOL/L (ref 23–29)
CREAT SERPL-MCNC: 2 MG/DL (ref 0.5–1.4)
DIFFERENTIAL METHOD: ABNORMAL
EOSINOPHIL # BLD AUTO: 0.5 K/UL (ref 0–0.5)
EOSINOPHIL NFR BLD: 3.2 % (ref 0–8)
ERYTHROCYTE [DISTWIDTH] IN BLOOD BY AUTOMATED COUNT: 15.3 % (ref 11.5–14.5)
EST. GFR  (AFRICAN AMERICAN): 37.4 ML/MIN/1.73 M^2
EST. GFR  (NON AFRICAN AMERICAN): 32.4 ML/MIN/1.73 M^2
GLUCOSE SERPL-MCNC: 153 MG/DL (ref 70–110)
HCT VFR BLD AUTO: 37.4 % (ref 40–54)
HGB BLD-MCNC: 11.9 G/DL (ref 14–18)
HIV 1+2 AB+HIV1 P24 AG SERPL QL IA: NEGATIVE
IMM GRANULOCYTES # BLD AUTO: 0.07 K/UL (ref 0–0.04)
IMM GRANULOCYTES NFR BLD AUTO: 0.5 % (ref 0–0.5)
LYMPHOCYTES # BLD AUTO: 1.2 K/UL (ref 1–4.8)
LYMPHOCYTES NFR BLD: 8.9 % (ref 18–48)
MAGNESIUM SERPL-MCNC: 2 MG/DL (ref 1.6–2.6)
MCH RBC QN AUTO: 29.9 PG (ref 27–31)
MCHC RBC AUTO-ENTMCNC: 31.8 G/DL (ref 32–36)
MCV RBC AUTO: 94 FL (ref 82–98)
MONOCYTES # BLD AUTO: 1.2 K/UL (ref 0.3–1)
MONOCYTES NFR BLD: 8.6 % (ref 4–15)
NEUTROPHILS # BLD AUTO: 10.9 K/UL (ref 1.8–7.7)
NEUTROPHILS NFR BLD: 78.4 % (ref 38–73)
NRBC BLD-RTO: 0 /100 WBC
PHOSPHATE SERPL-MCNC: 2.6 MG/DL (ref 2.7–4.5)
PLATELET # BLD AUTO: 127 K/UL (ref 150–350)
PMV BLD AUTO: 12.1 FL (ref 9.2–12.9)
POTASSIUM SERPL-SCNC: 3.2 MMOL/L (ref 3.5–5.1)
PROT SERPL-MCNC: 6.3 G/DL (ref 6–8.4)
RBC # BLD AUTO: 3.98 M/UL (ref 4.6–6.2)
SARS-COV-2 RNA RESP QL NAA+PROBE: NOT DETECTED
SODIUM SERPL-SCNC: 146 MMOL/L (ref 136–145)
WBC # BLD AUTO: 13.85 K/UL (ref 3.9–12.7)

## 2020-11-03 PROCEDURE — 25000003 PHARM REV CODE 250: Performed by: STUDENT IN AN ORGANIZED HEALTH CARE EDUCATION/TRAINING PROGRAM

## 2020-11-03 PROCEDURE — 63600175 PHARM REV CODE 636 W HCPCS: Performed by: STUDENT IN AN ORGANIZED HEALTH CARE EDUCATION/TRAINING PROGRAM

## 2020-11-03 PROCEDURE — 36415 COLL VENOUS BLD VENIPUNCTURE: CPT

## 2020-11-03 PROCEDURE — 82550 ASSAY OF CK (CPK): CPT

## 2020-11-03 PROCEDURE — 83735 ASSAY OF MAGNESIUM: CPT

## 2020-11-03 PROCEDURE — 86703 HIV-1/HIV-2 1 RESULT ANTBDY: CPT

## 2020-11-03 PROCEDURE — 99233 PR SUBSEQUENT HOSPITAL CARE,LEVL III: ICD-10-PCS | Mod: GC,,, | Performed by: STUDENT IN AN ORGANIZED HEALTH CARE EDUCATION/TRAINING PROGRAM

## 2020-11-03 PROCEDURE — 80053 COMPREHEN METABOLIC PANEL: CPT

## 2020-11-03 PROCEDURE — U0003 INFECTIOUS AGENT DETECTION BY NUCLEIC ACID (DNA OR RNA); SEVERE ACUTE RESPIRATORY SYNDROME CORONAVIRUS 2 (SARS-COV-2) (CORONAVIRUS DISEASE [COVID-19]), AMPLIFIED PROBE TECHNIQUE, MAKING USE OF HIGH THROUGHPUT TECHNOLOGIES AS DESCRIBED BY CMS-2020-01-R: HCPCS

## 2020-11-03 PROCEDURE — 20600001 HC STEP DOWN PRIVATE ROOM

## 2020-11-03 PROCEDURE — 84100 ASSAY OF PHOSPHORUS: CPT

## 2020-11-03 PROCEDURE — 99233 SBSQ HOSP IP/OBS HIGH 50: CPT | Mod: GC,,, | Performed by: STUDENT IN AN ORGANIZED HEALTH CARE EDUCATION/TRAINING PROGRAM

## 2020-11-03 PROCEDURE — 85025 COMPLETE CBC W/AUTO DIFF WBC: CPT

## 2020-11-03 RX ORDER — CEFTRIAXONE 1 G/1
1 INJECTION, POWDER, FOR SOLUTION INTRAMUSCULAR; INTRAVENOUS
Status: DISCONTINUED | OUTPATIENT
Start: 2020-11-03 | End: 2020-11-03

## 2020-11-03 RX ORDER — POTASSIUM CHLORIDE 1.5 G/1.58G
20 POWDER, FOR SOLUTION ORAL ONCE
Status: COMPLETED | OUTPATIENT
Start: 2020-11-03 | End: 2020-11-03

## 2020-11-03 RX ORDER — SODIUM CHLORIDE, SODIUM LACTATE, POTASSIUM CHLORIDE, CALCIUM CHLORIDE 600; 310; 30; 20 MG/100ML; MG/100ML; MG/100ML; MG/100ML
INJECTION, SOLUTION INTRAVENOUS CONTINUOUS
Status: ACTIVE | OUTPATIENT
Start: 2020-11-03 | End: 2020-11-04

## 2020-11-03 RX ORDER — CEFTRIAXONE 1 G/1
1 INJECTION, POWDER, FOR SOLUTION INTRAMUSCULAR; INTRAVENOUS
Status: DISCONTINUED | OUTPATIENT
Start: 2020-11-03 | End: 2020-11-04

## 2020-11-03 RX ADMIN — APIXABAN 5 MG: 5 TABLET, FILM COATED ORAL at 09:11

## 2020-11-03 RX ADMIN — FERROUS SULFATE TAB EC 325 MG (65 MG FE EQUIVALENT) 325 MG: 325 (65 FE) TABLET DELAYED RESPONSE at 05:11

## 2020-11-03 RX ADMIN — MICONAZOLE NITRATE: 20 POWDER TOPICAL at 09:11

## 2020-11-03 RX ADMIN — ASPIRIN 81 MG: 81 TABLET, COATED ORAL at 09:11

## 2020-11-03 RX ADMIN — CARVEDILOL 6.25 MG: 6.25 TABLET, FILM COATED ORAL at 09:11

## 2020-11-03 RX ADMIN — VANCOMYCIN HYDROCHLORIDE 2000 MG: 100 INJECTION, POWDER, LYOPHILIZED, FOR SOLUTION INTRAVENOUS at 02:11

## 2020-11-03 RX ADMIN — CEFTRIAXONE SODIUM 1 G: 1 INJECTION, POWDER, FOR SOLUTION INTRAMUSCULAR; INTRAVENOUS at 03:11

## 2020-11-03 RX ADMIN — CEFEPIME 1 G: 1 INJECTION, POWDER, FOR SOLUTION INTRAMUSCULAR; INTRAVENOUS at 09:11

## 2020-11-03 RX ADMIN — POTASSIUM CHLORIDE 20 MEQ: 1.5 FOR SOLUTION ORAL at 09:11

## 2020-11-03 RX ADMIN — SODIUM CHLORIDE, SODIUM LACTATE, POTASSIUM CHLORIDE, AND CALCIUM CHLORIDE: 600; 310; 30; 20 INJECTION, SOLUTION INTRAVENOUS at 02:11

## 2020-11-03 RX ADMIN — PIPERACILLIN AND TAZOBACTAM 4.5 G: 4; .5 INJECTION, POWDER, LYOPHILIZED, FOR SOLUTION INTRAVENOUS; PARENTERAL at 06:11

## 2020-11-03 RX ADMIN — FERROUS SULFATE TAB EC 325 MG (65 MG FE EQUIVALENT) 325 MG: 325 (65 FE) TABLET DELAYED RESPONSE at 09:11

## 2020-11-03 NOTE — ASSESSMENT & PLAN NOTE
70-year-old male with PMH of HFrEF 2/2 NICM (EF 35-40%), HTN, DLD, DM2, AF (on apixaban), hx CVA w/ L hemineglect, hx DVT, ERIC (compliant on CPAP), morbid obesity who presents after being found down at home and with altered mental status.  Last seen at 10AM on 10/31. Lives alone. Labs consistent with rhabdomyolysis and sepsis likely 2/2 to UTI.  Renal function at baseline, K 5.3.    - 1 L LR bolus @ 100 ml/hr x 10 hours  - CPK downtrending, will continue to trend CPK  - CMP b.i.d.  - strict I&Os

## 2020-11-03 NOTE — PHARMACY MED REC
"Admission Medication Reconciliation - Pharmacy Consult Note    The home medication history was taken by Sharonda Messina Pharmacy Tech.  Based on information gathered and subsequent review by the clinical pharmacist, the items below may need attention.     You may go to "Admission" then "Reconcile Home Medications" tabs to review and/or act upon these items.     Potentially problematic discrepancies with current MAR  o Patient IS taking the following which was not ordered upon admit  o Allopurinol 300 mg PO QAM    Please address this information as you see fit.  Feel free to contact us if you have any questions or require assistance.    Avril Zaragoza, PharmD  [id number]                    .    .            "

## 2020-11-03 NOTE — ASSESSMENT & PLAN NOTE
Lab Results   Component Value Date    HGBA1C 5.6 11/02/2020     No home meds listed. BG stable    - LDSS  - POCT glucose ACHS  - Diabetic diet.  - follow up with family about home meds

## 2020-11-03 NOTE — CONSULTS
VANCOMYCIN DOSING BY PHARMACY DISCONTINUATION NOTE    Hemant Kennedy Jr. is a 70 y.o. male who had been consulted for vancomycin dosing.    The pharmacy consult for vancomycin dosing has been discontinued.     Vancomycin Dosing by Pharmacy Consult will sign-off. Please reconsult if necessary. Thank you for allowing us to participate in this patient's care.       Avril Zaragoza, JonaD  [phone]

## 2020-11-03 NOTE — SUBJECTIVE & OBJECTIVE
Interval History:  NAEON. Improvement in lactate to 2.3. Switched antibiotics to ceftriaxone.     Review of Systems   Constitutional: Negative for chills and fever.   HENT: Negative for congestion, sneezing and sore throat.    Eyes: Negative for visual disturbance.   Respiratory: Negative for shortness of breath.    Cardiovascular: Positive for leg swelling. Negative for chest pain.   Gastrointestinal: Negative for abdominal pain, nausea and vomiting.   Genitourinary: Negative for dysuria.   Musculoskeletal: Negative for back pain.   Psychiatric/Behavioral: Positive for confusion.     Objective:     Vital Signs (Most Recent):  Temp: 98 °F (36.7 °C) (11/03/20 1121)  Pulse: 65 (11/03/20 1121)  Resp: 20 (11/03/20 1121)  BP: 102/63 (11/03/20 1121)  SpO2: (!) 92 % (11/03/20 1121) Vital Signs (24h Range):  Temp:  [96.9 °F (36.1 °C)-98.5 °F (36.9 °C)] 98 °F (36.7 °C)  Pulse:  [] 65  Resp:  [16-20] 20  SpO2:  [92 %-99 %] 92 %  BP: (102-123)/(55-63) 102/63     Weight: (!) 158.8 kg (350 lb)  Body mass index is 51.69 kg/m².  No intake or output data in the 24 hours ending 11/03/20 1340   Physical Exam  Constitutional:       Appearance: Normal appearance.   HENT:      Head: Normocephalic and atraumatic.      Mouth/Throat:      Mouth: Mucous membranes are moist.   Eyes:      Extraocular Movements: Extraocular movements intact.   Cardiovascular:      Rate and Rhythm: Normal rate and regular rhythm.      Pulses: Normal pulses.      Heart sounds: Normal heart sounds. No murmur. No friction rub. No gallop.    Pulmonary:      Effort: Pulmonary effort is normal. No respiratory distress.      Breath sounds: Normal breath sounds. No wheezing or rales.   Chest:      Chest wall: No tenderness.   Abdominal:      General: Abdomen is flat. There is no distension.      Palpations: Abdomen is soft.      Tenderness: There is no abdominal tenderness. There is no guarding or rebound.   Musculoskeletal:      Right lower leg: Edema present.       Left lower leg: Edema present.   Neurological:      General: No focal deficit present.      Mental Status: He is alert. He is disoriented.         Significant Labs:   A1C:   Recent Labs   Lab 11/02/20  0528   HGBA1C 5.6     ABGs:   Recent Labs   Lab 11/01/20  2303   PH 7.334*   PCO2 42.6   HCO3 22.7*   POCSATURATED 46*   BE -3     Bilirubin:   Recent Labs   Lab 11/01/20 2247 11/02/20 0807 11/02/20 2204   BILITOT 1.5* 1.3* 0.8     Blood Culture:   Recent Labs   Lab 11/01/20 2246 11/01/20 2247   LABBLOO No Growth to date  No Growth to date No Growth to date  No Growth to date     BMP:   Recent Labs   Lab 11/02/20 2204 11/03/20  0545   *  --      --    K 3.4*  --      --    CO2 23  --    BUN 38*  --    CREATININE 2.3*  --    CALCIUM 8.3*  --    MG  --  2.0     CBC:   Recent Labs   Lab 11/01/20 2247 11/01/20 2253 11/03/20  0545   WBC 17.34*  --  13.85*   HGB 13.8*  --  11.9*   HCT 43.3 46 37.4*   *  --  127*     CMP:   Recent Labs   Lab 11/01/20 2247 11/02/20 0807 11/02/20 2204    145 143   K 5.3* 3.5 3.4*    111* 110   CO2 19* 24 23   * 169* 203*   BUN 25* 31* 38*   CREATININE 1.9* 1.9* 2.3*   CALCIUM 8.8 8.1* 8.3*   PROT 7.9 6.3 6.3   ALBUMIN 2.9* 2.3* 2.4*   BILITOT 1.5* 1.3* 0.8   ALKPHOS 81 68 70   * 99* 101*   ALT 42 37 43   ANIONGAP 17* 10 10   EGFRNONAA 34.5* 34.5* 27.3*     Cardiac Markers:   Recent Labs   Lab 11/01/20 2247   *     Coagulation: No results for input(s): PT, INR, APTT in the last 48 hours.  Lactic Acid:   Recent Labs   Lab 11/02/20  0807 11/02/20  1401 11/02/20  2204   LACTATE 3.5* 3.3* 2.3*     Lipase: No results for input(s): LIPASE in the last 48 hours.  Lipid Panel: No results for input(s): CHOL, HDL, LDLCALC, TRIG, CHOLHDL in the last 48 hours.  Magnesium:   Recent Labs   Lab 11/03/20  0545   MG 2.0     Pathology Results  (Last 10 years)    None        POCT Glucose:   Recent Labs   Lab 11/02/20  0532 11/02/20  1109    POCTGLUCOSE 210* 151*     Prealbumin: No results for input(s): PREALBUMIN in the last 48 hours.  Respiratory Culture: No results for input(s): GSRESP, RESPIRATORYC in the last 48 hours.  Troponin:   Recent Labs   Lab 11/02/20  0807 11/02/20  1401 11/02/20  2246   TROPONINI 0.094* 0.118* 0.118*     TSH:   Recent Labs   Lab 11/01/20  2247   TSH 0.913     Urine Culture:   Recent Labs   Lab 11/02/20  0051   LABURIN PRESUMPTIVE PROTEUS SPECIES  >100,000 cfu/ml  Identification and susceptibility pending  *     Urine Studies:   Recent Labs   Lab 11/02/20  0051   COLORU Brittany   APPEARANCEUA Cloudy*   PHUR 7.0   SPECGRAV 1.020   PROTEINUA 2+*   GLUCUA Negative   KETONESU Trace*   BILIRUBINUA Negative   OCCULTUA 2+*   NITRITE Negative   LEUKOCYTESUR 3+*   RBCUA 28*   WBCUA >100*   BACTERIA Many*   HYALINECASTS 16*       Significant Imaging: I have reviewed all pertinent imaging results/findings within the past 24 hours.

## 2020-11-03 NOTE — PROGRESS NOTES
Pt's K 3.4 spoke with MD will review chart and call back with instructions. Will continue to monitor

## 2020-11-03 NOTE — PLAN OF CARE
11/03/20 1407   Post-Acute Status   Post-Acute Authorization Placement   Post-Acute Placement Status Referrals Sent     Per  Marixa pt's daughter in agreement with plan for [facility] SNF and discussed pt going to a facility close to where she lives.  SW sent SNF referrals to the following providers via :  [facility], [facility], [facility], and [facility].  SW will continue to follow.    Maura Garcia LMSW  [facility]  [phone]

## 2020-11-03 NOTE — ASSESSMENT & PLAN NOTE
Trop 0.07 on admission. Denies chest pain but he does have AMS. Likely type 2 NSTEMI given sepsis, rhabdo, and EKG with afib and no obvious ischemic changes   Repeat trop stable ( 0.07 --> 0.094 --> .118 --> .118)   Will no longer trend trops.

## 2020-11-03 NOTE — HOSPITAL COURSE
Urine cultures from 11/2 positive for proteus species. Antibiotics switched to ceftriaxone. 1 L LR infusion. Switched to cefpodoxime oral. To discharge to SNF on 11/5 with 12 day course of cefpodoxime, (14 total days of antibiotic therapy). AOx2.

## 2020-11-03 NOTE — PROGRESS NOTES
Pharmacist Renal Dose Adjustment Note    Hemant Kennedy Jr. is a 70 y.o. male being treated with the medication cefepime    Patient Data:    Vital Signs (Most Recent):  Temp: 98.5 °F (36.9 °C) (11/03/20 0530)  Pulse: 73 (11/03/20 0600)  Resp: 16 (11/03/20 0530)  BP: (!) 117/58 (11/03/20 0530)  SpO2: 96 % (11/03/20 0530)   Vital Signs (72h Range):  Temp:  [96.9 °F (36.1 °C)-98.5 °F (36.9 °C)]   Pulse:  []   Resp:  [16-32]   BP: (108-125)/(46-87)   SpO2:  [92 %-100 %]      Recent Labs   Lab 11/01/20  2247 11/02/20  0807 11/02/20  2204   CREATININE 1.9* 1.9* 2.3*     Serum creatinine: 2.3 mg/dL (H) 11/02/20 2204  Estimated creatinine clearance: 43.5 mL/min (A)    Cefepime 1 g Q24H will be changed to cefeime 1 g Q12H    Pharmacist's Name: Simin Willams  Pharmacist's Extension: [phone]

## 2020-11-03 NOTE — PLAN OF CARE
Pt free of falls/trauma/injuries.  Denies c/o SOB, CP. Pt's discomfort being treated with PO analgesics.  Generalized skin remains CDI; No  edema noted.  Electrolytes replaced as ordered. Pt remains on ABX gtts.Pt tolerating plan of care.
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
## 2020-11-03 NOTE — ASSESSMENT & PLAN NOTE
Currently in rate controlled AFib    - continue home Coreg  - continue Eliquis  - Holding losartan, amiodarone.

## 2020-11-03 NOTE — ASSESSMENT & PLAN NOTE
Leukocytosis and UA consistent with UTI.  Chest x-ray no evidence for focal infiltrates    - Discontinued vanc and Zosyn.    - BCX- NGTD  - UCX- proteus species, awaiting sensitivities   - 1 L LR infusion at 100 ml/hr x 10 hours

## 2020-11-03 NOTE — ASSESSMENT & PLAN NOTE
- renal function at baseline Cr of 1.9  - Cr 2.3  - chemistries b.i.d. given rhabdo  - strict I&Os  - 1 L LR @ 100ml/hr for 10 hours

## 2020-11-03 NOTE — PLAN OF CARE
11/03/20 1403   Discharge Assessment   Assessment Type Discharge Planning Assessment   Confirmed/corrected address and phone number on facesheet? Yes   Assessment information obtained from? Caregiver;Medical Record  (daughter, Lynne Giordano  [phone])   Expected Length of Stay (days) 3   Prior to hospitilization cognitive status: Alert/Oriented   Prior to hospitalization functional status: Assistive Equipment;Needs Assistance   Current cognitive status: Not Oriented to Place;Not Oriented to Time   Current Functional Status: Partially Dependent   Lives With alone   Able to Return to Prior Arrangements no   Is patient able to care for self after discharge? No   Patient's perception of discharge disposition skilled nursing facility   Readmission Within the Last 30 Days no previous admission in last 30 days   Patient currently being followed by outpatient case management? No   Patient currently receives any other outside agency services? No   Equipment Currently Used at Home wheelchair;bedside commode;hospital bed;power chair;oxygen;BIPAP   Do you have any problems affording any of your prescribed medications? TBD   Does the patient receive services at the [facility]? No   Discharge Plan A Skilled Nursing Facility   Patient/Family in Agreement with Plan yes   Admitted with Rhabdo, hyperlipidemia and Encephalopathy. Information obtained from pt's daughter. She states he previously lived alone but was requiring more assistance and they had been talking about moving in to live together. CM did discuss plan for NH SNG post DC from this hospital stay. She is in agreement. She requested somewhere in the area. She requested [facility] and other NH that may be near her [id number] residence.

## 2020-11-03 NOTE — ASSESSMENT & PLAN NOTE
Does not appear to be overtly volume overloaded on exam but does have BNP of 490    - holding home GDMT  - monitor for signs of overload     TTE 2020  Poor study quality due to elevated BMI, non-cooperative patient  EF 35-40%    TTE 2017    1 - Eccentric hypertrophy.     2 - Moderately depressed left ventricular systolic function (EF 35-40%).     3 - Restrictive LV filling pattern, indicating markedly elevated LAP (grade 3 diastolic dysfunction).     4 - Biatrial enlargement.     5 - Right ventricular enlargement with moderately depressed systolic function.     6 - Pulmonary hypertension. The estimated PA systolic pressure is 80 mmHg.     7 - Trivial aortic regurgitation.     8 - Trivial mitral regurgitation.     9 - Moderate tricuspid regurgitation.     10 - Increased central venous pressure.

## 2020-11-04 LAB
ALBUMIN SERPL BCP-MCNC: 2.3 G/DL (ref 3.5–5.2)
ALP SERPL-CCNC: 78 U/L (ref 55–135)
ALT SERPL W/O P-5'-P-CCNC: 46 U/L (ref 10–44)
ANION GAP SERPL CALC-SCNC: 11 MMOL/L (ref 8–16)
AST SERPL-CCNC: 82 U/L (ref 10–40)
BACTERIA UR CULT: ABNORMAL
BASOPHILS # BLD AUTO: 0.06 K/UL (ref 0–0.2)
BASOPHILS NFR BLD: 0.5 % (ref 0–1.9)
BILIRUB SERPL-MCNC: 0.8 MG/DL (ref 0.1–1)
BUN SERPL-MCNC: 32 MG/DL (ref 8–23)
CALCIUM SERPL-MCNC: 8 MG/DL (ref 8.7–10.5)
CHLORIDE SERPL-SCNC: 113 MMOL/L (ref 95–110)
CK SERPL-CCNC: 1098 U/L (ref 20–200)
CK SERPL-CCNC: 1159 U/L (ref 20–200)
CK SERPL-CCNC: 1357 U/L (ref 20–200)
CO2 SERPL-SCNC: 24 MMOL/L (ref 23–29)
CREAT SERPL-MCNC: 1.7 MG/DL (ref 0.5–1.4)
DIFFERENTIAL METHOD: ABNORMAL
EOSINOPHIL # BLD AUTO: 0.7 K/UL (ref 0–0.5)
EOSINOPHIL NFR BLD: 5.9 % (ref 0–8)
ERYTHROCYTE [DISTWIDTH] IN BLOOD BY AUTOMATED COUNT: 15.5 % (ref 11.5–14.5)
EST. GFR  (AFRICAN AMERICAN): 45.6 ML/MIN/1.73 M^2
EST. GFR  (NON AFRICAN AMERICAN): 39.4 ML/MIN/1.73 M^2
GLUCOSE SERPL-MCNC: 104 MG/DL (ref 70–110)
HCT VFR BLD AUTO: 34.8 % (ref 40–54)
HGB BLD-MCNC: 11.3 G/DL (ref 14–18)
IMM GRANULOCYTES # BLD AUTO: 0.02 K/UL (ref 0–0.04)
IMM GRANULOCYTES NFR BLD AUTO: 0.2 % (ref 0–0.5)
LYMPHOCYTES # BLD AUTO: 1.5 K/UL (ref 1–4.8)
LYMPHOCYTES NFR BLD: 12.3 % (ref 18–48)
MAGNESIUM SERPL-MCNC: 1.9 MG/DL (ref 1.6–2.6)
MCH RBC QN AUTO: 30.4 PG (ref 27–31)
MCHC RBC AUTO-ENTMCNC: 32.5 G/DL (ref 32–36)
MCV RBC AUTO: 94 FL (ref 82–98)
MONOCYTES # BLD AUTO: 1.2 K/UL (ref 0.3–1)
MONOCYTES NFR BLD: 9.8 % (ref 4–15)
NEUTROPHILS # BLD AUTO: 8.6 K/UL (ref 1.8–7.7)
NEUTROPHILS NFR BLD: 71.3 % (ref 38–73)
NRBC BLD-RTO: 0 /100 WBC
PHOSPHATE SERPL-MCNC: 2.5 MG/DL (ref 2.7–4.5)
PLATELET # BLD AUTO: 128 K/UL (ref 150–350)
PMV BLD AUTO: 12.3 FL (ref 9.2–12.9)
POCT GLUCOSE: 176 MG/DL (ref 70–110)
POCT GLUCOSE: 185 MG/DL (ref 70–110)
POTASSIUM SERPL-SCNC: 3.2 MMOL/L (ref 3.5–5.1)
PROT SERPL-MCNC: 6 G/DL (ref 6–8.4)
RBC # BLD AUTO: 3.72 M/UL (ref 4.6–6.2)
SODIUM SERPL-SCNC: 148 MMOL/L (ref 136–145)
WBC # BLD AUTO: 12.08 K/UL (ref 3.9–12.7)

## 2020-11-04 PROCEDURE — 82550 ASSAY OF CK (CPK): CPT

## 2020-11-04 PROCEDURE — 85025 COMPLETE CBC W/AUTO DIFF WBC: CPT

## 2020-11-04 PROCEDURE — 99233 PR SUBSEQUENT HOSPITAL CARE,LEVL III: ICD-10-PCS | Mod: GC,,, | Performed by: STUDENT IN AN ORGANIZED HEALTH CARE EDUCATION/TRAINING PROGRAM

## 2020-11-04 PROCEDURE — 97110 THERAPEUTIC EXERCISES: CPT | Mod: CQ

## 2020-11-04 PROCEDURE — 82550 ASSAY OF CK (CPK): CPT | Mod: 91

## 2020-11-04 PROCEDURE — 97530 THERAPEUTIC ACTIVITIES: CPT

## 2020-11-04 PROCEDURE — 84100 ASSAY OF PHOSPHORUS: CPT

## 2020-11-04 PROCEDURE — 63600175 PHARM REV CODE 636 W HCPCS: Performed by: STUDENT IN AN ORGANIZED HEALTH CARE EDUCATION/TRAINING PROGRAM

## 2020-11-04 PROCEDURE — 80053 COMPREHEN METABOLIC PANEL: CPT

## 2020-11-04 PROCEDURE — 25000003 PHARM REV CODE 250: Performed by: STUDENT IN AN ORGANIZED HEALTH CARE EDUCATION/TRAINING PROGRAM

## 2020-11-04 PROCEDURE — 99233 SBSQ HOSP IP/OBS HIGH 50: CPT | Mod: GC,,, | Performed by: STUDENT IN AN ORGANIZED HEALTH CARE EDUCATION/TRAINING PROGRAM

## 2020-11-04 PROCEDURE — 83735 ASSAY OF MAGNESIUM: CPT

## 2020-11-04 PROCEDURE — 36415 COLL VENOUS BLD VENIPUNCTURE: CPT

## 2020-11-04 PROCEDURE — 20600001 HC STEP DOWN PRIVATE ROOM

## 2020-11-04 RX ORDER — CEFPODOXIME PROXETIL 200 MG/1
200 TABLET, FILM COATED ORAL EVERY 12 HOURS
Status: DISCONTINUED | OUTPATIENT
Start: 2020-11-04 | End: 2020-11-05 | Stop reason: HOSPADM

## 2020-11-04 RX ORDER — POTASSIUM CHLORIDE 20 MEQ/1
40 TABLET, EXTENDED RELEASE ORAL ONCE
Status: COMPLETED | OUTPATIENT
Start: 2020-11-04 | End: 2020-11-04

## 2020-11-04 RX ORDER — IBUPROFEN 200 MG
16 TABLET ORAL
Status: DISCONTINUED | OUTPATIENT
Start: 2020-11-05 | End: 2020-11-05 | Stop reason: HOSPADM

## 2020-11-04 RX ORDER — GLUCAGON 1 MG
1 KIT INJECTION
Status: DISCONTINUED | OUTPATIENT
Start: 2020-11-05 | End: 2020-11-05 | Stop reason: HOSPADM

## 2020-11-04 RX ORDER — INSULIN ASPART 100 [IU]/ML
0-5 INJECTION, SOLUTION INTRAVENOUS; SUBCUTANEOUS
Status: DISCONTINUED | OUTPATIENT
Start: 2020-11-05 | End: 2020-11-05 | Stop reason: HOSPADM

## 2020-11-04 RX ORDER — IBUPROFEN 200 MG
24 TABLET ORAL
Status: DISCONTINUED | OUTPATIENT
Start: 2020-11-05 | End: 2020-11-05 | Stop reason: HOSPADM

## 2020-11-04 RX ORDER — CEFPODOXIME PROXETIL 200 MG/1
200 TABLET, FILM COATED ORAL EVERY 12 HOURS
Qty: 24 TABLET | Refills: 0 | Status: ON HOLD | OUTPATIENT
Start: 2020-11-04 | End: 2020-12-01 | Stop reason: HOSPADM

## 2020-11-04 RX ADMIN — MICONAZOLE NITRATE: 20 POWDER TOPICAL at 08:11

## 2020-11-04 RX ADMIN — POTASSIUM CHLORIDE 40 MEQ: 1500 TABLET, EXTENDED RELEASE ORAL at 10:11

## 2020-11-04 RX ADMIN — CARVEDILOL 6.25 MG: 6.25 TABLET, FILM COATED ORAL at 08:11

## 2020-11-04 RX ADMIN — MICONAZOLE NITRATE: 20 POWDER TOPICAL at 09:11

## 2020-11-04 RX ADMIN — CARVEDILOL 6.25 MG: 6.25 TABLET, FILM COATED ORAL at 10:11

## 2020-11-04 RX ADMIN — FERROUS SULFATE TAB EC 325 MG (65 MG FE EQUIVALENT) 325 MG: 325 (65 FE) TABLET DELAYED RESPONSE at 10:11

## 2020-11-04 RX ADMIN — CEFTRIAXONE SODIUM 1 G: 1 INJECTION, POWDER, FOR SOLUTION INTRAMUSCULAR; INTRAVENOUS at 01:11

## 2020-11-04 RX ADMIN — CEFPODOXIME PROXETIL 200 MG: 200 TABLET, FILM COATED ORAL at 08:11

## 2020-11-04 RX ADMIN — APIXABAN 5 MG: 5 TABLET, FILM COATED ORAL at 08:11

## 2020-11-04 RX ADMIN — FERROUS SULFATE TAB EC 325 MG (65 MG FE EQUIVALENT) 325 MG: 325 (65 FE) TABLET DELAYED RESPONSE at 05:11

## 2020-11-04 RX ADMIN — APIXABAN 5 MG: 5 TABLET, FILM COATED ORAL at 10:11

## 2020-11-04 RX ADMIN — ASPIRIN 81 MG: 81 TABLET, COATED ORAL at 10:11

## 2020-11-04 NOTE — PLAN OF CARE
Plan of care discussed with patient. Patient is free of fall/trauma/injury. Denies CP, SOB, or pain/discomfort. Vitals WNL, pt awake alert and oriented to self. Disoriented to place, time and situation. All questions addressed. Will continue to monitor

## 2020-11-04 NOTE — PLAN OF CARE
[facility]    FACILITY TRANSFER ORDERS      Patient Name: Hemant Kennedy Jr.  YOB: 1950    PCP: Wicho Qiu MD   PCP Address: [address] [place] [address] [address] / [place] [address]  PCP Phone Number: [phone]  PCP Fax: [phone]    Encounter Date: 11/04/2020    Admit to: SNF    Vital Signs:  Routine    Diagnoses:   Active Hospital Problems    Diagnosis  POA    *Rhabdomyolysis [M62.82]  Unknown    Sepsis [A41.9]  Unknown    Wounds, multiple [T07.XXXA]  Unknown    Atrial fibrillation [I48.91]  Unknown    Nonischemic cardiomyopathy [I42.8]  Unknown    Acute encephalopathy [G93.40]  Unknown    HLD (hyperlipidemia) [E78.5]  Unknown    Elevated troponin [R77.8]  Unknown    Iron deficiency anemia [D50.9]  Yes    Obstructive sleep apnea treated with continuous positive airway pressure (CPAP) [G47.33, Z99.89]  Not Applicable    Type II diabetes mellitus [E11.8, E11.65]  Yes    CKD (chronic kidney disease) stage 3, GFR 30-59 ml/min [N18.30]  Yes    Hypertension [I10]  Yes      Resolved Hospital Problems   No resolved problems to display.       Allergies:  Review of patient's allergies indicates:   Allergen Reactions    Spironolactone      Hyperkalemia         Diet: renal diet    Activities: Activity as tolerated    Nursing: Standard    Labs: CBC and BMP Once     CONSULTS:    Occupational Therapy to evaluate and treat.    MISCELLANEOUS CARE:  Routine Skin for Bedridden Patients: Apply moisture barrier cream to all skin folds and wet areas in perineal area daily and after baths and all bowel movements.    WOUND CARE ORDERS  Yes: Skin tears.  Follow skin tear standing orders-- foam dressing over skin tears- change weekly  Miconazole powder to skin folds BID.     Medications: Review discharge medications with patient and family and provide education.      Current Discharge Medication List      START taking these medications    Details   cefpodoxime (VANTIN) 200 MG tablet  Take 1 tablet (200 mg total) by mouth every 12 (twelve) hours.  Qty: 24 tablet, Refills: 0         CONTINUE these medications which have NOT CHANGED    Details   allopurinol (ZYLOPRIM) 300 MG tablet Take 1 tablet (300 mg total) by mouth every morning.  Qty: 90 tablet, Refills: 3      aspirin 81 MG Chew TAKE 1 TAB BY MOUTH daily  Qty: 90 tablet, Refills: 3    Associated Diagnoses: Chronic systolic heart failure      atorvastatin (LIPITOR) 40 MG tablet @@ TAKE 1 TABLET BY MOUTH daily  Qty: 90 tablet, Refills: 0    Associated Diagnoses: Chronic systolic heart failure; Atrial fibrillation, controlled      calcium carbonate (OS-ROJAS) 600 mg calcium (1,500 mg) Tab Take 600 mg by mouth 2 (two) times a day.      carvediloL (COREG) 6.25 MG tablet TAKE 1 TABLET BY MOUTH IN THE MORNING and TAKE 1 TABLET BY MOUTH every evening with meals  Qty: 180 tablet, Refills: 0    Associated Diagnoses: Chronic systolic heart failure      doxazosin (CARDURA) 2 MG tablet Take 2 mg by mouth once daily.    Comments: .      ELIQUIS 5 mg Tab TAKE 1 TABLET BY MOUTH twice a day  Qty: 60 tablet, Refills: 3    Associated Diagnoses: Atrial fibrillation, controlled      ergocalciferol (ERGOCALCIFEROL) 50,000 unit Cap Take 1 capsule (50,000 Units total) by mouth every 7 days.  Qty: 12 capsule, Refills: 3    Associated Diagnoses: Secondary hyperparathyroidism      ferrous sulfate 325 (65 FE) MG EC tablet Take 1 tablet (325 mg total) by mouth 2 (two) times daily with meals.  Qty: 60 tablet, Refills: 5      furosemide (LASIX) 40 MG tablet @@ TAKE 1 TABLET BY MOUTH daily  Qty: 90 tablet, Refills: 0    Associated Diagnoses: Chronic systolic heart failure      gabapentin (NEURONTIN) 300 MG capsule Take 1 capsule (300 mg total) by mouth 3 (three) times daily.  Qty: 90 capsule, Refills: 11    Associated Diagnoses: Type 2 diabetes mellitus with stage 3 chronic kidney disease, without long-term current use of insulin      glimepiride (AMARYL) 4 MG tablet Take 4  mg by mouth once daily.      losartan (COZAAR) 25 MG tablet Take 0.5 tablets (12.5 mg total) by mouth once daily.  Qty: 45 tablet, Refills: 3      multivitamin (ONE DAILY MULTIVITAMIN) per tablet Take 1 tablet by mouth once daily.      oxybutynin (DITROPAN-XL) 10 MG 24 hr tablet Take 10 mg by mouth once daily.      acetaminophen (TYLENOL) 500 MG tablet Take 2 tablets (1,000 mg total) by mouth 3 (three) times daily as needed for Pain.  Qty: 15 tablet, Refills: 0      amiodarone (PACERONE) 200 MG Tab TAKE 1 TABLET BY MOUTH IN THE MORNING  Qty: 30 tablet, Refills: 0    Associated Diagnoses: Chronic systolic heart failure; Atrial fibrillation, controlled      collagenase (SANTYL) ointment Apply topically once daily. Apply to wound daily as directed.  Qty: 90 g, Refills: 0    Associated Diagnoses: Ulcer of other part of foot; Decubitus ulcer of left heel, unstageable      fluticasone (FLONASE) 50 mcg/actuation nasal spray 2 sprays by Each Nare route once daily.  Refills: 0      HYDROPHILIC CREAM (TRIAD WOUND DRESSING TOP) Apply topically daily as needed.      ONE TOUCH ULTRA TEST Strp Test blood sugar two times daily                  _________________________________  Lester Wylie MD  11/04/2020

## 2020-11-04 NOTE — PROGRESS NOTES
Cleaned pt with assistance from co-workers. Cleansed skin and applied skin barrier. Pt unwilling to help us. Will continue to monitor.

## 2020-11-04 NOTE — PT/OT/SLP PROGRESS
"Occupational Therapy   Co-Treatment    Name: Hemant Kennedy Jr.  MRN: [id number]  Admitting Diagnosis:  Rhabdomyolysis    Recommendations:     Discharge Recommendations: nursing facility, skilled  Discharge Equipment Recommendations: none  Barriers to discharge: Decreased caregiver support    Assessment:     Hemant Kennedy Jr. is a 70 y.o. male with a medical diagnosis of Rhabdomyolysis.  He presents with the following performance deficits affecting function: weakness, impaired endurance, impaired self care skills, impaired functional mobilty, gait instability, impaired balance, impaired cardiopulmonary response to activity, impaired muscle length, decreased coordination, decreased upper extremity function, decreased lower extremity function. Patient presents with poor participation and motivation to participate in therapy. Patient refused to sit EOB and required total assist for re-positioning in bed. At this time, patient will continue to benefit from acute skilled therapy intervention to address deficits/underlying impairments and progress towards prior level of function. After discharge, patient would benefit from continued skilled therapy intervention at SNF to progress more towards independence in ADLs and functional mobility before going home.    Rehab Prognosis:  Good; patient would benefit from acute skilled OT services to address these deficits and reach maximum level of function.      Plan:     Patient to be seen 3 x/week to address the above listed problems via self-care/home management, therapeutic activities, therapeutic exercises, neuromuscular re-education  · Plan of Care Expires: 12/01/20  · Plan of Care Reviewed with: patient    Subjective     Patient stated "I don't need to do anything".     Pain/Comfort:  · Pain Rating 1: 0/10    Objective:     Communicated with: RN prior to session. Patient found HOB elevated with telemetry and RN present upon OT entry to room. PTA present for co-tx 2* level of " skilled assist required for tasks; however, patient refused EOB activities and session with limited this date.    General Precautions: Standard, fall   Orthopedic Precautions:N/A   Braces: N/A     Occupational Performance:     Bed Mobility:    · Patient completed Scooting/Bridging towards HOB in supine with total assistance and 2 persons   · Attempted to assist patient with supine to sit EOB but patient began resisting and refused    Functional Mobility/Transfers:  · Not assessed; patient refused to sit EOB     Activities of Daily Living:  · Not assessed    [facility] 6 Click ADL: 10    Treatment & Education:   Educated patient on importance of participation in therapy and effects of prolonged bedrest. Patient not receptive to therapist's instruction and continued to refuse to sit EOB.    Therapist instructed patient to perform B UE shoulder AROM exercises in bed to improve UE ROM, strength, and endurance. Patient demonstrated exercise with R UE but refused to with L UE.     Patient left HOB elevated with all lines intact, call button in reach and PTA presentEducation:      GOALS:   Multidisciplinary Problems     Occupational Therapy Goals        Problem: Occupational Therapy Goal    Goal Priority Disciplines Outcome Interventions   Occupational Therapy Goal     OT, PT/OT Ongoing, Progressing    Description: Goals to be met by: 11/12/20    Patient will increase functional independence with ADLs by performing:    UE Dressing with Minimal Assistance.  Grooming while seated in wheelchair with Set-up Assistance.  Toileting from bedside commode with Maximum Assistance for hygiene and clothing management.   Supine to sit with Maximum Assistance to increase bed mobility independence.  Squat pivot transfers with Maximum Assistance with use of slide board to complete daily tasks in w/c.  Upper extremity exercise program x15 reps per handout, with independence to build strength and endurance to increase independence in daily  occupations..                     Time Tracking:     OT Date of Treatment: 11/04/20  OT Start Time: 1042  OT Stop Time: 1052  OT Total Time (min): 10 min    Billable Minutes:Therapeutic Activity 10    Reina Wayne OT  11/4/2020

## 2020-11-04 NOTE — PROGRESS NOTES
[facility] Hospital Medicine  Progress Note    Patient Name: Hemant Kennedy Jr.  MRN: [id number]  Patient Class: IP- Inpatient   Admission Date: 11/1/2020  Length of Stay: 2 days  Attending Physician: Behram Khan, MD  Primary Care Provider: Wicho Qiu MD    [facility] Medicine Team: [facility] MED 5 Lester Wylie MD    Subjective:     Principal Problem:Rhabdomyolysis        HPI:  70-year-old male with PMH of HFrEF 2/2 NICM (EF 35-40%), HTN, DLD, DM2, AF (on apixaban), hx CVA w/ L hemineglect, hx DVT, ERIC (compliant on CPAP), morbid obesity who presents after being found down at home and with altered mental status.  Patient is confused on interview and family unable to be contacted via.  History obtained by chart review. Patient lives alone. On 10/31, family reported patient was seen at 5:00 a.m.; however, was found down at 10:00 a.m. later that morning.  Family then called the fire department who got patient back up in bed, this was the last time patient was seen by family.  Last night family call patient but he was not answering.  They went to check on him and found him crawling on the floor, confused so they called EMS.     On arrival to the ED, afebrile, rate controlled AFib, BP stable, satting well on room air.  Labs with leukocytosis of 17, platelets 133, BUN/Cr 25/1.9 (baseline 1.9), CPK 3700, bicarb 19, AG 17, K 5.3, LA 4.9, . UA consistent with UTI. CXR with possible pulm edema but limitied by body habitus. CT head/neck unremarkable. He was given 1.5L IVFs, doses of vanc/zosyn, and admitted to medicine for further care.     Overview/Hospital Course:  Urine cultures from 11/2 positive for proteus species. Antibiotics switched to ceftriaxone. 1 L LR infusion.     Interval History: NAEON. Patient agitated, convinced patient to stay until JOHN of 11/5. AO x 1.     Review of Systems   Constitutional: Negative for chills and fever.   HENT: Negative for congestion.    Eyes: Negative for  visual disturbance.   Respiratory: Negative for shortness of breath.    Cardiovascular: Negative for chest pain and leg swelling.   Gastrointestinal: Negative for abdominal distention, abdominal pain, constipation, diarrhea, nausea and vomiting.   Genitourinary: Negative for dysuria.   Musculoskeletal: Negative for back pain.   Neurological: Negative for headaches.   Psychiatric/Behavioral: Positive for agitation and confusion.     Objective:     Vital Signs (Most Recent):  Temp: 98.6 °F (37 °C) (11/04/20 0519)  Pulse: 74 (11/04/20 0740)  Resp: 16 (11/04/20 0519)  BP: (!) 142/63 (11/04/20 0519)  SpO2: 96 % (11/04/20 0519) Vital Signs (24h Range):  Temp:  [97.8 °F (36.6 °C)-98.6 °F (37 °C)] 98.6 °F (37 °C)  Pulse:  [64-98] 74  Resp:  [16-20] 16  SpO2:  [77 %-98 %] 96 %  BP: (102-142)/(55-86) 142/63     Weight: (!) 158.8 kg (350 lb)  Body mass index is 51.69 kg/m².    Intake/Output Summary (Last 24 hours) at 11/4/2020 0840  Last data filed at 11/3/2020 2100  Gross per 24 hour   Intake 240 ml   Output --   Net 240 ml      Physical Exam  Constitutional:       Appearance: He is obese.   HENT:      Head: Normocephalic and atraumatic.      Mouth/Throat:      Mouth: Mucous membranes are moist.   Eyes:      Extraocular Movements: Extraocular movements intact.   Cardiovascular:      Rate and Rhythm: Normal rate and regular rhythm.      Pulses: Normal pulses.      Heart sounds: Normal heart sounds. No murmur. No friction rub. No gallop.    Pulmonary:      Effort: Pulmonary effort is normal. No respiratory distress.      Breath sounds: Normal breath sounds. No wheezing or rales.   Chest:      Chest wall: No tenderness.   Abdominal:      General: Abdomen is flat. There is no distension.      Palpations: Abdomen is soft.      Tenderness: There is no abdominal tenderness. There is no guarding or rebound.   Neurological:      General: No focal deficit present.      Mental Status: He is alert. He is disoriented.      Cranial Nerves:  No cranial nerve deficit.      Motor: No weakness.         Significant Labs:   A1C:   Recent Labs   Lab 11/02/20  0528   HGBA1C 5.6     ABGs: No results for input(s): PH, PCO2, HCO3, POCSATURATED, BE, TOTALHB, COHB, METHB, O2HB, POCFIO2 in the last 48 hours.  Bilirubin:   Recent Labs   Lab 11/01/20  2247 11/02/20  0807 11/02/20 2204 11/03/20  1403 11/04/20  0303   BILITOT 1.5* 1.3* 0.8 1.0 0.8     Blood Culture: No results for input(s): LABBLOO in the last 48 hours.  BMP:   Recent Labs   Lab 11/04/20  0303      *   K 3.2*   *   CO2 24   BUN 32*   CREATININE 1.7*   CALCIUM 8.0*   MG 1.9     CBC:   Recent Labs   Lab 11/03/20  0545 11/04/20  0303   WBC 13.85* 12.08   HGB 11.9* 11.3*   HCT 37.4* 34.8*   * 128*     CMP:   Recent Labs   Lab 11/02/20 2204 11/03/20  1403 11/04/20  0303    146* 148*   K 3.4* 3.2* 3.2*    111* 113*   CO2 23 25 24   * 153* 104   BUN 38* 38* 32*   CREATININE 2.3* 2.0* 1.7*   CALCIUM 8.3* 8.1* 8.0*   PROT 6.3 6.3 6.0   ALBUMIN 2.4* 2.4* 2.3*   BILITOT 0.8 1.0 0.8   ALKPHOS 70 104 78   * 91* 82*   ALT 43 45* 46*   ANIONGAP 10 10 11   EGFRNONAA 27.3* 32.4* 39.4*     Cardiac Markers: No results for input(s): CKMB, MYOGLOBIN, BNP, TROPISTAT in the last 48 hours.  Coagulation: No results for input(s): PT, INR, APTT in the last 48 hours.  Lactic Acid:   Recent Labs   Lab 11/02/20  1401 11/02/20 2204   LACTATE 3.3* 2.3*     Lipase: No results for input(s): LIPASE in the last 48 hours.  Lipid Panel: No results for input(s): CHOL, HDL, LDLCALC, TRIG, CHOLHDL in the last 48 hours.  Magnesium:   Recent Labs   Lab 11/03/20  0545 11/04/20  0303   MG 2.0 1.9     Pathology Results  (Last 10 years)    None        POCT Glucose:   Recent Labs   Lab 11/02/20  1109 11/03/20  1125 11/03/20  2110   POCTGLUCOSE 151* 185* 176*     Prealbumin: No results for input(s): PREALBUMIN in the last 48 hours.  Respiratory Culture: No results for input(s): GSRESP,  RESPIRATORYC in the last 48 hours.  Troponin:   Recent Labs   Lab 11/02/20  1401 11/02/20  2246   TROPONINI 0.118* 0.118*     TSH:   Recent Labs   Lab 11/01/20  2247   TSH 0.913     Urine Culture: No results for input(s): LABURIN in the last 48 hours.  Urine Studies: No results for input(s): COLORU, APPEARANCEUA, PHUR, SPECGRAV, PROTEINUA, GLUCUA, KETONESU, BILIRUBINUA, OCCULTUA, NITRITE, UROBILINOGEN, LEUKOCYTESUR, RBCUA, WBCUA, BACTERIA, SQUAMEPITHEL, HYALINECASTS in the last 48 hours.    Invalid input(s): WRIGHTSUR    Significant Imaging: I have reviewed all pertinent imaging results/findings within the past 24 hours.      Assessment/Plan:      * Rhabdomyolysis  70-year-old male with PMH of HFrEF 2/2 NICM (EF 35-40%), HTN, DLD, DM2, AF (on apixaban), hx CVA w/ L hemineglect, hx DVT, ERIC (compliant on CPAP), morbid obesity who presents after being found down at home and with altered mental status.  Last seen at 10AM on 10/31. Lives alone. Labs consistent with rhabdomyolysis and sepsis likely 2/2 to UTI.  Renal function at baseline, K 5.3.    - 1 L LR bolus @ 100 ml/hr x 10 hours  - CPK downtrending, will continue to trend CPK  - CMP b.i.d.  - strict I&Os      Elevated troponin  Trop 0.07 on admission. Denies chest pain but he does have AMS. Likely type 2 NSTEMI given sepsis, rhabdo, and EKG with afib and no obvious ischemic changes   Repeat trop stable ( 0.07 --> 0.094 --> .118 --> .118)   Will no longer trend trops.     HLD (hyperlipidemia)  Lab Results   Component Value Date    LDLCALC 60.4 (L) 01/18/2017       - continue home statin and asa      Acute encephalopathy  - likely from sepsis and rhabdomyolysis   - AOx1.       Nonischemic cardiomyopathy  Does not appear to be overtly volume overloaded on exam but does have BNP of 490    - holding home GDMT  - monitor for signs of overload     TTE 2020  Poor study quality due to elevated BMI, non-cooperative patient  EF 35-40%    TTE 2017    1 - Eccentric  hypertrophy.     2 - Moderately depressed left ventricular systolic function (EF 35-40%).     3 - Restrictive LV filling pattern, indicating markedly elevated LAP (grade 3 diastolic dysfunction).     4 - Biatrial enlargement.     5 - Right ventricular enlargement with moderately depressed systolic function.     6 - Pulmonary hypertension. The estimated PA systolic pressure is 80 mmHg.     7 - Trivial aortic regurgitation.     8 - Trivial mitral regurgitation.     9 - Moderate tricuspid regurgitation.     10 - Increased central venous pressure.    Atrial fibrillation  Currently in rate controlled AFib    - continue home Coreg  - continue Eliquis  - Holding losartan, amiodarone.     Wounds, multiple  Wounds consistent with a rug burn from crawling on the floor.    - wound care consult      Sepsis  Leukocytosis and UA consistent with UTI.  Chest x-ray no evidence for focal infiltrates    - Discontinued vanc and Zosyn.    - On ceftriaxone  - BCX- NGTD  - UCX- proteus species, awaiting sensitivities   - 1 L LR infusion at 100 ml/hr x 10 hours    Iron deficiency anemia  - continue home iron supp      Obstructive sleep apnea treated with continuous positive airway pressure (CPAP)  - continue nightly CPAP      Type II diabetes mellitus  Lab Results   Component Value Date    HGBA1C 5.6 11/02/2020     No home meds listed. BG stable    - LDSS  - POCT glucose ACHS  - Diabetic diet.  - follow up with family about home meds        CKD (chronic kidney disease) stage 3, GFR 30-59 ml/min  - renal function at baseline Cr of 1.9  - Cr 1.7  - chemistries b.i.d. given rhabdo  - strict I&Os  - 1 L LR @ 100ml/hr for 10 hours  - K repleated.    Hypertension  - holding home BP meds        VTE Risk Mitigation (From admission, onward)         Ordered     apixaban tablet 5 mg  2 times daily      11/02/20 0350     IP VTE HIGH RISK PATIENT  Once      11/02/20 0347     Place sequential compression device  Until discontinued      11/02/20 0347                 Discharge Planning   JOHN: 11/5/2020     Code Status: Prior   Is the patient medically ready for discharge?:     Reason for patient still in hospital (select all that apply): Treatment  Discharge Plan A: Skilled Nursing Facility                  Lester Wylie MD  Department of Hospital Medicine   [facility]

## 2020-11-04 NOTE — ASSESSMENT & PLAN NOTE
Leukocytosis and UA consistent with UTI.  Chest x-ray no evidence for focal infiltrates    - Discontinued vanc and Zosyn.    - On ceftriaxone  - BCX- NGTD  - UCX- proteus species, awaiting sensitivities   - 1 L LR infusion at 100 ml/hr x 10 hours

## 2020-11-04 NOTE — SUBJECTIVE & OBJECTIVE
Interval History: NAEON. Patient agitated, convinced patient to stay until JOHN of 11/5. AO x 1.     Review of Systems   Constitutional: Negative for chills and fever.   HENT: Negative for congestion.    Eyes: Negative for visual disturbance.   Respiratory: Negative for shortness of breath.    Cardiovascular: Negative for chest pain and leg swelling.   Gastrointestinal: Negative for abdominal distention, abdominal pain, constipation, diarrhea, nausea and vomiting.   Genitourinary: Negative for dysuria.   Musculoskeletal: Negative for back pain.   Neurological: Negative for headaches.   Psychiatric/Behavioral: Positive for agitation and confusion.     Objective:     Vital Signs (Most Recent):  Temp: 98.6 °F (37 °C) (11/04/20 0519)  Pulse: 74 (11/04/20 0740)  Resp: 16 (11/04/20 0519)  BP: (!) 142/63 (11/04/20 0519)  SpO2: 96 % (11/04/20 0519) Vital Signs (24h Range):  Temp:  [97.8 °F (36.6 °C)-98.6 °F (37 °C)] 98.6 °F (37 °C)  Pulse:  [64-98] 74  Resp:  [16-20] 16  SpO2:  [77 %-98 %] 96 %  BP: (102-142)/(55-86) 142/63     Weight: (!) 158.8 kg (350 lb)  Body mass index is 51.69 kg/m².    Intake/Output Summary (Last 24 hours) at 11/4/2020 0840  Last data filed at 11/3/2020 2100  Gross per 24 hour   Intake 240 ml   Output --   Net 240 ml      Physical Exam  Constitutional:       Appearance: He is obese.   HENT:      Head: Normocephalic and atraumatic.      Mouth/Throat:      Mouth: Mucous membranes are moist.   Eyes:      Extraocular Movements: Extraocular movements intact.   Cardiovascular:      Rate and Rhythm: Normal rate and regular rhythm.      Pulses: Normal pulses.      Heart sounds: Normal heart sounds. No murmur. No friction rub. No gallop.    Pulmonary:      Effort: Pulmonary effort is normal. No respiratory distress.      Breath sounds: Normal breath sounds. No wheezing or rales.   Chest:      Chest wall: No tenderness.   Abdominal:      General: Abdomen is flat. There is no distension.      Palpations: Abdomen  is soft.      Tenderness: There is no abdominal tenderness. There is no guarding or rebound.   Neurological:      General: No focal deficit present.      Mental Status: He is alert. He is disoriented.      Cranial Nerves: No cranial nerve deficit.      Motor: No weakness.         Significant Labs:   A1C:   Recent Labs   Lab 11/02/20  0528   HGBA1C 5.6     ABGs: No results for input(s): PH, PCO2, HCO3, POCSATURATED, BE, TOTALHB, COHB, METHB, O2HB, POCFIO2 in the last 48 hours.  Bilirubin:   Recent Labs   Lab 11/01/20  2247 11/02/20  0807 11/02/20 2204 11/03/20  1403 11/04/20  0303   BILITOT 1.5* 1.3* 0.8 1.0 0.8     Blood Culture: No results for input(s): LABBLOO in the last 48 hours.  BMP:   Recent Labs   Lab 11/04/20  0303      *   K 3.2*   *   CO2 24   BUN 32*   CREATININE 1.7*   CALCIUM 8.0*   MG 1.9     CBC:   Recent Labs   Lab 11/03/20  0545 11/04/20  0303   WBC 13.85* 12.08   HGB 11.9* 11.3*   HCT 37.4* 34.8*   * 128*     CMP:   Recent Labs   Lab 11/02/20 2204 11/03/20  1403 11/04/20  0303    146* 148*   K 3.4* 3.2* 3.2*    111* 113*   CO2 23 25 24   * 153* 104   BUN 38* 38* 32*   CREATININE 2.3* 2.0* 1.7*   CALCIUM 8.3* 8.1* 8.0*   PROT 6.3 6.3 6.0   ALBUMIN 2.4* 2.4* 2.3*   BILITOT 0.8 1.0 0.8   ALKPHOS 70 104 78   * 91* 82*   ALT 43 45* 46*   ANIONGAP 10 10 11   EGFRNONAA 27.3* 32.4* 39.4*     Cardiac Markers: No results for input(s): CKMB, MYOGLOBIN, BNP, TROPISTAT in the last 48 hours.  Coagulation: No results for input(s): PT, INR, APTT in the last 48 hours.  Lactic Acid:   Recent Labs   Lab 11/02/20  1401 11/02/20  2204   LACTATE 3.3* 2.3*     Lipase: No results for input(s): LIPASE in the last 48 hours.  Lipid Panel: No results for input(s): CHOL, HDL, LDLCALC, TRIG, CHOLHDL in the last 48 hours.  Magnesium:   Recent Labs   Lab 11/03/20  0545 11/04/20  0303   MG 2.0 1.9     Pathology Results  (Last 10 years)    None        POCT Glucose:   Recent  Labs   Lab 11/02/20  1109 11/03/20  1125 11/03/20  2110   POCTGLUCOSE 151* 185* 176*     Prealbumin: No results for input(s): PREALBUMIN in the last 48 hours.  Respiratory Culture: No results for input(s): GSRESP, RESPIRATORYC in the last 48 hours.  Troponin:   Recent Labs   Lab 11/02/20  1401 11/02/20  2246   TROPONINI 0.118* 0.118*     TSH:   Recent Labs   Lab 11/01/20  2247   TSH 0.913     Urine Culture: No results for input(s): LABURIN in the last 48 hours.  Urine Studies: No results for input(s): COLORU, APPEARANCEUA, PHUR, SPECGRAV, PROTEINUA, GLUCUA, KETONESU, BILIRUBINUA, OCCULTUA, NITRITE, UROBILINOGEN, LEUKOCYTESUR, RBCUA, WBCUA, BACTERIA, SQUAMEPITHEL, HYALINECASTS in the last 48 hours.    Invalid input(s): WRIGHTSUR    Significant Imaging: I have reviewed all pertinent imaging results/findings within the past 24 hours.

## 2020-11-04 NOTE — PLAN OF CARE
11/04/20 1231   Post-Acute Status   Post-Acute Authorization Placement   Post-Acute Placement Status Pending Post-Acute Clinical Review     Pt declined by [facility] (cannot meet needs).  Still under review with [facility] and [facility].  GERARDO sent additional referrals to [facility] facilities.  GERARDO completed LOCET via phone and faxed PASRR to [facility] (fx. [phone]) to obtain the 142 for NH admission.  Will continue to follow.    Maura Garcia, LISA  [facility]  [id number]

## 2020-11-04 NOTE — ASSESSMENT & PLAN NOTE
- renal function at baseline Cr of 1.9  - Cr 1.7  - chemistries b.i.d. given rhabdo  - strict I&Os  - 1 L LR @ 100ml/hr for 10 hours  - K repleated.

## 2020-11-04 NOTE — PT/OT/SLP PROGRESS
Physical Therapy Treatment/Co-Treatment with O.T.    Patient Name:  Hemant Kennedy Jr.   MRN:  [id number]    Recommendations:     Discharge Recommendations:  nursing facility, skilled   Discharge Equipment Recommendations: other (see comments)(TBD)   Barriers to discharge: Inaccessible home and Decreased caregiver support    Assessment:     Hemant Kennedy Jr. is a 70 y.o. male admitted with a medical diagnosis of Rhabdomyolysis.  He presents with the following impairments/functional limitations:  weakness, impaired endurance, impaired self care skills, impaired functional mobilty, impaired balance, decreased upper extremity function, decreased lower extremity function, decreased ROM, edema, impaired cardiopulmonary response to activity, impaired muscle length, impaired joint extensibility . Patient showed limited participation and refused to sit at EOB as planned with O.T. Patient presents with very limited B LE ROM, as patient was very rigid , especially on B knees with PROM.    Rehab Prognosis: Fair; patient would benefit from acute skilled PT services to address these deficits and reach maximum level of function.    Recent Surgery: * No surgery found *      Plan:     During this hospitalization, patient to be seen 3 x/week to address the identified rehab impairments via therapeutic activities, therapeutic exercises, neuromuscular re-education and progress toward the following goals:    · Plan of Care Expires:  12/01/20    Subjective     Chief Complaint: feeling too tired  Patient/Family Comments/goals: none stated  Pain/Comfort:  · Pain Rating 1: 0/10  · Pain Rating Post-Intervention 1: 0/10      Objective:     Communicated with NSG prior to session.  Patient found HOB elevated with telemetry upon PT entry to room.     General Precautions: Standard, fall   Orthopedic Precautions:N/A   Braces: N/A     Functional Mobility:  · Bed Mobility:     · Rolling Right: total assistance and of 2 persons  · Scooting: total  assistance and of 2 persons      AM-PAC 6 CLICK MOBILITY  Turning over in bed (including adjusting bedclothes, sheets and blankets)?: 2  Sitting down on and standing up from a chair with arms (e.g., wheelchair, bedside commode, etc.): 1  Moving from lying on back to sitting on the side of the bed?: 1  Moving to and from a bed to a chair (including a wheelchair)?: 1  Need to walk in hospital room?: 1  Climbing 3-5 steps with a railing?: 1  Basic Mobility Total Score: 7       Therapeutic Activities and Exercises:   Co-Treatment with O.T. Attempted to sit at EOB, but Patient adamantly refused. Patient agreed to B LE PROM x 25 reps on all available planes of motion, with end range stretch.    Patient left HOB elevated with all lines intact, call button in reach and NSG notified..    GOALS:   Multidisciplinary Problems     Physical Therapy Goals        Problem: Physical Therapy Goal    Goal Priority Disciplines Outcome Goal Variances Interventions   Physical Therapy Goal     PT, PT/OT Ongoing, Progressing     Description: Goals to be met by: 2020     Patient will increase functional independence with mobility by performin. Supine to sit with MInimal Assistance  2. Sit to supine with MInimal Assistance  3. Rolling to Left and Right with Minimal Assistance.  4. Bed to wheelchair transfer with Moderate Assistance using Slideboard or Scoot Pivot.  5. Wheelchair propulsion x100 feet with Minimal Assistance using bilateral uppper extremities  6. Sitting at edge of bed x10 minutes with Stand-by Assistance                       Time Tracking:     PT Received On: 20  PT Start Time: 1046     PT Stop Time: 1059  PT Total Time (min): 13 min     Billable Minutes: Therapeutic Exercise 13    Treatment Type: Treatment  PT/PTA: PTA     PTA Visit Number: Waleska Hammonds PTA  2020

## 2020-11-04 NOTE — CONSULTS
Wound care consulted for skin tears to left side of body  PMH:  HTN, CKD, DM2, ERIC- CPAP, sepsis, rhabdomyolysis, recent falls at home- found down at home , A-fib.  Assessment:  The patient is lying on a low air loss surface. Needs 2-3 assist to turn, moisture control with blue pads, pillows for support. Skin tears and blisters to left side of body- left upper chest/breast, axilla, abdomen, pannus, left hip, left upper thigh, left knee.   Recommendations:  Follow skin tear standing orders-- foam dressing over skin tears- change weekly  Miconazole powder to skin folds BID. .   Nursing to continue pressure prevention measures and care.  Wound care will follow-up bianca Bradley RN, CWCN  [id number]  Left lateral upper chest/axilla partial thickness skin loss with irregular wound edges    Left upper breast discolored purple area with irregular edges/blisters.     Left lateral abdomen partial thickness skin tear with ruptured blister    Left pannus    Left upper thigh diffuse purple discoloration    Left knee  Purple/maroon discolored area with intact skin.

## 2020-11-05 VITALS
HEART RATE: 75 BPM | TEMPERATURE: 98 F | WEIGHT: 315 LBS | BODY MASS INDEX: 46.65 KG/M2 | OXYGEN SATURATION: 98 % | SYSTOLIC BLOOD PRESSURE: 136 MMHG | DIASTOLIC BLOOD PRESSURE: 84 MMHG | HEIGHT: 69 IN | RESPIRATION RATE: 18 BRPM

## 2020-11-05 LAB
CK SERPL-CCNC: 901 U/L (ref 20–200)
POCT GLUCOSE: 118 MG/DL (ref 70–110)
POCT GLUCOSE: 126 MG/DL (ref 70–110)
POCT GLUCOSE: 146 MG/DL (ref 70–110)
POCT GLUCOSE: 99 MG/DL (ref 70–110)

## 2020-11-05 PROCEDURE — 25000003 PHARM REV CODE 250: Performed by: STUDENT IN AN ORGANIZED HEALTH CARE EDUCATION/TRAINING PROGRAM

## 2020-11-05 PROCEDURE — 99239 HOSP IP/OBS DSCHRG MGMT >30: CPT | Mod: GC,,, | Performed by: STUDENT IN AN ORGANIZED HEALTH CARE EDUCATION/TRAINING PROGRAM

## 2020-11-05 PROCEDURE — 82550 ASSAY OF CK (CPK): CPT

## 2020-11-05 PROCEDURE — 36415 COLL VENOUS BLD VENIPUNCTURE: CPT

## 2020-11-05 PROCEDURE — 99239 PR HOSPITAL DISCHARGE DAY,>30 MIN: ICD-10-PCS | Mod: GC,,, | Performed by: STUDENT IN AN ORGANIZED HEALTH CARE EDUCATION/TRAINING PROGRAM

## 2020-11-05 RX ADMIN — APIXABAN 5 MG: 5 TABLET, FILM COATED ORAL at 08:11

## 2020-11-05 RX ADMIN — CARVEDILOL 6.25 MG: 6.25 TABLET, FILM COATED ORAL at 08:11

## 2020-11-05 RX ADMIN — CEFPODOXIME PROXETIL 200 MG: 200 TABLET, FILM COATED ORAL at 08:11

## 2020-11-05 RX ADMIN — ASPIRIN 81 MG: 81 TABLET, COATED ORAL at 08:11

## 2020-11-05 RX ADMIN — MICONAZOLE NITRATE: 20 POWDER TOPICAL at 08:11

## 2020-11-05 NOTE — ASSESSMENT & PLAN NOTE
- renal function at baseline Cr of 1.9  - Cr 1.7  - chemistries b.i.d. given rhabdo  - strict I&Os  - K repleated.

## 2020-11-05 NOTE — DISCHARGE SUMMARY
[facility] Hospital Medicine  Discharge Summary      Patient Name: Hemant Kennedy Jr.  MRN: [id number]  Admission Date: 11/1/2020  Hospital Length of Stay: 3 days  Discharge Date and Time:  11/05/2020 1:28 PM  Attending Physician: Behram Khan, MD   Discharging Provider: Lester Wylie MD  Primary Care Provider: Wicho Qiu MD  [facility] Medicine Team: [facility] HOSP MED 5 Lester Wylie MD    HPI:   70-year-old male with PMH of HFrEF 2/2 NICM (EF 35-40%), HTN, DLD, DM2, AF (on apixaban), hx CVA w/ L hemineglect, hx DVT, ERIC (compliant on CPAP), morbid obesity who presents after being found down at home and with altered mental status.  Patient is confused on interview and family unable to be contacted via.  History obtained by chart review. Patient lives alone. On 10/31, family reported patient was seen at 5:00 a.m.; however, was found down at 10:00 a.m. later that morning.  Family then called the fire department who got patient back up in bed, this was the last time patient was seen by family.  Last night family call patient but he was not answering.  They went to check on him and found him crawling on the floor, confused so they called EMS.     On arrival to the ED, afebrile, rate controlled AFib, BP stable, satting well on room air.  Labs with leukocytosis of 17, platelets 133, BUN/Cr 25/1.9 (baseline 1.9), CPK 3700, bicarb 19, AG 17, K 5.3, LA 4.9, . UA consistent with UTI. CXR with possible pulm edema but limitied by body habitus. CT head/neck unremarkable. He was given 1.5L IVFs, doses of vanc/zosyn, and admitted to medicine for further care.     * No surgery found *      Hospital Course:   Urine cultures from 11/2 positive for proteus species. Antibiotics switched to ceftriaxone. 1 L LR infusion. Switched to cefpodoxime oral. To discharge to SNF on 11/5 with 12 day course of cefpodoxime, (14 total days of antibiotic therapy). AOx2.      Consults:   Consults (From admission, onward)         Status Ordering Provider     Inpatient consult to PICC team (JOHNNY)  Once     Provider:  (Not yet assigned)    Completed KHAN, BEHRAM          * Sepsis  Leukocytosis and UA consistent with UTI.  Chest x-ray no evidence for focal infiltrates    - Discontinued vanc and Zosyn.    - Switched to cefpodoxime  - BCX- NGTD  - UCX- proteus mirabilis, sensitive to ceftriaxone    Elevated troponin  Trop 0.07 on admission. Denies chest pain but he does have AMS. Likely type 2 NSTEMI given sepsis, rhabdo, and EKG with afib and no obvious ischemic changes   Repeat trop stable ( 0.07 --> 0.094 --> .118 --> .118)   Will no longer trend trops.     HLD (hyperlipidemia)  Lab Results   Component Value Date    LDLCALC 60.4 (L) 01/18/2017       - continue home statin and asa      Acute encephalopathy  - likely from sepsis and rhabdomyolysis   - AOx2.       Nonischemic cardiomyopathy  Does not appear to be overtly volume overloaded on exam but does have BNP of 490    - holding home GDMT  - monitor for signs of overload     TTE 2020  Poor study quality due to elevated BMI, non-cooperative patient  EF 35-40%    TTE 2017    1 - Eccentric hypertrophy.     2 - Moderately depressed left ventricular systolic function (EF 35-40%).     3 - Restrictive LV filling pattern, indicating markedly elevated LAP (grade 3 diastolic dysfunction).     4 - Biatrial enlargement.     5 - Right ventricular enlargement with moderately depressed systolic function.     6 - Pulmonary hypertension. The estimated PA systolic pressure is 80 mmHg.     7 - Trivial aortic regurgitation.     8 - Trivial mitral regurgitation.     9 - Moderate tricuspid regurgitation.     10 - Increased central venous pressure.    Rhabdomyolysis  70-year-old male with PMH of HFrEF 2/2 NICM (EF 35-40%), HTN, DLD, DM2, AF (on apixaban), hx CVA w/ L hemineglect, hx DVT, ERIC (compliant on CPAP), morbid obesity who presents after being found down at home and with altered mental status.  Last  seen at 10AM on 10/31. Lives alone. Labs consistent with rhabdomyolysis and sepsis likely 2/2 to UTI.  Renal function at baseline, K 5.3.    - 1 L LR bolus @ 100 ml/hr x 10 hours  - CPK downtrending, will continue to trend CPK  - CMP b.i.d.  - strict I&Os      Atrial fibrillation  Currently in rate controlled AFib    - continue home Coreg  - continue Eliquis  - Holding losartan, amiodarone.     Wounds, multiple  Wounds consistent with a rug burn from crawling on the floor.    - wound care consult      Iron deficiency anemia  - continue home iron supp      Obstructive sleep apnea treated with continuous positive airway pressure (CPAP)  - continue nightly CPAP      Type II diabetes mellitus  Lab Results   Component Value Date    HGBA1C 5.6 11/02/2020     No home meds listed. BG stable    - LDSS  - POCT glucose ACHS  - Diabetic diet.  - follow up with family about home meds        CKD (chronic kidney disease) stage 3, GFR 30-59 ml/min  - renal function at baseline Cr of 1.9  - Cr 1.7  - chemistries b.i.d. given rhabdo  - strict I&Os  - K repleated.    Hypertension  - holding home BP meds        Final Active Diagnoses:    Diagnosis Date Noted POA    PRINCIPAL PROBLEM:  Sepsis [A41.9] 11/02/2020 Unknown    Wounds, multiple [T07.XXXA] 11/02/2020 Unknown    Atrial fibrillation [I48.91] 11/02/2020 Unknown    Rhabdomyolysis [M62.82] 11/02/2020 Unknown    Nonischemic cardiomyopathy [I42.8] 11/02/2020 Unknown    Acute encephalopathy [G93.40] 11/02/2020 Unknown    HLD (hyperlipidemia) [E78.5] 11/02/2020 Unknown    Elevated troponin [R77.8] 11/02/2020 Unknown    Iron deficiency anemia [D50.9] 03/24/2018 Yes    Obstructive sleep apnea treated with continuous positive airway pressure (CPAP) [G47.33, Z99.89] 05/24/2017 Not Applicable    Type II diabetes mellitus [E11.8, E11.65] 01/13/2017 Yes    CKD (chronic kidney disease) stage 3, GFR 30-59 ml/min [N18.30] 11/27/2015 Yes    Hypertension [I10] 12/18/2012 Yes       Problems Resolved During this Admission:       Discharged Condition: good    Disposition: Skilled Nursing Facility    Follow Up:    Patient Instructions:   No discharge procedures on file.    Significant Diagnostic Studies: Labs:   CMP   Recent Labs   Lab 11/03/20  1403 11/04/20  0303   * 148*   K 3.2* 3.2*   * 113*   CO2 25 24   * 104   BUN 38* 32*   CREATININE 2.0* 1.7*   CALCIUM 8.1* 8.0*   PROT 6.3 6.0   ALBUMIN 2.4* 2.3*   BILITOT 1.0 0.8   ALKPHOS 104 78   AST 91* 82*   ALT 45* 46*   ANIONGAP 10 11   ESTGFRAFRICA 37.4* 45.6*   EGFRNONAA 32.4* 39.4*   , CBC   Recent Labs   Lab 11/04/20  0303   WBC 12.08   HGB 11.3*   HCT 34.8*   *    and INR   Lab Results   Component Value Date    INR 1.4 (H) 03/24/2018    INR 1.3 (H) 04/27/2017    INR 1.5 (H) 11/09/2016       Pending Diagnostic Studies:     None         Medications:  Reconciled Home Medications:      Medication List      START taking these medications    cefpodoxime 200 MG tablet  Commonly known as: VANTIN  Take 1 tablet (200 mg total) by mouth every 12 (twelve) hours.        CHANGE how you take these medications    ergocalciferol 50,000 unit Cap  Commonly known as: ERGOCALCIFEROL  Take 1 capsule (50,000 Units total) by mouth every 7 days.  What changed: when to take this     fluticasone propionate 50 mcg/actuation nasal spray  Commonly known as: FLONASE  2 sprays by Each Nare route once daily.  What changed:   · when to take this  · reasons to take this        CONTINUE taking these medications    acetaminophen 500 MG tablet  Commonly known as: TYLENOL  Take 2 tablets (1,000 mg total) by mouth 3 (three) times daily as needed for Pain.     allopurinoL 300 MG tablet  Commonly known as: ZYLOPRIM  Take 1 tablet (300 mg total) by mouth every morning.     amiodarone 200 MG Tab  Commonly known as: PACERONE  TAKE 1 TABLET BY MOUTH IN THE MORNING     aspirin 81 MG Chew  TAKE 1 TAB BY MOUTH daily     atorvastatin 40 MG tablet  Commonly known  as: LIPITOR  @@ TAKE 1 TABLET BY MOUTH daily     calcium carbonate 600 mg calcium (1,500 mg) Tab  Commonly known as: OS-ROJAS  Take 600 mg by mouth 2 (two) times a day.     carvediloL 6.25 MG tablet  Commonly known as: COREG  TAKE 1 TABLET BY MOUTH IN THE MORNING and TAKE 1 TABLET BY MOUTH every evening with meals     collagenase ointment  Commonly known as: SANTYL  Apply topically once daily. Apply to wound daily as directed.     doxazosin 2 MG tablet  Commonly known as: CARDURA  Take 2 mg by mouth once daily.     ELIQUIS 5 mg Tab  Generic drug: apixaban  TAKE 1 TABLET BY MOUTH twice a day     ferrous sulfate 325 (65 FE) MG EC tablet  Take 1 tablet (325 mg total) by mouth 2 (two) times daily with meals.     furosemide 40 MG tablet  Commonly known as: LASIX  @@ TAKE 1 TABLET BY MOUTH daily     gabapentin 300 MG capsule  Commonly known as: NEURONTIN  Take 1 capsule (300 mg total) by mouth 3 (three) times daily.     glimepiride 4 MG tablet  Commonly known as: AMARYL  Take 4 mg by mouth once daily.     losartan 25 MG tablet  Commonly known as: COZAAR  Take 0.5 tablets (12.5 mg total) by mouth once daily.     ONE DAILY MULTIVITAMIN per tablet  Generic drug: multivitamin  Take 1 tablet by mouth once daily.     ONETOUCH ULTRA TEST Strp  Generic drug: blood sugar diagnostic  Test blood sugar two times daily     oxybutynin 10 MG 24 hr tablet  Commonly known as: DITROPAN-XL  Take 10 mg by mouth once daily.     TRIAD WOUND DRESSING TOP  Apply topically daily as needed.            Indwelling Lines/Drains at time of discharge:   Lines/Drains/Airways     None                 Time spent on the discharge of patient: 40 minutes  Patient was seen and examined on the date of discharge and determined to be suitable for discharge.      Lester Wylie MD  Department of Hospital Medicine  [facility]

## 2020-11-05 NOTE — PLAN OF CARE
A A O x self. Free of falls, traumas and injuries. Patien refusing wound care, also refusing turns q2h. BG monitored ACHS. Possible DC to SNF today. Denies shortness of breath, chest pain, nausea, vomiting. Plan of care reviewed with patient. Vital signs stable. Pain monitored. Will continue to monitor.

## 2020-11-05 NOTE — PLAN OF CARE
11/05/20 1140   Post-Acute Status   Post-Acute Authorization Placement   Post-Acute Placement Status Pending Service Contract     Pt declined by [facility] (cannot meet needs).  SW relayed this to pt's daughter Keyla who voiced agreement with plan for pt to go to [facility].  Erica with [facility] notified and will contact Keyla ponce.    Maura Garcia LMSW  [facility]  [phone]

## 2020-11-05 NOTE — ASSESSMENT & PLAN NOTE
Leukocytosis and UA consistent with UTI.  Chest x-ray no evidence for focal infiltrates    - Discontinued vanc and Zosyn.    - Switched to cefpodoxime  - BCX- NGTD  - UCX- proteus mirabilis, sensitive to ceftriaxone

## 2020-11-05 NOTE — PLAN OF CARE
GERARDO spoke with daughter Keyla and informed her that (per treatment team) pt is ready to discharge today and the only accepted facility is [facility].  Keyla reported that pt has been to [facility] before.  SW agreed to follow-up with [facility] but informed Keyla that if [facility] cannot give an answer today then pt will have to go to [facility].  Keyla voiced understanding.  GERARDO spoke with Keturah at [facility] who reported that they would review referral as soon as they could but no guarantee of response time.  SW to follow up shortly.    Maura Garcia LMSW  [facility]  [id number]

## 2020-11-05 NOTE — PLAN OF CARE
No significant events occurred during the night. Pt remained free from falls/trauma/injury. VSS. Denies any CP, SOB, palpitations, dizziness, pain and discomfort. Repositioning assistance provided. Plan of care reviewed with patient and all questions answered, verbalizes understanding. No acute distress noted. Will continue to monitor.

## 2020-11-05 NOTE — PLAN OF CARE
11/05/20 1436   Post-Acute Status   Post-Acute Authorization Placement   Post-Acute Placement Status Set-up Complete     SW informed by Erica at [facility] that pt's daughter is scheduled to come sign paperwork this afternoon and should be completed by 3:30pm.  Transportation scheduled via bariatric stretcher for 3:30pm to transfer to [facility], room 410.  ALBERT Forbes to call report to Jac at [phone].  ALBERT Forbes was notified of the above information.    UPDATE 3:09 PM  SW informed by Erica with [facility] that pt's daughter has not yet shown up to do paperwork.  SW called [facility] and put transportation on hold.  ALBERT Forbes updated.    UPDATE 4:25 PM  SW informed by Erica with [facility] that paperwork is complete and pt can transfer.  SW called [facility] and released the request for asap.  ALBERT Forbes notified and will call report.    Maura Garcia, LISA  [facility]  [phone]

## 2020-11-06 NOTE — PLAN OF CARE
11/06/20 0706   Final Note   Assessment Type Final Discharge Note   Anticipated Discharge Disposition SNF

## 2020-11-07 LAB
BACTERIA BLD CULT: NORMAL
BACTERIA BLD CULT: NORMAL

## 2020-11-12 NOTE — SUBJECTIVE & OBJECTIVE
Interval History: NAEON. Reports breathing is improved since admission. UOP over last 24h 2550cc + 1 unmeasured. Coreg held this AM for asymptomatic bradycardia, will restart at lower dose.    Review of Systems  Objective:     Vital Signs (Most Recent):  Temp: 98.2 °F (36.8 °C) (07/28/17 1137)  Pulse: (!) 53 (07/28/17 1137)  Resp: 20 (07/28/17 1137)  BP: (!) 102/55 (07/28/17 1137)  SpO2: (!) 94 % (07/28/17 1137) Vital Signs (24h Range):  Temp:  [96.7 °F (35.9 °C)-99.3 °F (37.4 °C)] 98.2 °F (36.8 °C)  Pulse:  [47-61] 53  Resp:  [18-20] 20  SpO2:  [94 %-100 %] 94 %  BP: (102-150)/(55-69) 102/55     Weight: (!) 145.2 kg (320 lb)  Body mass index is 50.12 kg/m².    Intake/Output Summary (Last 24 hours) at 07/28/17 1226  Last data filed at 07/28/17 0337   Gross per 24 hour   Intake              795 ml   Output             2550 ml   Net            -1755 ml      Physical Exam   Constitutional: He is oriented to person, place, and time. He appears well-developed and well-nourished. No distress.   Eyes: EOM are normal.   Cardiovascular: Regular rhythm and normal heart sounds.    No murmur heard.  bradycardic   Pulmonary/Chest: Effort normal and breath sounds normal. No respiratory distress. He has no wheezes. He has no rales.   No crackles appreciated, difficult to assess 2/2 poor effort and body habitus   Abdominal: Soft. Bowel sounds are normal. He exhibits no distension. There is no tenderness.   Musculoskeletal: He exhibits edema (2+ LLE, 1+ RLE).   Neurological: He is alert and oriented to person, place, and time. No cranial nerve deficit.   Skin: Skin is warm and dry. He is not diaphoretic.       Significant Labs:   BMP:   Recent Labs  Lab 07/27/17  0337 07/28/17  0547   GLU 75 88    146*   K 3.7 3.9    105   CO2 25 31*   BUN 15 18   CREATININE 1.6* 1.6*   CALCIUM 8.5* 8.7   MG 1.7  --      POCT Glucose:   Recent Labs  Lab 07/27/17  1705 07/27/17  2047 07/28/17  0804   POCTGLUCOSE 92 108 71     All  pertinent labs within the past 24 hours have been reviewed.    Significant Imaging: I have reviewed all pertinent imaging results/findings within the past 24 hours.   Spontaneous, unlabored and symmetrical

## 2020-11-20 ENCOUNTER — HOSPITAL ENCOUNTER (INPATIENT)
Facility: HOSPITAL | Age: 70
LOS: 10 days | Discharge: NURSING, REHAB OR LONG-TERM CARE | DRG: 640 | End: 2020-12-01
Attending: EMERGENCY MEDICINE | Admitting: INTERNAL MEDICINE
Payer: MEDICARE

## 2020-11-20 DIAGNOSIS — R41.82 ALTERED MENTAL STATUS, UNSPECIFIED ALTERED MENTAL STATUS TYPE: ICD-10-CM

## 2020-11-20 DIAGNOSIS — N18.9 CHRONIC KIDNEY DISEASE, UNSPECIFIED CKD STAGE: ICD-10-CM

## 2020-11-20 DIAGNOSIS — I82.409 DVT (DEEP VENOUS THROMBOSIS): ICD-10-CM

## 2020-11-20 DIAGNOSIS — E87.0 HYPERNATREMIA: Primary | ICD-10-CM

## 2020-11-20 DIAGNOSIS — R79.89 ELEVATED TROPONIN: ICD-10-CM

## 2020-11-20 DIAGNOSIS — R09.02 HYPOXIA: ICD-10-CM

## 2020-11-20 DIAGNOSIS — G93.40 ACUTE ENCEPHALOPATHY: ICD-10-CM

## 2020-11-20 LAB
ALBUMIN SERPL BCP-MCNC: 2.8 G/DL (ref 3.5–5.2)
ALLENS TEST: ABNORMAL
ALP SERPL-CCNC: 74 U/L (ref 55–135)
ALT SERPL W/O P-5'-P-CCNC: 26 U/L (ref 10–44)
AMPHET+METHAMPHET UR QL: NEGATIVE
ANION GAP SERPL CALC-SCNC: 12 MMOL/L (ref 8–16)
APTT BLDCRRT: 26.5 SEC (ref 21–32)
AST SERPL-CCNC: 58 U/L (ref 10–40)
BARBITURATES UR QL SCN>200 NG/ML: NEGATIVE
BASOPHILS # BLD AUTO: 0.06 K/UL (ref 0–0.2)
BASOPHILS NFR BLD: 0.6 % (ref 0–1.9)
BENZODIAZ UR QL SCN>200 NG/ML: NEGATIVE
BILIRUB SERPL-MCNC: 1 MG/DL (ref 0.1–1)
BNP SERPL-MCNC: 97 PG/ML (ref 0–99)
BUN SERPL-MCNC: 29 MG/DL (ref 8–23)
BZE UR QL SCN: NEGATIVE
CALCIUM SERPL-MCNC: 9.3 MG/DL (ref 8.7–10.5)
CANNABINOIDS UR QL SCN: NEGATIVE
CHLORIDE SERPL-SCNC: 117 MMOL/L (ref 95–110)
CO2 SERPL-SCNC: 28 MMOL/L (ref 23–29)
CREAT SERPL-MCNC: 2 MG/DL (ref 0.5–1.4)
CREAT UR-MCNC: 265 MG/DL (ref 23–375)
CTP QC/QA: YES
DIFFERENTIAL METHOD: ABNORMAL
EOSINOPHIL # BLD AUTO: 0.5 K/UL (ref 0–0.5)
EOSINOPHIL NFR BLD: 5.2 % (ref 0–8)
ERYTHROCYTE [DISTWIDTH] IN BLOOD BY AUTOMATED COUNT: 15.4 % (ref 11.5–14.5)
EST. GFR  (AFRICAN AMERICAN): 37.4 ML/MIN/1.73 M^2
EST. GFR  (NON AFRICAN AMERICAN): 32.4 ML/MIN/1.73 M^2
GLUCOSE SERPL-MCNC: 101 MG/DL (ref 70–110)
HCO3 UR-SCNC: 30.9 MMOL/L (ref 24–28)
HCT VFR BLD AUTO: 43.8 % (ref 40–54)
HGB BLD-MCNC: 13.3 G/DL (ref 14–18)
IMM GRANULOCYTES # BLD AUTO: 0.04 K/UL (ref 0–0.04)
IMM GRANULOCYTES NFR BLD AUTO: 0.4 % (ref 0–0.5)
INR PPP: 1.2 (ref 0.8–1.2)
LACTATE SERPL-SCNC: 2.2 MMOL/L (ref 0.5–2.2)
LIPASE SERPL-CCNC: 55 U/L (ref 4–60)
LYMPHOCYTES # BLD AUTO: 1.7 K/UL (ref 1–4.8)
LYMPHOCYTES NFR BLD: 17 % (ref 18–48)
MAGNESIUM SERPL-MCNC: 2.3 MG/DL (ref 1.6–2.6)
MCH RBC QN AUTO: 30 PG (ref 27–31)
MCHC RBC AUTO-ENTMCNC: 30.4 G/DL (ref 32–36)
MCV RBC AUTO: 99 FL (ref 82–98)
METHADONE UR QL SCN>300 NG/ML: NEGATIVE
MONOCYTES # BLD AUTO: 1 K/UL (ref 0.3–1)
MONOCYTES NFR BLD: 10.3 % (ref 4–15)
NEUTROPHILS # BLD AUTO: 6.6 K/UL (ref 1.8–7.7)
NEUTROPHILS NFR BLD: 66.5 % (ref 38–73)
NRBC BLD-RTO: 0 /100 WBC
OPIATES UR QL SCN: NEGATIVE
PCO2 BLDA: 49.9 MMHG (ref 35–45)
PCP UR QL SCN>25 NG/ML: NEGATIVE
PH SMN: 7.4 [PH] (ref 7.35–7.45)
PHOSPHATE SERPL-MCNC: 3.4 MG/DL (ref 2.7–4.5)
PLATELET # BLD AUTO: 192 K/UL (ref 150–350)
PMV BLD AUTO: 11.3 FL (ref 9.2–12.9)
PO2 BLDA: 31 MMHG (ref 40–60)
POC BE: 6 MMOL/L
POC SATURATED O2: 59 % (ref 95–100)
POC TCO2: 32 MMOL/L (ref 24–29)
POTASSIUM SERPL-SCNC: 3.8 MMOL/L (ref 3.5–5.1)
PROT SERPL-MCNC: 8.1 G/DL (ref 6–8.4)
PROTHROMBIN TIME: 13.1 SEC (ref 9–12.5)
RBC # BLD AUTO: 4.43 M/UL (ref 4.6–6.2)
SAMPLE: ABNORMAL
SARS-COV-2 RDRP RESP QL NAA+PROBE: NEGATIVE
SITE: ABNORMAL
SODIUM SERPL-SCNC: 157 MMOL/L (ref 136–145)
T4 FREE SERPL-MCNC: 1.26 NG/DL (ref 0.71–1.51)
TOXICOLOGY INFORMATION: NORMAL
TROPONIN I SERPL DL<=0.01 NG/ML-MCNC: 0.08 NG/ML (ref 0–0.03)
TSH SERPL DL<=0.005 MIU/L-ACNC: 1.79 UIU/ML (ref 0.4–4)
WBC # BLD AUTO: 9.94 K/UL (ref 3.9–12.7)

## 2020-11-20 PROCEDURE — 99291 PR CRITICAL CARE, E/M 30-74 MINUTES: ICD-10-PCS | Mod: ,,, | Performed by: EMERGENCY MEDICINE

## 2020-11-20 PROCEDURE — 99285 EMERGENCY DEPT VISIT HI MDM: CPT | Mod: 25

## 2020-11-20 PROCEDURE — 83735 ASSAY OF MAGNESIUM: CPT

## 2020-11-20 PROCEDURE — 96360 HYDRATION IV INFUSION INIT: CPT

## 2020-11-20 PROCEDURE — 85610 PROTHROMBIN TIME: CPT

## 2020-11-20 PROCEDURE — 81001 URINALYSIS AUTO W/SCOPE: CPT

## 2020-11-20 PROCEDURE — 99900035 HC TECH TIME PER 15 MIN (STAT)

## 2020-11-20 PROCEDURE — 83880 ASSAY OF NATRIURETIC PEPTIDE: CPT

## 2020-11-20 PROCEDURE — 87040 BLOOD CULTURE FOR BACTERIA: CPT | Mod: 59

## 2020-11-20 PROCEDURE — 82800 BLOOD PH: CPT

## 2020-11-20 PROCEDURE — 80053 COMPREHEN METABOLIC PANEL: CPT

## 2020-11-20 PROCEDURE — 85730 THROMBOPLASTIN TIME PARTIAL: CPT

## 2020-11-20 PROCEDURE — 83690 ASSAY OF LIPASE: CPT

## 2020-11-20 PROCEDURE — 83930 ASSAY OF BLOOD OSMOLALITY: CPT

## 2020-11-20 PROCEDURE — 93005 ELECTROCARDIOGRAM TRACING: CPT

## 2020-11-20 PROCEDURE — 99291 CRITICAL CARE FIRST HOUR: CPT | Mod: ,,, | Performed by: EMERGENCY MEDICINE

## 2020-11-20 PROCEDURE — 84484 ASSAY OF TROPONIN QUANT: CPT

## 2020-11-20 PROCEDURE — 80307 DRUG TEST PRSMV CHEM ANLYZR: CPT

## 2020-11-20 PROCEDURE — 84439 ASSAY OF FREE THYROXINE: CPT

## 2020-11-20 PROCEDURE — 85025 COMPLETE CBC W/AUTO DIFF WBC: CPT

## 2020-11-20 PROCEDURE — 82803 BLOOD GASES ANY COMBINATION: CPT

## 2020-11-20 PROCEDURE — 93010 EKG 12-LEAD: ICD-10-PCS | Mod: ,,, | Performed by: INTERNAL MEDICINE

## 2020-11-20 PROCEDURE — 84443 ASSAY THYROID STIM HORMONE: CPT

## 2020-11-20 PROCEDURE — 84300 ASSAY OF URINE SODIUM: CPT

## 2020-11-20 PROCEDURE — 83605 ASSAY OF LACTIC ACID: CPT

## 2020-11-20 PROCEDURE — 84100 ASSAY OF PHOSPHORUS: CPT

## 2020-11-20 PROCEDURE — U0002 COVID-19 LAB TEST NON-CDC: HCPCS | Performed by: STUDENT IN AN ORGANIZED HEALTH CARE EDUCATION/TRAINING PROGRAM

## 2020-11-20 PROCEDURE — 96361 HYDRATE IV INFUSION ADD-ON: CPT

## 2020-11-20 PROCEDURE — 93010 ELECTROCARDIOGRAM REPORT: CPT | Mod: ,,, | Performed by: INTERNAL MEDICINE

## 2020-11-20 RX ORDER — CARVEDILOL 3.12 MG/1
6.25 TABLET ORAL ONCE
Status: DISCONTINUED | OUTPATIENT
Start: 2020-11-20 | End: 2020-11-21

## 2020-11-21 PROBLEM — M79.3 PANNICULITIS: Status: ACTIVE | Noted: 2020-11-21

## 2020-11-21 PROBLEM — E87.0 HYPERNATREMIA: Status: ACTIVE | Noted: 2020-11-21

## 2020-11-21 PROBLEM — R09.02 HYPOXIA: Status: ACTIVE | Noted: 2020-11-21

## 2020-11-21 LAB
ANION GAP SERPL CALC-SCNC: 10 MMOL/L (ref 8–16)
ANION GAP SERPL CALC-SCNC: 10 MMOL/L (ref 8–16)
ANION GAP SERPL CALC-SCNC: 8 MMOL/L (ref 8–16)
BACTERIA #/AREA URNS AUTO: NORMAL /HPF
BASOPHILS # BLD AUTO: 0.05 K/UL (ref 0–0.2)
BASOPHILS NFR BLD: 0.5 % (ref 0–1.9)
BILIRUB UR QL STRIP: NEGATIVE
BUN SERPL-MCNC: 23 MG/DL (ref 8–23)
BUN SERPL-MCNC: 24 MG/DL (ref 8–23)
BUN SERPL-MCNC: 27 MG/DL (ref 8–23)
CALCIUM SERPL-MCNC: 8.7 MG/DL (ref 8.7–10.5)
CALCIUM SERPL-MCNC: 8.8 MG/DL (ref 8.7–10.5)
CALCIUM SERPL-MCNC: 9.1 MG/DL (ref 8.7–10.5)
CHLORIDE SERPL-SCNC: 117 MMOL/L (ref 95–110)
CHLORIDE SERPL-SCNC: 119 MMOL/L (ref 95–110)
CHLORIDE SERPL-SCNC: 119 MMOL/L (ref 95–110)
CLARITY UR REFRACT.AUTO: CLEAR
CO2 SERPL-SCNC: 27 MMOL/L (ref 23–29)
CO2 SERPL-SCNC: 28 MMOL/L (ref 23–29)
CO2 SERPL-SCNC: 31 MMOL/L (ref 23–29)
COLOR UR AUTO: YELLOW
CREAT SERPL-MCNC: 1.7 MG/DL (ref 0.5–1.4)
CREAT SERPL-MCNC: 1.7 MG/DL (ref 0.5–1.4)
CREAT SERPL-MCNC: 1.9 MG/DL (ref 0.5–1.4)
DIFFERENTIAL METHOD: ABNORMAL
EOSINOPHIL # BLD AUTO: 0.7 K/UL (ref 0–0.5)
EOSINOPHIL NFR BLD: 6.9 % (ref 0–8)
ERYTHROCYTE [DISTWIDTH] IN BLOOD BY AUTOMATED COUNT: 15.5 % (ref 11.5–14.5)
EST. GFR  (AFRICAN AMERICAN): 39.8 ML/MIN/1.73 M^2
EST. GFR  (AFRICAN AMERICAN): 45.6 ML/MIN/1.73 M^2
EST. GFR  (AFRICAN AMERICAN): 45.6 ML/MIN/1.73 M^2
EST. GFR  (NON AFRICAN AMERICAN): 34.5 ML/MIN/1.73 M^2
EST. GFR  (NON AFRICAN AMERICAN): 39.4 ML/MIN/1.73 M^2
EST. GFR  (NON AFRICAN AMERICAN): 39.4 ML/MIN/1.73 M^2
GLUCOSE SERPL-MCNC: 136 MG/DL (ref 70–110)
GLUCOSE SERPL-MCNC: 144 MG/DL (ref 70–110)
GLUCOSE SERPL-MCNC: 93 MG/DL (ref 70–110)
GLUCOSE UR QL STRIP: NEGATIVE
HCT VFR BLD AUTO: 41.7 % (ref 40–54)
HGB BLD-MCNC: 12.1 G/DL (ref 14–18)
HGB UR QL STRIP: ABNORMAL
HYALINE CASTS UR QL AUTO: 1 /LPF
IMM GRANULOCYTES # BLD AUTO: 0.02 K/UL (ref 0–0.04)
IMM GRANULOCYTES NFR BLD AUTO: 0.2 % (ref 0–0.5)
KETONES UR QL STRIP: ABNORMAL
LEUKOCYTE ESTERASE UR QL STRIP: NEGATIVE
LYMPHOCYTES # BLD AUTO: 2.6 K/UL (ref 1–4.8)
LYMPHOCYTES NFR BLD: 25.6 % (ref 18–48)
MCH RBC QN AUTO: 28.9 PG (ref 27–31)
MCHC RBC AUTO-ENTMCNC: 29 G/DL (ref 32–36)
MCV RBC AUTO: 100 FL (ref 82–98)
MICROSCOPIC COMMENT: NORMAL
MONOCYTES # BLD AUTO: 1.1 K/UL (ref 0.3–1)
MONOCYTES NFR BLD: 11.2 % (ref 4–15)
NEUTROPHILS # BLD AUTO: 5.6 K/UL (ref 1.8–7.7)
NEUTROPHILS NFR BLD: 55.6 % (ref 38–73)
NITRITE UR QL STRIP: NEGATIVE
NON-SQ EPI CELLS #/AREA URNS AUTO: <1 /HPF
NRBC BLD-RTO: 0 /100 WBC
OSMOLALITY SERPL: 339 MOSM/KG (ref 280–300)
PH UR STRIP: 5 [PH] (ref 5–8)
PLATELET # BLD AUTO: 171 K/UL (ref 150–350)
PMV BLD AUTO: 11.7 FL (ref 9.2–12.9)
POCT GLUCOSE: 121 MG/DL (ref 70–110)
POCT GLUCOSE: 127 MG/DL (ref 70–110)
POCT GLUCOSE: 133 MG/DL (ref 70–110)
POCT GLUCOSE: 135 MG/DL (ref 70–110)
POTASSIUM SERPL-SCNC: 3.7 MMOL/L (ref 3.5–5.1)
POTASSIUM SERPL-SCNC: 3.9 MMOL/L (ref 3.5–5.1)
POTASSIUM SERPL-SCNC: 4.5 MMOL/L (ref 3.5–5.1)
PROT UR QL STRIP: NEGATIVE
RBC # BLD AUTO: 4.18 M/UL (ref 4.6–6.2)
RBC #/AREA URNS AUTO: 3 /HPF (ref 0–4)
SODIUM SERPL-SCNC: 155 MMOL/L (ref 136–145)
SODIUM SERPL-SCNC: 156 MMOL/L (ref 136–145)
SODIUM SERPL-SCNC: 158 MMOL/L (ref 136–145)
SODIUM UR-SCNC: 55 MMOL/L (ref 20–250)
SP GR UR STRIP: 1.02 (ref 1–1.03)
SQUAMOUS #/AREA URNS AUTO: 1 /HPF
URN SPEC COLLECT METH UR: ABNORMAL
WBC # BLD AUTO: 9.98 K/UL (ref 3.9–12.7)
WBC #/AREA URNS AUTO: 2 /HPF (ref 0–5)

## 2020-11-21 PROCEDURE — 63600175 PHARM REV CODE 636 W HCPCS: Performed by: STUDENT IN AN ORGANIZED HEALTH CARE EDUCATION/TRAINING PROGRAM

## 2020-11-21 PROCEDURE — 85025 COMPLETE CBC W/AUTO DIFF WBC: CPT

## 2020-11-21 PROCEDURE — 99900035 HC TECH TIME PER 15 MIN (STAT)

## 2020-11-21 PROCEDURE — 99223 PR INITIAL HOSPITAL CARE,LEVL III: ICD-10-PCS | Mod: AI,,, | Performed by: INTERNAL MEDICINE

## 2020-11-21 PROCEDURE — 99223 1ST HOSP IP/OBS HIGH 75: CPT | Mod: AI,,, | Performed by: INTERNAL MEDICINE

## 2020-11-21 PROCEDURE — 11000001 HC ACUTE MED/SURG PRIVATE ROOM

## 2020-11-21 PROCEDURE — 25000003 PHARM REV CODE 250: Performed by: STUDENT IN AN ORGANIZED HEALTH CARE EDUCATION/TRAINING PROGRAM

## 2020-11-21 PROCEDURE — 27000190 HC CPAP FULL FACE MASK W/VALVE

## 2020-11-21 PROCEDURE — 36415 COLL VENOUS BLD VENIPUNCTURE: CPT

## 2020-11-21 PROCEDURE — 80048 BASIC METABOLIC PNL TOTAL CA: CPT

## 2020-11-21 PROCEDURE — 25000003 PHARM REV CODE 250: Performed by: INTERNAL MEDICINE

## 2020-11-21 RX ORDER — OXYBUTYNIN CHLORIDE 5 MG/1
10 TABLET, EXTENDED RELEASE ORAL DAILY
Status: DISCONTINUED | OUTPATIENT
Start: 2020-11-21 | End: 2020-12-01 | Stop reason: HOSPADM

## 2020-11-21 RX ORDER — CARVEDILOL 6.25 MG/1
6.25 TABLET ORAL 2 TIMES DAILY WITH MEALS
Status: DISCONTINUED | OUTPATIENT
Start: 2020-11-21 | End: 2020-11-23

## 2020-11-21 RX ORDER — FLUTICASONE PROPIONATE 50 MCG
2 SPRAY, SUSPENSION (ML) NASAL DAILY PRN
Status: DISCONTINUED | OUTPATIENT
Start: 2020-11-21 | End: 2020-12-01 | Stop reason: HOSPADM

## 2020-11-21 RX ORDER — AMIODARONE HYDROCHLORIDE 200 MG/1
200 TABLET ORAL EVERY MORNING
Status: DISCONTINUED | OUTPATIENT
Start: 2020-11-21 | End: 2020-12-01 | Stop reason: HOSPADM

## 2020-11-21 RX ORDER — FERROUS SULFATE 325(65) MG
325 TABLET, DELAYED RELEASE (ENTERIC COATED) ORAL 2 TIMES DAILY WITH MEALS
Status: DISCONTINUED | OUTPATIENT
Start: 2020-11-21 | End: 2020-12-01 | Stop reason: HOSPADM

## 2020-11-21 RX ORDER — INSULIN ASPART 100 [IU]/ML
0-5 INJECTION, SOLUTION INTRAVENOUS; SUBCUTANEOUS
Status: DISCONTINUED | OUTPATIENT
Start: 2020-11-21 | End: 2020-11-21

## 2020-11-21 RX ORDER — SODIUM CHLORIDE 0.9 % (FLUSH) 0.9 %
10 SYRINGE (ML) INJECTION
Status: DISCONTINUED | OUTPATIENT
Start: 2020-11-21 | End: 2020-12-01 | Stop reason: HOSPADM

## 2020-11-21 RX ORDER — ACETAMINOPHEN 500 MG
1000 TABLET ORAL 3 TIMES DAILY PRN
Status: DISCONTINUED | OUTPATIENT
Start: 2020-11-21 | End: 2020-12-01 | Stop reason: HOSPADM

## 2020-11-21 RX ORDER — TALC
6 POWDER (GRAM) TOPICAL NIGHTLY PRN
Status: CANCELLED | OUTPATIENT
Start: 2020-11-21

## 2020-11-21 RX ORDER — IBUPROFEN 200 MG
16 TABLET ORAL
Status: DISCONTINUED | OUTPATIENT
Start: 2020-11-21 | End: 2020-12-01 | Stop reason: HOSPADM

## 2020-11-21 RX ORDER — IBUPROFEN 200 MG
24 TABLET ORAL
Status: DISCONTINUED | OUTPATIENT
Start: 2020-11-21 | End: 2020-12-01 | Stop reason: HOSPADM

## 2020-11-21 RX ORDER — GLUCAGON 1 MG
1 KIT INJECTION
Status: DISCONTINUED | OUTPATIENT
Start: 2020-11-21 | End: 2020-12-01 | Stop reason: HOSPADM

## 2020-11-21 RX ORDER — MUPIROCIN 20 MG/G
OINTMENT TOPICAL 2 TIMES DAILY
Status: DISPENSED | OUTPATIENT
Start: 2020-11-21 | End: 2020-11-26

## 2020-11-21 RX ORDER — GABAPENTIN 300 MG/1
300 CAPSULE ORAL 3 TIMES DAILY
Status: DISCONTINUED | OUTPATIENT
Start: 2020-11-21 | End: 2020-12-01 | Stop reason: HOSPADM

## 2020-11-21 RX ORDER — ERGOCALCIFEROL 1.25 MG/1
50000 CAPSULE ORAL
Status: DISCONTINUED | OUTPATIENT
Start: 2020-11-23 | End: 2020-12-01 | Stop reason: HOSPADM

## 2020-11-21 RX ORDER — DOXAZOSIN 1 MG/1
2 TABLET ORAL DAILY
Status: DISCONTINUED | OUTPATIENT
Start: 2020-11-21 | End: 2020-11-22

## 2020-11-21 RX ORDER — INSULIN ASPART 100 [IU]/ML
0-5 INJECTION, SOLUTION INTRAVENOUS; SUBCUTANEOUS
Status: DISCONTINUED | OUTPATIENT
Start: 2020-11-21 | End: 2020-12-01 | Stop reason: HOSPADM

## 2020-11-21 RX ORDER — ATORVASTATIN CALCIUM 20 MG/1
40 TABLET, FILM COATED ORAL NIGHTLY
Status: DISCONTINUED | OUTPATIENT
Start: 2020-11-21 | End: 2020-12-01 | Stop reason: HOSPADM

## 2020-11-21 RX ORDER — DEXTROSE MONOHYDRATE 50 MG/ML
INJECTION, SOLUTION INTRAVENOUS CONTINUOUS
Status: DISCONTINUED | OUTPATIENT
Start: 2020-11-21 | End: 2020-11-23

## 2020-11-21 RX ORDER — OLANZAPINE 10 MG/2ML
10 INJECTION, POWDER, FOR SOLUTION INTRAMUSCULAR ONCE AS NEEDED
Status: DISCONTINUED | OUTPATIENT
Start: 2020-11-21 | End: 2020-11-30

## 2020-11-21 RX ORDER — SODIUM CHLORIDE 0.9 % (FLUSH) 0.9 %
10 SYRINGE (ML) INJECTION
Status: CANCELLED | OUTPATIENT
Start: 2020-11-21

## 2020-11-21 RX ORDER — ALLOPURINOL 300 MG/1
300 TABLET ORAL EVERY MORNING
Status: DISCONTINUED | OUTPATIENT
Start: 2020-11-21 | End: 2020-12-01 | Stop reason: HOSPADM

## 2020-11-21 RX ORDER — NAPROXEN SODIUM 220 MG/1
81 TABLET, FILM COATED ORAL DAILY
Status: DISCONTINUED | OUTPATIENT
Start: 2020-11-21 | End: 2020-12-01 | Stop reason: HOSPADM

## 2020-11-21 RX ADMIN — DEXTROSE: 5 SOLUTION INTRAVENOUS at 03:11

## 2020-11-21 RX ADMIN — DEXTROSE: 5 SOLUTION INTRAVENOUS at 11:11

## 2020-11-21 RX ADMIN — DEXTROSE: 5 SOLUTION INTRAVENOUS at 04:11

## 2020-11-21 RX ADMIN — SODIUM CHLORIDE, SODIUM LACTATE, POTASSIUM CHLORIDE, AND CALCIUM CHLORIDE 1000 ML: .6; .31; .03; .02 INJECTION, SOLUTION INTRAVENOUS at 12:11

## 2020-11-21 RX ADMIN — MUPIROCIN: 20 OINTMENT TOPICAL at 08:11

## 2020-11-21 NOTE — ASSESSMENT & PLAN NOTE
Renal function at baseline Cr of 2.0 (within baseline 1.8-2.0)    Avoid nephrotoxins, renally dose meds

## 2020-11-21 NOTE — ASSESSMENT & PLAN NOTE
Hemant Kennedy is a 70 year old male with NICM, HFrEF (EF of 35-40%), HTN, T2DM, AFib on DOAC and amiodarone, and obesity (BMI >50) who presents with AMS for skilled nursing facility. BMP remarkable for Na 157. VBG 7.39/39/31.   Imaging: CT head without acute intracranial process.   MICU consulted who recommended appropriate placement on floor for sodium correction   Suspect acute encephalopathy is 2/2 to hypernatremia     Plan  - Correct hypernatremia with D5 gtt with strict sodium monitoring        History of Present Illness





- General


Chief Complaint: Injury


Stated Complaint: INJURY


Time Seen by Provider: 11/09/18 08:56


History Source: Patient


Exam Limitations: No Limitations





- History of Present Illness


Initial Comments: 





CHIEF COMPLAINT:  13 y/o afebrile, ambulatory female c/o right knee pain after 

injury yesterday. 





HISTORY OF PRESENT ILLNESS:  Patient was playing flag football yesterday and 

she fell and her right knee hit the wall.  She states she's had pain since but 

can walk.  She did not ice it because the ice was burning.  She did take 

Ibuprofen last night for pain.  She denies popping, instability, numbness/

tingling in lower extremities. 





Vital signs on arrival are within normal limits. 





REVIEW OF SYSTEMS:


GENERAL/CONSTITUTIONAL: No fever/chills. No weakness. No weight change.


MUSCULOSKELETAL: +right knee pain.  No neck or back pain.


SKIN: No rash or easy bruising.


NEUROLOGIC: No headache, vertigo, loss of consciousness, or loss of sensation.








PHYSICAL EXAM:


VITAL_SIGNS: within normal limits


GENERAL_APPEARANCE: alert, cooperative, mild obvious discomfort with 

ambulation.  Patient is ambulatory with limp. 


MENTAL_STATUS: speech clear, oriented X 3, responds appropriately to questions.


NEURO: motor intact and sensory intact in injured extremity.


EXTREMITIES: Right knee with mild swelling and abrasion to superior patella 

that is erythematous.  No warmth.  Full ROM with some pain.  TTP of medial and 

lateral joint lines of right knee.  Negative lachmann's test.  No TTP of tibial 

plateau. 


SKIN: warm, dry, good color.











Past History





- Past Medical History


Allergies/Adverse Reactions: 


 Allergies











Allergy/AdvReac Type Severity Reaction Status Date / Time


 


No Known Allergies Allergy   Verified 11/09/18 09:09











Home Medications: 


Ambulatory Orders





NK [No Known Home Medication]  11/09/18 








Anemia: Yes


COPD: No


DVT: No





- Immunization History


Immunization Up to Date: Yes





- Suicide/Smoking/Psychosocial Hx


Smoking Status: No


Smoking History: Never smoked


Have you smoked in the past 12 months: No


Number of Cigarettes Smoked Daily: 0


Information on smoking cessation initiated: No


Hx Alcohol Use: No


Drug/Substance Use Hx: No


Substance Use Type: None





*Physical Exam





- Vital Signs


 Last Vital Signs











Temp Pulse Resp BP Pulse Ox


 


 98.6 F   92   16   116/62   100 


 


 11/09/18 08:52  11/09/18 08:52  11/09/18 08:52  11/09/18 08:52  11/09/18 08:52














Medical Decision Making





- Medical Decision Making





A/P:  13 y/o female with right knee pain.  Most likely bone bruise.  Less 

likely soft tissue injury.  Suggested RICE instructions and continued ibuprofen 

every 6 hours for pain.  Will provide referral for ortho and suggested no 

exercise until seen by Ortho.  Wrapped patient's knee with an ACE bandage





The patient verbalizes understanding of all instructions, has no further 

questions and is awaiting discharge.














*DC/Admit/Observation/Transfer


Diagnosis at time of Disposition: 


Right knee injury


Qualifiers:


 Encounter type: initial encounter Qualified Code(s): S89.91XA - Unspecified 

injury of right lower leg, initial encounter








- Discharge Dispostion


Disposition: HOME


Condition at time of disposition: Good





- Referrals


Referrals: 


Patrice Ly MD [Staff Physician] - 





- Patient Instructions


Printed Discharge Instructions:  DI for Knee Pain, How To Perform RICE (Rest, 

Ice, Compress, Elevate)


Additional Instructions: 


Discharge Instructions:


-Use ACE bandage for comfort and swelling


-Follow RICE instructions


-Take over the counter Ibuprofen (600mg every 6 hours) with food for pain


-Call Dr. Ly on Monday if no improvement 


-No sports until cleared by Orthopedic doctor





- Post Discharge Activity


Forms/Work/School Notes:  Back to School

## 2020-11-21 NOTE — SUBJECTIVE & OBJECTIVE
Past Medical History:   Diagnosis Date    Anticoagulant long-term use     Arthritis     Atrial fibrillation     Cardiomyopathy     home O2    Cataract     CHF (congestive heart failure)     CKD (chronic kidney disease) stage 3, GFR 30-59 ml/min 11/27/2015    Diabetes mellitus     DVT (deep venous thrombosis)     Hypertension     Sleep apnea     Stroke 2010    with residual effects W/C bound - Right Occipital       Past Surgical History:   Procedure Laterality Date    CATARACT EXTRACTION W/  INTRAOCULAR LENS IMPLANT Left 09/18/2017    Dr. Zuniga       Review of patient's allergies indicates:   Allergen Reactions    Spironolactone      Hyperkalemia         Family History     Problem Relation (Age of Onset)    Cataracts Mother    Glaucoma Sister, Sister    Heart attack Mother        Tobacco Use    Smoking status: Never Smoker    Smokeless tobacco: Never Used   Substance and Sexual Activity    Alcohol use: No    Drug use: No    Sexual activity: Not on file      Review of Systems   Unable to perform ROS: Mental status change     Objective:     Vital Signs (Most Recent):  Temp: 97.5 °F (36.4 °C) (11/20/20 2041)  Pulse: 100 (11/21/20 0007)  Resp: (!) 21 (11/21/20 0007)  BP: 119/79 (11/21/20 0007)  SpO2: 97 % (11/21/20 0007) Vital Signs (24h Range):  Temp:  [97.5 °F (36.4 °C)] 97.5 °F (36.4 °C)  Pulse:  [] 100  Resp:  [14-21] 21  SpO2:  [88 %-98 %] 97 %  BP: (116-141)/(69-87) 119/79   Weight: (!) 158.8 kg (350 lb)  Body mass index is 51.69 kg/m².    No intake or output data in the 24 hours ending 11/21/20 0101    Physical Exam  Constitutional:       General: He is not in acute distress.     Appearance: He is obese.   HENT:      Head: Normocephalic and atraumatic.   Eyes:      Extraocular Movements: Extraocular movements intact.      Conjunctiva/sclera: Conjunctivae normal.   Neck:      Musculoskeletal: Normal range of motion and neck supple.   Cardiovascular:      Rate and Rhythm: Normal rate and  regular rhythm.   Pulmonary:      Effort: Pulmonary effort is normal. No respiratory distress.      Breath sounds: Normal breath sounds.   Abdominal:      Palpations: Abdomen is soft.      Tenderness: There is no abdominal tenderness.   Musculoskeletal: Normal range of motion.      Right lower leg: Edema present.      Left lower leg: Edema present.   Skin:     General: Skin is warm and dry.   Neurological:      General: No focal deficit present.      Mental Status: He is alert.      Comments: Oriented to self         Vents:     Lines/Drains/Airways     Peripheral Intravenous Line                 Peripheral IV - Single Lumen 11/20/20 2130 22 G Left Hand less than 1 day              Significant Labs:    CBC/Anemia Profile:  Recent Labs   Lab 11/20/20  2151   WBC 9.94   HGB 13.3*   HCT 43.8      MCV 99*   RDW 15.4*        Chemistries:  Recent Labs   Lab 11/20/20  2147   *   K 3.8   *   CO2 28   BUN 29*   CREATININE 2.0*   CALCIUM 9.3   ALBUMIN 2.8*   PROT 8.1   BILITOT 1.0   ALKPHOS 74   ALT 26   AST 58*   MG 2.3   PHOS 3.4       All pertinent labs within the past 24 hours have been reviewed.    Significant Imaging: I have reviewed all pertinent imaging results/findings within the past 24 hours.

## 2020-11-21 NOTE — HPI
Hemant Kennedy is a 70 year old male with NICM, HFrEF (EF of 35-40%), HTN, T2DM, AFib on DOAC and amiodarone, h/o CVA w/ L hemineglect, h/o DVT, ERIC and obesity (BMI >50) who presents from [facility] for altered mental status.  Patient was brought in by EMS from [facility], where he has been living for the past 2 weeks, after being treated at [facility] 11/1-11/5 for rhabdomyolysis, acute metabolic encephalopathy 2/2 urosepsis with proteus miribalis, and panniculitis. EMS reports patient was refusing his nightly BIPAP at the nursing home.     History was limited due to patient's altered mental state. I contacted the nursing home to obtain collateral information but nursing staff wasn't available at the time.     In the ED, patient was afebrile, hemodynamically stable, and 95% on room air. BMP remarkable for Na 157. VBG 7.39/39/31. CT head without acute intracranial process.

## 2020-11-21 NOTE — PLAN OF CARE
Pt free from injury and in no acute distress. Denies pain. Combative at times. Zyprexa ordered prn. Turned q2hrs. Excoriation noted to scrotum.  Safety measures WNL. Will continue to monitor.

## 2020-11-21 NOTE — H&P
[facility] - ICU [facility] Medicine  History & Physical    Patient Name: Hemant Kennedy Jr.  MRN: [id number]  Admission Date: 11/20/2020  Attending Physician: Salena Caban MD   Primary Care Provider: Wicho Qiu MD         Patient information was obtained from past medical records and ER records.     Subjective:     Principal Problem:Acute encephalopathy    Chief Complaint:   Chief Complaint   Patient presents with    Shortness of Breath     From [facility]. Pt refusing BiPAP. Oxygen was fluctuating from 88-98% on RA        HPI: Hemant Kennedy is a 70 year old male with NICM, HFrEF (EF of 35-40%), HTN, T2DM, AFib on DOAC and amiodarone, h/o CVA w/ L hemineglect, h/o DVT, ERIC and obesity (BMI >50) who presents from [facility] for altered mental status.  Patient was brought in by EMS from [facility], where he has been living for the past 2 weeks, after being treated at [facility] 11/1-11/5 for rhabdomyolysis, acute metabolic encephalopathy 2/2 urosepsis with proteus miribalis, and panniculitis. EMS reports patient was refusing his nightly BIPAP at the nursing home.     History was limited due to patient's altered mental state. I contacted the nursing home to obtain collateral information but nursing staff wasn't available at the time.     In the ED, patient was afebrile, hemodynamically stable, and 95% on room air. BMP remarkable for Na 157. VBG 7.39/39/31. CT head without acute intracranial process.        Past Medical History:   Diagnosis Date    Anticoagulant long-term use     Arthritis     Atrial fibrillation     Cardiomyopathy     home O2    Cataract     CHF (congestive heart failure)     CKD (chronic kidney disease) stage 3, GFR 30-59 ml/min 11/27/2015    Diabetes mellitus     DVT (deep venous thrombosis)     Hypertension     Sleep apnea     Stroke 2010    with residual effects W/C bound - Right  Occipital       Past Surgical History:   Procedure Laterality Date    CATARACT EXTRACTION W/  INTRAOCULAR LENS IMPLANT Left 09/18/2017    Dr. Zuniga       Review of patient's allergies indicates:   Allergen Reactions    Spironolactone      Hyperkalemia         No current facility-administered medications on file prior to encounter.      Current Outpatient Medications on File Prior to Encounter   Medication Sig    acetaminophen (TYLENOL) 500 MG tablet Take 2 tablets (1,000 mg total) by mouth 3 (three) times daily as needed for Pain.    allopurinol (ZYLOPRIM) 300 MG tablet Take 1 tablet (300 mg total) by mouth every morning.    amiodarone (PACERONE) 200 MG Tab TAKE 1 TABLET BY MOUTH IN THE MORNING    aspirin 81 MG Chew TAKE 1 TAB BY MOUTH daily    atorvastatin (LIPITOR) 40 MG tablet @@ TAKE 1 TABLET BY MOUTH daily    calcium carbonate (OS-ROJAS) 600 mg calcium (1,500 mg) Tab Take 600 mg by mouth 2 (two) times a day.    carvediloL (COREG) 6.25 MG tablet TAKE 1 TABLET BY MOUTH IN THE MORNING and TAKE 1 TABLET BY MOUTH every evening with meals    cefpodoxime (VANTIN) 200 MG tablet Take 1 tablet (200 mg total) by mouth every 12 (twelve) hours.    collagenase (SANTYL) ointment Apply topically once daily. Apply to wound daily as directed.    doxazosin (CARDURA) 2 MG tablet Take 2 mg by mouth once daily.    ELIQUIS 5 mg Tab TAKE 1 TABLET BY MOUTH twice a day    ergocalciferol (ERGOCALCIFEROL) 50,000 unit Cap Take 1 capsule (50,000 Units total) by mouth every 7 days. (Patient taking differently: Take 50,000 Units by mouth every Monday. )    ferrous sulfate 325 (65 FE) MG EC tablet Take 1 tablet (325 mg total) by mouth 2 (two) times daily with meals.    fluticasone (FLONASE) 50 mcg/actuation nasal spray 2 sprays by Each Nare route once daily. (Patient taking differently: 2 sprays by Each Nare route daily as needed for Allergies. )    furosemide (LASIX) 40 MG tablet @@ TAKE 1 TABLET BY MOUTH daily    gabapentin  (NEURONTIN) 300 MG capsule Take 1 capsule (300 mg total) by mouth 3 (three) times daily.    glimepiride (AMARYL) 4 MG tablet Take 4 mg by mouth once daily.    HYDROPHILIC CREAM (TRIAD WOUND DRESSING TOP) Apply topically daily as needed.    losartan (COZAAR) 25 MG tablet Take 0.5 tablets (12.5 mg total) by mouth once daily.    multivitamin (ONE DAILY MULTIVITAMIN) per tablet Take 1 tablet by mouth once daily.    ONE TOUCH ULTRA TEST Strp Test blood sugar two times daily    oxybutynin (DITROPAN-XL) 10 MG 24 hr tablet Take 10 mg by mouth once daily.     Family History     Problem Relation (Age of Onset)    Cataracts Mother    Glaucoma Sister, Sister    Heart attack Mother        Tobacco Use    Smoking status: Never Smoker    Smokeless tobacco: Never Used   Substance and Sexual Activity    Alcohol use: No    Drug use: No    Sexual activity: Not on file     Review of Systems   Unable to perform ROS: Mental status change     Objective:     Vital Signs (Most Recent):  Temp: 98.2 °F (36.8 °C) (11/21/20 0345)  Pulse: 91 (11/21/20 0345)  Resp: 20 (11/21/20 0345)  BP: 116/80 (11/21/20 0345)  SpO2: 95 % (11/21/20 0345) Vital Signs (24h Range):  Temp:  [97.5 °F (36.4 °C)-98.2 °F (36.8 °C)] 98.2 °F (36.8 °C)  Pulse:  [] 91  Resp:  [14-21] 20  SpO2:  [88 %-98 %] 95 %  BP: (116-141)/(59-87) 116/80     Weight: (!) 158.8 kg (350 lb)  Body mass index is 51.69 kg/m².    Physical Exam  Constitutional:       General: He is not in acute distress.     Appearance: He is obese. He is not ill-appearing, toxic-appearing or diaphoretic.   HENT:      Head: Normocephalic and atraumatic.      Nose: Nose normal.      Mouth/Throat:      Mouth: Mucous membranes are dry.   Eyes:      Extraocular Movements: Extraocular movements intact.   Neck:      Musculoskeletal: Normal range of motion.   Cardiovascular:      Rate and Rhythm: Normal rate and regular rhythm.      Pulses: Normal pulses.      Heart sounds: No murmur.   Pulmonary:       Effort: Pulmonary effort is normal. No respiratory distress.      Breath sounds: Normal breath sounds. No wheezing or rales.   Abdominal:      General: Abdomen is flat. There is no distension.      Palpations: Abdomen is soft.      Tenderness: There is no abdominal tenderness.   Musculoskeletal: Normal range of motion.         General: No swelling or tenderness.   Skin:     General: Skin is warm and dry.   Neurological:      Mental Status: He is alert. He is disoriented.      Comments: Awake and maintains eye contact. Unable to tell me his name, year or location.   Psychiatric:      Comments: Unable to assess             Significant Labs:   Blood Culture: No results for input(s): LABBLOO in the last 48 hours.  BMP:   Recent Labs   Lab 11/20/20 2147      *   K 3.8   *   CO2 28   BUN 29*   CREATININE 2.0*   CALCIUM 9.3   MG 2.3     CBC:   Recent Labs   Lab 11/20/20 2151   WBC 9.94   HGB 13.3*   HCT 43.8        CMP:   Recent Labs   Lab 11/20/20 2147   *   K 3.8   *   CO2 28      BUN 29*   CREATININE 2.0*   CALCIUM 9.3   PROT 8.1   ALBUMIN 2.8*   BILITOT 1.0   ALKPHOS 74   AST 58*   ALT 26   ANIONGAP 12   EGFRNONAA 32.4*       Significant Imaging:   Imaging Results          CT Head Without Contrast (Final result)  Result time 11/20/20 23:02:54    Final result by David Gorman MD (11/20/20 23:02:54)                 Impression:      1. No acute intracranial process.  2. Involutional changes with chronic microvascular ischemic changes in small remote right posterior occipital cortical infarct and left frontal cortical infarct.  No significant change.      Electronically signed by: David Gorman  Date:    11/20/2020  Time:    23:02             Narrative:    EXAMINATION:  CT HEAD WITHOUT CONTRAST    CLINICAL HISTORY:  Altered mental status;    TECHNIQUE:  Low dose axial CT images obtained throughout the head without intravenous contrast. Sagittal and coronal reconstructions  were performed.    COMPARISON:  11/02/2020    FINDINGS:  Intracranial compartment:    Ventricles and sulci are normal in size for age without evidence of hydrocephalus. No extra-axial blood or fluid collections.    Moderate involutional changes with chronic microvascular ischemic changes in the periventricular white matter.    Small remote right posterior occipital cortical infarct.  Small remote left frontal cortical infarct.  No significant change.    No parenchymal mass, hemorrhage, edema or major vascular distribution infarct.    Skull/extracranial contents (limited evaluation): No fracture. Mastoid air cells and paranasal sinuses are essentially clear.                               X-Ray Chest AP Portable (Final result)  Result time 11/20/20 22:54:44    Final result by Dipak Carranza MD (11/20/20 22:54:44)                 Impression:      There is suggestion of mild prominence of the pulmonary vascular with mild interstitial infiltrates, possible mild ground-glass infiltrate at the left base however this is more likely due to soft tissue attenuation.  There is no dense consolidation, no large pleural effusion.      Electronically signed by: Dipak Carranza  Date:    11/20/2020  Time:    22:54             Narrative:    EXAMINATION:  XR CHEST AP PORTABLE    CLINICAL HISTORY:  Hypoxemia    TECHNIQUE:  Single frontal view of the chest was performed.    COMPARISON:  November 1, 2020    FINDINGS:  Single chest view is submitted.  General increased attenuation is likely due to patient body habitus.  There is diminished depth of inspiration, there is mild rotation, when accounting for these factors the cardiomediastinal silhouette appears stable with cardiac prominence.  There is mild prominence of the pulmonary vascular with mild pattern of interstitial infiltrates, and some suspected ground-glass infiltrate at the left base although this may relate to soft tissue attenuation.  There is no large pleural effusion  and no evidence for pneumothorax.  The osseous structures demonstrate chronic change.                                  Assessment/Plan:     * Acute encephalopathy  Hemant Kennedy is a 70 year old male with NICM, HFrEF (EF of 35-40%), HTN, T2DM, AFib on DOAC and amiodarone, and obesity (BMI >50) who presents with AMS for skilled nursing facility. BMP remarkable for Na 157. VBG 7.39/39/31.   Imaging: CT head without acute intracranial process.   MICU consulted who recommended appropriate placement on floor for sodium correction   Suspect acute encephalopathy is 2/2 to hypernatremia     Plan  - Correct hypernatremia with D5 gtt with strict sodium monitoring         Hypernatremia  Patient with Na of 157 on admission; serum osm 339. Free water deficit 6.6L   Suspect possibly due to dehydration due to poor intake vs GI losses, although unable to obtain collateral information from skilled nursing home as nurse was not available at the time of my call.   EF noted at 35%.    Plan  - Initiate D5W gtt @100cc/hr and adjust rate based on sodium levels  - BMP q4hrs   - Correction Na goal 8-10mEq in 24 hours to prevent cerebral edema      Panniculitis  Noted in lower abdominal/ groin area. No erythema or purulence noted.    -Wound care consulted for skin breakdown    HLD (hyperlipidemia)  - Continue home statin       Nonischemic cardiomyopathy  The estimated ejection fraction is 35-40%.  Continue with GDMT: Losartan 12.5mg, coreg 6.25mg BID    Obstructive sleep apnea treated with continuous positive airway pressure (CPAP)  CPAP qHS      Type II diabetes mellitus  Well controlled. Hgb A1c 5.6. Patient doesn't appear to be on diabetic medication.       CKD (chronic kidney disease) stage 3, GFR 30-59 ml/min  Renal function at baseline Cr of 2.0 (within baseline 1.8-2.0)    Avoid nephrotoxins, renally dose meds      Atrial fibrillation, controlled  On home amiodarone 200mg, eliquis 5mg.       VTE Risk Mitigation (From admission,  onward)         Ordered     apixaban tablet 5 mg  2 times daily      11/21/20 0258                   Jez Isaac MD  Department of Hospital Medicine   [facility] - ICU [facility]

## 2020-11-21 NOTE — ED NOTES
"Patient resting on stretcher, would barely attempt to open his eyes when spoken to, I pulled back the covers introduced myself and he told me to stop as I was trying to assess him, I brought a glass of water to assess his ability to swallow and take pills, he would swallow 1 sip of water then said "Im not takin no pills now go" I advised I had medications to give him and he refused.   "

## 2020-11-21 NOTE — ED TRIAGE NOTES
Pt arrived EMS from [facility] with c/o of low O2 sats and refusing Bipap. Pt has hx of encephalopathy and CVA affecting left side. Pt speaks minimal words and is oriented to self only. O2 sats 98% RA upon arrival in no acute resp distress. Pt continues to repeat that he is cold. Pt is unable to walk. Has pressure heel booties to bilateral lower extremities. Pt is incontinent. Pressure injury to sacrum, wound to left upper thigh, left lateral knee, and deep tissue to left heel.

## 2020-11-21 NOTE — ASSESSMENT & PLAN NOTE
Noted in lower abdominal/ groin area. No erythema or purulence noted.    -Wound care consulted for skin breakdown

## 2020-11-21 NOTE — ED PROVIDER NOTES
"Encounter Date: 11/20/2020       History     Chief Complaint   Patient presents with    Shortness of Breath     From [facility]. Pt refusing BiPAP. Oxygen was fluctuating from 88-98% on RA     HPI     70 M h/o NICM, HFrEF (EF of 35-40%), HTN, DMT2, AFib on DOAC, h/o CVA w/ L hemineglect, h/o DVT, ERIC and obesity (BMI >50) presents for agitation, AMS, and hypoxia. Pt was BIBEMS from [facility], where he has been living for the past 2 weeks, after being treated at [facility] 11/1-11/5 for acute metabolic encephalopathy 2/2 urosepsis with proteus miribalis, as well as panniculitis. EMS reports patient was refusing his nightly BIPAP tonight, repeating that he was "cold", and was hypoxic on their monitors to 76%. .     Pt is A&Ox1 to name, awakens to verbal questions but dismisses questions with annoyed responses, again repeating that he is cold, and refuses commands to move extremities or open eyes. Opening eyes spontaneously, intermittently.   Unable to obtain further ROS.      Review of patient's allergies indicates:   Allergen Reactions    Spironolactone      Hyperkalemia       Past Medical History:   Diagnosis Date    Anticoagulant long-term use     Arthritis     Atrial fibrillation     Cardiomyopathy     home O2    Cataract     CHF (congestive heart failure)     CKD (chronic kidney disease) stage 3, GFR 30-59 ml/min 11/27/2015    Diabetes mellitus     DVT (deep venous thrombosis)     Hypertension     Sleep apnea     Stroke 2010    with residual effects W/C bound - Right Occipital     Past Surgical History:   Procedure Laterality Date    CATARACT EXTRACTION W/  INTRAOCULAR LENS IMPLANT Left 09/18/2017    Dr. Zuniga     Family History   Problem Relation Age of Onset    Heart attack Mother     Cataracts Mother     Glaucoma Sister     Glaucoma Sister      Social History     Tobacco Use    Smoking status: Never Smoker    Smokeless tobacco: Never Used "   Substance Use Topics    Alcohol use: No    Drug use: No     Review of Systems   Unable to perform ROS: Mental status change       Physical Exam     Initial Vitals [11/20/20 2041]   BP Pulse Resp Temp SpO2   (!) 141/78 106 20 97.5 °F (36.4 °C) (!) 88 %      MAP       --         Physical Exam    Nursing note and vitals reviewed.  Constitutional: He appears well-developed and well-nourished. He is not diaphoretic. No distress.   HENT:   Head: Normocephalic and atraumatic.   Eyes: EOM are normal. Pupils are equal, round, and reactive to light.   Neck: Normal range of motion.   Cardiovascular: Normal rate, regular rhythm and normal heart sounds.   Distance heart sounds   Pulmonary/Chest: Breath sounds normal. No respiratory distress. He has no wheezes.   Sating %   Abdominal: Soft. Bowel sounds are normal. There is no abdominal tenderness.   Panniculus soft, nontender, no induration or drainage. Covered in baby powder.   Musculoskeletal:      Comments: LUE contractures> RUE, LEs flexed and hypertonic, L heel ulcer with hematoma. R heel clean and dry.   Neurological:   AXOx1, pupils 3mm and reactive   Skin: Skin is warm.   Psychiatric:   AxOx1, minimally cooperative, annoyed at questioning, seems sleepy but not somnolent         ED Course   Procedures  Labs Reviewed   CBC W/ AUTO DIFFERENTIAL - Abnormal; Notable for the following components:       Result Value    RBC 4.43 (*)     Hemoglobin 13.3 (*)     MCV 99 (*)     MCHC 30.4 (*)     RDW 15.4 (*)     Lymph % 17.0 (*)     All other components within normal limits   COMPREHENSIVE METABOLIC PANEL - Abnormal; Notable for the following components:    Sodium 157 (*)     Chloride 117 (*)     BUN 29 (*)     Creatinine 2.0 (*)     Albumin 2.8 (*)     AST 58 (*)     eGFR if  37.4 (*)     eGFR if non  32.4 (*)     All other components within normal limits   PROTIME-INR - Abnormal; Notable for the following components:    Prothrombin Time  13.1 (*)     All other components within normal limits   TROPONIN I - Abnormal; Notable for the following components:    Troponin I 0.083 (*)     All other components within normal limits   URINALYSIS, REFLEX TO URINE CULTURE - Abnormal; Notable for the following components:    Ketones, UA Trace (*)     Occult Blood UA 1+ (*)     All other components within normal limits    Narrative:     Specimen Source->Urine  ADD ON TEST DRUG SCREEN PANEL URINE PER DR ZACHARY BARNES ORDER#   [id number]  11/20/2020  22:46    OSMOLALITY, SERUM - Abnormal; Notable for the following components:    Osmolality 339 (*)     All other components within normal limits    Narrative:     add on test osmolality serum per dr zachary barnes order# [id number]   11/21/2020  01:50   SERUM OSMO  critical result(s) called and verbal readback obtained   from ANDER TALLEY RN AT 0230 ON 11/21/2020 BY MUKESH  FINAL RESULTS VERIFIED BY REPEAT ANALYSIS by MUKESH 11/21/2020 02:30   ISTAT PROCEDURE - Abnormal; Notable for the following components:    POC PCO2 49.9 (*)     POC PO2 31 (*)     POC HCO3 30.9 (*)     POC SATURATED O2 59 (*)     POC TCO2 32 (*)     All other components within normal limits   TSH   LACTIC ACID, PLASMA   LIPASE   B-TYPE NATRIURETIC PEPTIDE   APTT   MAGNESIUM   PHOSPHORUS   T4, FREE   DRUG SCREEN PANEL, URINE EMERGENCY   DRUG SCREEN PANEL, URINE EMERGENCY    Narrative:     Specimen Source->Urine  ADD ON TEST DRUG SCREEN PANEL URINE PER DR ZACHARY BARNES ORDER#   [id number]  11/20/2020  22:46    URINALYSIS MICROSCOPIC    Narrative:     Specimen Source->Urine  ADD ON TEST DRUG SCREEN PANEL URINE PER DR ZACHARY BARNES ORDER#   [id number]  11/20/2020  22:46    CREATININE, URINE, RANDOM   OSMOLALITY, SERUM   SODIUM, URINE, RANDOM   SODIUM, URINE, RANDOM    Narrative:     Specimen Source->Urine  ADD ON TEST DRUG SCREEN PANEL URINE PER DR ZACHARY BARNES ORDER#   [id number]  11/20/2020  22:46   add on test urine sodium per dr campa  margiews order# [id number]    11/21/2020  01:52    SARS-COV-2 RDRP GENE        ECG Results          EKG 12-lead (Final result)  Result time 11/21/20 12:58:58    Final result by Interface, Lab [facility] (11/21/20 12:58:58)                 Narrative:    Test Reason : R09.02,    Vent. Rate : 101 BPM     Atrial Rate : 125 BPM     P-R Int : 000 ms          QRS Dur : 104 ms      QT Int : 306 ms       P-R-T Axes : 000 106 194 degrees     QTc Int : 396 ms    Atrial fibrillation with rapid ventricular response  Rightward axis  Low voltage QRS  Nonspecific T wave abnormality  Abnormal ECG  When compared with ECG of 02-NOV-2020 07:42,  The axis Shifted right  Confirmed by Gianna JIN, Linda ([id number]) on 11/21/2020 12:58:50 PM    Referred By: [email or web address]   SELF           Confirmed By:Linda Katz MD                            Imaging Results          CT Head Without Contrast (Final result)  Result time 11/20/20 23:02:54    Final result by David Gorman MD (11/20/20 23:02:54)                 Impression:      1. No acute intracranial process.  2. Involutional changes with chronic microvascular ischemic changes in small remote right posterior occipital cortical infarct and left frontal cortical infarct.  No significant change.      Electronically signed by: David Gorman  Date:    11/20/2020  Time:    23:02             Narrative:    EXAMINATION:  CT HEAD WITHOUT CONTRAST    CLINICAL HISTORY:  Altered mental status;    TECHNIQUE:  Low dose axial CT images obtained throughout the head without intravenous contrast. Sagittal and coronal reconstructions were performed.    COMPARISON:  11/02/2020    FINDINGS:  Intracranial compartment:    Ventricles and sulci are normal in size for age without evidence of hydrocephalus. No extra-axial blood or fluid collections.    Moderate involutional changes with chronic microvascular ischemic changes in the periventricular white matter.    Small remote right posterior occipital cortical infarct.  Small  remote left frontal cortical infarct.  No significant change.    No parenchymal mass, hemorrhage, edema or major vascular distribution infarct.    Skull/extracranial contents (limited evaluation): No fracture. Mastoid air cells and paranasal sinuses are essentially clear.                               X-Ray Chest AP Portable (Final result)  Result time 11/20/20 22:54:44    Final result by Dipak Carranza MD (11/20/20 22:54:44)                 Impression:      There is suggestion of mild prominence of the pulmonary vascular with mild interstitial infiltrates, possible mild ground-glass infiltrate at the left base however this is more likely due to soft tissue attenuation.  There is no dense consolidation, no large pleural effusion.      Electronically signed by: Dipak Carranza  Date:    11/20/2020  Time:    22:54             Narrative:    EXAMINATION:  XR CHEST AP PORTABLE    CLINICAL HISTORY:  Hypoxemia    TECHNIQUE:  Single frontal view of the chest was performed.    COMPARISON:  November 1, 2020    FINDINGS:  Single chest view is submitted.  General increased attenuation is likely due to patient body habitus.  There is diminished depth of inspiration, there is mild rotation, when accounting for these factors the cardiomediastinal silhouette appears stable with cardiac prominence.  There is mild prominence of the pulmonary vascular with mild pattern of interstitial infiltrates, and some suspected ground-glass infiltrate at the left base although this may relate to soft tissue attenuation.  There is no large pleural effusion and no evidence for pneumothorax.  The osseous structures demonstrate chronic change.                                 Medical Decision Making:   History:   Old Medical Records: I decided to obtain old medical records.  Initial Assessment:   70 M h/o NICM, HFrEF (EF of 35-40%), HTN, DMT2, AFib on DOAC, h/o CVA w/ L hemineglect, h/o DVT, ERIC and obesity (BMI >50) presents for agitation, AMS,  "and hypoxia. Previously admitted for panniculitis and urosepsis. Not currently hypoxic in ED, sating 96% on RA and in no respiratory distress.  Last admission TTE 2020 with "poor study quality due to elevated BMI, non-cooperative patient, EF 35-40%"  Differential Diagnosis:   Sepsis, arrhythmia, toxin, uremia, stroke, ACS, seizure, hyperinsulinemia, medication change, hypoxia, CHF.    Clinical Tests:   Lab Tests: Ordered and Reviewed  Radiological Study: Ordered and Reviewed  Medical Tests: Ordered and Reviewed  ED Management:  Septic wkup for AMS: BCx x2, CBC, CMP, troponin, BMP, Lipase, lactic acid, BNP, coags, mag, phos, UA, Covid, TSH, T4  CTH without cont  Ordered home carvedilol 6.25mg home med for HR minimally elevated to 101-103, technically Afib with RVR. Pt did not get meds tonight.   Deferred abx for now due to afebrile, stable /76, and no source of AMS at this time    Araceli Hannah MD PGY3   [facility]  9:58 PM 11/20/2020      Update:   Pt hypernatremic to 157  UA negative   CT head negative   Pt consulted to MICU for AMS and hypernatremic, recommended against admission  Pt admitted to medicine for D5 fluids and q4 BMPs    Araceli Hannah MD PGY3   [facility]  5:02 AM                 Other:   I have discussed this case with another health care provider.       <> Summary of the Discussion: MICU and hospital medicine              Attending Attestation:   Physician Attestation Statement for Resident:  As the supervising MD   Physician Attestation Statement: I have personally seen and examined this patient.   I agree with the above history. -:   As the supervising MD I agree with the above PE.    As the supervising MD I agree with the above treatment, course, plan, and disposition.   -:     Vitals normal.  Afebrile.  Here with altered mental status and refusing to wear BiPAP at the nursing home.  Exam and history limited as patient is not cooperative.  Labs " concerning for hypernatremia.  Discussed with MICU who evaluated.  They believe patient is stable for step-down unit.  Discussed with hospital medicine who admitted patient.      I have reviewed the following: old records at this facility.        Attending Critical Care:   Critical Care Times:   ==============================================================  · Total Critical Care Time - exclusive of procedural time: 40 minutes.  ==============================================================  Critical care was necessary to treat or prevent imminent or life-threatening deterioration of the following conditions: metabolic crisis.   Critical care was time spent personally by me on the following activities: obtaining history from patient or relative, examination of patient, review of old charts, ordering lab, x-rays, and/or EKG, development of treatment plan with patient or relative, ordering and performing treatments and interventions, evaluation of patient's response to treatment, discussion with consultants, interpretation of cardiac measurements and re-evaluation of patient's conition.   Critical Care Condition: potentially life-threatening               ED Course as of Nov 23 1525 Fri Nov 20, 2020   2337 Decreasing from 2 weeks ago   Troponin I(!): 0.083 [NP]      ED Course User Index  [NP] Araceli Hannah MD            Clinical Impression:       ICD-10-CM ICD-9-CM   1. Hypernatremia  E87.0 276.0   2. Hypoxia  R09.02 799.02   3. Altered mental status, unspecified altered mental status type  R41.82 780.97   4. DVT (deep venous thrombosis)  I82.409 453.40   5. Chronic kidney disease, unspecified CKD stage  N18.9 585.9   6. Elevated troponin  R77.8 790.6                          ED Disposition Condition    Admit                             Araceli Hannah MD  Resident  11/21/20 0503       Maurice Segovia MD  11/23/20 1525

## 2020-11-21 NOTE — ASSESSMENT & PLAN NOTE
Patient with Na of 157 on admission; serum osm 339. Free water deficit 6.6L   Suspect possibly due to dehydration due to poor intake vs GI losses, although unable to obtain collateral information from skilled nursing home as nurse was not available at the time of my call.   EF noted at 35%.    Plan  - Initiate D5W gtt @100cc/hr and adjust rate based on sodium levels  - BMP q4hrs   - Correction Na goal 8-10mEq in 24 hours to prevent cerebral edema

## 2020-11-21 NOTE — NURSING
Pt combative with staff. DO [place] notified. Will allow pt time to calm down before performing pt care. Will continue to monitor.

## 2020-11-21 NOTE — ED NOTES
Pt cleaned and placed in new brief. Wedge pillow place left side. Sacral dressing placed to sacrum. Dressings placed to left leg to thigh and shin.

## 2020-11-21 NOTE — HPI
Hemant Kennedy is a 70 year old male with NICM, HFrEF (EF of 35-40%), HTN, DMT2, AFib on DOAC, h/o CVA w/ L hemineglect, h/o DVT, ERIC and obesity (BMI >50) who presents for agitation, AMS, and hypoxia. Patient brought in by EMS from [facility], where he has been living for the past 2 weeks, after being treated at [facility] 11/1-11/5 for acute metabolic encephalopathy 2/2 urosepsis with proteus miribalis, as well as panniculitis. EMS reports patient was refusing his nightly BIPAP tonight. In ED, patient was afebrile, hemodynamically stable, and 95% on room air. Patient oriented to self but otherwise disoriented. In no acute distress. History limited as patient dismissed questions. BMP remarkable for Na 157. VBG 7.39/39/31. CT head without acute intracranial process.     Critical Care consulted for AMS in setting of Na 157.

## 2020-11-21 NOTE — CONSULTS
[facility]  Critical Care Medicine  Consult Note    Patient Name: Hemant Kennedy Jr.  MRN: [id number]  Admission Date: 11/20/2020  Hospital Length of Stay: 0 days  Code Status: Prior  Attending Physician: Maurice Segovia MD   Primary Care Provider: Wicho Qiu MD   Principal Problem: <principal problem not specified>    Inpatient consult to Critical Care Medicine  Consult performed by: Hemant Gann MD  Consult ordered by: Araceli Hannah MD        Subjective:     HPI:  Hemant Kennedy is a 70 year old male with NICM, HFrEF (EF of 35-40%), HTN, DMT2, AFib on DOAC, h/o CVA w/ L hemineglect, h/o DVT, ERIC and obesity (BMI >50) who presents for agitation, AMS, and hypoxia. Patient brought in by EMS from [facility], where he has been living for the past 2 weeks, after being treated at [facility] 11/1-11/5 for acute metabolic encephalopathy 2/2 urosepsis with proteus miribalis, as well as panniculitis. EMS reports patient was refusing his nightly BIPAP tonight. In ED, patient was afebrile, hemodynamically stable, and 95% on room air. Patient oriented to self but otherwise disoriented. In no acute distress. History limited as patient dismissed questions. BMP remarkable for Na 157. VBG 7.39/39/31. CT head without acute intracranial process.     Critical Care consulted for AMS in setting of Na 157.     Hospital/ICU Course:  No notes on file    Past Medical History:   Diagnosis Date    Anticoagulant long-term use     Arthritis     Atrial fibrillation     Cardiomyopathy     home O2    Cataract     CHF (congestive heart failure)     CKD (chronic kidney disease) stage 3, GFR 30-59 ml/min 11/27/2015    Diabetes mellitus     DVT (deep venous thrombosis)     Hypertension     Sleep apnea     Stroke 2010    with residual effects W/C bound - Right Occipital       Past Surgical History:   Procedure Laterality Date    CATARACT EXTRACTION W/  INTRAOCULAR LENS IMPLANT  Left 09/18/2017    Dr. Zuniga       Review of patient's allergies indicates:   Allergen Reactions    Spironolactone      Hyperkalemia         Family History     Problem Relation (Age of Onset)    Cataracts Mother    Glaucoma Sister, Sister    Heart attack Mother        Tobacco Use    Smoking status: Never Smoker    Smokeless tobacco: Never Used   Substance and Sexual Activity    Alcohol use: No    Drug use: No    Sexual activity: Not on file      Review of Systems   Unable to perform ROS: Mental status change     Objective:     Vital Signs (Most Recent):  Temp: 97.5 °F (36.4 °C) (11/20/20 2041)  Pulse: 100 (11/21/20 0007)  Resp: (!) 21 (11/21/20 0007)  BP: 119/79 (11/21/20 0007)  SpO2: 97 % (11/21/20 0007) Vital Signs (24h Range):  Temp:  [97.5 °F (36.4 °C)] 97.5 °F (36.4 °C)  Pulse:  [] 100  Resp:  [14-21] 21  SpO2:  [88 %-98 %] 97 %  BP: (116-141)/(69-87) 119/79   Weight: (!) 158.8 kg (350 lb)  Body mass index is 51.69 kg/m².    No intake or output data in the 24 hours ending 11/21/20 0101    Physical Exam  Constitutional:       General: He is not in acute distress.     Appearance: He is obese.   HENT:      Head: Normocephalic and atraumatic.   Eyes:      Extraocular Movements: Extraocular movements intact.      Conjunctiva/sclera: Conjunctivae normal.   Neck:      Musculoskeletal: Normal range of motion and neck supple.   Cardiovascular:      Rate and Rhythm: Normal rate and regular rhythm.   Pulmonary:      Effort: Pulmonary effort is normal. No respiratory distress.      Breath sounds: Normal breath sounds.   Abdominal:      Palpations: Abdomen is soft.      Tenderness: There is no abdominal tenderness.   Musculoskeletal: Normal range of motion.      Right lower leg: Edema present.      Left lower leg: Edema present.   Skin:     General: Skin is warm and dry.   Neurological:      General: No focal deficit present.      Mental Status: He is alert.      Comments: Oriented to self         Vents:      Lines/Drains/Airways     Peripheral Intravenous Line                 Peripheral IV - Single Lumen 11/20/20 2130 22 G Left Hand less than 1 day              Significant Labs:    CBC/Anemia Profile:  Recent Labs   Lab 11/20/20 2151   WBC 9.94   HGB 13.3*   HCT 43.8      MCV 99*   RDW 15.4*        Chemistries:  Recent Labs   Lab 11/20/20 2147   *   K 3.8   *   CO2 28   BUN 29*   CREATININE 2.0*   CALCIUM 9.3   ALBUMIN 2.8*   PROT 8.1   BILITOT 1.0   ALKPHOS 74   ALT 26   AST 58*   MG 2.3   PHOS 3.4       All pertinent labs within the past 24 hours have been reviewed.    Significant Imaging: I have reviewed all pertinent imaging results/findings within the past 24 hours.      ABG  Recent Labs   Lab 11/20/20 2148   PH 7.399   PO2 31*   PCO2 49.9*   HCO3 30.9*   BE 6     Assessment/Plan:     Neuro  Acute encephalopathy  Hemant Kennedy is a 70 year old male with NICM, HFrEF (EF of 35-40%), HTN, DMT2, AFib on DOAC, h/o CVA w/ L hemineglect, h/o DVT, ERIC and obesity (BMI >50) who presents with AMS. BMP remarkable for Na 157. VBG 7.39/39/31. CT head without acute intracranial process.     - Acute encephalopathy likely 2/2 hypernatremia   - Patient hemodynamically stable, 95% on room air, and protecting airway well   - Patient has a free water deficit of about 8 L   - Correct hypernatremia with D5 gtt and monitor Na with Q4H BMP   - Recommend stepdown unit   - Patient has no ICU needs at this time    Renal/  Hypernatremia  - see acute encephalopathy      Critical care was time spent personally by me on the following activities: development of treatment plan with patient or surrogate and bedside caregivers, discussions with consultants, evaluation of patient's response to treatment, examination of patient, ordering and performing treatments and interventions, ordering and review of laboratory studies, ordering and review of radiographic studies, pulse oximetry, re-evaluation of patient's condition. This  critical care time did not overlap with that of any other provider or involve time for any procedures.    Thank you for your consult. I will sign off. Please contact us if you have any additional questions.     Hemant Gann MD  Critical Care Medicine  [facility]

## 2020-11-21 NOTE — ASSESSMENT & PLAN NOTE
Hemant Kennedy is a 70 year old male with NICM, HFrEF (EF of 35-40%), HTN, DMT2, AFib on DOAC, h/o CVA w/ L hemineglect, h/o DVT, ERIC and obesity (BMI >50) who presents with AMS. BMP remarkable for Na 157. VBG 7.39/39/31. CT head without acute intracranial process.     - Acute encephalopathy likely 2/2 hypernatremia   - Patient hemodynamically stable, 95% on room air, and protecting airway well   - Patient has a free water deficit of about 8 L   - Correct hypernatremia with D5 gtt and monitor Na with Q4H BMP   - Recommend stepdown unit   - Patient has no ICU needs at this time

## 2020-11-22 PROBLEM — R09.02 HYPOXIA: Status: RESOLVED | Noted: 2020-11-21 | Resolved: 2020-11-22

## 2020-11-22 LAB
ANION GAP SERPL CALC-SCNC: 10 MMOL/L (ref 8–16)
ANION GAP SERPL CALC-SCNC: 10 MMOL/L (ref 8–16)
ANION GAP SERPL CALC-SCNC: 8 MMOL/L (ref 8–16)
ANION GAP SERPL CALC-SCNC: 8 MMOL/L (ref 8–16)
ANION GAP SERPL CALC-SCNC: 9 MMOL/L (ref 8–16)
BASOPHILS # BLD AUTO: 0.04 K/UL (ref 0–0.2)
BASOPHILS NFR BLD: 0.5 % (ref 0–1.9)
BUN SERPL-MCNC: 19 MG/DL (ref 8–23)
BUN SERPL-MCNC: 20 MG/DL (ref 8–23)
BUN SERPL-MCNC: 22 MG/DL (ref 8–23)
BUN SERPL-MCNC: 22 MG/DL (ref 8–23)
BUN SERPL-MCNC: 23 MG/DL (ref 8–23)
CALCIUM SERPL-MCNC: 8 MG/DL (ref 8.7–10.5)
CALCIUM SERPL-MCNC: 8.3 MG/DL (ref 8.7–10.5)
CALCIUM SERPL-MCNC: 8.4 MG/DL (ref 8.7–10.5)
CALCIUM SERPL-MCNC: 8.4 MG/DL (ref 8.7–10.5)
CALCIUM SERPL-MCNC: 8.6 MG/DL (ref 8.7–10.5)
CHLORIDE SERPL-SCNC: 111 MMOL/L (ref 95–110)
CHLORIDE SERPL-SCNC: 114 MMOL/L (ref 95–110)
CHLORIDE SERPL-SCNC: 114 MMOL/L (ref 95–110)
CHLORIDE SERPL-SCNC: 116 MMOL/L (ref 95–110)
CHLORIDE SERPL-SCNC: 117 MMOL/L (ref 95–110)
CO2 SERPL-SCNC: 28 MMOL/L (ref 23–29)
CO2 SERPL-SCNC: 29 MMOL/L (ref 23–29)
CO2 SERPL-SCNC: 30 MMOL/L (ref 23–29)
CO2 SERPL-SCNC: 30 MMOL/L (ref 23–29)
CO2 SERPL-SCNC: 31 MMOL/L (ref 23–29)
CREAT SERPL-MCNC: 1.6 MG/DL (ref 0.5–1.4)
CREAT SERPL-MCNC: 1.7 MG/DL (ref 0.5–1.4)
CREAT SERPL-MCNC: 1.8 MG/DL (ref 0.5–1.4)
DIFFERENTIAL METHOD: ABNORMAL
EOSINOPHIL # BLD AUTO: 0.8 K/UL (ref 0–0.5)
EOSINOPHIL NFR BLD: 9.8 % (ref 0–8)
ERYTHROCYTE [DISTWIDTH] IN BLOOD BY AUTOMATED COUNT: 15.3 % (ref 11.5–14.5)
EST. GFR  (AFRICAN AMERICAN): 42.5 ML/MIN/1.73 M^2
EST. GFR  (AFRICAN AMERICAN): 45.6 ML/MIN/1.73 M^2
EST. GFR  (AFRICAN AMERICAN): 49 ML/MIN/1.73 M^2
EST. GFR  (NON AFRICAN AMERICAN): 36.8 ML/MIN/1.73 M^2
EST. GFR  (NON AFRICAN AMERICAN): 39.4 ML/MIN/1.73 M^2
EST. GFR  (NON AFRICAN AMERICAN): 42.4 ML/MIN/1.73 M^2
GLUCOSE SERPL-MCNC: 100 MG/DL (ref 70–110)
GLUCOSE SERPL-MCNC: 158 MG/DL (ref 70–110)
GLUCOSE SERPL-MCNC: 183 MG/DL (ref 70–110)
GLUCOSE SERPL-MCNC: 75 MG/DL (ref 70–110)
GLUCOSE SERPL-MCNC: 83 MG/DL (ref 70–110)
HCT VFR BLD AUTO: 37.5 % (ref 40–54)
HGB BLD-MCNC: 11.2 G/DL (ref 14–18)
IMM GRANULOCYTES # BLD AUTO: 0.01 K/UL (ref 0–0.04)
IMM GRANULOCYTES NFR BLD AUTO: 0.1 % (ref 0–0.5)
LYMPHOCYTES # BLD AUTO: 1.9 K/UL (ref 1–4.8)
LYMPHOCYTES NFR BLD: 24.1 % (ref 18–48)
MCH RBC QN AUTO: 28.8 PG (ref 27–31)
MCHC RBC AUTO-ENTMCNC: 29.9 G/DL (ref 32–36)
MCV RBC AUTO: 96 FL (ref 82–98)
MONOCYTES # BLD AUTO: 0.9 K/UL (ref 0.3–1)
MONOCYTES NFR BLD: 11.7 % (ref 4–15)
NEUTROPHILS # BLD AUTO: 4.3 K/UL (ref 1.8–7.7)
NEUTROPHILS NFR BLD: 53.8 % (ref 38–73)
NRBC BLD-RTO: 0 /100 WBC
PLATELET # BLD AUTO: 158 K/UL (ref 150–350)
PMV BLD AUTO: 11.9 FL (ref 9.2–12.9)
POCT GLUCOSE: 181 MG/DL (ref 70–110)
POCT GLUCOSE: 198 MG/DL (ref 70–110)
POCT GLUCOSE: 209 MG/DL (ref 70–110)
POCT GLUCOSE: 74 MG/DL (ref 70–110)
POCT GLUCOSE: 77 MG/DL (ref 70–110)
POCT GLUCOSE: 79 MG/DL (ref 70–110)
POTASSIUM SERPL-SCNC: 3.4 MMOL/L (ref 3.5–5.1)
POTASSIUM SERPL-SCNC: 3.5 MMOL/L (ref 3.5–5.1)
POTASSIUM SERPL-SCNC: 3.7 MMOL/L (ref 3.5–5.1)
RBC # BLD AUTO: 3.89 M/UL (ref 4.6–6.2)
SODIUM SERPL-SCNC: 149 MMOL/L (ref 136–145)
SODIUM SERPL-SCNC: 151 MMOL/L (ref 136–145)
SODIUM SERPL-SCNC: 153 MMOL/L (ref 136–145)
SODIUM SERPL-SCNC: 155 MMOL/L (ref 136–145)
SODIUM SERPL-SCNC: 157 MMOL/L (ref 136–145)
WBC # BLD AUTO: 7.92 K/UL (ref 3.9–12.7)

## 2020-11-22 PROCEDURE — 80048 BASIC METABOLIC PNL TOTAL CA: CPT | Mod: 91

## 2020-11-22 PROCEDURE — 36415 COLL VENOUS BLD VENIPUNCTURE: CPT

## 2020-11-22 PROCEDURE — 99233 SBSQ HOSP IP/OBS HIGH 50: CPT | Mod: GC,,, | Performed by: INTERNAL MEDICINE

## 2020-11-22 PROCEDURE — 63600175 PHARM REV CODE 636 W HCPCS: Performed by: STUDENT IN AN ORGANIZED HEALTH CARE EDUCATION/TRAINING PROGRAM

## 2020-11-22 PROCEDURE — 11000001 HC ACUTE MED/SURG PRIVATE ROOM

## 2020-11-22 PROCEDURE — 25000003 PHARM REV CODE 250: Performed by: STUDENT IN AN ORGANIZED HEALTH CARE EDUCATION/TRAINING PROGRAM

## 2020-11-22 PROCEDURE — 99233 PR SUBSEQUENT HOSPITAL CARE,LEVL III: ICD-10-PCS | Mod: GC,,, | Performed by: INTERNAL MEDICINE

## 2020-11-22 PROCEDURE — 99900035 HC TECH TIME PER 15 MIN (STAT)

## 2020-11-22 PROCEDURE — 85025 COMPLETE CBC W/AUTO DIFF WBC: CPT

## 2020-11-22 RX ADMIN — DEXTROSE: 5 SOLUTION INTRAVENOUS at 04:11

## 2020-11-22 RX ADMIN — DEXTROSE: 5 SOLUTION INTRAVENOUS at 11:11

## 2020-11-22 RX ADMIN — DEXTROSE: 5 SOLUTION INTRAVENOUS at 10:11

## 2020-11-22 RX ADMIN — DEXTROSE: 5 SOLUTION INTRAVENOUS at 06:11

## 2020-11-22 RX ADMIN — INSULIN ASPART 2 UNITS: 100 INJECTION, SOLUTION INTRAVENOUS; SUBCUTANEOUS at 04:11

## 2020-11-22 RX ADMIN — MUPIROCIN: 20 OINTMENT TOPICAL at 11:11

## 2020-11-22 NOTE — ASSESSMENT & PLAN NOTE
Hemant Kennedy is a 70 year old male with NICM, HFrEF (EF of 35-40%), HTN, T2DM, AFib on DOAC and amiodarone, and obesity (BMI >50) who presents with AMS for skilled nursing facility. BMP remarkable for Na 157. VBG 7.39/39/31.   Imaging: CT head without acute intracranial process.   MICU consulted who recommended appropriate placement on floor for sodium correction   Suspect acute encephalopathy is 2/2 to hypernatremia     Plan  - Correct hypernatremia with D5 gtt with strict sodium monitoring

## 2020-11-22 NOTE — PLAN OF CARE
11/22/20 1504   Post-Acute Status   Post-Acute Authorization Placement   Post-Acute Placement Status Referrals Sent       SW spoke with pt daughter who requested change in pt SNF. SW sent referrals via . Awaiting facility decision.     Daisy Hanley LMSW  Case Management Social Worker   [facility] [address]

## 2020-11-22 NOTE — SUBJECTIVE & OBJECTIVE
Interval History: No acute events overnight. Pt continues to be alert but disoriented.     Review of Systems   Unable to perform ROS: Mental status change     Objective:     Vital Signs (Most Recent):  Temp: 99.4 °F (37.4 °C) (11/22/20 1200)  Pulse: 87 (11/22/20 1200)  Resp: (!) 24 (11/22/20 1200)  BP: (!) 90/56 (11/22/20 1200)  SpO2: 97 % (11/22/20 1140) Vital Signs (24h Range):  Temp:  [98 °F (36.7 °C)-99.4 °F (37.4 °C)] 99.4 °F (37.4 °C)  Pulse:  [72-87] 87  Resp:  [13-31] 24  SpO2:  [90 %-99 %] 97 %  BP: ()/(50-60) 90/56     Weight: (!) 158.8 kg (350 lb)  Body mass index is 51.69 kg/m².    Intake/Output Summary (Last 24 hours) at 11/22/2020 1415  Last data filed at 11/22/2020 0619  Gross per 24 hour   Intake 3520.83 ml   Output --   Net 3520.83 ml      Physical Exam  Constitutional:       General: He is not in acute distress.     Appearance: He is obese. He is not ill-appearing, toxic-appearing or diaphoretic.   HENT:      Head: Normocephalic and atraumatic.      Nose: Nose normal.      Mouth/Throat:      Mouth: Mucous membranes are dry.   Eyes:      Extraocular Movements: Extraocular movements intact.   Neck:      Musculoskeletal: Normal range of motion.   Cardiovascular:      Rate and Rhythm: Normal rate and regular rhythm.      Pulses: Normal pulses.      Heart sounds: No murmur.   Pulmonary:      Effort: Pulmonary effort is normal. No respiratory distress.      Breath sounds: Normal breath sounds. No wheezing or rales.   Abdominal:      General: Abdomen is flat. There is no distension.      Palpations: Abdomen is soft.      Tenderness: There is no abdominal tenderness.   Musculoskeletal: Normal range of motion.         General: No swelling or tenderness.   Skin:     General: Skin is warm and dry.   Neurological:      Mental Status: He is alert. He is disoriented.      Comments: Awake and maintains eye contact. Unable to tell me his name, year or location.   Psychiatric:      Comments: Poverty of ideas          Significant Labs:   CBC:   Recent Labs   Lab 11/20/20  2151 11/21/20  0650 11/22/20  0354   WBC 9.94 9.98 7.92   HGB 13.3* 12.1* 11.2*   HCT 43.8 41.7 37.5*    171 158     CMP:   Recent Labs   Lab 11/20/20  2147  11/22/20  0354 11/22/20  0834 11/22/20  1218   *   < > 157* 155* 151*   K 3.8   < > 3.4* 3.5 3.4*   *   < > 117* 116* 114*   CO2 28   < > 30* 30* 29      < > 83 75 100   BUN 29*   < > 22 22 20   CREATININE 2.0*   < > 1.7* 1.7* 1.7*   CALCIUM 9.3   < > 8.4* 8.4* 8.3*   PROT 8.1  --   --   --   --    ALBUMIN 2.8*  --   --   --   --    BILITOT 1.0  --   --   --   --    ALKPHOS 74  --   --   --   --    AST 58*  --   --   --   --    ALT 26  --   --   --   --    ANIONGAP 12   < > 10 9 8   EGFRNONAA 32.4*   < > 39.4* 39.4* 39.4*    < > = values in this interval not displayed.     All pertinent labs within the past 24 hours have been reviewed.    Significant Imaging: I have reviewed all pertinent imaging results/findings within the past 24 hours.

## 2020-11-22 NOTE — HOSPITAL COURSE
Patient admitted to medicine for AMS with Na 157. Patient treated with D5W infusion, with adequate adjustment until coming down to 142. AMS with mild improvement, patient only oriented to self and still combative and difficult to redirect. Patient refusing PO medications since 11/22, transitioned to IV enoxaparin, CHADS2 Score of 5. Left upper extremity swelling noted in fingers up to arm, with skin pealing after removing IV. US ordered to r/o DVT and negative. Swelling improved after placing ice and removing IV. Bullae noted on left arm, wound care on board. Mental status improving, patient more coopertive with care. Pending placement to SNF, as daughter not pleased with [facility]. According to daughter patient has not yet reached his baseline mental status, will get collateral information from nursing of how patient was prior to UTI where he also was experiencing mental status changes. Daughter concerned that her father has not returned to his previous baseline since his previous hospitalization early November. MRI completed, no new acute findings, remote infarcts and age related atrophy and encephalomalacia secondary to infarcts. Pending to SNF, not accepted at [facility], requests sent to two other facilities. SW and daughter on board. Patient having delirious episodes throughout the day. Had discussion with daughter and she hopes for father to go to SNF, patient accepted at [facility]. Will submit authorizations. Patient does better when family with him. Patient discharged to [facility] SNF.

## 2020-11-22 NOTE — PROGRESS NOTES
[facility] - ICU 14 [facility] Medicine  Progress Note    Patient Name: Hemant Kennedy Jr.  MRN: [id number]  Patient Class: IP- Inpatient   Admission Date: 11/20/2020  Length of Stay: 1 days  Attending Physician: Salena Caban MD  Primary Care Provider: Wicho Qiu MD        Subjective:     Principal Problem:Acute encephalopathy        HPI:  Hemant Kennedy is a 70 year old male with NICM, HFrEF (EF of 35-40%), HTN, T2DM, AFib on DOAC and amiodarone, h/o CVA w/ L hemineglect, h/o DVT, ERIC and obesity (BMI >50) who presents from [facility] for altered mental status.  Patient was brought in by EMS from [facility], where he has been living for the past 2 weeks, after being treated at [facility] 11[facility] for rhabdomyolysis, acute metabolic encephalopathy 2/2 urosepsis with proteus miribalis, and panniculitis. EMS reports patient was refusing his nightly BIPAP at the nursing home.     History was limited due to patient's altered mental state. I contacted the nursing home to obtain collateral information but nursing staff wasn't available at the time.     In the ED, patient was afebrile, hemodynamically stable, and 95% on room air. BMP remarkable for Na 157. VBG 7.39/39/31. CT head without acute intracranial process.        Overview/Hospital Course:  Pt treated with D5W infusion, rate adjusted upwards until response seen in sodium.     Interval History: No acute events overnight. Pt continues to be alert but disoriented.     Review of Systems   Unable to perform ROS: Mental status change     Objective:     Vital Signs (Most Recent):  Temp: 99.4 °F (37.4 °C) (11/22/20 1200)  Pulse: 87 (11/22/20 1200)  Resp: (!) 24 (11/22/20 1200)  BP: (!) 90/56 (11/22/20 1200)  SpO2: 97 % (11/22/20 1140) Vital Signs (24h Range):  Temp:  [98 °F (36.7 °C)-99.4 °F (37.4 °C)] 99.4 °F (37.4 °C)  Pulse:  [72-87] 87  Resp:  [13-31] 24  SpO2:  [90 %-99 %] 97 %  BP:  ()/(50-60) 90/56     Weight: (!) 158.8 kg (350 lb)  Body mass index is 51.69 kg/m².    Intake/Output Summary (Last 24 hours) at 11/22/2020 1415  Last data filed at 11/22/2020 0619  Gross per 24 hour   Intake 3520.83 ml   Output --   Net 3520.83 ml      Physical Exam  Constitutional:       General: He is not in acute distress.     Appearance: He is obese. He is not ill-appearing, toxic-appearing or diaphoretic.   HENT:      Head: Normocephalic and atraumatic.      Nose: Nose normal.      Mouth/Throat:      Mouth: Mucous membranes are dry.   Eyes:      Extraocular Movements: Extraocular movements intact.   Neck:      Musculoskeletal: Normal range of motion.   Cardiovascular:      Rate and Rhythm: Normal rate and regular rhythm.      Pulses: Normal pulses.      Heart sounds: No murmur.   Pulmonary:      Effort: Pulmonary effort is normal. No respiratory distress.      Breath sounds: Normal breath sounds. No wheezing or rales.   Abdominal:      General: Abdomen is flat. There is no distension.      Palpations: Abdomen is soft.      Tenderness: There is no abdominal tenderness.   Musculoskeletal: Normal range of motion.         General: No swelling or tenderness.   Skin:     General: Skin is warm and dry.   Neurological:      Mental Status: He is alert. He is disoriented.      Comments: Awake and maintains eye contact. Unable to tell me his name, year or location.   Psychiatric:      Comments: Poverty of ideas         Significant Labs:   CBC:   Recent Labs   Lab 11/20/20  2151 11/21/20  0650 11/22/20  0354   WBC 9.94 9.98 7.92   HGB 13.3* 12.1* 11.2*   HCT 43.8 41.7 37.5*    171 158     CMP:   Recent Labs   Lab 11/20/20  2147  11/22/20  0354 11/22/20  0834 11/22/20  1218   *   < > 157* 155* 151*   K 3.8   < > 3.4* 3.5 3.4*   *   < > 117* 116* 114*   CO2 28   < > 30* 30* 29      < > 83 75 100   BUN 29*   < > 22 22 20   CREATININE 2.0*   < > 1.7* 1.7* 1.7*   CALCIUM 9.3   < > 8.4* 8.4* 8.3*    PROT 8.1  --   --   --   --    ALBUMIN 2.8*  --   --   --   --    BILITOT 1.0  --   --   --   --    ALKPHOS 74  --   --   --   --    AST 58*  --   --   --   --    ALT 26  --   --   --   --    ANIONGAP 12   < > 10 9 8   EGFRNONAA 32.4*   < > 39.4* 39.4* 39.4*    < > = values in this interval not displayed.     All pertinent labs within the past 24 hours have been reviewed.    Significant Imaging: I have reviewed all pertinent imaging results/findings within the past 24 hours.      Assessment/Plan:      * Acute encephalopathy  Hemant Kennedy is a 70 year old male with NICM, HFrEF (EF of 35-40%), HTN, T2DM, AFib on DOAC and amiodarone, and obesity (BMI >50) who presents with AMS for skilled nursing facility. BMP remarkable for Na 157. VBG 7.39/39/31.   Imaging: CT head without acute intracranial process.   MICU consulted who recommended appropriate placement on floor for sodium correction   Suspect acute encephalopathy is 2/2 to hypernatremia     Plan  - Correct hypernatremia with D5 gtt with strict sodium monitoring         Panniculitis  Noted in lower abdominal/ groin area. No erythema or purulence noted.    -Wound care consulted for skin breakdown    Hypernatremia  Patient with Na of 157 on admission; serum osm 339. Free water deficit 6.6L   Suspect possibly due to dehydration due to poor intake vs GI losses, although unable to obtain collateral information from skilled nursing home as nurse was not available at the time of my call.   EF noted at 35%.    Plan  - Continue D5W gtt @200cc/hr and adjust rate based on sodium levels  - BMP q6hrs   - Correction Na goal 8-10mEq per 24 hours to prevent cerebral edema      HLD (hyperlipidemia)  - Continue home statin       Nonischemic cardiomyopathy  The estimated ejection fraction is 35-40%.  Continue with GDMT: Losartan 12.5mg, coreg 6.25mg BID    Obstructive sleep apnea treated with continuous positive airway pressure (CPAP)  CPAP qHS      Type II diabetes mellitus  Well  controlled. Hgb A1c 5.6. Patient doesn't appear to be on diabetic medication.       CKD (chronic kidney disease) stage 3, GFR 30-59 ml/min  Renal function at baseline Cr of 2.0 (within baseline 1.8-2.0)    Avoid nephrotoxins, renally dose meds      Atrial fibrillation, controlled  On home amiodarone 200mg, eliquis 5mg.         VTE Risk Mitigation (From admission, onward)         Ordered     apixaban tablet 5 mg  2 times daily      11/21/20 0258                Discharge Planning   JOHN: 11/25/2020     Code Status: Prior   Is the patient medically ready for discharge?: No    Reason for patient still in hospital (select all that apply): Patient trending condition and Treatment                     Erickson Mcnulty DO  Department of Hospital Medicine   [facility]

## 2020-11-22 NOTE — PLAN OF CARE
Pt free from injury and in no acute distress. Denies pain. Family at beside. Safety measures WNL. Will continue to monitor.

## 2020-11-22 NOTE — PLAN OF CARE
PCP: Wicho Qiu MD    Payor: Medicare ([id number])  [employer] ([id number])    Pharmacy: [facility] [address] [facility], [place]  [phone]    SW visisted pt at  to complete assessment. Alert and oriented. Stated that they live in home with daughter. No hx of dialysis or coumadin. Stated use of CPAP and w/c prior to admit. DME listed in assessment. Will need ride at d/c. Pt to d/c to SNF and daughter has requested change in facility. Referrals sent via .    No further needs at time of assessment.       11/22/20 1505   Discharge Assessment   Assessment Type Discharge Planning Assessment   Confirmed/corrected address and phone number on facesheet? Yes   Assessment information obtained from? Caregiver   Expected Length of Stay (days) 3   Communicated expected length of stay with patient/caregiver yes   Prior to hospitilization cognitive status: Alert/Oriented   Prior to hospitalization functional status: Assistive Equipment   Current cognitive status: Alert/Oriented   Current Functional Status: Assistive Equipment   Facility Arrived From: [facility] With alone   Able to Return to Prior Arrangements other (see comments)  (requesting change in placement)   Is patient able to care for self after discharge? Unable to determine at this time (comments)   Who are your caregiver(s) and their phone number(s)? Daughter; Keyla Giordano; [phone]   Patient's perception of discharge disposition home or selfcare   Readmission Within the Last 30 Days unable to assess   Patient currently being followed by outpatient case management? Unable to determine (comments)   Patient currently receives any other outside agency services? No   Equipment Currently Used at Home CPAP;wheelchair   Do you have any problems affording any of your prescribed medications? No   Is the patient taking medications as prescribed? yes   Does the patient have transportation home? Yes    Transportation Anticipated family or friend will provide   Dialysis Name and Scheduled days n/a   Does the patient receive services at the Coumadin Clinic? No   Discharge Plan A Skilled Nursing Facility   Discharge Plan B Home Health   DME Needed Upon Discharge    (not yet determined)   Patient/Family in Agreement with Plan yes       Daisy Hanley LMSW  Case Management Social Worker   [facility] [address]

## 2020-11-22 NOTE — ASSESSMENT & PLAN NOTE
Patient with Na of 157 on admission; serum osm 339. Free water deficit 6.6L   Suspect possibly due to dehydration due to poor intake vs GI losses, although unable to obtain collateral information from skilled nursing home as nurse was not available at the time of my call.   EF noted at 35%.    Plan  - Continue D5W gtt @200cc/hr and adjust rate based on sodium levels  - BMP q6hrs   - Correction Na goal 8-10mEq per 24 hours to prevent cerebral edema

## 2020-11-22 NOTE — PLAN OF CARE
Pt alert and confused; oriented to self only. VSS. Refuses medications. Only agreeable to few sips of water. Fights against being turned and repositioned. Blood sugar 133 and 74. IVF continues at 150ml/hr now. Safety maintained. Will continue to monitor.

## 2020-11-23 PROBLEM — R60.0 EDEMA OF LEFT UPPER EXTREMITY: Status: ACTIVE | Noted: 2020-11-23

## 2020-11-23 LAB
ANION GAP SERPL CALC-SCNC: 6 MMOL/L (ref 8–16)
ANION GAP SERPL CALC-SCNC: 7 MMOL/L (ref 8–16)
ANION GAP SERPL CALC-SCNC: 9 MMOL/L (ref 8–16)
BASOPHILS # BLD AUTO: 0.03 K/UL (ref 0–0.2)
BASOPHILS NFR BLD: 0.4 % (ref 0–1.9)
BUN SERPL-MCNC: 16 MG/DL (ref 8–23)
BUN SERPL-MCNC: 17 MG/DL (ref 8–23)
BUN SERPL-MCNC: 17 MG/DL (ref 8–23)
CALCIUM SERPL-MCNC: 7.9 MG/DL (ref 8.7–10.5)
CALCIUM SERPL-MCNC: 7.9 MG/DL (ref 8.7–10.5)
CALCIUM SERPL-MCNC: 8.1 MG/DL (ref 8.7–10.5)
CHLORIDE SERPL-SCNC: 108 MMOL/L (ref 95–110)
CHLORIDE SERPL-SCNC: 108 MMOL/L (ref 95–110)
CHLORIDE SERPL-SCNC: 109 MMOL/L (ref 95–110)
CO2 SERPL-SCNC: 26 MMOL/L (ref 23–29)
CO2 SERPL-SCNC: 27 MMOL/L (ref 23–29)
CO2 SERPL-SCNC: 30 MMOL/L (ref 23–29)
CREAT SERPL-MCNC: 1.5 MG/DL (ref 0.5–1.4)
CREAT SERPL-MCNC: 1.6 MG/DL (ref 0.5–1.4)
CREAT SERPL-MCNC: 1.6 MG/DL (ref 0.5–1.4)
DIFFERENTIAL METHOD: ABNORMAL
EOSINOPHIL # BLD AUTO: 0.6 K/UL (ref 0–0.5)
EOSINOPHIL NFR BLD: 8.3 % (ref 0–8)
ERYTHROCYTE [DISTWIDTH] IN BLOOD BY AUTOMATED COUNT: 14.8 % (ref 11.5–14.5)
EST. GFR  (AFRICAN AMERICAN): 49 ML/MIN/1.73 M^2
EST. GFR  (AFRICAN AMERICAN): 49 ML/MIN/1.73 M^2
EST. GFR  (AFRICAN AMERICAN): 53 ML/MIN/1.73 M^2
EST. GFR  (NON AFRICAN AMERICAN): 42.4 ML/MIN/1.73 M^2
EST. GFR  (NON AFRICAN AMERICAN): 42.4 ML/MIN/1.73 M^2
EST. GFR  (NON AFRICAN AMERICAN): 45.9 ML/MIN/1.73 M^2
GLUCOSE SERPL-MCNC: 119 MG/DL (ref 70–110)
GLUCOSE SERPL-MCNC: 149 MG/DL (ref 70–110)
GLUCOSE SERPL-MCNC: 56 MG/DL (ref 70–110)
HCT VFR BLD AUTO: 37 % (ref 40–54)
HGB BLD-MCNC: 11.3 G/DL (ref 14–18)
IMM GRANULOCYTES # BLD AUTO: 0.01 K/UL (ref 0–0.04)
IMM GRANULOCYTES NFR BLD AUTO: 0.1 % (ref 0–0.5)
LYMPHOCYTES # BLD AUTO: 2 K/UL (ref 1–4.8)
LYMPHOCYTES NFR BLD: 28 % (ref 18–48)
MCH RBC QN AUTO: 29.7 PG (ref 27–31)
MCHC RBC AUTO-ENTMCNC: 30.5 G/DL (ref 32–36)
MCV RBC AUTO: 97 FL (ref 82–98)
MONOCYTES # BLD AUTO: 0.7 K/UL (ref 0.3–1)
MONOCYTES NFR BLD: 9.8 % (ref 4–15)
NEUTROPHILS # BLD AUTO: 3.8 K/UL (ref 1.8–7.7)
NEUTROPHILS NFR BLD: 53.4 % (ref 38–73)
NRBC BLD-RTO: 0 /100 WBC
PLATELET # BLD AUTO: 143 K/UL (ref 150–350)
PMV BLD AUTO: 11.8 FL (ref 9.2–12.9)
POCT GLUCOSE: 128 MG/DL (ref 70–110)
POCT GLUCOSE: 143 MG/DL (ref 70–110)
POCT GLUCOSE: 70 MG/DL (ref 70–110)
POCT GLUCOSE: 74 MG/DL (ref 70–110)
POTASSIUM SERPL-SCNC: 3.1 MMOL/L (ref 3.5–5.1)
POTASSIUM SERPL-SCNC: 3.2 MMOL/L (ref 3.5–5.1)
POTASSIUM SERPL-SCNC: 3.4 MMOL/L (ref 3.5–5.1)
RBC # BLD AUTO: 3.81 M/UL (ref 4.6–6.2)
SODIUM SERPL-SCNC: 142 MMOL/L (ref 136–145)
SODIUM SERPL-SCNC: 143 MMOL/L (ref 136–145)
SODIUM SERPL-SCNC: 145 MMOL/L (ref 136–145)
WBC # BLD AUTO: 7.11 K/UL (ref 3.9–12.7)

## 2020-11-23 PROCEDURE — 86580 TB INTRADERMAL TEST: CPT | Performed by: INTERNAL MEDICINE

## 2020-11-23 PROCEDURE — 25000003 PHARM REV CODE 250: Performed by: STUDENT IN AN ORGANIZED HEALTH CARE EDUCATION/TRAINING PROGRAM

## 2020-11-23 PROCEDURE — 80048 BASIC METABOLIC PNL TOTAL CA: CPT

## 2020-11-23 PROCEDURE — 36415 COLL VENOUS BLD VENIPUNCTURE: CPT

## 2020-11-23 PROCEDURE — 99233 PR SUBSEQUENT HOSPITAL CARE,LEVL III: ICD-10-PCS | Mod: GC,,, | Performed by: INTERNAL MEDICINE

## 2020-11-23 PROCEDURE — 85025 COMPLETE CBC W/AUTO DIFF WBC: CPT

## 2020-11-23 PROCEDURE — 63600175 PHARM REV CODE 636 W HCPCS: Performed by: STUDENT IN AN ORGANIZED HEALTH CARE EDUCATION/TRAINING PROGRAM

## 2020-11-23 PROCEDURE — 80048 BASIC METABOLIC PNL TOTAL CA: CPT | Mod: 91

## 2020-11-23 PROCEDURE — 11000001 HC ACUTE MED/SURG PRIVATE ROOM

## 2020-11-23 PROCEDURE — 30200315 PPD INTRADERMAL TEST REV CODE 302: Performed by: INTERNAL MEDICINE

## 2020-11-23 PROCEDURE — 63600175 PHARM REV CODE 636 W HCPCS: Performed by: INTERNAL MEDICINE

## 2020-11-23 PROCEDURE — 99233 SBSQ HOSP IP/OBS HIGH 50: CPT | Mod: GC,,, | Performed by: INTERNAL MEDICINE

## 2020-11-23 RX ORDER — POTASSIUM CHLORIDE 20 MEQ/1
40 TABLET, EXTENDED RELEASE ORAL
Status: DISCONTINUED | OUTPATIENT
Start: 2020-11-23 | End: 2020-11-23

## 2020-11-23 RX ORDER — ENOXAPARIN SODIUM 150 MG/ML
150 INJECTION SUBCUTANEOUS 2 TIMES DAILY
Status: DISCONTINUED | OUTPATIENT
Start: 2020-11-23 | End: 2020-11-24

## 2020-11-23 RX ORDER — POTASSIUM CHLORIDE 7.45 MG/ML
10 INJECTION INTRAVENOUS
Status: DISPENSED | OUTPATIENT
Start: 2020-11-23 | End: 2020-11-23

## 2020-11-23 RX ORDER — ENOXAPARIN SODIUM 100 MG/ML
40 INJECTION SUBCUTANEOUS EVERY 24 HOURS
Status: DISCONTINUED | OUTPATIENT
Start: 2020-11-23 | End: 2020-11-23

## 2020-11-23 RX ORDER — POTASSIUM CHLORIDE 7.45 MG/ML
10 INJECTION INTRAVENOUS
Status: DISCONTINUED | OUTPATIENT
Start: 2020-11-24 | End: 2020-11-24

## 2020-11-23 RX ORDER — POTASSIUM CHLORIDE 7.45 MG/ML
10 INJECTION INTRAVENOUS
Status: DISCONTINUED | OUTPATIENT
Start: 2020-11-23 | End: 2020-11-23 | Stop reason: ALTCHOICE

## 2020-11-23 RX ORDER — POTASSIUM CHLORIDE 20 MEQ/1
40 TABLET, EXTENDED RELEASE ORAL
Status: DISCONTINUED | OUTPATIENT
Start: 2020-11-24 | End: 2020-11-23

## 2020-11-23 RX ORDER — POTASSIUM CHLORIDE 7.45 MG/ML
80 INJECTION INTRAVENOUS
Status: DISCONTINUED | OUTPATIENT
Start: 2020-11-23 | End: 2020-11-23

## 2020-11-23 RX ADMIN — POTASSIUM CHLORIDE 10 MEQ: 7.46 INJECTION, SOLUTION INTRAVENOUS at 06:11

## 2020-11-23 RX ADMIN — POTASSIUM CHLORIDE 10 MEQ: 7.46 INJECTION, SOLUTION INTRAVENOUS at 05:11

## 2020-11-23 RX ADMIN — TUBERCULIN PURIFIED PROTEIN DERIVATIVE 5 UNITS: 5 INJECTION, SOLUTION INTRADERMAL at 05:11

## 2020-11-23 RX ADMIN — POTASSIUM CHLORIDE 10 MEQ: 7.46 INJECTION, SOLUTION INTRAVENOUS at 11:11

## 2020-11-23 RX ADMIN — MUPIROCIN: 20 OINTMENT TOPICAL at 10:11

## 2020-11-23 RX ADMIN — ATORVASTATIN CALCIUM 40 MG: 20 TABLET, FILM COATED ORAL at 08:11

## 2020-11-23 RX ADMIN — MUPIROCIN: 20 OINTMENT TOPICAL at 08:11

## 2020-11-23 RX ADMIN — GABAPENTIN 300 MG: 300 CAPSULE ORAL at 08:11

## 2020-11-23 RX ADMIN — POTASSIUM CHLORIDE 80 MEQ: 7.46 INJECTION, SOLUTION INTRAVENOUS at 10:11

## 2020-11-23 RX ADMIN — APIXABAN 5 MG: 5 TABLET, FILM COATED ORAL at 09:11

## 2020-11-23 RX ADMIN — DEXTROSE: 5 SOLUTION INTRAVENOUS at 03:11

## 2020-11-23 RX ADMIN — POTASSIUM CHLORIDE 10 MEQ: 7.46 INJECTION, SOLUTION INTRAVENOUS at 04:11

## 2020-11-23 RX ADMIN — POTASSIUM CHLORIDE 10 MEQ: 7.46 INJECTION, SOLUTION INTRAVENOUS at 07:11

## 2020-11-23 RX ADMIN — MICONAZOLE NITRATE: 20 OINTMENT TOPICAL at 08:11

## 2020-11-23 RX ADMIN — ENOXAPARIN SODIUM 150 MG: 150 INJECTION SUBCUTANEOUS at 08:11

## 2020-11-23 NOTE — ASSESSMENT & PLAN NOTE
Patient with HX of Afib with CHADs score of 5 on amiodarone 200mg, eliquis 5mg at home. Patient is currently refusing PO medications, HD stable.     Plan:   -- Start enoxaparin 150mg IV BID   -- Will hold amiodarone since HD stable and continue to monitor.

## 2020-11-23 NOTE — ASSESSMENT & PLAN NOTE
Since arrival patient has been refusing PO medications, for which started enoxaparin IV on 11/23/20. Upper extremity with 2+ pitting edema from fingers up to proxima fore arm. Patient denies pain, CP, SOB.     Plan:   -- Left Upper extremity US ordered to r/o DVT   -- Removed IV from arm   -- Wound care notified for skin pealing  -- Will continue to monitor

## 2020-11-23 NOTE — PLAN OF CARE
SW received call from pt's daughter Keyla who states that she is not pleased with [facility], where pt came from. Keyla's preference is [facility] or [facility]. Referrals sent via [employer]. Will follow.    11:51 AM  142 and PASRR in [employer].     3:50 PM  SW updated daughter Keyla on SNF status. [facility] denied. [facility] under review. [facility] accepted. Daughter wants to hear from [facility] before accepting [facility].     Hui Richmond LMSW  [facility]

## 2020-11-23 NOTE — PROGRESS NOTES
[facility] Medicine  Progress Note    Patient Name: Hemant Kennedy Jr.  MRN: [id number]  Patient Class: IP- Inpatient   Admission Date: 11/20/2020  Length of Stay: 2 days  Attending Physician: Salena Caban MD  Primary Care Provider: Wicho Qiu MD        Subjective:     Principal Problem:Acute encephalopathy        HPI:  Hemant Kennedy is a 70 year old male with NICM, HFrEF (EF of 35-40%), HTN, T2DM, AFib on DOAC and amiodarone, h/o CVA w/ L hemineglect, h/o DVT, ERIC and obesity (BMI >50) who presents from [facility] for altered mental status.  Patient was brought in by EMS from [facility], where he has been living for the past 2 weeks, after being treated at [facility] 11[facility] for rhabdomyolysis, acute metabolic encephalopathy 2/2 urosepsis with proteus miribalis, and panniculitis. EMS reports patient was refusing his nightly BIPAP at the nursing home.     History was limited due to patient's altered mental state. I contacted the nursing home to obtain collateral information but nursing staff wasn't available at the time.     In the ED, patient was afebrile, hemodynamically stable, and 95% on room air. BMP remarkable for Na 157. VBG 7.39/39/31. CT head without acute intracranial process.        Overview/Hospital Course:  Patient admitted to medicine for AMS with Na 157. Patient treated with D5W infusion, with adequate adjustment until coming down to 142. AMS with mild improvement, patient only oriented to self and still combative and difficult to redirect. Patient refusing PO medications since 11/22, transitioned to IV enoxaparin, CHADS2 Score of 5. Left upper extremity swelling noted in fingers up to arm, with skin pealing after removing IV. US ordered to r/o DVT, patient denies pain, CP, SOB.    Interval History:  NAEO. Patient oriented x1 with mild combativeness to treatment. Started enoxaparin IV for Ahx  AFIB Chads2 score 5. LUE US ordered to r/o DVT for upper extremity swelling.     Review of Systems   Unable to perform ROS: Mental status change        Objective:     Vital Signs (Most Recent):  Temp: 99.5 °F (37.5 °C) (11/23/20 0800)  Pulse: 82 (11/23/20 1110)  Resp: 18 (11/23/20 1110)  BP: 108/68 (11/23/20 1110)  SpO2: 99 % (11/23/20 1110) Vital Signs (24h Range):  Temp:  [97.6 °F (36.4 °C)-99.8 °F (37.7 °C)] 99.5 °F (37.5 °C)  Pulse:  [66-85] 82  Resp:  [14-25] 18  SpO2:  [95 %-100 %] 99 %  BP: ()/(55-90) 108/68     Weight: (!) 158.8 kg (350 lb)  Body mass index is 51.69 kg/m².    Intake/Output Summary (Last 24 hours) at 11/23/2020 1322  Last data filed at 11/23/2020 0600  Gross per 24 hour   Intake 5114.17 ml   Output --   Net 5114.17 ml      Physical Exam  Constitutional:       Appearance: He is obese.   HENT:      Head: Normocephalic and atraumatic.      Nose: Nose normal.      Mouth/Throat:      Mouth: Mucous membranes are moist.   Eyes:      Extraocular Movements: Extraocular movements intact.      Pupils: Pupils are equal, round, and reactive to light.   Neck:      Musculoskeletal: Normal range of motion.   Cardiovascular:      Rate and Rhythm: Normal rate and regular rhythm.   Pulmonary:      Breath sounds: Normal breath sounds.   Abdominal:      General: Bowel sounds are normal. There is no distension.      Palpations: Abdomen is soft.      Tenderness: There is no abdominal tenderness.   Musculoskeletal:         General: No swelling.      Comments: Left upper extremity swelling from fingers up to proximal fore arm. Unable to remove ring.   Skin pealing on IV site after removing band aid.    Skin:     General: Skin is dry.      Capillary Refill: Capillary refill takes less than 2 seconds.   Neurological:      Comments: Oriented to self only.   Combative and unable to follow commands.          Significant Labs:   CBC:   Recent Labs   Lab 11/22/20  0354 11/23/20  0612   WBC 7.92 7.11   HGB 11.2* 11.3*    HCT 37.5* 37.0*    143*     CMP:   Recent Labs   Lab 11/22/20 1952 11/23/20  0049 11/23/20  0612   * 145 142   K 3.4* 3.4* 3.1*   * 109 108   CO2 28 30* 27   * 149* 119*   BUN 19 17 17   CREATININE 1.6* 1.6* 1.6*   CALCIUM 8.0* 8.1* 7.9*   ANIONGAP 10 6* 7*   EGFRNONAA 42.4* 42.4* 42.4*       Significant Imaging: I have reviewed and interpreted all pertinent imaging results/findings within the past 24 hours.      Assessment/Plan:      * Acute encephalopathy  70 year old male with NICM, HFrEF (EF of 35-40%), HTN, T2DM, AFib on DOAC and amiodarone, and obesity (BMI >50) who presents with AMS from skilled nursing facility. BMP remarkable for Na 157. VBG 7.39/39/31.   Imaging: CT head without acute intracranial process.   MICU consulted who recommended appropriate placement on floor for sodium correction  Suspect acute encephalopathy is 2/2 to hypernatremia.   Sodium wnl, 142. Patient more alert and responsive than on arrival. Oriented x1 and with some combativeness and refusing treatment.   Mild hypotension noted, rechecked with adequate size pressure cuff: 120/90.     Plan  -- Discontinued fluids since Na wnl   -- Patient refusion PO medications, started enoxaparin IV for hx of Afib   -- Daily CMP   -- Replace electrolytes as needed   -- Delirium precautions         Hypernatremia  Patient with Na of 157 on admission; serum osm 339. Free water deficit 6.6L   Suspect possibly due to dehydration due to poor intake vs GI losses, although unable to obtain collateral information from skilled nursing home as nurse was not available at the time of call.   EF noted at 35%.  Sodium wnl 11/23/20    Plan  - Discontinue IVF   - Daily CMP    - Correction Na goal 8-10mEq per 24 hours to prevent cerebral edema      Edema of left upper extremity  Since arrival patient has been refusing PO medications, for which started enoxaparin IV on 11/23/20. Upper extremity with 2+ pitting edema from fingers up to  proxima fore arm. Patient denies pain, CP, SOB.     Plan:   -- Left Upper extremity US ordered to r/o DVT   -- Removed IV from arm   -- Wound care notified for skin pealing  -- Will continue to monitor       Panniculitis  Noted in lower abdominal/ groin area. No erythema or purulence noted.    -Wound care consulted for skin breakdown, will follow recommendations     HLD (hyperlipidemia)  - Continue home statin       Nonischemic cardiomyopathy  The estimated ejection fraction is 35-40%.    Plan:   --Patient with episodes of hypotension, holding coreg for now   -- Monitor BP, make sure cuff is right size     Obstructive sleep apnea treated with continuous positive airway pressure (CPAP)  CPAP qHS      Type II diabetes mellitus  Well controlled. Hgb A1c 5.6. Patient doesn't appear to be on diabetic medication.     Plan:   -- BG goal 140-180   -- No medical treatment required at this time       CKD (chronic kidney disease) stage 3, GFR 30-59 ml/min  Renal function at baseline Cr of 2.0 (within baseline 1.8-2.0)    Plan:   -Avoid nephrotoxic medicationd  -Renally dose medications  -Daily BMP      Atrial fibrillation, controlled  Patient with HX of Afib with CHADs score of 5 on amiodarone 200mg, eliquis 5mg at home. Patient is currently refusing PO medications, HD stable.     Plan:   -- Start enoxaparin 150mg IV BID   -- Will hold amiodarone since HD stable and continue to monitor.         VTE Risk Mitigation (From admission, onward)         Ordered     enoxaparin injection 150 mg  2 times daily      11/23/20 1033                Discharge Planning   JOHN: 11/25/2020     Code Status: Prior   Is the patient medically ready for discharge?: No    Reason for patient still in hospital (select all that apply): Treatment  Discharge Plan A: Skilled Nursing Facility                  Gi Fall MD  Department of Hospital Medicine   [facility]

## 2020-11-23 NOTE — ASSESSMENT & PLAN NOTE
Renal function at baseline Cr of 2.0 (within baseline 1.8-2.0)    Plan:   -Avoid nephrotoxic medicationd  -Renally dose medications  -Daily BMP

## 2020-11-23 NOTE — ASSESSMENT & PLAN NOTE
Patient with Na of 157 on admission; serum osm 339. Free water deficit 6.6L   Suspect possibly due to dehydration due to poor intake vs GI losses, although unable to obtain collateral information from skilled nursing home as nurse was not available at the time of call.   EF noted at 35%.  Sodium wnl 11/23/20    Plan  - Discontinue IVF   - Daily CMP    - Correction Na goal 8-10mEq per 24 hours to prevent cerebral edema

## 2020-11-23 NOTE — PLAN OF CARE
Pt alert and oriented to self only. VSS. Blood sugars 198 and 143. IVF continues as ordered. Sodium 145 now. Rested well through night. Continues to resist being turned and repositioned. No complaints. Safety maintained. Will continue to monitor.

## 2020-11-23 NOTE — SUBJECTIVE & OBJECTIVE
Interval History:  NAEO. Patient oriented x1 with mild combativeness to treatment. Started enoxaparin IV for Ahx AFIB Chads2 score 5. LUE US ordered to r/o DVT for upper extremity swelling.     Review of Systems   Unable to perform ROS: Mental status change        Objective:     Vital Signs (Most Recent):  Temp: 99.5 °F (37.5 °C) (11/23/20 0800)  Pulse: 82 (11/23/20 1110)  Resp: 18 (11/23/20 1110)  BP: 108/68 (11/23/20 1110)  SpO2: 99 % (11/23/20 1110) Vital Signs (24h Range):  Temp:  [97.6 °F (36.4 °C)-99.8 °F (37.7 °C)] 99.5 °F (37.5 °C)  Pulse:  [66-85] 82  Resp:  [14-25] 18  SpO2:  [95 %-100 %] 99 %  BP: ()/(55-90) 108/68     Weight: (!) 158.8 kg (350 lb)  Body mass index is 51.69 kg/m².    Intake/Output Summary (Last 24 hours) at 11/23/2020 1322  Last data filed at 11/23/2020 0600  Gross per 24 hour   Intake 5114.17 ml   Output --   Net 5114.17 ml      Physical Exam  Constitutional:       Appearance: He is obese.   HENT:      Head: Normocephalic and atraumatic.      Nose: Nose normal.      Mouth/Throat:      Mouth: Mucous membranes are moist.   Eyes:      Extraocular Movements: Extraocular movements intact.      Pupils: Pupils are equal, round, and reactive to light.   Neck:      Musculoskeletal: Normal range of motion.   Cardiovascular:      Rate and Rhythm: Normal rate and regular rhythm.   Pulmonary:      Breath sounds: Normal breath sounds.   Abdominal:      General: Bowel sounds are normal. There is no distension.      Palpations: Abdomen is soft.      Tenderness: There is no abdominal tenderness.   Musculoskeletal:         General: No swelling.      Comments: Left upper extremity swelling from fingers up to proximal fore arm. Unable to remove ring.   Skin pealing on IV site after removing band aid.    Skin:     General: Skin is dry.      Capillary Refill: Capillary refill takes less than 2 seconds.   Neurological:      Comments: Oriented to self only.   Combative and unable to follow commands.           Significant Labs:   CBC:   Recent Labs   Lab 11/22/20 0354 11/23/20  0612   WBC 7.92 7.11   HGB 11.2* 11.3*   HCT 37.5* 37.0*    143*     CMP:   Recent Labs   Lab 11/22/20 1952 11/23/20  0049 11/23/20  0612   * 145 142   K 3.4* 3.4* 3.1*   * 109 108   CO2 28 30* 27   * 149* 119*   BUN 19 17 17   CREATININE 1.6* 1.6* 1.6*   CALCIUM 8.0* 8.1* 7.9*   ANIONGAP 10 6* 7*   EGFRNONAA 42.4* 42.4* 42.4*       Significant Imaging: I have reviewed and interpreted all pertinent imaging results/findings within the past 24 hours.

## 2020-11-23 NOTE — CONSULTS
Wound care consult received from MD and RN for assessment of groin, left leg and left thigh. Patient is a 70 year old male with NICM, HFrEF, HTN, T2DM, AFib, h/o CVA w/ L hemineglect,  DVT, ERIC and obesity who presents from [facility] for altered mental status.    Assessments and pictures listed below. All wounds were present on admit.     Recommendations:   -Nursing to apply Medi-honey to wounds to left lateral thigh and left lower leg to promote healing and reduce bio-burden.     -Nursing to apply clear critic-aid moisture barrier ointment to groin area for moisture and yeast management.     Nursing and MD team notified with recommendations.   Wound care to continue to follow pt PRN [id number]    Left lateral thigh: 1 x 1 x 0.1 cm   -Noted healing full thickness wound with scant amount of tan slough to wound bed. Etiology unknown. Thin pink epithelium and maceration to wound edges. No odor. Scant amount of serosanguineous drainage.         Left lower thigh: 1 x 1 cm   -Noted unstageable full thickness wound with adherent yellow tan slough to wound bed. Unknown etiology. No odor. No drainage.       Groin/penis  -Noted POA healing full thickness wounds to foreskin of penis with excessive moisture to groin and abdominal folds. Mild odor.

## 2020-11-23 NOTE — NURSING
Pt has not voided since noon. Pt bladder scanned and has a residual of 78 mL. MD Mee notified. Instructed to bladder scan pt prn. Will continue to monitor.

## 2020-11-23 NOTE — ASSESSMENT & PLAN NOTE
The estimated ejection fraction is 35-40%.    Plan:   --Patient with episodes of hypotension, holding coreg for now   -- Monitor BP, make sure cuff is right size

## 2020-11-23 NOTE — ASSESSMENT & PLAN NOTE
70 year old male with NICM, HFrEF (EF of 35-40%), HTN, T2DM, AFib on DOAC and amiodarone, and obesity (BMI >50) who presents with AMS from skilled nursing facility. BMP remarkable for Na 157. VBG 7.39/39/31.   Imaging: CT head without acute intracranial process.   MICU consulted who recommended appropriate placement on floor for sodium correction  Suspect acute encephalopathy is 2/2 to hypernatremia.   Sodium wnl, 142. Patient more alert and responsive than on arrival. Oriented x1 and with some combativeness and refusing treatment.   Mild hypotension noted, rechecked with adequate size pressure cuff: 120/90.     Plan  -- Discontinued fluids since Na wnl   -- Patient refusion PO medications, started enoxaparin IV for hx of Afib   -- Daily CMP   -- Replace electrolytes as needed   -- Delirium precautions

## 2020-11-23 NOTE — ASSESSMENT & PLAN NOTE
Well controlled. Hgb A1c 5.6. Patient doesn't appear to be on diabetic medication.     Plan:   -- BG goal 140-180   -- No medical treatment required at this time

## 2020-11-23 NOTE — NURSING
Pt LUE noted to be swollen and warm. IV potassium stopped. Pt refusing to elevate extremity. Ring on pts middle finger unable to remove. MD Juan David notified. Will come see pt. Will continue to monitor.

## 2020-11-23 NOTE — ASSESSMENT & PLAN NOTE
Noted in lower abdominal/ groin area. No erythema or purulence noted.    -Wound care consulted for skin breakdown, will follow recommendations

## 2020-11-24 PROBLEM — Z75.8 DISCHARGE PLANNING ISSUES: Status: ACTIVE | Noted: 2020-11-24

## 2020-11-24 LAB
ANION GAP SERPL CALC-SCNC: 7 MMOL/L (ref 8–16)
BASOPHILS # BLD AUTO: 0.03 K/UL (ref 0–0.2)
BASOPHILS NFR BLD: 0.4 % (ref 0–1.9)
BUN SERPL-MCNC: 14 MG/DL (ref 8–23)
CALCIUM SERPL-MCNC: 7.9 MG/DL (ref 8.7–10.5)
CHLORIDE SERPL-SCNC: 108 MMOL/L (ref 95–110)
CO2 SERPL-SCNC: 25 MMOL/L (ref 23–29)
CREAT SERPL-MCNC: 1.4 MG/DL (ref 0.5–1.4)
DIFFERENTIAL METHOD: ABNORMAL
EOSINOPHIL # BLD AUTO: 0.7 K/UL (ref 0–0.5)
EOSINOPHIL NFR BLD: 8.9 % (ref 0–8)
ERYTHROCYTE [DISTWIDTH] IN BLOOD BY AUTOMATED COUNT: 14.6 % (ref 11.5–14.5)
EST. GFR  (AFRICAN AMERICAN): 57.6 ML/MIN/1.73 M^2
EST. GFR  (NON AFRICAN AMERICAN): 49.8 ML/MIN/1.73 M^2
GLUCOSE SERPL-MCNC: 79 MG/DL (ref 70–110)
HCT VFR BLD AUTO: 38.4 % (ref 40–54)
HGB BLD-MCNC: 11.9 G/DL (ref 14–18)
IMM GRANULOCYTES # BLD AUTO: 0.02 K/UL (ref 0–0.04)
IMM GRANULOCYTES NFR BLD AUTO: 0.3 % (ref 0–0.5)
LYMPHOCYTES # BLD AUTO: 2.1 K/UL (ref 1–4.8)
LYMPHOCYTES NFR BLD: 28.8 % (ref 18–48)
MAGNESIUM SERPL-MCNC: 1.8 MG/DL (ref 1.6–2.6)
MCH RBC QN AUTO: 30 PG (ref 27–31)
MCHC RBC AUTO-ENTMCNC: 31 G/DL (ref 32–36)
MCV RBC AUTO: 97 FL (ref 82–98)
MONOCYTES # BLD AUTO: 0.8 K/UL (ref 0.3–1)
MONOCYTES NFR BLD: 10.9 % (ref 4–15)
NEUTROPHILS # BLD AUTO: 3.8 K/UL (ref 1.8–7.7)
NEUTROPHILS NFR BLD: 50.7 % (ref 38–73)
NRBC BLD-RTO: 0 /100 WBC
PLATELET # BLD AUTO: 144 K/UL (ref 150–350)
PMV BLD AUTO: 12.1 FL (ref 9.2–12.9)
POCT GLUCOSE: 114 MG/DL (ref 70–110)
POCT GLUCOSE: 56 MG/DL (ref 70–110)
POCT GLUCOSE: 75 MG/DL (ref 70–110)
POTASSIUM SERPL-SCNC: 3.5 MMOL/L (ref 3.5–5.1)
RBC # BLD AUTO: 3.97 M/UL (ref 4.6–6.2)
SODIUM SERPL-SCNC: 140 MMOL/L (ref 136–145)
WBC # BLD AUTO: 7.44 K/UL (ref 3.9–12.7)

## 2020-11-24 PROCEDURE — 80048 BASIC METABOLIC PNL TOTAL CA: CPT

## 2020-11-24 PROCEDURE — 63600175 PHARM REV CODE 636 W HCPCS: Performed by: STUDENT IN AN ORGANIZED HEALTH CARE EDUCATION/TRAINING PROGRAM

## 2020-11-24 PROCEDURE — 85025 COMPLETE CBC W/AUTO DIFF WBC: CPT

## 2020-11-24 PROCEDURE — 99233 SBSQ HOSP IP/OBS HIGH 50: CPT | Mod: GC,,, | Performed by: INTERNAL MEDICINE

## 2020-11-24 PROCEDURE — 94761 N-INVAS EAR/PLS OXIMETRY MLT: CPT

## 2020-11-24 PROCEDURE — 83735 ASSAY OF MAGNESIUM: CPT

## 2020-11-24 PROCEDURE — 36415 COLL VENOUS BLD VENIPUNCTURE: CPT

## 2020-11-24 PROCEDURE — 97535 SELF CARE MNGMENT TRAINING: CPT

## 2020-11-24 PROCEDURE — 97166 OT EVAL MOD COMPLEX 45 MIN: CPT

## 2020-11-24 PROCEDURE — 11000001 HC ACUTE MED/SURG PRIVATE ROOM

## 2020-11-24 PROCEDURE — 25000003 PHARM REV CODE 250: Performed by: STUDENT IN AN ORGANIZED HEALTH CARE EDUCATION/TRAINING PROGRAM

## 2020-11-24 PROCEDURE — 99233 PR SUBSEQUENT HOSPITAL CARE,LEVL III: ICD-10-PCS | Mod: GC,,, | Performed by: INTERNAL MEDICINE

## 2020-11-24 PROCEDURE — 97530 THERAPEUTIC ACTIVITIES: CPT

## 2020-11-24 PROCEDURE — 97163 PT EVAL HIGH COMPLEX 45 MIN: CPT

## 2020-11-24 RX ORDER — CARVEDILOL 6.25 MG/1
6.25 TABLET ORAL 2 TIMES DAILY
Status: DISCONTINUED | OUTPATIENT
Start: 2020-11-25 | End: 2020-12-01 | Stop reason: HOSPADM

## 2020-11-24 RX ORDER — MAGNESIUM SULFATE HEPTAHYDRATE 40 MG/ML
2 INJECTION, SOLUTION INTRAVENOUS ONCE
Status: DISCONTINUED | OUTPATIENT
Start: 2020-11-24 | End: 2020-11-25

## 2020-11-24 RX ORDER — POTASSIUM CHLORIDE 20 MEQ/1
40 TABLET, EXTENDED RELEASE ORAL ONCE
Status: DISCONTINUED | OUTPATIENT
Start: 2020-11-24 | End: 2020-12-01 | Stop reason: HOSPADM

## 2020-11-24 RX ADMIN — THERA TABS 1 TABLET: TAB at 10:11

## 2020-11-24 RX ADMIN — FERROUS SULFATE TAB EC 325 MG (65 MG FE EQUIVALENT) 325 MG: 325 (65 FE) TABLET DELAYED RESPONSE at 10:11

## 2020-11-24 RX ADMIN — POTASSIUM CHLORIDE 10 MEQ: 7.46 INJECTION, SOLUTION INTRAVENOUS at 02:11

## 2020-11-24 RX ADMIN — DEXTROSE MONOHYDRATE 12.5 G: 25 INJECTION, SOLUTION INTRAVENOUS at 05:11

## 2020-11-24 RX ADMIN — AMIODARONE HYDROCHLORIDE 200 MG: 200 TABLET ORAL at 10:11

## 2020-11-24 RX ADMIN — ALLOPURINOL 300 MG: 300 TABLET ORAL at 10:11

## 2020-11-24 RX ADMIN — ENOXAPARIN SODIUM 150 MG: 150 INJECTION SUBCUTANEOUS at 10:11

## 2020-11-24 RX ADMIN — MICONAZOLE NITRATE: 20 OINTMENT TOPICAL at 10:11

## 2020-11-24 RX ADMIN — MUPIROCIN: 20 OINTMENT TOPICAL at 10:11

## 2020-11-24 RX ADMIN — GABAPENTIN 300 MG: 300 CAPSULE ORAL at 10:11

## 2020-11-24 RX ADMIN — GABAPENTIN 300 MG: 300 CAPSULE ORAL at 03:11

## 2020-11-24 RX ADMIN — OXYBUTYNIN CHLORIDE 10 MG: 5 TABLET, EXTENDED RELEASE ORAL at 10:11

## 2020-11-24 RX ADMIN — ASPIRIN 81 MG CHEWABLE TABLET 81 MG: 81 TABLET CHEWABLE at 10:11

## 2020-11-24 RX ADMIN — POTASSIUM CHLORIDE 10 MEQ: 7.46 INJECTION, SOLUTION INTRAVENOUS at 01:11

## 2020-11-24 NOTE — NURSING
Pt has refused to take oral medication for two days now. MD Juan David notified. Was able to get pt to take eliquis. Potasium switched to IV. Will continue to monitor.

## 2020-11-24 NOTE — PROGRESS NOTES
[facility] - ICU 14 [facility] Medicine  Progress Note    Patient Name: Hemant Kennedy Jr.  MRN: [id number]  Patient Class: IP- Inpatient   Admission Date: 11/20/2020  Length of Stay: 3 days  Attending Physician: Salena Caban MD  Primary Care Provider: Wicho Qiu MD        Subjective:     Principal Problem:Acute encephalopathy        HPI:  Hemant Kennedy is a 70 year old male with NICM, HFrEF (EF of 35-40%), HTN, T2DM, AFib on DOAC and amiodarone, h/o CVA w/ L hemineglect, h/o DVT, ERIC and obesity (BMI >50) who presents from [facility] for altered mental status.  Patient was brought in by EMS from [facility], where he has been living for the past 2 weeks, after being treated at [facility] 11/1-11/5 for rhabdomyolysis, acute metabolic encephalopathy 2/2 urosepsis with proteus miribalis, and panniculitis. EMS reports patient was refusing his nightly BIPAP at the nursing home.     History was limited due to patient's altered mental state. I contacted the nursing home to obtain collateral information but nursing staff wasn't available at the time.     In the ED, patient was afebrile, hemodynamically stable, and 95% on room air. BMP remarkable for Na 157. VBG 7.39/39/31. CT head without acute intracranial process.        Overview/Hospital Course:  Patient admitted to medicine for AMS with Na 157. Patient treated with D5W infusion, with adequate adjustment until coming down to 142. AMS with mild improvement, patient only oriented to self and still combative and difficult to redirect. Patient refusing PO medications since 11/22, transitioned to IV enoxaparin, CHADS2 Score of 5. Left upper extremity swelling noted in fingers up to arm, with skin pealing after removing IV. US ordered to r/o DVT and negative. Swelling improved after placing ice and removing IV. Bullae noted on left arm, wound care on board. Mental status improving, patient  more coopertive with care. Pending placement to SNF, as daughter not pleased with [facility].     Interval History: NAEO, patient more alert and cooperative with care. Oriented to place able to say year of birth and president (Vinod) and daughter's name. Left arm swelling improving. Waiting for placement to SNF. During rounds patient with slower response, for which will need collateral from daughter to assess baseline     Review of Systems   Constitutional: Negative for appetite change, chills and fever.   HENT: Negative for congestion, sore throat, trouble swallowing and voice change.    Eyes: Negative.    Respiratory: Negative for cough and shortness of breath.    Cardiovascular: Negative for chest pain and leg swelling.   Gastrointestinal: Negative for abdominal distention, abdominal pain, constipation, nausea and vomiting.   Endocrine: Negative.    Genitourinary: Negative.    Musculoskeletal: Negative for back pain and gait problem.   Skin: Positive for wound.   Neurological: Negative for headaches.   Psychiatric/Behavioral: Negative.      Objective:     Vital Signs (Most Recent):  Temp: 97.5 °F (36.4 °C) (11/24/20 0400)  Pulse: 75 (11/24/20 0400)  Resp: 18 (11/24/20 0400)  BP: 107/74 (11/24/20 0400)  SpO2: 95 % (11/24/20 0400) Vital Signs (24h Range):  Temp:  [97.5 °F (36.4 °C)-99.3 °F (37.4 °C)] 97.5 °F (36.4 °C)  Pulse:  [73-85] 75  Resp:  [17-30] 18  SpO2:  [90 %-99 %] 95 %  BP: (100-125)/(51-90) 107/74     Weight: (!) 158.8 kg (350 lb)  Body mass index is 51.69 kg/m².    Intake/Output Summary (Last 24 hours) at 11/24/2020 0842  Last data filed at 11/24/2020 0600  Gross per 24 hour   Intake 1160 ml   Output --   Net 1160 ml      Physical Exam  Constitutional:       Appearance: He is obese.   HENT:      Head: Normocephalic and atraumatic.      Nose: Nose normal.      Mouth/Throat:      Mouth: Mucous membranes are moist.   Eyes:      Extraocular Movements: Extraocular movements intact.      Pupils:  Pupils are equal, round, and reactive to light.   Neck:      Musculoskeletal: Normal range of motion.   Cardiovascular:      Rate and Rhythm: Normal rate. Rhythm irregular.   Pulmonary:      Breath sounds: Normal breath sounds.   Abdominal:      General: Bowel sounds are normal. There is no distension.      Palpations: Abdomen is soft.      Tenderness: There is no abdominal tenderness.   Musculoskeletal: Normal range of motion.         General: No swelling.   Skin:     General: Skin is warm.      Capillary Refill: Capillary refill takes less than 2 seconds.   Neurological:      General: No focal deficit present.      Mental Status: He is alert.      Comments: Oriented to person.   Correctly answers daughter's name, president Vinod and year of birth, which he could not yesterday.   More alert and cooperative with care         Significant Labs:   CBC:   Recent Labs   Lab 11/23/20  0612 11/24/20  0803   WBC 7.11 7.44   HGB 11.3* 11.9*   HCT 37.0* 38.4*   * 144*     CMP:   Recent Labs   Lab 11/23/20  0612 11/23/20  1159 11/24/20  0803    143 140   K 3.1* 3.2* 3.5    108 108   CO2 27 26 25   * 56* 79   BUN 17 16 14   CREATININE 1.6* 1.5* 1.4   CALCIUM 7.9* 7.9* 7.9*   ANIONGAP 7* 9 7*   EGFRNONAA 42.4* 45.9* 49.8*       Significant Imaging: I have reviewed and interpreted all pertinent imaging results/findings within the past 24 hours.      Assessment/Plan:      * Acute encephalopathy  70 year old male with NICM, HFrEF (EF of 35-40%), HTN, T2DM, AFib on DOAC and amiodarone, and obesity (BMI >50) who presents with AMS from skilled nursing facility. BMP remarkable for Na 157. VBG 7.39/39/31.   Imaging: CT head without acute intracranial process.   MICU consulted who recommended appropriate placement on floor for sodium correction  Suspect acute encephalopathy is 2/2 to hypernatremia.   Sodium wnl, 140. Patient more alert and responsive than on arrival, more cooperative to care. Oriented x1 to  person. Answers correctly daughter's name, president and year of birth.  Able to follow simple commands.   During rounds, patient with some changes in mentation and slower response than in morning.      Plan  -- Discontinued fluids since Na wnl   -- Re-attempt to start PO medications.   -- Re start home apixaban 5mg BID if patient taking PO medications and d/c SQ enoxaparin   -- Daily CMP   -- Replace electrolytes as needed   -- Delirium precautions   -- Reach out to daughter to assess patient's baseline mental status           Hypernatremia  Patient with Na of 157 on admission; serum osm 339. Free water deficit 6.6L   Suspect possibly due to dehydration due to poor intake vs GI losses, although unable to obtain collateral information from skilled nursing home as nurse was not available at the time of call.   EF noted at 35%.  Sodium wnl 11/23/20    Plan  - Discontinue IVF   - Daily CMP    - Correction Na goal 8-10mEq per 24 hours to prevent cerebral edema  - Encourage PO intake as patient most likely hypovolemic on arrival       Discharge planning issues  Patient was brought from [facility]. Dsaughter is not pleased with care and wishes another SNF for patient. Currently waiting for placement. Patient already accepted at [facility]. Daughter wants to hear from [facility] before accepting, which is currently under review.     Edema of left upper extremity  Since arrival patient has been refusing PO medications, for which started enoxaparin IV on 11/23/20. Upper extremity with 2+ pitting edema from fingers up to proxima fore arm. Patient denies pain, CP, SOB.   Swelling improving after removing IV and ice placed     Plan:   -- Left Upper extremity US ordered to r/o DVT : negative  -- Removed IV from arm   -- Wound care notified for skin pealing  -- Will continue to monitor       Panniculitis  Noted in lower abdominal/ groin area. No erythema or purulence noted.    -Wound care consulted for  skin breakdown, will follow recommendations     HLD (hyperlipidemia)  - Continue home statin       Nonischemic cardiomyopathy  The estimated ejection fraction is 35-40%.    Plan:   -- Re start patient's carvedilol 6.25 BID  -- Monitor BP, make sure cuff is right size     Obstructive sleep apnea treated with continuous positive airway pressure (CPAP)  CPAP qHS      Type II diabetes mellitus  Well controlled. Hgb A1c 5.6. Patient doesn't appear to be on diabetic medication.   Episode of hypoglycemia today, responded well to D50% injection    Plan:   -- BG goal 140-180   -- No medical treatment required at this time   -- Encourage bedside assistance for meals and fluid intake       CKD (chronic kidney disease) stage 3, GFR 30-59 ml/min  Renal function at baseline Cr of 2.0 (within baseline 1.8-2.0), Cr today 1.5     Plan:   --Avoid nephrotoxic medicationd  --Renally dose medications  --Daily BMP      Atrial fibrillation, controlled  Patient with HX of Afib with CHADs score of 5 on amiodarone 200mg, eliquis 5mg at home. Patient is currently refusing PO medications, HD stable.     Plan:    --  Patient on A- fib but is rate controlled, will encourage PO intake of his amiodarone.   -- Re start patient's eliquis this afternoon and d/c enoxaparin         VTE Risk Mitigation (From admission, onward)         Ordered     apixaban tablet 5 mg  2 times daily      11/24/20 1413                Discharge Planning   JOHN: 11/25/2020     Code Status: Full Code   Is the patient medically ready for discharge?: No    Reason for patient still in hospital (select all that apply): Treatment and Pending disposition  Discharge Plan A: Skilled Nursing Facility                  Gi Fall MD  Department of Hospital Medicine   [facility] - ICU 14 [facility]

## 2020-11-24 NOTE — PLAN OF CARE
SW assigned to case today 11/24/20. SW will assist team with DC needs. GERARDO in communication with CM.    Irene Jurado LMSW   - Case Management

## 2020-11-24 NOTE — PLAN OF CARE
PT melodie completed. Pt will begin PT POC.  Shayy Weeks, PT  2020    Problem: Physical Therapy Goal  Goal: Physical Therapy Goal  Description: Goals to be met by: 20     Patient will increase functional independence with mobility by performin. Supine to sit with Moderate Assistance  2. Sit to supine with Moderate Assistance  3. Sit to stand transfer with Maximum Assistance  4. Sitting at edge of bed x5 minutes with Minimal Assistance  5. Stand for 1 minutes with Moderate Assistance using Rolling Walker    Outcome: Ongoing, Progressing

## 2020-11-24 NOTE — ASSESSMENT & PLAN NOTE
Renal function at baseline Cr of 2.0 (within baseline 1.8-2.0), Cr today 1.5     Plan:   --Avoid nephrotoxic medicationd  --Renally dose medications  --Daily BMP

## 2020-11-24 NOTE — SUBJECTIVE & OBJECTIVE
Interval History: NAEO, patient more alert and cooperative with care. Oriented to place able to say year of birth and president (Vinod) and daughter's name. Left arm swelling improving. Waiting for placement to SNF.     Review of Systems   Constitutional: Negative for appetite change, chills and fever.   HENT: Negative for congestion, sore throat, trouble swallowing and voice change.    Eyes: Negative.    Respiratory: Negative for cough and shortness of breath.    Cardiovascular: Negative for chest pain and leg swelling.   Gastrointestinal: Negative for abdominal distention, abdominal pain, constipation, nausea and vomiting.   Endocrine: Negative.    Genitourinary: Negative.    Musculoskeletal: Negative for back pain and gait problem.   Skin: Positive for wound.   Neurological: Negative for headaches.   Psychiatric/Behavioral: Negative.      Objective:     Vital Signs (Most Recent):  Temp: 97.5 °F (36.4 °C) (11/24/20 0400)  Pulse: 75 (11/24/20 0400)  Resp: 18 (11/24/20 0400)  BP: 107/74 (11/24/20 0400)  SpO2: 95 % (11/24/20 0400) Vital Signs (24h Range):  Temp:  [97.5 °F (36.4 °C)-99.3 °F (37.4 °C)] 97.5 °F (36.4 °C)  Pulse:  [73-85] 75  Resp:  [17-30] 18  SpO2:  [90 %-99 %] 95 %  BP: (100-125)/(51-90) 107/74     Weight: (!) 158.8 kg (350 lb)  Body mass index is 51.69 kg/m².    Intake/Output Summary (Last 24 hours) at 11/24/2020 0842  Last data filed at 11/24/2020 0600  Gross per 24 hour   Intake 1160 ml   Output --   Net 1160 ml      Physical Exam  Constitutional:       Appearance: He is obese.   HENT:      Head: Normocephalic and atraumatic.      Nose: Nose normal.      Mouth/Throat:      Mouth: Mucous membranes are moist.   Eyes:      Extraocular Movements: Extraocular movements intact.      Pupils: Pupils are equal, round, and reactive to light.   Neck:      Musculoskeletal: Normal range of motion.   Cardiovascular:      Rate and Rhythm: Normal rate. Rhythm irregular.   Pulmonary:      Breath sounds: Normal  breath sounds.   Abdominal:      General: Bowel sounds are normal. There is no distension.      Palpations: Abdomen is soft.      Tenderness: There is no abdominal tenderness.   Musculoskeletal: Normal range of motion.         General: No swelling.   Skin:     General: Skin is warm.      Capillary Refill: Capillary refill takes less than 2 seconds.   Neurological:      General: No focal deficit present.      Mental Status: He is alert.      Comments: Oriented to person.   Correctly answers daughter's name, president Vinod and year of birth, which he could not yesterday.   More alert and cooperative with care         Significant Labs:   CBC:   Recent Labs   Lab 11/23/20  0612 11/24/20  0803   WBC 7.11 7.44   HGB 11.3* 11.9*   HCT 37.0* 38.4*   * 144*     CMP:   Recent Labs   Lab 11/23/20  0612 11/23/20  1159 11/24/20  0803    143 140   K 3.1* 3.2* 3.5    108 108   CO2 27 26 25   * 56* 79   BUN 17 16 14   CREATININE 1.6* 1.5* 1.4   CALCIUM 7.9* 7.9* 7.9*   ANIONGAP 7* 9 7*   EGFRNONAA 42.4* 45.9* 49.8*       Significant Imaging: I have reviewed and interpreted all pertinent imaging results/findings within the past 24 hours.

## 2020-11-24 NOTE — ASSESSMENT & PLAN NOTE
Since arrival patient has been refusing PO medications, for which started enoxaparin IV on 11/23/20. Upper extremity with 2+ pitting edema from fingers up to proxima fore arm. Patient denies pain, CP, SOB.   Swelling improving after removing IV and ice placed     Plan:   -- Left Upper extremity US ordered to r/o DVT : negative  -- Removed IV from arm   -- Wound care notified for skin pealing  -- Will continue to monitor

## 2020-11-24 NOTE — ASSESSMENT & PLAN NOTE
70 year old male with NICM, HFrEF (EF of 35-40%), HTN, T2DM, AFib on DOAC and amiodarone, and obesity (BMI >50) who presents with AMS from skilled nursing facility. BMP remarkable for Na 157. VBG 7.39/39/31.   Imaging: CT head without acute intracranial process.   MICU consulted who recommended appropriate placement on floor for sodium correction  Suspect acute encephalopathy is 2/2 to hypernatremia.   Sodium wnl, 140. Patient more alert and responsive than on arrival, more cooperative to care. Oriented x1 to person. Answers correctly daughter's name, president and year of birth.  Able to follow simple commands.   During rounds, patient with some changes in mentation and slower response than in morning.      Plan  -- Discontinued fluids since Na wnl   -- Re-attempt to start PO medications.   -- Re start home apixaban 5mg BID if patient taking PO medications and d/c SQ enoxaparin   -- Daily CMP   -- Replace electrolytes as needed   -- Delirium precautions   -- Reach out to daughter to assess patient's baseline mental status

## 2020-11-24 NOTE — PT/OT/SLP EVAL
Physical Therapy Evaluation    Patient Name:  Hemant Kennedy Jr.   MRN:  [id number]    Recommendations:     Discharge Recommendations:  nursing facility, skilled   Discharge Equipment Recommendations: (TBD)   Barriers to discharge: Decreased caregiver support (due to current LOF)    Assessment:     Hemant Kennedy Jr. is a 70 y.o. male admitted with a medical diagnosis of Acute encephalopathy.  He presents with the following impairments/functional limitations:  weakness, impaired sensation, impaired endurance, impaired self care skills, impaired functional mobilty, gait instability, impaired balance, impaired cognition, decreased upper extremity function, decreased lower extremity function, decreased safety awareness, decreased ROM, pain .     Pt was limited t/o session by confusion and fatigue. He required frequent cues to remain awake and keep his eyes open. His PLOF is unknown but he currently requires TotalA(x2) for bed mobility and sitting balance at EOB. He is appropriate for acute PT services and will begin PT POC.     Rehab Prognosis: Good; patient would benefit from acute skilled PT services to address these deficits and reach maximum level of function.    Recent Surgery: * No surgery found *      Plan:     During this hospitalization, patient to be seen 3 x/week to address the identified rehab impairments via therapeutic activities, therapeutic exercises, neuromuscular re-education and progress toward the following goals:    · Plan of Care Expires:  12/23/20    Subjective     Chief Complaint: fatigue  Patient/Family Comments/goals: to get stronger  Pain/Comfort:  · Pain Rating 1: (did not rate but reported gluteal pain)  · Pain Addressed 1: Reposition    Patients cultural, spiritual, Church conflicts given the current situation: no    Living Environment:  Per chart review pt was at a SNF prior to this admission. He was unable to accurately answer living environment questions so info will need to be  confirmed.  Prior to admission, patients level of function was unknown. Pt reports he had staff assistance for getting OOB.  Equipment used at home: (unknown).  DME owned (not currently used): unknown.  Upon discharge, patient will have assistance from unknown.    Objective:     Communicated with nursing prior to session.  Patient found supine with pulse ox (continuous), blood pressure cuff, telemetry  upon PT entry to room.    General Precautions: Standard, fall   Orthopedic Precautions:N/A   Braces: N/A     Exams:  · Cognitive Exam:  Patient is oriented to Person; partially oriented to place and time; disoriented to situation  · Gross Motor Coordination:  impaired  · Postural Exam:  Patient presented with the following abnormalities:    · -       Rounded shoulders  · -       Forward head  · Sensation:    · -       Impaired  light/touch feet/toes  · RLE ROM: WFL except knee extension limited  · RLE Strength: grossly 2+/5  · LLE ROM: WFL except knee extension limited  · LLE Strength: grossly 2+/5    Functional Mobility:  · Bed Mobility:    · Supine<>sit on bed w/ TotalA(x2) for trunk and BLE  · inc'd time and cues required  · HOB elevated and side rail  · Transfers:   · Not yet appropriate due to poor sitting balance  · Balance:   · Static sit at EOB ~5min w/ TotalA(x2) due to posterior lean  · Cues t/o trial for core activation in order to bring shoulders forward, pt w/ difficulty following cues    Therapeutic Activities and Exercises:   Pt was educated on PT role and POC. He will need reinforcement.    AM-PAC 6 CLICK MOBILITY  Total Score:8     Patient left supine with all lines intact and call button in reach.    GOALS:   Multidisciplinary Problems     Physical Therapy Goals        Problem: Physical Therapy Goal    Goal Priority Disciplines Outcome Goal Variances Interventions   Physical Therapy Goal     PT, PT/OT Ongoing, Progressing     Description: Goals to be met by: 12/8/20     Patient will increase  functional independence with mobility by performin. Supine to sit with Moderate Assistance  2. Sit to supine with Moderate Assistance  3. Sit to stand transfer with Maximum Assistance  4. Sitting at edge of bed x5 minutes with Minimal Assistance  5. Stand for 1 minutes with Moderate Assistance using Rolling Walker                     History:     Past Medical History:   Diagnosis Date    Anticoagulant long-term use     Arthritis     Atrial fibrillation     Cardiomyopathy     home O2    Cataract     CHF (congestive heart failure)     CKD (chronic kidney disease) stage 3, GFR 30-59 ml/min 2015    Diabetes mellitus     DVT (deep venous thrombosis)     Hypertension     Sleep apnea     Stroke     with residual effects W/C bound - Right Occipital       Past Surgical History:   Procedure Laterality Date    CATARACT EXTRACTION W/  INTRAOCULAR LENS IMPLANT Left 2017    Dr. Zuniga       Time Tracking:     PT Received On: 20  PT Start Time: 859     PT Stop Time: 919  PT Total Time (min): 20 min     Billable Minutes: Evaluation 10, Therapeutic Activity 10 and Total Time 20      Shayy Weeks, PT  2020

## 2020-11-24 NOTE — PLAN OF CARE
Evaluation completed, plan of care established.  Discharge Recommendation: SNF    Problem: Occupational Therapy Goal  Goal: Occupational Therapy Goal  Description: Goals to be met by: 12/24/20     Patient will increase functional independence with ADLs by performing:    UE Dressing with Minimal Assistance.  LE Dressing with Minimal Assistance.  Grooming while seated edge of bed with Minimal Assistance.  Sitting at edge of bed x10 minutes with Minimal Assistance.  Rolling to Bilateral with Contact Guard Assistance.   Supine to sit with Minimal Assistance.  Toilet transfer to beside commode with Moderate Assistance.    Outcome: Ongoing, Progressing

## 2020-11-24 NOTE — ASSESSMENT & PLAN NOTE
Patient with Na of 157 on admission; serum osm 339. Free water deficit 6.6L   Suspect possibly due to dehydration due to poor intake vs GI losses, although unable to obtain collateral information from skilled nursing home as nurse was not available at the time of call.   EF noted at 35%.  Sodium wnl 11/23/20    Plan  - Discontinue IVF   - Daily CMP    - Correction Na goal 8-10mEq per 24 hours to prevent cerebral edema  - Encourage PO intake as patient most likely hypovolemic on arrival

## 2020-11-24 NOTE — ASSESSMENT & PLAN NOTE
Patient with HX of Afib with CHADs score of 5 on amiodarone 200mg, eliquis 5mg at home. Patient is currently refusing PO medications, HD stable.     Plan:    --  Patient on A- fib but is rate controlled, will encourage PO intake of his amiodarone.   -- Re start patient's eliquis this afternoon and d/c enoxaparin

## 2020-11-24 NOTE — PLAN OF CARE
Pt alert and oriented to self only. More coopertive with care now. VSS. Incontinent of urine and stool. Skin care done. Bed bath given. Linens and gown changed. No complaints. Blood sugars 75 and 56. D50 given; blood sugar up to 114 after treatment. Potassium replacement given. Day shift nurse Stefanie reported having given 50meq potassium already with 30meq more to be given which this nurse gave. Safety maintained.

## 2020-11-24 NOTE — ASSESSMENT & PLAN NOTE
The estimated ejection fraction is 35-40%.    Plan:   -- Re start patient's carvedilol 6.25 BID  -- Monitor BP, make sure cuff is right size

## 2020-11-24 NOTE — PT/OT/SLP EVAL
"Occupational Therapy   Evaluation    Name: Hemant Kennedy Jr.  MRN: [id number]  Admitting Diagnosis:  Acute encephalopathy      Recommendations:     Discharge Recommendations: nursing facility, skilled  Discharge Equipment Recommendations:  (TBD')  Barriers to discharge:  (Level of skilled assistance required at this time)    Assessment:     Hemant Kennedy Jr. is a 70 y.o. male with a medical diagnosis of Acute encephalopathy.  He presents with the following performance deficits affecting function: weakness, impaired self care skills, impaired functional mobilty, gait instability, impaired balance, decreased upper extremity function, decreased lower extremity function, decreased safety awareness, pain, impaired cognition, decreased ROM.  Patient presented as oriented to self and 2020, very lethargic and requiring consistent and max cues in attempts to maintain wakeful state. Patient required total assist x 2 persons for bed mobility, in attempt to achieve upright posture edge of bed, and scooting. Patient will continue to benefit from skilled therapy during this admission and placement in SNF to maximize functional independence.     Rehab Prognosis: Fair; patient would benefit from acute skilled OT services to address these deficits and reach maximum level of function.       Plan:     Patient to be seen 3 x/week to address the above listed problems via self-care/home management, therapeutic activities, therapeutic exercises, neuromuscular re-education, cognitive retraining  · Plan of Care Expires: 12/24/20  · Plan of Care Reviewed with: patient    Subjective     Chief Complaint: Discomfort in gluteal region, lethargy  Patient/Family Comments/goals: "To get better."    Occupational Profile:  Living Environment: Patient admitted from SNF; unable to report social information due to impaired cognition.  Previous level of function: Reports requiring assistance for all bed mobility  Equipment Used at Home:  " (Unknown)  Assistance upon Discharge: Unknown    Pain/Comfort:  · Pain Rating 1: (Unrated gluteal pain)  · Pain Addressed 1: Reposition, Nurse notified  · Pain Rating Post-Intervention 1: (Remain unchanged)    Patients cultural, spiritual, Caodaism conflicts given the current situation: no    Objective:     Communicated with: Nursing prior to session.  Patient found supine with blood pressure cuff, pulse ox (continuous), telemetry, pressure relief boots upon OT entry to room.    General Precautions: Standard, fall   Orthopedic Precautions:N/A   Braces: N/A     Occupational Performance:    Bed Mobility:    · Patient completed Rolling/Turning to Left with  total assistance and 2 persons  · Patient completed Rolling/Turning to Right with total assistance and 2 persons  · Patient completed Scooting/Bridging with total assistance and 2 persons  · Patient completed Supine to Sit with total assistance and 2 persons  · Patient completed Sit to Supine with total assistance and 2 persons    Functional Mobility/Transfers:  · TOMÁS on this date- patient requiring very significant amount of assistance to maintain static sitting balance edge of bed    Activities of Daily Living:  · Grooming: stand by assistance with head of bed elevated, cues for task completion due to impaired cognition  · Upper Body Dressing: total assistance sitting edge of bed  · Lower Body Dressing: dependence prior to transfer for safety    Cognitive/Visual Perceptual:  Cognitive/Psychosocial Skills:     -       Oriented to: Person   -       Follows Commands/attention:Inattentive, Easily distracted and Follows one-step commands  -       Safety awareness/insight to disability: impaired   -       Mood/Affect/Coping skills/emotional control: Cooperative and Lethargic    Physical Exam:  Postural examination/scapula alignment:    -       Rounded shoulders  -       Forward head  -       Posterior pelvic tilt  Upper Extremity Range of Motion:     -       Right  Upper Extremity: WFL  -       Left Upper Extremity: WFL  Upper Extremity Strength:    -       Right Upper Extremity: Grossly 3/5  -       Left Upper Extremity: Grossly 3/5   Strength:   -       Right Upper Extremity: WFL  -       Left Upper Extremity: WFL    AMPAC 6 Click ADL:  AMPAC Total Score: 9    Treatment & Education:  -Evaluation completed, plan of care established.  -Patient and family educated on roles/goals of OT and POC.  -White board updated.  -Therapist provided time for questions and answered within scope of practice.  -Patient educated on importance of EOB/OOB activity to maximize recovery.  Education:    Patient left HOB elevated with all lines intact, call button in reach and nursing notified    GOALS:   Multidisciplinary Problems     Occupational Therapy Goals        Problem: Occupational Therapy Goal    Goal Priority Disciplines Outcome Interventions   Occupational Therapy Goal     OT, PT/OT Ongoing, Progressing    Description: Goals to be met by: 12/24/20     Patient will increase functional independence with ADLs by performing:    UE Dressing with Minimal Assistance.  LE Dressing with Minimal Assistance.  Grooming while seated edge of bed with Minimal Assistance.  Sitting at edge of bed x10 minutes with Minimal Assistance.  Rolling to Bilateral with Contact Guard Assistance.   Supine to sit with Minimal Assistance.  Toilet transfer to beside commode with Moderate Assistance.                     History:     Past Medical History:   Diagnosis Date    Anticoagulant long-term use     Arthritis     Atrial fibrillation     Cardiomyopathy     home O2    Cataract     CHF (congestive heart failure)     CKD (chronic kidney disease) stage 3, GFR 30-59 ml/min 11/27/2015    Diabetes mellitus     DVT (deep venous thrombosis)     Hypertension     Sleep apnea     Stroke 2010    with residual effects W/C bound - Right Occipital       Past Surgical History:   Procedure Laterality Date    CATARACT  EXTRACTION W/  INTRAOCULAR LENS IMPLANT Left 09/18/2017    Dr. Zuniga       Time Tracking:     OT Date of Treatment: 11/24/20  OT Start Time: 0848  OT Stop Time: 0920  OT Total Time (min): 32 min    Billable Minutes:Evaluation 8  Self Care/Home Management 10  Therapeutic Activity 16    Mala Mayorga OT  11/24/2020

## 2020-11-24 NOTE — ASSESSMENT & PLAN NOTE
Well controlled. Hgb A1c 5.6. Patient doesn't appear to be on diabetic medication.   Episode of hypoglycemia today, responded well to D50% injection    Plan:   -- BG goal 140-180   -- No medical treatment required at this time   -- Encourage bedside assistance for meals and fluid intake

## 2020-11-24 NOTE — ASSESSMENT & PLAN NOTE
Patient was brought from [facility]. Dsaughter is not pleased with care and wishes another SNF for patient. Currently waiting for placement. Patient already accepted at [facility]. Daughter wants to hear from [facility] before accepting, which is currently under review.

## 2020-11-25 LAB
ANION GAP SERPL CALC-SCNC: 7 MMOL/L (ref 8–16)
BASOPHILS # BLD AUTO: 0.04 K/UL (ref 0–0.2)
BASOPHILS NFR BLD: 0.6 % (ref 0–1.9)
BUN SERPL-MCNC: 14 MG/DL (ref 8–23)
CALCIUM SERPL-MCNC: 8.5 MG/DL (ref 8.7–10.5)
CHLORIDE SERPL-SCNC: 109 MMOL/L (ref 95–110)
CO2 SERPL-SCNC: 29 MMOL/L (ref 23–29)
CREAT SERPL-MCNC: 1.5 MG/DL (ref 0.5–1.4)
DIFFERENTIAL METHOD: ABNORMAL
EOSINOPHIL # BLD AUTO: 0.7 K/UL (ref 0–0.5)
EOSINOPHIL NFR BLD: 10.2 % (ref 0–8)
ERYTHROCYTE [DISTWIDTH] IN BLOOD BY AUTOMATED COUNT: 14.8 % (ref 11.5–14.5)
EST. GFR  (AFRICAN AMERICAN): 53 ML/MIN/1.73 M^2
EST. GFR  (NON AFRICAN AMERICAN): 45.9 ML/MIN/1.73 M^2
GLUCOSE SERPL-MCNC: 71 MG/DL (ref 70–110)
HCT VFR BLD AUTO: 41.4 % (ref 40–54)
HGB BLD-MCNC: 12.6 G/DL (ref 14–18)
IMM GRANULOCYTES # BLD AUTO: 0.03 K/UL (ref 0–0.04)
IMM GRANULOCYTES NFR BLD AUTO: 0.4 % (ref 0–0.5)
LYMPHOCYTES # BLD AUTO: 1.7 K/UL (ref 1–4.8)
LYMPHOCYTES NFR BLD: 25.2 % (ref 18–48)
MAGNESIUM SERPL-MCNC: 1.9 MG/DL (ref 1.6–2.6)
MCH RBC QN AUTO: 29.4 PG (ref 27–31)
MCHC RBC AUTO-ENTMCNC: 30.4 G/DL (ref 32–36)
MCV RBC AUTO: 97 FL (ref 82–98)
MONOCYTES # BLD AUTO: 0.7 K/UL (ref 0.3–1)
MONOCYTES NFR BLD: 11 % (ref 4–15)
NEUTROPHILS # BLD AUTO: 3.6 K/UL (ref 1.8–7.7)
NEUTROPHILS NFR BLD: 52.6 % (ref 38–73)
NRBC BLD-RTO: 0 /100 WBC
PLATELET # BLD AUTO: 149 K/UL (ref 150–350)
PMV BLD AUTO: 12 FL (ref 9.2–12.9)
POCT GLUCOSE: 133 MG/DL (ref 70–110)
POTASSIUM SERPL-SCNC: 3.9 MMOL/L (ref 3.5–5.1)
RBC # BLD AUTO: 4.28 M/UL (ref 4.6–6.2)
SODIUM SERPL-SCNC: 145 MMOL/L (ref 136–145)
WBC # BLD AUTO: 6.75 K/UL (ref 3.9–12.7)

## 2020-11-25 PROCEDURE — 25000003 PHARM REV CODE 250: Performed by: STUDENT IN AN ORGANIZED HEALTH CARE EDUCATION/TRAINING PROGRAM

## 2020-11-25 PROCEDURE — 97530 THERAPEUTIC ACTIVITIES: CPT

## 2020-11-25 PROCEDURE — 63600175 PHARM REV CODE 636 W HCPCS: Performed by: STUDENT IN AN ORGANIZED HEALTH CARE EDUCATION/TRAINING PROGRAM

## 2020-11-25 PROCEDURE — 11000001 HC ACUTE MED/SURG PRIVATE ROOM

## 2020-11-25 PROCEDURE — 94761 N-INVAS EAR/PLS OXIMETRY MLT: CPT

## 2020-11-25 PROCEDURE — 80048 BASIC METABOLIC PNL TOTAL CA: CPT

## 2020-11-25 PROCEDURE — 99900035 HC TECH TIME PER 15 MIN (STAT)

## 2020-11-25 PROCEDURE — 36415 COLL VENOUS BLD VENIPUNCTURE: CPT

## 2020-11-25 PROCEDURE — 94660 CPAP INITIATION&MGMT: CPT

## 2020-11-25 PROCEDURE — 27000190 HC CPAP FULL FACE MASK W/VALVE

## 2020-11-25 PROCEDURE — 85025 COMPLETE CBC W/AUTO DIFF WBC: CPT

## 2020-11-25 PROCEDURE — 83735 ASSAY OF MAGNESIUM: CPT

## 2020-11-25 PROCEDURE — 99232 SBSQ HOSP IP/OBS MODERATE 35: CPT | Mod: GC,,, | Performed by: INTERNAL MEDICINE

## 2020-11-25 PROCEDURE — 99232 PR SUBSEQUENT HOSPITAL CARE,LEVL II: ICD-10-PCS | Mod: GC,,, | Performed by: INTERNAL MEDICINE

## 2020-11-25 PROCEDURE — 27000221 HC OXYGEN, UP TO 24 HOURS

## 2020-11-25 PROCEDURE — 97535 SELF CARE MNGMENT TRAINING: CPT

## 2020-11-25 RX ORDER — MAGNESIUM SULFATE HEPTAHYDRATE 40 MG/ML
2 INJECTION, SOLUTION INTRAVENOUS ONCE
Status: COMPLETED | OUTPATIENT
Start: 2020-11-25 | End: 2020-11-25

## 2020-11-25 RX ORDER — ENOXAPARIN SODIUM 150 MG/ML
150 INJECTION SUBCUTANEOUS
Status: DISCONTINUED | OUTPATIENT
Start: 2020-11-25 | End: 2020-11-25

## 2020-11-25 RX ADMIN — GABAPENTIN 300 MG: 300 CAPSULE ORAL at 09:11

## 2020-11-25 RX ADMIN — ASPIRIN 81 MG CHEWABLE TABLET 81 MG: 81 TABLET CHEWABLE at 09:11

## 2020-11-25 RX ADMIN — CARVEDILOL 6.25 MG: 6.25 TABLET, FILM COATED ORAL at 08:11

## 2020-11-25 RX ADMIN — MAGNESIUM SULFATE IN WATER 2 G: 40 INJECTION, SOLUTION INTRAVENOUS at 09:11

## 2020-11-25 RX ADMIN — MICONAZOLE NITRATE: 20 OINTMENT TOPICAL at 09:11

## 2020-11-25 RX ADMIN — MUPIROCIN: 20 OINTMENT TOPICAL at 09:11

## 2020-11-25 RX ADMIN — APIXABAN 5 MG: 5 TABLET, FILM COATED ORAL at 11:11

## 2020-11-25 RX ADMIN — CARVEDILOL 6.25 MG: 6.25 TABLET, FILM COATED ORAL at 09:11

## 2020-11-25 RX ADMIN — FERROUS SULFATE TAB EC 325 MG (65 MG FE EQUIVALENT) 325 MG: 325 (65 FE) TABLET DELAYED RESPONSE at 09:11

## 2020-11-25 RX ADMIN — AMIODARONE HYDROCHLORIDE 200 MG: 200 TABLET ORAL at 09:11

## 2020-11-25 RX ADMIN — ALLOPURINOL 300 MG: 300 TABLET ORAL at 09:11

## 2020-11-25 RX ADMIN — THERA TABS 1 TABLET: TAB at 09:11

## 2020-11-25 RX ADMIN — APIXABAN 5 MG: 5 TABLET, FILM COATED ORAL at 08:11

## 2020-11-25 RX ADMIN — ATORVASTATIN CALCIUM 40 MG: 20 TABLET, FILM COATED ORAL at 08:11

## 2020-11-25 RX ADMIN — MUPIROCIN: 20 OINTMENT TOPICAL at 08:11

## 2020-11-25 RX ADMIN — OXYBUTYNIN CHLORIDE 10 MG: 5 TABLET, EXTENDED RELEASE ORAL at 09:11

## 2020-11-25 RX ADMIN — GABAPENTIN 300 MG: 300 CAPSULE ORAL at 08:11

## 2020-11-25 NOTE — ASSESSMENT & PLAN NOTE
The estimated ejection fraction is 35-40%.    Plan:   -- Continue  patient's carvedilol 6.25 BID  -- Monitor BP, make sure cuff is right size

## 2020-11-25 NOTE — PLAN OF CARE
Pt declined by [facility], [facility], [facility] and [facility]. [facility]  is willing to accept pt back, but per MD notes, dtg does not want him to return. MD will update SW on expected dc date once dtg comes to hospital and pts mental status baseline is determined.    Kaitlin Cyr LCSW

## 2020-11-25 NOTE — ASSESSMENT & PLAN NOTE
Renal function at baseline Cr of 2.0 (within baseline 1.8-2.0), Cr today 1.5     Plan:   --Avoid nephrotoxic medication  --Renally dose medications  --Daily BMP

## 2020-11-25 NOTE — PT/OT/SLP PROGRESS
Occupational Therapy   Treatment    Name: Hemant Kennedy Jr.  MRN: [id number]  Admitting Diagnosis:  Acute encephalopathy       Recommendations:     Discharge Recommendations: nursing facility, skilled  Discharge Equipment Recommendations:  (TBD)  Barriers to discharge:  Other (Comment)(level of care required)    Assessment:     Hemant Kennedy Jr. is a 70 y.o. male with a medical diagnosis of Acute encephalopathy.  Patient confused throughout session. He required total assistance x 2 for rolling in bed to completed toilet task. Patient was set up with his breakfast at end of session. He would continue to benefit from skilled OT needs.  Performance deficits affecting function are weakness, impaired functional mobilty, impaired cognition, gait instability, impaired endurance, impaired self care skills, impaired balance, decreased lower extremity function, decreased ROM, decreased upper extremity function, decreased safety awareness.     Rehab Prognosis:  Good; patient would benefit from acute skilled OT services to address these deficits and reach maximum level of function.       Plan:     Patient to be seen 3 x/week to address the above listed problems via self-care/home management, therapeutic activities, therapeutic exercises  · Plan of Care Expires: 12/24/20  · Plan of Care Reviewed with: patient    Subjective     He reported he uses his wheelchair to use bathroom.     Pain/Comfort:  · Pain Rating 1: 0/10    Objective:     Communicated with: RN prior to session.  Patient found supine with pulse ox (continuous), telemetry, pressure relief boots, blood pressure cuff upon OT entry to room.    General Precautions: Standard, fall   Orthopedic Precautions:N/A   Braces: N/A     Occupational Performance:     Bed Mobility:    · Patient completed Rolling/Turning to Left with  total assistance and 2 persons  · Patient completed Rolling/Turning to Right with total assistance and 2 persons     Functional  Mobility/Transfers:  · Patient declined further transfers after toilet task     Activities of Daily Living:  · Feeding:  patient's tray was set-up with items opened   · Upper Body Dressing: moderate assistance for donning gown after patient doffed gown while supine in bed  · Toileting: total assistance completed supine in bed       [facility] 6 Click ADL: 9    Treatment & Education:  Patient provided reorientation to place/time   Patient not appropriate due to confusion for further education     Patient left supine with all lines intact, call button in reach and RN notifiedEducation:      GOALS:   Multidisciplinary Problems     Occupational Therapy Goals        Problem: Occupational Therapy Goal    Goal Priority Disciplines Outcome Interventions   Occupational Therapy Goal     OT, PT/OT Ongoing, Progressing    Description: Goals to be met by: 12/24/20     Patient will increase functional independence with ADLs by performing:    UE Dressing with Minimal Assistance.  LE Dressing with Minimal Assistance.  Grooming while seated edge of bed with Minimal Assistance.  Sitting at edge of bed x10 minutes with Minimal Assistance.  Rolling to Bilateral with Contact Guard Assistance.   Supine to sit with Minimal Assistance.  Toilet transfer to beside commode with Moderate Assistance.                     Time Tracking:     OT Date of Treatment: 11/25/20  OT Start Time: 0932  OT Stop Time: 0958  OT Total Time (min): 26 min    Billable Minutes:Self Care/Home Management 10  Therapeutic Activity 16    Jayla Ramon OT  11/25/2020

## 2020-11-25 NOTE — ASSESSMENT & PLAN NOTE
Patient with HX of Afib with CHADs score of 5 on amiodarone 200mg, eliquis 5mg at home. Patient is currently refusing PO medications, HD stable.     Plan:    --  Patient on A- fib but is rate controlled, will encourage PO intake of his amiodarone.   -- Continue apixaban 5 mg BID

## 2020-11-25 NOTE — PLAN OF CARE
Pt alert and oriented to self only. VSS. Conversing more at end of shift and more agreeable to care now. Had refused meds and blood sugar checks earlier. Consumed container of cereal with milk. Tolerated well. Skin care done; no changes from initial shift assessment. Denies any shortness of breath, pain or discomfort. Safety maintained. Will continue to monitor.

## 2020-11-25 NOTE — ASSESSMENT & PLAN NOTE
Patient was brought from [facility]. Daughter is not pleased with care and wishes another SNF for patient. Currently waiting for placement. [facility]. Daughter wants to hear from [facility] before accepting, which is currently under review.

## 2020-11-25 NOTE — ASSESSMENT & PLAN NOTE
70 year old male with NICM, HFrEF (EF of 35-40%), HTN, T2DM, AFib on DOAC and amiodarone, and obesity (BMI >50) who presents with AMS from skilled nursing facility. BMP remarkable for Na 157. VBG 7.39/39/31.   Imaging: CT head without acute intracranial process.   MICU consulted who recommended appropriate placement on floor for sodium correction  Suspect acute encephalopathy is 2/2 to hypernatremia.   Sodium wnl, 140. Patient more alert and responsive than on arrival, more cooperative to care. Oriented x1 to person. Answers correctly daughter's name, president and year of birth.  Able to follow simple commands.   Patient is experiencing fluctuations in mental status throughout the day, being more clear in early morning and at the end of day he starts to act out and refuse medication. Sodium levels have been wnl in the past 48 hours. Due to patient's age it is likely to take longer to reach baseline mental status. Patient is not presenting with elevated WBC, fevers or acute neurological decompensation concerning for infection or other structural/physiological cause for mental status.     Plan  -- Daughter will come and see patient and will discuss whether patient is near to his normal mental state.   -- Re-attempt to start PO medications.   -- Re start home apixaban 5mg BID if patient taking PO medications and d/c SQ enoxaparin   -- Daily CMP   -- Replace electrolytes as needed   -- Delirium precautions

## 2020-11-25 NOTE — PROGRESS NOTES
[facility] - ICU 14 [facility] Medicine  Progress Note    Patient Name: Hemant Kennedy Jr.  MRN: [id number]  Patient Class: IP- Inpatient   Admission Date: 11/20/2020  Length of Stay: 4 days  Attending Physician: Salena Caban MD  Primary Care Provider: Wicho Qiu MD        Subjective:     Principal Problem:Acute encephalopathy        HPI:  Hemant Kennedy is a 70 year old male with NICM, HFrEF (EF of 35-40%), HTN, T2DM, AFib on DOAC and amiodarone, h/o CVA w/ L hemineglect, h/o DVT, ERIC and obesity (BMI >50) who presents from [facility] for altered mental status.  Patient was brought in by EMS from [facility], where he has been living for the past 2 weeks, after being treated at [facility] 11/1-11/5 for rhabdomyolysis, acute metabolic encephalopathy 2/2 urosepsis with proteus miribalis, and panniculitis. EMS reports patient was refusing his nightly BIPAP at the nursing home.     History was limited due to patient's altered mental state. I contacted the nursing home to obtain collateral information but nursing staff wasn't available at the time.     In the ED, patient was afebrile, hemodynamically stable, and 95% on room air. BMP remarkable for Na 157. VBG 7.39/39/31. CT head without acute intracranial process.        Overview/Hospital Course:  Patient admitted to medicine for AMS with Na 157. Patient treated with D5W infusion, with adequate adjustment until coming down to 142. AMS with mild improvement, patient only oriented to self and still combative and difficult to redirect. Patient refusing PO medications since 11/22, transitioned to IV enoxaparin, CHADS2 Score of 5. Left upper extremity swelling noted in fingers up to arm, with skin pealing after removing IV. US ordered to r/o DVT and negative. Swelling improved after placing ice and removing IV. Bullae noted on left arm, wound care on board. Mental status improving, patient  more coopertive with care. Pending placement to SNF, as daughter not pleased with [facility]. According to daughter patient has not yet reached his baseline mental status, will get collateral information from nursing of how patient was prior to UTI where he also was experiencing mental status changes. Daughter will come in today.     Interval History: Overnight patient refusing treatment. At the moment concerned wether mental status changes are due to undetermined etiology vs hospital delirium. No signs of mental deterioration or focal neurological deficit, no signs of infection. Daughter will come in today and will have better idea of mental baseline.     Review of Systems   Constitutional: Positive for activity change. Negative for appetite change, chills and fever.   HENT: Negative for congestion, sore throat, trouble swallowing and voice change.    Eyes: Negative.    Respiratory: Negative for cough and shortness of breath.    Cardiovascular: Negative for chest pain and leg swelling.   Gastrointestinal: Negative for abdominal distention, abdominal pain, constipation, nausea and vomiting.   Endocrine: Negative.    Genitourinary: Negative.    Musculoskeletal: Negative for back pain and gait problem.   Skin: Positive for wound.   Neurological: Negative for weakness and headaches.   Psychiatric/Behavioral: Positive for confusion.     Objective:     Vital Signs (Most Recent):  Temp: 97.6 °F (36.4 °C) (11/25/20 1000)  Pulse: 85 (11/25/20 1043)  Resp: (!) 22 (11/25/20 0400)  BP: 124/65 (11/25/20 0400)  SpO2: 100 % (11/25/20 0400) Vital Signs (24h Range):  Temp:  [97.6 °F (36.4 °C)-98.3 °F (36.8 °C)] 97.6 °F (36.4 °C)  Pulse:  [71-90] 85  Resp:  [18-22] 22  SpO2:  [96 %-100 %] 100 %  BP: (124-131)/(65-92) 124/65     Weight: (!) 158.8 kg (350 lb)  Body mass index is 51.69 kg/m².    Intake/Output Summary (Last 24 hours) at 11/25/2020 1139  Last data filed at 11/25/2020 0400  Gross per 24 hour   Intake 1240 ml    Output --   Net 1240 ml      Physical Exam  Constitutional:       Appearance: He is obese.   HENT:      Head: Normocephalic and atraumatic.      Nose: Nose normal.      Mouth/Throat:      Mouth: Mucous membranes are moist.   Eyes:      Extraocular Movements: Extraocular movements intact.      Pupils: Pupils are equal, round, and reactive to light.   Neck:      Musculoskeletal: Normal range of motion.   Cardiovascular:      Rate and Rhythm: Normal rate. Rhythm irregular.   Pulmonary:      Breath sounds: Normal breath sounds.   Abdominal:      General: Bowel sounds are normal. There is no distension.      Palpations: Abdomen is soft.      Tenderness: There is no abdominal tenderness.   Musculoskeletal: Normal range of motion.         General: Swelling (left upper extremity  improving) present.   Skin:     General: Skin is warm and dry.      Capillary Refill: Capillary refill takes less than 2 seconds.      Comments: Bullae in left hand   Neurological:      Mental Status: He is alert. He is disoriented.      Comments: Oriented to self.   Patient more cooperative with care, but mental status fluctuates throughout the day          Significant Labs:   CBC:   Recent Labs   Lab 11/24/20  0803 11/25/20  0443   WBC 7.44 6.75   HGB 11.9* 12.6*   HCT 38.4* 41.4   * 149*     CMP:   Recent Labs   Lab 11/23/20  1159 11/24/20  0803 11/25/20  0443    140 145   K 3.2* 3.5 3.9    108 109   CO2 26 25 29   GLU 56* 79 71   BUN 16 14 14   CREATININE 1.5* 1.4 1.5*   CALCIUM 7.9* 7.9* 8.5*   ANIONGAP 9 7* 7*   EGFRNONAA 45.9* 49.8* 45.9*       Significant Imaging: I have reviewed and interpreted all pertinent imaging results/findings within the past 24 hours.      Assessment/Plan:      * Acute encephalopathy  70 year old male with NICM, HFrEF (EF of 35-40%), HTN, T2DM, AFib on DOAC and amiodarone, and obesity (BMI >50) who presents with AMS from skilled nursing facility. BMP remarkable for Na 157. VBG 7.39/39/31.    Imaging: CT head without acute intracranial process.   MICU consulted who recommended appropriate placement on floor for sodium correction  Suspect acute encephalopathy is 2/2 to hypernatremia.   Sodium wnl, 140. Patient more alert and responsive than on arrival, more cooperative to care. Oriented x1 to person. Answers correctly daughter's name, president and year of birth.  Able to follow simple commands.   Patient is experiencing fluctuations in mental status throughout the day, being more clear in early morning and at the end of day he starts to act out and refuse medication. Sodium levels have been wnl in the past 48 hours. Due to patient's age it is likely to take longer to reach baseline mental status. Patient is not presenting with elevated WBC, fevers or acute neurological decompensation concerning for infection or other structural/physiological cause for mental status.     Plan  -- Daughter will come and see patient and will discuss whether patient is near to his normal mental state.   -- Re-attempt to start PO medications.   -- Re start home apixaban 5mg BID if patient taking PO medications and d/c SQ enoxaparin   -- Daily CMP   -- Replace electrolytes as needed   -- Delirium precautions           Hypernatremia  Patient with Na of 157 on admission; serum osm 339. Free water deficit 6.6L   Suspect possibly due to dehydration due to poor intake vs GI losses, although unable to obtain collateral information from skilled nursing home as nurse was not available at the time of call.   EF noted at 35%.  Sodium wnl 11/23/20    Plan  - Discontinue IVF   - Daily CMP    - Correction Na goal 8-10mEq per 24 hours to prevent cerebral edema  - Encourage PO intake as patient most likely hypovolemic on arrival       Discharge planning issues  Patient was brought from [facility]. Daughter is not pleased with care and wishes another SNF for patient. Currently waiting for placement. Patient already accepted at  [facility]. Daughter wants to hear from [facility] before accepting, which is currently under review.     Edema of left upper extremity  Since arrival patient has been refusing PO medications, for which started enoxaparin IV on 11/23/20. Upper extremity with 2+ pitting edema from fingers up to proxima fore arm. Patient denies pain, CP, SOB.   Swelling improving after removing IV and ice placed     Plan:   -- Left Upper extremity US ordered to r/o DVT : negative  -- Removed IV from arm   -- Wound care notified for skin pealing  -- Will continue to monitor       Panniculitis  Noted in lower abdominal/ groin area. No erythema or purulence noted.    -Wound care consulted for skin breakdown, will follow recommendations     HLD (hyperlipidemia)  - Continue home statin       Nonischemic cardiomyopathy  The estimated ejection fraction is 35-40%.    Plan:   -- Continue  patient's carvedilol 6.25 BID  -- Monitor BP, make sure cuff is right size     Obstructive sleep apnea treated with continuous positive airway pressure (CPAP)  CPAP qHS      Type II diabetes mellitus  Well controlled. Hgb A1c 5.6. Patient doesn't appear to be on diabetic medication.   Episode of hypoglycemia today, responded well to D50% injection    Plan:   -- BG goal 140-180   -- No medical treatment required at this time   -- Encourage bedside assistance for meals and fluid intake       CKD (chronic kidney disease) stage 3, GFR 30-59 ml/min  Renal function at baseline Cr of 2.0 (within baseline 1.8-2.0), Cr today 1.5     Plan:   --Avoid nephrotoxic medication  --Renally dose medications  --Daily BMP      Atrial fibrillation, controlled  Patient with HX of Afib with CHADs score of 5 on amiodarone 200mg, eliquis 5mg at home. Patient is currently refusing PO medications, HD stable.     Plan:    --  Patient on A- fib but is rate controlled, will encourage PO intake of his amiodarone.   -- Continue apixaban 5 mg BID        VTE Risk Mitigation  (From admission, onward)         Ordered     apixaban tablet 5 mg  2 times daily      11/25/20 1102                Discharge Planning   JOHN: 11/25/2020     Code Status: Full Code   Is the patient medically ready for discharge?: No    Reason for patient still in hospital (select all that apply): Treatment and Pending disposition  Discharge Plan A: Skilled Nursing Facility                  Gi Fall MD  Department of Hospital Medicine   [facility] - ICU 14 [facility]

## 2020-11-25 NOTE — SUBJECTIVE & OBJECTIVE
Interval History: Overnight patient refusing treatment. At the moment concerned wether mental status changes are due to undetermined etiology vs hospital delirium. No signs of mental deterioration or focal neurological deficit, no signs of infection. Daughter will come in today and will have better idea of mental baseline.     Review of Systems   Constitutional: Positive for activity change. Negative for appetite change, chills and fever.   HENT: Negative for congestion, sore throat, trouble swallowing and voice change.    Eyes: Negative.    Respiratory: Negative for cough and shortness of breath.    Cardiovascular: Negative for chest pain and leg swelling.   Gastrointestinal: Negative for abdominal distention, abdominal pain, constipation, nausea and vomiting.   Endocrine: Negative.    Genitourinary: Negative.    Musculoskeletal: Negative for back pain and gait problem.   Skin: Positive for wound.   Neurological: Negative for weakness and headaches.   Psychiatric/Behavioral: Positive for confusion.     Objective:     Vital Signs (Most Recent):  Temp: 97.6 °F (36.4 °C) (11/25/20 1000)  Pulse: 85 (11/25/20 1043)  Resp: (!) 22 (11/25/20 0400)  BP: 124/65 (11/25/20 0400)  SpO2: 100 % (11/25/20 0400) Vital Signs (24h Range):  Temp:  [97.6 °F (36.4 °C)-98.3 °F (36.8 °C)] 97.6 °F (36.4 °C)  Pulse:  [71-90] 85  Resp:  [18-22] 22  SpO2:  [96 %-100 %] 100 %  BP: (124-131)/(65-92) 124/65     Weight: (!) 158.8 kg (350 lb)  Body mass index is 51.69 kg/m².    Intake/Output Summary (Last 24 hours) at 11/25/2020 1139  Last data filed at 11/25/2020 0400  Gross per 24 hour   Intake 1240 ml   Output --   Net 1240 ml      Physical Exam  Constitutional:       Appearance: He is obese.   HENT:      Head: Normocephalic and atraumatic.      Nose: Nose normal.      Mouth/Throat:      Mouth: Mucous membranes are moist.   Eyes:      Extraocular Movements: Extraocular movements intact.      Pupils: Pupils are equal, round, and reactive to  light.   Neck:      Musculoskeletal: Normal range of motion.   Cardiovascular:      Rate and Rhythm: Normal rate. Rhythm irregular.   Pulmonary:      Breath sounds: Normal breath sounds.   Abdominal:      General: Bowel sounds are normal. There is no distension.      Palpations: Abdomen is soft.      Tenderness: There is no abdominal tenderness.   Musculoskeletal: Normal range of motion.         General: Swelling (left upper extremity  improving) present.   Skin:     General: Skin is warm and dry.      Capillary Refill: Capillary refill takes less than 2 seconds.      Comments: Bullae in left hand   Neurological:      Mental Status: He is alert. He is disoriented.      Comments: Oriented to self.   Patient more cooperative with care, but mental status fluctuates throughout the day          Significant Labs:   CBC:   Recent Labs   Lab 11/24/20  0803 11/25/20  0443   WBC 7.44 6.75   HGB 11.9* 12.6*   HCT 38.4* 41.4   * 149*     CMP:   Recent Labs   Lab 11/23/20  1159 11/24/20  0803 11/25/20  0443    140 145   K 3.2* 3.5 3.9    108 109   CO2 26 25 29   GLU 56* 79 71   BUN 16 14 14   CREATININE 1.5* 1.4 1.5*   CALCIUM 7.9* 7.9* 8.5*   ANIONGAP 9 7* 7*   EGFRNONAA 45.9* 49.8* 45.9*       Significant Imaging: I have reviewed and interpreted all pertinent imaging results/findings within the past 24 hours.

## 2020-11-26 LAB
ANION GAP SERPL CALC-SCNC: 10 MMOL/L (ref 8–16)
BACTERIA BLD CULT: NORMAL
BACTERIA BLD CULT: NORMAL
BUN SERPL-MCNC: 14 MG/DL (ref 8–23)
CALCIUM SERPL-MCNC: 9 MG/DL (ref 8.7–10.5)
CHLORIDE SERPL-SCNC: 112 MMOL/L (ref 95–110)
CO2 SERPL-SCNC: 23 MMOL/L (ref 23–29)
CREAT SERPL-MCNC: 1.6 MG/DL (ref 0.5–1.4)
EST. GFR  (AFRICAN AMERICAN): 49 ML/MIN/1.73 M^2
EST. GFR  (NON AFRICAN AMERICAN): 42.4 ML/MIN/1.73 M^2
GLUCOSE SERPL-MCNC: 137 MG/DL (ref 70–110)
MAGNESIUM SERPL-MCNC: 2 MG/DL (ref 1.6–2.6)
POCT GLUCOSE: 163 MG/DL (ref 70–110)
POCT GLUCOSE: 213 MG/DL (ref 70–110)
POTASSIUM SERPL-SCNC: 4.7 MMOL/L (ref 3.5–5.1)
SODIUM SERPL-SCNC: 145 MMOL/L (ref 136–145)
TB INDURATION 48 - 72 HR READ: 0 MM

## 2020-11-26 PROCEDURE — 25000003 PHARM REV CODE 250: Performed by: STUDENT IN AN ORGANIZED HEALTH CARE EDUCATION/TRAINING PROGRAM

## 2020-11-26 PROCEDURE — 99900035 HC TECH TIME PER 15 MIN (STAT)

## 2020-11-26 PROCEDURE — 99232 SBSQ HOSP IP/OBS MODERATE 35: CPT | Mod: GC,,, | Performed by: HOSPITALIST

## 2020-11-26 PROCEDURE — 99232 PR SUBSEQUENT HOSPITAL CARE,LEVL II: ICD-10-PCS | Mod: GC,,, | Performed by: HOSPITALIST

## 2020-11-26 PROCEDURE — 94660 CPAP INITIATION&MGMT: CPT

## 2020-11-26 PROCEDURE — 80048 BASIC METABOLIC PNL TOTAL CA: CPT

## 2020-11-26 PROCEDURE — 27000221 HC OXYGEN, UP TO 24 HOURS

## 2020-11-26 PROCEDURE — 83735 ASSAY OF MAGNESIUM: CPT

## 2020-11-26 PROCEDURE — 20600001 HC STEP DOWN PRIVATE ROOM

## 2020-11-26 PROCEDURE — 36415 COLL VENOUS BLD VENIPUNCTURE: CPT

## 2020-11-26 PROCEDURE — 94761 N-INVAS EAR/PLS OXIMETRY MLT: CPT

## 2020-11-26 RX ADMIN — GABAPENTIN 300 MG: 300 CAPSULE ORAL at 09:11

## 2020-11-26 RX ADMIN — OXYBUTYNIN CHLORIDE 10 MG: 5 TABLET, EXTENDED RELEASE ORAL at 09:11

## 2020-11-26 RX ADMIN — GABAPENTIN 300 MG: 300 CAPSULE ORAL at 03:11

## 2020-11-26 RX ADMIN — APIXABAN 5 MG: 5 TABLET, FILM COATED ORAL at 08:11

## 2020-11-26 RX ADMIN — MICONAZOLE NITRATE: 20 OINTMENT TOPICAL at 09:11

## 2020-11-26 RX ADMIN — AMIODARONE HYDROCHLORIDE 200 MG: 200 TABLET ORAL at 09:11

## 2020-11-26 RX ADMIN — THERA TABS 1 TABLET: TAB at 09:11

## 2020-11-26 RX ADMIN — FERROUS SULFATE TAB EC 325 MG (65 MG FE EQUIVALENT) 325 MG: 325 (65 FE) TABLET DELAYED RESPONSE at 09:11

## 2020-11-26 RX ADMIN — ALLOPURINOL 300 MG: 300 TABLET ORAL at 09:11

## 2020-11-26 RX ADMIN — CARVEDILOL 6.25 MG: 6.25 TABLET, FILM COATED ORAL at 08:11

## 2020-11-26 RX ADMIN — CARVEDILOL 6.25 MG: 6.25 TABLET, FILM COATED ORAL at 09:11

## 2020-11-26 RX ADMIN — FERROUS SULFATE TAB EC 325 MG (65 MG FE EQUIVALENT) 325 MG: 325 (65 FE) TABLET DELAYED RESPONSE at 04:11

## 2020-11-26 RX ADMIN — GABAPENTIN 300 MG: 300 CAPSULE ORAL at 08:11

## 2020-11-26 RX ADMIN — APIXABAN 5 MG: 5 TABLET, FILM COATED ORAL at 09:11

## 2020-11-26 RX ADMIN — ATORVASTATIN CALCIUM 40 MG: 20 TABLET, FILM COATED ORAL at 08:11

## 2020-11-26 RX ADMIN — ASPIRIN 81 MG CHEWABLE TABLET 81 MG: 81 TABLET CHEWABLE at 09:11

## 2020-11-26 NOTE — ASSESSMENT & PLAN NOTE
70 year old male with NICM, HFrEF (EF of 35-40%), HTN, T2DM, AFib on DOAC and amiodarone, and obesity (BMI >50) who presents with AMS from skilled nursing facility. BMP remarkable for Na 157. VBG 7.39/39/31.   Imaging: CT head without acute intracranial process.   MICU consulted who recommended appropriate placement on floor for sodium correction  Suspect acute encephalopathy is 2/2 to hypernatremia.   Sodium wnl, 140. Patient more alert and responsive than on arrival, more cooperative to care. Oriented x1 to person. Answers correctly daughter's name, president and year of birth.  Able to follow simple commands.   Patient is experiencing fluctuations in mental status throughout the day, being more clear in early morning and at the end of day he starts to act out and refuse medication. Sodium levels have been wnl in the past 48 hours. Due to patient's age it is likely to take longer to reach baseline mental status. Patient is not presenting with elevated WBC, fevers or acute neurological decompensation concerning for infection or other structural/physiological cause for mental status.     Plan  -- Daughter expresses concern that her father has not returned to his previous baseline since his last hospitalization. She reports he had been independent, but that she was wanting for him to move in with her because of previous falls at home. She reports that she and her sister have had concerns since this hospitalization that he may not have been as independent as they had previously thought. Expressed to her concern that while we are hopeful that his mental status will continue to improve and may be exacerbated by current medical issues/hospital delerium, given his cardiovascular history pt may be beginning to show symptoms of vascular dementia   -- Obtain MRI brain non-contrast  -- Re-attempt to start PO medications.   -- Re start home apixaban 5mg BID if patient taking PO medications and d/c SQ enoxaparin   -- Daily CMP    -- Replace electrolytes as needed   -- Delirium precautions

## 2020-11-26 NOTE — ASSESSMENT & PLAN NOTE
Patient with Na of 157 on admission; serum osm 339. Free water deficit 6.6L   Suspect possibly due to dehydration due to poor intake vs GI losses, although unable to obtain collateral information from skilled nursing home as nurse was not available at the time of call.   EF noted at 35%.  Sodium wnl 11/23/20    Plan  - Discontinue IVF   - Daily CMP    - Encourage PO intake

## 2020-11-26 NOTE — ASSESSMENT & PLAN NOTE
Patient was brought from [facility]. Daughter is not pleased with care and wishes another SNF for patient. Currently waiting for placement. Patient already accepted at [facility]. Declined by several others, including [facility] (daughters 1st choice). Will work towards authorization for [place]

## 2020-11-26 NOTE — PROGRESS NOTES
[facility] Hospital Medicine  Progress Note    Patient Name: Hemant Kennedy Jr.  MRN: [id number]  Patient Class: IP- Inpatient   Admission Date: 11/20/2020  Length of Stay: 5 days  Attending Physician: Shira Lazcano*  Primary Care Provider: Wicho Qiu MD    [facility] Medicine Team: [facility] HOSP MED 4 Erickson Mcnulty DO    Subjective:     Principal Problem:Acute encephalopathy        HPI:  Hemant Kennedy is a 70 year old male with NICM, HFrEF (EF of 35-40%), HTN, T2DM, AFib on DOAC and amiodarone, h/o CVA w/ L hemineglect, h/o DVT, ERIC and obesity (BMI >50) who presents from [facility] for altered mental status.  Patient was brought in by EMS from [facility], where he has been living for the past 2 weeks, after being treated at [facility] 11/1-11/5 for rhabdomyolysis, acute metabolic encephalopathy 2/2 urosepsis with proteus miribalis, and panniculitis. EMS reports patient was refusing his nightly BIPAP at the nursing home.     History was limited due to patient's altered mental state. I contacted the nursing home to obtain collateral information but nursing staff wasn't available at the time.     In the ED, patient was afebrile, hemodynamically stable, and 95% on room air. BMP remarkable for Na 157. VBG 7.39/39/31. CT head without acute intracranial process.        Overview/Hospital Course:  Patient admitted to medicine for AMS with Na 157. Patient treated with D5W infusion, with adequate adjustment until coming down to 142. AMS with mild improvement, patient only oriented to self and still combative and difficult to redirect. Patient refusing PO medications since 11/22, transitioned to IV enoxaparin, CHADS2 Score of 5. Left upper extremity swelling noted in fingers up to arm, with skin pealing after removing IV. US ordered to r/o DVT and negative. Swelling improved after placing ice and removing IV. Bullae noted on left  arm, wound care on board. Mental status improving, patient more coopertive with care. Pending placement to SNF, as daughter not pleased with [facility]. According to daughter patient has not yet reached his baseline mental status, will get collateral information from nursing of how patient was prior to UTI where he also was experiencing mental status changes. Daughter concerned that her father has not returned to his previous baseline since his previous hospitalization early November.     Interval History: Overnight pt more calm and interactive with staff. No signs of mental deterioration or focal neurological deficit, no signs of infection. Took medication yesterday evening and this AM. Daughter at bedside last night and this afternoon.     Review of Systems   Constitutional: Positive for activity change. Negative for appetite change, chills and fever.   HENT: Negative for congestion, sore throat, trouble swallowing and voice change.    Eyes: Negative.    Respiratory: Negative for cough and shortness of breath.    Cardiovascular: Negative for chest pain and leg swelling.   Gastrointestinal: Negative for abdominal distention, abdominal pain, constipation, nausea and vomiting.   Endocrine: Negative.    Genitourinary: Negative.    Musculoskeletal: Negative for back pain and gait problem.   Skin: Positive for wound.   Neurological: Negative for weakness and headaches.   Psychiatric/Behavioral: Positive for confusion.     Objective:     Vital Signs (Most Recent):  Temp: 97.6 °F (36.4 °C) (11/26/20 0900)  Pulse: 76 (11/26/20 1453)  Resp: 20 (11/26/20 1453)  BP: 103/60 (11/26/20 0400)  SpO2: (!) 94 % (11/26/20 1453) Vital Signs (24h Range):  Temp:  [97.6 °F (36.4 °C)-98.6 °F (37 °C)] 97.6 °F (36.4 °C)  Pulse:  [74-78] 76  Resp:  [18-22] 20  SpO2:  [92 %-99 %] 94 %  BP: (103-132)/(60-65) 103/60     Weight: (!) 158.8 kg (350 lb)  Body mass index is 51.69 kg/m².  No intake or output data in the 24 hours ending  11/26/20 1513   Physical Exam  Constitutional:       Appearance: He is obese.   HENT:      Head: Normocephalic and atraumatic.      Nose: Nose normal.      Mouth/Throat:      Mouth: Mucous membranes are moist.   Eyes:      Extraocular Movements: Extraocular movements intact.      Pupils: Pupils are equal, round, and reactive to light.   Neck:      Musculoskeletal: Normal range of motion.   Cardiovascular:      Rate and Rhythm: Normal rate. Rhythm irregular.   Pulmonary:      Breath sounds: Normal breath sounds.   Abdominal:      General: Bowel sounds are normal. There is no distension.      Palpations: Abdomen is soft.      Tenderness: There is no abdominal tenderness.   Musculoskeletal: Normal range of motion.         General: Swelling (left upper extremity  improving) present.   Skin:     General: Skin is warm and dry.      Capillary Refill: Capillary refill takes less than 2 seconds.      Comments: Bullae in left hand   Neurological:      Mental Status: He is alert. He is disoriented.      Comments: Oriented to self.   Patient more cooperative with care, but mental status fluctuates throughout the day          Significant Labs:   CBC:   Recent Labs   Lab 11/25/20  0443   WBC 6.75   HGB 12.6*   HCT 41.4   *     CMP:   Recent Labs   Lab 11/25/20  0443 11/26/20  0911    145   K 3.9 4.7    112*   CO2 29 23   GLU 71 137*   BUN 14 14   CREATININE 1.5* 1.6*   CALCIUM 8.5* 9.0   ANIONGAP 7* 10   EGFRNONAA 45.9* 42.4*       Significant Imaging: I have reviewed and interpreted all pertinent imaging results/findings within the past 24 hours.      Assessment/Plan:      * Acute encephalopathy  70 year old male with NICM, HFrEF (EF of 35-40%), HTN, T2DM, AFib on DOAC and amiodarone, and obesity (BMI >50) who presents with AMS from skilled nursing facility. BMP remarkable for Na 157. VBG 7.39/39/31.   Imaging: CT head without acute intracranial process.   MICU consulted who recommended appropriate placement  on floor for sodium correction  Suspect acute encephalopathy is 2/2 to hypernatremia.   Sodium wnl, 140. Patient more alert and responsive than on arrival, more cooperative to care. Oriented x1 to person. Answers correctly daughter's name, president and year of birth.  Able to follow simple commands.   Patient is experiencing fluctuations in mental status throughout the day, being more clear in early morning and at the end of day he starts to act out and refuse medication. Sodium levels have been wnl in the past 48 hours. Due to patient's age it is likely to take longer to reach baseline mental status. Patient is not presenting with elevated WBC, fevers or acute neurological decompensation concerning for infection or other structural/physiological cause for mental status.     Plan  -- Daughter expresses concern that her father has not returned to his previous baseline since his last hospitalization. She reports he had been independent, but that she was wanting for him to move in with her because of previous falls at home. She reports that she and her sister have had concerns since this hospitalization that he may not have been as independent as they had previously thought. Expressed to her concern that while we are hopeful that his mental status will continue to improve and may be exacerbated by current medical issues/hospital delerium, given his cardiovascular history pt may be beginning to show symptoms of vascular dementia   -- Obtain MRI brain non-contrast  -- Re-attempt to start PO medications.   -- Re start home apixaban 5mg BID if patient taking PO medications and d/c SQ enoxaparin   -- Daily CMP   -- Replace electrolytes as needed   -- Delirium precautions           Discharge planning issues  Patient was brought from [facility]. Daughter is not pleased with care and wishes another SNF for patient. Currently waiting for placement. Patient already accepted at [facility]. Declined by  several others, including [facility] (daughters 1st choice). Will work towards authorization for [facility]    Edema of left upper extremity  Since arrival patient has been refusing PO medications, for which started enoxaparin IV on 11/23/20. Upper extremity with 2+ pitting edema from fingers up to proxima fore arm. Patient denies pain, CP, SOB.   Swelling improving after removing IV and ice placed     Plan:   -- Left Upper extremity US ordered to r/o DVT : negative  -- Removed IV from arm   -- Wound care notified for skin pealing  -- Will continue to monitor       Panniculitis  Noted in lower abdominal/ groin area. No erythema or purulence noted.    -Wound care consulted for skin breakdown, agree with recommendations     Hypernatremia  Patient with Na of 157 on admission; serum osm 339. Free water deficit 6.6L   Suspect possibly due to dehydration due to poor intake vs GI losses, although unable to obtain collateral information from skilled nursing home as nurse was not available at the time of call.   EF noted at 35%.  Sodium wnl 11/23/20    Plan  - Discontinue IVF   - Daily CMP    - Encourage PO intake     HLD (hyperlipidemia)  - Continue home statin       Nonischemic cardiomyopathy  The estimated ejection fraction is 35-40%.    Plan:   -- Continue  patient's carvedilol 6.25 BID  -- Monitor BP, make sure cuff is right size     Obstructive sleep apnea treated with continuous positive airway pressure (CPAP)  CPAP qHS      Type II diabetes mellitus  Well controlled. Hgb A1c 5.6. Patient doesn't appear to be on diabetic medication.   Episode of hypoglycemia today, responded well to D50% injection    Plan:   -- BG goal 140-180   -- No medical treatment required at this time   -- Encourage bedside assistance for meals and fluid intake       CKD (chronic kidney disease) stage 3, GFR 30-59 ml/min  Renal function at baseline Cr of 2.0 (within baseline 1.8-2.0), Cr today 1.5     Plan:   --Avoid nephrotoxic  medication  --Renally dose medications  --Daily BMP      Atrial fibrillation, controlled  Patient with HX of Afib with CHADs score of 5 on amiodarone 200mg, eliquis 5mg at home. Patient is currently refusing PO medications, HD stable.     Plan:    --  Patient on A- fib but is rate controlled, will encourage PO intake of his amiodarone.   -- Continue apixaban 5 mg BID        VTE Risk Mitigation (From admission, onward)         Ordered     apixaban tablet 5 mg  2 times daily      11/25/20 1102                Discharge Planning   JOHN: 11/26/2020     Code Status: Full Code   Is the patient medically ready for discharge?: No    Reason for patient still in hospital (select all that apply): Imaging and Pending disposition  Discharge Plan A: Skilled Nursing Facility                  Erickson Mcnulty DO  Department of Hospital Medicine   [facility]

## 2020-11-26 NOTE — SUBJECTIVE & OBJECTIVE
Interval History: Overnight pt more calm and interactive with staff. No signs of mental deterioration or focal neurological deficit, no signs of infection. Took medication yesterday evening and this AM. Daughter at bedside last night and this afternoon.     Review of Systems   Constitutional: Positive for activity change. Negative for appetite change, chills and fever.   HENT: Negative for congestion, sore throat, trouble swallowing and voice change.    Eyes: Negative.    Respiratory: Negative for cough and shortness of breath.    Cardiovascular: Negative for chest pain and leg swelling.   Gastrointestinal: Negative for abdominal distention, abdominal pain, constipation, nausea and vomiting.   Endocrine: Negative.    Genitourinary: Negative.    Musculoskeletal: Negative for back pain and gait problem.   Skin: Positive for wound.   Neurological: Negative for weakness and headaches.   Psychiatric/Behavioral: Positive for confusion.     Objective:     Vital Signs (Most Recent):  Temp: 97.6 °F (36.4 °C) (11/26/20 0900)  Pulse: 76 (11/26/20 1453)  Resp: 20 (11/26/20 1453)  BP: 103/60 (11/26/20 0400)  SpO2: (!) 94 % (11/26/20 1453) Vital Signs (24h Range):  Temp:  [97.6 °F (36.4 °C)-98.6 °F (37 °C)] 97.6 °F (36.4 °C)  Pulse:  [74-78] 76  Resp:  [18-22] 20  SpO2:  [92 %-99 %] 94 %  BP: (103-132)/(60-65) 103/60     Weight: (!) 158.8 kg (350 lb)  Body mass index is 51.69 kg/m².  No intake or output data in the 24 hours ending 11/26/20 1513   Physical Exam  Constitutional:       Appearance: He is obese.   HENT:      Head: Normocephalic and atraumatic.      Nose: Nose normal.      Mouth/Throat:      Mouth: Mucous membranes are moist.   Eyes:      Extraocular Movements: Extraocular movements intact.      Pupils: Pupils are equal, round, and reactive to light.   Neck:      Musculoskeletal: Normal range of motion.   Cardiovascular:      Rate and Rhythm: Normal rate. Rhythm irregular.   Pulmonary:      Breath sounds: Normal breath  sounds.   Abdominal:      General: Bowel sounds are normal. There is no distension.      Palpations: Abdomen is soft.      Tenderness: There is no abdominal tenderness.   Musculoskeletal: Normal range of motion.         General: Swelling (left upper extremity  improving) present.   Skin:     General: Skin is warm and dry.      Capillary Refill: Capillary refill takes less than 2 seconds.      Comments: Bullae in left hand   Neurological:      Mental Status: He is alert. He is disoriented.      Comments: Oriented to self.   Patient more cooperative with care, but mental status fluctuates throughout the day          Significant Labs:   CBC:   Recent Labs   Lab 11/25/20  0443   WBC 6.75   HGB 12.6*   HCT 41.4   *     CMP:   Recent Labs   Lab 11/25/20  0443 11/26/20  0911    145   K 3.9 4.7    112*   CO2 29 23   GLU 71 137*   BUN 14 14   CREATININE 1.5* 1.6*   CALCIUM 8.5* 9.0   ANIONGAP 7* 10   EGFRNONAA 45.9* 42.4*       Significant Imaging: I have reviewed and interpreted all pertinent imaging results/findings within the past 24 hours.

## 2020-11-26 NOTE — ASSESSMENT & PLAN NOTE
Noted in lower abdominal/ groin area. No erythema or purulence noted.    -Wound care consulted for skin breakdown, agree with recommendations

## 2020-11-26 NOTE — NURSING
Pt arrived to [id number] from [facility]. VSS. Pt turned and waffle mattress placed on bed. Pt personal belongings in include two gold rings, glasses, iphone and .  Daughter at bedside. Pt and family oriented to room. Safety checks preformed. Call light in reach.

## 2020-11-27 LAB
ANION GAP SERPL CALC-SCNC: 10 MMOL/L (ref 8–16)
BUN SERPL-MCNC: 13 MG/DL (ref 8–23)
CALCIUM SERPL-MCNC: 8.4 MG/DL (ref 8.7–10.5)
CHLORIDE SERPL-SCNC: 108 MMOL/L (ref 95–110)
CO2 SERPL-SCNC: 23 MMOL/L (ref 23–29)
CREAT SERPL-MCNC: 1.5 MG/DL (ref 0.5–1.4)
EST. GFR  (AFRICAN AMERICAN): 53 ML/MIN/1.73 M^2
EST. GFR  (NON AFRICAN AMERICAN): 45.9 ML/MIN/1.73 M^2
GLUCOSE SERPL-MCNC: 91 MG/DL (ref 70–110)
MAGNESIUM SERPL-MCNC: 1.9 MG/DL (ref 1.6–2.6)
POCT GLUCOSE: 104 MG/DL (ref 70–110)
POCT GLUCOSE: 113 MG/DL (ref 70–110)
POCT GLUCOSE: 157 MG/DL (ref 70–110)
POTASSIUM SERPL-SCNC: 3.8 MMOL/L (ref 3.5–5.1)
SODIUM SERPL-SCNC: 141 MMOL/L (ref 136–145)

## 2020-11-27 PROCEDURE — 25000003 PHARM REV CODE 250: Performed by: STUDENT IN AN ORGANIZED HEALTH CARE EDUCATION/TRAINING PROGRAM

## 2020-11-27 PROCEDURE — 20600001 HC STEP DOWN PRIVATE ROOM

## 2020-11-27 PROCEDURE — 97535 SELF CARE MNGMENT TRAINING: CPT

## 2020-11-27 PROCEDURE — 97530 THERAPEUTIC ACTIVITIES: CPT

## 2020-11-27 PROCEDURE — 94660 CPAP INITIATION&MGMT: CPT

## 2020-11-27 PROCEDURE — 97110 THERAPEUTIC EXERCISES: CPT

## 2020-11-27 PROCEDURE — 99231 PR SUBSEQUENT HOSPITAL CARE,LEVL I: ICD-10-PCS | Mod: GC,,, | Performed by: HOSPITALIST

## 2020-11-27 PROCEDURE — 99231 SBSQ HOSP IP/OBS SF/LOW 25: CPT | Mod: GC,,, | Performed by: HOSPITALIST

## 2020-11-27 PROCEDURE — 36415 COLL VENOUS BLD VENIPUNCTURE: CPT

## 2020-11-27 PROCEDURE — 99900035 HC TECH TIME PER 15 MIN (STAT)

## 2020-11-27 PROCEDURE — 80048 BASIC METABOLIC PNL TOTAL CA: CPT

## 2020-11-27 PROCEDURE — 83735 ASSAY OF MAGNESIUM: CPT

## 2020-11-27 RX ADMIN — OXYBUTYNIN CHLORIDE 10 MG: 5 TABLET, EXTENDED RELEASE ORAL at 10:11

## 2020-11-27 RX ADMIN — FERROUS SULFATE TAB EC 325 MG (65 MG FE EQUIVALENT) 325 MG: 325 (65 FE) TABLET DELAYED RESPONSE at 10:11

## 2020-11-27 RX ADMIN — GABAPENTIN 300 MG: 300 CAPSULE ORAL at 10:11

## 2020-11-27 RX ADMIN — AMIODARONE HYDROCHLORIDE 200 MG: 200 TABLET ORAL at 10:11

## 2020-11-27 RX ADMIN — THERA TABS 1 TABLET: TAB at 10:11

## 2020-11-27 RX ADMIN — APIXABAN 5 MG: 5 TABLET, FILM COATED ORAL at 10:11

## 2020-11-27 RX ADMIN — ALLOPURINOL 300 MG: 300 TABLET ORAL at 10:11

## 2020-11-27 RX ADMIN — GABAPENTIN 300 MG: 300 CAPSULE ORAL at 02:11

## 2020-11-27 RX ADMIN — CARVEDILOL 6.25 MG: 6.25 TABLET, FILM COATED ORAL at 10:11

## 2020-11-27 RX ADMIN — FERROUS SULFATE TAB EC 325 MG (65 MG FE EQUIVALENT) 325 MG: 325 (65 FE) TABLET DELAYED RESPONSE at 05:11

## 2020-11-27 RX ADMIN — ASPIRIN 81 MG CHEWABLE TABLET 81 MG: 81 TABLET CHEWABLE at 10:11

## 2020-11-27 RX ADMIN — MICONAZOLE NITRATE: 20 OINTMENT TOPICAL at 10:11

## 2020-11-27 NOTE — NURSING
"Patient refused dressing changes and scheduled morning meds. Patient raised voiced and said "I'm not taking anything until my daughter gets here. I don't trust none of y'all".   "

## 2020-11-27 NOTE — PLAN OF CARE
11/27/20 1155   Discharge Reassessment   Assessment Type Discharge Planning Reassessment   Provided patient/caregiver education on the expected discharge date and the discharge plan Yes   Do you have any problems affording any of your prescribed medications? No   Discharge Plan A Skilled Nursing Facility   Discharge Plan B Home Health   DME Needed Upon Discharge  other (see comments)  (TBD)   Anticipated Discharge Disposition SNF   Post-Acute Status   Post-Acute Placement Status Referrals Sent       Solange De Jesus MPH, RN, CM  Ext. [phone]

## 2020-11-27 NOTE — PLAN OF CARE
Problem: Fall Injury Risk  Goal: Absence of Fall and Fall-Related Injury  Outcome: Ongoing, Progressing     Problem: Bariatric Environmental Safety  Goal: Safety Maintained with Care  Outcome: Ongoing, Progressing  Intervention: Promote Safety and Comfort  Flowsheets (Taken 11/27/2020 0500)  Bariatric Safety: specialty bed utilized     Problem: Wound  Goal: Optimal Wound Healing  Outcome: Ongoing, Progressing  Intervention: Promote Effective Wound Healing  Flowsheets (Taken 11/27/2020 0500)  Oral Nutrition Promotion: calorie dense foods provided  Pain Management Interventions: pillow support provided    Patient lying in bed in no acute distress. On room air. Glasses are on. IV to the right arm is saline locked. Due meds given whole and without difficulty. Safety maintained over night. Bed in low. Call light in reach. Cell phone and  within reach. Will continue to monitor.

## 2020-11-27 NOTE — ASSESSMENT & PLAN NOTE
Patient was brought from [facility]. Daughter is not pleased with care and wishes another SNF for patient. Currently waiting for placement. Patient already accepted at [facility]. Declined by several others, including [facility] (daughters 1st choice). Will work towards authorization for [facility]    --Patient accepted at [facility], pending placement.

## 2020-11-27 NOTE — PLAN OF CARE
GERARDO spoke with Pt's daughter Keyla [phone] in reference to status and advised [facility] will not be able to accept pt.  Pt's daughter agreed to send referrals to [place] [facility] and [facility].     GERARDO sent additional referrals to the following facilities via :  [facility] and [place] [facility].  GERARDO will continue to monitor case status.         11/27/20 1514   Post-Acute Status   Post-Acute Authorization Placement   Post-Acute Placement Status Referrals Sent     Lorraine Mckinley LMSW  PRN-  [facility]  Ext. [phone]

## 2020-11-27 NOTE — PLAN OF CARE
Problem: Adult Inpatient Plan of Care  Goal: Plan of Care Review  Outcome: Ongoing, Progressing  Flowsheets (Taken 11/27/2020 1700)  Plan of Care Reviewed With: patient   Pt remains free from falls/injury. No acute events during shift. VSS, NAD. Bed in lowest position, wheels locked, side rails up times 3, call light w/in reach, bed alarm on. Room clear of obstacles. Pt BG monitored AC/HS as per order. Pt covered with insulin aspart per low sliding scale. No S&S of hypo/hyperglycemia noticed. Pt remains afibrile during shift. Skin assessment completed. All skin breakdown assessed and documented, wound care done per order. Pillows used for repositioning. Pt oriented to self, place and time, but confused and forgetful at times. MRI brain completed this shift. WCTM.

## 2020-11-27 NOTE — ASSESSMENT & PLAN NOTE
70 year old male with NICM, HFrEF (EF of 35-40%), HTN, T2DM, AFib on DOAC and amiodarone, and obesity (BMI >50) who presents with AMS from skilled nursing facility. BMP remarkable for Na 157. VBG 7.39/39/31.   Imaging: CT head without acute intracranial process.   MICU consulted who recommended appropriate placement on floor for sodium correction  Suspect acute encephalopathy is 2/2 to hypernatremia.   Sodium wnl, 140. Patient more alert and responsive than on arrival, more cooperative to care. Oriented x1 to person. Answers correctly daughter's name, president and year of birth.  Able to follow simple commands.   Patient is experiencing fluctuations in mental status throughout the day, being more clear in early morning and at the end of day he starts to act out and refuse medication. Sodium levels have been wnl in the past 48 hours. Due to patient's age it is likely to take longer to reach baseline mental status. Patient is not presenting with elevated WBC, fevers or acute neurological decompensation concerning for infection or other structural/physiological cause for mental status.   Patient oriented x3 today.     Plan  -- Daughter expresses concern that her father has not returned to his previous baseline since his last hospitalization. She reports he had been independent, but that she was wanting for him to move in with her because of previous falls at home. She reports that she and her sister have had concerns since this hospitalization that he may not have been as independent as they had previously thought. Expressed to her concern that while we are hopeful that his mental status will continue to improve and may be exacerbated by current medical issues/hospital delerium, given his cardiovascular history pt may be beginning to show symptoms of vascular dementia   -- Obtain MRI brain non-contrast: no acute findings, chronic encephalomalacia from prior infarcts.   -- Re-attempt to start PO medications.   -- Re  start home apixaban 5mg BID if patient taking PO medications and d/c SQ enoxaparin   -- Daily CMP   -- Replace electrolytes as needed   -- Delirium precautions

## 2020-11-27 NOTE — PROGRESS NOTES
[facility] Hospital Medicine  Progress Note    Patient Name: Hemant Kennedy Jr.  MRN: [id number]  Patient Class: IP- Inpatient   Admission Date: 11/20/2020  Length of Stay: 6 days  Attending Physician: Shira Lazcano*  Primary Care Provider: Wicho Qiu MD    [facility] Medicine Team: [facility] 4 Gi Fall MD    Subjective:     Principal Problem:Acute encephalopathy        HPI:  Hemant Kennedy is a 70 year old male with NICM, HFrEF (EF of 35-40%), HTN, T2DM, AFib on DOAC and amiodarone, h/o CVA w/ L hemineglect, h/o DVT, ERIC and obesity (BMI >50) who presents from [facility] for altered mental status.  Patient was brought in by EMS from [facility], where he has been living for the past 2 weeks, after being treated at [facility] 11/1-11/5 for rhabdomyolysis, acute metabolic encephalopathy 2/2 urosepsis with proteus miribalis, and panniculitis. EMS reports patient was refusing his nightly BIPAP at the nursing home.     History was limited due to patient's altered mental state. I contacted the nursing home to obtain collateral information but nursing staff wasn't available at the time.     In the ED, patient was afebrile, hemodynamically stable, and 95% on room air. BMP remarkable for Na 157. VBG 7.39/39/31. CT head without acute intracranial process.        Overview/Hospital Course:  Patient admitted to medicine for AMS with Na 157. Patient treated with D5W infusion, with adequate adjustment until coming down to 142. AMS with mild improvement, patient only oriented to self and still combative and difficult to redirect. Patient refusing PO medications since 11/22, transitioned to IV enoxaparin, CHADS2 Score of 5. Left upper extremity swelling noted in fingers up to arm, with skin pealing after removing IV. US ordered to r/o DVT and negative. Swelling improved after placing ice and removing IV. Bullae noted  on left arm, wound care on board. Mental status improving, patient more coopertive with care. Pending placement to SNF, as daughter not pleased with [facility]. According to daughter patient has not yet reached his baseline mental status, will get collateral information from nursing of how patient was prior to UTI where he also was experiencing mental status changes. Daughter concerned that her father has not returned to his previous baseline since his previous hospitalization early November. MRI completed, no new acute findings, remote infarcts and age related atrophy and encephalomalacia secondary to infarcts. Pending placement on [facility].     Interval History: NAEO. Patient oriented x3 today and more cooperative with care. MRI with no acute findings. Currently waiting for placement on SNF.     Review of Systems   Constitutional: Negative for appetite change, chills and fever.   HENT: Negative for congestion, sore throat, trouble swallowing and voice change.    Eyes: Negative.    Respiratory: Negative for cough and shortness of breath.    Cardiovascular: Negative for chest pain and leg swelling.   Gastrointestinal: Negative for abdominal distention, abdominal pain, constipation, nausea and vomiting.   Endocrine: Negative.    Genitourinary: Negative.    Musculoskeletal: Negative for back pain and gait problem.   Skin: Negative.    Neurological: Negative for weakness and headaches.   Psychiatric/Behavioral: Negative.      Objective:     Vital Signs (Most Recent):  Temp: 98.2 °F (36.8 °C) (11/27/20 0755)  Pulse: 79 (11/27/20 0755)  Resp: 16 (11/27/20 0755)  BP: (!) 105/55 (11/27/20 0755)  SpO2: 96 % (11/27/20 0755) Vital Signs (24h Range):  Temp:  [97 °F (36.1 °C)-98.2 °F (36.8 °C)] 98.2 °F (36.8 °C)  Pulse:  [70-84] 79  Resp:  [16-20] 16  SpO2:  [92 %-100 %] 96 %  BP: (105-121)/(53-70) 105/55     Weight: (!) 158.8 kg (350 lb)  Body mass index is 51.69 kg/m².    Intake/Output Summary (Last 24 hours)  at 11/27/2020 1151  Last data filed at 11/27/2020 0500  Gross per 24 hour   Intake 800 ml   Output --   Net 800 ml      Physical Exam  Constitutional:       Appearance: Normal appearance.   HENT:      Head: Normocephalic and atraumatic.      Nose: Nose normal.      Mouth/Throat:      Mouth: Mucous membranes are moist.   Eyes:      Extraocular Movements: Extraocular movements intact.      Pupils: Pupils are equal, round, and reactive to light.   Neck:      Musculoskeletal: Normal range of motion.   Cardiovascular:      Rate and Rhythm: Normal rate and regular rhythm.   Pulmonary:      Breath sounds: Normal breath sounds.   Abdominal:      General: Bowel sounds are normal. There is no distension.      Palpations: Abdomen is soft.      Tenderness: There is no abdominal tenderness.   Musculoskeletal: Normal range of motion.         General: No swelling.   Skin:     General: Skin is warm.      Capillary Refill: Capillary refill takes less than 2 seconds.      Comments: Bullae on left arm Swelling noted.   Neurological:      General: No focal deficit present.      Mental Status: He is alert and oriented to person, place, and time.   Psychiatric:         Mood and Affect: Mood normal.         Significant Labs:   CBC: No results for input(s): WBC, HGB, HCT, PLT in the last 48 hours.  CMP:   Recent Labs   Lab 11/26/20  0911 11/27/20  0707    141   K 4.7 3.8   * 108   CO2 23 23   * 91   BUN 14 13   CREATININE 1.6* 1.5*   CALCIUM 9.0 8.4*   ANIONGAP 10 10   EGFRNONAA 42.4* 45.9*       Significant Imaging: I have reviewed and interpreted all pertinent imaging results/findings within the past 24 hours.     MRI Brain Without Contrast  Order: [id number]  Status:  Final result   Visible to patient:  No (not released) Next appt:  None  Details    Reading Physician Reading Date Result Priority   Lit AShanti Amin DO  [phone]  [phone] 11/27/2020 Routine      Narrative & Impression     EXAMINATION:  MRI BRAIN  WITHOUT CONTRAST     CLINICAL HISTORY:  Suspected vascular dementia. Initial imaging;     TECHNIQUE:  Sagittal and axial T1, axial T2, axial FLAIR, axial gradient and axial diffusion imaging of the whole brain without contrast.     COMPARISON:  CT 11/20/2020     FINDINGS:  There is mild moderate encephalomalacia of the left inferior frontal lobe with additional area of encephalomalacia in the right posterior frontal/parietal lobe and right occipital lobe suggestive for prior infarcts.  There is T2 flair signal hyperintensity underlying these regions concerning for gliosis and scarring.  There is no restricted diffusion to suggest acute or recent infarction.     There is superimposed generalized cerebral volume loss most prominent involving the frontal lobes with compensatory enlargement of the ventricles sulci and cisterns without hydrocephalus.  Major intracranial T2 flow voids are present.  Remote operative change from left cataract repair. small focus of susceptibility in the right parietal lobe without mass effect or hyperdensity on CT most compatible with remote hemorrhage.     Impression:     Multifocal mild moderate size regions of encephalomalacia left inferior frontal, right posterior frontal parietal and right occipital lobes compatible with prior infarcts.     T2 flair signal hyperintensity underlying these regions compatible with gliosis and scarring.     There is superimposed age advanced cerebral volume loss with compensatory enlargement ventricles sulci and cisterns without hydrocephalus.     There is a subcentimeter remote left cerebellar infarction.     In addition there is a small remote hemorrhage within the right parietal lobe.     Clinical correlation and further evaluation as warranted.        Electronically signed by: Lit Amin DO  Date:                                            11/27/2020  Time:                                           10:35             Last Resulted: 11/27/20 10:35                  Assessment/Plan:      * Acute encephalopathy  70 year old male with NICM, HFrEF (EF of 35-40%), HTN, T2DM, AFib on DOAC and amiodarone, and obesity (BMI >50) who presents with AMS from skilled nursing facility. BMP remarkable for Na 157. VBG 7.39/39/31.   Imaging: CT head without acute intracranial process.   MICU consulted who recommended appropriate placement on floor for sodium correction  Suspect acute encephalopathy is 2/2 to hypernatremia.   Sodium wnl, 140. Patient more alert and responsive than on arrival, more cooperative to care. Oriented x1 to person. Answers correctly daughter's name, president and year of birth.  Able to follow simple commands.   Patient is experiencing fluctuations in mental status throughout the day, being more clear in early morning and at the end of day he starts to act out and refuse medication. Sodium levels have been wnl in the past 48 hours. Due to patient's age it is likely to take longer to reach baseline mental status. Patient is not presenting with elevated WBC, fevers or acute neurological decompensation concerning for infection or other structural/physiological cause for mental status.   Patient oriented x3 today.     Plan  -- Daughter expresses concern that her father has not returned to his previous baseline since his last hospitalization. She reports he had been independent, but that she was wanting for him to move in with her because of previous falls at home. She reports that she and her sister have had concerns since this hospitalization that he may not have been as independent as they had previously thought. Expressed to her concern that while we are hopeful that his mental status will continue to improve and may be exacerbated by current medical issues/hospital delerium, given his cardiovascular history pt may be beginning to show symptoms of vascular dementia   -- Obtain MRI brain non-contrast: no acute findings, chronic encephalomalacia from prior  infarcts.   -- Re-attempt to start PO medications.   -- Re start home apixaban 5mg BID if patient taking PO medications and d/c SQ enoxaparin   -- Daily CMP   -- Replace electrolytes as needed   -- Delirium precautions           Hypernatremia  Patient with Na of 157 on admission; serum osm 339. Free water deficit 6.6L   Suspect possibly due to dehydration due to poor intake vs GI losses, although unable to obtain collateral information from [facility] nursing home as nurse was not available at the time of call.   EF noted at 35%.  Sodium wnl 11/23/20    Plan  - Discontinue IVF   - Daily CMP    - Encourage PO intake     Discharge planning issues  Patient was brought from [facility]. Daughter is not pleased with care and wishes another SNF for patient. Currently waiting for placement. Patient already accepted at [facility]. Declined by several others, including [facility] (daughters 1st choice). Will work towards authorization for [facility]    --Patient accepted at [facility], pending placement.     Edema of left upper extremity  Since arrival patient has been refusing PO medications, for which started enoxaparin IV on 11/23/20. Upper extremity with 2+ pitting edema from fingers up to proxima fore arm. Patient denies pain, CP, SOB.   Swelling improving after removing IV and ice placed     Plan:   -- Left Upper extremity US ordered to r/o DVT : negative  -- Removed IV from arm   -- Wound care notified for skin pealing  -- Will continue to monitor       Panniculitis  Noted in lower abdominal/ groin area. No erythema or purulence noted.    -Wound care consulted for skin breakdown, agree with recommendations     HLD (hyperlipidemia)  - Continue home statin       Nonischemic cardiomyopathy  The estimated ejection fraction is 35-40%.    Plan:   -- Continue  patient's carvedilol 6.25 BID  -- Monitor BP, make sure cuff is right size     Obstructive sleep apnea treated with continuous positive airway  pressure (CPAP)  CPAP qHS      Type II diabetes mellitus  Well controlled. Hgb A1c 5.6. Patient doesn't appear to be on diabetic medication.   Episode of hypoglycemia today, responded well to D50% injection    Plan:   -- BG goal 140-180   -- No medical treatment required at this time   -- Encourage bedside assistance for meals and fluid intake       CKD (chronic kidney disease) stage 3, GFR 30-59 ml/min  Renal function at baseline Cr of 2.0 (within baseline 1.8-2.0), Cr today 1.5     Plan:   --Avoid nephrotoxic medication  --Renally dose medications  --Daily BMP      Atrial fibrillation, controlled  Patient with HX of Afib with CHADs score of 5 on amiodarone 200mg, eliquis 5mg at home. Patient is currently refusing PO medications, HD stable.     Plan:    --  Patient on A- fib but is rate controlled, will encourage PO intake of his amiodarone.   -- Continue apixaban 5 mg BID        VTE Risk Mitigation (From admission, onward)         Ordered     apixaban tablet 5 mg  2 times daily      11/25/20 1102                Discharge Planning   JOHN: 11/30/2020     Code Status: Full Code   Is the patient medically ready for discharge?: No    Reason for patient still in hospital (select all that apply): Treatment and Pending disposition  Discharge Plan A: Skilled Nursing Facility                  Gi Fall MD  Department of Hospital Medicine   [facility]

## 2020-11-27 NOTE — SUBJECTIVE & OBJECTIVE
Interval History: NAEO. Patient oriented x3 today and more cooperative with care. MRI with no acute findings. Currently waiting for placement on SNF.     Review of Systems   Constitutional: Negative for appetite change, chills and fever.   HENT: Negative for congestion, sore throat, trouble swallowing and voice change.    Eyes: Negative.    Respiratory: Negative for cough and shortness of breath.    Cardiovascular: Negative for chest pain and leg swelling.   Gastrointestinal: Negative for abdominal distention, abdominal pain, constipation, nausea and vomiting.   Endocrine: Negative.    Genitourinary: Negative.    Musculoskeletal: Negative for back pain and gait problem.   Skin: Negative.    Neurological: Negative for weakness and headaches.   Psychiatric/Behavioral: Negative.      Objective:     Vital Signs (Most Recent):  Temp: 98.2 °F (36.8 °C) (11/27/20 0755)  Pulse: 79 (11/27/20 0755)  Resp: 16 (11/27/20 0755)  BP: (!) 105/55 (11/27/20 0755)  SpO2: 96 % (11/27/20 0755) Vital Signs (24h Range):  Temp:  [97 °F (36.1 °C)-98.2 °F (36.8 °C)] 98.2 °F (36.8 °C)  Pulse:  [70-84] 79  Resp:  [16-20] 16  SpO2:  [92 %-100 %] 96 %  BP: (105-121)/(53-70) 105/55     Weight: (!) 158.8 kg (350 lb)  Body mass index is 51.69 kg/m².    Intake/Output Summary (Last 24 hours) at 11/27/2020 1151  Last data filed at 11/27/2020 0500  Gross per 24 hour   Intake 800 ml   Output --   Net 800 ml      Physical Exam  Constitutional:       Appearance: Normal appearance.   HENT:      Head: Normocephalic and atraumatic.      Nose: Nose normal.      Mouth/Throat:      Mouth: Mucous membranes are moist.   Eyes:      Extraocular Movements: Extraocular movements intact.      Pupils: Pupils are equal, round, and reactive to light.   Neck:      Musculoskeletal: Normal range of motion.   Cardiovascular:      Rate and Rhythm: Normal rate and regular rhythm.   Pulmonary:      Breath sounds: Normal breath sounds.   Abdominal:      General: Bowel sounds are  normal. There is no distension.      Palpations: Abdomen is soft.      Tenderness: There is no abdominal tenderness.   Musculoskeletal: Normal range of motion.         General: No swelling.   Skin:     General: Skin is warm.      Capillary Refill: Capillary refill takes less than 2 seconds.      Comments: Bullae on left arm Swelling noted.   Neurological:      General: No focal deficit present.      Mental Status: He is alert and oriented to person, place, and time.   Psychiatric:         Mood and Affect: Mood normal.         Significant Labs:   CBC: No results for input(s): WBC, HGB, HCT, PLT in the last 48 hours.  CMP:   Recent Labs   Lab 11/26/20  0911 11/27/20  0707    141   K 4.7 3.8   * 108   CO2 23 23   * 91   BUN 14 13   CREATININE 1.6* 1.5*   CALCIUM 9.0 8.4*   ANIONGAP 10 10   EGFRNONAA 42.4* 45.9*       Significant Imaging: I have reviewed and interpreted all pertinent imaging results/findings within the past 24 hours.     MRI Brain Without Contrast  Order: [id number]  Status:  Final result   Visible to patient:  No (not released) Next appt:  None  Details    Reading Physician Reading Date Result Priority   Lit Amin,   [phone]  [phone] 11/27/2020 Routine      Narrative & Impression     EXAMINATION:  MRI BRAIN WITHOUT CONTRAST     CLINICAL HISTORY:  Suspected vascular dementia. Initial imaging;     TECHNIQUE:  Sagittal and axial T1, axial T2, axial FLAIR, axial gradient and axial diffusion imaging of the whole brain without contrast.     COMPARISON:  CT 11/20/2020     FINDINGS:  There is mild moderate encephalomalacia of the left inferior frontal lobe with additional area of encephalomalacia in the right posterior frontal/parietal lobe and right occipital lobe suggestive for prior infarcts.  There is T2 flair signal hyperintensity underlying these regions concerning for gliosis and scarring.  There is no restricted diffusion to suggest acute or recent  infarction.     There is superimposed generalized cerebral volume loss most prominent involving the frontal lobes with compensatory enlargement of the ventricles sulci and cisterns without hydrocephalus.  Major intracranial T2 flow voids are present.  Remote operative change from left cataract repair. small focus of susceptibility in the right parietal lobe without mass effect or hyperdensity on CT most compatible with remote hemorrhage.     Impression:     Multifocal mild moderate size regions of encephalomalacia left inferior frontal, right posterior frontal parietal and right occipital lobes compatible with prior infarcts.     T2 flair signal hyperintensity underlying these regions compatible with gliosis and scarring.     There is superimposed age advanced cerebral volume loss with compensatory enlargement ventricles sulci and cisterns without hydrocephalus.     There is a subcentimeter remote left cerebellar infarction.     In addition there is a small remote hemorrhage within the right parietal lobe.     Clinical correlation and further evaluation as warranted.        Electronically signed by: Lit Amin DO  Date:                                            11/27/2020  Time:                                           10:35             Last Resulted: 11/27/20 10:35

## 2020-11-28 LAB
ANION GAP SERPL CALC-SCNC: 7 MMOL/L (ref 8–16)
BUN SERPL-MCNC: 12 MG/DL (ref 8–23)
CALCIUM SERPL-MCNC: 8.6 MG/DL (ref 8.7–10.5)
CHLORIDE SERPL-SCNC: 111 MMOL/L (ref 95–110)
CO2 SERPL-SCNC: 25 MMOL/L (ref 23–29)
CREAT SERPL-MCNC: 1.3 MG/DL (ref 0.5–1.4)
EST. GFR  (AFRICAN AMERICAN): >60 ML/MIN/1.73 M^2
EST. GFR  (NON AFRICAN AMERICAN): 54.5 ML/MIN/1.73 M^2
GLUCOSE SERPL-MCNC: 119 MG/DL (ref 70–110)
MAGNESIUM SERPL-MCNC: 1.8 MG/DL (ref 1.6–2.6)
POCT GLUCOSE: 116 MG/DL (ref 70–110)
POCT GLUCOSE: 180 MG/DL (ref 70–110)
POCT GLUCOSE: 190 MG/DL (ref 70–110)
POCT GLUCOSE: 203 MG/DL (ref 70–110)
POTASSIUM SERPL-SCNC: 3.8 MMOL/L (ref 3.5–5.1)
SODIUM SERPL-SCNC: 143 MMOL/L (ref 136–145)

## 2020-11-28 PROCEDURE — 25000003 PHARM REV CODE 250: Performed by: STUDENT IN AN ORGANIZED HEALTH CARE EDUCATION/TRAINING PROGRAM

## 2020-11-28 PROCEDURE — 99900035 HC TECH TIME PER 15 MIN (STAT)

## 2020-11-28 PROCEDURE — 20600001 HC STEP DOWN PRIVATE ROOM

## 2020-11-28 PROCEDURE — 80048 BASIC METABOLIC PNL TOTAL CA: CPT

## 2020-11-28 PROCEDURE — 83735 ASSAY OF MAGNESIUM: CPT

## 2020-11-28 PROCEDURE — 36415 COLL VENOUS BLD VENIPUNCTURE: CPT

## 2020-11-28 PROCEDURE — 99231 PR SUBSEQUENT HOSPITAL CARE,LEVL I: ICD-10-PCS | Mod: GC,,, | Performed by: HOSPITALIST

## 2020-11-28 PROCEDURE — 99231 SBSQ HOSP IP/OBS SF/LOW 25: CPT | Mod: GC,,, | Performed by: HOSPITALIST

## 2020-11-28 RX ADMIN — ASPIRIN 81 MG CHEWABLE TABLET 81 MG: 81 TABLET CHEWABLE at 08:11

## 2020-11-28 RX ADMIN — THERA TABS 1 TABLET: TAB at 08:11

## 2020-11-28 RX ADMIN — APIXABAN 5 MG: 5 TABLET, FILM COATED ORAL at 08:11

## 2020-11-28 RX ADMIN — ATORVASTATIN CALCIUM 40 MG: 20 TABLET, FILM COATED ORAL at 08:11

## 2020-11-28 RX ADMIN — CARVEDILOL 6.25 MG: 6.25 TABLET, FILM COATED ORAL at 08:11

## 2020-11-28 RX ADMIN — GABAPENTIN 300 MG: 300 CAPSULE ORAL at 08:11

## 2020-11-28 RX ADMIN — GABAPENTIN 300 MG: 300 CAPSULE ORAL at 04:11

## 2020-11-28 RX ADMIN — FERROUS SULFATE TAB EC 325 MG (65 MG FE EQUIVALENT) 325 MG: 325 (65 FE) TABLET DELAYED RESPONSE at 05:11

## 2020-11-28 RX ADMIN — MICONAZOLE NITRATE: 20 OINTMENT TOPICAL at 08:11

## 2020-11-28 RX ADMIN — OXYBUTYNIN CHLORIDE 10 MG: 5 TABLET, EXTENDED RELEASE ORAL at 08:11

## 2020-11-28 RX ADMIN — FERROUS SULFATE TAB EC 325 MG (65 MG FE EQUIVALENT) 325 MG: 325 (65 FE) TABLET DELAYED RESPONSE at 08:11

## 2020-11-28 NOTE — PT/OT/SLP PROGRESS
"Occupational Therapy   Treatment    Name: Hemant Kennedy Jr.  MRN: [id number]  Admitting Diagnosis:  Acute encephalopathy        Co-treat with PT due to pt requiring 2 skilled therapists to safely perform bed mobility     Recommendations:     Discharge Recommendations: nursing facility, skilled  Discharge Equipment Recommendations:  other (see comments)(TBD)  Barriers to discharge:  Other (Comment)(increased level of skilled assistance required at his current level of function)    Assessment:     Hemant Kennedy Jr. is a 70 y.o. male with a medical diagnosis of Acute encephalopathy.  Pt was confused throughout the session, requiring maximal cueing to participate and to attend to task.  He required total assistance of 2 people to perform bed mobility and required Min-Mod A with his sitting balance.  Pt with noted L lateral lean.  He presents with the following deficits. Performance deficits affecting function are weakness, impaired endurance, impaired self care skills, impaired functional mobilty, gait instability, impaired balance, impaired cognition, decreased safety awareness, decreased lower extremity function, decreased ROM, decreased upper extremity function.     Rehab Prognosis:  Good; patient would benefit from acute skilled OT services to address these deficits and reach maximum level of function.       Plan:     Patient to be seen 3 x/week to address the above listed problems via self-care/home management, therapeutic exercises, therapeutic activities  · Plan of Care Expires: 12/24/20  · Plan of Care Reviewed with: patient    Subjective   "Where are we!?  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  Patient found HOB elevated with telemetry, bed alarm, pulse ox (continuous), perineural catheter, " pressure relief boots upon OT entry to room.    General Precautions: Standard, fall   Orthopedic Precautions:N/A   Braces: N/A     Occupational Performance:     Bed Mobility:    · Patient completed Rolling/Turning to Left with  total assistance and 2 persons to change his bed pads  · Patient completed Rolling/Turning to Right with total assistance and 2 persons to change his bed pads  · Patient completed Scooting/Bridging with total assistance and 2 persons.  Pt attempted to bridge using B hands on the head rail of bed but without success.   · Patient completed Supine to Sit with total assistance and 2 persons  · Patient completed Sit to Supine with total assistance and 2 persons     Functional Mobility/Transfers:  · Deferred due to pt being unable to WB on BLE and being unable to place his feet flat on the floor. Pt sat EOB x~17 min with Min-Mod A to correct his L lateral lean.      Activities of Daily Living:  · Feeding:  Maximal Assistance to take a bite of his red beans and rice.  Therapist had set-up pt's lunch on tray table while he sat EOB.  Pt played with his food with his fork despite cueing from therapist to take a bite. Pt became fatigued during task, began laying down, and was returned to supine.  · Lower Body Dressing: total assistance to edy B socks while supine      [facility] 6 Click ADL: 12    Treatment & Education:  - Pt edu on role of OT, POC, safety when performing self care tasks, benefit of performing EOB activity, and safety when performing bed mobility.    - Self care tasks completed-- as noted above       Patient left HOB elevated with all lines intact, call button in reach, nursing present and his lunch set-up on tray tableEducation:      GOALS:   Multidisciplinary Problems     Occupational Therapy Goals        Problem: Occupational Therapy Goal    Goal Priority Disciplines Outcome Interventions   Occupational Therapy Goal     OT, PT/OT Ongoing, Progressing    Description: Goals to be met by:  12/24/20     Patient will increase functional independence with ADLs by performing:    UE Dressing with Minimal Assistance.  LE Dressing with Minimal Assistance.  Grooming while seated edge of bed with Minimal Assistance.  Sitting at edge of bed x10 minutes with Minimal Assistance.  Rolling to Bilateral with Contact Guard Assistance.   Supine to sit with Minimal Assistance.  Toilet transfer to beside commode with Moderate Assistance.                     Time Tracking:     OT Date of Treatment: 11/27/20  OT Start Time: 1135  OT Stop Time: 1205  OT Total Time (min): 30 min    Billable Minutes:Self Care/Home Management 15 min.  Therapeutic Activity 15 min.    Rigo Brumfield, OT  11/27/2020

## 2020-11-28 NOTE — ASSESSMENT & PLAN NOTE
Patient was brought from [facility]. Daughter is not pleased with care and wishes another SNF for patient. Currently waiting for placement. Patient already accepted at [facility]. Declined by several others, including [facility] (daughters 1st choice). Will work towards authorization for [facility]    -- Patient not accepted to [facility]'s.   >>Pt's daughter agreed to send referrals to [place] [facility] and [facility].     SW sent additional referrals to the following facilities via :  [facility] and [facility].  GERARDO will continue to monitor case status

## 2020-11-28 NOTE — CARE UPDATE
Contacted daughter, Keyla, to give update on patient's status. Discussed that patient is medically cleared for discharge and we are currently working on placement. Mentioned that patient is having waxing and waning mentation most likely secondary to delirium, for which it is important for family member to come and visit to help and re direct and the it is important to decide on placement as SNF will help with patient's recovery . Son should be coming in today and she will be coming in tomorrow. All questions were answered and referred understanding.     Gi Fall MD   Internal Medicine

## 2020-11-28 NOTE — PROGRESS NOTES
[facility] Hospital Medicine  Progress Note    Patient Name: Hemant Kennedy Jr.  MRN: [id number]  Patient Class: IP- Inpatient   Admission Date: 11/20/2020  Length of Stay: 7 days  Attending Physician: Shira Lazcano*  Primary Care Provider: Wicho Qiu MD    [facility] Medicine Team: [facility] HOSP MED 4 Gi Fall MD    Subjective:     Principal Problem:Acute encephalopathy        HPI:  Hemant Kennedy is a 70 year old male with NICM, HFrEF (EF of 35-40%), HTN, T2DM, AFib on DOAC and amiodarone, h/o CVA w/ L hemineglect, h/o DVT, ERIC and obesity (BMI >50) who presents from [facility] for altered mental status.  Patient was brought in by EMS from [facility], where he has been living for the past 2 weeks, after being treated at [facility] 11/1-11/5 for rhabdomyolysis, acute metabolic encephalopathy 2/2 urosepsis with proteus miribalis, and panniculitis. EMS reports patient was refusing his nightly BIPAP at the nursing home.     History was limited due to patient's altered mental state. I contacted the nursing home to obtain collateral information but nursing staff wasn't available at the time.     In the ED, patient was afebrile, hemodynamically stable, and 95% on room air. BMP remarkable for Na 157. VBG 7.39/39/31. CT head without acute intracranial process.        Overview/Hospital Course:  Patient admitted to medicine for AMS with Na 157. Patient treated with D5W infusion, with adequate adjustment until coming down to 142. AMS with mild improvement, patient only oriented to self and still combative and difficult to redirect. Patient refusing PO medications since 11/22, transitioned to IV enoxaparin, CHADS2 Score of 5. Left upper extremity swelling noted in fingers up to arm, with skin pealing after removing IV. US ordered to r/o DVT and negative. Swelling improved after placing ice and removing IV. Bullae noted  on left arm, wound care on board. Mental status improving, patient more coopertive with care. Pending placement to SNF, as daughter not pleased with [facility]. According to daughter patient has not yet reached his baseline mental status, will get collateral information from nursing of how patient was prior to UTI where he also was experiencing mental status changes. Daughter concerned that her father has not returned to his previous baseline since his previous hospitalization early November. MRI completed, no new acute findings, remote infarcts and age related atrophy and encephalomalacia secondary to infarcts. Pending to SNF, not accepted at [place], requests sent to two other facilities. SW and daughter on board.     Interval History: NAEO. Patient refusing labs this AM. A bit more delirious but oriented to place, time and person. Pending acceptance to SNF. Will continue delirium precautions and try to increase mobility with PT/OT assistance.     Review of Systems   Constitutional: Negative for appetite change, chills and fever.   HENT: Negative for congestion, sore throat, trouble swallowing and voice change.    Eyes: Negative.    Respiratory: Negative for cough and shortness of breath.    Cardiovascular: Negative for chest pain and leg swelling.   Gastrointestinal: Negative for abdominal distention, abdominal pain, constipation, nausea and vomiting.   Endocrine: Negative.    Genitourinary: Negative.    Musculoskeletal: Negative for back pain and gait problem.   Skin: Negative.    Neurological: Negative for weakness and headaches.   Psychiatric/Behavioral: Positive for confusion.     Objective:     Vital Signs (Most Recent):  Temp: 98.5 °F (36.9 °C) (11/28/20 0727)  Pulse: 75 (11/28/20 0727)  Resp: 17 (11/28/20 0727)  BP: 128/60 (11/28/20 0727)  SpO2: 96 % (11/28/20 0727) Vital Signs (24h Range):  Temp:  [97.7 °F (36.5 °C)-98.5 °F (36.9 °C)] 98.5 °F (36.9 °C)  Pulse:  [60-82] 75  Resp:  [17-18]  17  SpO2:  [89 %-96 %] 96 %  BP: (101-151)/(58-86) 128/60     Weight: (!) 158.8 kg (350 lb)  Body mass index is 51.69 kg/m².    Intake/Output Summary (Last 24 hours) at 11/28/2020 1127  Last data filed at 11/27/2020 1600  Gross per 24 hour   Intake --   Output 200 ml   Net -200 ml      Physical Exam  Constitutional:       Appearance: Normal appearance.   HENT:      Head: Normocephalic and atraumatic.      Nose: Nose normal.      Mouth/Throat:      Mouth: Mucous membranes are moist.   Eyes:      Extraocular Movements: Extraocular movements intact.      Pupils: Pupils are equal, round, and reactive to light.   Neck:      Musculoskeletal: Normal range of motion.   Cardiovascular:      Rate and Rhythm: Normal rate. Rhythm irregular.   Pulmonary:      Breath sounds: Normal breath sounds.   Abdominal:      General: Bowel sounds are normal. There is no distension.      Palpations: Abdomen is soft.      Tenderness: There is no abdominal tenderness.   Musculoskeletal: Normal range of motion.         General: No swelling.   Skin:     General: Skin is warm and dry.      Capillary Refill: Capillary refill takes less than 2 seconds.      Findings: Bruising and lesion present.   Neurological:      General: No focal deficit present.      Mental Status: He is alert and oriented to person, place, and time.   Psychiatric:      Comments: Some episodes of confusion          Significant Labs:   CBC: No results for input(s): WBC, HGB, HCT, PLT in the last 48 hours.  CMP:   Recent Labs   Lab 11/27/20  0707 11/28/20  0908    143   K 3.8 3.8    111*   CO2 23 25   GLU 91 119*   BUN 13 12   CREATININE 1.5* 1.3   CALCIUM 8.4* 8.6*   ANIONGAP 10 7*   EGFRNONAA 45.9* 54.5*       Significant Imaging: I have reviewed and interpreted all pertinent imaging results/findings within the past 24 hours.      Assessment/Plan:      * Acute encephalopathy  70 year old male with NICM, HFrEF (EF of 35-40%), HTN, T2DM, AFib on DOAC and amiodarone,  and obesity (BMI >50) who presents with AMS from skilled nursing facility. BMP remarkable for Na 157. VBG 7.39/39/31.   Imaging: CT head without acute intracranial process.   MICU consulted who recommended appropriate placement on floor for sodium correction  Suspect acute encephalopathy is 2/2 to hypernatremia.   Sodium wnl, 140. Patient more alert and responsive than on arrival, more cooperative to care. Oriented x1 to person. Answers correctly daughter's name, president and year of birth.  Able to follow simple commands.   Patient is experiencing fluctuations in mental status throughout the day, being more clear in early morning and at the end of day he starts to act out and refuse medication. Sodium levels have been wnl in the past 48 hours. Due to patient's age it is likely to take longer to reach baseline mental status. Patient is not presenting with elevated WBC, fevers or acute neurological decompensation concerning for infection or other structural/physiological cause for mental status.   Patient oriented x3 today.     Plan  -- Daughter expresses concern that her father has not returned to his previous baseline since his last hospitalization. She reports he had been independent, but that she was wanting for him to move in with her because of previous falls at home. She reports that she and her sister have had concerns since this hospitalization that he may not have been as independent as they had previously thought. Expressed to her concern that while we are hopeful that his mental status will continue to improve and may be exacerbated by current medical issues/hospital delerium, given his cardiovascular history pt may be beginning to show symptoms of vascular dementia   -- Obtain MRI brain non-contrast: no acute findings, chronic encephalomalacia from prior infarcts.   -- Continue to encourage PO medications. Requested to try to give them with meals  -- Continue apixaban 5mg BID if patient taking PO  medications and d/c SQ enoxaparin   -- Daily CMP   -- Replace electrolytes as needed   -- Delirium precautions, increase mobility with PT/OT today.           Hypernatremia  Patient with Na of 157 on admission; serum osm 339. Free water deficit 6.6L   Suspect possibly due to dehydration due to poor intake vs GI losses, although unable to obtain collateral information from [facility] nursing home as nurse was not available at the time of call.   EF noted at 35%.  Sodium wnl 11/23/20    Plan  - Discontinue IVF   - Daily CMP    - Encourage PO intake     Discharge planning issues  Patient was brought from [facility]. Daughter is not pleased with care and wishes another SNF for patient. Currently waiting for placement. Patient already accepted at [facility]. Declined by several others, including [facility] (daughters 1st choice). Will work towards authorization for [place]    -- Patient not accepted to [facility]'s.   >>Pt's daughter agreed to send referrals to [facility] and [facility].     SW sent additional referrals to the following facilities via [facility]:  [facility] and [facility].  SW will continue to monitor case status    Edema of left upper extremity  Since arrival patient has been refusing PO medications, for which started enoxaparin IV on 11/23/20. Upper extremity with 2+ pitting edema from fingers up to proxima fore arm. Patient denies pain, CP, SOB.   Swelling improving after removing IV and ice placed     Plan:   -- Left Upper extremity US ordered to r/o DVT : negative  -- Removed IV from arm   -- Wound care notified for skin pealing  -- Will continue to monitor       Panniculitis  Noted in lower abdominal/ groin area. No erythema or purulence noted.    -Wound care consulted for skin breakdown, agree with recommendations     HLD (hyperlipidemia)  - Continue home statin       Nonischemic  cardiomyopathy  The estimated ejection fraction is 35-40%.    Plan:   -- Continue  patient's carvedilol 6.25 BID  -- Monitor BP, make sure cuff is right size     Obstructive sleep apnea treated with continuous positive airway pressure (CPAP)  CPAP qHS      Type II diabetes mellitus  Well controlled. Hgb A1c 5.6. Patient doesn't appear to be on diabetic medication.   Episode of hypoglycemia today, responded well to D50% injection    Plan:   -- BG goal 140-180   -- No medical treatment required at this time   -- Encourage bedside assistance for meals and fluid intake       CKD (chronic kidney disease) stage 3, GFR 30-59 ml/min  Renal function at baseline Cr of 2.0 (within baseline 1.8-2.0), Cr today 1.5     Plan:   --Avoid nephrotoxic medication  --Renally dose medications  --Daily BMP      Atrial fibrillation, controlled  Patient with HX of Afib with CHADs score of 5 on amiodarone 200mg, eliquis 5mg at home. Patient is currently refusing PO medications, HD stable.     Plan:    --  Patient on A- fib but is rate controlled, will encourage PO intake of his amiodarone.   -- Continue apixaban 5 mg BID        VTE Risk Mitigation (From admission, onward)         Ordered     apixaban tablet 5 mg  2 times daily      11/25/20 1102                Discharge Planning   JOHN: 11/30/2020     Code Status: Full Code   Is the patient medically ready for discharge?: No    Reason for patient still in hospital (select all that apply): Treatment and Pending disposition  Discharge Plan A: Skilled Nursing Facility                  Gi Fall MD  Department of Hospital Medicine   [facility]

## 2020-11-28 NOTE — PT/OT/SLP PROGRESS
Physical Therapy Treatment    Patient Name:  Hemant Kennedy Jr.   MRN:  [id number]    Recommendations:     Discharge Recommendations:  nursing facility, skilled   Discharge Equipment Recommendations: hospital bed(TBD)   Barriers to discharge: Decreased caregiver support    Assessment:     Hemant Kennedy Jr. is a 70 y.o. male admitted with a medical diagnosis of Acute encephalopathy.  He presents with the following impairments/functional limitations:  weakness, impaired endurance, impaired self care skills, gait instability, impaired functional mobilty, impaired balance, impaired cognition, decreased safety awareness, impaired joint extensibility, decreased lower extremity function, impaired skin, decreased ROM. Pt required much encouragement for participation. He was oriented to person only. His functional mobility was limited by fatigue and large body mass. He required total assist X 2 persons for bed mobility and for scooting in sitting. Sitting balance improved during therapy session from mod A to min A.    Rehab Prognosis: Good; patient would benefit from acute skilled PT services to address these deficits and reach maximum level of function.    Recent Surgery: * No surgery found *      Plan:     During this hospitalization, patient to be seen 3 x/week to address the identified rehab impairments via therapeutic activities, therapeutic exercises, neuromuscular re-education and progress toward the following goals:    · Plan of Care Expires:  12/23/20    Subjective     Chief Complaint: Pt did not express any specific complaints  Patient/Family Comments/goals: Pt spoke often about working for [employer] and wearing out his knees walking stairs  Pain/Comfort:  · Pain Rating 1: 0/10  · Pain Rating Post-Intervention 1: 0/10      Objective:     Communicated with RN prior to session.  Patient found HOB elevated with pulse ox (continuous), telemetry, perineural catheter, bed alarm upon PT entry to room.     General  Precautions: Standard, fall   Orthopedic Precautions:N/A   Braces: N/A     Functional Mobility:  · Bed Mobility:     · Rolling Left and right:  Total assistance X 2  with pt using UEs to assist but not effective in creating movement of body  · Scooting: up in bed in left sidelying pt was able to assist minimally using momentum to achieve incremental movements; in supine pt attempted to use LEs to assist pushing and UEs on headboard but was ineffective  · Bridging: unable to achieve any buttock clearance  · Supine to Sit: total assistance of 2 persons  · Sit to Supine: total assistance of 2 persons      AM-PAC 6 CLICK MOBILITY  Turning over in bed (including adjusting bedclothes, sheets and blankets)?: 2  Sitting down on and standing up from a chair with arms (e.g., wheelchair, bedside commode, etc.): 1  Moving from lying on back to sitting on the side of the bed?: 2  Moving to and from a bed to a chair (including a wheelchair)?: 1  Need to walk in hospital room?: 1  Climbing 3-5 steps with a railing?: 1  Basic Mobility Total Score: 8       Therapeutic Activities and Exercises:   -Sitting balance and trunk strengthening in sitting at EOB X 17 min with pt performing reaching and static sitting  -initially required mod A b/o lean to left improving to achieve and maintain midline with min A but could not achive WB through LEs b/o  B knee contractures  -total assist for reciprocal scooting in sitting    Patient left with bed in chair position with all lines intact and call button in reach..    GOALS:   Multidisciplinary Problems     Physical Therapy Goals        Problem: Physical Therapy Goal    Goal Priority Disciplines Outcome Goal Variances Interventions   Physical Therapy Goal     PT, PT/OT Ongoing, Progressing     Description: Goals to be met by: 20     Patient will increase functional independence with mobility by performin. Supine to sit with Moderate Assistance  2. Sit to supine with Moderate  Assistance  3. Sit to stand transfer with Maximum Assistance  4. Sitting at edge of bed x5 minutes with Minimal Assistance  5. Stand for 1 minutes with Moderate Assistance using Rolling Walker                     Time Tracking:     PT Received On: 11/27/20  PT Start Time: 1135     PT Stop Time: 1213  PT Total Time (min): 38 min     Billable Minutes: Therapeutic Activity 23 and Therapeutic Exercise 15    Co-treat with OT performed for this visit due to pt's need for 2 skilled therapists to ensure patient and staff safety and to accommodate for pt activity tolerance.    Treatment Type: Treatment  PT/PTA: PT     PTA Visit Number: 0     Maura Humphreys, PT  11/27/2020

## 2020-11-28 NOTE — PLAN OF CARE
Problem: Fall Injury Risk  Goal: Absence of Fall and Fall-Related Injury  Outcome: Ongoing, Progressing  Intervention: Identify and Manage Contributors to Fall Injury Risk  Flowsheets (Taken 11/28/2020 1427)  Self-Care Promotion:   independence encouraged   BADL personal objects within reach   meal setup provided  Medication Review/Management: medications reviewed  Intervention: Promote Injury-Free Environment  Flowsheets (Taken 11/28/2020 1427)  Safety Promotion/Fall Prevention:   assistive device/personal item within reach   bed alarm set   Fall Risk reviewed with patient/family   diversional activities provided  Environmental Safety Modification:   assistive device/personal items within reach   clutter free environment maintained   lighting adjusted     Problem: Skin Injury Risk Increased  Goal: Skin Health and Integrity  Outcome: Ongoing, Progressing  Intervention: Optimize Skin Protection  Flowsheets (Taken 11/28/2020 1427)  Pressure Reduction Devices:   heel offloading device utilized   specialty bed utilized  Skin Protection:   adhesive use limited   incontinence pads utilized   skin sealant/moisture barrier applied   tubing/devices free from skin contact  Head of Bed (HOB): HOB at 45 degrees  Intervention: Promote and Optimize Oral Intake  Flowsheets (Taken 11/28/2020 1427)  Oral Nutrition Promotion: calorie dense foods provided     Problem: Diabetes Comorbidity  Goal: Blood Glucose Level Within Desired Range  Outcome: Ongoing, Progressing  Intervention: Maintain Glycemic Control  Flowsheets (Taken 11/28/2020 1427)  Glycemic Management: blood glucose monitoring     Problem: Oral Intake Inadequate (Acute Kidney Injury/Impairment)  Goal: Optimal Nutrition Intake  Outcome: Ongoing, Progressing  Intervention: Promote and Optimize Nutrition  Flowsheets (Taken 11/28/2020 1427)  Oral Nutrition Promotion: calorie dense foods provided   Pt AAO x 4 throughout the day, mild periods of confusion but easily reoriented.  Pt seeing things that aren't there. Delirium precautions continued. Pt cooperative with care throughout the day. No acute changes. VSS. Wound care done per order. BG monitoring ACHS per order. Pt refused to be fed lunch, stated he wasn't hungry. Explained the importance of eating and nutrition for optimal wound healing. POC reviewed with patient, all questions/concerns addressed. Bed locked in lowest position, call light within reach. WCTM.

## 2020-11-28 NOTE — NURSING
Patient refusing to take medications and refusing dressing changes. Advised patient the importance of taking medications but still refuses. Offered to call the MD but patient states he will not take anything

## 2020-11-28 NOTE — PLAN OF CARE
Problem: Adult Inpatient Plan of Care  Goal: Absence of Hospital-Acquired Illness or Injury  Outcome: Ongoing, Progressing  Intervention: Identify and Manage Fall Risk  Flowsheets (Taken 11/28/2020 0500)  Safety Promotion/Fall Prevention:   bed alarm set   Fall Risk signage in place   side rails raised x 3   Patient lying in bed in no acute distress. Patient uncooperative and refusing care. IV saline locked. Bed in low. Call light in reach. Will continue to monitor.

## 2020-11-28 NOTE — SUBJECTIVE & OBJECTIVE
Interval History: NAEO. Patient refusing labs this AM. A bit more delirious but oriented to place, time and person. Pending acceptance to SNF. Will continue delirium precautions and try to increase mobility with PT/OT assistance.     Review of Systems   Constitutional: Negative for appetite change, chills and fever.   HENT: Negative for congestion, sore throat, trouble swallowing and voice change.    Eyes: Negative.    Respiratory: Negative for cough and shortness of breath.    Cardiovascular: Negative for chest pain and leg swelling.   Gastrointestinal: Negative for abdominal distention, abdominal pain, constipation, nausea and vomiting.   Endocrine: Negative.    Genitourinary: Negative.    Musculoskeletal: Negative for back pain and gait problem.   Skin: Negative.    Neurological: Negative for weakness and headaches.   Psychiatric/Behavioral: Positive for confusion.     Objective:     Vital Signs (Most Recent):  Temp: 98.5 °F (36.9 °C) (11/28/20 0727)  Pulse: 75 (11/28/20 0727)  Resp: 17 (11/28/20 0727)  BP: 128/60 (11/28/20 0727)  SpO2: 96 % (11/28/20 0727) Vital Signs (24h Range):  Temp:  [97.7 °F (36.5 °C)-98.5 °F (36.9 °C)] 98.5 °F (36.9 °C)  Pulse:  [60-82] 75  Resp:  [17-18] 17  SpO2:  [89 %-96 %] 96 %  BP: (101-151)/(58-86) 128/60     Weight: (!) 158.8 kg (350 lb)  Body mass index is 51.69 kg/m².    Intake/Output Summary (Last 24 hours) at 11/28/2020 1127  Last data filed at 11/27/2020 1600  Gross per 24 hour   Intake --   Output 200 ml   Net -200 ml      Physical Exam  Constitutional:       Appearance: Normal appearance.   HENT:      Head: Normocephalic and atraumatic.      Nose: Nose normal.      Mouth/Throat:      Mouth: Mucous membranes are moist.   Eyes:      Extraocular Movements: Extraocular movements intact.      Pupils: Pupils are equal, round, and reactive to light.   Neck:      Musculoskeletal: Normal range of motion.   Cardiovascular:      Rate and Rhythm: Normal rate. Rhythm irregular.    Pulmonary:      Breath sounds: Normal breath sounds.   Abdominal:      General: Bowel sounds are normal. There is no distension.      Palpations: Abdomen is soft.      Tenderness: There is no abdominal tenderness.   Musculoskeletal: Normal range of motion.         General: No swelling.   Skin:     General: Skin is warm and dry.      Capillary Refill: Capillary refill takes less than 2 seconds.      Findings: Bruising and lesion present.   Neurological:      General: No focal deficit present.      Mental Status: He is alert and oriented to person, place, and time.   Psychiatric:      Comments: Some episodes of confusion          Significant Labs:   CBC: No results for input(s): WBC, HGB, HCT, PLT in the last 48 hours.  CMP:   Recent Labs   Lab 11/27/20  0707 11/28/20  0908    143   K 3.8 3.8    111*   CO2 23 25   GLU 91 119*   BUN 13 12   CREATININE 1.5* 1.3   CALCIUM 8.4* 8.6*   ANIONGAP 10 7*   EGFRNONAA 45.9* 54.5*       Significant Imaging: I have reviewed and interpreted all pertinent imaging results/findings within the past 24 hours.

## 2020-11-28 NOTE — ASSESSMENT & PLAN NOTE
70 year old male with NICM, HFrEF (EF of 35-40%), HTN, T2DM, AFib on DOAC and amiodarone, and obesity (BMI >50) who presents with AMS from skilled nursing facility. BMP remarkable for Na 157. VBG 7.39/39/31.   Imaging: CT head without acute intracranial process.   MICU consulted who recommended appropriate placement on floor for sodium correction  Suspect acute encephalopathy is 2/2 to hypernatremia.   Sodium wnl, 140. Patient more alert and responsive than on arrival, more cooperative to care. Oriented x1 to person. Answers correctly daughter's name, president and year of birth.  Able to follow simple commands.   Patient is experiencing fluctuations in mental status throughout the day, being more clear in early morning and at the end of day he starts to act out and refuse medication. Sodium levels have been wnl in the past 48 hours. Due to patient's age it is likely to take longer to reach baseline mental status. Patient is not presenting with elevated WBC, fevers or acute neurological decompensation concerning for infection or other structural/physiological cause for mental status.   Patient oriented x3 today.     Plan  -- Daughter expresses concern that her father has not returned to his previous baseline since his last hospitalization. She reports he had been independent, but that she was wanting for him to move in with her because of previous falls at home. She reports that she and her sister have had concerns since this hospitalization that he may not have been as independent as they had previously thought. Expressed to her concern that while we are hopeful that his mental status will continue to improve and may be exacerbated by current medical issues/hospital delerium, given his cardiovascular history pt may be beginning to show symptoms of vascular dementia   -- Obtain MRI brain non-contrast: no acute findings, chronic encephalomalacia from prior infarcts.   -- Continue to encourage PO medications.  Requested to try to give them with meals  -- Continue apixaban 5mg BID if patient taking PO medications and d/c SQ enoxaparin   -- Daily CMP   -- Replace electrolytes as needed   -- Delirium precautions, increase mobility with PT/OT today.

## 2020-11-28 NOTE — PLAN OF CARE
Problem: Occupational Therapy Goal  Goal: Occupational Therapy Goal  Description: Goals to be met by: 12/24/20     Patient will increase functional independence with ADLs by performing:    UE Dressing with Minimal Assistance.  LE Dressing with Minimal Assistance.  Grooming while seated edge of bed with Minimal Assistance.  Sitting at edge of bed x10 minutes with Minimal Assistance.  Rolling to Bilateral with Contact Guard Assistance.   Supine to sit with Minimal Assistance.  Toilet transfer to beside commode with Moderate Assistance.    Outcome: Ongoing, Progressing    OT goals remain appropriate.    Rigo Brumfield, OT   11/27/2020

## 2020-11-29 LAB
ANION GAP SERPL CALC-SCNC: 8 MMOL/L (ref 8–16)
BUN SERPL-MCNC: 10 MG/DL (ref 8–23)
CALCIUM SERPL-MCNC: 8.2 MG/DL (ref 8.7–10.5)
CHLORIDE SERPL-SCNC: 111 MMOL/L (ref 95–110)
CO2 SERPL-SCNC: 24 MMOL/L (ref 23–29)
CREAT SERPL-MCNC: 1.3 MG/DL (ref 0.5–1.4)
EST. GFR  (AFRICAN AMERICAN): >60 ML/MIN/1.73 M^2
EST. GFR  (NON AFRICAN AMERICAN): 54.5 ML/MIN/1.73 M^2
GLUCOSE SERPL-MCNC: 115 MG/DL (ref 70–110)
MAGNESIUM SERPL-MCNC: 1.8 MG/DL (ref 1.6–2.6)
POCT GLUCOSE: 112 MG/DL (ref 70–110)
POCT GLUCOSE: 141 MG/DL (ref 70–110)
POCT GLUCOSE: 151 MG/DL (ref 70–110)
POCT GLUCOSE: 177 MG/DL (ref 70–110)
POTASSIUM SERPL-SCNC: 3.4 MMOL/L (ref 3.5–5.1)
SODIUM SERPL-SCNC: 143 MMOL/L (ref 136–145)

## 2020-11-29 PROCEDURE — 20600001 HC STEP DOWN PRIVATE ROOM

## 2020-11-29 PROCEDURE — 83735 ASSAY OF MAGNESIUM: CPT

## 2020-11-29 PROCEDURE — 25000003 PHARM REV CODE 250: Performed by: STUDENT IN AN ORGANIZED HEALTH CARE EDUCATION/TRAINING PROGRAM

## 2020-11-29 PROCEDURE — 80048 BASIC METABOLIC PNL TOTAL CA: CPT

## 2020-11-29 PROCEDURE — 99232 PR SUBSEQUENT HOSPITAL CARE,LEVL II: ICD-10-PCS | Mod: GC,,, | Performed by: HOSPITALIST

## 2020-11-29 PROCEDURE — 36415 COLL VENOUS BLD VENIPUNCTURE: CPT

## 2020-11-29 PROCEDURE — 27000221 HC OXYGEN, UP TO 24 HOURS

## 2020-11-29 PROCEDURE — 99232 SBSQ HOSP IP/OBS MODERATE 35: CPT | Mod: GC,,, | Performed by: HOSPITALIST

## 2020-11-29 PROCEDURE — 99900035 HC TECH TIME PER 15 MIN (STAT)

## 2020-11-29 PROCEDURE — 94660 CPAP INITIATION&MGMT: CPT

## 2020-11-29 PROCEDURE — 94761 N-INVAS EAR/PLS OXIMETRY MLT: CPT

## 2020-11-29 RX ORDER — POTASSIUM CHLORIDE 1.5 G/1.58G
40 POWDER, FOR SOLUTION ORAL
Status: COMPLETED | OUTPATIENT
Start: 2020-11-29 | End: 2020-11-29

## 2020-11-29 RX ADMIN — MICONAZOLE NITRATE: 20 OINTMENT TOPICAL at 08:11

## 2020-11-29 RX ADMIN — ASPIRIN 81 MG CHEWABLE TABLET 81 MG: 81 TABLET CHEWABLE at 08:11

## 2020-11-29 RX ADMIN — THERA TABS 1 TABLET: TAB at 08:11

## 2020-11-29 RX ADMIN — APIXABAN 5 MG: 5 TABLET, FILM COATED ORAL at 08:11

## 2020-11-29 RX ADMIN — FERROUS SULFATE TAB EC 325 MG (65 MG FE EQUIVALENT) 325 MG: 325 (65 FE) TABLET DELAYED RESPONSE at 08:11

## 2020-11-29 RX ADMIN — OXYBUTYNIN CHLORIDE 10 MG: 5 TABLET, EXTENDED RELEASE ORAL at 08:11

## 2020-11-29 RX ADMIN — FERROUS SULFATE TAB EC 325 MG (65 MG FE EQUIVALENT) 325 MG: 325 (65 FE) TABLET DELAYED RESPONSE at 04:11

## 2020-11-29 RX ADMIN — GABAPENTIN 300 MG: 300 CAPSULE ORAL at 09:11

## 2020-11-29 RX ADMIN — POTASSIUM CHLORIDE 40 MEQ: 1.5 POWDER, FOR SOLUTION ORAL at 09:11

## 2020-11-29 RX ADMIN — AMIODARONE HYDROCHLORIDE 200 MG: 200 TABLET ORAL at 06:11

## 2020-11-29 RX ADMIN — GABAPENTIN 300 MG: 300 CAPSULE ORAL at 03:11

## 2020-11-29 RX ADMIN — APIXABAN 5 MG: 5 TABLET, FILM COATED ORAL at 09:11

## 2020-11-29 RX ADMIN — GABAPENTIN 300 MG: 300 CAPSULE ORAL at 08:11

## 2020-11-29 RX ADMIN — POTASSIUM CHLORIDE 40 MEQ: 1.5 POWDER, FOR SOLUTION ORAL at 08:11

## 2020-11-29 RX ADMIN — CARVEDILOL 6.25 MG: 6.25 TABLET, FILM COATED ORAL at 09:11

## 2020-11-29 RX ADMIN — ATORVASTATIN CALCIUM 40 MG: 20 TABLET, FILM COATED ORAL at 09:11

## 2020-11-29 RX ADMIN — CARVEDILOL 6.25 MG: 6.25 TABLET, FILM COATED ORAL at 08:11

## 2020-11-29 RX ADMIN — ALLOPURINOL 300 MG: 300 TABLET ORAL at 06:11

## 2020-11-29 NOTE — PLAN OF CARE
Problem: Bariatric Environmental Safety  Goal: Safety Maintained with Care  Outcome: Ongoing, Progressing  Intervention: Promote Safety and Comfort  Flowsheets (Taken 11/29/2020 0512)  Bariatric Safety: specialty bed utilized     Problem: Diabetes Comorbidity  Goal: Blood Glucose Level Within Desired Range  Outcome: Ongoing, Progressing  Intervention: Maintain Glycemic Control  Flowsheets (Taken 11/29/2020 0512)  Glycemic Management:   blood glucose monitoring   oral hydration promoted     Patient lying in bed in no acute distress. Patient has moments of confusion but redirectable. IV to right forearm is saline locked. Dressings are dry and intact. Bed in low. Call light in reach. Will continue to monitor.

## 2020-11-29 NOTE — ASSESSMENT & PLAN NOTE
70 year old male with NICM, HFrEF (EF of 35-40%), HTN, T2DM, AFib on DOAC and amiodarone, and obesity (BMI >50) who presents with AMS from skilled nursing facility. BMP remarkable for Na 157. VBG 7.39/39/31.   Imaging: CT head without acute intracranial process.   MICU consulted who recommended appropriate placement on floor for sodium correction  Suspect acute encephalopathy is 2/2 to hypernatremia.   Sodium wnl, 140. Patient more alert and responsive than on arrival, more cooperative to care. Oriented x1 to person. Answers correctly daughter's name, president and year of birth.  Able to follow simple commands.   Patient is experiencing fluctuations in mental status throughout the day, being more clear in early morning and at the end of day he starts to act out and refuse medication. Sodium levels have been wnl in the past 48 hours. Due to patient's age it is likely to take longer to reach baseline mental status. Patient is not presenting with elevated WBC, fevers or acute neurological decompensation concerning for infection or other structural/physiological cause for mental status.   Patient oriented x3 today. [place]. Waxing and waning mentation.     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    Plan  -- Patient with episodes of waxing and waning, remarkable for delirium. He is oriented x3 and redirectable.   -- Encourage family members to come see patient to increase familiarity.   -- Continue to encourage PO medications. Requested to try to give them with meals  -- Continue apixaban 5mg BID if patient taking PO medications   -- Daily CMP   -- Replace electrolytes as needed   -- Delirium precautions, increase mobility with PT/OT today.

## 2020-11-29 NOTE — PLAN OF CARE
"  Problem: Fall Injury Risk  Goal: Absence of Fall and Fall-Related Injury  Outcome: Ongoing, Progressing  Intervention: Identify and Manage Contributors to Fall Injury Risk  Flowsheets (Taken 11/29/2020 1558)  Self-Care Promotion:   independence encouraged   BADL personal objects within reach   BADL personal routines maintained   meal setup provided  Medication Review/Management: medications reviewed  Intervention: Promote Injury-Free Environment  Flowsheets (Taken 11/29/2020 1558)  Safety Promotion/Fall Prevention:   assistive device/personal item within reach   bed alarm set   commode/urinal/bedpan at bedside   Fall Risk reviewed with patient/family   lighting adjusted   medications reviewed   side rails raised x 2  Environmental Safety Modification:   assistive device/personal items within reach   clutter free environment maintained   lighting adjusted     Problem: Diabetes Comorbidity  Goal: Blood Glucose Level Within Desired Range  Outcome: Ongoing, Progressing  Intervention: Maintain Glycemic Control  Flowsheets (Taken 11/29/2020 1558)  Glycemic Management: blood glucose monitoring     Problem: Electrolyte Imbalance (Acute Kidney Injury/Impairment)  Goal: Serum Electrolyte Balance  Outcome: Ongoing, Progressing  Intervention: Monitor and Manage Electrolyte Imbalance  Flowsheets (Taken 11/29/2020 1558)  Fluid/Electrolyte Management:   electrolyte supplement initiated   fluids provided     Problem: Wound  Goal: Optimal Wound Healing  Outcome: Ongoing, Progressing  Intervention: Promote Effective Wound Healing  Flowsheets (Taken 11/29/2020 1558)  Oral Nutrition Promotion: calorie dense foods provided  Pt disoriented to situation/place/time throughout the day, but easily reoriented.  Delirium precautions continued. Pt cooperative with care throughout the morning, but became uncooperative this afternoon after he took a nap. He asked "who sent you?" as I entered the room to give him his meds. He stated he did not " remember me. Was able to reorient patient and explain the importance of taking his medications. Eventually, pt became cooperative. No acute changes. VSS. Wound care done per order. BG monitoring ACHS per order.  POC reviewed with patient, all questions/concerns addressed. Bed locked in lowest position, call light within reach. WCTM.

## 2020-11-29 NOTE — PROGRESS NOTES
[facility] Hospital Medicine  Progress Note    Patient Name: Hemant Kennedy Jr.  MRN: [id number]  Patient Class: IP- Inpatient   Admission Date: 11/20/2020  Length of Stay: 8 days  Attending Physician: Shira Lazcano*  Primary Care Provider: Wicho Qiu MD    [facility] Medicine Team: [facility] 4 Gi Fall MD    Subjective:     Principal Problem:Acute encephalopathy        HPI:  Hemant Kennedy is a 70 year old male with NICM, HFrEF (EF of 35-40%), HTN, T2DM, AFib on DOAC and amiodarone, h/o CVA w/ L hemineglect, h/o DVT, ERIC and obesity (BMI >50) who presents from [facility] for altered mental status.  Patient was brought in by EMS from [facility], where he has been living for the past 2 weeks, after being treated at [facility] 11/1-11/5 for rhabdomyolysis, acute metabolic encephalopathy 2/2 urosepsis with proteus miribalis, and panniculitis. EMS reports patient was refusing his nightly BIPAP at the nursing home.     History was limited due to patient's altered mental state. I contacted the nursing home to obtain collateral information but nursing staff wasn't available at the time.     In the ED, patient was afebrile, hemodynamically stable, and 95% on room air. BMP remarkable for Na 157. VBG 7.39/39/31. CT head without acute intracranial process.        Overview/Hospital Course:  Patient admitted to medicine for AMS with Na 157. Patient treated with D5W infusion, with adequate adjustment until coming down to 142. AMS with mild improvement, patient only oriented to self and still combative and difficult to redirect. Patient refusing PO medications since 11/22, transitioned to IV enoxaparin, CHADS2 Score of 5. Left upper extremity swelling noted in fingers up to arm, with skin pealing after removing IV. US ordered to r/o DVT and negative. Swelling improved after placing ice and removing IV. Bullae noted  on left arm, wound care on board. Mental status improving, patient more coopertive with care. Pending placement to SNF, as daughter not pleased with [facility]. According to daughter patient has not yet reached his baseline mental status, will get collateral information from nursing of how patient was prior to UTI where he also was experiencing mental status changes. Daughter concerned that her father has not returned to his previous baseline since his previous hospitalization early November. MRI completed, no new acute findings, remote infarcts and age related atrophy and encephalomalacia secondary to infarcts. Pending to SNF, not accepted at [facility], requests sent to two other facilities. SW and daughter on board. Patient having delirious episodes throughout the day.    Interval History: NAEO. [place]. Concerned for hospital delirium. Daughter will come visit today and will continue to work on placement for patient. No FND. Will replace electrolytes.     Review of Systems   Constitutional: Negative for appetite change, chills and fever.   HENT: Negative for congestion, sore throat, trouble swallowing and voice change.    Eyes: Negative.    Respiratory: Negative for cough and shortness of breath.    Cardiovascular: Negative for chest pain and leg swelling.   Gastrointestinal: Negative for abdominal distention, abdominal pain, constipation, nausea and vomiting.   Endocrine: Negative.    Genitourinary: Negative.    Musculoskeletal: Negative for back pain and gait problem.   Skin: Negative.    Neurological: Negative for weakness and headaches.   Psychiatric/Behavioral: Negative.      Objective:     Vital Signs (Most Recent):  Temp: 98 °F (36.7 °C) (11/29/20 0717)  Pulse: 81 (11/29/20 0717)  Resp: 17 (11/29/20 0717)  BP: (!) 92/53 (11/29/20 0717)  SpO2: (!) 92 % (11/29/20 0717) Vital Signs (24h Range):  Temp:  [98 °F (36.7 °C)-98.6 °F (37 °C)] 98 °F (36.7  °C)  Pulse:  [71-83] 81  Resp:  [17-18] 17  SpO2:  [89 %-97 %] 92 %  BP: ()/(53-68) 92/53     Weight: (!) 158.8 kg (350 lb)  Body mass index is 51.69 kg/m².    Intake/Output Summary (Last 24 hours) at 11/29/2020 0825  Last data filed at 11/28/2020 1700  Gross per 24 hour   Intake 240 ml   Output 200 ml   Net 40 ml      Physical Exam  Constitutional:       Appearance: Normal appearance.   HENT:      Head: Normocephalic and atraumatic.      Nose: Nose normal.      Mouth/Throat:      Mouth: Mucous membranes are moist.   Eyes:      Extraocular Movements: Extraocular movements intact.      Pupils: Pupils are equal, round, and reactive to light.   Neck:      Musculoskeletal: Normal range of motion.   Cardiovascular:      Rate and Rhythm: Normal rate. Rhythm irregular.   Pulmonary:      Breath sounds: Normal breath sounds.   Abdominal:      General: Bowel sounds are normal. There is no distension.      Palpations: Abdomen is soft.      Tenderness: There is no abdominal tenderness.   Musculoskeletal: Normal range of motion.         General: Swelling (left arm) present.   Skin:     General: Skin is warm.      Capillary Refill: Capillary refill takes less than 2 seconds.   Neurological:      General: No focal deficit present.      Mental Status: He is alert and oriented to person, place, and time. Mental status is at baseline.         Significant Labs:   CBC: No results for input(s): WBC, HGB, HCT, PLT in the last 48 hours.  CMP:   Recent Labs   Lab 11/28/20  0908 11/29/20  0548    143   K 3.8 3.4*   * 111*   CO2 25 24   * 115*   BUN 12 10   CREATININE 1.3 1.3   CALCIUM 8.6* 8.2*   ANIONGAP 7* 8   EGFRNONAA 54.5* 54.5*       Significant Imaging: I have reviewed and interpreted all pertinent imaging results/findings within the past 24 hours.      Assessment/Plan:      * Acute encephalopathy  70 year old male with NICM, HFrEF (EF of 35-40%), HTN, T2DM, AFib on DOAC and amiodarone, and obesity (BMI >50)  who presents with AMS from skilled nursing facility. BMP remarkable for Na 157. VBG 7.39/39/31.   Imaging: CT head without acute intracranial process.   MICU consulted who recommended appropriate placement on floor for sodium correction  Suspect acute encephalopathy is 2/2 to hypernatremia.   Sodium wnl, 140. Patient more alert and responsive than on arrival, more cooperative to care. Oriented x1 to person. Answers correctly daughter's name, president and year of birth.  Able to follow simple commands.   Patient is experiencing fluctuations in mental status throughout the day, being more clear in early morning and at the end of day he starts to act out and refuse medication. Sodium levels have been wnl in the past 48 hours. Due to patient's age it is likely to take longer to reach baseline mental status. Patient is not presenting with elevated WBC, fevers or acute neurological decompensation concerning for infection or other structural/physiological cause for mental status.   Patient oriented x3 today. He [place]. Waxing and waning mentation.     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    Plan  -- Patient with episodes of waxing and waning, remarkable for delirium. He is oriented x3 and redirectable.   -- Encourage family members to come see patient to increase familiarity.   -- Continue to encourage PO medications. Requested to try to give them with meals  -- Continue apixaban 5mg BID if patient taking PO medications   -- Daily CMP   -- Replace electrolytes as needed   -- Delirium precautions, increase mobility with PT/OT today.               Hypernatremia  Patient with Na of 157 on admission; serum osm 339. Free water deficit 6.6L   Suspect possibly due to dehydration due to poor intake vs GI losses, although unable to obtain collateral information from skilled nursing home as nurse was not available at the time of call.    EF noted at 35%.  Sodium wnl 11/23/20    Plan  - Discontinue IVF   - Daily CMP    - Encourage PO intake     Discharge planning issues  Patient was brought from [facility]. Daughter is not pleased with care and wishes another SNF for patient. Currently waiting for placement. Patient already accepted at [facility]. Declined by several others, including [facility] (daughters 1st choice). Will work towards authorization for [facility]    -- Patient not accepted to [facility]'s.   >>Pt's daughter agreed to send referrals to [place] [facility] and [facility].     SW sent additional referrals to the following facilities via [facility]:  [facility] and [facility].  SW will continue to monitor case status    Edema of left upper extremity  Since arrival patient has been refusing PO medications, for which started enoxaparin IV on 11/23/20. Upper extremity with 2+ pitting edema from fingers up to proxima fore arm. Patient denies pain, CP, SOB.   Swelling improving after removing IV and ice placed     Plan:   -- Left Upper extremity US ordered to r/o DVT : negative  -- Removed IV from arm   -- Wound care notified for skin pealing  -- Will continue to monitor       Panniculitis  Noted in lower abdominal/ groin area. No erythema or purulence noted.    -Wound care consulted for skin breakdown, agree with recommendations     HLD (hyperlipidemia)  - Continue home statin       Nonischemic cardiomyopathy  The estimated ejection fraction is 35-40%.    Plan:   -- Continue  patient's carvedilol 6.25 BID  -- Monitor BP, make sure cuff is right size     Obstructive sleep apnea treated with continuous positive airway pressure (CPAP)  CPAP qHS      Type II diabetes mellitus  Well controlled. Hgb A1c 5.6. Patient doesn't appear to be on diabetic medication.   Episode of hypoglycemia today, responded well to D50% injection    Plan:   -- BG goal  140-180   -- No medical treatment required at this time   -- Encourage bedside assistance for meals and fluid intake       CKD (chronic kidney disease) stage 3, GFR 30-59 ml/min  Renal function at baseline Cr of 2.0 (within baseline 1.8-2.0), Cr today 1.5     Plan:   --Avoid nephrotoxic medication  --Renally dose medications  --Daily BMP      Atrial fibrillation, controlled  Patient with HX of Afib with CHADs score of 5 on amiodarone 200mg, eliquis 5mg at home. Patient is currently refusing PO medications, HD stable.     Plan:    --  Patient on A- fib but is rate controlled, will encourage PO intake of his amiodarone.   -- Continue apixaban 5 mg BID  -- Daily Cbc      VTE Risk Mitigation (From admission, onward)         Ordered     apixaban tablet 5 mg  2 times daily      11/25/20 1102                Discharge Planning   JOHN: 11/30/2020     Code Status: Full Code   Is the patient medically ready for discharge?: No    Reason for patient still in hospital (select all that apply): Pending disposition  Discharge Plan A: Skilled Nursing Facility                  Gi Fall MD  Department of Hospital Medicine   [facility]

## 2020-11-29 NOTE — ASSESSMENT & PLAN NOTE
Patient with HX of Afib with CHADs score of 5 on amiodarone 200mg, eliquis 5mg at home. Patient is currently refusing PO medications, HD stable.     Plan:    --  Patient on A- fib but is rate controlled, will encourage PO intake of his amiodarone.   -- Continue apixaban 5 mg BID  -- Daily Cbc

## 2020-11-29 NOTE — SUBJECTIVE & OBJECTIVE
Interval History: NAEO. Patient refers [place]. Concerned for hospital delirium. Daughter will come visit today and will continue to work on placement for patient. No FND. Will replace electrolytes.     Review of Systems   Constitutional: Negative for appetite change, chills and fever.   HENT: Negative for congestion, sore throat, trouble swallowing and voice change.    Eyes: Negative.    Respiratory: Negative for cough and shortness of breath.    Cardiovascular: Negative for chest pain and leg swelling.   Gastrointestinal: Negative for abdominal distention, abdominal pain, constipation, nausea and vomiting.   Endocrine: Negative.    Genitourinary: Negative.    Musculoskeletal: Negative for back pain and gait problem.   Skin: Negative.    Neurological: Negative for weakness and headaches.   Psychiatric/Behavioral: Negative.      Objective:     Vital Signs (Most Recent):  Temp: 98 °F (36.7 °C) (11/29/20 0717)  Pulse: 81 (11/29/20 0717)  Resp: 17 (11/29/20 0717)  BP: (!) 92/53 (11/29/20 0717)  SpO2: (!) 92 % (11/29/20 0717) Vital Signs (24h Range):  Temp:  [98 °F (36.7 °C)-98.6 °F (37 °C)] 98 °F (36.7 °C)  Pulse:  [71-83] 81  Resp:  [17-18] 17  SpO2:  [89 %-97 %] 92 %  BP: ()/(53-68) 92/53     Weight: (!) 158.8 kg (350 lb)  Body mass index is 51.69 kg/m².    Intake/Output Summary (Last 24 hours) at 11/29/2020 0825  Last data filed at 11/28/2020 1700  Gross per 24 hour   Intake 240 ml   Output 200 ml   Net 40 ml      Physical Exam  Constitutional:       Appearance: Normal appearance.   HENT:      Head: Normocephalic and atraumatic.      Nose: Nose normal.      Mouth/Throat:      Mouth: Mucous membranes are moist.   Eyes:      Extraocular Movements: Extraocular movements intact.      Pupils: Pupils are equal, round, and reactive to light.   Neck:      Musculoskeletal: Normal range of motion.   Cardiovascular:      Rate and Rhythm: Normal rate. Rhythm irregular.   Pulmonary:       Breath sounds: Normal breath sounds.   Abdominal:      General: Bowel sounds are normal. There is no distension.      Palpations: Abdomen is soft.      Tenderness: There is no abdominal tenderness.   Musculoskeletal: Normal range of motion.         General: Swelling (left arm) present.   Skin:     General: Skin is warm.      Capillary Refill: Capillary refill takes less than 2 seconds.   Neurological:      General: No focal deficit present.      Mental Status: He is alert and oriented to person, place, and time. Mental status is at baseline.         Significant Labs:   CBC: No results for input(s): WBC, HGB, HCT, PLT in the last 48 hours.  CMP:   Recent Labs   Lab 11/28/20  0908 11/29/20  0548    143   K 3.8 3.4*   * 111*   CO2 25 24   * 115*   BUN 12 10   CREATININE 1.3 1.3   CALCIUM 8.6* 8.2*   ANIONGAP 7* 8   EGFRNONAA 54.5* 54.5*       Significant Imaging: I have reviewed and interpreted all pertinent imaging results/findings within the past 24 hours.

## 2020-11-30 LAB
ANION GAP SERPL CALC-SCNC: 6 MMOL/L (ref 8–16)
BASOPHILS # BLD AUTO: 0.04 K/UL (ref 0–0.2)
BASOPHILS NFR BLD: 0.6 % (ref 0–1.9)
BUN SERPL-MCNC: 9 MG/DL (ref 8–23)
CALCIUM SERPL-MCNC: 8.2 MG/DL (ref 8.7–10.5)
CHLORIDE SERPL-SCNC: 111 MMOL/L (ref 95–110)
CO2 SERPL-SCNC: 24 MMOL/L (ref 23–29)
CREAT SERPL-MCNC: 1.2 MG/DL (ref 0.5–1.4)
DIFFERENTIAL METHOD: ABNORMAL
EOSINOPHIL # BLD AUTO: 0.6 K/UL (ref 0–0.5)
EOSINOPHIL NFR BLD: 8.1 % (ref 0–8)
ERYTHROCYTE [DISTWIDTH] IN BLOOD BY AUTOMATED COUNT: 15.1 % (ref 11.5–14.5)
EST. GFR  (AFRICAN AMERICAN): >60 ML/MIN/1.73 M^2
EST. GFR  (NON AFRICAN AMERICAN): >60 ML/MIN/1.73 M^2
GLUCOSE SERPL-MCNC: 91 MG/DL (ref 70–110)
HCT VFR BLD AUTO: 35.8 % (ref 40–54)
HGB BLD-MCNC: 10.9 G/DL (ref 14–18)
IMM GRANULOCYTES # BLD AUTO: 0.06 K/UL (ref 0–0.04)
IMM GRANULOCYTES NFR BLD AUTO: 0.8 % (ref 0–0.5)
LYMPHOCYTES # BLD AUTO: 2.1 K/UL (ref 1–4.8)
LYMPHOCYTES NFR BLD: 29.5 % (ref 18–48)
MAGNESIUM SERPL-MCNC: 1.8 MG/DL (ref 1.6–2.6)
MCH RBC QN AUTO: 29.3 PG (ref 27–31)
MCHC RBC AUTO-ENTMCNC: 30.4 G/DL (ref 32–36)
MCV RBC AUTO: 96 FL (ref 82–98)
MONOCYTES # BLD AUTO: 1 K/UL (ref 0.3–1)
MONOCYTES NFR BLD: 13.2 % (ref 4–15)
NEUTROPHILS # BLD AUTO: 3.4 K/UL (ref 1.8–7.7)
NEUTROPHILS NFR BLD: 47.8 % (ref 38–73)
NRBC BLD-RTO: 0 /100 WBC
PLATELET # BLD AUTO: 149 K/UL (ref 150–350)
PMV BLD AUTO: 11.9 FL (ref 9.2–12.9)
POCT GLUCOSE: 119 MG/DL (ref 70–110)
POCT GLUCOSE: 156 MG/DL (ref 70–110)
POCT GLUCOSE: 161 MG/DL (ref 70–110)
POCT GLUCOSE: 94 MG/DL (ref 70–110)
POTASSIUM SERPL-SCNC: 4 MMOL/L (ref 3.5–5.1)
RBC # BLD AUTO: 3.72 M/UL (ref 4.6–6.2)
SODIUM SERPL-SCNC: 141 MMOL/L (ref 136–145)
WBC # BLD AUTO: 7.19 K/UL (ref 3.9–12.7)

## 2020-11-30 PROCEDURE — 99233 PR SUBSEQUENT HOSPITAL CARE,LEVL III: ICD-10-PCS | Mod: GC,,, | Performed by: HOSPITALIST

## 2020-11-30 PROCEDURE — 85025 COMPLETE CBC W/AUTO DIFF WBC: CPT

## 2020-11-30 PROCEDURE — 99233 SBSQ HOSP IP/OBS HIGH 50: CPT | Mod: GC,,, | Performed by: HOSPITALIST

## 2020-11-30 PROCEDURE — 94761 N-INVAS EAR/PLS OXIMETRY MLT: CPT

## 2020-11-30 PROCEDURE — 20600001 HC STEP DOWN PRIVATE ROOM

## 2020-11-30 PROCEDURE — 25000003 PHARM REV CODE 250: Performed by: STUDENT IN AN ORGANIZED HEALTH CARE EDUCATION/TRAINING PROGRAM

## 2020-11-30 PROCEDURE — 97110 THERAPEUTIC EXERCISES: CPT

## 2020-11-30 PROCEDURE — 94660 CPAP INITIATION&MGMT: CPT

## 2020-11-30 PROCEDURE — 97530 THERAPEUTIC ACTIVITIES: CPT | Mod: CQ

## 2020-11-30 PROCEDURE — 27000221 HC OXYGEN, UP TO 24 HOURS

## 2020-11-30 PROCEDURE — 99900035 HC TECH TIME PER 15 MIN (STAT)

## 2020-11-30 PROCEDURE — 63600175 PHARM REV CODE 636 W HCPCS: Performed by: STUDENT IN AN ORGANIZED HEALTH CARE EDUCATION/TRAINING PROGRAM

## 2020-11-30 PROCEDURE — 80048 BASIC METABOLIC PNL TOTAL CA: CPT

## 2020-11-30 PROCEDURE — 83735 ASSAY OF MAGNESIUM: CPT

## 2020-11-30 PROCEDURE — 36415 COLL VENOUS BLD VENIPUNCTURE: CPT

## 2020-11-30 RX ORDER — MAGNESIUM SULFATE HEPTAHYDRATE 40 MG/ML
2 INJECTION, SOLUTION INTRAVENOUS ONCE
Status: COMPLETED | OUTPATIENT
Start: 2020-11-30 | End: 2020-11-30

## 2020-11-30 RX ADMIN — ASPIRIN 81 MG CHEWABLE TABLET 81 MG: 81 TABLET CHEWABLE at 09:11

## 2020-11-30 RX ADMIN — CARVEDILOL 6.25 MG: 6.25 TABLET, FILM COATED ORAL at 09:11

## 2020-11-30 RX ADMIN — FERROUS SULFATE TAB EC 325 MG (65 MG FE EQUIVALENT) 325 MG: 325 (65 FE) TABLET DELAYED RESPONSE at 04:11

## 2020-11-30 RX ADMIN — GABAPENTIN 300 MG: 300 CAPSULE ORAL at 02:11

## 2020-11-30 RX ADMIN — GABAPENTIN 300 MG: 300 CAPSULE ORAL at 08:11

## 2020-11-30 RX ADMIN — ERGOCALCIFEROL 50000 UNITS: 1.25 CAPSULE ORAL at 04:11

## 2020-11-30 RX ADMIN — APIXABAN 5 MG: 5 TABLET, FILM COATED ORAL at 09:11

## 2020-11-30 RX ADMIN — OXYBUTYNIN CHLORIDE 10 MG: 5 TABLET, EXTENDED RELEASE ORAL at 09:11

## 2020-11-30 RX ADMIN — MICONAZOLE NITRATE: 20 OINTMENT TOPICAL at 09:11

## 2020-11-30 RX ADMIN — CARVEDILOL 6.25 MG: 6.25 TABLET, FILM COATED ORAL at 08:11

## 2020-11-30 RX ADMIN — FERROUS SULFATE TAB EC 325 MG (65 MG FE EQUIVALENT) 325 MG: 325 (65 FE) TABLET DELAYED RESPONSE at 06:11

## 2020-11-30 RX ADMIN — APIXABAN 5 MG: 5 TABLET, FILM COATED ORAL at 08:11

## 2020-11-30 RX ADMIN — THERA TABS 1 TABLET: TAB at 09:11

## 2020-11-30 RX ADMIN — ALLOPURINOL 300 MG: 300 TABLET ORAL at 06:11

## 2020-11-30 RX ADMIN — GABAPENTIN 300 MG: 300 CAPSULE ORAL at 09:11

## 2020-11-30 RX ADMIN — ATORVASTATIN CALCIUM 40 MG: 20 TABLET, FILM COATED ORAL at 08:11

## 2020-11-30 RX ADMIN — AMIODARONE HYDROCHLORIDE 200 MG: 200 TABLET ORAL at 06:11

## 2020-11-30 RX ADMIN — FERROUS SULFATE TAB EC 325 MG (65 MG FE EQUIVALENT) 325 MG: 325 (65 FE) TABLET DELAYED RESPONSE at 09:11

## 2020-11-30 RX ADMIN — MICONAZOLE NITRATE: 20 OINTMENT TOPICAL at 08:11

## 2020-11-30 RX ADMIN — MAGNESIUM SULFATE IN WATER 2 G: 40 INJECTION, SOLUTION INTRAVENOUS at 09:11

## 2020-11-30 NOTE — PLAN OF CARE
Problem: Fall Injury Risk  Goal: Absence of Fall and Fall-Related Injury  Outcome: Ongoing, Progressing  Intervention: Identify and Manage Contributors to Fall Injury Risk  Flowsheets (Taken 11/30/2020 1742)  Self-Care Promotion:   independence encouraged   BADL personal objects within reach   BADL personal routines maintained   meal setup provided   safe use of adaptive equipment encouraged  Intervention: Promote Injury-Free Environment  Flowsheets (Taken 11/30/2020 1742)  Safety Promotion/Fall Prevention:   assistive device/personal item within reach   bed alarm set   Fall Risk reviewed with patient/family   family to remain at bedside   lighting adjusted   medications reviewed   side rails raised x 2  Environmental Safety Modification:   assistive device/personal items within reach   clutter free environment maintained   lighting adjusted     Problem: Diabetes Comorbidity  Goal: Blood Glucose Level Within Desired Range  Outcome: Ongoing, Progressing  Intervention: Maintain Glycemic Control  Flowsheets (Taken 11/30/2020 1742)  Glycemic Management: blood glucose monitoring     Pt disoriented to situation/place/time throughout the day, but easily reoriented.  Delirium precautions continued.   No acute changes. VSS. Wound care done per order. BG monitoring ACHS per order.  POC reviewed with patient, all questions/concerns addressed. Bed locked in lowest position, call light within reach. WCTM.

## 2020-11-30 NOTE — PLAN OF CARE
Problem: Fall Injury Risk  Goal: Absence of Fall and Fall-Related Injury  Intervention: Identify and Manage Contributors to Fall Injury Risk  Flowsheets (Taken 11/30/2020 0345)  Self-Care Promotion: independence encouraged  Medication Review/Management: medications reviewed  Intervention: Promote Injury-Free Environment  Flowsheets (Taken 11/30/2020 0345)  Safety Promotion/Fall Prevention:   assistive device/personal item within reach   nonskid shoes/socks when out of bed   medications reviewed   lighting adjusted   Fall Risk reviewed with patient/family   side rails raised x 2  Environmental Safety Modification:   assistive device/personal items within reach   clutter free environment maintained   lighting adjusted   room organization consistent     Problem: Skin Injury Risk Increased  Goal: Skin Health and Integrity  Intervention: Optimize Skin Protection  Flowsheets (Taken 11/30/2020 0345)  Pressure Reduction Devices:   foam padding utilized   heel offloading device utilized  Head of Bed (HOB): HOB at 30 degrees     Problem: Adult Inpatient Plan of Care  Goal: Absence of Hospital-Acquired Illness or Injury  Intervention: Identify and Manage Fall Risk  Flowsheets (Taken 11/30/2020 0345)  Safety Promotion/Fall Prevention:   assistive device/personal item within reach   nonskid shoes/socks when out of bed   medications reviewed   lighting adjusted   Fall Risk reviewed with patient/family   side rails raised x 2  Intervention: Prevent VTE (venous thromboembolism)  Flowsheets (Taken 11/30/2020 0345)  VTE Prevention/Management: fluids promoted  Goal: Optimal Comfort and Wellbeing  Intervention: Provide Person-Centered Care  Flowsheets (Taken 11/30/2020 0345)  Trust Relationship/Rapport:   care explained   choices provided   emotional support provided   questions encouraged   reassurance provided   empathic listening provided   questions answered   thoughts/feelings acknowledged     Problem: Respiratory Compromise  (Sepsis/Septic Shock)  Goal: Effective Oxygenation and Ventilation  Intervention: Promote Airway Secretion Clearance  Flowsheets (Taken 11/30/2020 0345)  Activity Management:   rolling - L1   arm raise - L1  Intervention: Optimize Oxygenation and Ventilation  Flowsheets (Taken 11/30/2020 0345)  Head of Bed (HOB): HOB at 30 degrees     Problem: Renal Function Impairment (Acute Kidney Injury/Impairment)  Goal: Effective Renal Function  Intervention: Monitor and Support Renal Function  Flowsheets (Taken 11/30/2020 0345)  Medication Review/Management: medications reviewed     Problem: Wound  Goal: Optimal Wound Healing  Intervention: Promote Effective Wound Healing  Flowsheets (Taken 11/30/2020 0345)  Sleep/Rest Enhancement: awakenings minimized  Pain Management Interventions:   care clustered   pillow support provided

## 2020-11-30 NOTE — ASSESSMENT & PLAN NOTE
Patient was brought from [facility]. Daughter is not pleased with care and wishes another SNF for patient. Currently waiting for placement. Patient already accepted at [facility]. Declined by several others, including [facility] (daughters 1st choice). Will work towards authorization for [facility]    -- Patient not accepted to [facility].   -- Daughter agreed agreeable to  returning patient to [facility] which accepted him. She wants to try SNF before taking him home. No wishes for [facility] nursing at this time.   -- Authorizations sent, patient most likely discharged tomorrow.

## 2020-11-30 NOTE — PT/OT/SLP PROGRESS
"Physical Therapy Treatment    Patient Name:  Hemant Kennedy Jr.   MRN:  [id number]    Recommendations:     Discharge Recommendations:  nursing facility, skilled   Discharge Equipment Recommendations: wheelchair, hospital bed   Barriers to discharge: Decreased caregiver support    Assessment:     Hemant Kennedy Jr. is a 70 y.o. male admitted with a medical diagnosis of Acute encephalopathy.  He presents with the following impairments/functional limitations:  weakness, impaired endurance, impaired self care skills, impaired functional mobilty, impaired balance, impaired cognition, decreased coordination, decreased lower extremity function, decreased safety awareness, pain, decreased ROM, impaired joint extensibility. Pt confused, agitated, and resistive to attempts to mobilize. Pt rolled in bed and scooted higher in bed following pericare. Further mobilization deferred d/t pt resistance. Pt will be assigned for follow-up with PT for re-assessment.     Rehab Prognosis: Fair; patient would benefit from acute skilled PT services to address these deficits and reach maximum level of function.    Recent Surgery: * No surgery found *      Plan:     During this hospitalization, patient to be seen 3 x/week to address the identified rehab impairments via therapeutic activities, therapeutic exercises, neuromuscular re-education and progress toward the following goals:    · Plan of Care Expires:  12/23/20    Subjective     Chief Complaint: back pain - pt heavily soiled in urine and had not alerted NSG  Patient/Family Comments/goals: "My back hurts and you don't know anything about that."  Pain/Comfort:  · Pain Rating 1: other (see comments)(does not rate c/o when asked)  · Location - Orientation 1: generalized  · Location 1: back  · Pain Addressed 1: Reposition, Distraction, Nurse notified      Objective:     Communicated with NSG prior to session.  Patient found supine attempting to offload and roll to L with bed alarm, " peripheral IV, PRAFO upon PTA entry to room. NSG in to bedside during session. Therapy technician assisting throughout.     General Precautions: Standard, fall   Orthopedic Precautions:N/A   Braces: N/A     Functional Mobility:  · Bed Mobility:     · Rolling Left:  total assistance and of 2 persons  · Rolling Right: total assistance and of 2 persons  · Scooting: total assistance and of 2 persons      AM-PAC 6 CLICK MOBILITY  Turning over in bed (including adjusting bedclothes, sheets and blankets)?: 2  Sitting down on and standing up from a chair with arms (e.g., wheelchair, bedside commode, etc.): 1  Moving from lying on back to sitting on the side of the bed?: 1  Moving to and from a bed to a chair (including a wheelchair)?: 1  Need to walk in hospital room?: 1  Climbing 3-5 steps with a railing?: 1  Basic Mobility Total Score: 7       Therapeutic Activities and Exercises:   Pt assisted with functional mobility as noted above.   Attempted re-orientation and redirection with poor result, pt agitated and resistive to attempts to assist.   Assisted in bed mobilization for pericare and repositioning of patient and further mobility deferred d/t pt resistance.     Patient left HOB elevated with all lines intact, call button in reach, bed alarm on and NSG present..    GOALS:   Multidisciplinary Problems     Physical Therapy Goals        Problem: Physical Therapy Goal    Goal Priority Disciplines Outcome Goal Variances Interventions   Physical Therapy Goal     PT, PT/OT Ongoing, Progressing     Description: Goals to be met by: 20     Patient will increase functional independence with mobility by performin. Supine to sit with Moderate Assistance  2. Sit to supine with Moderate Assistance  3. Sit to stand transfer with Maximum Assistance  4. Sitting at edge of bed x5 minutes with Minimal Assistance  5. Stand for 1 minutes with Moderate Assistance using Rolling Walker                     Time Tracking:     PT  Received On: 11/30/20  PT Start Time: 0858     PT Stop Time: 0910  PT Total Time (min): 12 min     Billable Minutes: Therapeutic Activity 12    Treatment Type: Treatment  PT/PTA: PTA     PTA Visit Number: 1     Bernard Peterson PTA  11/30/2020

## 2020-11-30 NOTE — PLAN OF CARE
Discharge recommendation to basic, nursing facility.    Problem: Occupational Therapy Goal  Goal: Occupational Therapy Goal  Description: Goals to be met by: 12/24/20     Patient will increase functional independence with ADLs by performing:    UE Dressing with Minimal Assistance.  LE Dressing with Minimal Assistance.  Grooming while seated edge of bed with Minimal Assistance.  Sitting at edge of bed x10 minutes with Minimal Assistance.  Rolling to Bilateral with Contact Guard Assistance.   Supine to sit with Minimal Assistance.  Toilet transfer to beside commode with Moderate Assistance.    Outcome: Ongoing, Not Progressing     SUSAN Grider  11/30/2020  Student OT

## 2020-11-30 NOTE — PLAN OF CARE
11/30/20 1411   Post-Acute Status   Post-Acute Authorization Placement   Post-Acute Placement Status Authorization Obtained   Discharge Delays None known at this time   Discharge Plan   Discharge Plan A Skilled Nursing Facility     GERARDO updated pts dtg regarding SNF denials. Dtg agreeable to pt returning to [facility]. Pt accepted by [facility]. GERARDO updated primary team.    GERARDO updated Erica with [facility]; plan is for admit tomorrow.    Kaitlin Cyr, ADAMA [id number]

## 2020-11-30 NOTE — ASSESSMENT & PLAN NOTE
70 year old male with NICM, HFrEF (EF of 35-40%), HTN, T2DM, AFib on DOAC and amiodarone, and obesity (BMI >50) who presents with AMS from skilled nursing facility. BMP remarkable for Na 157. VBG 7.39/39/31.   Imaging: CT head without acute intracranial process.   MICU consulted who recommended appropriate placement on floor for sodium correction  Suspect acute encephalopathy is 2/2 to hypernatremia.   Sodium wnl, 140. Patient more alert and responsive than on arrival, more cooperative to care. Oriented x1 to person. Answers correctly daughter's name, president and year of birth.  Able to follow simple commands.   Patient is experiencing fluctuations in mental status throughout the day, being more clear in early morning and at the end of day he starts to act out and refuse medication. Sodium levels have been wnl in the past 48 hours. Due to patient's age it is likely to take longer to reach baseline mental status. Patient is not presenting with elevated WBC, fevers or acute neurological decompensation concerning for infection or other structural/physiological cause for mental status.   Patient oriented x3 today. [place]. Waxing and waning mentation.     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    Plan  -- Patient with episodes of waxing and waning, remarkable for delirium. He is oriented x3 and redirectable.   -- Encourage family members to come see patient to increase familiarity.   -- Continue to encourage PO medications. Requested to try to give them with meals  -- Continue apixaban 5mg BID if patient taking PO medications   -- Daily CMP   -- Replace electrolytes as needed   -- Delirium precautions

## 2020-11-30 NOTE — PLAN OF CARE
Pt declined by multiple SNFs. DANIS sent message to [facility] to f/u on ref under review.    UPDATE 10am-danis spoke with Ekaterina at [facility]-ref is under review with no decision yet. She did inform sw [facility] has more than 1 positive covid case at the facility at this time.    DANIS left message for pts dtg to discuss dc plan.    Kaitlin Cyr, [facility] [phone]

## 2020-11-30 NOTE — PLAN OF CARE
Problem: Fall Injury Risk  Goal: Absence of Fall and Fall-Related Injury  Outcome: Ongoing, Progressing  Intervention: Identify and Manage Contributors to Fall Injury Risk  Flowsheets (Taken 11/30/2020 0345)  Self-Care Promotion: independence encouraged  Medication Review/Management: medications reviewed  Intervention: Promote Injury-Free Environment  Flowsheets (Taken 11/30/2020 0345)  Safety Promotion/Fall Prevention:   assistive device/personal item within reach   nonskid shoes/socks when out of bed   medications reviewed   lighting adjusted   Fall Risk reviewed with patient/family   side rails raised x 2  Environmental Safety Modification:   assistive device/personal items within reach   clutter free environment maintained   lighting adjusted   room organization consistent     Problem: Skin Injury Risk Increased  Goal: Skin Health and Integrity  Outcome: Ongoing, Progressing  Intervention: Optimize Skin Protection  Flowsheets (Taken 11/30/2020 0345)  Pressure Reduction Devices:   foam padding utilized   heel offloading device utilized  Head of Bed (HOB): HOB at 30 degrees     Problem: Adult Inpatient Plan of Care  Goal: Plan of Care Review  Outcome: Ongoing, Progressing  Goal: Patient-Specific Goal (Individualization)  Outcome: Ongoing, Progressing  Goal: Absence of Hospital-Acquired Illness or Injury  Outcome: Ongoing, Progressing  Intervention: Identify and Manage Fall Risk  Flowsheets (Taken 11/30/2020 0345)  Safety Promotion/Fall Prevention:   assistive device/personal item within reach   nonskid shoes/socks when out of bed   medications reviewed   lighting adjusted   Fall Risk reviewed with patient/family   side rails raised x 2  Intervention: Prevent VTE (venous thromboembolism)  Flowsheets (Taken 11/30/2020 0345)  VTE Prevention/Management: fluids promoted  Goal: Optimal Comfort and Wellbeing  Outcome: Ongoing, Progressing  Intervention: Provide Person-Centered Care  Flowsheets (Taken 11/30/2020  6798)  Trust Relationship/Rapport:   care explained   choices provided   emotional support provided   questions encouraged   reassurance provided   empathic listening provided   questions answered   thoughts/feelings acknowledged  Goal: Readiness for Transition of Care  Outcome: Ongoing, Progressing  Goal: Rounds/Family Conference  Outcome: Ongoing, Progressing     Problem: Bariatric Environmental Safety  Goal: Safety Maintained with Care  Outcome: Ongoing, Progressing     Problem: Diabetes Comorbidity  Goal: Blood Glucose Level Within Desired Range  Outcome: Ongoing, Progressing     Problem: Adjustment to Illness (Sepsis/Septic Shock)  Goal: Optimal Coping  Outcome: Ongoing, Progressing     Problem: Bleeding (Sepsis/Septic Shock)  Goal: Absence of Bleeding  Outcome: Ongoing, Progressing     Problem: Glycemic Control Impaired (Sepsis/Septic Shock)  Goal: Blood Glucose Level Within Desired Range  Outcome: Ongoing, Progressing     Problem: Hemodynamic Instability (Sepsis/Septic Shock)  Goal: Effective Tissue Perfusion  Outcome: Ongoing, Progressing     Problem: Infection (Sepsis/Septic Shock)  Goal: Absence of Infection Signs/Symptoms  Outcome: Ongoing, Progressing     Problem: Nutrition Impaired (Sepsis/Septic Shock)  Goal: Optimal Nutrition Intake  Outcome: Ongoing, Progressing     Problem: Respiratory Compromise (Sepsis/Septic Shock)  Goal: Effective Oxygenation and Ventilation  Outcome: Ongoing, Progressing  Intervention: Promote Airway Secretion Clearance  Flowsheets (Taken 11/30/2020 9472)  Activity Management:   rolling - L1   arm raise - L1  Intervention: Optimize Oxygenation and Ventilation  Flowsheets (Taken 11/30/2020 3022)  Head of Bed (HOB): HOB at 30 degrees     Problem: Electrolyte Imbalance (Acute Kidney Injury/Impairment)  Goal: Serum Electrolyte Balance  Outcome: Ongoing, Progressing     Problem: Fluid Imbalance (Acute Kidney Injury/Impairment)  Goal: Optimal Fluid Balance  Outcome: Ongoing,  Progressing     Problem: Hematologic Alteration (Acute Kidney Injury/Impairment)  Goal: Hemoglobin, Hematocrit and Platelets Within Normal Range  Outcome: Ongoing, Progressing     Problem: Oral Intake Inadequate (Acute Kidney Injury/Impairment)  Goal: Optimal Nutrition Intake  Outcome: Ongoing, Progressing     Problem: Renal Function Impairment (Acute Kidney Injury/Impairment)  Goal: Effective Renal Function  Outcome: Ongoing, Progressing  Intervention: Monitor and Support Renal Function  Flowsheets (Taken 11/30/2020 3935)  Medication Review/Management: medications reviewed     Problem: Wound  Goal: Optimal Wound Healing  Outcome: Ongoing, Progressing  Intervention: Promote Effective Wound Healing  Flowsheets (Taken 11/30/2020 5665)  Sleep/Rest Enhancement: awakenings minimized  Pain Management Interventions:   care clustered   pillow support provided

## 2020-11-30 NOTE — PLAN OF CARE
11/30/20 1209   Discharge Reassessment   Assessment Type Discharge Planning Reassessment   Provided patient/caregiver education on the expected discharge date and the discharge plan Yes   Do you have any problems affording any of your prescribed medications? No   Discharge Plan A Skilled Nursing Facility   Discharge Plan B New Nursing Home placement - retirement care facility   DME Needed Upon Discharge  other (see comments)  (TBD)   Post-Acute Status   Discharge Delays (!) Other  (issues with placement)       Solange De Jesus MPH, RN, CM  Ext. [phone]

## 2020-11-30 NOTE — PT/OT/SLP PROGRESS
Occupational Therapy   Treatment    Name: Hemant Kennedy Jr.  MRN: [id number]  Admitting Diagnosis:  Acute encephalopathy       Recommendations:     Discharge Recommendations: nursing facility, basic  Discharge Equipment Recommendations:  wheelchair, hospital bed, lift device if pt were to go home  Barriers to discharge:  Other (Comment)(increased assist level)    Assessment:     Hemant Kennedy Jr. is a 70 y.o. male with a medical diagnosis of Acute encephalopathy.  He presents with a decline in functional mobility and self-care management. Performance deficits affecting function are weakness, impaired endurance, impaired self care skills, impaired functional mobilty, decreased upper extremity function, decreased lower extremity function, impaired cognition. Pt presents with impaired cognition by demonstrating an inability to follow commands with all mobility tasks. Pt demonstrated UE gross motor/fine motor skills by being able to grab phone out front pocket and manipulate device. Pt was unable to sit EOB, follow commands for process, and needed total assistance x 2 persons to long sit in bed. Pt is currently dependent for all ADLs and would benefit from continued acute OT services to address decreased independence with ADLs, functional mobility, and generalized weakness.    Rehab Prognosis:  Good; patient would benefit from acute skilled OT services to address these deficits and reach maximum level of function.       Plan:     Patient to be seen 2 x/week to address the above listed problems via self-care/home management, therapeutic activities, therapeutic exercises, neuromuscular re-education  · Plan of Care Expires: 12/24/20  · Plan of Care Reviewed with: patient    Subjective     Pain/Comfort:  Pain Rating 1: 0/10  Pain Rating Post-Intervention 1: 0/10    Objective:     Communicated with: RN prior to session.  Patient found supine with bed alarm, peripheral IV, PRAFO upon OT entry to room.    General Precautions:  Standard, fall   Orthopedic Precautions:N/A   Braces: N/A     Occupational Performance:     Bed Mobility:    · Pt resisting/unable to follow commands to perform roll to right and supine to sit transfers.  · Pt total x 2 persons for supine to long sitting in bed.    Functional Mobility/Transfers:  · Functional Mobility: DNT, pt unable to complete bed mobility secondary to increased assist level and inability to follow commands.    Activities of Daily Living:  · Bathing: dependence for bathing wipes bed level  · Lower Body Dressing: dependence at bed level  · Toileting: dependence with bed pan/urinal bed level      [facility] 6 Click ADL: 12    Treatment & Education:  Pt educated on role of OT in acute care setting.   Assisted with ADLs and functional mobility with assist levels noted above.  Pt deferred UE AROM exercises bed level.       Patient left supine with call button in reachEducation:      GOALS:   Multidisciplinary Problems     Occupational Therapy Goals        Problem: Occupational Therapy Goal    Goal Priority Disciplines Outcome Interventions   Occupational Therapy Goal     OT, PT/OT Ongoing, Not Progressing    Description: Goals to be met by: 12/24/20     Patient will increase functional independence with ADLs by performing:    UE Dressing with Minimal Assistance.  LE Dressing with Minimal Assistance.  Grooming while seated edge of bed with Minimal Assistance.  Sitting at edge of bed x10 minutes with Minimal Assistance.  Rolling to Bilateral with Contact Guard Assistance.   Supine to sit with Minimal Assistance.  Toilet transfer to beside commode with Moderate Assistance.                     Time Tracking:     OT Date of Treatment: 11/30/20  OT Start Time: 1010  OT Stop Time: 1028  OT Total Time (min): 18 min    Billable Minutes:Therapeutic Exercise 18    SUSAN Grider  11/30/2020

## 2020-11-30 NOTE — PROGRESS NOTES
[facility] Hospital Medicine  Progress Note    Patient Name: Hemant Kennedy Jr.  MRN: [id number]  Patient Class: IP- Inpatient   Admission Date: 11/20/2020  Length of Stay: 9 days  Attending Physician: Shira Lazcano*  Primary Care Provider: Wicho Qiu MD    [facility] Medicine Team: [facility] HOSP MED 4 Gi Fall MD    Subjective:     Principal Problem:Acute encephalopathy        HPI:  Hemant Kennedy is a 70 year old male with NICM, HFrEF (EF of 35-40%), HTN, T2DM, AFib on DOAC and amiodarone, h/o CVA w/ L hemineglect, h/o DVT, ERIC and obesity (BMI >50) who presents from [facility] for altered mental status.  Patient was brought in by EMS from [facility], where he has been living for the past 2 weeks, after being treated at [facility] 11/1-11/5 for rhabdomyolysis, acute metabolic encephalopathy 2/2 urosepsis with proteus miribalis, and panniculitis. EMS reports patient was refusing his nightly BIPAP at the nursing home.     History was limited due to patient's altered mental state. I contacted the nursing home to obtain collateral information but nursing staff wasn't available at the time.     In the ED, patient was afebrile, hemodynamically stable, and 95% on room air. BMP remarkable for Na 157. VBG 7.39/39/31. CT head without acute intracranial process.        Overview/Hospital Course:  Patient admitted to medicine for AMS with Na 157. Patient treated with D5W infusion, with adequate adjustment until coming down to 142. AMS with mild improvement, patient only oriented to self and still combative and difficult to redirect. Patient refusing PO medications since 11/22, transitioned to IV enoxaparin, CHADS2 Score of 5. Left upper extremity swelling noted in fingers up to arm, with skin pealing after removing IV. US ordered to r/o DVT and negative. Swelling improved after placing ice and removing IV. Bullae noted  on left arm, wound care on board. Mental status improving, patient more coopertive with care. Pending placement to SNF, as daughter not pleased with [facility]. According to daughter patient has not yet reached his baseline mental status, will get collateral information from nursing of how patient was prior to UTI where he also was experiencing mental status changes. Daughter concerned that her father has not returned to his previous baseline since his previous hospitalization early November. MRI completed, no new acute findings, remote infarcts and age related atrophy and encephalomalacia secondary to infarcts. Pending to SNF, not accepted at [facility], requests sent to two other facilities. SW and daughter on board. Patient having delirious episodes throughout the day. Had discussion with daughter and she hopes for father to go to SNF, patient accepted at [facility]. Will submit authorizations. Patient does better when family with him. Patient will possibly be discharged tomorrow    Interval History: NAEO. Patient cooperative with care and medical treatment. Has agreed with taking medications and being assisted. Some waxing and waning on mentation but oriented x3. Patient accepted at [facility], will be discharged tomorrow most likely.     Review of Systems   Constitutional: Negative for appetite change, chills and fever.   HENT: Negative for congestion, sore throat, trouble swallowing and voice change.    Eyes: Negative.    Respiratory: Negative for cough and shortness of breath.    Cardiovascular: Negative for chest pain and leg swelling.   Gastrointestinal: Negative for abdominal distention, abdominal pain, constipation, nausea and vomiting.   Endocrine: Negative.    Genitourinary: Negative.    Musculoskeletal: Negative for back pain and gait problem.   Skin: Negative.    Neurological: Negative for weakness and headaches.   Psychiatric/Behavioral: Positive for hallucinations. The  patient is not nervous/anxious.      Objective:     Vital Signs (Most Recent):  Temp: 98.4 °F (36.9 °C) (11/30/20 1530)  Pulse: 72 (11/30/20 1530)  Resp: 17 (11/30/20 1530)  BP: (!) 111/53 (11/30/20 1530)  SpO2: (!) 93 % (11/30/20 1530) Vital Signs (24h Range):  Temp:  [97.4 °F (36.3 °C)-98.4 °F (36.9 °C)] 98.4 °F (36.9 °C)  Pulse:  [58-85] 72  Resp:  [17-19] 17  SpO2:  [92 %-98 %] 93 %  BP: ()/(43-75) 111/53     Weight: (!) 158.8 kg (350 lb)  Body mass index is 51.69 kg/m².  No intake or output data in the 24 hours ending 11/30/20 1727   Physical Exam  Constitutional:       Appearance: Normal appearance.   HENT:      Head: Normocephalic and atraumatic.      Nose: Nose normal.      Mouth/Throat:      Mouth: Mucous membranes are moist.   Eyes:      Extraocular Movements: Extraocular movements intact.      Pupils: Pupils are equal, round, and reactive to light.   Neck:      Musculoskeletal: Normal range of motion.   Cardiovascular:      Rate and Rhythm: Normal rate and regular rhythm.   Pulmonary:      Breath sounds: Normal breath sounds.   Abdominal:      General: Bowel sounds are normal. There is no distension.      Palpations: Abdomen is soft.      Tenderness: There is no abdominal tenderness.   Musculoskeletal: Normal range of motion.         General: Swelling (left hand improving) present.   Skin:     General: Skin is warm.      Capillary Refill: Capillary refill takes less than 2 seconds.   Neurological:      General: No focal deficit present.      Mental Status: He is alert and oriented to person, place, and time. Mental status is at baseline.         Significant Labs:   CBC:   Recent Labs   Lab 11/30/20 0357   WBC 7.19   HGB 10.9*   HCT 35.8*   *     CMP:   Recent Labs   Lab 11/29/20  0548 11/30/20  0357    141   K 3.4* 4.0   * 111*   CO2 24 24   * 91   BUN 10 9   CREATININE 1.3 1.2   CALCIUM 8.2* 8.2*   ANIONGAP 8 6*   EGFRNONAA 54.5* >60.0       Significant Imaging: I have  reviewed and interpreted all pertinent imaging results/findings within the past 24 hours.      Assessment/Plan:      * Acute encephalopathy  70 year old male with NICM, HFrEF (EF of 35-40%), HTN, T2DM, AFib on DOAC and amiodarone, and obesity (BMI >50) who presents with AMS from skilled nursing facility. BMP remarkable for Na 157. VBG 7.39/39/31.   Imaging: CT head without acute intracranial process.   MICU consulted who recommended appropriate placement on floor for sodium correction  Suspect acute encephalopathy is 2/2 to hypernatremia.   Sodium wnl, 140. Patient more alert and responsive than on arrival, more cooperative to care. Oriented x1 to person. Answers correctly daughter's name, president and year of birth.  Able to follow simple commands.   Patient is experiencing fluctuations in mental status throughout the day, being more clear in early morning and at the end of day he starts to act out and refuse medication. Sodium levels have been wnl in the past 48 hours. Due to patient's age it is likely to take longer to reach baseline mental status. Patient is not presenting with elevated WBC, fevers or acute neurological decompensation concerning for infection or other structural/physiological cause for mental status.   Patient oriented x3 today. [place]. Waxing and waning mentation.     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    Plan  -- Patient with episodes of waxing and waning, remarkable for delirium. He is oriented x3 and redirectable.   -- Encourage family members to come see patient to increase familiarity.   -- Continue to encourage PO medications. Requested to try to give them with meals  -- Continue apixaban 5mg BID if patient taking PO medications   -- Daily CMP   -- Replace electrolytes as needed   -- Delirium precautions              Hypernatremia  Patient with Na of 157 on admission; serum osm 339.  Free water deficit 6.6L   Suspect possibly due to dehydration due to poor intake vs GI losses, although unable to obtain collateral information from skilled nursing home as nurse was not available at the time of call.   EF noted at 35%.  Sodium wnl 11/23/20    Plan  - Discontinue IVF   - Daily CMP    - Encourage PO intake     Discharge planning issues  Patient was brought from [facility]. Daughter is not pleased with care and wishes another SNF for patient. Currently waiting for placement. Patient already accepted at [facility]. Declined by several others, including [facility] (daughters 1st choice). Will work towards authorization for [facility]    -- Patient not accepted to [facility].   -- Daughter agreed agreeable to  returning patient to [facility] which accepted him. She wants to try SNF before taking him home. No wishes for detention nursing at this time.   -- Authorizations sent, patient most likely discharged tomorrow.       Edema of left upper extremity  Since arrival patient has been refusing PO medications, for which started enoxaparin IV on 11/23/20. Upper extremity with 2+ pitting edema from fingers up to proxima fore arm. Patient denies pain, CP, SOB.   Swelling improving after removing IV and ice placed     Plan:   -- Left Upper extremity US ordered to r/o DVT : negative  -- Removed IV from arm   -- Wound care notified for skin pealing  -- Will continue to monitor       Panniculitis  Noted in lower abdominal/ groin area. No erythema or purulence noted.    -Wound care consulted for skin breakdown, agree with recommendations     HLD (hyperlipidemia)  - Continue home statin       Nonischemic cardiomyopathy  The estimated ejection fraction is 35-40%.    Plan:   -- Continue  patient's carvedilol 6.25 BID  -- Monitor BP, make sure cuff is right size     Obstructive sleep apnea treated with continuous positive airway pressure (CPAP)  CPAP qHS      Type II diabetes  mellitus  Well controlled. Hgb A1c 5.6. Patient doesn't appear to be on diabetic medication.   Episode of hypoglycemia today, responded well to D50% injection    Plan:   -- BG goal 140-180   -- No medical treatment required at this time   -- Encourage bedside assistance for meals and fluid intake       CKD (chronic kidney disease) stage 3, GFR 30-59 ml/min  Renal function at baseline Cr of 2.0 (within baseline 1.8-2.0), Cr today 1.5     Plan:   --Avoid nephrotoxic medication  --Renally dose medications  --Daily BMP      Atrial fibrillation, controlled  Patient with HX of Afib with CHADs score of 5 on amiodarone 200mg, eliquis 5mg at home. Patient is currently refusing PO medications, HD stable.     Plan:    --  Patient on A- fib but is rate controlled, will encourage PO intake of his amiodarone.   -- Continue apixaban 5 mg BID  -- Daily Cbc        VTE Risk Mitigation (From admission, onward)         Ordered     apixaban tablet 5 mg  2 times daily      11/25/20 1102                Discharge Planning   JOHN: 12/1/2020     Code Status: Full Code   Is the patient medically ready for discharge?: Yes    Reason for patient still in hospital (select all that apply): Pending disposition  Discharge Plan A: Skilled Nursing Facility   Discharge Delays: None known at this time              Gi Fall MD  Department of Hospital Medicine   [facility]

## 2020-11-30 NOTE — SUBJECTIVE & OBJECTIVE
Interval History: NAEO. Patient cooperative with care and medical treatment. Has agreed with taking medications and being assisted. Some waxing and waning on mentation but oriented x3. Patient accepted at [facility], will be discharged tomorrow most likely.     Review of Systems   Constitutional: Negative for appetite change, chills and fever.   HENT: Negative for congestion, sore throat, trouble swallowing and voice change.    Eyes: Negative.    Respiratory: Negative for cough and shortness of breath.    Cardiovascular: Negative for chest pain and leg swelling.   Gastrointestinal: Negative for abdominal distention, abdominal pain, constipation, nausea and vomiting.   Endocrine: Negative.    Genitourinary: Negative.    Musculoskeletal: Negative for back pain and gait problem.   Skin: Negative.    Neurological: Negative for weakness and headaches.   Psychiatric/Behavioral: Positive for hallucinations. The patient is not nervous/anxious.      Objective:     Vital Signs (Most Recent):  Temp: 98.4 °F (36.9 °C) (11/30/20 1530)  Pulse: 72 (11/30/20 1530)  Resp: 17 (11/30/20 1530)  BP: (!) 111/53 (11/30/20 1530)  SpO2: (!) 93 % (11/30/20 1530) Vital Signs (24h Range):  Temp:  [97.4 °F (36.3 °C)-98.4 °F (36.9 °C)] 98.4 °F (36.9 °C)  Pulse:  [58-85] 72  Resp:  [17-19] 17  SpO2:  [92 %-98 %] 93 %  BP: ()/(43-75) 111/53     Weight: (!) 158.8 kg (350 lb)  Body mass index is 51.69 kg/m².  No intake or output data in the 24 hours ending 11/30/20 1727   Physical Exam  Constitutional:       Appearance: Normal appearance.   HENT:      Head: Normocephalic and atraumatic.      Nose: Nose normal.      Mouth/Throat:      Mouth: Mucous membranes are moist.   Eyes:      Extraocular Movements: Extraocular movements intact.      Pupils: Pupils are equal, round, and reactive to light.   Neck:      Musculoskeletal: Normal range of motion.   Cardiovascular:      Rate and Rhythm: Normal rate and regular rhythm.   Pulmonary:       Breath sounds: Normal breath sounds.   Abdominal:      General: Bowel sounds are normal. There is no distension.      Palpations: Abdomen is soft.      Tenderness: There is no abdominal tenderness.   Musculoskeletal: Normal range of motion.         General: Swelling (left hand improving) present.   Skin:     General: Skin is warm.      Capillary Refill: Capillary refill takes less than 2 seconds.   Neurological:      General: No focal deficit present.      Mental Status: He is alert and oriented to person, place, and time. Mental status is at baseline.         Significant Labs:   CBC:   Recent Labs   Lab 11/30/20  0357   WBC 7.19   HGB 10.9*   HCT 35.8*   *     CMP:   Recent Labs   Lab 11/29/20  0548 11/30/20  0357    141   K 3.4* 4.0   * 111*   CO2 24 24   * 91   BUN 10 9   CREATININE 1.3 1.2   CALCIUM 8.2* 8.2*   ANIONGAP 8 6*   EGFRNONAA 54.5* >60.0       Significant Imaging: I have reviewed and interpreted all pertinent imaging results/findings within the past 24 hours.

## 2020-12-01 VITALS
HEART RATE: 75 BPM | OXYGEN SATURATION: 97 % | WEIGHT: 315 LBS | HEIGHT: 69 IN | DIASTOLIC BLOOD PRESSURE: 51 MMHG | RESPIRATION RATE: 20 BRPM | TEMPERATURE: 98 F | SYSTOLIC BLOOD PRESSURE: 107 MMHG | BODY MASS INDEX: 46.65 KG/M2

## 2020-12-01 PROBLEM — N17.9 ACUTE RENAL FAILURE SUPERIMPOSED ON STAGE 3 CHRONIC KIDNEY DISEASE: Status: RESOLVED | Noted: 2017-04-28 | Resolved: 2020-12-01

## 2020-12-01 PROBLEM — R06.02 SHORTNESS OF BREATH: Status: RESOLVED | Noted: 2018-03-24 | Resolved: 2020-12-01

## 2020-12-01 PROBLEM — M62.82 RHABDOMYOLYSIS: Status: RESOLVED | Noted: 2020-11-02 | Resolved: 2020-12-01

## 2020-12-01 PROBLEM — N19 UREMIC ENCEPHALOPATHY: Status: RESOLVED | Noted: 2017-05-24 | Resolved: 2020-12-01

## 2020-12-01 PROBLEM — N18.30 ACUTE RENAL FAILURE SUPERIMPOSED ON STAGE 3 CHRONIC KIDNEY DISEASE: Status: RESOLVED | Noted: 2017-04-28 | Resolved: 2020-12-01

## 2020-12-01 PROBLEM — G93.40 ENCEPHALOPATHY: Status: RESOLVED | Noted: 2017-04-28 | Resolved: 2020-12-01

## 2020-12-01 PROBLEM — A41.9 SEPSIS: Status: RESOLVED | Noted: 2020-11-02 | Resolved: 2020-12-01

## 2020-12-01 PROBLEM — G93.49 UREMIC ENCEPHALOPATHY: Status: RESOLVED | Noted: 2017-05-24 | Resolved: 2020-12-01

## 2020-12-01 LAB
ANION GAP SERPL CALC-SCNC: 6 MMOL/L (ref 8–16)
BASOPHILS # BLD AUTO: 0.03 K/UL (ref 0–0.2)
BASOPHILS NFR BLD: 0.4 % (ref 0–1.9)
BUN SERPL-MCNC: 10 MG/DL (ref 8–23)
CALCIUM SERPL-MCNC: 8.6 MG/DL (ref 8.7–10.5)
CHLORIDE SERPL-SCNC: 111 MMOL/L (ref 95–110)
CO2 SERPL-SCNC: 26 MMOL/L (ref 23–29)
CREAT SERPL-MCNC: 1.2 MG/DL (ref 0.5–1.4)
DIFFERENTIAL METHOD: ABNORMAL
EOSINOPHIL # BLD AUTO: 0.5 K/UL (ref 0–0.5)
EOSINOPHIL NFR BLD: 6.3 % (ref 0–8)
ERYTHROCYTE [DISTWIDTH] IN BLOOD BY AUTOMATED COUNT: 15.1 % (ref 11.5–14.5)
EST. GFR  (AFRICAN AMERICAN): >60 ML/MIN/1.73 M^2
EST. GFR  (NON AFRICAN AMERICAN): >60 ML/MIN/1.73 M^2
GLUCOSE SERPL-MCNC: 95 MG/DL (ref 70–110)
HCT VFR BLD AUTO: 37.1 % (ref 40–54)
HGB BLD-MCNC: 11.4 G/DL (ref 14–18)
IMM GRANULOCYTES # BLD AUTO: 0.01 K/UL (ref 0–0.04)
IMM GRANULOCYTES NFR BLD AUTO: 0.1 % (ref 0–0.5)
LYMPHOCYTES # BLD AUTO: 1.9 K/UL (ref 1–4.8)
LYMPHOCYTES NFR BLD: 24.7 % (ref 18–48)
MAGNESIUM SERPL-MCNC: 2 MG/DL (ref 1.6–2.6)
MCH RBC QN AUTO: 28.9 PG (ref 27–31)
MCHC RBC AUTO-ENTMCNC: 30.7 G/DL (ref 32–36)
MCV RBC AUTO: 94 FL (ref 82–98)
MONOCYTES # BLD AUTO: 1.1 K/UL (ref 0.3–1)
MONOCYTES NFR BLD: 14.8 % (ref 4–15)
NEUTROPHILS # BLD AUTO: 4 K/UL (ref 1.8–7.7)
NEUTROPHILS NFR BLD: 53.7 % (ref 38–73)
NRBC BLD-RTO: 0 /100 WBC
PLATELET # BLD AUTO: 158 K/UL (ref 150–350)
PMV BLD AUTO: 11 FL (ref 9.2–12.9)
POCT GLUCOSE: 106 MG/DL (ref 70–110)
POCT GLUCOSE: 98 MG/DL (ref 70–110)
POTASSIUM SERPL-SCNC: 4.1 MMOL/L (ref 3.5–5.1)
RBC # BLD AUTO: 3.95 M/UL (ref 4.6–6.2)
SODIUM SERPL-SCNC: 143 MMOL/L (ref 136–145)
WBC # BLD AUTO: 7.48 K/UL (ref 3.9–12.7)

## 2020-12-01 PROCEDURE — 83735 ASSAY OF MAGNESIUM: CPT

## 2020-12-01 PROCEDURE — 25000003 PHARM REV CODE 250: Performed by: STUDENT IN AN ORGANIZED HEALTH CARE EDUCATION/TRAINING PROGRAM

## 2020-12-01 PROCEDURE — 85025 COMPLETE CBC W/AUTO DIFF WBC: CPT

## 2020-12-01 PROCEDURE — 80048 BASIC METABOLIC PNL TOTAL CA: CPT

## 2020-12-01 PROCEDURE — 99239 HOSP IP/OBS DSCHRG MGMT >30: CPT | Mod: GC,,, | Performed by: HOSPITALIST

## 2020-12-01 PROCEDURE — 99239 PR HOSPITAL DISCHARGE DAY,>30 MIN: ICD-10-PCS | Mod: GC,,, | Performed by: HOSPITALIST

## 2020-12-01 PROCEDURE — 99900035 HC TECH TIME PER 15 MIN (STAT)

## 2020-12-01 PROCEDURE — 36415 COLL VENOUS BLD VENIPUNCTURE: CPT

## 2020-12-01 RX ORDER — FUROSEMIDE 40 MG/1
40 TABLET ORAL DAILY
Status: DISCONTINUED | OUTPATIENT
Start: 2020-12-01 | End: 2020-12-01 | Stop reason: HOSPADM

## 2020-12-01 RX ADMIN — CARVEDILOL 6.25 MG: 6.25 TABLET, FILM COATED ORAL at 09:12

## 2020-12-01 RX ADMIN — THERA TABS 1 TABLET: TAB at 09:12

## 2020-12-01 RX ADMIN — APIXABAN 5 MG: 5 TABLET, FILM COATED ORAL at 09:12

## 2020-12-01 RX ADMIN — GABAPENTIN 300 MG: 300 CAPSULE ORAL at 09:12

## 2020-12-01 RX ADMIN — AMIODARONE HYDROCHLORIDE 200 MG: 200 TABLET ORAL at 09:12

## 2020-12-01 RX ADMIN — OXYBUTYNIN CHLORIDE 10 MG: 5 TABLET, EXTENDED RELEASE ORAL at 09:12

## 2020-12-01 RX ADMIN — FUROSEMIDE 40 MG: 40 TABLET ORAL at 09:12

## 2020-12-01 RX ADMIN — ASPIRIN 81 MG CHEWABLE TABLET 81 MG: 81 TABLET CHEWABLE at 09:12

## 2020-12-01 RX ADMIN — FERROUS SULFATE TAB EC 325 MG (65 MG FE EQUIVALENT) 325 MG: 325 (65 FE) TABLET DELAYED RESPONSE at 09:12

## 2020-12-01 RX ADMIN — MICONAZOLE NITRATE: 20 OINTMENT TOPICAL at 09:12

## 2020-12-01 RX ADMIN — ALLOPURINOL 300 MG: 300 TABLET ORAL at 09:12

## 2020-12-01 NOTE — PLAN OF CARE
12/01/20 1344   Post-Acute Status   Post-Acute Authorization Placement   Post-Acute Placement Status Set-up Complete   Discharge Delays None known at this time   Discharge Plan   Discharge Plan A Skilled Nursing Facility     Pt is ready to dc to [facility] today.     Nurse to call report to Jac at [phone].    Ambulance transport ordrered for 3pm .    SW updated pts nurse Huff and pts dtg Keyla.    Kaitlin Cyr, ADAMA [id number]

## 2020-12-01 NOTE — PLAN OF CARE
MD updated nursing home orders to include cpap info, orders sent to [facility].    Kaitlin Cyr, [facility] [id number]

## 2020-12-01 NOTE — NURSING
Spoke with Myranda(nurse) at [facility] report given. Removed IV access. Awaiting transportation

## 2020-12-01 NOTE — PLAN OF CARE
POC reviewed with patient, questions and concerns addressed. No acute events through the night.  All Vital signs stable. No complaints.  AAOx2. Safety maintained.  Bed locked, in lowest position, call light within reach, side rails x2. Mobilized to their highest function. See doc flow sheets for further information. Will continue to monitor.

## 2020-12-01 NOTE — PLAN OF CARE
Pts nurse informed sw that she called report and NH staff inquired about a cpap machine as pt has had one in the past. Pts nurse said respiratory confirmed that pt has not been wearing it consistently because he has been refusing it. GERARDO updated Avril in admissions at [facility]. GERARDO sent message to MD asking about clarification of cpap use.    Kaitlin Cyr, ADAMA [id number]

## 2020-12-01 NOTE — DISCHARGE SUMMARY
[facility] Hospital Medicine  Discharge Summary      Patient Name: Hemant Kennedy Jr.  MRN: [id number]  Admission Date: 11/20/2020  Hospital Length of Stay: 10 days  Discharge Date and Time: 12/1/2020  4:21 PM  Attending Physician: No att. providers found   Discharging Provider: Erickson Mcnulty DO  Primary Care Provider: Wicho Qiu MD  [facility] Medicine Team: [facility] HOSP MED 4 Erickson Mcnulty DO    HPI:   Hemant Kennedy is a 70 year old male with NICM, HFrEF (EF of 35-40%), HTN, T2DM, AFib on DOAC and amiodarone, h/o CVA w/ L hemineglect, h/o DVT, ERIC and obesity (BMI >50) who presents from [facility] for altered mental status.  Patient was brought in by EMS from [facility], where he has been living for the past 2 weeks, after being treated at [facility] 11/1-11/5 for rhabdomyolysis, acute metabolic encephalopathy 2/2 urosepsis with proteus miribalis, and panniculitis. EMS reports patient was refusing his nightly BIPAP at the nursing home.     History was limited due to patient's altered mental state. I contacted the nursing home to obtain collateral information but nursing staff wasn't available at the time.     In the ED, patient was afebrile, hemodynamically stable, and 95% on room air. BMP remarkable for Na 157. VBG 7.39/39/31. CT head without acute intracranial process.        * No surgery found *      Hospital Course:   Patient admitted to medicine for AMS with Na 157. Patient treated with D5W infusion, with adequate adjustment until coming down to 142. AMS with mild improvement, patient only oriented to self and still combative and difficult to redirect. Patient refusing PO medications since 11/22, transitioned to IV enoxaparin, CHADS2 Score of 5. Left upper extremity swelling noted in fingers up to arm, with skin pealing after removing IV. US ordered to r/o DVT and negative. Swelling improved after placing ice and  removing IV. Bullae noted on left arm, wound care on board. Mental status improving, patient more coopertive with care. Pending placement to SNF, as daughter not pleased with [facility]. According to daughter patient has not yet reached his baseline mental status, will get collateral information from nursing of how patient was prior to UTI where he also was experiencing mental status changes. Daughter concerned that her father has not returned to his previous baseline since his previous hospitalization early November. MRI completed, no new acute findings, remote infarcts and age related atrophy and encephalomalacia secondary to infarcts. Pending to SNF, not accepted at [facility], requests sent to two other facilities. SW and daughter on board. Patient having delirious episodes throughout the day. Had discussion with daughter and she hopes for father to go to SNF, patient accepted at [facility]. Will submit authorizations. Patient does better when family with him. Patient discharged to [facility].      Consults:   Consults (From admission, onward)        Status Ordering Provider     Inpatient consult to Critical Care Medicine  Once     Provider:  (Not yet assigned)    Completed NANETTE REINA     Inpatient consult to PICC team ([facility])  Once     Provider:  (Not yet assigned)    Completed KYLE JACKSON          No new Assessment & Plan notes have been filed under this hospital service since the last note was generated.  Service: Hospital Medicine    Final Active Diagnoses:    Diagnosis Date Noted POA    PRINCIPAL PROBLEM:  Acute encephalopathy [G93.40] 11/02/2020 Yes    Discharge planning issues [Z02.9] 11/24/2020 Not Applicable    Edema of left upper extremity [R60.0] 11/23/2020 Yes    Hypernatremia [E87.0] 11/21/2020 Yes    Panniculitis [M79.3] 11/21/2020 Yes    Nonischemic cardiomyopathy [I42.8] 11/02/2020 Yes    HLD (hyperlipidemia) [E78.5] 11/02/2020 Yes     Obstructive sleep apnea treated with continuous positive airway pressure (CPAP) [G47.33, Z99.89] 05/24/2017 Not Applicable    Type II diabetes mellitus [E11.8, E11.65] 01/13/2017 Yes    CKD (chronic kidney disease) stage 3, GFR 30-59 ml/min [N18.30] 11/27/2015 Yes    Atrial fibrillation, controlled [I48.91] 07/21/2013 Yes      Problems Resolved During this Admission:    Diagnosis Date Noted Date Resolved POA    Hypoxia [R09.02] 11/21/2020 11/22/2020 Yes       Discharged Condition: stable    Disposition: Skilled Nursing Facility    Follow Up:    Patient Instructions:   No discharge procedures on file.    Significant Diagnostic Studies: Labs:   CMP   Recent Labs   Lab 11/30/20 0357 12/01/20  0720    143   K 4.0 4.1   * 111*   CO2 24 26   GLU 91 95   BUN 9 10   CREATININE 1.2 1.2   CALCIUM 8.2* 8.6*   ANIONGAP 6* 6*   ESTGFRAFRICA >60.0 >60.0   EGFRNONAA >60.0 >60.0   , CBC   Recent Labs   Lab 11/30/20 0357 12/01/20  0720   WBC 7.19 7.48   HGB 10.9* 11.4*   HCT 35.8* 37.1*   * 158    and All labs within the past 24 hours have been reviewed    MRI Brain Without Contrast  Narrative: EXAMINATION:  MRI BRAIN WITHOUT CONTRAST    CLINICAL HISTORY:  Suspected vascular dementia. Initial imaging;    TECHNIQUE:  Sagittal and axial T1, axial T2, axial FLAIR, axial gradient and axial diffusion imaging of the whole brain without contrast.    COMPARISON:  CT 11/20/2020    FINDINGS:  There is mild moderate encephalomalacia of the left inferior frontal lobe with additional area of encephalomalacia in the right posterior frontal/parietal lobe and right occipital lobe suggestive for prior infarcts.  There is T2 flair signal hyperintensity underlying these regions concerning for gliosis and scarring.  There is no restricted diffusion to suggest acute or recent infarction.    There is superimposed generalized cerebral volume loss most prominent involving the frontal lobes with compensatory enlargement of the  ventricles sulci and cisterns without hydrocephalus.  Major intracranial T2 flow voids are present.  Remote operative change from left cataract repair. small focus of susceptibility in the right parietal lobe without mass effect or hyperdensity on CT most compatible with remote hemorrhage.  Impression: Multifocal mild moderate size regions of encephalomalacia left inferior frontal, right posterior frontal parietal and right occipital lobes compatible with prior infarcts.    T2 flair signal hyperintensity underlying these regions compatible with gliosis and scarring.    There is superimposed age advanced cerebral volume loss with compensatory enlargement ventricles sulci and cisterns without hydrocephalus.    There is a subcentimeter remote left cerebellar infarction.    In addition there is a small remote hemorrhage within the right parietal lobe.    Clinical correlation and further evaluation as warranted.    Electronically signed by: Lit Amin DO  Date:    11/27/2020  Time:    10:35  X-Ray Abdomen AP 1 View  Narrative: EXAMINATION:  XR ABDOMEN AP 1 VIEW    CLINICAL HISTORY:  .; Encephalopathy, unspecified    TECHNIQUE:  AP View(s) of the abdomen was performed.    COMPARISON:  None    FINDINGS:  Multiple views of the abdomen reveals a nonspecific bowel gas pattern.  There is air seen throughout the entire gastrointestinal tract.  There is no obvious evidence of free air under the diaphragm.  No unusual calcifications or masses are appreciated.  Impression: No obvious evidence of an acute abdominal process.    Electronically signed by: Derek Harper  Date:    11/27/2020  Time:    01:55     Pending Diagnostic Studies:     None         Medications:  Reconciled Home Medications:      Medication List      CHANGE how you take these medications    ergocalciferol 50,000 unit Cap  Commonly known as: ERGOCALCIFEROL  Take 1 capsule (50,000 Units total) by mouth every 7 days.  What changed: when to take this      fluticasone propionate 50 mcg/actuation nasal spray  Commonly known as: FLONASE  2 sprays by Each Nare route once daily.  What changed:   · when to take this  · reasons to take this        CONTINUE taking these medications    acetaminophen 500 MG tablet  Commonly known as: TYLENOL  Take 2 tablets (1,000 mg total) by mouth 3 (three) times daily as needed for Pain.     allopurinoL 300 MG tablet  Commonly known as: ZYLOPRIM  Take 1 tablet (300 mg total) by mouth every morning.     amiodarone 200 MG Tab  Commonly known as: PACERONE  TAKE 1 TABLET BY MOUTH IN THE MORNING     aspirin 81 MG Chew  TAKE 1 TAB BY MOUTH daily     atorvastatin 40 MG tablet  Commonly known as: LIPITOR  @@ TAKE 1 TABLET BY MOUTH daily     calcium carbonate 600 mg calcium (1,500 mg) Tab  Commonly known as: OS-ROJAS  Take 600 mg by mouth 2 (two) times a day.     carvediloL 6.25 MG tablet  Commonly known as: COREG  TAKE 1 TABLET BY MOUTH IN THE MORNING and TAKE 1 TABLET BY MOUTH every evening with meals     collagenase ointment  Commonly known as: SANTYL  Apply topically once daily. Apply to wound daily as directed.     ELIQUIS 5 mg Tab  Generic drug: apixaban  TAKE 1 TABLET BY MOUTH twice a day     ferrous sulfate 325 (65 FE) MG EC tablet  Take 1 tablet (325 mg total) by mouth 2 (two) times daily with meals.     furosemide 40 MG tablet  Commonly known as: LASIX  @@ TAKE 1 TABLET BY MOUTH daily     gabapentin 300 MG capsule  Commonly known as: NEURONTIN  Take 1 capsule (300 mg total) by mouth 3 (three) times daily.     glimepiride 4 MG tablet  Commonly known as: AMARYL  Take 4 mg by mouth once daily.     losartan 25 MG tablet  Commonly known as: COZAAR  Take 0.5 tablets (12.5 mg total) by mouth once daily.     ONE DAILY MULTIVITAMIN per tablet  Generic drug: multivitamin  Take 1 tablet by mouth once daily.     ONETOUCH ULTRA TEST Strp  Generic drug: blood sugar diagnostic  Test blood sugar two times daily     oxybutynin 10 MG 24 hr tablet  Commonly  known as: DITROPAN-XL  Take 10 mg by mouth once daily.     TRIAD WOUND DRESSING TOP  Apply topically daily as needed.        STOP taking these medications    cefpodoxime 200 MG tablet  Commonly known as: VANTIN     doxazosin 2 MG tablet  Commonly known as: CARDURA            Indwelling Lines/Drains at time of discharge:   Lines/Drains/Airways     None                 Time spent on the discharge of patient: 40 minutes  Patient was seen and examined on the date of discharge and determined to be suitable for discharge.         Erickson Mcnulty DO  Department of Hospital Medicine  [facility]

## 2020-12-01 NOTE — PLAN OF CARE
GERARDO spoke with Avril from [facility], informed her still waiting on clarification from MD on cpap. Avril said [facility]'s NP said pt can still be admitted and they will take care of the cpap need once pt arrives.    Kaitlin Cyr LCSW

## 2020-12-01 NOTE — PLAN OF CARE
SNF orders sent to [facility] for review-sw notified [facility] pt is ready for dc today.    Awaiting number for report and room assignment.    Kaitlin Cyr, ANNAW [phone]

## 2020-12-01 NOTE — PLAN OF CARE
[facility]     Department of Hospital Medicine     [address] [place] [address]     [phone] after hours  [phone] fax       NURSING HOME ORDERS    12/01/2020    Admit to Nursing Home:      Skilled Bed                                                Diagnoses:  Active Hospital Problems    Diagnosis  POA    *Acute encephalopathy [G93.40]  Yes    Discharge planning issues [Z02.9]  Not Applicable    Edema of left upper extremity [R60.0]  Yes    Hypernatremia [E87.0]  Yes    Panniculitis [M79.3]  Yes    Nonischemic cardiomyopathy [I42.8]  Yes    HLD (hyperlipidemia) [E78.5]  Yes    Obstructive sleep apnea treated with continuous positive airway pressure (CPAP) [G47.33, Z99.89]  Not Applicable    Type II diabetes mellitus [E11.8, E11.65]  Yes    CKD (chronic kidney disease) stage 3, GFR 30-59 ml/min [N18.30]  Yes    Atrial fibrillation, controlled [I48.91]  Yes      Resolved Hospital Problems    Diagnosis Date Resolved POA    Hypoxia [R09.02] 11/22/2020 Yes       Patient is homebound due to:  Acute encephalopathy    Allergies:  Review of patient's allergies indicates:   Allergen Reactions    Spironolactone      Hyperkalemia         Vitals:       Every shift (Skilled Nursing patients)    Diet: Diabetic Diet   Supplement:  1 can every three times a day with meals                         Type:  House        Acitivities:      - Up in a chair each morning as tolerated   - Ambulate with assistance to bathroom   - May use walker, cane, or self-propelled wheelchair   - Weight bearing: As tolerated    LABS:  Per facility protocol   CMP, CBC each month for 3 months   PT-INR each week for 1 month then monthly   Pre-albumin each month for 3 months   TSH every year     Nursing Precautions:      - Aspiration precautions:             - Total assistance with meals            -  Upright 90 degrees befor during and after meals             -  Suction at bedside           - Fall precautions per nursing home protocol   - Seizure precaution per snf protocol   - Decubitus precautions:        -  for positioning   - Pressure reducing foam mattress   - Turn patient every two hours. Use wedge pillows to anchor patient    CONSULTS:      Physical Therapy to evaluate and treat     Occupational Therapy to evaluate and treat     Speech Therapy  to evaluate and treat     Nutrition to evaluate and recommend diet     Psychiatry to evaluate and follow patients for delirium    MISCELLANEOUS CARE:              Routine Skin for Bedridden Patients:  Apply moisture barrier cream to all    skin folds and wet areas in perineal area daily and after baths and                           all bowel movements.    CPAP: use nightly; 8mmHg    WOUND CARE:  Wound spray or saline for wound cleaning with all dressing changes.    All wounds to be measured with first dressing changes and every week.    -Nursing to apply Medi-honey to wounds to left lateral thigh and left lower leg to promote healing and reduce bio-burden.      -Nursing to apply clear critic-aid moisture barrier ointment to groin area for moisture and yeast management.            Medications: Discontinue all previous medication orders, if any. See new list below.     Hemant Kennedy Jr.   Home Medication Instructions KRYS:[id number]    Printed on:12/01/20 0601   Medication Information                      acetaminophen (TYLENOL) 500 MG tablet  Take 2 tablets (1,000 mg total) by mouth 3 (three) times daily as needed for Pain.             allopurinol (ZYLOPRIM) 300 MG tablet  Take 1 tablet (300 mg total) by mouth every morning.             amiodarone (PACERONE) 200 MG Tab  TAKE 1 TABLET BY MOUTH IN THE MORNING             aspirin 81 MG Chew  TAKE 1 TAB BY MOUTH daily             atorvastatin (LIPITOR) 40 MG tablet  @@ TAKE 1 TABLET BY MOUTH daily             calcium carbonate (OS-ROJAS) 600 mg calcium (1,500 mg) Tab  Take 600 mg by mouth  2 (two) times a day.             carvediloL (COREG) 6.25 MG tablet  TAKE 1 TABLET BY MOUTH IN THE MORNING and TAKE 1 TABLET BY MOUTH every evening with meals             collagenase (SANTYL) ointment  Apply topically once daily. Apply to wound daily as directed.             ELIQUIS 5 mg Tab  TAKE 1 TABLET BY MOUTH twice a day             ergocalciferol (ERGOCALCIFEROL) 50,000 unit Cap  Take 1 capsule (50,000 Units total) by mouth every 7 days.             ferrous sulfate 325 (65 FE) MG EC tablet  Take 1 tablet (325 mg total) by mouth 2 (two) times daily with meals.             fluticasone (FLONASE) 50 mcg/actuation nasal spray  2 sprays by Each Nare route once daily.             furosemide (LASIX) 40 MG tablet  @@ TAKE 1 TABLET BY MOUTH daily             gabapentin (NEURONTIN) 300 MG capsule  Take 1 capsule (300 mg total) by mouth 3 (three) times daily.             glimepiride (AMARYL) 4 MG tablet  Take 4 mg by mouth once daily.             HYDROPHILIC CREAM (TRIAD WOUND DRESSING TOP)  Apply topically daily as needed.             losartan (COZAAR) 25 MG tablet  Take 0.5 tablets (12.5 mg total) by mouth once daily.             multivitamin (ONE DAILY MULTIVITAMIN) per tablet  Take 1 tablet by mouth once daily.             ONE TOUCH ULTRA TEST Strp  Test blood sugar two times daily             oxybutynin (DITROPAN-XL) 10 MG 24 hr tablet  Take 10 mg by mouth once daily.                     _________________________________  Erickson Mcnulty DO  12/01/2020

## 2020-12-01 NOTE — NURSING
Patient transfer via stretcher with [facility] with personal belonging and cell phone in bag

## 2020-12-02 NOTE — PLAN OF CARE
CM notified that patient will discharge today to [facility].  CM remains available for questions/concerns.         12/02/20 0953   Final Note   Assessment Type Final Discharge Note   Anticipated Discharge Disposition SNF       Solange De Jesus MPH, RN, CM  Ext. [phone]

## 2020-12-14 ENCOUNTER — HOSPITAL ENCOUNTER (INPATIENT)
Facility: HOSPITAL | Age: 70
LOS: 3 days | Discharge: HOSPICE | DRG: 640 | End: 2020-12-17
Attending: EMERGENCY MEDICINE | Admitting: HOSPITALIST
Payer: MEDICARE

## 2020-12-14 DIAGNOSIS — R94.31 QT PROLONGATION: ICD-10-CM

## 2020-12-14 DIAGNOSIS — E87.0 HYPERNATREMIA: ICD-10-CM

## 2020-12-14 DIAGNOSIS — Z71.89 GOALS OF CARE, COUNSELING/DISCUSSION: ICD-10-CM

## 2020-12-14 DIAGNOSIS — R45.1 AGITATION: ICD-10-CM

## 2020-12-14 DIAGNOSIS — R06.00 DYSPNEA, UNSPECIFIED TYPE: ICD-10-CM

## 2020-12-14 DIAGNOSIS — R41.82 ALTERED MENTAL STATUS, UNSPECIFIED ALTERED MENTAL STATUS TYPE: ICD-10-CM

## 2020-12-14 DIAGNOSIS — Z51.5 PALLIATIVE CARE ENCOUNTER: ICD-10-CM

## 2020-12-14 DIAGNOSIS — G93.40 ACUTE ENCEPHALOPATHY: ICD-10-CM

## 2020-12-14 DIAGNOSIS — Z71.89 ADVANCED CARE PLANNING/COUNSELING DISCUSSION: ICD-10-CM

## 2020-12-14 DIAGNOSIS — N17.9 AKI (ACUTE KIDNEY INJURY): Primary | ICD-10-CM

## 2020-12-14 DIAGNOSIS — Z78.9 ADVANCED DIRECTIVE PLACED IN CHART THIS ADMISSION: ICD-10-CM

## 2020-12-14 LAB
ALBUMIN SERPL BCP-MCNC: 2.7 G/DL (ref 3.5–5.2)
ALLENS TEST: ABNORMAL
ALP SERPL-CCNC: 74 U/L (ref 55–135)
ALT SERPL W/O P-5'-P-CCNC: 22 U/L (ref 10–44)
ANION GAP SERPL CALC-SCNC: 17 MMOL/L (ref 8–16)
AST SERPL-CCNC: 44 U/L (ref 10–40)
BASOPHILS # BLD AUTO: 0.07 K/UL (ref 0–0.2)
BASOPHILS NFR BLD: 0.7 % (ref 0–1.9)
BILIRUB SERPL-MCNC: 0.8 MG/DL (ref 0.1–1)
BILIRUB UR QL STRIP: NEGATIVE
BUN SERPL-MCNC: 46 MG/DL (ref 8–23)
CALCIUM SERPL-MCNC: 9.1 MG/DL (ref 8.7–10.5)
CHLORIDE SERPL-SCNC: 116 MMOL/L (ref 95–110)
CK SERPL-CCNC: 434 U/L (ref 20–200)
CLARITY UR REFRACT.AUTO: ABNORMAL
CO2 SERPL-SCNC: 24 MMOL/L (ref 23–29)
COLOR UR AUTO: ABNORMAL
CREAT SERPL-MCNC: 3.2 MG/DL (ref 0.5–1.4)
CREAT SERPL-MCNC: 3.4 MG/DL (ref 0.5–1.4)
CTP QC/QA: YES
DIFFERENTIAL METHOD: ABNORMAL
EOSINOPHIL # BLD AUTO: 0.7 K/UL (ref 0–0.5)
EOSINOPHIL NFR BLD: 6.9 % (ref 0–8)
ERYTHROCYTE [DISTWIDTH] IN BLOOD BY AUTOMATED COUNT: 15.9 % (ref 11.5–14.5)
EST. GFR  (AFRICAN AMERICAN): 19.7 ML/MIN/1.73 M^2
EST. GFR  (NON AFRICAN AMERICAN): 17 ML/MIN/1.73 M^2
GLUCOSE SERPL-MCNC: 126 MG/DL (ref 70–110)
GLUCOSE UR QL STRIP: NEGATIVE
HCO3 UR-SCNC: 32.1 MMOL/L (ref 24–28)
HCT VFR BLD AUTO: 40.5 % (ref 40–54)
HGB BLD-MCNC: 11.9 G/DL (ref 14–18)
HGB UR QL STRIP: NEGATIVE
IMM GRANULOCYTES # BLD AUTO: 0.03 K/UL (ref 0–0.04)
IMM GRANULOCYTES NFR BLD AUTO: 0.3 % (ref 0–0.5)
KETONES UR QL STRIP: NEGATIVE
LACTATE SERPL-SCNC: 2.4 MMOL/L (ref 0.5–2.2)
LACTATE SERPL-SCNC: 2.7 MMOL/L (ref 0.5–2.2)
LEUKOCYTE ESTERASE UR QL STRIP: NEGATIVE
LYMPHOCYTES # BLD AUTO: 2.3 K/UL (ref 1–4.8)
LYMPHOCYTES NFR BLD: 22.2 % (ref 18–48)
MCH RBC QN AUTO: 28.9 PG (ref 27–31)
MCHC RBC AUTO-ENTMCNC: 29.4 G/DL (ref 32–36)
MCV RBC AUTO: 98 FL (ref 82–98)
MONOCYTES # BLD AUTO: 1.2 K/UL (ref 0.3–1)
MONOCYTES NFR BLD: 11.3 % (ref 4–15)
NEUTROPHILS # BLD AUTO: 6 K/UL (ref 1.8–7.7)
NEUTROPHILS NFR BLD: 58.6 % (ref 38–73)
NITRITE UR QL STRIP: NEGATIVE
NRBC BLD-RTO: 0 /100 WBC
PCO2 BLDA: 50.9 MMHG (ref 35–45)
PH SMN: 7.41 [PH] (ref 7.35–7.45)
PH UR STRIP: 5 [PH] (ref 5–8)
PHOSPHATE SERPL-MCNC: 3.7 MG/DL (ref 2.7–4.5)
PLATELET # BLD AUTO: 156 K/UL (ref 150–350)
PMV BLD AUTO: 11.7 FL (ref 9.2–12.9)
PO2 BLDA: 39 MMHG (ref 40–60)
POC BE: 7 MMOL/L
POC SATURATED O2: 73 % (ref 95–100)
POC TCO2: 34 MMOL/L (ref 24–29)
POCT GLUCOSE: 119 MG/DL (ref 70–110)
POTASSIUM SERPL-SCNC: 5.4 MMOL/L (ref 3.5–5.1)
PROT SERPL-MCNC: 8 G/DL (ref 6–8.4)
PROT UR QL STRIP: NEGATIVE
RBC # BLD AUTO: 4.12 M/UL (ref 4.6–6.2)
SAMPLE: ABNORMAL
SAMPLE: ABNORMAL
SARS-COV-2 RDRP RESP QL NAA+PROBE: NEGATIVE
SITE: ABNORMAL
SODIUM SERPL-SCNC: 157 MMOL/L (ref 136–145)
SP GR UR STRIP: 1.02 (ref 1–1.03)
URATE SERPL-MCNC: 7.7 MG/DL (ref 3.4–7)
URN SPEC COLLECT METH UR: ABNORMAL
WBC # BLD AUTO: 10.22 K/UL (ref 3.9–12.7)

## 2020-12-14 PROCEDURE — 99285 EMERGENCY DEPT VISIT HI MDM: CPT | Mod: 25

## 2020-12-14 PROCEDURE — 84100 ASSAY OF PHOSPHORUS: CPT

## 2020-12-14 PROCEDURE — 93010 ELECTROCARDIOGRAM REPORT: CPT | Mod: ,,, | Performed by: INTERNAL MEDICINE

## 2020-12-14 PROCEDURE — 25000003 PHARM REV CODE 250: Performed by: STUDENT IN AN ORGANIZED HEALTH CARE EDUCATION/TRAINING PROGRAM

## 2020-12-14 PROCEDURE — 99900035 HC TECH TIME PER 15 MIN (STAT)

## 2020-12-14 PROCEDURE — 93010 EKG 12-LEAD: ICD-10-PCS | Mod: ,,, | Performed by: INTERNAL MEDICINE

## 2020-12-14 PROCEDURE — 93005 ELECTROCARDIOGRAM TRACING: CPT

## 2020-12-14 PROCEDURE — 83735 ASSAY OF MAGNESIUM: CPT

## 2020-12-14 PROCEDURE — 82803 BLOOD GASES ANY COMBINATION: CPT

## 2020-12-14 PROCEDURE — 84550 ASSAY OF BLOOD/URIC ACID: CPT

## 2020-12-14 PROCEDURE — 99285 EMERGENCY DEPT VISIT HI MDM: CPT | Mod: GC,CS,, | Performed by: EMERGENCY MEDICINE

## 2020-12-14 PROCEDURE — 83605 ASSAY OF LACTIC ACID: CPT | Mod: 91

## 2020-12-14 PROCEDURE — 81003 URINALYSIS AUTO W/O SCOPE: CPT

## 2020-12-14 PROCEDURE — 85025 COMPLETE CBC W/AUTO DIFF WBC: CPT

## 2020-12-14 PROCEDURE — 82550 ASSAY OF CK (CPK): CPT

## 2020-12-14 PROCEDURE — 82565 ASSAY OF CREATININE: CPT

## 2020-12-14 PROCEDURE — U0002 COVID-19 LAB TEST NON-CDC: HCPCS | Performed by: STUDENT IN AN ORGANIZED HEALTH CARE EDUCATION/TRAINING PROGRAM

## 2020-12-14 PROCEDURE — 12000002 HC ACUTE/MED SURGE SEMI-PRIVATE ROOM

## 2020-12-14 PROCEDURE — 80053 COMPREHEN METABOLIC PANEL: CPT

## 2020-12-14 PROCEDURE — 99285 PR EMERGENCY DEPT VISIT,LEVEL V: ICD-10-PCS | Mod: GC,CS,, | Performed by: EMERGENCY MEDICINE

## 2020-12-14 RX ORDER — DEXTROSE MONOHYDRATE 50 MG/ML
INJECTION, SOLUTION INTRAVENOUS CONTINUOUS
Status: ACTIVE | OUTPATIENT
Start: 2020-12-14 | End: 2020-12-15

## 2020-12-14 RX ORDER — INSULIN ASPART 100 [IU]/ML
0-5 INJECTION, SOLUTION INTRAVENOUS; SUBCUTANEOUS EVERY 6 HOURS PRN
Status: DISCONTINUED | OUTPATIENT
Start: 2020-12-15 | End: 2020-12-15

## 2020-12-14 RX ORDER — TALC
6 POWDER (GRAM) TOPICAL NIGHTLY PRN
Status: DISCONTINUED | OUTPATIENT
Start: 2020-12-15 | End: 2020-12-15

## 2020-12-14 RX ORDER — CARVEDILOL 3.12 MG/1
6.25 TABLET ORAL 2 TIMES DAILY
Status: DISCONTINUED | OUTPATIENT
Start: 2020-12-15 | End: 2020-12-15

## 2020-12-14 RX ORDER — AMIODARONE HYDROCHLORIDE 200 MG/1
200 TABLET ORAL EVERY MORNING
Status: DISCONTINUED | OUTPATIENT
Start: 2020-12-15 | End: 2020-12-15

## 2020-12-14 RX ORDER — SODIUM CHLORIDE 0.9 % (FLUSH) 0.9 %
10 SYRINGE (ML) INJECTION
Status: DISCONTINUED | OUTPATIENT
Start: 2020-12-15 | End: 2020-12-17 | Stop reason: HOSPADM

## 2020-12-14 RX ORDER — GLUCAGON 1 MG
1 KIT INJECTION
Status: DISCONTINUED | OUTPATIENT
Start: 2020-12-15 | End: 2020-12-15

## 2020-12-14 RX ORDER — ATORVASTATIN CALCIUM 20 MG/1
40 TABLET, FILM COATED ORAL NIGHTLY
Status: DISCONTINUED | OUTPATIENT
Start: 2020-12-15 | End: 2020-12-15

## 2020-12-14 RX ADMIN — DEXTROSE 100 ML/HR: 5 SOLUTION INTRAVENOUS at 10:12

## 2020-12-15 PROBLEM — J98.4 PNEUMONITIS: Status: ACTIVE | Noted: 2020-12-15

## 2020-12-15 LAB
ALBUMIN SERPL BCP-MCNC: 2.6 G/DL (ref 3.5–5.2)
ALBUMIN SERPL BCP-MCNC: 2.7 G/DL (ref 3.5–5.2)
ALP SERPL-CCNC: 72 U/L (ref 55–135)
ALP SERPL-CCNC: 74 U/L (ref 55–135)
ALT SERPL W/O P-5'-P-CCNC: 19 U/L (ref 10–44)
ALT SERPL W/O P-5'-P-CCNC: 20 U/L (ref 10–44)
ANION GAP SERPL CALC-SCNC: 10 MMOL/L (ref 8–16)
ANION GAP SERPL CALC-SCNC: 15 MMOL/L (ref 8–16)
ANION GAP SERPL CALC-SCNC: 15 MMOL/L (ref 8–16)
AST SERPL-CCNC: 29 U/L (ref 10–40)
AST SERPL-CCNC: 30 U/L (ref 10–40)
BASOPHILS # BLD AUTO: 0.06 K/UL (ref 0–0.2)
BASOPHILS NFR BLD: 0.6 % (ref 0–1.9)
BILIRUB SERPL-MCNC: 0.8 MG/DL (ref 0.1–1)
BILIRUB SERPL-MCNC: 0.8 MG/DL (ref 0.1–1)
BUN SERPL-MCNC: 44 MG/DL (ref 8–23)
BUN SERPL-MCNC: 46 MG/DL (ref 8–23)
BUN SERPL-MCNC: 46 MG/DL (ref 8–23)
CALCIUM SERPL-MCNC: 9 MG/DL (ref 8.7–10.5)
CALCIUM SERPL-MCNC: 9 MG/DL (ref 8.7–10.5)
CALCIUM SERPL-MCNC: 9.3 MG/DL (ref 8.7–10.5)
CHLORIDE SERPL-SCNC: 115 MMOL/L (ref 95–110)
CHLORIDE SERPL-SCNC: 117 MMOL/L (ref 95–110)
CHLORIDE SERPL-SCNC: 117 MMOL/L (ref 95–110)
CO2 SERPL-SCNC: 26 MMOL/L (ref 23–29)
CO2 SERPL-SCNC: 26 MMOL/L (ref 23–29)
CO2 SERPL-SCNC: 30 MMOL/L (ref 23–29)
CREAT SERPL-MCNC: 3.1 MG/DL (ref 0.5–1.4)
CREAT SERPL-MCNC: 3.3 MG/DL (ref 0.5–1.4)
CREAT SERPL-MCNC: 3.3 MG/DL (ref 0.5–1.4)
DIFFERENTIAL METHOD: ABNORMAL
EOSINOPHIL # BLD AUTO: 0.8 K/UL (ref 0–0.5)
EOSINOPHIL NFR BLD: 7.2 % (ref 0–8)
ERYTHROCYTE [DISTWIDTH] IN BLOOD BY AUTOMATED COUNT: 15.6 % (ref 11.5–14.5)
EST. GFR  (AFRICAN AMERICAN): 20.4 ML/MIN/1.73 M^2
EST. GFR  (AFRICAN AMERICAN): 20.4 ML/MIN/1.73 M^2
EST. GFR  (AFRICAN AMERICAN): 22 ML/MIN/1.73 M^2
EST. GFR  (NON AFRICAN AMERICAN): 17.7 ML/MIN/1.73 M^2
EST. GFR  (NON AFRICAN AMERICAN): 17.7 ML/MIN/1.73 M^2
EST. GFR  (NON AFRICAN AMERICAN): 19.1 ML/MIN/1.73 M^2
GLUCOSE SERPL-MCNC: 133 MG/DL (ref 70–110)
GLUCOSE SERPL-MCNC: 139 MG/DL (ref 70–110)
GLUCOSE SERPL-MCNC: 139 MG/DL (ref 70–110)
HCT VFR BLD AUTO: 39.4 % (ref 40–54)
HGB BLD-MCNC: 11.5 G/DL (ref 14–18)
IMM GRANULOCYTES # BLD AUTO: 0.03 K/UL (ref 0–0.04)
IMM GRANULOCYTES NFR BLD AUTO: 0.3 % (ref 0–0.5)
LACTATE SERPL-SCNC: 1.9 MMOL/L (ref 0.5–2.2)
LACTATE SERPL-SCNC: 2.8 MMOL/L (ref 0.5–2.2)
LYMPHOCYTES # BLD AUTO: 2.4 K/UL (ref 1–4.8)
LYMPHOCYTES NFR BLD: 22.7 % (ref 18–48)
MAGNESIUM SERPL-MCNC: 2.3 MG/DL (ref 1.6–2.6)
MAGNESIUM SERPL-MCNC: 3.1 MG/DL (ref 1.6–2.6)
MCH RBC QN AUTO: 28.8 PG (ref 27–31)
MCHC RBC AUTO-ENTMCNC: 29.2 G/DL (ref 32–36)
MCV RBC AUTO: 99 FL (ref 82–98)
MONOCYTES # BLD AUTO: 1.2 K/UL (ref 0.3–1)
MONOCYTES NFR BLD: 11.4 % (ref 4–15)
NEUTROPHILS # BLD AUTO: 6.1 K/UL (ref 1.8–7.7)
NEUTROPHILS NFR BLD: 57.8 % (ref 38–73)
NRBC BLD-RTO: 0 /100 WBC
PLATELET # BLD AUTO: 138 K/UL (ref 150–350)
PMV BLD AUTO: 11.1 FL (ref 9.2–12.9)
POTASSIUM SERPL-SCNC: 3.9 MMOL/L (ref 3.5–5.1)
POTASSIUM SERPL-SCNC: 4.2 MMOL/L (ref 3.5–5.1)
POTASSIUM SERPL-SCNC: 4.2 MMOL/L (ref 3.5–5.1)
PROCALCITONIN SERPL IA-MCNC: 0.12 NG/ML
PROT SERPL-MCNC: 7.2 G/DL (ref 6–8.4)
PROT SERPL-MCNC: 7.7 G/DL (ref 6–8.4)
RBC # BLD AUTO: 4 M/UL (ref 4.6–6.2)
SODIUM SERPL-SCNC: 155 MMOL/L (ref 136–145)
SODIUM SERPL-SCNC: 158 MMOL/L (ref 136–145)
SODIUM SERPL-SCNC: 158 MMOL/L (ref 136–145)
WBC # BLD AUTO: 10.54 K/UL (ref 3.9–12.7)

## 2020-12-15 PROCEDURE — 36415 COLL VENOUS BLD VENIPUNCTURE: CPT

## 2020-12-15 PROCEDURE — 63600175 PHARM REV CODE 636 W HCPCS: Performed by: STUDENT IN AN ORGANIZED HEALTH CARE EDUCATION/TRAINING PROGRAM

## 2020-12-15 PROCEDURE — 84145 PROCALCITONIN (PCT): CPT

## 2020-12-15 PROCEDURE — 85025 COMPLETE CBC W/AUTO DIFF WBC: CPT

## 2020-12-15 PROCEDURE — 99223 1ST HOSP IP/OBS HIGH 75: CPT | Mod: AI,,, | Performed by: HOSPITALIST

## 2020-12-15 PROCEDURE — 93005 ELECTROCARDIOGRAM TRACING: CPT

## 2020-12-15 PROCEDURE — 11000001 HC ACUTE MED/SURG PRIVATE ROOM

## 2020-12-15 PROCEDURE — 25000003 PHARM REV CODE 250: Performed by: STUDENT IN AN ORGANIZED HEALTH CARE EDUCATION/TRAINING PROGRAM

## 2020-12-15 PROCEDURE — 83735 ASSAY OF MAGNESIUM: CPT

## 2020-12-15 PROCEDURE — 97535 SELF CARE MNGMENT TRAINING: CPT

## 2020-12-15 PROCEDURE — 80053 COMPREHEN METABOLIC PANEL: CPT

## 2020-12-15 PROCEDURE — 93010 EKG 12-LEAD: ICD-10-PCS | Mod: ,,, | Performed by: INTERNAL MEDICINE

## 2020-12-15 PROCEDURE — 94761 N-INVAS EAR/PLS OXIMETRY MLT: CPT

## 2020-12-15 PROCEDURE — 99223 PR INITIAL HOSPITAL CARE,LEVL III: ICD-10-PCS | Mod: AI,,, | Performed by: HOSPITALIST

## 2020-12-15 PROCEDURE — 93010 ELECTROCARDIOGRAM REPORT: CPT | Mod: ,,, | Performed by: INTERNAL MEDICINE

## 2020-12-15 PROCEDURE — 80053 COMPREHEN METABOLIC PANEL: CPT | Mod: 91

## 2020-12-15 PROCEDURE — 83605 ASSAY OF LACTIC ACID: CPT | Mod: 91

## 2020-12-15 PROCEDURE — 92610 EVALUATE SWALLOWING FUNCTION: CPT

## 2020-12-15 PROCEDURE — 87040 BLOOD CULTURE FOR BACTERIA: CPT

## 2020-12-15 RX ORDER — HEPARIN SODIUM 5000 [USP'U]/ML
7500 INJECTION, SOLUTION INTRAVENOUS; SUBCUTANEOUS EVERY 8 HOURS
Status: DISCONTINUED | OUTPATIENT
Start: 2020-12-15 | End: 2020-12-15

## 2020-12-15 RX ORDER — METOPROLOL TARTRATE 1 MG/ML
5 INJECTION, SOLUTION INTRAVENOUS EVERY 6 HOURS PRN
Status: DISCONTINUED | OUTPATIENT
Start: 2020-12-15 | End: 2020-12-16

## 2020-12-15 RX ORDER — HEPARIN SODIUM,PORCINE/D5W 25000/250
0-40 INTRAVENOUS SOLUTION INTRAVENOUS CONTINUOUS
Status: DISCONTINUED | OUTPATIENT
Start: 2020-12-15 | End: 2020-12-15

## 2020-12-15 RX ORDER — DEXTROSE MONOHYDRATE 50 MG/ML
INJECTION, SOLUTION INTRAVENOUS CONTINUOUS
Status: ACTIVE | OUTPATIENT
Start: 2020-12-15 | End: 2020-12-15

## 2020-12-15 RX ORDER — LORAZEPAM 2 MG/ML
1 INJECTION INTRAMUSCULAR EVERY 4 HOURS PRN
Status: DISCONTINUED | OUTPATIENT
Start: 2020-12-15 | End: 2020-12-16

## 2020-12-15 RX ORDER — MORPHINE SULFATE 4 MG/ML
4 INJECTION, SOLUTION INTRAMUSCULAR; INTRAVENOUS ONCE
Status: COMPLETED | OUTPATIENT
Start: 2020-12-15 | End: 2020-12-15

## 2020-12-15 RX ADMIN — HEPARIN SODIUM 7500 UNITS: 5000 INJECTION INTRAVENOUS; SUBCUTANEOUS at 06:12

## 2020-12-15 RX ADMIN — MICONAZOLE NITRATE: 20 OINTMENT TOPICAL at 09:12

## 2020-12-15 RX ADMIN — COLLAGENASE SANTYL: 250 OINTMENT TOPICAL at 03:12

## 2020-12-15 RX ADMIN — MORPHINE SULFATE 4 MG: 2 INJECTION, SOLUTION INTRAMUSCULAR; INTRAVENOUS at 01:12

## 2020-12-15 RX ADMIN — DEXTROSE MONOHYDRATE 100 ML/HR: 5 INJECTION, SOLUTION INTRAVENOUS at 12:12

## 2020-12-15 NOTE — CONSULTS
Wound care consult received from MD for assessment of buttocks, pannus, L heel, thigh, L elbow and scrotum. Patient is a  70 year old male with hx of NICM, CHF, HTN, T2DM, AFib, CVA w/ L hemineglect, DVT, ERIC and obesity was transferred from [facility] for hypernatremia, HUMERA, and confusion.      Assessments and pictures listed below. All wounds listed below were present on admit. Will order a PRAVIN surface.     Recommendations:   -Betadine swabs to L heel BID to keep dry and reduce bio-burden.   -Clear critic-aid moisture barrier ointment BID/ PRN to pannus for moisture and yeast management.   - Sween moisturizer to lips BID to restore moisture to dry skin.   - Triad ointment BID/PRN to coccyx and bilateral buttocks to provide a hydrophilic environment to promote wound healing.   - Santyl ointment daily to promote healing through enzymatic debridement.     Nursing and MD team notified w/ recommendations.   Wound care to follow pt PRN [id number]     L heel- 3 x 3 cm - Present on admit healing stage 3 pressure injury   - Noted dry, healing full thickness pressure injury with dark eschar to small area of wound bed. No odor. No drainage. Calloused periwound.        R hip-  Present on admit healed shallow ulceration. No odor. No drainage      Left hand  - Present on admit multiple healing, intact scabbed ulcerations. Etiology unknown. No odor. No drainage.       Lower lip  -Noted peeling,dry skin. No odor. No drainage.      Left lateral knee: Present on admit unstageable pressure injury- 1 x 1 cm  - Noted full thickness with 100 % adherent slough to wound bed. No odor . No drainage.   Left anterior lower leg: 2 x 1 cm   - Noted present on admit full thickness with 100% dried fibrous tissue to wound bed. No odor. No drainage. Etiology unknown but possibly related to pressure.        Coccyx and bilateral buttocks  - Noted present on admit multiple scattered areas of full thickness wounds in various stages of healing  that appear to be pressure related and complicated by moisture. No odor. Scant serosanguineous drainage.        Pannus  - Noted present on admit moisture related partial thickness skin erosion. Malodorous. Small amount of serosanguinous drainage.

## 2020-12-15 NOTE — CONSULTS
"  [facility]  Adult Nutrition  Consult Note    SUMMARY     Recommendations  1. As medically appropriate, ADAT to diabetic cardiac with texture per SLP.   2. If unable to advance diet, recommend Glucerna 1.5 @ a goal rate of 60mL/hr, to provide 2160 kcal, 119g protein, and 1094mL free water. Additional fluid per MD.   3. RD to monitor.    Goals: Pt to meet and tolerate > 75% EEN/EPN by RD follow up  Nutrition Goal Status: new  Communication of RD Recs: other (comment)(POC)    Reason for Assessment    Reason For Assessment: consult  Diagnosis: (Acute encephalopathy)  Relevant Medical History: NICM, HF, HTN, DM2, afib, CVA, DVT  Interdisciplinary Rounds: did not attend  General Information Comments: Pt disoriented and unable to provide nutrition hx. NPO per SLP. No wt loss noted PTA per chart. NFPE completed today with no physical s/s of malnutrition, however pt is at risk if remains NPO with no alternate means of nutrition. Pt noted with pressure injury to left heel.  Nutrition Discharge Planning: Unable to determine at this time    Nutrition Risk Screen    Nutrition Risk Screen: large or nonhealing wound, burn or pressure injury    Nutrition/Diet History    Spiritual, Cultural Beliefs, Alevism Practices, Values that Affect Care: no    Anthropometrics    Temp: 97.4 °F (36.3 °C)  Height Method: Stated  Height: 5' 9" (175.3 cm)  Height (inches): 69 in  Weight Method: Stated  Weight: (!) 158.9 kg (350 lb 5 oz)  Weight (lb): (!) 350.31 lb  Ideal Body Weight (IBW), Male: 160 lb  % Ideal Body Weight, Male (lb): 218.94 %  BMI (Calculated): 51.7    Lab/Procedures/Meds    Pertinent Labs Reviewed: reviewed  Pertinent Labs Comments: Na 158, BUN 46, Cr 3.3, GFR 20.4, Glu 139, Alb 2.6  Pertinent Medications Reviewed: reviewed  Pertinent Medications Comments: noted    Estimated/Assessed Needs    Weight Used For Calorie Calculations: 72.6 kg (160 lb 0.9 oz)(IBW)  Energy Calorie Requirements (kcal): 7423-7622 " kcal/day  Energy Need Method: Kcal/kg(25-30 kcal/kg IBW)  Protein Requirements: 109-130 g/day(1.5-1.8 g/kg IBW)  Weight Used For Protein Calculations: 72.6 kg (160 lb 0.9 oz)  Fluid Requirements (mL): per MD or 1 mL/kcal     RDA Method (mL): 1815    Nutrition Prescription Ordered    Current Diet Order: NPO    Evaluation of Received Nutrient/Fluid Intake    Comments: LBM 12/15  % Intake of Estimated Energy Needs: 0 - 25 %  % Meal Intake: NPO    Nutrition Risk    Level of Risk/Frequency of Follow-up: high     Assessment and Plan    Nutrition Problem  Inadequate energy intake    Related to (etiology):   Decreased ability to consume sufficient energy    Signs and Symptoms (as evidenced by):   NPO with no alternate means of nutrition at this time    Interventions (treatment strategy):  Collaboration with other providers    Nutrition Diagnosis Status:   New    Monitor and Evaluation    Food and Nutrient Intake: energy intake, food and beverage intake, enteral nutrition intake  Food and Nutrient Adminstration: diet order, enteral and parenteral nutrition administration  Anthropometric Measurements: weight, weight change, body mass index  Biochemical Data, Medical Tests and Procedures: electrolyte and renal panel, gastrointestinal profile, glucose/endocrine profile, inflammatory profile, lipid profile  Nutrition-Focused Physical Findings: overall appearance    Malnutrition Assessment  Orbital Region (Subcutaneous Fat Loss): well nourished  Upper Arm Region (Subcutaneous Fat Loss): well nourished   Jehovah's witness Region (Muscle Loss): well nourished  Clavicle Bone Region (Muscle Loss): well nourished  Dorsal Hand (Muscle Loss): well nourished  Posterior Calf Region (Muscle Loss): well nourished     Nutrition Follow-Up    RD Follow-up?: Yes

## 2020-12-15 NOTE — PT/OT/SLP PROGRESS
Physical Therapy Discharge      Patient Name:  Hemant Kennedy Jr.   MRN:  [id number]    PT attempted to see patient at 12:00 pm, patient agitated and adamantly refused PT/OT evaluation. Patient confused and unable/refusing to answer any questions regarding his history. Writing therapist contacted [facility] to obtain prior level of function. Per nurse Thelma, patient is total care for mobility and ADLs at baseline. Toileting and bathing are performed in bed, and patient requires romelia lift transfer to w/c. Pt is unable to self-propel w/c. Patient does feed himself with [place]. Per nurse, patient does not display active participation in his care at baseline. Based on this information, patient is not appropriate for acute PT services. PT orders discontinued this date. No charges posted.     Edda Peng PT, DPT  12/15/2020  Pager# [phone]

## 2020-12-15 NOTE — ED NOTES
Telemetry Verification   Patient placed on Telemetry Box  Verified with War Room  Box # [id number]   Monitor Tech    Rate 72   Rhythm SR

## 2020-12-15 NOTE — ASSESSMENT & PLAN NOTE
Patient with Echo on ( 11/20) showing EF 35-40%. Patient on coreg 6.25mg BID, losartan 12.5mg daily, lasix 40 mg daily. Patient with no prescription for BB per chart review.     Plan:   -- Continue  patient's carvedilol 6.25 BID  --  Will hold ACEi/ARB at this time given HUMERA  -- Monitor BP, make sure cuff is right size   -- Strict I&O

## 2020-12-15 NOTE — CARE UPDATE
Patient refusing physical examination and not cooperating with interview at this time. Minimal hx obtained. VSS will contact daughter for collateral information. Orders placed. Will try to re-exam patient shortly.    Na 157, monitoring BMP q4h, neuro checks q4h. D5W infusing at 100cc/hr.    Loretta Sosa, PGY-2  Internal Medicine

## 2020-12-15 NOTE — ASSESSMENT & PLAN NOTE
Patient with hx of longstanding afib. On home amio 200mg daily and apixaban 5mg BID. Per patient's nursing home nurse patient last took second dose of eliquis at 4pm. CHADs score of 5 on amiodarone 200mg, eliquis 5mg at home. Hemodynamically stable.     - Cardiac Monitoring; patient in A.fib but rate controlled  - CMP and Mg daily  - Continue home apixaban 5mg BID; however, patient with nonvalvular a.fib. Given that patient has Cr 3.4 at this time would favor reduction in dosing to 2.5mg BID. Patient additionally with CrCl  44 per priginal Cockcroft-Gault. Adjusted CrCl modified for obesity is CrCl 29.   - Continue home amiodarone 200mg daily

## 2020-12-15 NOTE — SUBJECTIVE & OBJECTIVE
Past Medical History:   Diagnosis Date    Anticoagulant long-term use     Arthritis     Atrial fibrillation     Cardiomyopathy     home O2    Cataract     CHF (congestive heart failure)     CKD (chronic kidney disease) stage 3, GFR 30-59 ml/min 11/27/2015    Diabetes mellitus     DVT (deep venous thrombosis)     Hypertension     Physical debility     Sleep apnea     Stroke 2010    with residual effects W/C bound - Right Occipital       Past Surgical History:   Procedure Laterality Date    CATARACT EXTRACTION W/  INTRAOCULAR LENS IMPLANT Left 09/18/2017    Dr. Zuniga       Review of patient's allergies indicates:   Allergen Reactions    Spironolactone      Hyperkalemia         No current facility-administered medications on file prior to encounter.      Current Outpatient Medications on File Prior to Encounter   Medication Sig    acetaminophen (TYLENOL) 500 MG tablet Take 2 tablets (1,000 mg total) by mouth 3 (three) times daily as needed for Pain.    allopurinol (ZYLOPRIM) 300 MG tablet Take 1 tablet (300 mg total) by mouth every morning.    amiodarone (PACERONE) 200 MG Tab TAKE 1 TABLET BY MOUTH IN THE MORNING    aspirin 81 MG Chew TAKE 1 TAB BY MOUTH daily    atorvastatin (LIPITOR) 40 MG tablet @@ TAKE 1 TABLET BY MOUTH daily    calcium carbonate (OS-ROJAS) 600 mg calcium (1,500 mg) Tab Take 600 mg by mouth 2 (two) times a day.    carvediloL (COREG) 6.25 MG tablet TAKE 1 TABLET BY MOUTH IN THE MORNING and TAKE 1 TABLET BY MOUTH every evening with meals    ELIQUIS 5 mg Tab TAKE 1 TABLET BY MOUTH twice a day    ferrous sulfate 325 (65 FE) MG EC tablet Take 1 tablet (325 mg total) by mouth 2 (two) times daily with meals.    furosemide (LASIX) 40 MG tablet @@ TAKE 1 TABLET BY MOUTH daily    glimepiride (AMARYL) 4 MG tablet Take 4 mg by mouth once daily.    losartan (COZAAR) 25 MG tablet Take 0.5 tablets (12.5 mg total) by mouth once daily.    multivitamin (ONE DAILY MULTIVITAMIN) per tablet  Take 1 tablet by mouth once daily.    collagenase (SANTYL) ointment Apply topically once daily. Apply to wound daily as directed.    ergocalciferol (ERGOCALCIFEROL) 50,000 unit Cap Take 1 capsule (50,000 Units total) by mouth every 7 days. (Patient taking differently: Take 50,000 Units by mouth every Monday. )    fluticasone (FLONASE) 50 mcg/actuation nasal spray 2 sprays by Each Nare route once daily. (Patient taking differently: 2 sprays by Each Nare route daily as needed for Allergies. )    gabapentin (NEURONTIN) 300 MG capsule Take 1 capsule (300 mg total) by mouth 3 (three) times daily.    HYDROPHILIC CREAM (TRIAD WOUND DRESSING TOP) Apply topically daily as needed.    ONE TOUCH ULTRA TEST Strp Test blood sugar two times daily    oxybutynin (DITROPAN-XL) 10 MG 24 hr tablet Take 10 mg by mouth once daily.     Family History     Problem Relation (Age of Onset)    Cataracts Mother    Glaucoma Sister, Sister    Heart attack Mother        Tobacco Use    Smoking status: Never Smoker    Smokeless tobacco: Never Used   Substance and Sexual Activity    Alcohol use: No    Drug use: No    Sexual activity: Not Currently     Review of Systems   Unable to perform ROS: Mental status change     Objective:     Vital Signs (Most Recent):  Temp: 97.5 °F (36.4 °C) (12/15/20 0400)  Pulse: 90 (12/15/20 0400)  Resp: 16 (12/15/20 0400)  BP: 129/71 (12/15/20 0400)  SpO2: 99 % (12/15/20 0412) Vital Signs (24h Range):  Temp:  [97 °F (36.1 °C)-97.9 °F (36.6 °C)] 97.5 °F (36.4 °C)  Pulse:  [65-91] 90  Resp:  [15-20] 16  SpO2:  [95 %-100 %] 99 %  BP: (117-162)/(63-83) 129/71     Weight: (!) 158.9 kg (350 lb 5 oz)  Body mass index is 51.73 kg/m².    Physical Exam  Constitutional:       Appearance: He is obese.      Comments: Disoriented.  Disheveled appearing    Limited exam due to patient's lack of cooperation   HENT:      Head: Normocephalic and atraumatic.      Mouth/Throat:      Mouth: Mucous membranes are dry.      Comments:  Lips dried and cracked.  Skin dry all over  Eyes:      General: No scleral icterus.     Extraocular Movements: Extraocular movements intact.      Conjunctiva/sclera: Conjunctivae normal.      Pupils: Pupils are equal, round, and reactive to light.   Neck:      Musculoskeletal: Normal range of motion.   Cardiovascular:      Rate and Rhythm: Normal rate. Rhythm irregular.      Heart sounds: No murmur.   Pulmonary:      Breath sounds: Rales (bibasilar) present.   Abdominal:      General: Bowel sounds are normal.      Tenderness: There is no abdominal tenderness. There is no right CVA tenderness or left CVA tenderness.   Musculoskeletal:      Right lower leg: No edema.      Left lower leg: No edema.   Skin:     General: Skin is warm.      Comments: Skin break down on buttocks bilaterally, superficial skin tears    Skin appears very dry and ashy   Neurological:      Mental Status: He is disoriented.      Motor: No weakness.      Comments: Limited neuro exam due to patient's unwillingness to participate in physical           CRANIAL NERVES     CN III, IV, VI   Pupils are equal, round, and reactive to light.       Significant Labs: All pertinent labs within the past 24 hours have been reviewed.    Significant Imaging: I have reviewed and interpreted all pertinent imaging results/findings within the past 24 hours.

## 2020-12-15 NOTE — PLAN OF CARE
Problem: Fall Injury Risk  Goal: Absence of Fall and Fall-Related Injury  Outcome: Ongoing, Progressing     Problem: Adult Inpatient Plan of Care  Goal: Plan of Care Review  Outcome: Ongoing, Progressing  Goal: Patient-Specific Goal (Individualization)  Outcome: Ongoing, Progressing  Goal: Absence of Hospital-Acquired Illness or Injury  Outcome: Ongoing, Progressing  Goal: Optimal Comfort and Wellbeing  Outcome: Ongoing, Progressing  Goal: Readiness for Transition of Care  Outcome: Ongoing, Progressing  Goal: Rounds/Family Conference  Outcome: Ongoing, Progressing   Patient has remained agitated with staff. Patient has voided , incont. Od urine. Remains NPO r/t swallowing difficulties. Will continue to monitor. SR up, CB near

## 2020-12-15 NOTE — PLAN OF CARE
Problem: SLP Goal  Goal: SLP Goal  Description: Speech Language Pathology Goals  Goals expected to be met by 12/22:  1. Pt will participate in ongoing assessment of swallow function to determine safest and least restrictive diet.   2. Pt will participate in MBSS to gain further objective assessment of swallow function and determine future therapeutic plan of care.   Outcome: Ongoing, Progressing  Pt seen bedside for swallow evaluation. SLP recommend NPO at this time with alternate means of nutrition/hydration/medication and MBSS to gain further objective assessment of swallow. Continue ST per POC.    Nayely Henry CF-SLP  12/15/2020

## 2020-12-15 NOTE — ASSESSMENT & PLAN NOTE
Unable to discuss with patient's daughter who per nursing staff at his nursing home state that she is his medical decision maker.     - Please contact daughter and obtain current CODE status  - LAPOST?Living Will? Designated POA?

## 2020-12-15 NOTE — CARE UPDATE
Unable to reach patient's daughter/emergency contact.    Was able to talk with Nurse at [facility]. States that patient had taken all 9am and 4pm medications today, including amiodarone and eliquis. States that patient's daughter is POA. Per her records patient is a DNR but she does not know if he has an [place] POST or formal documentation confirming that and to confirm with patient's daughter.    Loretta Sosa, PGY-2  Internal Medicine

## 2020-12-15 NOTE — PLAN OF CARE
GERARDO met with pt and pt's loulou (Zoltan at bedside) but was unable to complete assessment, per pt's daughter Keyla Giordano have COREY.   GERARDO telephoned pt's daughter Keyla to complete discharge planning.  Pt was admitted to [facility] prior to hospital admission.  Pt lives alone in an apartment complex.  Pt does not have any stairs.  Pt 's daughter Keyla is pt caretaker and stated she needs help taking care of her father.  Pt's daughter is requesting information on agencies that may be able to provide in home services to help care for pt.  She advised she have been looking for places to she and her father can live together.  Pt will need transportation at time of d/c.  Pt's daughter stated pt does not go to coumadin or dialysis clinics.       12/15/20 9674   Discharge Assessment   Assessment Type Discharge Planning Assessment   Confirmed/corrected address and phone number on facesheet? Yes   Assessment information obtained from? Caregiver   Communicated expected length of stay with patient/caregiver yes   Prior to hospitilization cognitive status: Alert/Oriented   Prior to hospitalization functional status: Wheelchair Bound   Current cognitive status: Unable to Assess   Current Functional Status: Needs Assistance   Lives With alone   Able to Return to Prior Arrangements no   Is patient able to care for self after discharge? No   Who are your caregiver(s) and their phone number(s)? Keyla Giordano (Daughter/POA) [phone]   Patient's perception of discharge disposition skilled nursing facility   Readmission Within the Last 30 Days no previous admission in last 30 days   Patient currently being followed by outpatient case management? No   Patient currently receives any other outside agency services? No   Equipment Currently Used at Home shower chair;CPAP;oxygen;wheelchair   Do you have any problems affording any of your prescribed medications? No   Is the patient taking medications as  prescribed? no   If no, which medications is patient not taking? Daughter was unable to provide the name of medications pt is not taking as prescribed.   Does the patient have transportation home? No  (pt will need transportation home.)   Dialysis Name and Scheduled days N/A   Does the patient receive services at the Coumadin Clinic? No   Discharge Plan A Skilled Nursing Facility   Discharge Plan B Home with family;Home Health   DME Needed Upon Discharge  none   Patient/Family in Agreement with Plan yes     Wicho Qiu MD  [address] [place] [address] [address] / [place] [id number] [phone]       [facility]  for [facility] [place], [place] - [address] [place] [address]  Phone: [phone] Fax: [phone]    [facility] [place]  [address] [id number] [facility] [place] [address]  Phone: [phone] Fax: [phone]    [facility]  - [place]  [address] [facility].  [facility] [facility].  [place] [address]  Phone: [phone] Fax: [phone]    Payor: MEDICARE / Plan: MEDICARE PART A & B / Product Type: Government /     Lorraine Mckinley LMSW  PRN-  [facility]  Ext. [id number]

## 2020-12-15 NOTE — ASSESSMENT & PLAN NOTE
Patient with multiple admissions for hypernatremia, today on presentation Na 157    - D5W 100cc/hr infusion  - Will monitor volume status given CHF (EF 35-40%  - BMP q4h; patient should not correct more than 8-10mEq within 24hrs (12/14/20; no less than 147)  - Neuro checks q4h  - FWD 5.4 L

## 2020-12-15 NOTE — ASSESSMENT & PLAN NOTE
Wound care consult placed as patient with significantly altered skin integrity including superficial tears on buttocks bilaterally    - Wound care consulted, celsa matta

## 2020-12-15 NOTE — ED TRIAGE NOTES
Pt brought to ED via EMS from [facility] for AMS and abnormal labs. Per nursing staff pt has had AMS for the past few days with aggressive behavior. Pt with abnormal labs that were drawn two days ago and transported to ED for eval.

## 2020-12-15 NOTE — PLAN OF CARE
Recommendations  1. As medically appropriate, ADAT to diabetic cardiac with texture per SLP.   2. If unable to advance diet, recommend Glucerna 1.5 @ a goal rate of 60mL/hr, to provide 2160 kcal, 119g protein, and 1094mL free water. Additional fluid per MD.   3. RD to monitor.     Goals: Pt to meet and tolerate > 75% EEN/EPN by RD follow up  Nutrition Goal Status: new  Communication of RD Recs: other (comment)(POC)

## 2020-12-15 NOTE — PT/OT/SLP EVAL
"Speech Language Pathology Evaluation  Bedside Swallow    Patient Name:  Hemant Kennedy Jr.   MRN:  [id number]  Admitting Diagnosis: Acute encephalopathy    Recommendations:                 General Recommendations:  Dysphagia therapy and Modified barium swallow study  Diet recommendations:  NPO, NPO   Aspiration Precautions: Alternate means of nutrition/hydration/medication  General Precautions: Standard, aspiration, NPO, fall  Communication strategies:  provide increased time to answer    History:     Past Medical History:   Diagnosis Date    Anticoagulant long-term use     Arthritis     Atrial fibrillation     Cardiomyopathy     home O2    Cataract     CHF (congestive heart failure)     CKD (chronic kidney disease) stage 3, GFR 30-59 ml/min 11/27/2015    Diabetes mellitus     DVT (deep venous thrombosis)     Hypertension     Physical debility     Sleep apnea     Stroke 2010    with residual effects W/C bound - Right Occipital       Past Surgical History:   Procedure Laterality Date    CATARACT EXTRACTION W/  INTRAOCULAR LENS IMPLANT Left 09/18/2017    Dr. Zuniga       Social History: Unable to obtain    Prior Intubation HX:  None this admit    Modified Barium Swallow: None on file; per note in 2016, proposition of MBSS introduced though pt adamently refused.     Chest X-Rays: 12/14: "Bilateral ground-glass airspace opacities, suggestive of pneumonitis. Cardiomegaly with mild pulmonary vascular congestion, suggestive of underlying CHF."    Prior diet: Unable to obtain. Unsure of most recent diet/liquid level, though ST notes from 2016 indicate pt demonstrated difficulty tolerating regular diet with thin liquids.     Occupation/hobbies/homemaking: Unable to obtain    Subjective     Pt awake and alert upon SLP entry, though non-participatory initially. SLP provided max encouragement to elicit participation from pt.     Pain/Comfort:  · Pain Rating 1: 0/10  · Pain Rating Post-Intervention 1: " 0/10    Objective:     Oral Musculature Evaluation  · Oral Musculature: general weakness  · Dentition: present and adequate, teeth in poor condition  · Secretion Management: adequate  · Mucosal Quality: dry  · Oral Labial Strength and Mobility: impaired pursing, impaired retraction  · Lingual Strength and Mobility: impaired strength, impaired right lateral movement, impaired left lateral movement, functional protrusion  · Volitional Cough: Strong  · Volitional Swallow: Unable to elicit  · Voice Prior to PO Intake: Clear    Bedside Swallow Eval:   Consistencies Assessed:  · Thin liquids - x1 ice chip, x1 cup cip, x1 straw sip  · Puree - x1/4 tsp     Oral Phase:   · Lingual pumping across all presentations  · Decreased stripping of utensil, stripping 1/4 tsp puree from utensil with upper dentition only  · Poor oral acceptance and oral holding of bolus despite ST encouragement to initiate swallow    Pharyngeal Phase:   · decreased hyolaryngeal excursion to palpation  · delayed swallow initation  · multiple spontaneous swallows  · throat clearing demonstrated with puree bolus remaining in oral cavity  · wet vocal quality after swallow of puree with ongoing throat clearing and wet breath sounds     Compensatory Strategies  · None    Treatment: Pt unsafe for po intake at this time given multiple risk factors for aspiration. SLP recommend NPO with alternate means of nutrition/hydration/medications at this time. MBSS warranted to gain further objective assessment of swallowing mechanism. MD at bedside upon termination of po trials to which SLP provided education to pt and MD re: impressions at bedside and MBSS next service date to which MD indicated agreement. Pt unresponsive to education topics, therefore reinforcement of education topics recommended. RN notified of findings. Continue ST per POC.    Assessment:     Hemant Kennedy Jr. is a 70 y.o. male with an SLP diagnosis of Dysphagia.     Goals:   Multidisciplinary  Problems     SLP Goals        Problem: SLP Goal    Goal Priority Disciplines Outcome   SLP Goal     SLP Ongoing, Progressing   Description: Speech Language Pathology Goals  Goals expected to be met by 12/22:  1. Pt will participate in ongoing assessment of swallow function to determine safest and least restrictive diet.   2. Pt will participate in MBSS to gain further objective assessment of swallow function and determine future therapeutic plan of care.                    Plan:     · Patient to be seen:  4 x/week   · Plan of Care expires:  01/13/21  · Plan of Care reviewed with:  patient   · SLP Follow-Up:  Yes       Discharge recommendations:  (tba)     Time Tracking:     SLP Treatment Date:   12/15/20  Speech Start Time:  0920  Speech Stop Time:  0936     Speech Total Time (min):  16 min    Billable Minutes: Eval Swallow and Oral Function 8 and Seld Care/Home Management Training 8    Nayely Henry CF-SLP  12/15/2020

## 2020-12-15 NOTE — HPI
History per [facility] RN:     Hemant Kennedy Jr. is a 70 y.o. m with hx of PMHx NICM, HFrEF (EF of 35-40%), HTN, T2DM, AFib on DOAC and amiodarone, h/o CVA w/ L hemineglect, h/o DVT, ERIC and obesity was transferred from [facility] for hypernatremia, HUMERA, and confusion that began this morning. Per [facility] RN, Na was 156 and Cr was 3 (baseline 1.2) on labs from 2 days ago. She also reports that although he is confused at baseline, he is more altered lately, refusing bipap. At baseline, he is AAOx1 to person. Patient is confused and denies any symptoms at this time.     Per ED note: Additional history taken from daughter: Patient had a fall on Oct 31 and has been confused since then. He has had 3 admissions in the past month, all with negative work ups. She reports a physician told her that he has had 2 strokes in the past, but cannot remember who told her .     On assessment of patient in the ED, patient would not participate in interview and refused physical examination on multiple occassions. Unable to obtain collateral from patient's emergency contact or from patient.      Patient is reported to have been complaint with medications per [facility] RN.  Patient is on anticoagulation. Patient taking eliquis 5mg BID for a.fib.  Allergies include: spironolactone, resulting in hyperkalemia ( per chart)      Code Status: Will need to confirm with [facility]. Per previous admissions patient full code; however, nurse reporting patient DNR. When asked if patient had [place] Post or any documentation of DNR status nurse was unsure. Nurse states that his daughter, makes medical decisions for the patient, but unclear if patient has legal POA.      ED Course:  In the ED patient confused and only able to answer questions regarding his name, daughters name and birthdate. Initial labs notable for  Na 157, K+ 5.4, Cr 3.4, elevated CPK, Lactic 2.4, negative utox, negative CT Head, negative UA..  EKG showed a.fib with rate 98, qtc 541. CXR bilateral ground-glass airspace opacities, suggestive of pneumonitis. In the ED, per documentation Nephrology curbsided who recommended D5W infusion.

## 2020-12-15 NOTE — ASSESSMENT & PLAN NOTE
Patient hyperkalemic to 5.4 today on admission. Presenting with HUMERA and hypernatremia.     - q4h BMP ( for hypernatremia), will trend K+  - If continuing to uptrend would get STAT EKG and utilize management for hyperkalemia  - At that time would consider Nephrology consult, given HUMERA, low UOP and development of electrolyte abnormalities  - CPK elevated 424, Uric Acid 7.7  - Would consider reticulocyte count, LDH, peripheral smear if concerned for hemolytic process

## 2020-12-15 NOTE — H&P
[facility] Hospital Medicine  History & Physical    Patient Name: Hemant Kennedy Jr.  MRN: [id number]  Admission Date: 12/14/2020  Attending Physician: Allie Davis MD   Primary Care Provider: Wicho Qiu MD    [facility] Medicine Team: [facility] [place] 3 Loretta Sosa MD     Patient information was obtained from nursing home, past medical records and ER records.     Subjective:     Principal Problem:Acute encephalopathy    Chief Complaint:   Chief Complaint   Patient presents with    Aggressive Behavior     Presents from [facility] for evaluation after refusing medications and CPAP there. Patient combative with EMS. Hx of CHF.     Altered Mental Status     [facility] reports patient confused, but patient oriented x4.         HPI: History per [facility] RN:     Hemant Kennedy Jr. is a 70 y.o. m with hx of PMHx NICM, HFrEF (EF of 35-40%), HTN, T2DM, AFib on DOAC and amiodarone, h/o CVA w/ L hemineglect, h/o DVT, ERIC and obesity was transferred from [facility] for hypernatremia, HUMERA, and confusion that began this morning. Per [facility] RN, Na was 156 and Cr was 3 (baseline 1.2) on labs from 2 days ago. She also reports that although he is confused at baseline, he is more altered lately, refusing bipap. At baseline, he is AAOx1 to person. Patient is confused and denies any symptoms at this time.     Per ED note: Additional history taken from daughter: Patient had a fall on Oct 31 and has been confused since then. He has had 3 admissions in the past month, all with negative work ups. She reports a physician told her that he has had 2 strokes in the past, but cannot remember who told her .     On assessment of patient in the ED, patient would not participate in interview and refused physical examination on multiple occassions. Unable to obtain collateral from patient's emergency contact or from patient.      Patient is reported to have been complaint with  medications per [facility] RN.  Patient is on anticoagulation. Patient taking eliquis 5mg BID for a.fib.  Allergies include: spironolactone, resulting in hyperkalemia ( per chart)      Code Status: Will need to confirm with [facility]. Per previous admissions patient full code; however, nurse reporting patient DNR. When asked if patient had [place] or any documentation of DNR status nurse was unsure. Nurse states that his daughter, makes medical decisions for the patient, but unclear if patient has legal POA.      ED Course:  In the ED patient confused and only able to answer questions regarding his name, daughters name and birthdate. Initial labs notable for  Na 157, K+ 5.4, Cr 3.4, elevated CPK, Lactic 2.4, negative utox, negative CT Head, negative UA.. EKG showed a.fib with rate 98, qtc 541. CXR bilateral ground-glass airspace opacities, suggestive of pneumonitis. In the ED, per documentation Nephrology curbsided who recommended D5W infusion.           Past Medical History:   Diagnosis Date    Anticoagulant long-term use     Arthritis     Atrial fibrillation     Cardiomyopathy     home O2    Cataract     CHF (congestive heart failure)     CKD (chronic kidney disease) stage 3, GFR 30-59 ml/min 11/27/2015    Diabetes mellitus     DVT (deep venous thrombosis)     Hypertension     Physical debility     Sleep apnea     Stroke 2010    with residual effects W/C bound - Right Occipital       Past Surgical History:   Procedure Laterality Date    CATARACT EXTRACTION W/  INTRAOCULAR LENS IMPLANT Left 09/18/2017    Dr. Zuniga       Review of patient's allergies indicates:   Allergen Reactions    Spironolactone      Hyperkalemia         No current facility-administered medications on file prior to encounter.      Current Outpatient Medications on File Prior to Encounter   Medication Sig    acetaminophen (TYLENOL) 500 MG tablet Take 2 tablets (1,000 mg total) by mouth 3 (three) times daily as  needed for Pain.    allopurinol (ZYLOPRIM) 300 MG tablet Take 1 tablet (300 mg total) by mouth every morning.    amiodarone (PACERONE) 200 MG Tab TAKE 1 TABLET BY MOUTH IN THE MORNING    aspirin 81 MG Chew TAKE 1 TAB BY MOUTH daily    atorvastatin (LIPITOR) 40 MG tablet @@ TAKE 1 TABLET BY MOUTH daily    calcium carbonate (OS-ROJAS) 600 mg calcium (1,500 mg) Tab Take 600 mg by mouth 2 (two) times a day.    carvediloL (COREG) 6.25 MG tablet TAKE 1 TABLET BY MOUTH IN THE MORNING and TAKE 1 TABLET BY MOUTH every evening with meals    ELIQUIS 5 mg Tab TAKE 1 TABLET BY MOUTH twice a day    ferrous sulfate 325 (65 FE) MG EC tablet Take 1 tablet (325 mg total) by mouth 2 (two) times daily with meals.    furosemide (LASIX) 40 MG tablet @@ TAKE 1 TABLET BY MOUTH daily    glimepiride (AMARYL) 4 MG tablet Take 4 mg by mouth once daily.    losartan (COZAAR) 25 MG tablet Take 0.5 tablets (12.5 mg total) by mouth once daily.    multivitamin (ONE DAILY MULTIVITAMIN) per tablet Take 1 tablet by mouth once daily.    collagenase (SANTYL) ointment Apply topically once daily. Apply to wound daily as directed.    ergocalciferol (ERGOCALCIFEROL) 50,000 unit Cap Take 1 capsule (50,000 Units total) by mouth every 7 days. (Patient taking differently: Take 50,000 Units by mouth every Monday. )    fluticasone (FLONASE) 50 mcg/actuation nasal spray 2 sprays by Each Nare route once daily. (Patient taking differently: 2 sprays by Each Nare route daily as needed for Allergies. )    gabapentin (NEURONTIN) 300 MG capsule Take 1 capsule (300 mg total) by mouth 3 (three) times daily.    HYDROPHILIC CREAM (TRIAD WOUND DRESSING TOP) Apply topically daily as needed.    ONE TOUCH ULTRA TEST Strp Test blood sugar two times daily    oxybutynin (DITROPAN-XL) 10 MG 24 hr tablet Take 10 mg by mouth once daily.     Family History     Problem Relation (Age of Onset)    Cataracts Mother    Glaucoma Sister, Sister    Heart attack Mother         Tobacco Use    Smoking status: Never Smoker    Smokeless tobacco: Never Used   Substance and Sexual Activity    Alcohol use: No    Drug use: No    Sexual activity: Not Currently     Review of Systems   Unable to perform ROS: Mental status change     Objective:     Vital Signs (Most Recent):  Temp: 97.5 °F (36.4 °C) (12/15/20 0400)  Pulse: 90 (12/15/20 0400)  Resp: 16 (12/15/20 0400)  BP: 129/71 (12/15/20 0400)  SpO2: 99 % (12/15/20 0412) Vital Signs (24h Range):  Temp:  [97 °F (36.1 °C)-97.9 °F (36.6 °C)] 97.5 °F (36.4 °C)  Pulse:  [65-91] 90  Resp:  [15-20] 16  SpO2:  [95 %-100 %] 99 %  BP: (117-162)/(63-83) 129/71     Weight: (!) 158.9 kg (350 lb 5 oz)  Body mass index is 51.73 kg/m².    Physical Exam  Constitutional:       Appearance: He is obese.      Comments: Disoriented.  Disheveled appearing    Limited exam due to patient's lack of cooperation   HENT:      Head: Normocephalic and atraumatic.      Mouth/Throat:      Mouth: Mucous membranes are dry.      Comments: Lips dried and cracked.  Skin dry all over  Eyes:      General: No scleral icterus.     Extraocular Movements: Extraocular movements intact.      Conjunctiva/sclera: Conjunctivae normal.      Pupils: Pupils are equal, round, and reactive to light.   Neck:      Musculoskeletal: Normal range of motion.   Cardiovascular:      Rate and Rhythm: Normal rate. Rhythm irregular.      Heart sounds: No murmur.   Pulmonary:      Breath sounds: Rales (bibasilar) present.   Abdominal:      General: Bowel sounds are normal.      Tenderness: There is no abdominal tenderness. There is no right CVA tenderness or left CVA tenderness.   Musculoskeletal:      Right lower leg: No edema.      Left lower leg: No edema.   Skin:     General: Skin is warm.      Comments: Skin break down on buttocks bilaterally, superficial skin tears    Skin appears very dry and ashy   Neurological:      Mental Status: He is disoriented.      Motor: No weakness.      Comments: Limited  neuro exam due to patient's unwillingness to participate in physical           CRANIAL NERVES     CN III, IV, VI   Pupils are equal, round, and reactive to light.       Significant Labs: All pertinent labs within the past 24 hours have been reviewed.    Significant Imaging: I have reviewed and interpreted all pertinent imaging results/findings within the past 24 hours.    Assessment/Plan:     Pneumonitis  CXR showing Bilateral ground-glass airspace opacities, suggestive of pneumonitis. New finding from previous CXR. Patient remains afebrile, no leukocytosis, procal pending, no hx of cough or SOB. On RA sat %.    - Procal pending  - CBC daily  - Aspiration precautions ordered  - SLP eval ordered  - Patient on amiodarone, possible eitology of developing pneumonitis. TSH/T4 stable from Nov 11/2020  - Patient without fever, leukocytosis or respiratory compromise, will hold Abx and low threshold to restart. If need to restart would be ok starting Zosyn and cautious about restarting macrolides/quinolones given prolonged qt      Prolonged Q-T interval on ECG  Per EKG patient rate controlled a.fib, with qtc 541    - Avoid QT-prolonging medications  - Cardiac monitoring  - Daily Mg      HUMERA (acute kidney injury)  Baseline Cr 1.7. Today on admission Cr 3.4.  Likely pre-renal given poor PO intake and volume status. DDx include pre-renal from hypovolemia vs. Pre-renal from cardio-renal syndrome vs. Infectious. Patient receiving D5W 100cc/hr for volume repletion will continue to trend and monitor volume status given CHF. Patient hyperkalemic to 5.4, will continue to monitor. Per ED note, Nephrology curbsided and will follow patient in the AM. Would consider formal Nephrology consult if Cr does not improve after IVF administration.     - UA  - Urine Urea  - Urine Creatinine  - Urine Protein:Creatinine   - Renal U/S  - CMP daily  - Strict I&Os  - Bladder scan prn        Hypernatremia  Patient with multiple admissions for  hypernatremia, today on presentation Na 157    - D5W 100cc/hr infusion  - Will monitor volume status given CHF (EF 35-40%  - BMP q4h; patient should not correct more than 8-10mEq within 24hrs (12/14/20; no less than 147)  - Neuro checks q4h  - FWD 5.4 L      HLD (hyperlipidemia)  Continue home statin      Acute encephalopathy  70 year old male with NICM, HFrEF (EF of 35-40%), HTN, T2DM, AFib on DOAC and amiodarone, and obesity (BMI >50) who presents with AMS from nursing home. Per nursing staff patient baseline with confusion.  BMP remarkable for Na 157. VBG 7.40/50.9/39.   Imaging: CT head without acute intracranial process. Suspect acute encephalopathy is 2/2 to hypernatremia.  Oriented x1 to person. Answers correctly daughter's name, president and year of birth.  Able to follow simple commands. Patient is not presenting with elevated WBC, fevers or acute neurological decompensation concerning for infection or other structural/physiological cause for mental status. However, patient with elevation in lactate to 2.7, will continue to trend. UA negative for infection. CXR Bilateral ground-glass airspace opacities, suggestive of pneumonitis.    - Seizure precautions  - Delirium precautions  - Fall precautions  - Aspiration precautions  - Procalcitonin ordered, f/u.  - Neuro checks q4h  - BMP q4h  - Daily CBC,CMP, Mg, Phos  - CT Head negative for acute intracranial process/edema. UA clear. CXR clear, no signs of infection. Utox negative  - Trend lactate  - Encourage family members to visit to assist in re-orienting patient      Atrial fibrillation  Patient with hx of longstanding afib. On home amio 200mg daily and apixaban 5mg BID. Per patient's nursing home nurse patient last took second dose of eliquis at 4pm. CHADs score of 5 on amiodarone 200mg, eliquis 5mg at home. Hemodynamically stable.     - Cardiac Monitoring; patient in A.fib but rate controlled  - CMP and Mg daily  - Continue home apixaban 5mg BID; however,  patient with nonvalvular a.fib. Given that patient has Cr 3.4 at this time would favor reduction in dosing to 2.5mg BID. Patient additionally with CrCl  44 per priginal Cockcroft-Gault. Adjusted CrCl modified for obesity is CrCl 29.   - Continue home amiodarone 200mg daily      Wounds, multiple  Wound care consult placed as patient with significantly altered skin integrity including superficial tears on buttocks bilaterally    - Wound care consulted, appreciate recs      Dyslipidemia        Chronic combined systolic and diastolic heart failure  Patient with Echo on ( 11/20) showing EF 35-40%. Patient on coreg 6.25mg BID, losartan 12.5mg daily, lasix 40 mg daily. Patient with no prescription for BB per chart review.     Plan:   -- Continue  patient's carvedilol 6.25 BID  --  Will hold ACEi/ARB at this time given HUMERA  -- Monitor BP, make sure cuff is right size   -- Strict I&O    Obstructive sleep apnea treated with continuous positive airway pressure (CPAP)  CPAP nightly at nursing home    - Holding CPAP tonight as patient too altered to tolerated  - If improvement in mentation would re-ordered CPAP qhs      Goals of care, counseling/discussion  Unable to discuss with patient's daughter who per nursing staff at his nursing home state that she is his medical decision maker.     - Please contact daughter and obtain current CODE status  - LAPOST?Living Will? Designated POA?    Hyperkalemia  Patient hyperkalemic to 5.4 today on admission. Presenting with HUMERA and hypernatremia.     - q4h BMP ( for hypernatremia), will trend K+  - If continuing to uptrend would get STAT EKG and utilize management for hyperkalemia  - At that time would consider Nephrology consult, given HUMERA, low UOP and development of electrolyte abnormalities  - CPK elevated 424, Uric Acid 7.7  - Would consider reticulocyte count, LDH, peripheral smear if concerned for hemolytic process      Type II diabetes mellitus  Last A1c 5.6 (11/2/20). Patient on  home glimepiride    LDSSI  POCT Glucose, q6h  Patient on D5W gtt for hypernatremia        Debility  PT/OT ordered      Long term current use of anticoagulant  See norbertofib        VTE Risk Mitigation (From admission, onward)         Ordered     apixaban tablet 5 mg  2 times daily      12/14/20 2356     IP VTE HIGH RISK PATIENT  Once      12/14/20 2309     Place sequential compression device  Until discontinued      12/14/20 2309                   Loretta Sosa MD  Department of Hospital Medicine   [facility]                      12/15/2020                             STAFF PHYSICIAN NOTE                                   Attending Attestation for Rounds with Resident  I have reviewed and concur with the resident's history, physical, assessment, and plan.  I have personally interviewed and examined the patient at bedside and agree with the resident's findings.             70 y.o. resident of [facility], PMHx NICM, HFrEF (EF of 35-40%), HTN, T2DM, AFib on DOAC and amiodarone, h/o CVA w/ L hemineglect, h/o DVT, ERIC and obesity was transferred for hypernatremia, HUMERA, Na 156, Cr was 3 (baseline 1.2)  elevated CPK, Lactic 2.4, negative utox, negative CT Head, negative UA.. EKG showed a.fib with rate 98, qtc 541. CXR bilateral ground-glass airspace opacities, suggestive of pneumonitis.  FULL code- need to readress [facility] w Lynne Giordano.                             Allie Davis MD  Senior Hospitalist  [id number], [phone]

## 2020-12-15 NOTE — ASSESSMENT & PLAN NOTE
70 year old male with NICM, HFrEF (EF of 35-40%), HTN, T2DM, AFib on DOAC and amiodarone, and obesity (BMI >50) who presents with AMS from nursing home. Per nursing staff patient baseline with confusion.  BMP remarkable for Na 157. VBG 7.40/50.9/39.   Imaging: CT head without acute intracranial process. Suspect acute encephalopathy is 2/2 to hypernatremia.  Oriented x1 to person. Answers correctly daughter's name, president and year of birth.  Able to follow simple commands. Patient is not presenting with elevated WBC, fevers or acute neurological decompensation concerning for infection or other structural/physiological cause for mental status. However, patient with elevation in lactate to 2.7, will continue to trend. UA negative for infection. CXR Bilateral ground-glass airspace opacities, suggestive of pneumonitis.    - Seizure precautions  - Delirium precautions  - Fall precautions  - Aspiration precautions  - Procalcitonin ordered, f/u.  - Neuro checks q4h  - BMP q4h  - Daily CBC,CMP, Mg, Phos  - CT Head negative for acute intracranial process/edema. UA clear. CXR clear, no signs of infection. Utox negative  - Trend lactate  - Encourage family members to visit to assist in re-orienting patient

## 2020-12-15 NOTE — PLAN OF CARE
Problem: Fall Injury Risk  Goal: Absence of Fall and Fall-Related Injury  Outcome: Ongoing, Progressing     Problem: Adult Inpatient Plan of Care  Goal: Plan of Care Review  Outcome: Ongoing, Progressing  Goal: Patient-Specific Goal (Individualization)  Outcome: Ongoing, Progressing  Goal: Absence of Hospital-Acquired Illness or Injury  Outcome: Ongoing, Progressing  Goal: Optimal Comfort and Wellbeing  Outcome: Ongoing, Progressing  Goal: Readiness for Transition of Care  Outcome: Ongoing, Progressing  Goal: Rounds/Family Conference  Outcome: Ongoing, Progressing     Problem: Bariatric Environmental Safety  Goal: Safety Maintained with Care  Outcome: Ongoing, Progressing     Problem: Diabetes Comorbidity  Goal: Blood Glucose Level Within Desired Range  Outcome: Ongoing, Progressing     Problem: Electrolyte Imbalance (Acute Kidney Injury/Impairment)  Goal: Serum Electrolyte Balance  Outcome: Ongoing, Progressing     Problem: Fluid Imbalance (Acute Kidney Injury/Impairment)  Goal: Optimal Fluid Balance  Outcome: Ongoing, Progressing     Problem: Hematologic Alteration (Acute Kidney Injury/Impairment)  Goal: Hemoglobin, Hematocrit and Platelets Within Normal Range  Outcome: Ongoing, Progressing     Problem: Oral Intake Inadequate (Acute Kidney Injury/Impairment)  Goal: Optimal Nutrition Intake  Outcome: Ongoing, Progressing     Problem: Renal Function Impairment (Acute Kidney Injury/Impairment)  Goal: Effective Renal Function  Outcome: Ongoing, Progressing     Problem: Fluid Imbalance (Pneumonia)  Goal: Fluid Balance  Outcome: Ongoing, Progressing     Problem: Infection (Pneumonia)  Goal: Resolution of Infection Signs/Symptoms  Outcome: Ongoing, Progressing     Problem: Respiratory Compromise (Pneumonia)  Goal: Effective Oxygenation and Ventilation  Outcome: Ongoing, Progressing     Problem: Wound  Goal: Optimal Wound Healing  Outcome: Ongoing, Progressing     Problem: Skin Injury Risk Increased  Goal: Skin Health  and Integrity  Outcome: Ongoing, Progressing

## 2020-12-15 NOTE — ED PROVIDER NOTES
Encounter Date: 12/14/2020       History     Chief Complaint   Patient presents with    Aggressive Behavior     Presents from [facility] for evaluation after refusing medications and CPAP there. Patient combative with EMS. Hx of CHF.     Altered Mental Status     [facility] reports patient confused, but patient oriented x4.      History per [facility] RN:    69 y/o M with PMHx NICM, HFrEF (EF of 35-40%), HTN, T2DM, AFib on DOAC and amiodarone, h/o CVA w/ L hemineglect, h/o DVT, ERIC and obesity was transferred from [facility] for hypernatremia, HUMERA, and confusion that began this morning. Per [place] RN, Na was 156 and Cr was 3 (baseline 1.2) on labs from 2 days ago. She also reports that although he is confused at baseline, he is more altered lately, refusing bipap. At baseline, he is AAOx1 to person. Patient is confused and denies any symptoms at this time.    Additional history taken from daughter: Patient had a fall on Oct 31 and has been confused since then. He has had 3 admissions in the past month, all with negative work ups. She reports a physician told her that he has had 2 strokes in the past, but cannot remember who told her.    The history is provided by the patient and a relative. The history is limited by the condition of the patient. No  was used.     Review of patient's allergies indicates:   Allergen Reactions    Spironolactone      Hyperkalemia       Past Medical History:   Diagnosis Date    Anticoagulant long-term use     Arthritis     Atrial fibrillation     Cardiomyopathy     home O2    Cataract     CHF (congestive heart failure)     CKD (chronic kidney disease) stage 3, GFR 30-59 ml/min 11/27/2015    Diabetes mellitus     DVT (deep venous thrombosis)     Hypertension     Physical debility     Sleep apnea     Stroke 2010    with residual effects W/C bound - Right Occipital     Past Surgical History:   Procedure  Laterality Date    CATARACT EXTRACTION W/  INTRAOCULAR LENS IMPLANT Left 09/18/2017    Dr. Zuniga     Family History   Problem Relation Age of Onset    Heart attack Mother     Cataracts Mother     Glaucoma Sister     Glaucoma Sister      Social History     Tobacco Use    Smoking status: Never Smoker    Smokeless tobacco: Never Used   Substance Use Topics    Alcohol use: No    Drug use: No     Review of Systems   Unable to perform ROS: Mental status change   Constitutional: Negative for fever.   HENT: Negative for facial swelling.    Eyes: Negative for redness.   Neurological: Negative for facial asymmetry.       Physical Exam     Initial Vitals   BP Pulse Resp Temp SpO2   12/14/20 1802 12/14/20 1751 12/14/20 1751 12/14/20 1751 12/14/20 1751   (!) 162/64 91 20 97.9 °F (36.6 °C) 100 %      MAP       --                Physical Exam    Nursing note and vitals reviewed.  Constitutional: He appears well-developed and well-nourished. No distress.   Physical exam limited by confusion   HENT:   Head: Normocephalic.   Eyes: Pupils are equal, round, and reactive to light.   Neck: Neck supple.   No meningismus    Cardiovascular: Normal rate, regular rhythm, normal heart sounds and intact distal pulses.   No murmur heard.  Pulmonary/Chest: Breath sounds normal. No stridor. No respiratory distress. He has no rales.   Abdominal: Soft. There is no abdominal tenderness.   Musculoskeletal: No edema.   Neurological: No cranial nerve deficit or sensory deficit.   AAOx1 (person) (baseline) (intermittently following commands)   Psychiatric: He is not actively hallucinating.         ED Course   Procedures  Labs Reviewed   CBC W/ AUTO DIFFERENTIAL - Abnormal; Notable for the following components:       Result Value    RBC 4.12 (*)     Hemoglobin 11.9 (*)     MCHC 29.4 (*)     RDW 15.9 (*)     Mono # 1.2 (*)     Eos # 0.7 (*)     All other components within normal limits   COMPREHENSIVE METABOLIC PANEL - Abnormal; Notable for the  following components:    Sodium 157 (*)     Potassium 5.4 (*)     Chloride 116 (*)     Glucose 126 (*)     BUN 46 (*)     Creatinine 3.4 (*)     Albumin 2.7 (*)     AST 44 (*)     Anion Gap 17 (*)     eGFR if  19.7 (*)     eGFR if non  17.0 (*)     All other components within normal limits   LACTIC ACID, PLASMA - Abnormal; Notable for the following components:    Lactate (Lactic Acid) 2.4 (*)     All other components within normal limits   URINALYSIS, REFLEX TO URINE CULTURE - Abnormal; Notable for the following components:    Appearance, UA Hazy (*)     All other components within normal limits    Narrative:     Specimen Source->Urine   CK - Abnormal; Notable for the following components:     (*)     All other components within normal limits   URIC ACID - Abnormal; Notable for the following components:    Uric Acid 7.7 (*)     All other components within normal limits   LACTIC ACID, PLASMA - Abnormal; Notable for the following components:    Lactate (Lactic Acid) 2.7 (*)     All other components within normal limits   MAGNESIUM - Abnormal; Notable for the following components:    Magnesium 3.1 (*)     All other components within normal limits    Narrative:     ADD ON MG #[id number] PER FARHANA STEVE MD  12/15/2020  00:24    ISTAT CREATININE - Abnormal; Notable for the following components:    POC Creatinine 3.2 (*)     All other components within normal limits   ISTAT PROCEDURE - Abnormal; Notable for the following components:    POC PCO2 50.9 (*)     POC PO2 39 (*)     POC HCO3 32.1 (*)     POC SATURATED O2 73 (*)     POC TCO2 34 (*)     All other components within normal limits   POCT GLUCOSE - Abnormal; Notable for the following components:    POCT Glucose 119 (*)     All other components within normal limits   PHOSPHORUS   MAGNESIUM   OSMOLALITY, URINE RANDOM   SODIUM, URINE, RANDOM   CREATININE, URINE, RANDOM   UREA NITROGEN, URINE, RANDOM   PROTEIN / CREATININE RATIO,  URINE   SARS-COV-2 RDRP GENE    Narrative:     This test utilizes isothermal nucleic acid amplification   technology to detect the SARS-CoV-2 RdRp nucleic acid segment.   The analytical sensitivity (limit of detection) is 125 genome   equivalents/mL.   A POSITIVE result implies infection with the SARS-CoV-2 virus;   the patient is presumed to be contagious.     A NEGATIVE result means that SARS-CoV-2 nucleic acids are not   present above the limit of detection. A NEGATIVE result should be   treated as presumptive. It does not rule out the possibility of   COVID-19 and should not be the sole basis for treatment decisions.   If COVID-19 is strongly suspected based on clinical and exposure   history, re-testing using an alternate molecular assay should be   considered.   This test is only for use under the Food and Drug   Administration s Emergency Use Authorization (EUA).   Commercial kits are provided by [employer].   Performance characteristics of the EUA have been independently   verified by [facility] Department of   Pathology and Laboratory Medicine.   _________________________________________________________________   The authorized Fact Sheet for Healthcare Providers and the authorized Fact   Sheet for Patients of the ID NOW COVID-19 are available on the FDA   website:     https://www.fda.gov/media/383185/download  https://www.fda.gov/media/745883/download         POCT GLUCOSE MONITORING CONTINUOUS     EKG Readings: (Independently Interpreted)   Initial Reading: No STEMI. Rhythm: Atrial Fibrillation. Heart Rate: 98. Clinical Impression: Atrial Fibrillation       Imaging Results          CT Head Without Contrast (Final result)  Result time 12/14/20 20:29:54    Final result by Rich Walton MD (12/14/20 20:29:54)                 Impression:      Multifocal areas of encephalomalacia consistent with remote infarcts.    Chronic ischemic microvascular changes.    No evidence of acute  intraparenchymal hemorrhage or major vascular distribution infarction.  MRI may be attempted for further evaluation.    Electronically signed by resident: Elia Wray MD  Date:    12/14/2020  Time:    20:12    Electronically signed by: Rich Walton MD  Date:    12/14/2020  Time:    20:29             Narrative:    EXAMINATION:  CT HEAD WITHOUT CONTRAST    CLINICAL HISTORY:  Altered mental status;    TECHNIQUE:  Low dose axial CT images obtained throughout the head without intravenous contrast. Sagittal and coronal reconstructions were performed.    COMPARISON:  MRI brain 11/27/2020.  CT head 11/20/2020, 11/02/2020.    FINDINGS:  Generalized cerebral volume loss.  Stable configuration of ventricular system without evidence of hydrocephalus. No extra-axial blood or fluid collections.    Multiple areas of encephalomalacia involving the left frontal lobe, right paramedian occipital lobe, and right posterior frontal/parietal lobe consistent with remote infarct.  Additional areas patchy parenchymal hypoattenuation in the supratentorial white matter likely related to chronic ischemic microvascular changes as seen on prior exams.    No evidence of acute intraparenchymal hemorrhage or major vascular distribution infarction.    Skull/extracranial contents (limited evaluation): No fracture. Mastoid air cells and paranasal sinuses are essentially clear.    Prior left cataract repair.                               X-Ray Chest AP Portable (Final result)  Result time 12/14/20 20:12:04    Final result by Rich Walton MD (12/14/20 20:12:04)                 Impression:      Bilateral ground-glass airspace opacities, suggestive of pneumonitis.    Cardiomegaly with mild pulmonary vascular congestion, suggestive of underlying CHF.      Electronically signed by: Rich Walton MD  Date:    12/14/2020  Time:    20:12             Narrative:    EXAMINATION:  XR CHEST AP PORTABLE    CLINICAL HISTORY:  Sepsis;    TECHNIQUE:  Single frontal view  of the chest was performed.    COMPARISON:  11/20/2020.    FINDINGS:  The trachea is unremarkable.  The cardiomediastinal silhouette is enlarged.  There is no evidence of free air beneath the hemidiaphragms.  There are no pleural effusions.  There is no evidence of a pneumothorax.  There is no evidence of pneumomediastinum.  There is mild pulmonary vascular congestion.  There are bilateral ground-glass airspace opacities.  There are degenerative changes in the osseous structures.                                 Medical Decision Making:   History:   I obtained history from: someone other than patient.  Old Medical Records: I decided to obtain old medical records.  Initial Assessment:   69 y/o M with PMHx NICM, HFrEF (EF of 35-40%), HTN, T2DM, AFib on eliquis and amiodarone, h/o CVA w/ L hemineglect, h/o DVT, ERIC and obesity transferred from nursing home for hypernatremia, HUMERA, and confusion, on eliquis. He is hemodynamically stable, afebrile, AAOx1 (at his baseline), unable to participate in interview.   Differential Diagnosis:   Metabolic encephalopathy, sepsis, UTI, ICH, uremia       APC / Resident Notes:   1913 Patient seen and examined, [facility] contacted for further history, will obtain UA, CT head, cbc, cmp, lactate.    2144 CMP shows hypernatremia, hyperkalemia, elevated Cr. FWD 11.6 L. Case discussed with nephro, recommend d5w infusion. Will see patient in consult in the morning. Will admit to hospital medicine.         Attending Attestation:   Physician Attestation Statement for Resident:  As the supervising MD   Physician Attestation Statement: I have personally seen and examined this patient.   I agree with the above history. -: 71 yo male presenting with altered mental status, abnormal labs.  Daughter at bedside endorses gradual decline since beginning of November, poor PO intake.   Patient denies pain.   As the supervising MD I agree with the above PE.   -: Moves all extremities.  Oriented to  self, can name his daughter, when asked year gives birth date.  Pupils equal, round, reactive.    As the supervising MD I agree with the above treatment, course, plan, and disposition.   -: Labs notable for hypernatremia, HUMERA.  Broad differential, could be dehydrated versus uremic encephalopathy.   Discussed case with nephrology, recommend D5.   Admit for further management, nephrology to be formally consulted.   I have reviewed the following: old records at this facility.                              Clinical Impression:     ICD-10-CM ICD-9-CM   1. HUMERA (acute kidney injury)  N17.9 584.9   2. Hypernatremia  E87.0 276.0   3. Altered mental status, unspecified altered mental status type  R41.82 780.97                      Disposition:   Disposition: Admitted  Condition: Stable     ED Disposition Condition    Admit                             Ekaterina Turner MD  Resident  12/14/20 6512       Zoltan Horan MD  12/15/20 [id number]

## 2020-12-15 NOTE — ASSESSMENT & PLAN NOTE
Last A1c 5.6 (11/2/20). Patient on home glimepiride    LDSSI  POCT Glucose, q6h  Patient on D5W gtt for hypernatremia

## 2020-12-15 NOTE — PT/OT/SLP PROGRESS
"Occupational Therapy Discharge      Patient Name:  Hemant Kennedy Jr.   MRN:  [id number]    Patient not seen today secondary to patient unwilling to participate.  OT attempted evaluation at 12:00 pm with PT.  Pt was agitated and confused/disoriented.  He refused answering questions about his living environment other than saying he lives in a nursing home.  Therapists attempted to educate pt on benefit of performing bed mobility, but pt said, "Sleep!  No because I said no!"  Pt also stated his leg hurt.  When therapist again attempted to educate pt on importance of performing activity to prevent further debility, pt said, "It's too late.  I'm already weak."  When therapist was grabbing call button to bring closer to pt, pt said, "Stop touching me or you'll find out!"  Per chart, pt requires Total A for mobility and ADLs at baseline at NH.  Toileting and bathing are performed in bed, and pt requires Adrian lift t/f to his w/c.  Pt can feed himself with Min A at baseline, but he does not actively participate in his care.  Based on this information, pt is not appropriate for acute OT services.  OT orders discontinued.  No charges.     Rigo Brumfield, OT  12/15/2020  "

## 2020-12-15 NOTE — ASSESSMENT & PLAN NOTE
Per EKG patient rate controlled a.fib, with qtc 541    - Avoid QT-prolonging medications  - Cardiac monitoring  - Daily Mg

## 2020-12-15 NOTE — ASSESSMENT & PLAN NOTE
CXR showing Bilateral ground-glass airspace opacities, suggestive of pneumonitis. New finding from previous CXR. Patient remains afebrile, no leukocytosis, procal pending, no hx of cough or SOB. On RA sat %.    - Procal pending  - CBC daily  - Aspiration precautions ordered  - SLP eval ordered  - Patient on amiodarone, possible eitology of developing pneumonitis. TSH/T4 stable from Nov 11/2020

## 2020-12-15 NOTE — NURSING
At time 0116 hours, spoke with daughter/POAKeyla via telephone. Updated daughter on Plan of Care, and reviewed Code Status with daughter. Keyla states she would like patient to receive CPR and all life sustaining treatment if needed. Informed Provider Sheila via face-to-face conversation at patient's bedside.

## 2020-12-15 NOTE — ASSESSMENT & PLAN NOTE
CPAP nightly at nursing home    - Holding CPAP tonight as patient too altered to tolerated  - If improvement in mentation would re-ordered CPAP qhs

## 2020-12-15 NOTE — ASSESSMENT & PLAN NOTE
Baseline Cr 1.7. Today on admission Cr 3.4.  Likely pre-renal given poor PO intake and volume status. DDx include pre-renal from hypovolemia vs. Pre-renal from cardio-renal syndrome vs. Infectious. Patient receiving D5W 100cc/hr for volume repletion will continue to trend and monitor volume status given CHF. Patient hyperkalemic to 5.4, will continue to monitor. Per ED note, Nephrology curbsided and will follow patient in the AM. Would consider formal Nephrology consult if Cr does not improve after IVF administration.     - UA  - Urine Urea  - Urine Creatinine  - Urine Protein:Creatinine   - Renal U/S  - CMP daily  - Strict I&Os  - Bladder scan prn

## 2020-12-16 PROBLEM — Z51.5 PALLIATIVE CARE ENCOUNTER: Status: ACTIVE | Noted: 2020-12-16

## 2020-12-16 PROBLEM — F01.50 VASCULAR DEMENTIA: Status: ACTIVE | Noted: 2020-12-16

## 2020-12-16 PROBLEM — F01.518 VASCULAR DEMENTIA WITH BEHAVIOR DISTURBANCE: Status: ACTIVE | Noted: 2020-12-16

## 2020-12-16 PROBLEM — R62.7 ADULT FAILURE TO THRIVE: Status: ACTIVE | Noted: 2020-12-16

## 2020-12-16 LAB
ALBUMIN SERPL BCP-MCNC: 2.5 G/DL (ref 3.5–5.2)
ALP SERPL-CCNC: 72 U/L (ref 55–135)
ALT SERPL W/O P-5'-P-CCNC: 19 U/L (ref 10–44)
AMMONIA PLAS-SCNC: 21 UMOL/L (ref 10–50)
ANION GAP SERPL CALC-SCNC: 13 MMOL/L (ref 8–16)
ANION GAP SERPL CALC-SCNC: 16 MMOL/L (ref 8–16)
ANION GAP SERPL CALC-SCNC: 16 MMOL/L (ref 8–16)
AST SERPL-CCNC: 32 U/L (ref 10–40)
BASOPHILS # BLD AUTO: 0.06 K/UL (ref 0–0.2)
BASOPHILS NFR BLD: 0.6 % (ref 0–1.9)
BILIRUB SERPL-MCNC: 0.7 MG/DL (ref 0.1–1)
BUN SERPL-MCNC: 44 MG/DL (ref 8–23)
BUN SERPL-MCNC: 45 MG/DL (ref 8–23)
BUN SERPL-MCNC: 45 MG/DL (ref 8–23)
CALCIUM SERPL-MCNC: 8.8 MG/DL (ref 8.7–10.5)
CALCIUM SERPL-MCNC: 8.8 MG/DL (ref 8.7–10.5)
CALCIUM SERPL-MCNC: 9.2 MG/DL (ref 8.7–10.5)
CHLORIDE SERPL-SCNC: 114 MMOL/L (ref 95–110)
CHLORIDE SERPL-SCNC: 115 MMOL/L (ref 95–110)
CHLORIDE SERPL-SCNC: 115 MMOL/L (ref 95–110)
CK SERPL-CCNC: 321 U/L (ref 20–200)
CO2 SERPL-SCNC: 23 MMOL/L (ref 23–29)
CO2 SERPL-SCNC: 23 MMOL/L (ref 23–29)
CO2 SERPL-SCNC: 28 MMOL/L (ref 23–29)
CREAT SERPL-MCNC: 3 MG/DL (ref 0.5–1.4)
CREAT SERPL-MCNC: 3.1 MG/DL (ref 0.5–1.4)
CREAT SERPL-MCNC: 3.1 MG/DL (ref 0.5–1.4)
CREAT UR-MCNC: 298 MG/DL (ref 23–375)
CREAT UR-MCNC: 298 MG/DL (ref 23–375)
DIFFERENTIAL METHOD: ABNORMAL
EOSINOPHIL # BLD AUTO: 0.7 K/UL (ref 0–0.5)
EOSINOPHIL NFR BLD: 7.2 % (ref 0–8)
ERYTHROCYTE [DISTWIDTH] IN BLOOD BY AUTOMATED COUNT: 15.7 % (ref 11.5–14.5)
EST. GFR  (AFRICAN AMERICAN): 22 ML/MIN/1.73 M^2
EST. GFR  (AFRICAN AMERICAN): 22 ML/MIN/1.73 M^2
EST. GFR  (AFRICAN AMERICAN): 22.9 ML/MIN/1.73 M^2
EST. GFR  (NON AFRICAN AMERICAN): 19.1 ML/MIN/1.73 M^2
EST. GFR  (NON AFRICAN AMERICAN): 19.1 ML/MIN/1.73 M^2
EST. GFR  (NON AFRICAN AMERICAN): 19.8 ML/MIN/1.73 M^2
GLUCOSE SERPL-MCNC: 110 MG/DL (ref 70–110)
GLUCOSE SERPL-MCNC: 110 MG/DL (ref 70–110)
GLUCOSE SERPL-MCNC: 145 MG/DL (ref 70–110)
HCT VFR BLD AUTO: 43.3 % (ref 40–54)
HGB BLD-MCNC: 13 G/DL (ref 14–18)
IMM GRANULOCYTES # BLD AUTO: 0.07 K/UL (ref 0–0.04)
IMM GRANULOCYTES NFR BLD AUTO: 0.7 % (ref 0–0.5)
LYMPHOCYTES # BLD AUTO: 2.8 K/UL (ref 1–4.8)
LYMPHOCYTES NFR BLD: 29 % (ref 18–48)
MAGNESIUM SERPL-MCNC: 2.3 MG/DL (ref 1.6–2.6)
MCH RBC QN AUTO: 28.9 PG (ref 27–31)
MCHC RBC AUTO-ENTMCNC: 30 G/DL (ref 32–36)
MCV RBC AUTO: 96 FL (ref 82–98)
MONOCYTES # BLD AUTO: 1.3 K/UL (ref 0.3–1)
MONOCYTES NFR BLD: 14 % (ref 4–15)
NEUTROPHILS # BLD AUTO: 4.6 K/UL (ref 1.8–7.7)
NEUTROPHILS NFR BLD: 48.5 % (ref 38–73)
NRBC BLD-RTO: 0 /100 WBC
OSMOLALITY UR: 607 MOSM/KG (ref 50–1200)
PHOSPHATE SERPL-MCNC: 3.5 MG/DL (ref 2.7–4.5)
PLATELET # BLD AUTO: 100 K/UL (ref 150–350)
PMV BLD AUTO: 11.7 FL (ref 9.2–12.9)
POCT GLUCOSE: 135 MG/DL (ref 70–110)
POCT GLUCOSE: 148 MG/DL (ref 70–110)
POCT GLUCOSE: 173 MG/DL (ref 70–110)
POTASSIUM SERPL-SCNC: 3.9 MMOL/L (ref 3.5–5.1)
POTASSIUM SERPL-SCNC: 4.5 MMOL/L (ref 3.5–5.1)
POTASSIUM SERPL-SCNC: 4.5 MMOL/L (ref 3.5–5.1)
PROT SERPL-MCNC: 7.3 G/DL (ref 6–8.4)
PROT UR-MCNC: 27 MG/DL (ref 0–15)
PROT/CREAT UR: 0.09 MG/G{CREAT} (ref 0–0.2)
RBC # BLD AUTO: 4.5 M/UL (ref 4.6–6.2)
SODIUM SERPL-SCNC: 154 MMOL/L (ref 136–145)
SODIUM SERPL-SCNC: 154 MMOL/L (ref 136–145)
SODIUM SERPL-SCNC: 155 MMOL/L (ref 136–145)
SODIUM UR-SCNC: 24 MMOL/L (ref 20–250)
UUN UR-MCNC: 953 MG/DL (ref 140–1050)
WBC # BLD AUTO: 9.48 K/UL (ref 3.9–12.7)

## 2020-12-16 PROCEDURE — 84540 ASSAY OF URINE/UREA-N: CPT

## 2020-12-16 PROCEDURE — 99233 SBSQ HOSP IP/OBS HIGH 50: CPT | Mod: ,,, | Performed by: HOSPITALIST

## 2020-12-16 PROCEDURE — 83935 ASSAY OF URINE OSMOLALITY: CPT

## 2020-12-16 PROCEDURE — 25000003 PHARM REV CODE 250: Performed by: STUDENT IN AN ORGANIZED HEALTH CARE EDUCATION/TRAINING PROGRAM

## 2020-12-16 PROCEDURE — 99497 ADVNCD CARE PLAN 30 MIN: CPT | Mod: 25,,, | Performed by: CLINICAL NURSE SPECIALIST

## 2020-12-16 PROCEDURE — 83735 ASSAY OF MAGNESIUM: CPT

## 2020-12-16 PROCEDURE — 84300 ASSAY OF URINE SODIUM: CPT

## 2020-12-16 PROCEDURE — 80048 BASIC METABOLIC PNL TOTAL CA: CPT

## 2020-12-16 PROCEDURE — 99223 PR INITIAL HOSPITAL CARE,LEVL III: ICD-10-PCS | Mod: ,,, | Performed by: CLINICAL NURSE SPECIALIST

## 2020-12-16 PROCEDURE — 97535 SELF CARE MNGMENT TRAINING: CPT

## 2020-12-16 PROCEDURE — 99497 PR ADVNCD CARE PLAN 30 MIN: ICD-10-PCS | Mod: 25,,, | Performed by: CLINICAL NURSE SPECIALIST

## 2020-12-16 PROCEDURE — 99233 PR SUBSEQUENT HOSPITAL CARE,LEVL III: ICD-10-PCS | Mod: ,,, | Performed by: HOSPITALIST

## 2020-12-16 PROCEDURE — 11000001 HC ACUTE MED/SURG PRIVATE ROOM

## 2020-12-16 PROCEDURE — 85025 COMPLETE CBC W/AUTO DIFF WBC: CPT

## 2020-12-16 PROCEDURE — 99223 1ST HOSP IP/OBS HIGH 75: CPT | Mod: ,,, | Performed by: CLINICAL NURSE SPECIALIST

## 2020-12-16 PROCEDURE — 82570 ASSAY OF URINE CREATININE: CPT

## 2020-12-16 PROCEDURE — 82550 ASSAY OF CK (CPK): CPT

## 2020-12-16 PROCEDURE — 25000003 PHARM REV CODE 250: Performed by: HOSPITALIST

## 2020-12-16 PROCEDURE — 80053 COMPREHEN METABOLIC PANEL: CPT

## 2020-12-16 PROCEDURE — 84100 ASSAY OF PHOSPHORUS: CPT

## 2020-12-16 PROCEDURE — 36415 COLL VENOUS BLD VENIPUNCTURE: CPT

## 2020-12-16 PROCEDURE — 82140 ASSAY OF AMMONIA: CPT

## 2020-12-16 RX ORDER — DEXTROSE MONOHYDRATE 50 MG/ML
INJECTION, SOLUTION INTRAVENOUS CONTINUOUS
Status: DISCONTINUED | OUTPATIENT
Start: 2020-12-16 | End: 2020-12-16

## 2020-12-16 RX ORDER — HALOPERIDOL 5 MG/ML
5 INJECTION INTRAMUSCULAR EVERY 6 HOURS PRN
Start: 2020-12-16 | End: 2021-12-16

## 2020-12-16 RX ORDER — MORPHINE SULFATE 2 MG/ML
1 INJECTION, SOLUTION INTRAMUSCULAR; INTRAVENOUS
Status: DISCONTINUED | OUTPATIENT
Start: 2020-12-16 | End: 2020-12-17 | Stop reason: HOSPADM

## 2020-12-16 RX ORDER — MUPIROCIN 20 MG/G
OINTMENT TOPICAL 2 TIMES DAILY
Status: DISCONTINUED | OUTPATIENT
Start: 2020-12-16 | End: 2020-12-17 | Stop reason: HOSPADM

## 2020-12-16 RX ORDER — HALOPERIDOL 5 MG/ML
5 INJECTION INTRAMUSCULAR EVERY 6 HOURS PRN
Status: DISCONTINUED | OUTPATIENT
Start: 2020-12-16 | End: 2020-12-17 | Stop reason: HOSPADM

## 2020-12-16 RX ADMIN — MICONAZOLE NITRATE: 20 OINTMENT TOPICAL at 08:12

## 2020-12-16 RX ADMIN — DEXTROSE MONOHYDRATE 100 ML/HR: 5 INJECTION, SOLUTION INTRAVENOUS at 03:12

## 2020-12-16 RX ADMIN — MUPIROCIN: 20 OINTMENT TOPICAL at 08:12

## 2020-12-16 RX ADMIN — DEXTROSE 100 ML/HR: 5 SOLUTION INTRAVENOUS at 03:12

## 2020-12-16 NOTE — PLAN OF CARE
GERARDO spoke with pt's daughter Keyla in reference to referral for Pallative Care.  Pt's daughter stated family preference is [facility].  SW notified Mary with [facility] of referral.  She will meet with pt's family today.  Referral sent.      Lorraine Mckinley LMSW  PRN-  [facility]  Ext. [phone]

## 2020-12-16 NOTE — PT/OT/SLP PROGRESS
Speech Language Pathology Treatment    Patient Name:  Hemant Kennedy Jr.   MRN:  [id number]  Admitting Diagnosis: Acute encephalopathy    Recommendations:     General Recommendations:  ongoing swallow assessment; Modified Barium Swallow Study attempted 12/16  Diet recommendations:  NPO, Liquid Diet Level: NPO   Aspiration Precautions: Continue alternate means of nutrition, Frequent oral care and Strict aspiration precautions   General Precautions: Standard, aspiration, fall, NPO    Subjective   Awake, confused, agitated, resistant, pt refusing to participate in Modified Barium Swallow Study     Pain/Comfort:  · Pain Rating 1: 0/10  · Pain Rating Post-Intervention 2: 0/10    Objective:     Has the patient been evaluated by SLP for swallowing?   Yes  Keep patient NPO? Yes   Current Respiratory Status: room air      Pt seen in radiology suite for attempted Modified Barium Swallow Study. Pt repositioned several times in hopes of getting best view. Pt with clear vocal quality prior to attempted trials. Pt did not elicit volitional cough or swallow. Despite max encouragement & coaxing from SLP, NSG & MD, pt refused to participate, pushing away when given given trials to even attempt self feeding. Pt also refused to remove mask. SLP provided education regarding need to determine recs for possible PO or alternative means, risks of aspiration, role & SLP, POC, etc. Pt with no evidence of learning. After about 15 mins, radiologist called study & it was discontinued.     Assessment:     Hemant Kennedy Jr. is a 70 y.o. male with an SLP diagnosis of Dysphagia    Goals:   Multidisciplinary Problems     SLP Goals        Problem: SLP Goal    Goal Priority Disciplines Outcome   SLP Goal     SLP Ongoing, Not Progressing   Description: Speech Language Pathology Goals  Goals expected to be met by 12/22:  1. Pt will participate in ongoing assessment of swallow function to determine safest and least restrictive diet.   2. Pt will  participate in MBSS to gain further objective assessment of swallow function and determine future therapeutic plan of care.                    Plan:     · Patient to be seen:  3 x/week   · Plan of Care expires:  01/13/21  · Plan of Care reviewed with:  patient   · SLP Follow-Up:  Yes       Discharge recommendations:  (tbd)     Time Tracking:     SLP Treatment Date:   12/16/20  Speech Start Time:  1035  Speech Stop Time:  1100     Speech Total Time (min):  25 min    Billable Minutes: [facility]/Home Management Training 25    Vickie Juárez CCC-SLP  12/16/2020

## 2020-12-16 NOTE — PLAN OF CARE
SW faxed referral to [facility] via  for review. GERARDO will continue to follow.      12/16/20 1530   Post-Acute Status   Post-Acute Authorization Hospice   Hospice Status Referrals Sent     Christine Cabrera LMSW   - [facility]  Ext. [phone]

## 2020-12-16 NOTE — ASSESSMENT & PLAN NOTE
Unable to discuss with patient's daughter who per nursing staff at his nursing home state that she is his medical decision maker.       Upon chart review and speaking with daughter, clear downward decline in function and cognition since earlier this year. Per daughter, patient had a fall and possible stroke in October and since has been in SNFs and gradually getting sicker and sicker    Based on 3 recent admits for hypernatremia, low function, worsening mental status, discussed that patient's ongoing issues likely represent progressive decline for which there is likely no effective intervention. Plan to transition to hospice per daughter's request        - Spoke with daughter on 12/16   -- now DNR  - LAPOST completed per palliative care

## 2020-12-16 NOTE — ASSESSMENT & PLAN NOTE
PT/OT ordered    Upon chart review and speaking with daughter, clear downward decline in function and cognition since earlier this year. Per daughter, patient had a fall and possible stroke in October and since has been in SNFs and gradually getting sicker and sicker    Based on 3 recent admits for hypernatremia, low function, worsening mental status, discussed that patient's ongoing issues likely represent progressive decline for which there is likely no effective intervention. Plan to transition to hospice per daughter's request

## 2020-12-16 NOTE — PLAN OF CARE
Problem: SLP Goal  Goal: SLP Goal  Description: Speech Language Pathology Goals  Goals expected to be met by 12/22:  1. Pt will participate in ongoing assessment of swallow function to determine safest and least restrictive diet.   2. Pt will participate in MBSS to gain further objective assessment of swallow function and determine future therapeutic plan of care.   Outcome: Ongoing, Not Progressing    Pt was seen today for attempted Modified Barium Swallow Study. See note for details.

## 2020-12-16 NOTE — ASSESSMENT & PLAN NOTE
Palliative medicine consulted for hospice discussion and recommendations    Impression: Mr. Bravo is a 69 yo gentleman admitted from nursing home with acute onset  encephalopathy, failure to thrive and HUMERA. He has a history of CHF and CKD, multiple falls and recent stroke with debility. He is unable to participate in conversation.  Appears to be uncomfortable with facial grimacing, has labored breathing with accessory muscle use and is agitated with soft wrist restraints in use.  Has multiple skin integrity issues including full tissue pressure injury to buttocks and heels.  Unable to swallow safely and speech pathology has recommended alternative routes for nutrition. Family has refused PEG placement.      Current clinical condition - multiple comorbidity, HF, CKD, CVA, dementia with pessure ulcer, failure to thrive, no safe route for nutrition is appropriate for transition to hospice care.   - Patient is showing signs of discomfort, facial grimacing, labored breathing and agitation requiring soft wrist restraints.  - Family has expressed interest in inpatient hospice care. Family is aware of inpatient hospice criteria and understands symptom burden will be contributing factor.      Advance Care Planning     HPOA has been received.  Abel Giordano [phone] is the designated decision maker  - No living will   - Lynne reports CPR and life support not consistent with family's wishes for Mr. Kennedy  - DNR orders written per primary team   - [place] has been completed and is available on the [place]POST registry      Goals of Care:   - Palliative medicine APRN met with patient, daughter and patient's sister at bedside. Palliative medicine introduced  - Primary team attending Dr. Davis at bedside also and provided clinical updates  - Family confirmed wishes and goals do not include placement of PEG.  Family is aware without nutrition life expectancy is reduced and prognosis is weeks     - Family confirms  interest in hospice care.  Hospice education ( philosophy, services, costs and setting) provided.  Explained criteria for inpatient hospice - poor prognosis of days to weeks and symptom management burden.  Family is aware he may not meet criteria  - Family has familiarity with [facility] and has requested an inpatient referral    Symptom Management  Agitation  - currently has lorazepam 1 mg IVP every 4 hrs as needed for agitation  - As patient is making transition to hospice care there is less concern for elevated qtc, recommend  Haloperidol 1 mg sublingual every 6 hrs as needed for agitation      Dyspnea/Diffiuculty breathing  - Mr. Kennedy  is unable to report any shortness of breath  - appears labored with use of accessory muscles  - supplemental oxygen in use 2 liters nasal cannula.    -Oxygen saturation 89 - 100%  - may benefit from oral morphine 2.5 mg every 4 hrs as needed for dyspnea    Plan/Recommendations  - See above symptom management recommendations  - Continue current plan of care - consult  for inpatient hospice consult with [facility]  - Hospice education has been completed  - Please provide patient with copy of [place]POST    Primary team attending, resident,  and  aware of goals of care.

## 2020-12-16 NOTE — HOSPITAL COURSE
Patient admitted to IM 3 for metabolic encephalopathy likely secondary to hypernatremia , and underlying vascular dementia. Sodium  slowly improved with gradual use of D5W, although cautious with rate given patient's heart failure. Despite improving Na, patient's mental status continued to worsen with increased somnolence and agitation at times. Patient repeatedly refuses labs and care and at times, becomes combative with nursing staff.  Goals of care discussion with family members.  Discussed patient's condition with daughter, Keyla and also discussed this clear downward trend in his care with repeat admissions for hypernatremia, poor oral intake, worsening physical and cognitive function. During this discussion, daughter agreed with our recommendation that hospice care is appropriate to avoid repeat admissions for conditions that do not have clear or reasonable solutions. Plan to transition to hospice care, comfort measures only at this time.

## 2020-12-16 NOTE — ASSESSMENT & PLAN NOTE
Per daughter and SNF, patient has had progressive decreased appetite, poor nutrition, and inactivity. comfort measures only at this time

## 2020-12-16 NOTE — PLAN OF CARE
Pt remains free from falls and injuries during shift. Disoriented x 4. Vital signs stable. MD restarted IV fluids. Lab techs refused to get labs stating pt was combative. This nurse specifically told lab techs prior to attempting stick to let me know before going in to draw blood, lab techs did not notify the nurse prior to doing so as asked. MD is awaiting on those labs to be collected. Charge nurse is aware and did call lab supervisor, lab stated they will send some one to attempt to draw the labs again. No signs of acute distress noted at this time. Bed in lowest position, wheels locked, and bed alarm on. Call light in reach, camera at the bedside. Will continue to monitor, safety maintained.

## 2020-12-16 NOTE — SUBJECTIVE & OBJECTIVE
Interval History: NAEO. Patient continues to be intermittently combative with nursing staff. During my exam, oriented only to self, requiring repeated stimulation to stay awake. Barium swallow failed due to poor cooperation.     Review of Systems   Unable to perform ROS: Mental status change     Objective:     Vital Signs (Most Recent):  Temp: 98.1 °F (36.7 °C) (12/16/20 0752)  Pulse: 88 (12/16/20 0752)  Resp: 16 (12/16/20 0752)  BP: (!) 98/52 (12/16/20 0752)  SpO2: (!) 92 % (12/16/20 0800) Vital Signs (24h Range):  Temp:  [97.9 °F (36.6 °C)-98.1 °F (36.7 °C)] 98.1 °F (36.7 °C)  Pulse:  [] 88  Resp:  [15-18] 16  SpO2:  [89 %-96 %] 92 %  BP: ()/(43-61) 98/52     Weight: 121.5 kg (267 lb 13.7 oz)  Body mass index is 39.56 kg/m².    Intake/Output Summary (Last 24 hours) at 12/16/2020 1350  Last data filed at 12/16/2020 0543  Gross per 24 hour   Intake 0 ml   Output --   Net 0 ml      Physical Exam  Vitals signs and nursing note reviewed.   Constitutional:       Appearance: He is obese.      Comments: Disoriented.  Disheveled appearing    Limited exam due to patient's lack of cooperation   HENT:      Head: Normocephalic and atraumatic.      Mouth/Throat:      Mouth: Mucous membranes are dry.      Comments: Lips dried and cracked.  Skin dry all over  Eyes:      General: No scleral icterus.     Extraocular Movements: Extraocular movements intact.      Conjunctiva/sclera: Conjunctivae normal.      Pupils: Pupils are equal, round, and reactive to light.   Neck:      Musculoskeletal: Normal range of motion.   Cardiovascular:      Rate and Rhythm: Normal rate. Rhythm irregular.      Heart sounds: No murmur.   Pulmonary:      Breath sounds: Rales (bibasilar) present.   Abdominal:      General: Bowel sounds are normal.      Tenderness: There is no abdominal tenderness. There is no right CVA tenderness or left CVA tenderness.   Musculoskeletal:      Right lower leg: No edema.      Left lower leg: No edema.   Skin:      General: Skin is warm.      Comments: Skin break down on buttocks bilaterally, superficial skin tears    Skin appears very dry and ashy   Neurological:      Mental Status: He is disoriented.      Motor: No weakness.      Comments: Limited neuro exam due to patient's unwillingness to participate in physical         Significant Labs:   CBC:   Recent Labs   Lab 12/14/20  2029 12/15/20  0247 12/16/20  0559   WBC 10.22 10.54 9.48   HGB 11.9* 11.5* 13.0*   HCT 40.5 39.4* 43.3    138* 100*     CMP:   Recent Labs   Lab 12/15/20  0247 12/15/20  1510 12/16/20  0559 12/16/20  0816   *  158* 155* 154*  154* 155*   K 4.2  4.2 3.9 4.5  4.5 3.9   *  117* 115* 115*  115* 114*   CO2 26  26 30* 23  23 28   *  139* 133* 110  110 145*   BUN 46*  46* 44* 45*  45* 44*   CREATININE 3.3*  3.3* 3.1* 3.1*  3.1* 3.0*   CALCIUM 9.0  9.0 9.3 8.8  8.8 9.2   PROT 7.2 7.7 7.3  --    ALBUMIN 2.6* 2.7* 2.5*  --    BILITOT 0.8 0.8 0.7  --    ALKPHOS 72 74 72  --    AST 29 30 32  --    ALT 19 20 19  --    ANIONGAP 15  15 10 16  16 13   EGFRNONAA 17.7*  17.7* 19.1* 19.1*  19.1* 19.8*

## 2020-12-16 NOTE — HPI
HPI obtained from chart review see H&P 12/5/2020      Hemant Kennedy Jr. is a 70 y.o. m with hx of PMHx NICM, HFrEF (EF of 35-40%), HTN, T2DM, AFib on DOAC and amiodarone, h/o CVA w/ L hemineglect, h/o DVT, ERIC and obesity was transferred from [facility] for hypernatremia, HUMERA, and confusion that began this morning. Per Silas RN, Na was 156 and Cr was 3 (baseline 1.2) on labs from 2 days ago. She also reports that although he is confused at baseline, he is more altered lately, refusing bipap. At baseline, he is AAOx1 to person. Patient is confused and denies any symptoms at this time.      Per ED note: Additional history taken from daughter: Patient had a fall on Oct 31 and has been confused since then. He has had 3 admissions in the past month, all with negative work ups. She reports a physician told her that he has had 2 strokes in the past, but cannot remember who told her .     On assessment of patient in the ED, patient would not participate in interview and refused physical examination on multiple occassions. Unable to obtain collateral from patient's emergency contact or from patient.    Palliative medicine consulted for hospice discussion and referral

## 2020-12-16 NOTE — PROGRESS NOTES
After placing the patient in position for MBS, the patient refused to following instructions to drink or eat the contrast for the study.  Several attempts were made by the speech therapist and the RN. MBS

## 2020-12-16 NOTE — PROGRESS NOTES
Barium swallow was unsuccessful. Nurse and Speech therapy and MD were in procedure area attempting to convince pt to drink barium and position pt correctly but pt would not cooperate and did not follow commands. Pt also refused to put oxygen on and mask on.

## 2020-12-16 NOTE — PLAN OF CARE
[facility]  Department of Hospital Medicine  [address] [place] [address]  [phone] after hours  [phone] fax    HOSPICE  ORDERS    12/16/2020    Admit to Hospice:  Inpatient Service       Diagnoses:   Active Hospital Problems    Diagnosis  POA    *Acute encephalopathy [G93.40]  Yes    Palliative care encounter [Z51.5]  Not Applicable    Pneumonitis [J18.9]  Yes    HUMERA (acute kidney injury) [N17.9]  Yes    Prolonged Q-T interval on ECG [R94.31]  Yes    Hypernatremia [E87.0]  Yes    Atrial fibrillation [I48.91]  Yes    HLD (hyperlipidemia) [E78.5]  Yes    Wounds, multiple [T07.XXXA]  Yes    Chronic combined systolic and diastolic heart failure [I50.42]  Yes    Obstructive sleep apnea treated with continuous positive airway pressure (CPAP) [G47.33, Z99.89]  Not Applicable    Goals of care, counseling/discussion [Z71.89]  Not Applicable    Hyperkalemia [E87.5]  Yes    Type II diabetes mellitus [E11.8, E11.65]  Yes    Debility [R53.81]  Yes    Hypertension [I10]  Yes    Long term current use of anticoagulant [Z79.01]  Not Applicable      Resolved Hospital Problems   No resolved problems to display.       Hospice Qualifying Diagnoses:        Patient has a life expectancy < 6 months due to: Vascular Dementia  1) Primary Hospice Diagnosis: vascular dementia  2) Comorbid Conditions Contributing to Decline: HFrEF, adult failure to thrive, dysphagia, hypernatremia    Vital Signs: Routine per Hospice Protocol.      Code Status: DNR    Allergies:   Review of patient's allergies indicates:   Allergen Reactions    Spironolactone      Hyperkalemia         Diet: pleasure feeds as desired    Activities: As tolerated    Nursing: Per Hospice Routine.      Routine Skin for Bedridden Patients: Apply moisture barrier cream to all skin folds and   wet areas in perineal area daily and after baths and all bowel movements.        Central :      Oxygen:       Hemant Kennedy Jr.   Home Medication Instructions KRYS:[id number]    Printed on:12/16/20 1626   Medication Information                      acetaminophen (TYLENOL) 500 MG tablet  Take 2 tablets (1,000 mg total) by mouth 3 (three) times daily as needed for Pain.             haloperidol lactate (HALDOL) 5 mg/mL injection  Inject 1 mL (5 mg total) into the vein every 6 (six) hours as needed (combativeness).  --------------------------------------------------------------------------  morphine injection 1 mg  1 mg, Intravenous, Every hour PRN, moderate pain 4-6/10 pain scale, dyspnea, Starting Wed 12/16/20 at 1717                     Future Orders:  Hospice Medical Director may dictate new orders for comfortable care measures & sign death certificate.        _________________________________  Paulino Gutiérrez MD  12/16/2020

## 2020-12-16 NOTE — ASSESSMENT & PLAN NOTE
70 year old male with NICM, HFrEF (EF of 35-40%), HTN, T2DM, AFib on DOAC and amiodarone, and obesity (BMI >50) who presents with AMS from nursing home. Per nursing staff patient baseline with confusion.  BMP remarkable for Na 157. VBG 7.40/50.9/39.   Imaging: CT head without acute intracranial process. Suspect acute encephalopathy is 2/2 to hypernatremia.  Oriented x1 to person. Answers correctly daughter's name, president and year of birth.  Able to follow simple commands. Patient is not presenting with elevated WBC, fevers or acute neurological decompensation concerning for infection or other structural/physiological cause for mental status. However, patient with elevation in lactate to 2.7, will continue to trend. UA negative for infection. CXR Bilateral ground-glass airspace opacities, suggestive of pneumonitis.    - CT Head negative for acute intracranial process/edema. UA clear. CXR clear, no signs of infection. Utox negative    -Per recent admits over the past few months, as well as report from SNF, patient has had a steady downward cognitive decline likely 2/2 to prior strokes and encephalomalacia.     plan  - Seizure precautions  - Delirium precautions  - Fall precautions  - Aspiration precautions  - plan to transition to hospice per daughter's request

## 2020-12-16 NOTE — CARE UPDATE
Saw patient at bedside. He refused his labs to trend sodium overnight. I spoke to him about the importance of getting the labs drawn so fluids can be adjusted for his free water deficit. He is not fully oriented but says he will let the labs be drawn after I explained why we are getting them. He was reportedly agitated and combative when they attempted to draw labs earlier. He is currently resting in NAD in bed.

## 2020-12-16 NOTE — CONSULTS
Palliative Care Acknowledgement of Consult - .date 12/16/2020    Consult received. Palliative Care Provider:YOUNG Salinas, APRN, ACNS-BC will touch base with team prior to seeing patient. Full consult to follow.    Thank you for allowing us to be a part of the care of this patient.

## 2020-12-16 NOTE — SUBJECTIVE & OBJECTIVE
Interval History:     Past Medical History:   Diagnosis Date    Anticoagulant long-term use     Arthritis     Atrial fibrillation     Cardiomyopathy     home O2    Cataract     CHF (congestive heart failure)     CKD (chronic kidney disease) stage 3, GFR 30-59 ml/min 11/27/2015    Diabetes mellitus     DVT (deep venous thrombosis)     Hypertension     Physical debility     Sleep apnea     Stroke 2010    with residual effects W/C bound - Right Occipital       Past Surgical History:   Procedure Laterality Date    CATARACT EXTRACTION W/  INTRAOCULAR LENS IMPLANT Left 09/18/2017    Dr. Zuniga       Review of patient's allergies indicates:   Allergen Reactions    Spironolactone      Hyperkalemia         Medications:  Continuous Infusions:   dextrose 5 % Stopped (12/16/20 1154)     Scheduled Meds:   collagenase   Topical (Top) Daily    miconazole nitrate 2%   Topical (Top) BID     PRN Meds:lorazepam, metoprolol, sodium chloride 0.9%    Family History     Problem Relation (Age of Onset)    Cataracts Mother    Glaucoma Sister, Sister    Heart attack Mother        Tobacco Use    Smoking status: Never Smoker    Smokeless tobacco: Never Used   Substance and Sexual Activity    Alcohol use: No    Drug use: No    Sexual activity: Not Currently       Review of Systems   Unable to perform ROS: Dementia     Objective:     Vital Signs (Most Recent):  Temp: 98.1 °F (36.7 °C) (12/16/20 0752)  Pulse: 88 (12/16/20 0752)  Resp: 16 (12/16/20 0752)  BP: (!) 98/52 (12/16/20 0752)  SpO2: (!) 92 % (12/16/20 0800) Vital Signs (24h Range):  Temp:  [97.9 °F (36.6 °C)-98.1 °F (36.7 °C)] 98.1 °F (36.7 °C)  Pulse:  [] 88  Resp:  [15-18] 16  SpO2:  [89 %-96 %] 92 %  BP: ()/(43-61) 98/52     Weight: 121.5 kg (267 lb 13.7 oz)  Body mass index is 39.56 kg/m².    Physical Exam  Vitals signs and nursing note reviewed.   Constitutional:       General: He is in acute distress.   HENT:      Head: Normocephalic and atraumatic.    Neck:      Musculoskeletal: Normal range of motion and neck supple.   Cardiovascular:      Rate and Rhythm: Rhythm irregular.   Pulmonary:      Breath sounds: Rales present.      Comments: Labored with use of accessory muscles   Abdominal:      General: Bowel sounds are normal.   Genitourinary:     Comments: Indwelling urethral catheter   Musculoskeletal: Normal range of motion.   Skin:     General: Skin is warm and dry.      Comments: Multiple skin integrity issues including present on admit full thick tissue loss to buttocks and left heel    Neurological:      Mental Status: He is alert.      Comments: Alert, not oriented secondary to dementia   Psychiatric:      Comments: Agitated, soft wrist restraints in use          Review of Symptoms    Symptom Assessment (ESAS 0-10 Scale)  Pain:  0  Dyspnea:  0  Anxiety:  0  Nausea:  0  Depression:  0  Anorexia:  0  Fatigue:  0  Insomnia:  0  Restlessness:  0  Agitation:  0         Comments:  Patient has history of dementia and is unable to complete ESAS: appears agitated in soft restraints,  Labored breathing with use of accessory muscles, facial grimacing           Performance Status:  20    Living Arrangements:  Lives in nursing home    Psychosocial/Cultural: , five children, two biological and three step children,  Retired, when he was well enjoyed sports and time with family     Spiritual:  F - Emerald and Belief:  Taoist   A - Address in Care:  [place] to  visits      Advance Care Planning   Advance Directives:   Living Will: No        Oral Declaration: No    LaPOST: No    Do Not Resuscitate Status: Yes    Medical Power of : Yes    Agent's Name:  Lynne Giordano [phone]    Decision Making:  Family answered questions and Patient unable to communicate due to disease severity/cognitive impairment         Significant Labs: All pertinent labs within the past 24 hours have been reviewed.  CBC:   Recent Labs   Lab 12/16/20  0559   WBC 9.48   HGB  13.0*   HCT 43.3   MCV 96   *     BMP:  Recent Labs   Lab 12/16/20  0559 12/16/20  0816     110 145*   *  154* 155*   K 4.5  4.5 3.9   *  115* 114*   CO2 23  23 28   BUN 45*  45* 44*   CREATININE 3.1*  3.1* 3.0*   CALCIUM 8.8  8.8 9.2   MG 2.3  --      LFT:  Lab Results   Component Value Date    AST 32 12/16/2020    ALKPHOS 72 12/16/2020    BILITOT 0.7 12/16/2020     Albumin:   Albumin   Date Value Ref Range Status   12/16/2020 2.5 (L) 3.5 - 5.2 g/dL Final   12/11/2013 3.3 (L) 3.6 - 5.1 g/dL Final     Comment:     @ Test Performed By:  [facility] Kristal Jimneez M.D., AFSHIN,   [address] [place] [phone]  [facility] #[id number]     Protein:   Total Protein   Date Value Ref Range Status   12/16/2020 7.3 6.0 - 8.4 g/dL Final     Lactic acid:   Lab Results   Component Value Date    LACTATE 1.9 12/15/2020    LACTATE 2.8 (H) 12/15/2020       Significant Imaging: I have reviewed all pertinent imaging results/findings within the past 24 hours.

## 2020-12-16 NOTE — PROGRESS NOTES
[facility] Hospital Medicine  Progress Note    Patient Name: Hemant Kennedy Jr.  MRN: [id number]  Patient Class: IP- Inpatient   Admission Date: 12/14/2020  Length of Stay: 2 days  Attending Physician: Allie Davis MD  Primary Care Provider: Wicho Qiu MD    [facility] Medicine Team: [facility] HOSP MED 3 Paulino Gutiérrez MD    Subjective:     Principal Problem:Acute encephalopathy        HPI:  History per [facility] RN:     Hemant Kennedy Jr. is a 70 y.o. m with hx of PMHx NICM, HFrEF (EF of 35-40%), HTN, T2DM, AFib on DOAC and amiodarone, h/o CVA w/ L hemineglect, h/o DVT, ERIC and obesity was transferred from [facility] for hypernatremia, HUMERA, and confusion that began this morning. Per [facility] RN, Na was 156 and Cr was 3 (baseline 1.2) on labs from 2 days ago. She also reports that although he is confused at baseline, he is more altered lately, refusing bipap. At baseline, he is AAOx1 to person. Patient is confused and denies any symptoms at this time.     Per ED note: Additional history taken from daughter: Patient had a fall on Oct 31 and has been confused since then. He has had 3 admissions in the past month, all with negative work ups. She reports a physician told her that he has had 2 strokes in the past, but cannot remember who told her .     On assessment of patient in the ED, patient would not participate in interview and refused physical examination on multiple occassions. Unable to obtain collateral from patient's emergency contact or from patient.      Patient is reported to have been complaint with medications per [facility] RN.  Patient is on anticoagulation. Patient taking eliquis 5mg BID for a.fib.  Allergies include: spironolactone, resulting in hyperkalemia ( per chart)      Code Status: Will need to confirm with [facility]. Per previous admissions patient full code; however, nurse reporting patient DNR. When asked if patient had [place]  Post or any documentation of DNR status nurse was unsure. Nurse states that his daughter, makes medical decisions for the patient, but unclear if patient has legal POA.      ED Course:  In the ED patient confused and only able to answer questions regarding his name, daughters name and birthdate. Initial labs notable for  Na 157, K+ 5.4, Cr 3.4, elevated CPK, Lactic 2.4, negative utox, negative CT Head, negative UA.. EKG showed a.fib with rate 98, qtc 541. CXR bilateral ground-glass airspace opacities, suggestive of pneumonitis. In the ED, per documentation Nephrology curbsided who recommended D5W infusion.           Overview/Hospital Course:  Patient Na slowly improved with gradual use of D5W, although cautious with rate given patient's heart failure. Despite improving Na, patient's mental status continued to worsen with increased somnolence and agitation at times. Patient repeatedly refuses labs and care and, at times, becomes combative with nursing staff.    12/16/20 discussed patient's condition with daughter, Keyla and also discussed this clear downward trend in his care with repeat admissions for hypernatremia, poor oral intake, worsening physical and cognitive function. During this discussion, daughter agreed with our recommendation that hospice care is appropriate to avoid repeat admissions for conditions that do not have clear or reasonable solutions. Plan to transition to hospice care.     Interval History: NAEO. Patient continues to be intermittently combative with nursing staff. During my exam, oriented only to self, requiring repeated stimulation to stay awake. Barium swallow failed due to poor cooperation.     Review of Systems   Unable to perform ROS: Mental status change     Objective:     Vital Signs (Most Recent):  Temp: 98.1 °F (36.7 °C) (12/16/20 0752)  Pulse: 88 (12/16/20 0752)  Resp: 16 (12/16/20 0752)  BP: (!) 98/52 (12/16/20 0752)  SpO2: (!) 92 % (12/16/20 0800) Vital Signs (24h  Range):  Temp:  [97.9 °F (36.6 °C)-98.1 °F (36.7 °C)] 98.1 °F (36.7 °C)  Pulse:  [] 88  Resp:  [15-18] 16  SpO2:  [89 %-96 %] 92 %  BP: ()/(43-61) 98/52     Weight: 121.5 kg (267 lb 13.7 oz)  Body mass index is 39.56 kg/m².    Intake/Output Summary (Last 24 hours) at 12/16/2020 1350  Last data filed at 12/16/2020 0543  Gross per 24 hour   Intake 0 ml   Output --   Net 0 ml      Physical Exam  Vitals signs and nursing note reviewed.   Constitutional:       Appearance: He is obese.      Comments: Disoriented.  Disheveled appearing    Limited exam due to patient's lack of cooperation   HENT:      Head: Normocephalic and atraumatic.      Mouth/Throat:      Mouth: Mucous membranes are dry.      Comments: Lips dried and cracked.  Skin dry all over  Eyes:      General: No scleral icterus.     Extraocular Movements: Extraocular movements intact.      Conjunctiva/sclera: Conjunctivae normal.      Pupils: Pupils are equal, round, and reactive to light.   Neck:      Musculoskeletal: Normal range of motion.   Cardiovascular:      Rate and Rhythm: Normal rate. Rhythm irregular.      Heart sounds: No murmur.   Pulmonary:      Breath sounds: Rales (bibasilar) present.   Abdominal:      General: Bowel sounds are normal.      Tenderness: There is no abdominal tenderness. There is no right CVA tenderness or left CVA tenderness.   Musculoskeletal:      Right lower leg: No edema.      Left lower leg: No edema.   Skin:     General: Skin is warm.      Comments: Skin break down on buttocks bilaterally, superficial skin tears    Skin appears very dry and ashy   Neurological:      Mental Status: He is disoriented.      Motor: No weakness.      Comments: Limited neuro exam due to patient's unwillingness to participate in physical         Significant Labs:   CBC:   Recent Labs   Lab 12/14/20  2029 12/15/20  0247 12/16/20  0559   WBC 10.22 10.54 9.48   HGB 11.9* 11.5* 13.0*   HCT 40.5 39.4* 43.3    138* 100*     CMP:    Recent Labs   Lab 12/15/20  0247 12/15/20  1510 12/16/20  0559 12/16/20  0816   *  158* 155* 154*  154* 155*   K 4.2  4.2 3.9 4.5  4.5 3.9   *  117* 115* 115*  115* 114*   CO2 26  26 30* 23  23 28   *  139* 133* 110  110 145*   BUN 46*  46* 44* 45*  45* 44*   CREATININE 3.3*  3.3* 3.1* 3.1*  3.1* 3.0*   CALCIUM 9.0  9.0 9.3 8.8  8.8 9.2   PROT 7.2 7.7 7.3  --    ALBUMIN 2.6* 2.7* 2.5*  --    BILITOT 0.8 0.8 0.7  --    ALKPHOS 72 74 72  --    AST 29 30 32  --    ALT 19 20 19  --    ANIONGAP 15  15 10 16  16 13   EGFRNONAA 17.7*  17.7* 19.1* 19.1*  19.1* 19.8*       Assessment/Plan:      * Acute encephalopathy  70 year old male with NICM, HFrEF (EF of 35-40%), HTN, T2DM, AFib on DOAC and amiodarone, and obesity (BMI >50) who presents with AMS from nursing home. Per nursing staff patient baseline with confusion.  BMP remarkable for Na 157. VBG 7.40/50.9/39.   Imaging: CT head without acute intracranial process. Suspect acute encephalopathy is 2/2 to hypernatremia.  Oriented x1 to person. Answers correctly daughter's name, president and year of birth.  Able to follow simple commands. Patient is not presenting with elevated WBC, fevers or acute neurological decompensation concerning for infection or other structural/physiological cause for mental status. However, patient with elevation in lactate to 2.7, will continue to trend. UA negative for infection. CXR Bilateral ground-glass airspace opacities, suggestive of pneumonitis.    - CT Head negative for acute intracranial process/edema. UA clear. CXR clear, no signs of infection. Utox negative    -Per recent admits over the past few months, as well as report from SNF, patient has had a steady downward cognitive decline likely 2/2 to prior strokes and encephalomalacia.     plan  - Seizure precautions  - Delirium precautions  - Fall precautions  - Aspiration precautions  - plan to transition to hospice per daughter's  request      Palliative care encounter        Pneumonitis  CXR showing Bilateral ground-glass airspace opacities, suggestive of pneumonitis. New finding from previous CXR. Patient remains afebrile, no leukocytosis, procal pending, no hx of cough or SOB. On RA sat %.    - Procal pending  - CBC daily  - Aspiration precautions ordered  - SLP eval ordered  - Patient on amiodarone, possible eitology of developing pneumonitis. TSH/T4 stable from Nov 11/2020      Prolonged Q-T interval on ECG  Per EKG patient rate controlled a.fib, with qtc 541    - Avoid QT-prolonging medications  - Cardiac monitoring  - Daily Mg      HUMERA (acute kidney injury)  Baseline Cr 1.7. Today on admission Cr 3.4.  Likely pre-renal given poor PO intake and volume status. DDx include pre-renal from hypovolemia vs. Pre-renal from cardio-renal syndrome vs. Infectious. Patient receiving D5W 100cc/hr for volume repletion will continue to trend and monitor volume status given CHF. Patient hyperkalemic to 5.4, will continue to monitor. Per ED note, Nephrology curbsided and will follow patient in the AM. Would consider formal Nephrology consult if Cr does not improve after IVF administration.     - UA  - Urine Urea  - Urine Creatinine  - Urine Protein:Creatinine   - Renal U/S  - CMP daily  - Strict I&Os  - Bladder scan prn        Hypernatremia  Patient with multiple admissions for hypernatremia, today on presentation Na 157    - D5W 100cc/hr infusion  - Will monitor volume status given CHF (EF 35-40%  - BMP q4h; patient should not correct more than 8-10mEq within 24hrs (12/14/20; no less than 147)  - Neuro checks q4h  - FWD 5.4 L      HLD (hyperlipidemia)  Continue home statin       Atrial fibrillation  Patient with hx of longstanding afib. On home amio 200mg daily and apixaban 5mg BID. Per patient's nursing home nurse patient last took second dose of eliquis at 4pm. CHADs score of 5 on amiodarone 200mg, eliquis 5mg at home. Hemodynamically stable.         - Cardiac Monitoring; patient in A.fib but rate controlled  - CMP and Mg daily  - Continue home apixaban 5mg BID; however, patient with nonvalvular a.fib. Given that patient has Cr 3.4 at this time would favor reduction in dosing to 2.5mg BID. Patient additionally with CrCl  44 per priginal Cockcroft-Gault. Adjusted CrCl modified for obesity is CrCl 29.   - Continue home amiodarone 200mg daily      Wounds, multiple  Wound care consult placed as patient with significantly altered skin integrity including superficial tears on buttocks bilaterally    - Wound care consulted, appreciate recs        Dyslipidemia        Chronic combined systolic and diastolic heart failure  Patient with Echo on ( 11/20) showing EF 35-40%. Patient on coreg 6.25mg BID, losartan 12.5mg daily, lasix 40 mg daily. Patient with no prescription for BB per chart review.       Plan:   -- Continue  patient's carvedilol 6.25 BID  --  Will hold ACEi/ARB at this time given HUMERA  -- Monitor BP, make sure cuff is right size   -- Strict I&O    Obstructive sleep apnea treated with continuous positive airway pressure (CPAP)  CPAP nightly at nursing home    - Holding CPAP tonight as patient too altered to tolerated  - If improvement in mentation would re-ordered CPAP qhs      Goals of care, counseling/discussion  Unable to discuss with patient's daughter who per nursing staff at his nursing home state that she is his medical decision maker.       Upon chart review and speaking with daughter, clear downward decline in function and cognition since earlier this year. Per daughter, patient had a fall and possible stroke in October and since has been in SNFs and gradually getting sicker and sicker    Based on 3 recent admits for hypernatremia, low function, worsening mental status, discussed that patient's ongoing issues likely represent progressive decline for which there is likely no effective intervention. Plan to transition to hospice per daughter's  request        - Spoke with daughter on 12/16   -- now DNR  - LAPOST completed per palliative care    Hyperkalemia  Patient hyperkalemic to 5.4 today on admission. Presenting with HUMERA and hypernatremia.     - q4h BMP ( for hypernatremia), will trend K+  - If continuing to uptrend would get STAT EKG and utilize management for hyperkalemia  - At that time would consider Nephrology consult, given HUMERA, low UOP and development of electrolyte abnormalities  - CPK elevated 424, Uric Acid 7.7  - Would consider reticulocyte count, LDH, peripheral smear if concerned for hemolytic process       Type II diabetes mellitus  Last A1c 5.6 (11/2/20). Patient on home glimepiride    LDSSI  POCT Glucose, q6h   Patient on D5W gtt for hypernatremia        Debility  PT/OT ordered    Upon chart review and speaking with daughter, clear downward decline in function and cognition since earlier this year. Per daughter, patient had a fall and possible stroke in October and since has been in SNFs and gradually getting sicker and sicker    Based on 3 recent admits for hypernatremia, low function, worsening mental status, discussed that patient's ongoing issues likely represent progressive decline for which there is likely no effective intervention. Plan to transition to hospice per daughter's request    Hypertension        Long term current use of anticoagulant  See dusty        VTE Risk Mitigation (From admission, onward)         Ordered     IP VTE HIGH RISK PATIENT  Once      12/15/20 0631     Place sequential compression device  Until discontinued      12/14/20 8285                Discharge Planning   JOHN: 12/18/2020     Code Status: DNR   Is the patient medically ready for discharge?: No    Reason for patient still in hospital (select all that apply): Pending disposition  Discharge Plan A: Skilled Nursing Facility            Paulino Gutiérrez MD  Department of Hospital Medicine   [facility]                       12/16/2020                             STAFF PHYSICIAN NOTE                                   Attending Attestation for Rounds with Resident  I have reviewed and concur with the resident's history, physical, assessment, and plan.  I have personally interviewed and examined the patient at bedside and agree with the resident's findings.           70 y.o. resident of [facility], PMHx NICM, HFrEF (EF of 35-40%), HTN, T2DM, AFib on DOAC and amiodarone, h/o CVA w/ L hemineglect, h/o DVT, ERIC and obesity was transferred for hypernatremia, HUMERA, Na 157, Cr was 3 (baseline 1.2)  elevated CPK, Lactic 2.4, negative utox, negative CT Head, negative UA.. BL MS -A&O x1, verbal -1-2 words. EKG showed a.fib with rate 98, qtc 541. CXR bilateral ground-glass airspace opacities, suggestive of Aspiration pneumonitis. COVID negative. FULL code- need to readress GOC w Lynne Pasquale.  Rounded w ST- pt is aspirating, NPO, need mod Ba swallow, not able to get enough to keep hydrated.  Acute encephalopathy on top of vascular dementia w behavior disturbances. Discussed GOC- DTR wants FULL code status,  need to discuss TF and prognosis.  [facility] is poor to grim.                                 Allie Davis MD  Senior Hospitalist  [id number], [phone]

## 2020-12-17 ENCOUNTER — HOSPITAL ENCOUNTER (INPATIENT)
Facility: HOSPITAL | Age: 70
LOS: 4 days | Discharge: HOSPICE | DRG: 683 | End: 2020-12-21
Attending: INTERNAL MEDICINE | Admitting: INTERNAL MEDICINE
Payer: OTHER MISCELLANEOUS

## 2020-12-17 VITALS
WEIGHT: 267.88 LBS | DIASTOLIC BLOOD PRESSURE: 58 MMHG | HEIGHT: 69 IN | HEART RATE: 85 BPM | SYSTOLIC BLOOD PRESSURE: 113 MMHG | BODY MASS INDEX: 39.68 KG/M2 | OXYGEN SATURATION: 98 % | TEMPERATURE: 98 F | RESPIRATION RATE: 16 BRPM

## 2020-12-17 DIAGNOSIS — F03.90 DEMENTIA: ICD-10-CM

## 2020-12-17 LAB
POCT GLUCOSE: 128 MG/DL (ref 70–110)
POCT GLUCOSE: 162 MG/DL (ref 70–110)

## 2020-12-17 PROCEDURE — 20600001 HC STEP DOWN PRIVATE ROOM

## 2020-12-17 PROCEDURE — 99233 SBSQ HOSP IP/OBS HIGH 50: CPT | Mod: ,,, | Performed by: CLINICAL NURSE SPECIALIST

## 2020-12-17 PROCEDURE — 94761 N-INVAS EAR/PLS OXIMETRY MLT: CPT

## 2020-12-17 PROCEDURE — 99233 PR SUBSEQUENT HOSPITAL CARE,LEVL III: ICD-10-PCS | Mod: ,,, | Performed by: CLINICAL NURSE SPECIALIST

## 2020-12-17 PROCEDURE — 99238 PR HOSPITAL DISCHARGE DAY,<30 MIN: ICD-10-PCS | Mod: GC,,, | Performed by: HOSPITALIST

## 2020-12-17 PROCEDURE — 25000003 PHARM REV CODE 250: Performed by: STUDENT IN AN ORGANIZED HEALTH CARE EDUCATION/TRAINING PROGRAM

## 2020-12-17 PROCEDURE — 99238 HOSP IP/OBS DSCHRG MGMT 30/<: CPT | Mod: GC,,, | Performed by: HOSPITALIST

## 2020-12-17 PROCEDURE — 63600175 PHARM REV CODE 636 W HCPCS: Performed by: INTERNAL MEDICINE

## 2020-12-17 RX ORDER — MORPHINE SULFATE 10 MG/.5ML
10 SOLUTION ORAL
Status: DISCONTINUED | OUTPATIENT
Start: 2020-12-17 | End: 2020-12-17

## 2020-12-17 RX ORDER — HALOPERIDOL 5 MG/ML
1 INJECTION INTRAMUSCULAR EVERY 4 HOURS PRN
Status: DISCONTINUED | OUTPATIENT
Start: 2020-12-17 | End: 2020-12-21 | Stop reason: HOSPADM

## 2020-12-17 RX ORDER — LORAZEPAM 2 MG/ML
1 INJECTION INTRAMUSCULAR
Status: DISCONTINUED | OUTPATIENT
Start: 2020-12-17 | End: 2020-12-21 | Stop reason: HOSPADM

## 2020-12-17 RX ORDER — LORAZEPAM 2 MG/ML
2 CONCENTRATE ORAL EVERY 4 HOURS PRN
Status: DISCONTINUED | OUTPATIENT
Start: 2020-12-17 | End: 2020-12-17

## 2020-12-17 RX ORDER — MORPHINE SULFATE 2 MG/ML
1 INJECTION, SOLUTION INTRAMUSCULAR; INTRAVENOUS
Status: DISCONTINUED | OUTPATIENT
Start: 2020-12-17 | End: 2020-12-21

## 2020-12-17 RX ORDER — MORPHINE SULFATE 2 MG/ML
2 INJECTION, SOLUTION INTRAMUSCULAR; INTRAVENOUS
Status: DISCONTINUED | OUTPATIENT
Start: 2020-12-17 | End: 2020-12-21

## 2020-12-17 RX ORDER — GLYCOPYRROLATE 1 MG/5ML
1 SOLUTION ORAL 3 TIMES DAILY PRN
Status: DISCONTINUED | OUTPATIENT
Start: 2020-12-17 | End: 2020-12-21

## 2020-12-17 RX ORDER — MORPHINE SULFATE 2 MG/ML
2 INJECTION, SOLUTION INTRAMUSCULAR; INTRAVENOUS EVERY 30 MIN PRN
Status: DISCONTINUED | OUTPATIENT
Start: 2020-12-17 | End: 2020-12-21 | Stop reason: HOSPADM

## 2020-12-17 RX ADMIN — MUPIROCIN: 20 OINTMENT TOPICAL at 09:12

## 2020-12-17 RX ADMIN — COLLAGENASE SANTYL: 250 OINTMENT TOPICAL at 09:12

## 2020-12-17 RX ADMIN — HALOPERIDOL LACTATE 1 MG: 5 INJECTION, SOLUTION INTRAMUSCULAR at 04:12

## 2020-12-17 RX ADMIN — MICONAZOLE NITRATE: 20 OINTMENT TOPICAL at 09:12

## 2020-12-17 NOTE — ASSESSMENT & PLAN NOTE
70 year old male with NICM, HFrEF (EF of 35-40%), HTN, T2DM, AFib on DOAC and amiodarone, and obesity (BMI >50) who presents with AMS from nursing home. Per nursing staff patient baseline with confusion.  BMP remarkable for Na 157. VBG 7.40/50.9/39.   Imaging: CT head without acute intracranial process. Suspect acute encephalopathy is 2/2 to hypernatremia.  Oriented x1 to person. Answers correctly daughter's name, president and year of birth.  Able to follow simple commands. Patient is not presenting with elevated WBC, fevers or acute neurological decompensation concerning for infection or other structural/physiological cause for mental status. However, patient with elevation in lactate to 2.7, will continue to trend. UA negative for infection. CXR Bilateral ground-glass airspace opacities, suggestive of pneumonitis.    - CT Head negative for acute intracranial process/edema. UA clear. CXR clear, no signs of infection. Utox negative    -Per recent admits over the past few months, as well as report from SNF, patient has had a steady downward cognitive decline likely 2/2 to prior strokes and encephalomalacia.     - plan to transition to hospice per daughter's request  - comfort measures only at this time

## 2020-12-17 NOTE — ASSESSMENT & PLAN NOTE
Palliative medicine consulted for hospice discussion and recommendations    Impression: Mr. Bravo is a 71 yo gentleman admitted from nursing home with acute onset  encephalopathy, failure to thrive and HUMERA. He has a history of CHF and CKD, multiple falls and recent stroke with debility. He is unable to participate in conversation.  Appears to be uncomfortable with facial grimacing, has labored breathing with accessory muscle use and is agitated with soft wrist restraints in use.  Has multiple skin integrity issues including full tissue pressure injury to buttocks and heels.  Unable to swallow safely and speech pathology has recommended alternative routes for nutrition. Family has refused PEG placement.      Current clinical condition - multiple comorbidity, HF, CKD, CVA, dementia with pessure ulcer, failure to thrive, no safe route for nutrition is appropriate for transition to hospice care.   - Patient is showing signs of discomfort, facial grimacing, labored breathing and agitation requiring soft wrist restraints.  - Family has expressed interest in inpatient hospice care. Family is aware of inpatient hospice criteria and understands symptom burden will be determining factor for acceptance. .      Advance Care Planning     HPOA has been received.  Abel Giordano [phone] is the designated decision maker  - No living will   - Lynne reports CPR and life support not consistent with family's wishes for Mr. Kennedy  - DNR orders written per primary team   - LaPOST has been completed and is available on the LaPOST registry      Goals of Care:   - Consult to [facility] for inpatient hospice initiated.  No beds available at this time.  Will be evaluated for GIP bed here at [facility].  - Family has stated opposition to nursing home placement for hospice.  - Family's second choice for inpatient hospice is [facility] as this is close to the daughter's home.     Symptom Management  Agitation  -  currently has lorazepam 1 mg IVP every 4 hrs as needed for agitation  - As patient is making transition to hospice care there is less concern for elevated qtc, recommend  Haloperidol 1 mg sublingual every 6 hrs as needed for agitation      Dyspnea/Diffiuculty breathing  - Mr. Kennedy  is unable to report any shortness of breath  - appears labored with use of accessory muscles  - supplemental oxygen in use 2 liters nasal cannula.    -Oxygen saturation 89 - 100%  - may benefit from oral morphine 2.5 mg every 4 hrs as needed for dyspnea    Plan/Recommendations  - See above symptom management recommendations  - If patient is not accepted to [facility], family's second choice is [facility]  - Hospice education has been completed  - Please provide patient with copy of LaPOST    Primary team attending, resident,  and  aware of goals of care.

## 2020-12-17 NOTE — SUBJECTIVE & OBJECTIVE
Interval History:     Past Medical History:   Diagnosis Date    Anticoagulant long-term use     Arthritis     Atrial fibrillation     Cardiomyopathy     home O2    Cataract     CHF (congestive heart failure)     CKD (chronic kidney disease) stage 3, GFR 30-59 ml/min 11/27/2015    Diabetes mellitus     DVT (deep venous thrombosis)     Hypertension     Physical debility     Sleep apnea     Stroke 2010    with residual effects W/C bound - Right Occipital       Past Surgical History:   Procedure Laterality Date    CATARACT EXTRACTION W/  INTRAOCULAR LENS IMPLANT Left 09/18/2017    Dr. Zuniga       Review of patient's allergies indicates:   Allergen Reactions    Spironolactone      Hyperkalemia         Medications:  Continuous Infusions:    Scheduled Meds:   collagenase   Topical (Top) Daily    miconazole nitrate 2%   Topical (Top) BID    mupirocin   Nasal BID     PRN Meds:haloperidol lactate, influenza, morphine, pneumoc 13-amanda conj-dip cr(PF), sodium chloride 0.9%    Family History     Problem Relation (Age of Onset)    Cataracts Mother    Glaucoma Sister, Sister    Heart attack Mother        Tobacco Use    Smoking status: Never Smoker    Smokeless tobacco: Never Used   Substance and Sexual Activity    Alcohol use: No    Drug use: No    Sexual activity: Not Currently       Review of Systems   Unable to perform ROS: Dementia     Objective:     Vital Signs (Most Recent):  Temp: 98.5 °F (36.9 °C) (12/17/20 1203)  Pulse: 89 (12/17/20 1237)  Resp: 17 (12/17/20 1237)  BP: (!) 95/49 (12/17/20 1237)  SpO2: 98 % (12/17/20 1237) Vital Signs (24h Range):  Temp:  [98.4 °F (36.9 °C)-98.7 °F (37.1 °C)] 98.5 °F (36.9 °C)  Pulse:  [86-92] 89  Resp:  [10-18] 17  SpO2:  [97 %-100 %] 98 %  BP: ()/(47-53) 95/49     Weight: 121.5 kg (267 lb 13.7 oz)  Body mass index is 39.56 kg/m².    Physical Exam  Vitals signs and nursing note reviewed.   Constitutional:       General: He is in acute distress.   HENT:       Head: Normocephalic and atraumatic.   Neck:      Musculoskeletal: Normal range of motion and neck supple.   Cardiovascular:      Rate and Rhythm: Rhythm irregular.   Pulmonary:      Breath sounds: Rales present.      Comments: Labored with use of accessory muscles   Abdominal:      General: Bowel sounds are normal.   Genitourinary:     Comments: Indwelling urethral catheter   Musculoskeletal: Normal range of motion.   Skin:     General: Skin is warm and dry.      Comments: Multiple skin integrity issues including present on admit full thick tissue loss to buttocks and left heel    Neurological:      Mental Status: He is alert.      Comments: Alert, not oriented secondary to dementia   Psychiatric:      Comments: Agitated, soft wrist restraints in use          Review of Symptoms    Symptom Assessment (ESAS 0-10 Scale)  Pain:  0  Dyspnea:  0  Anxiety:  0  Nausea:  0  Depression:  0  Anorexia:  0  Fatigue:  0  Insomnia:  0  Restlessness:  0  Agitation:  0         Comments:  Patient has history of dementia and is unable to complete ESAS: appears agitated in soft restraints,  Labored breathing with use of accessory muscles, facial grimacing           Performance Status:  20    Living Arrangements:  Lives in nursing home    Psychosocial/Cultural: , five children, two biological and three step children,  Retired, when he was well enjoyed sports and time with family     Spiritual:  F - Emerald and Belief:  Zoroastrianism   A - Address in Care:  [place] to  visits      Advance Care Planning   Advance Directives:   Living Will: No        Oral Declaration: No    LaPOST: No    Do Not Resuscitate Status: Yes    Medical Power of : Yes    Agent's Name:  Lynne Giordano [phone]    Decision Making:  Family answered questions and Patient unable to communicate due to disease severity/cognitive impairment         Significant Labs: All pertinent labs within the past 24 hours have been reviewed.  CBC:   Recent Labs    Lab 12/16/20  0559   WBC 9.48   HGB 13.0*   HCT 43.3   MCV 96   *     BMP:  No results for input(s): GLU, NA, K, CL, CO2, BUN, CREATININE, CALCIUM, MG in the last 24 hours.  LFT:  Lab Results   Component Value Date    AST 32 12/16/2020    ALKPHOS 72 12/16/2020    BILITOT 0.7 12/16/2020     Albumin:   Albumin   Date Value Ref Range Status   12/16/2020 2.5 (L) 3.5 - 5.2 g/dL Final   12/11/2013 3.3 (L) 3.6 - 5.1 g/dL Final     Comment:     @ Test Performed By:  [facility] Kristal Jimenez M.D., AFSHIN,   [address] [place] [id number]  [facility] #[id number]     Protein:   Total Protein   Date Value Ref Range Status   12/16/2020 7.3 6.0 - 8.4 g/dL Final     Lactic acid:   Lab Results   Component Value Date    LACTATE 1.9 12/15/2020    LACTATE 2.8 (H) 12/15/2020       Significant Imaging: I have reviewed all pertinent imaging results/findings within the past 24 hours.

## 2020-12-17 NOTE — PROGRESS NOTES
[facility]  Palliative Medicine  Progress Note    Patient Name: Hemant Kennedy Jr.  MRN: [id number]  Admission Date: 12/14/2020  Hospital Length of Stay: 3 days  Code Status: DNR   Attending Provider: Sheyla Vee MD  Consulting Provider: JUMA Lopez  Primary Care Physician: Wicho Qiu MD  Principal Problem:Acute encephalopathy    Patient information was obtained from relative(s) and past medical records.      Assessment/Plan:     Palliative care encounter  Palliative medicine consulted for hospice discussion and recommendations    Impression: Mr. Bravo is a 71 yo gentleman admitted from nursing home with acute onset  encephalopathy, failure to thrive and HUMERA. He has a history of CHF and CKD, multiple falls and recent stroke with debility. He is unable to participate in conversation.  Appears to be uncomfortable with facial grimacing, has labored breathing with accessory muscle use and is agitated with soft wrist restraints in use.  Has multiple skin integrity issues including full tissue pressure injury to buttocks and heels.  Unable to swallow safely and speech pathology has recommended alternative routes for nutrition. Family has refused PEG placement.      Current clinical condition - multiple comorbidity, HF, CKD, CVA, dementia with pessure ulcer, failure to thrive, no safe route for nutrition is appropriate for transition to hospice care.   - Patient is showing signs of discomfort, facial grimacing, labored breathing and agitation requiring soft wrist restraints.  - Family has expressed interest in inpatient hospice care. Family is aware of inpatient hospice criteria and understands symptom burden will be determining factor for acceptance. .      Advance Care Planning     HPOA has been received.  Daughter Lynne Giordano [phone] is the designated decision maker  - No living will   - Lynne reports CPR and life support not consistent with family's wishes for   Marcia  - DNR orders written per primary team   - LaPOST has been completed and is available on the LaPOST registry      Goals of Care:   - Consult to [facility] for inpatient hospice initiated.  No beds available at this time.  [place] [facility] [place] [facility]  - Family has stated opposition to nursing home placement for hospice.  - Family's second choice for inpatient hospice is [facility] as this is close to the daughter's home.     Symptom Management  Agitation  - currently has lorazepam 1 mg IVP every 4 hrs as needed for agitation  - As patient is making transition to hospice care there is less concern for elevated qtc, recommend  Haloperidol 1 mg sublingual every 6 hrs as needed for agitation      Dyspnea/Diffiuculty breathing  - Mr. Kennedy  is unable to report any shortness of breath  - appears labored with use of accessory muscles  - supplemental oxygen in use 2 liters nasal cannula.    -Oxygen saturation 89 - 100%  - may benefit from oral morphine 2.5 mg every 4 hrs as needed for dyspnea    Plan/Recommendations  - See above symptom management recommendations  - If patient is not accepted to [facility], family's second choice is [facility]  - Hospice education has been completed  - Please provide patient with copy of LaPOST    Primary team attending, resident,  and  aware of goals of care.           I will sign off. Please contact us if you have any additional questions.    Subjective:     Chief Complaint:   Chief Complaint   Patient presents with    Aggressive Behavior     Presents from [facility] for evaluation after refusing medications and CPAP there. Patient combative with EMS. Hx of CHF.     Altered Mental Status     [facility] reports patient confused, but patient oriented x4.        HPI:   HPI obtained from chart review see H&P 12/5/2020      Hemant Kennedy Jr. is a 70 y.o. m with hx of PMHx NICM, HFrEF (EF of 35-40%),  HTN, T2DM, AFib on DOAC and amiodarone, h/o CVA w/ L hemineglect, h/o DVT, ERIC and obesity was transferred from [facility] for hypernatremia, HUMERA, and confusion that began this morning. Per Silas RN, Na was 156 and Cr was 3 (baseline 1.2) on labs from 2 days ago. She also reports that although he is confused at baseline, he is more altered lately, refusing bipap. At baseline, he is AAOx1 to person. Patient is confused and denies any symptoms at this time.      Per ED note: Additional history taken from daughter: Patient had a fall on Oct 31 and has been confused since then. He has had 3 admissions in the past month, all with negative work ups. She reports a physician told her that he has had 2 strokes in the past, but cannot remember who told her .     On assessment of patient in the ED, patient would not participate in interview and refused physical examination on multiple occassions. Unable to obtain collateral from patient's emergency contact or from patient.    Palliative medicine consulted for hospice discussion and referral     Hospital Course:  No notes on file    Interval History:     Past Medical History:   Diagnosis Date    Anticoagulant long-term use     Arthritis     Atrial fibrillation     Cardiomyopathy     home O2    Cataract     CHF (congestive heart failure)     CKD (chronic kidney disease) stage 3, GFR 30-59 ml/min 11/27/2015    Diabetes mellitus     DVT (deep venous thrombosis)     Hypertension     Physical debility     Sleep apnea     Stroke 2010    with residual effects W/C bound - Right Occipital       Past Surgical History:   Procedure Laterality Date    CATARACT EXTRACTION W/  INTRAOCULAR LENS IMPLANT Left 09/18/2017    Dr. Zuniga       Review of patient's allergies indicates:   Allergen Reactions    Spironolactone      Hyperkalemia         Medications:  Continuous Infusions:    Scheduled Meds:   collagenase   Topical (Top) Daily    miconazole nitrate 2%   Topical  (Top) BID    mupirocin   Nasal BID     PRN Meds:haloperidol lactate, influenza, morphine, pneumoc 13-amanda conj-dip cr(PF), sodium chloride 0.9%    Family History     Problem Relation (Age of Onset)    Cataracts Mother    Glaucoma Sister, Sister    Heart attack Mother        Tobacco Use    Smoking status: Never Smoker    Smokeless tobacco: Never Used   Substance and Sexual Activity    Alcohol use: No    Drug use: No    Sexual activity: Not Currently       Review of Systems   Unable to perform ROS: Dementia     Objective:     Vital Signs (Most Recent):  Temp: 98.5 °F (36.9 °C) (12/17/20 1203)  Pulse: 89 (12/17/20 1237)  Resp: 17 (12/17/20 1237)  BP: (!) 95/49 (12/17/20 1237)  SpO2: 98 % (12/17/20 1237) Vital Signs (24h Range):  Temp:  [98.4 °F (36.9 °C)-98.7 °F (37.1 °C)] 98.5 °F (36.9 °C)  Pulse:  [86-92] 89  Resp:  [10-18] 17  SpO2:  [97 %-100 %] 98 %  BP: ()/(47-53) 95/49     Weight: 121.5 kg (267 lb 13.7 oz)  Body mass index is 39.56 kg/m².    Physical Exam  Vitals signs and nursing note reviewed.   Constitutional:       General: He is in acute distress.   HENT:      Head: Normocephalic and atraumatic.   Neck:      Musculoskeletal: Normal range of motion and neck supple.   Cardiovascular:      Rate and Rhythm: Rhythm irregular.   Pulmonary:      Breath sounds: Rales present.      Comments: Labored with use of accessory muscles   Abdominal:      General: Bowel sounds are normal.   Genitourinary:     Comments: Indwelling urethral catheter   Musculoskeletal: Normal range of motion.   Skin:     General: Skin is warm and dry.      Comments: Multiple skin integrity issues including present on admit full thick tissue loss to buttocks and left heel    Neurological:      Mental Status: He is alert.      Comments: Alert, not oriented secondary to dementia   Psychiatric:      Comments: Agitated, soft wrist restraints in use          Review of Symptoms    Symptom Assessment (ESAS 0-10 Scale)  Pain:  0  Dyspnea:   0  Anxiety:  0  Nausea:  0  Depression:  0  Anorexia:  0  Fatigue:  0  Insomnia:  0  Restlessness:  0  Agitation:  0         Comments:  Patient has history of dementia and is unable to complete ESAS: appears agitated in soft restraints,  Labored breathing with use of accessory muscles, facial grimacing           Performance Status:  20    Living Arrangements:  Lives in nursing home    Psychosocial/Cultural: , five children, two biological and three step children,  Retired, when he was well enjoyed sports and time with family     Spiritual:  F - Emerald and Belief:  Religious   A - Address in Care:  Amenable to  visits      Advance Care Planning   Advance Directives:   Living Will: No        Oral Declaration: No    LaPOST: No    Do Not Resuscitate Status: Yes    Medical Power of : Yes    Agent's Name:  Lynne Giordano [phone]    Decision Making:  Family answered questions and Patient unable to communicate due to disease severity/cognitive impairment         Significant Labs: All pertinent labs within the past 24 hours have been reviewed.  CBC:   Recent Labs   Lab 12/16/20  0559   WBC 9.48   HGB 13.0*   HCT 43.3   MCV 96   *     BMP:  No results for input(s): GLU, NA, K, CL, CO2, BUN, CREATININE, CALCIUM, MG in the last 24 hours.  LFT:  Lab Results   Component Value Date    AST 32 12/16/2020    ALKPHOS 72 12/16/2020    BILITOT 0.7 12/16/2020     Albumin:   Albumin   Date Value Ref Range Status   12/16/2020 2.5 (L) 3.5 - 5.2 g/dL Final   12/11/2013 3.3 (L) 3.6 - 5.1 g/dL Final     Comment:     @ Test Performed By:  [employer] Kristal Jimenez M.D., FCAP.,   [address] [place] [address]  [facility] #[id number]     Protein:   Total Protein   Date Value Ref Range Status   12/16/2020 7.3 6.0 - 8.4 g/dL Final     Lactic acid:   Lab Results   Component Value Date    LACTATE 1.9 12/15/2020    LACTATE 2.8 (H) 12/15/2020       Significant  Imaging: I have reviewed all pertinent imaging results/findings within the past 24 hours.      > 50% of 35 min visit spent in chart review, face to face discussion of goals of care,  symptom assessment, coordination of care and emotional support.    YOUNG Salinas, APRN, ACNS-BC  Palliative Medicine  [facility]

## 2020-12-17 NOTE — PLAN OF CARE
Problem: Adult Inpatient Plan of Care  Goal: Plan of Care Review  Outcome: Ongoing, Progressing     Problem: Fall Injury Risk  Goal: Absence of Fall and Fall-Related Injury  Outcome: Ongoing, Progressing     Problem: Diabetes Comorbidity  Goal: Blood Glucose Level Within Desired Range  Outcome: Ongoing, Progressing     Problem: Wound  Goal: Optimal Wound Healing  Outcome: Ongoing, Progressing     Problem: Restraint, Nonbehavioral (Nonviolent)  Goal: Discontinuation Criteria Achieved  Outcome: Ongoing, Progressing    Pt free of fall this shift. Pain assessed and pt denied pain. Cbg monitored to manage pt's DM2 and no insulin needed this shift. Pt disoriented to time, place, and situation and unable to follow commands. Vss. Will continue to monitor pt.

## 2020-12-17 NOTE — PLAN OF CARE
GERARDO faxed Hospice Orders to [facility] via  for review. GERARDO will continue to follow.     11:27 AM  GERARDO spoke with Mary with [facility], who reported she will have to admit the patient to [facility] bed due to no beds being available at their Inpatient facility. Per Mary, she will update GERARDO once the details are completed for the transfer. GERARDO will continue to follow.      Christine Cabrera LMSW   - [facility]  Ext. [phone]

## 2020-12-17 NOTE — PLAN OF CARE
Pt is transfer to room 8065 for hospice care. Pt's sister called and was informed that pt is getting transfer to room 8065 for hospice care.

## 2020-12-17 NOTE — PHYSICIAN QUERY
PT Name: Hemant Kennedy Jr.  MR #: [id number]    Consultant Diagnosis Clarification     CDS/: Tammy Posada RN              Contact information: [email or web address]  This form is a permanent document in the medical record.    Query Date: December 17, 2020      By submitting this query, we are merely seeking further clarification of documentation.  Please utilize your independent clinical judgment when addressing the question(s) below.    The Medical Record reflects the following:    Clinical Information Location in Medical Record   Patient is a  70 year old male with hx of NICM, CHF, HTN, T2DM, AFib, CVA w/ L hemineglect, DVT, ERIC and obesity was transferred from [facility] for hypernatremia, HUMERA, and confusion.     L heel- 3 x 3 cm - Present on admit healing stage 3 pressure injury   - Noted dry, healing full thickness pressure injury with dark eschar to small area of wound bed. No odor. No drainage. Calloused periwound.           Recommendations:   -Betadine swabs to L heel BID to keep dry and reduce bio-burden.      Wound Care 12/15       Please clarify/confirm the Consultants diagnosis of _____L heel- 3 x 3 cm - Present on admit healing stage 3 pressure injury____________________________:     [ X ] Diagnosis ruled in   [  ] Diagnosis ruled in, but it resolved prior to my assessment of the patient   [  ] Diagnosis ruled out   [  ] Other diagnosis (please specify): _____________________________   [  ] Clinically undetermined

## 2020-12-17 NOTE — CONSULTS
[facility]  Palliative Medicine  Consult Note    Patient Name: Hemant Kennedy Jr.  MRN: [id number]  Admission Date: 12/14/2020  Hospital Length of Stay: 2 days  Code Status: DNR   Attending Provider: Allie Davis MD  Consulting Provider: JUMA Lopez  Primary Care Physician: Wicho Qiu MD  Principal Problem:Acute encephalopathy    Patient information was obtained from relative(s), past medical records and ER records.      Consults  Assessment/Plan:     Palliative care encounter  Palliative medicine consulted for hospice discussion and recommendations    Impression: Mr. Bravo is a 69 yo gentleman admitted from nursing home with acute onset  encephalopathy, failure to thrive and HUMERA. He has a history of CHF and CKD, multiple falls and recent stroke with debility. He is unable to participate in conversation.  Appears to be uncomfortable with facial grimacing, has labored breathing with accessory muscle use and is agitated with soft wrist restraints in use.  Has multiple skin integrity issues including full tissue pressure injury to buttocks and heels.  Unable to swallow safely and speech pathology has recommended alternative routes for nutrition. Family has refused PEG placement.      Current clinical condition - multiple comorbidity, HF, CKD, CVA, dementia with pessure ulcer, failure to thrive, no safe route for nutrition is appropriate for transition to hospice care.   - Patient is showing signs of discomfort, facial grimacing, labored breathing and agitation requiring soft wrist restraints.  - Family has expressed interest in inpatient hospice care. Family is aware of inpatient hospice criteria and understands symptom burden will be contributing factor.      Advance Care Planning     HPOA has been received.  Daughter Lynne Giordano [phone] is the designated decision maker  - No living will   - Lynne reports CPR and life support not consistent with family's wishes for   Marcia  - DNR orders written per primary team   - LaPOST has been completed and is available on the LaPOST registry      Goals of Care:   - Palliative medicine APRN met with patient, daughter and patient's sister at bedside. Palliative medicine introduced  - Primary team attending Dr. Davis at bedside also and provided clinical updates  - Family confirmed wishes and goals do not include placement of PEG.  Family is aware without nutrition life expectancy is reduced and prognosis is weeks     - Family confirms interest in hospice care.  Hospice education ( philosophy, services, costs and setting) provided.  Explained criteria for inpatient hospice - poor prognosis of days to weeks and symptom management burden.  Family is aware he may not meet criteria  - Family has familiarity with [facility] and has requested an inpatient referral    Symptom Management  Agitation  - currently has lorazepam 1 mg IVP every 4 hrs as needed for agitation  - As patient is making transition to hospice care there is less concern for elevated qtc, recommend  Haloperidol 1 mg sublingual every 6 hrs as needed for agitation      Dyspnea/Diffiuculty breathing  - Mr. Kennedy  is unable to report any shortness of breath  - appears labored with use of accessory muscles  - supplemental oxygen in use 2 liters nasal cannula.    -Oxygen saturation 89 - 100%  - may benefit from oral morphine 2.5 mg every 4 hrs as needed for dyspnea    Plan/Recommendations  - See above symptom management recommendations  - Continue current plan of care - consult  for inpatient hospice consult with [facility]  - Hospice education has been completed  - Please provide patient with copy of LaPOST    Primary team attending, resident,  and  aware of goals of care.           Thank you for your consult. I will follow-up with patient. Please contact us if you have any additional questions.    Subjective:     HPI:   HPI obtained from chart  review see H&P 12/5/2020      Hemant Kennedy Jr. is a 70 y.o. m with hx of PMHx NICM, HFrEF (EF of 35-40%), HTN, T2DM, AFib on DOAC and amiodarone, h/o CVA w/ L hemineglect, h/o DVT, ERIC and obesity was transferred from [facility] for hypernatremia, HUMERA, and confusion that began this morning. Per Silas RN, Na was 156 and Cr was 3 (baseline 1.2) on labs from 2 days ago. She also reports that although he is confused at baseline, he is more altered lately, refusing bipap. At baseline, he is AAOx1 to person. Patient is confused and denies any symptoms at this time.      Per ED note: Additional history taken from daughter: Patient had a fall on Oct 31 and has been confused since then. He has had 3 admissions in the past month, all with negative work ups. She reports a physician told her that he has had 2 strokes in the past, but cannot remember who told her .     On assessment of patient in the ED, patient would not participate in interview and refused physical examination on multiple occassions. Unable to obtain collateral from patient's emergency contact or from patient.    Palliative medicine consulted for hospice discussion and referral     Hospital Course:  No notes on file    Interval History:     Past Medical History:   Diagnosis Date    Anticoagulant long-term use     Arthritis     Atrial fibrillation     Cardiomyopathy     home O2    Cataract     CHF (congestive heart failure)     CKD (chronic kidney disease) stage 3, GFR 30-59 ml/min 11/27/2015    Diabetes mellitus     DVT (deep venous thrombosis)     Hypertension     Physical debility     Sleep apnea     Stroke 2010    with residual effects W/C bound - Right Occipital       Past Surgical History:   Procedure Laterality Date    CATARACT EXTRACTION W/  INTRAOCULAR LENS IMPLANT Left 09/18/2017    Dr. Znuiga       Review of patient's allergies indicates:   Allergen Reactions    Spironolactone      Hyperkalemia          Medications:  Continuous Infusions:   dextrose 5 % Stopped (12/16/20 1154)     Scheduled Meds:   collagenase   Topical (Top) Daily    miconazole nitrate 2%   Topical (Top) BID     PRN Meds:lorazepam, metoprolol, sodium chloride 0.9%    Family History     Problem Relation (Age of Onset)    Cataracts Mother    Glaucoma Sister, Sister    Heart attack Mother        Tobacco Use    Smoking status: Never Smoker    Smokeless tobacco: Never Used   Substance and Sexual Activity    Alcohol use: No    Drug use: No    Sexual activity: Not Currently       Review of Systems   Unable to perform ROS: Dementia     Objective:     Vital Signs (Most Recent):  Temp: 98.1 °F (36.7 °C) (12/16/20 0752)  Pulse: 88 (12/16/20 0752)  Resp: 16 (12/16/20 0752)  BP: (!) 98/52 (12/16/20 0752)  SpO2: (!) 92 % (12/16/20 0800) Vital Signs (24h Range):  Temp:  [97.9 °F (36.6 °C)-98.1 °F (36.7 °C)] 98.1 °F (36.7 °C)  Pulse:  [] 88  Resp:  [15-18] 16  SpO2:  [89 %-96 %] 92 %  BP: ()/(43-61) 98/52     Weight: 121.5 kg (267 lb 13.7 oz)  Body mass index is 39.56 kg/m².    Physical Exam  Vitals signs and nursing note reviewed.   Constitutional:       General: He is in acute distress.   HENT:      Head: Normocephalic and atraumatic.   Neck:      Musculoskeletal: Normal range of motion and neck supple.   Cardiovascular:      Rate and Rhythm: Rhythm irregular.   Pulmonary:      Breath sounds: Rales present.      Comments: Labored with use of accessory muscles   Abdominal:      General: Bowel sounds are normal.   Genitourinary:     Comments: Indwelling urethral catheter   Musculoskeletal: Normal range of motion.   Skin:     General: Skin is warm and dry.      Comments: Multiple skin integrity issues including present on admit full thick tissue loss to buttocks and left heel    Neurological:      Mental Status: He is alert.      Comments: Alert, not oriented secondary to dementia   Psychiatric:      Comments: Agitated, soft wrist  restraints in use          Review of Symptoms    Symptom Assessment (ESAS 0-10 Scale)  Pain:  0  Dyspnea:  0  Anxiety:  0  Nausea:  0  Depression:  0  Anorexia:  0  Fatigue:  0  Insomnia:  0  Restlessness:  0  Agitation:  0         Comments:  Patient has history of dementia and is unable to complete ESAS: appears agitated in soft restraints,  Labored breathing with use of accessory muscles, facial grimacing           Performance Status:  20    Living Arrangements:  Lives in nursing home    Psychosocial/Cultural: , five children, two biological and three step children,  Retired, when he was well enjoyed sports and time with family     Spiritual:  F - Emerald and Belief:  Buddhist   A - Address in Care:  Amenable to  visits      Advance Care Planning   Advance Directives:   Living Will: No        Oral Declaration: No    LaPOST: No    Do Not Resuscitate Status: Yes    Medical Power of : Yes    Agent's Name:  Lynne Giordano [phone]    Decision Making:  Family answered questions and Patient unable to communicate due to disease severity/cognitive impairment         Significant Labs: All pertinent labs within the past 24 hours have been reviewed.  CBC:   Recent Labs   Lab 12/16/20  0559   WBC 9.48   HGB 13.0*   HCT 43.3   MCV 96   *     BMP:  Recent Labs   Lab 12/16/20  0559 12/16/20  0816     110 145*   *  154* 155*   K 4.5  4.5 3.9   *  115* 114*   CO2 23  23 28   BUN 45*  45* 44*   CREATININE 3.1*  3.1* 3.0*   CALCIUM 8.8  8.8 9.2   MG 2.3  --      LFT:  Lab Results   Component Value Date    AST 32 12/16/2020    ALKPHOS 72 12/16/2020    BILITOT 0.7 12/16/2020     Albumin:   Albumin   Date Value Ref Range Status   12/16/2020 2.5 (L) 3.5 - 5.2 g/dL Final   12/11/2013 3.3 (L) 3.6 - 5.1 g/dL Final     Comment:     @ Test Performed By:  [employer] Kristal Jimenez M.D., FCAP.,   [address] [place] [address]  [id number]  [facility] #[id number]     Protein:   Total Protein   Date Value Ref Range Status   12/16/2020 7.3 6.0 - 8.4 g/dL Final     Lactic acid:   Lab Results   Component Value Date    LACTATE 1.9 12/15/2020    LACTATE 2.8 (H) 12/15/2020       Significant Imaging: I have reviewed all pertinent imaging results/findings within the past 24 hours.      > 50% of 70 min visit spent in chart review, face to face discussion of goals of care,  symptom assessment, coordination of care and emotional support.    Additional 30 mins spent in advanced care planning and completion of documents     YOUNG Salinas, DAT, ACNS-BC  Palliative Medicine  [facility]

## 2020-12-17 NOTE — PHYSICIAN QUERY
PT Name: Hemant Kennedy Jr.  MR #: [id number]    Atrial Fibrillation Specificity Clarification     CDS/: Tammy Posada RN             Contact information: [email or web address]     This form is a permanent document in the medical record.     Query Date: December 17, 2020    By submitting this query, we are merely seeking further clarification of documentation. Please utilize your independent clinical judgment when addressing the question(s) below.    The medical record contains the following:   Indicators Supporting Clinical Findings Location in Medical Record   x Atrial Fibrillation Atrial fibrillation  Patient with hx of longstanding afib. On home amio 200mg daily and apixaban 5mg BID. Per patient's nursing home nurse patient last took second dose of eliquis at 4pm. CHADs score of 5 on amiodarone 200mg, eliquis 5mg at home. Hemodynamically stable.         - Cardiac Monitoring; patient in A.fib but rate controlled  - CMP and Mg daily  - Continue home apixaban 5mg BID; however, patient with nonvalvular a.fib. Given that patient has Cr 3.4 at this time would favor reduction in dosing to 2.5mg BID. Patient additionally with CrCl  44 per priginal Cockcroft-Gault. Adjusted CrCl modified for obesity is CrCl 29.   - Continue home amiodarone 200mg daily   Progress Note  12/16 (Susan)   x EKG results Vent. Rate : 092 BPM     Atrial Rate : 000 BPM      P-R Int : 000 ms          QRS Dur : 110 ms       QT Int : 446 ms       P-R-T Axes : 000 105 183 degrees      QTc Int : 551 ms     Atrial fibrillation   Rightward axis   Low voltage QRS in limb leads   Nonspecific ST and T wave abnormality   Abnormal R wave progression in the precordial leads   Abnormal ECG   When compared with ECG of 14-DEC-2020 22:49,   No significant change was found  EKG 12/15    Medication      Treatment      Other         Provider, please further specify the type of Atrial Fibrillation:    [  ] Paroxysmal - defined as AF that terminates  spontaneously or with intervention within seven days of onset, Episodes may recur with variable frequency   [  ] Permanent - a term used to identify individuals with persistent atrial fibrillation where a joint decision by the patient and clinician has been made to no longer pursue a rhythm control strategy   [X  ] Long-standing persistent - AF that has lasted for more than 12 months   [  ] Other Persistent - defined as AF that fails to self-terminate within seven days, Episodes often require pharmacologic or electrical cardioversion to restore sinus rhythm   [  ] Chronic, not otherwise specified      [  ] Unspecified Atrial Fibrillation   [  ] Other Cardiac diagnosis (please specify): ____________   [  ] Clinically Undetermined         Please document in your progress notes daily for the duration of treatment until resolved, and include in your discharge summary.

## 2020-12-17 NOTE — ASSESSMENT & PLAN NOTE
Wound care consult placed as patient with significantly altered skin integrity including superficial tears on buttocks bilaterally    - comfort measures only at this time

## 2020-12-17 NOTE — ASSESSMENT & PLAN NOTE
er.  Upon chart review and speaking with daughter, clear downward decline in function and cognition since earlier this year. Per daughter, patient had a fall and possible stroke in October and since has been in SNFs and gradually getting sicker and sicker    Based on 3 recent admits for hypernatremia, low function, worsening mental status, discussed that patient's ongoing issues likely represent progressive decline for which there is likely no effective intervention. Plan to transition to hospice per daughter's request.  SANDRA completed per palliative care    Comfort measures only at this time

## 2020-12-17 NOTE — PLAN OF CARE
Patient will admit to [facility] [place] [facility] SW will continue to follow.      12/17/20 1534   Post-Acute Status   Post-Acute Authorization Hospice   Hospice Status Set-up Complete     Christine Cabrera LMSW   - [facility]  Ext. [phone]

## 2020-12-17 NOTE — H&P
[facility]  [facility]PATIENT [facility]OSPICE  H&P    Patient Name: Hemant Kennedy Jr.  MRN: [id number]  Admission Date: (Not on file)  Hospital Length of Stay: 0 days  Code Status: DNR   Attending Provider: Clinton Ngo MD      Assessment/Plan:     Hospice qualifying diagnosis:  Dementia    Hospice Encounter / Goals of care discussion:     Narrative: Hemant Kennedy Jr. is a unfortunate 69 yo gentleman with a history of NICM (EF 35%) HTN, DM2, AF, CVA with left hemineglect, ERIC and obesity who had resided at [facility]. He presents with signs of dehydration and HUMERA. He has developed delirium and confusion.   Discussions with the pt. Family were held and decision was made to transition care focus to comfort care. The pt. Is being admitted to hospice care.     1: Symptom Assessment:     - Pain: denies pain currently     - Dyspnea: does not feel breathless     - Agitation: agitated at times and restless     - Mentation: awake and conversing, confused    3: Current Treatment regiment   Scheduled Meds:  Continuous Infusions:  PRN Meds:.glycopyrrolate, haloperidol lactate, lorazepam 2 mg/ml oral conc, morphine, morphine, morphine    4: Family discussion   - held by palliative care and primary team. Decision made to focus on comfort at the end of life.     5: Plan of care   Will start comfort oriented care plan.       Subjective:     Past Medical History:   Diagnosis Date    Anticoagulant long-term use     Arthritis     Atrial fibrillation     Cardiomyopathy     home O2    Cataract     CHF (congestive heart failure)     CKD (chronic kidney disease) stage 3, GFR 30-59 ml/min 11/27/2015    Diabetes mellitus     DVT (deep venous thrombosis)     Hypertension     Physical debility     Sleep apnea     Stroke 2010    with residual effects W/C bound - Right Occipital       Past Surgical History:   Procedure Laterality Date    CATARACT EXTRACTION W/  INTRAOCULAR LENS IMPLANT Left 09/18/2017    Dr. Zuniga        Family  History     Problem Relation (Age of Onset)    Cataracts Mother    Glaucoma Sister, Sister    Heart attack Mother          Tobacco Use    Smoking status: Never Smoker    Smokeless tobacco: Never Used   Substance and Sexual Activity    Alcohol use: No    Drug use: No    Sexual activity: Not Currently       Review of patient's allergies indicates:   Allergen Reactions    Spironolactone      Hyperkalemia           Review of Symptoms    Review of Systems   Unable to perform ROS: Acuity of condition       Objective:     Vital Signs (Most Recent):  There were no vitals taken for this visit.        Physical Exam  Vitals signs and nursing note reviewed.   Constitutional:       General: He is not in acute distress.     Appearance: He is obese. He is ill-appearing.   HENT:      Head: Normocephalic and atraumatic.      Mouth/Throat:      Mouth: Mucous membranes are dry.   Eyes:      General: No scleral icterus.     Conjunctiva/sclera: Conjunctivae normal.   Neck:      Musculoskeletal: Neck supple.   Cardiovascular:      Rate and Rhythm: Normal rate. Rhythm irregular.      Heart sounds: No murmur.   Pulmonary:      Effort: Pulmonary effort is normal. No respiratory distress.      Breath sounds: Normal breath sounds. No wheezing.   Abdominal:      General: Abdomen is flat. There is no distension.      Palpations: Abdomen is soft.   Musculoskeletal:         General: No deformity or signs of injury.   Lymphadenopathy:      Cervical: No cervical adenopathy.   Skin:     General: Skin is warm and dry.      Comments: Chronic skin changes on legs.    Neurological:      General: No focal deficit present.      Mental Status: He is alert. He is disoriented.      Cranial Nerves: No cranial nerve deficit.         Medications:  Scheduled Meds:    Scheduled Meds:  Continuous Infusions:  PRN Meds:.glycopyrrolate, haloperidol lactate, lorazepam 2 mg/ml oral conc, morphine, morphine, morphine       Significant Imaging: I have reviewed all  pertinent imaging results/findings within the past 24 hours.    Advanced Directives::  Do Not Resuscitate Status: Yes  Surrogate Decision maker / Medical Power of : Daughter       Clinton Ngo MD  Palliative Medicine  [facility]

## 2020-12-17 NOTE — DISCHARGE SUMMARY
[facility] Hospital Medicine  Discharge Summary      Patient Name: Hemant Kennedy Jr.  MRN: [id number]  Patient Class: IP- Inpatient  Admission Date: 12/14/2020  Hospital Length of Stay: 3 days  Discharge Date and Time:  12/17/2020 1:41 PM  Attending Physician: Sheyla Vee MD   Discharging Provider: Hemant Valencia DO  Primary Care Provider: Wicho Qiu MD  [facility] Medicine Team: [facility] Hemant Valencia DO    HPI:   History per [facility] RN:     Hemant Kennedy Jr. is a 70 y.o. m with hx of PMHx NICM, HFrEF (EF of 35-40%), HTN, T2DM, AFib on DOAC and amiodarone, h/o CVA w/ L hemineglect, h/o DVT, ERIC and obesity was transferred from [facility] for hypernatremia, HUMERA, and confusion that began this morning. Per [place] RN, Na was 156 and Cr was 3 (baseline 1.2) on labs from 2 days ago. She also reports that although he is confused at baseline, he is more altered lately, refusing bipap. At baseline, he is AAOx1 to person. Patient is confused and denies any symptoms at this time.     Per ED note: Additional history taken from daughter: Patient had a fall on Oct 31 and has been confused since then. He has had 3 admissions in the past month, all with negative work ups. She reports a physician told her that he has had 2 strokes in the past, but cannot remember who told her .     On assessment of patient in the ED, patient would not participate in interview and refused physical examination on multiple occassions. Unable to obtain collateral from patient's emergency contact or from patient.      Patient is reported to have been complaint with medications per [facility] RN.  Patient is on anticoagulation. Patient taking eliquis 5mg BID for a.fib.  Allergies include: spironolactone, resulting in hyperkalemia ( per chart)      Code Status: Will need to confirm with [facility]. Per previous admissions patient full code; however, nurse reporting  patient DNR. When asked if patient had [place] or any documentation of DNR status nurse was unsure. Nurse states that his daughter, makes medical decisions for the patient, but unclear if patient has legal POA.      ED Course:  In the ED patient confused and only able to answer questions regarding his name, daughters name and birthdate. Initial labs notable for  Na 157, K+ 5.4, Cr 3.4, elevated CPK, Lactic 2.4, negative utox, negative CT Head, negative UA.. EKG showed a.fib with rate 98, qtc 541. CXR bilateral ground-glass airspace opacities, suggestive of pneumonitis. In the ED, per documentation Nephrology curbsided who recommended D5W infusion.           * No surgery found *      Hospital Course:   Patient admitted to IM 3 for metabolic encephalopathy likely secondary to hypernatremia , and underlying vascular dementia. Sodium  slowly improved with gradual use of D5W, although cautious with rate given patient's heart failure. Despite improving Na, patient's mental status continued to worsen with increased somnolence and agitation at times. Patient repeatedly refuses labs and care and at times, becomes combative with nursing staff.  Goals of care discussion with family members.  Discussed patient's condition with daughter, Keyla and also discussed this clear downward trend in his care with repeat admissions for hypernatremia, poor oral intake, worsening physical and cognitive function. During this discussion, daughter agreed with our recommendation that hospice care is appropriate to avoid repeat admissions for conditions that do not have clear or reasonable solutions. Plan to transition to hospice care, comfort measures only at this time.     Consults:   Consults (From admission, onward)        Status Ordering Provider     Inpatient consult to Palliative Care  Once     Provider:  (Not yet assigned)    Completed LOLI BARRON     Inpatient consult to PICC team ([facility])  Once     Provider:  (Not yet assigned)     Completed ALEX VALDEZ M          * Acute encephalopathy  70 year old male with NICM, HFrEF (EF of 35-40%), HTN, T2DM, AFib on DOAC and amiodarone, and obesity (BMI >50) who presents with AMS from nursing home. Per nursing staff patient baseline with confusion.  BMP remarkable for Na 157. VBG 7.40/50.9/39.   Imaging: CT head without acute intracranial process. Suspect acute encephalopathy is 2/2 to hypernatremia.  Oriented x1 to person. Answers correctly daughter's name, president and year of birth.  Able to follow simple commands. Patient is not presenting with elevated WBC, fevers or acute neurological decompensation concerning for infection or other structural/physiological cause for mental status. However, patient with elevation in lactate to 2.7, will continue to trend. UA negative for infection. CXR Bilateral ground-glass airspace opacities, suggestive of pneumonitis.    - CT Head negative for acute intracranial process/edema. UA clear. CXR clear, no signs of infection. Utox negative    -Per recent admits over the past few months, as well as report from SNF, patient has had a steady downward cognitive decline likely 2/2 to prior strokes and encephalomalacia.     - plan to transition to hospice per daughter's request  - comfort measures only at this time      Goals of care, counseling/discussion  er.  Upon chart review and speaking with daughter, clear downward decline in function and cognition since earlier this year. Per daughter, patient had a fall and possible stroke in October and since has been in SNFs and gradually getting sicker and sicker    Based on 3 recent admits for hypernatremia, low function, worsening mental status, discussed that patient's ongoing issues likely represent progressive decline for which there is likely no effective intervention. Plan to transition to hospice per daughter's request.  LAPOST completed per palliative care    Comfort measures only at this time    Debility  comfort  measures only at this time    Adult failure to thrive  Per daughter and SNF, patient has had progressive decreased appetite, poor nutrition, and inactivity. comfort measures only at this time      Vascular dementia with behavior disturbance  comfort measures only at this time      Palliative care encounter  See goals of care      Pneumonitis  comfort measures only at this time      Prolonged Q-T interval on ECG  comfort measures only at this time      HUMERA (acute kidney injury)  comfort measures only at this time      Hypernatremia  comfort measures only at this time      HLD (hyperlipidemia)  comfort measures only at this time    Atrial fibrillation  comfort measures only at this time      Wounds, multiple  Wound care consult placed as patient with significantly altered skin integrity including superficial tears on buttocks bilaterally    - comfort measures only at this time      Chronic combined systolic and diastolic heart failure  comfort measures only at this time    Obstructive sleep apnea treated with continuous positive airway pressure (CPAP)  comfort measures only at this time      Hyperkalemia  comfort measures only at this time    Type II diabetes mellitus  comfort measures only at this timeia        Hypertension  comfort measures only at this time    Long term current use of anticoagulant  See norbertofib  - comfort measures only at this time        Final Active Diagnoses:    Diagnosis Date Noted POA    PRINCIPAL PROBLEM:  Acute encephalopathy [G93.40] 11/02/2020 Yes    Goals of care, counseling/discussion [Z71.89] 04/30/2017 Not Applicable    Debility [R53.81] 07/07/2013 Yes    Adult failure to thrive [R62.7] 12/16/2020 Yes    Palliative care encounter [Z51.5] 12/16/2020 Not Applicable    Vascular dementia with behavior disturbance [F01.51] 12/16/2020 Yes    Advanced care planning/counseling discussion [Z71.89]  Not Applicable    Advanced directive placed in chart this admission [Z78.9]  Unknown     Dyspnea [R06.00]  Unknown    Agitation [R45.1]  Unknown    Pneumonitis [J18.9] 12/15/2020 Yes    HUMERA (acute kidney injury) [N17.9] 12/14/2020 Yes    Prolonged Q-T interval on ECG [R94.31] 12/14/2020 Yes    Hypernatremia [E87.0] 11/21/2020 Yes    Atrial fibrillation [I48.91] 11/02/2020 Yes    HLD (hyperlipidemia) [E78.5] 11/02/2020 Yes    Wounds, multiple [T07.XXXA] 11/02/2020 Yes    Chronic combined systolic and diastolic heart failure [I50.42] 07/26/2017 Yes    Obstructive sleep apnea treated with continuous positive airway pressure (CPAP) [G47.33, Z99.89] 05/24/2017 Not Applicable    Hyperkalemia [E87.5] 04/28/2017 Yes    Type II diabetes mellitus [E11.8, E11.65] 01/13/2017 Yes    Hypertension [I10] 12/18/2012 Yes    Long term current use of anticoagulant [Z79.01] 07/25/2012 Not Applicable      Problems Resolved During this Admission:       Discharged Condition: fair    Disposition: Hospice/Home    Follow Up:    Patient Instructions:   No discharge procedures on file.    Significant Diagnostic Studies: Labs: All labs within the past 24 hours have been reviewed    Pending Diagnostic Studies:     None         Medications:  Reconciled Home Medications:      Medication List      START taking these medications    haloperidol lactate 5 mg/mL injection  Commonly known as: HALDOL  Inject 1 mL (5 mg total) into the vein every 6 (six) hours as needed (combativeness).        CONTINUE taking these medications    acetaminophen 500 MG tablet  Commonly known as: TYLENOL  Take 2 tablets (1,000 mg total) by mouth 3 (three) times daily as needed for Pain.        STOP taking these medications    allopurinoL 300 MG tablet  Commonly known as: ZYLOPRIM     amiodarone 200 MG Tab  Commonly known as: PACERONE     aspirin 81 MG Chew     atorvastatin 40 MG tablet  Commonly known as: LIPITOR     calcium carbonate 600 mg calcium (1,500 mg) Tab  Commonly known as: OS-ROJAS     carvediloL 6.25 MG tablet  Commonly known as: COREG      collagenase ointment  Commonly known as: SantyL     ELIQUIS 5 mg Tab  Generic drug: apixaban     ergocalciferol 50,000 unit Cap  Commonly known as: ERGOCALCIFEROL     ferrous sulfate 325 (65 FE) MG EC tablet     fluticasone propionate 50 mcg/actuation nasal spray  Commonly known as: FLONASE     furosemide 40 MG tablet  Commonly known as: LASIX     gabapentin 300 MG capsule  Commonly known as: NEURONTIN     glimepiride 4 MG tablet  Commonly known as: AMARYL     losartan 25 MG tablet  Commonly known as: COZAAR     ONE DAILY MULTIVITAMIN per tablet  Generic drug: multivitamin     ONETOUCH ULTRA TEST Strp  Generic drug: blood sugar diagnostic     oxybutynin 10 MG 24 hr tablet  Commonly known as: DITROPAN-XL     TRIAD WOUND DRESSING TOP            Indwelling Lines/Drains at time of discharge:   Lines/Drains/Airways     Drain                 Urethral Catheter 12/16/20 1313 Straight-tip 16 Fr. 1 day                Time spent on the discharge of patient: 40 minutes  Patient was seen and examined on the date of discharge and determined to be suitable for discharge.         Hemant Valencia DO  Department of Hospital Medicine  [facility]

## 2020-12-18 PROCEDURE — 20600001 HC STEP DOWN PRIVATE ROOM

## 2020-12-18 NOTE — PLAN OF CARE
Report received from ALBERT Church. Pt AA to self, breathing even and unlabored on RA, call light in reach, bed in lowest position. Pt restraints removed per orders. Pt combative and attempted to grab at RN's arms and swung at RN when attempting pt care. Administered haldol per PRN orders. [facility] RN at bedside. VSS, frequent rounding completed. No other significant events throughout shift. Will continue to monitor.

## 2020-12-18 NOTE — PLAN OF CARE
Patient transferred to [facility] inpatient hospice bed in [facility].       12/17/20 1600   Final Note   Assessment Type Final Discharge Note   Anticipated Discharge Disposition [facility]iceMedic   Right Care Referral Info   Post Acute Recommendation Other   Referral Type Inpatient hospice   Facility Name [facility]

## 2020-12-18 NOTE — PROGRESS NOTES
"[facility]  Inpatient Progress Note    Patient Name: Hemant Kennedy Jr.  MRN: [id number]  Admission Date: 12/17/2020  Hospital Length of Stay: 1 days  Code Status: DNR   Attending Provider: Clinton Ngo MD  Hospice Diagnosis:Dementia        Assessment/Plan:     Hospice qualifying diagnosis:  Dementia    Hemant Kennedy Jr. is a unfortunate 69 yo gentleman with a history of NICM (EF 35%) HTN, DM2, AF, CVA with left hemineglect, ERIC and obesity who had resided at [facility]. He presents with signs of dehydration and HUMERA. He has developed delirium and confusion.   Discussions with the pt. Family were held and decision was made to transition care focus to comfort care. The pt. Is being admitted to hospice care.     Interval Events: Had issues with agitation and unable to take oral Rx. Yesterday afternoon. Had a uneventful night and is resting comfortably this AM.     Hospice Encounter / Goals of care discussion/Assessment and Plan:       1: Symptom Assessment:     - Pain: does not appear to be in pain     - Dyspnea: not short of breath     - Agitation: agitated at times but calm this am     - Mentation: awake but unable to converse or answer questions    3: Current Treatment regiment   Scheduled Meds:  Continuous Infusions:  PRN Meds:.glycopyrrolate, haloperidol lactate, lorazepam, morphine, morphine, morphine    4: Plan of care   Comfort oriented care at the end of life.     5: Dementia    - advanced    Subjective:       Review of Symptoms    Review of Systems   Unable to perform ROS: Acuity of condition         Objective:     Vital Signs (Most Recent):  BP (!) 82/52 (BP Location: Right arm, Patient Position: Lying)   Pulse 86   Temp 99.2 °F (37.3 °C) (Axillary)   Resp 15   Ht 5' 9" (1.753 m)   Wt 121.5 kg (267 lb 13.7 oz)   SpO2 (!) 93%   BMI 39.56 kg/m²         Physical Exam  Vitals signs and nursing note reviewed.   Constitutional:       General: He is not in acute distress.  HENT:      " Head: Normocephalic and atraumatic.      Mouth/Throat:      Mouth: Mucous membranes are dry.   Eyes:      General: No scleral icterus.     Conjunctiva/sclera: Conjunctivae normal.   Neck:      Musculoskeletal: Neck supple.   Cardiovascular:      Rate and Rhythm: Normal rate and regular rhythm.   Pulmonary:      Effort: Pulmonary effort is normal. No respiratory distress.      Breath sounds: Normal breath sounds.   Abdominal:      General: Abdomen is flat. There is no distension.   Musculoskeletal:         General: No deformity or signs of injury.   Lymphadenopathy:      Cervical: No cervical adenopathy.   Skin:     General: Skin is warm and dry.      Comments: Chronic skin changes noted.    Neurological:      General: No focal deficit present.      Mental Status: He is alert.      Cranial Nerves: No cranial nerve deficit.         Medications:    Scheduled Meds:  Continuous Infusions:  PRN Meds:.glycopyrrolate, haloperidol lactate, lorazepam, morphine, morphine, morphine           Clinton Ngo MD  Palliative Medicine  [facility]

## 2020-12-19 PROCEDURE — 63600175 PHARM REV CODE 636 W HCPCS: Performed by: INTERNAL MEDICINE

## 2020-12-19 PROCEDURE — 20600001 HC STEP DOWN PRIVATE ROOM

## 2020-12-19 RX ADMIN — LORAZEPAM 1 MG: 2 INJECTION INTRAMUSCULAR; INTRAVENOUS at 03:12

## 2020-12-19 NOTE — PLAN OF CARE
Overnight, pt slept majority of night. Resting peacefully. Respirations even and unlabored. Oral care done. Had 1 BM. Daughter at bedside. WCTM

## 2020-12-19 NOTE — NURSING
Pt lying in bed, eyes open currently with family in room. Pt awake with AM vitals. No discomfort or agitation noted today, rested peacefully with eyes closed. Oral care performed. Will continue to monitor.

## 2020-12-20 LAB
BACTERIA BLD CULT: NORMAL
BACTERIA BLD CULT: NORMAL

## 2020-12-20 PROCEDURE — 20600001 HC STEP DOWN PRIVATE ROOM

## 2020-12-20 PROCEDURE — 63600175 PHARM REV CODE 636 W HCPCS: Performed by: INTERNAL MEDICINE

## 2020-12-20 RX ADMIN — LORAZEPAM 1 MG: 2 INJECTION INTRAMUSCULAR; INTRAVENOUS at 12:12

## 2020-12-20 RX ADMIN — MORPHINE SULFATE 1 MG: 2 INJECTION, SOLUTION INTRAMUSCULAR; INTRAVENOUS at 07:12

## 2020-12-20 NOTE — NURSING
Pt resting quietly with eyes closed. RR even, nonlabored. No distress noted. Family at bedside. Pt family member called me from room because pt started shoving her; Ativan given. Calmed pt without 'snowing'. Lott in place. Will continue to monitor.

## 2020-12-20 NOTE — PLAN OF CARE
Pt refused to allow nurse to do vital signs for night shift vitals. He became very agitated when trying to put on bp cuff. Daughter at bedside. Pt is resting comfortably now 2200. See doc flow sheets for further information. Will continue to monitor

## 2020-12-20 NOTE — PROGRESS NOTES
"[facility]  Inpatient Progress Note    Patient Name: Hemant Kennedy Jr.  MRN: [id number]  Admission Date: 12/17/2020  Hospital Length of Stay: 3 days  Code Status: DNR   Attending Provider: Clinton Ngo MD  Hospice Diagnosis:Dementia        Assessment/Plan:     Hospice qualifying diagnosis:  Dementia    Hemant Kennedy Jr. is a unfortunate 71 yo gentleman with a history of NICM (EF 35%) HTN, DM2, AF, CVA with left hemineglect, ERIC and obesity who had resided at [facility]. He presents with signs of dehydration and HUMERA. He has developed delirium and confusion.   Discussions with the pt. Family were held and decision was made to transition care focus to comfort care. The pt. Is being admitted to hospice care.     Interval Events: Met with daughter at the bedside today. Pt. Is more alert and converses in short sentences. No meaningful oral intake. Discussed oral care practices with daughter    Hospice Encounter / Goals of care discussion/Assessment and Plan:       1: Symptom Assessment:     - Pain: does not appear to be in pain     - Dyspnea: not short of breath     - Agitation: resting comfortably.      - Mentation: awake but unable to converse or answer questions    3: Current Treatment regiment   Scheduled Meds:  Continuous Infusions:  PRN Meds:.glycopyrrolate, haloperidol lactate, lorazepam, morphine, morphine, morphine    4: Plan of care   Comfort oriented care at the end of life.    Will need to discuss placement in the next 1-2 days. Will approach daughter on follow up.     5: Dementia    - advanced    Subjective:       Review of Symptoms    Review of Systems   Unable to perform ROS: Acuity of condition         Objective:     Vital Signs (Most Recent):  BP (!) 98/56 (BP Location: Right arm, Patient Position: Lying)   Pulse 74   Temp 98 °F (36.7 °C) (Axillary)   Resp 17   Ht 5' 9" (1.753 m)   Wt 121.5 kg (267 lb 13.7 oz)   SpO2 (!) 93%   BMI 39.56 kg/m²         Physical Exam  Vitals " signs and nursing note reviewed.   Constitutional:       General: He is not in acute distress.  HENT:      Head: Normocephalic and atraumatic.      Mouth/Throat:      Mouth: Mucous membranes are dry.   Eyes:      General: No scleral icterus.     Conjunctiva/sclera: Conjunctivae normal.   Neck:      Musculoskeletal: Neck supple.   Cardiovascular:      Rate and Rhythm: Normal rate and regular rhythm.   Pulmonary:      Effort: Pulmonary effort is normal. No respiratory distress.      Breath sounds: Normal breath sounds.   Abdominal:      General: Abdomen is flat. There is no distension.   Musculoskeletal:         General: No deformity or signs of injury.   Lymphadenopathy:      Cervical: No cervical adenopathy.   Skin:     General: Skin is warm and dry.      Comments: Chronic skin changes noted.    Neurological:      General: No focal deficit present.      Mental Status: He is alert.      Cranial Nerves: No cranial nerve deficit.         Medications:    Scheduled Meds:  Continuous Infusions:  PRN Meds:.glycopyrrolate, haloperidol lactate, lorazepam, morphine, morphine, morphine           Clinton Ngo MD  Palliative Medicine  [facility]

## 2020-12-20 NOTE — PROGRESS NOTES
"[facility]  Inpatient Progress Note    Patient Name: Hemant Kennedy Jr.  MRN: [id number]  Admission Date: 12/17/2020  Hospital Length of Stay: 3 days  Code Status: DNR   Attending Provider: Clinton Ngo MD  Hospice Diagnosis:Dementia        Assessment/Plan:     Hospice qualifying diagnosis:  Dementia    Hemant Kennedy Jr. is a unfortunate 71 yo gentleman with a history of NICM (EF 35%) HTN, DM2, AF, CVA with left hemineglect, ERIC and obesity who had resided at [facility]. He presents with signs of dehydration and HUMERA. He has developed delirium and confusion.   Discussions with the pt. Family were held and decision was made to transition care focus to comfort care. The pt. Is being admitted to hospice care.     Interval Events: Little to no oral intake. Mild agitation controlled with ATivan and Haldol.   No distress noted today.   No family at bedside.     Hospice Encounter / Goals of care discussion/Assessment and Plan:       1: Symptom Assessment:     - Pain: does not appear to be in pain     - Dyspnea: not short of breath     - Agitation: resting comfortably.      - Mentation: awake but unable to converse or answer questions    3: Current Treatment regiment   Scheduled Meds:  Continuous Infusions:  PRN Meds:.glycopyrrolate, haloperidol lactate, lorazepam, morphine, morphine, morphine    4: Plan of care   Comfort oriented care at the end of life.    Will need to discuss placement in the next 1-2 days. Will approach daughter on follow up.     5: Dementia    - advanced    Subjective:       Review of Symptoms    Review of Systems   Unable to perform ROS: Acuity of condition         Objective:     Vital Signs (Most Recent):  BP (!) 98/56 (BP Location: Right arm, Patient Position: Lying)   Pulse 74   Temp 98 °F (36.7 °C) (Axillary)   Resp 17   Ht 5' 9" (1.753 m)   Wt 121.5 kg (267 lb 13.7 oz)   SpO2 (!) 93%   BMI 39.56 kg/m²         Physical Exam  Vitals signs and nursing note reviewed. "   Constitutional:       General: He is not in acute distress.  HENT:      Head: Normocephalic and atraumatic.      Mouth/Throat:      Mouth: Mucous membranes are dry.   Eyes:      General: No scleral icterus.     Conjunctiva/sclera: Conjunctivae normal.   Neck:      Musculoskeletal: Neck supple.   Cardiovascular:      Rate and Rhythm: Normal rate and regular rhythm.   Pulmonary:      Effort: Pulmonary effort is normal. No respiratory distress.      Breath sounds: Normal breath sounds.   Abdominal:      General: Abdomen is flat. There is no distension.   Musculoskeletal:         General: No deformity or signs of injury.   Lymphadenopathy:      Cervical: No cervical adenopathy.   Skin:     General: Skin is warm and dry.      Comments: Chronic skin changes noted.    Neurological:      General: No focal deficit present.      Mental Status: He is alert.      Cranial Nerves: No cranial nerve deficit.         Medications:    Scheduled Meds:  Continuous Infusions:  PRN Meds:.glycopyrrolate, haloperidol lactate, lorazepam, morphine, morphine, morphine           Clinton Ngo MD  Palliative Medicine  [facility]

## 2020-12-21 VITALS
SYSTOLIC BLOOD PRESSURE: 122 MMHG | RESPIRATION RATE: 18 BRPM | OXYGEN SATURATION: 95 % | HEIGHT: 69 IN | TEMPERATURE: 98 F | WEIGHT: 267.88 LBS | BODY MASS INDEX: 39.68 KG/M2 | DIASTOLIC BLOOD PRESSURE: 59 MMHG | HEART RATE: 84 BPM

## 2020-12-21 PROCEDURE — 99233 SBSQ HOSP IP/OBS HIGH 50: CPT | Mod: GW,,, | Performed by: EMERGENCY MEDICINE

## 2020-12-21 PROCEDURE — 99233 PR SUBSEQUENT HOSPITAL CARE,LEVL III: ICD-10-PCS | Mod: GW,,, | Performed by: EMERGENCY MEDICINE

## 2020-12-21 PROCEDURE — 63600175 PHARM REV CODE 636 W HCPCS: Performed by: INTERNAL MEDICINE

## 2020-12-21 RX ADMIN — HALOPERIDOL LACTATE 1 MG: 5 INJECTION, SOLUTION INTRAMUSCULAR at 12:12

## 2020-12-21 NOTE — PROGRESS NOTES
[facility]  Inpatient Progress Note    Patient Name: Hemant Kennedy Jr.  MRN: [id number]  Admission Date: 12/17/2020  Hospital Length of Stay: 4 days  Code Status: DNR   Attending Provider: Clinton Ngo MD  Hospice Diagnosis:Dementia        Assessment/Plan:     Hospice qualifying diagnosis:  Dementia    Hemant Kennedy Jr. is a unfortunate 71 yo gentleman with a history of NICM (EF 35%) HTN, DM2, AF, CVA with left hemineglect, ERIC and obesity who had resided at [facility]. He presents with signs of dehydration and HUMERA. He has developed delirium and confusion.   Discussions with the pt. Family were held and decision was made to transition care focus to comfort care. The pt. Is being admitted to hospice care.     Interval Events: Little to no oral intake. Agitation with any stimulation.  Continues to pick at gown and andrade    No distress noted today.   No family at bedside.     Hospice Encounter / Goals of care discussion/Assessment and Plan:       1: Symptom Assessment:     - Pain: does not appear to be in pain     - Dyspnea: not short of breath     - Agitation: will add daily zyprexa ODT     - Mentation: awake but unable to converse or answer questions    3: Current Treatment regiment   Scheduled Meds:  Continuous Infusions:  PRN Meds:.haloperidol lactate, lorazepam, morphine    4: Plan of care   Comfort oriented care at the end of life.    Working with family and [facility] care mgmt team for definitive placement options.  Pt with minimal needs for GIP and still has expectancy of days to possibly a week or two..     5: Dementia    - advanced; dementia related comorbidities leading to expected death    Subjective:       Review of Symptoms    Review of Systems   Unable to perform ROS: Acuity of condition         Objective:     Vital Signs (Most Recent):  BP (!) 179/102 (BP Location: Right arm, Patient Position: Lying) Comment: Pt was resisting and very tense  Pulse 91   Temp 97.7 °F (36.5 °C)  "(Axillary)   Resp 19   Ht 5' 9" (1.753 m)   Wt 121.5 kg (267 lb 13.7 oz)   SpO2 97%   BMI 39.56 kg/m²         Physical Exam  Vitals signs and nursing note reviewed.   Constitutional:       General: He is not in acute distress.  HENT:      Head: Normocephalic and atraumatic.      Mouth/Throat:      Mouth: Mucous membranes are dry.   Eyes:      General: No scleral icterus.     Conjunctiva/sclera: Conjunctivae normal.   Neck:      Musculoskeletal: Neck supple.   Cardiovascular:      Rate and Rhythm: Normal rate and regular rhythm.   Pulmonary:      Effort: Pulmonary effort is normal. No respiratory distress.      Breath sounds: Normal breath sounds.   Abdominal:      General: Abdomen is flat. There is no distension.   Musculoskeletal:         General: No deformity or signs of injury.   Lymphadenopathy:      Cervical: No cervical adenopathy.   Skin:     General: Skin is warm and dry.      Comments: Chronic skin changes noted.    Neurological:      General: No focal deficit present.      Mental Status: He is alert.      Cranial Nerves: No cranial nerve deficit.         Medications:    Scheduled Meds:  Continuous Infusions:  PRN Meds:.haloperidol lactate, lorazepam, morphine           Colin Farfan MD  Palliative Medicine  [facility]          "

## 2020-12-21 NOTE — PLAN OF CARE
Pt refused to allow nurse to do vitals. Is combative when ever touched. 1930 administered morphine per orders. Pt stated he was in pain when asked. Adjusted in bed per daughter at bedside request 2200. See doc flow sheets for further information. Will continue to monitor

## 2020-12-22 NOTE — DISCHARGE SUMMARY
Discharge Summary  [facility]      Admit Date: 12/17/2020    Discharge Date and Time: 12/21/2020 6:25 PM    Discharge Attending Physician: Clinton Ngo MD     Diagnoses:  Active Hospital Problems    Diagnosis  POA    *Dementia [F03.90]  Yes      Resolved Hospital Problems   No resolved problems to display.       Discharged Condition: discharged to hospice in facility    Hospital Course: Hemant Kennedy Jr. is a unfortunate 69 yo gentleman with a history of NICM (EF 35%) HTN, DM2, AF, CVA with left hemineglect, ERIC and obesity who had resided at [facility]. He presents with signs of dehydration and HUMERA. He has developed delirium and confusion.   Discussions with the pt. Family were held and decision was made to transition care focus to comfort care. The pt. Is being admitted to hospice care.   He was transferred to facility for ongoing hospice care.     Clinton Ngo MD  Palliative Medicine   [facility]  [phone] (cell)

## 2021-05-10 NOTE — PLAN OF CARE
Problem: Patient Care Overview  Goal: Plan of Care Review  Patient is a 67 y.o. male y/o M with HFrEF (EF 20-25% in Jun 2016), NICM declined ICD, HTN, HLD, atrial fibrillation on Eliquis, T2DM, h/o CVA in 2010, ERIC presents with generalized weakness, fatigue and AMS that started as per patient since yesterday. While at the ED laboratories drawn and patient was found with acute renal failure BUN/ Creatinine: 147/6.2 and hyperkalemic: 7.0 (No hemolysis) and thats the reason we are consult. Patient poor historian.       4/28: Admit to SICU for hyperkalemia. K shifted X2. Kayexalate given X 1.     Q1H Accuchecks.  Miconazole ointment BID to pannus and perineal area.   Outcome: Ongoing (interventions implemented as appropriate)  POC reviewed with patient. Pt remains on RA; CPAP overnight. Pt disoriented to place, time, and situation. Adequate UOP throughout shift. Bicarb gtt at 50ml/hr. See flowsheet for details. All questions answered by RN. Will continue to monitor.        aware

## 2022-08-29 NOTE — PLAN OF CARE
Problem: Patient Care Overview  Goal: Plan of Care Review  Outcome: Outcome(s) achieved Date Met: 03/26/18  POC reviewed with pt. Pt aao x 4. Bg monitored achs. No pressure ulcer noted.  Safety precautions maintained. Bed in low position wheels locked. Pt free of falls.        no

## 2023-11-12 NOTE — SUBJECTIVE & OBJECTIVE
Past Medical History:   Diagnosis Date    Anticoagulant long-term use     Arthritis     Atrial fibrillation     Cardiomyopathy     home O2    Cataract     CHF (congestive heart failure)     CKD (chronic kidney disease) stage 3, GFR 30-59 ml/min 11/27/2015    Diabetes mellitus     DVT (deep venous thrombosis)     Hypertension     Sleep apnea     Stroke 2010    with residual effects W/C bound - Right Occipital       Past Surgical History:   Procedure Laterality Date    CATARACT EXTRACTION W/  INTRAOCULAR LENS IMPLANT Left 09/18/2017    Dr. Zuniga       Review of patient's allergies indicates:   Allergen Reactions    Spironolactone      Hyperkalemia         No current facility-administered medications on file prior to encounter.      Current Outpatient Medications on File Prior to Encounter   Medication Sig    acetaminophen (TYLENOL) 500 MG tablet Take 2 tablets (1,000 mg total) by mouth 3 (three) times daily as needed for Pain.    allopurinol (ZYLOPRIM) 300 MG tablet Take 1 tablet (300 mg total) by mouth every morning.    amiodarone (PACERONE) 200 MG Tab TAKE 1 TABLET BY MOUTH IN THE MORNING    aspirin 81 MG Chew TAKE 1 TAB BY MOUTH daily    atorvastatin (LIPITOR) 40 MG tablet @@ TAKE 1 TABLET BY MOUTH daily    calcium carbonate (OS-ROJAS) 600 mg calcium (1,500 mg) Tab Take 600 mg by mouth 2 (two) times a day.    carvediloL (COREG) 6.25 MG tablet TAKE 1 TABLET BY MOUTH IN THE MORNING and TAKE 1 TABLET BY MOUTH every evening with meals    cefpodoxime (VANTIN) 200 MG tablet Take 1 tablet (200 mg total) by mouth every 12 (twelve) hours.    collagenase (SANTYL) ointment Apply topically once daily. Apply to wound daily as directed.    doxazosin (CARDURA) 2 MG tablet Take 2 mg by mouth once daily.    ELIQUIS 5 mg Tab TAKE 1 TABLET BY MOUTH twice a day    ergocalciferol (ERGOCALCIFEROL) 50,000 unit Cap Take 1 capsule (50,000 Units total) by mouth every 7 days. (Patient taking differently: Take 50,000  Units by mouth every Monday. )    ferrous sulfate 325 (65 FE) MG EC tablet Take 1 tablet (325 mg total) by mouth 2 (two) times daily with meals.    fluticasone (FLONASE) 50 mcg/actuation nasal spray 2 sprays by Each Nare route once daily. (Patient taking differently: 2 sprays by Each Nare route daily as needed for Allergies. )    furosemide (LASIX) 40 MG tablet @@ TAKE 1 TABLET BY MOUTH daily    gabapentin (NEURONTIN) 300 MG capsule Take 1 capsule (300 mg total) by mouth 3 (three) times daily.    glimepiride (AMARYL) 4 MG tablet Take 4 mg by mouth once daily.    HYDROPHILIC CREAM (TRIAD WOUND DRESSING TOP) Apply topically daily as needed.    losartan (COZAAR) 25 MG tablet Take 0.5 tablets (12.5 mg total) by mouth once daily.    multivitamin (ONE DAILY MULTIVITAMIN) per tablet Take 1 tablet by mouth once daily.    ONE TOUCH ULTRA TEST Strp Test blood sugar two times daily    oxybutynin (DITROPAN-XL) 10 MG 24 hr tablet Take 10 mg by mouth once daily.     Family History     Problem Relation (Age of Onset)    Cataracts Mother    Glaucoma Sister, Sister    Heart attack Mother        Tobacco Use    Smoking status: Never Smoker    Smokeless tobacco: Never Used   Substance and Sexual Activity    Alcohol use: No    Drug use: No    Sexual activity: Not on file     Review of Systems   Unable to perform ROS: Mental status change     Objective:     Vital Signs (Most Recent):  Temp: 98.2 °F (36.8 °C) (11/21/20 0345)  Pulse: 91 (11/21/20 0345)  Resp: 20 (11/21/20 0345)  BP: 116/80 (11/21/20 0345)  SpO2: 95 % (11/21/20 0345) Vital Signs (24h Range):  Temp:  [97.5 °F (36.4 °C)-98.2 °F (36.8 °C)] 98.2 °F (36.8 °C)  Pulse:  [] 91  Resp:  [14-21] 20  SpO2:  [88 %-98 %] 95 %  BP: (116-141)/(59-87) 116/80     Weight: (!) 158.8 kg (350 lb)  Body mass index is 51.69 kg/m².    Physical Exam  Constitutional:       General: He is not in acute distress.     Appearance: He is obese. He is not ill-appearing, toxic-appearing  or diaphoretic.   HENT:      Head: Normocephalic and atraumatic.      Nose: Nose normal.      Mouth/Throat:      Mouth: Mucous membranes are dry.   Eyes:      Extraocular Movements: Extraocular movements intact.   Neck:      Musculoskeletal: Normal range of motion.   Cardiovascular:      Rate and Rhythm: Normal rate and regular rhythm.      Pulses: Normal pulses.      Heart sounds: No murmur.   Pulmonary:      Effort: Pulmonary effort is normal. No respiratory distress.      Breath sounds: Normal breath sounds. No wheezing or rales.   Abdominal:      General: Abdomen is flat. There is no distension.      Palpations: Abdomen is soft.      Tenderness: There is no abdominal tenderness.   Musculoskeletal: Normal range of motion.         General: No swelling or tenderness.   Skin:     General: Skin is warm and dry.   Neurological:      Mental Status: He is alert. He is disoriented.      Comments: Awake and maintains eye contact. Unable to tell me his name, year or location.   Psychiatric:      Comments: Unable to assess             Significant Labs:   Blood Culture: No results for input(s): LABBLOO in the last 48 hours.  BMP:   Recent Labs   Lab 11/20/20  2147      *   K 3.8   *   CO2 28   BUN 29*   CREATININE 2.0*   CALCIUM 9.3   MG 2.3     CBC:   Recent Labs   Lab 11/20/20  2151   WBC 9.94   HGB 13.3*   HCT 43.8        CMP:   Recent Labs   Lab 11/20/20  2147   *   K 3.8   *   CO2 28      BUN 29*   CREATININE 2.0*   CALCIUM 9.3   PROT 8.1   ALBUMIN 2.8*   BILITOT 1.0   ALKPHOS 74   AST 58*   ALT 26   ANIONGAP 12   EGFRNONAA 32.4*       Significant Imaging:   Imaging Results          CT Head Without Contrast (Final result)  Result time 11/20/20 23:02:54    Final result by David Gorman MD (11/20/20 23:02:54)                 Impression:      1. No acute intracranial process.  2. Involutional changes with chronic microvascular ischemic changes in small remote right posterior  occipital cortical infarct and left frontal cortical infarct.  No significant change.      Electronically signed by: David Hamilton  Date:    11/20/2020  Time:    23:02             Narrative:    EXAMINATION:  CT HEAD WITHOUT CONTRAST    CLINICAL HISTORY:  Altered mental status;    TECHNIQUE:  Low dose axial CT images obtained throughout the head without intravenous contrast. Sagittal and coronal reconstructions were performed.    COMPARISON:  11/02/2020    FINDINGS:  Intracranial compartment:    Ventricles and sulci are normal in size for age without evidence of hydrocephalus. No extra-axial blood or fluid collections.    Moderate involutional changes with chronic microvascular ischemic changes in the periventricular white matter.    Small remote right posterior occipital cortical infarct.  Small remote left frontal cortical infarct.  No significant change.    No parenchymal mass, hemorrhage, edema or major vascular distribution infarct.    Skull/extracranial contents (limited evaluation): No fracture. Mastoid air cells and paranasal sinuses are essentially clear.                               X-Ray Chest AP Portable (Final result)  Result time 11/20/20 22:54:44    Final result by Dipak Carranza MD (11/20/20 22:54:44)                 Impression:      There is suggestion of mild prominence of the pulmonary vascular with mild interstitial infiltrates, possible mild ground-glass infiltrate at the left base however this is more likely due to soft tissue attenuation.  There is no dense consolidation, no large pleural effusion.      Electronically signed by: Dipak Carranza  Date:    11/20/2020  Time:    22:54             Narrative:    EXAMINATION:  XR CHEST AP PORTABLE    CLINICAL HISTORY:  Hypoxemia    TECHNIQUE:  Single frontal view of the chest was performed.    COMPARISON:  November 1, 2020    FINDINGS:  Single chest view is submitted.  General increased attenuation is likely due to patient body habitus.  There is  diminished depth of inspiration, there is mild rotation, when accounting for these factors the cardiomediastinal silhouette appears stable with cardiac prominence.  There is mild prominence of the pulmonary vascular with mild pattern of interstitial infiltrates, and some suspected ground-glass infiltrate at the left base although this may relate to soft tissue attenuation.  There is no large pleural effusion and no evidence for pneumothorax.  The osseous structures demonstrate chronic change.                                 Patient tolerated procedure well.

## 2025-06-02 NOTE — ASSESSMENT & PLAN NOTE
- At home, long acting 8U BID and short acting 5U TIDWM.  - LDSSI   - Diabetic diet and repeat [id number]     no

## (undated) DEVICE — GLOVE BIOGEL SKINSENSE PI 7.5

## (undated) DEVICE — Device

## (undated) DEVICE — CASSETTE INFINITI

## (undated) DEVICE — SOL BETADINE 5%

## (undated) DEVICE — GLOVE BIOGEL SKINSENSE PI 6.5

## (undated) DEVICE — SOL WATER STRL IRR 1000ML

## (undated) DEVICE — GLASSES EYE PROTECTIVE